# Patient Record
Sex: MALE | Race: WHITE | NOT HISPANIC OR LATINO | Employment: UNEMPLOYED | URBAN - METROPOLITAN AREA
[De-identification: names, ages, dates, MRNs, and addresses within clinical notes are randomized per-mention and may not be internally consistent; named-entity substitution may affect disease eponyms.]

---

## 2022-04-12 ENCOUNTER — APPOINTMENT (OUTPATIENT)
Dept: CT IMAGING | Facility: HOSPITAL | Age: 77
End: 2022-04-12

## 2022-04-12 ENCOUNTER — HOSPITAL ENCOUNTER (INPATIENT)
Facility: HOSPITAL | Age: 77
LOS: 2 days | Discharge: HOME | End: 2022-04-15
Attending: EMERGENCY MEDICINE | Admitting: STUDENT IN AN ORGANIZED HEALTH CARE EDUCATION/TRAINING PROGRAM

## 2022-04-12 DIAGNOSIS — K56.609 SMALL BOWEL OBSTRUCTION: ICD-10-CM

## 2022-04-12 DIAGNOSIS — R10.84 GENERALIZED ABDOMINAL PAIN: Primary | ICD-10-CM

## 2022-04-12 DIAGNOSIS — R93.3 ABNORMAL CT SCAN, ESOPHAGUS: ICD-10-CM

## 2022-04-12 LAB
ALBUMIN SERPL-MCNC: 4.5 G/DL (ref 3.5–5.2)
ALBUMIN/GLOB SERPL: 1.9 G/DL
ALP SERPL-CCNC: 50 U/L (ref 39–117)
ALT SERPL W P-5'-P-CCNC: 11 U/L (ref 1–41)
ANION GAP SERPL CALCULATED.3IONS-SCNC: 14 MMOL/L (ref 5–15)
APTT PPP: 22.3 SECONDS (ref 24–31)
AST SERPL-CCNC: 17 U/L (ref 1–40)
BASOPHILS # BLD AUTO: 0 10*3/MM3 (ref 0–0.2)
BASOPHILS NFR BLD AUTO: 0.7 % (ref 0–1.5)
BILIRUB SERPL-MCNC: 0.5 MG/DL (ref 0–1.2)
BUN SERPL-MCNC: 21 MG/DL (ref 8–23)
BUN/CREAT SERPL: 16.9 (ref 7–25)
CALCIUM SPEC-SCNC: 9.5 MG/DL (ref 8.6–10.5)
CHLORIDE SERPL-SCNC: 102 MMOL/L (ref 98–107)
CO2 SERPL-SCNC: 24 MMOL/L (ref 22–29)
CREAT SERPL-MCNC: 1.24 MG/DL (ref 0.76–1.27)
DEPRECATED RDW RBC AUTO: 47.3 FL (ref 37–54)
EGFRCR SERPLBLD CKD-EPI 2021: 59.9 ML/MIN/1.73
EOSINOPHIL # BLD AUTO: 0.1 10*3/MM3 (ref 0–0.4)
EOSINOPHIL NFR BLD AUTO: 2.5 % (ref 0.3–6.2)
ERYTHROCYTE [DISTWIDTH] IN BLOOD BY AUTOMATED COUNT: 14.1 % (ref 12.3–15.4)
GLOBULIN UR ELPH-MCNC: 2.4 GM/DL
GLUCOSE SERPL-MCNC: 118 MG/DL (ref 65–99)
HCT VFR BLD AUTO: 37 % (ref 37.5–51)
HGB BLD-MCNC: 12.8 G/DL (ref 13–17.7)
HOLD SPECIMEN: NORMAL
HOLD SPECIMEN: NORMAL
INR PPP: 0.96 (ref 0.93–1.1)
LIPASE SERPL-CCNC: 26 U/L (ref 13–60)
LYMPHOCYTES # BLD AUTO: 1 10*3/MM3 (ref 0.7–3.1)
LYMPHOCYTES NFR BLD AUTO: 17.6 % (ref 19.6–45.3)
MCH RBC QN AUTO: 33.6 PG (ref 26.6–33)
MCHC RBC AUTO-ENTMCNC: 34.5 G/DL (ref 31.5–35.7)
MCV RBC AUTO: 97.2 FL (ref 79–97)
MONOCYTES # BLD AUTO: 0.5 10*3/MM3 (ref 0.1–0.9)
MONOCYTES NFR BLD AUTO: 8.8 % (ref 5–12)
NEUTROPHILS NFR BLD AUTO: 4 10*3/MM3 (ref 1.7–7)
NEUTROPHILS NFR BLD AUTO: 70.4 % (ref 42.7–76)
NRBC BLD AUTO-RTO: 0.1 /100 WBC (ref 0–0.2)
PLATELET # BLD AUTO: 162 10*3/MM3 (ref 140–450)
PMV BLD AUTO: 8.7 FL (ref 6–12)
POTASSIUM SERPL-SCNC: 4.4 MMOL/L (ref 3.5–5.2)
PROT SERPL-MCNC: 6.9 G/DL (ref 6–8.5)
PROTHROMBIN TIME: 9.9 SECONDS (ref 9.6–11.7)
RBC # BLD AUTO: 3.81 10*6/MM3 (ref 4.14–5.8)
SARS-COV-2 RNA PNL SPEC NAA+PROBE: NOT DETECTED
SODIUM SERPL-SCNC: 140 MMOL/L (ref 136–145)
TROPONIN T SERPL-MCNC: <0.01 NG/ML (ref 0–0.03)
WBC NRBC COR # BLD: 5.6 10*3/MM3 (ref 3.4–10.8)
WHOLE BLOOD HOLD SPECIMEN: NORMAL

## 2022-04-12 PROCEDURE — 87635 SARS-COV-2 COVID-19 AMP PRB: CPT | Performed by: PHYSICIAN ASSISTANT

## 2022-04-12 PROCEDURE — 99284 EMERGENCY DEPT VISIT MOD MDM: CPT

## 2022-04-12 PROCEDURE — 93005 ELECTROCARDIOGRAM TRACING: CPT

## 2022-04-12 PROCEDURE — 85730 THROMBOPLASTIN TIME PARTIAL: CPT | Performed by: PHYSICIAN ASSISTANT

## 2022-04-12 PROCEDURE — 74177 CT ABD & PELVIS W/CONTRAST: CPT

## 2022-04-12 PROCEDURE — 93005 ELECTROCARDIOGRAM TRACING: CPT | Performed by: EMERGENCY MEDICINE

## 2022-04-12 PROCEDURE — 80053 COMPREHEN METABOLIC PANEL: CPT | Performed by: PHYSICIAN ASSISTANT

## 2022-04-12 PROCEDURE — 85025 COMPLETE CBC W/AUTO DIFF WBC: CPT | Performed by: PHYSICIAN ASSISTANT

## 2022-04-12 PROCEDURE — 83690 ASSAY OF LIPASE: CPT | Performed by: PHYSICIAN ASSISTANT

## 2022-04-12 PROCEDURE — 0 MORPHINE SULFATE 4 MG/ML SOLUTION: Performed by: PHYSICIAN ASSISTANT

## 2022-04-12 PROCEDURE — 85610 PROTHROMBIN TIME: CPT | Performed by: PHYSICIAN ASSISTANT

## 2022-04-12 PROCEDURE — 25010000002 ONDANSETRON PER 1 MG: Performed by: PHYSICIAN ASSISTANT

## 2022-04-12 PROCEDURE — 84484 ASSAY OF TROPONIN QUANT: CPT | Performed by: PHYSICIAN ASSISTANT

## 2022-04-12 PROCEDURE — 0 IOPAMIDOL PER 1 ML: Performed by: EMERGENCY MEDICINE

## 2022-04-12 RX ORDER — SODIUM CHLORIDE 0.9 % (FLUSH) 0.9 %
10 SYRINGE (ML) INJECTION AS NEEDED
Status: DISCONTINUED | OUTPATIENT
Start: 2022-04-12 | End: 2022-04-15 | Stop reason: HOSPADM

## 2022-04-12 RX ORDER — ONDANSETRON 2 MG/ML
4 INJECTION INTRAMUSCULAR; INTRAVENOUS ONCE
Status: COMPLETED | OUTPATIENT
Start: 2022-04-12 | End: 2022-04-12

## 2022-04-12 RX ORDER — PANTOPRAZOLE SODIUM 40 MG/10ML
40 INJECTION, POWDER, LYOPHILIZED, FOR SOLUTION INTRAVENOUS ONCE
Status: COMPLETED | OUTPATIENT
Start: 2022-04-12 | End: 2022-04-12

## 2022-04-12 RX ORDER — MORPHINE SULFATE 4 MG/ML
4 INJECTION, SOLUTION INTRAMUSCULAR; INTRAVENOUS ONCE
Status: COMPLETED | OUTPATIENT
Start: 2022-04-12 | End: 2022-04-12

## 2022-04-12 RX ADMIN — IOPAMIDOL 100 ML: 755 INJECTION, SOLUTION INTRAVENOUS at 23:14

## 2022-04-12 RX ADMIN — SODIUM CHLORIDE 1000 ML: 9 INJECTION, SOLUTION INTRAVENOUS at 21:46

## 2022-04-12 RX ADMIN — Medication 10 ML: at 21:47

## 2022-04-12 RX ADMIN — MORPHINE SULFATE 4 MG: 4 INJECTION INTRAVENOUS at 21:47

## 2022-04-12 RX ADMIN — PANTOPRAZOLE SODIUM 40 MG: 40 INJECTION, POWDER, FOR SOLUTION INTRAVENOUS at 21:47

## 2022-04-12 RX ADMIN — ONDANSETRON 4 MG: 2 INJECTION INTRAMUSCULAR; INTRAVENOUS at 21:47

## 2022-04-13 ENCOUNTER — APPOINTMENT (OUTPATIENT)
Dept: GENERAL RADIOLOGY | Facility: HOSPITAL | Age: 77
End: 2022-04-13

## 2022-04-13 ENCOUNTER — APPOINTMENT (OUTPATIENT)
Dept: CT IMAGING | Facility: HOSPITAL | Age: 77
End: 2022-04-13

## 2022-04-13 PROBLEM — R10.84 GENERALIZED ABDOMINAL PAIN: Status: ACTIVE | Noted: 2022-04-13

## 2022-04-13 LAB
ALBUMIN SERPL-MCNC: 3.6 G/DL (ref 3.5–5.2)
ALP SERPL-CCNC: 40 U/L (ref 39–117)
ALT SERPL W P-5'-P-CCNC: 11 U/L (ref 1–41)
ANION GAP SERPL CALCULATED.3IONS-SCNC: 7 MMOL/L (ref 5–15)
AST SERPL-CCNC: 19 U/L (ref 1–40)
BILIRUB CONJ SERPL-MCNC: <0.2 MG/DL (ref 0–0.3)
BILIRUB INDIRECT SERPL-MCNC: ABNORMAL MG/DL
BILIRUB SERPL-MCNC: 0.5 MG/DL (ref 0–1.2)
BILIRUB UR QL STRIP: NEGATIVE
BUN SERPL-MCNC: 24 MG/DL (ref 8–23)
BUN/CREAT SERPL: 26.1 (ref 7–25)
CALCIUM SPEC-SCNC: 8.5 MG/DL (ref 8.6–10.5)
CHLORIDE SERPL-SCNC: 108 MMOL/L (ref 98–107)
CLARITY UR: CLEAR
CO2 SERPL-SCNC: 25 MMOL/L (ref 22–29)
COLOR UR: YELLOW
CREAT SERPL-MCNC: 0.92 MG/DL (ref 0.76–1.27)
D-LACTATE SERPL-SCNC: 1 MMOL/L (ref 0.5–2)
DEPRECATED RDW RBC AUTO: 46.8 FL (ref 37–54)
EGFRCR SERPLBLD CKD-EPI 2021: 85.7 ML/MIN/1.73
ERYTHROCYTE [DISTWIDTH] IN BLOOD BY AUTOMATED COUNT: 14.1 % (ref 12.3–15.4)
GLUCOSE SERPL-MCNC: 91 MG/DL (ref 65–99)
GLUCOSE UR STRIP-MCNC: NEGATIVE MG/DL
HCT VFR BLD AUTO: 29.7 % (ref 37.5–51)
HGB BLD-MCNC: 10.4 G/DL (ref 13–17.7)
HGB UR QL STRIP.AUTO: NEGATIVE
KETONES UR QL STRIP: ABNORMAL
LEUKOCYTE ESTERASE UR QL STRIP.AUTO: NEGATIVE
MCH RBC QN AUTO: 34 PG (ref 26.6–33)
MCHC RBC AUTO-ENTMCNC: 35.2 G/DL (ref 31.5–35.7)
MCV RBC AUTO: 96.8 FL (ref 79–97)
NITRITE UR QL STRIP: NEGATIVE
PH UR STRIP.AUTO: 5.5 [PH] (ref 5–8)
PLATELET # BLD AUTO: 119 10*3/MM3 (ref 140–450)
PMV BLD AUTO: 8.4 FL (ref 6–12)
POTASSIUM SERPL-SCNC: 4.5 MMOL/L (ref 3.5–5.2)
PROT SERPL-MCNC: 5.7 G/DL (ref 6–8.5)
PROT UR QL STRIP: NEGATIVE
RBC # BLD AUTO: 3.06 10*6/MM3 (ref 4.14–5.8)
SODIUM SERPL-SCNC: 140 MMOL/L (ref 136–145)
SP GR UR STRIP: 1.06 (ref 1–1.03)
UROBILINOGEN UR QL STRIP: ABNORMAL
WBC NRBC COR # BLD: 5.4 10*3/MM3 (ref 3.4–10.8)

## 2022-04-13 PROCEDURE — 85027 COMPLETE CBC AUTOMATED: CPT | Performed by: STUDENT IN AN ORGANIZED HEALTH CARE EDUCATION/TRAINING PROGRAM

## 2022-04-13 PROCEDURE — 25010000002 METHYLNALTREXONE 12 MG/0.6ML SOLUTION: Performed by: NURSE PRACTITIONER

## 2022-04-13 PROCEDURE — 80076 HEPATIC FUNCTION PANEL: CPT | Performed by: STUDENT IN AN ORGANIZED HEALTH CARE EDUCATION/TRAINING PROGRAM

## 2022-04-13 PROCEDURE — 71045 X-RAY EXAM CHEST 1 VIEW: CPT

## 2022-04-13 PROCEDURE — 74018 RADEX ABDOMEN 1 VIEW: CPT

## 2022-04-13 PROCEDURE — 36415 COLL VENOUS BLD VENIPUNCTURE: CPT | Performed by: FAMILY MEDICINE

## 2022-04-13 PROCEDURE — 80048 BASIC METABOLIC PNL TOTAL CA: CPT | Performed by: STUDENT IN AN ORGANIZED HEALTH CARE EDUCATION/TRAINING PROGRAM

## 2022-04-13 PROCEDURE — 81003 URINALYSIS AUTO W/O SCOPE: CPT | Performed by: PHYSICIAN ASSISTANT

## 2022-04-13 PROCEDURE — 94640 AIRWAY INHALATION TREATMENT: CPT

## 2022-04-13 PROCEDURE — 94799 UNLISTED PULMONARY SVC/PX: CPT

## 2022-04-13 PROCEDURE — 99222 1ST HOSP IP/OBS MODERATE 55: CPT | Performed by: FAMILY MEDICINE

## 2022-04-13 PROCEDURE — 99223 1ST HOSP IP/OBS HIGH 75: CPT | Performed by: SURGERY

## 2022-04-13 PROCEDURE — 83605 ASSAY OF LACTIC ACID: CPT | Performed by: FAMILY MEDICINE

## 2022-04-13 PROCEDURE — 25010000002 MORPHINE PER 10 MG: Performed by: FAMILY MEDICINE

## 2022-04-13 RX ORDER — MELOXICAM 15 MG/1
15 TABLET ORAL DAILY
COMMUNITY
End: 2022-04-15 | Stop reason: HOSPADM

## 2022-04-13 RX ORDER — ALBUTEROL SULFATE 90 UG/1
2 AEROSOL, METERED RESPIRATORY (INHALATION) EVERY 6 HOURS PRN
COMMUNITY

## 2022-04-13 RX ORDER — NITROGLYCERIN 0.4 MG/1
0.4 TABLET SUBLINGUAL
Status: DISCONTINUED | OUTPATIENT
Start: 2022-04-13 | End: 2022-04-15 | Stop reason: HOSPADM

## 2022-04-13 RX ORDER — SODIUM CHLORIDE 0.9 % (FLUSH) 0.9 %
10 SYRINGE (ML) INJECTION AS NEEDED
Status: DISCONTINUED | OUTPATIENT
Start: 2022-04-13 | End: 2022-04-15 | Stop reason: HOSPADM

## 2022-04-13 RX ORDER — SODIUM CHLORIDE 9 MG/ML
75 INJECTION, SOLUTION INTRAVENOUS CONTINUOUS
Status: DISCONTINUED | OUTPATIENT
Start: 2022-04-13 | End: 2022-04-14

## 2022-04-13 RX ORDER — CLOPIDOGREL BISULFATE 75 MG/1
75 TABLET ORAL DAILY
COMMUNITY

## 2022-04-13 RX ORDER — MORPHINE SULFATE 2 MG/ML
2 INJECTION, SOLUTION INTRAMUSCULAR; INTRAVENOUS EVERY 4 HOURS PRN
Status: DISCONTINUED | OUTPATIENT
Start: 2022-04-13 | End: 2022-04-15 | Stop reason: HOSPADM

## 2022-04-13 RX ORDER — PRAVASTATIN SODIUM 40 MG
40 TABLET ORAL DAILY
COMMUNITY

## 2022-04-13 RX ORDER — ONDANSETRON 2 MG/ML
4 INJECTION INTRAMUSCULAR; INTRAVENOUS EVERY 6 HOURS PRN
Status: DISCONTINUED | OUTPATIENT
Start: 2022-04-13 | End: 2022-04-15 | Stop reason: HOSPADM

## 2022-04-13 RX ORDER — TAMSULOSIN HYDROCHLORIDE 0.4 MG/1
1 CAPSULE ORAL DAILY
COMMUNITY

## 2022-04-13 RX ORDER — ONDANSETRON 4 MG/1
4 TABLET, FILM COATED ORAL EVERY 6 HOURS PRN
Status: DISCONTINUED | OUTPATIENT
Start: 2022-04-13 | End: 2022-04-15 | Stop reason: HOSPADM

## 2022-04-13 RX ORDER — ACETAMINOPHEN 160 MG/5ML
650 SOLUTION ORAL EVERY 4 HOURS PRN
Status: DISCONTINUED | OUTPATIENT
Start: 2022-04-13 | End: 2022-04-15 | Stop reason: HOSPADM

## 2022-04-13 RX ORDER — IPRATROPIUM BROMIDE AND ALBUTEROL SULFATE 2.5; .5 MG/3ML; MG/3ML
3 SOLUTION RESPIRATORY (INHALATION) EVERY 6 HOURS PRN
Status: DISCONTINUED | OUTPATIENT
Start: 2022-04-13 | End: 2022-04-14

## 2022-04-13 RX ORDER — PANTOPRAZOLE SODIUM 40 MG/10ML
40 INJECTION, POWDER, LYOPHILIZED, FOR SOLUTION INTRAVENOUS
Status: DISCONTINUED | OUTPATIENT
Start: 2022-04-13 | End: 2022-04-13

## 2022-04-13 RX ORDER — AMLODIPINE BESYLATE 10 MG/1
10 TABLET ORAL DAILY
COMMUNITY

## 2022-04-13 RX ORDER — ACETAMINOPHEN 325 MG/1
650 TABLET ORAL EVERY 4 HOURS PRN
Status: DISCONTINUED | OUTPATIENT
Start: 2022-04-13 | End: 2022-04-15 | Stop reason: HOSPADM

## 2022-04-13 RX ORDER — IPRATROPIUM BROMIDE AND ALBUTEROL SULFATE 2.5; .5 MG/3ML; MG/3ML
3 SOLUTION RESPIRATORY (INHALATION) EVERY 4 HOURS PRN
COMMUNITY

## 2022-04-13 RX ORDER — PANTOPRAZOLE SODIUM 40 MG/10ML
40 INJECTION, POWDER, LYOPHILIZED, FOR SOLUTION INTRAVENOUS 2 TIMES DAILY
Status: DISCONTINUED | OUTPATIENT
Start: 2022-04-13 | End: 2022-04-14

## 2022-04-13 RX ORDER — TRAMADOL HYDROCHLORIDE 50 MG/1
50 TABLET ORAL EVERY 6 HOURS PRN
COMMUNITY

## 2022-04-13 RX ORDER — IPRATROPIUM BROMIDE AND ALBUTEROL SULFATE 2.5; .5 MG/3ML; MG/3ML
3 SOLUTION RESPIRATORY (INHALATION) EVERY 4 HOURS PRN
Status: CANCELLED | OUTPATIENT
Start: 2022-04-13

## 2022-04-13 RX ORDER — AMLODIPINE BESYLATE 5 MG/1
10 TABLET ORAL DAILY
Status: CANCELLED | OUTPATIENT
Start: 2022-04-13

## 2022-04-13 RX ORDER — BISACODYL 10 MG
10 SUPPOSITORY, RECTAL RECTAL ONCE
Status: COMPLETED | OUTPATIENT
Start: 2022-04-13 | End: 2022-04-13

## 2022-04-13 RX ORDER — SORBITOL SOLUTION 70 %
45 SOLUTION, ORAL MISCELLANEOUS ONCE
Status: COMPLETED | OUTPATIENT
Start: 2022-04-13 | End: 2022-04-13

## 2022-04-13 RX ORDER — ACETAMINOPHEN 650 MG/1
650 SUPPOSITORY RECTAL EVERY 4 HOURS PRN
Status: DISCONTINUED | OUTPATIENT
Start: 2022-04-13 | End: 2022-04-15 | Stop reason: HOSPADM

## 2022-04-13 RX ORDER — ALBUTEROL SULFATE 2.5 MG/3ML
2.5 SOLUTION RESPIRATORY (INHALATION) EVERY 6 HOURS PRN
Status: CANCELLED | OUTPATIENT
Start: 2022-04-13

## 2022-04-13 RX ORDER — CLOPIDOGREL BISULFATE 75 MG/1
75 TABLET ORAL DAILY
Status: CANCELLED | OUTPATIENT
Start: 2022-04-13

## 2022-04-13 RX ORDER — SODIUM CHLORIDE 0.9 % (FLUSH) 0.9 %
10 SYRINGE (ML) INJECTION EVERY 12 HOURS SCHEDULED
Status: DISCONTINUED | OUTPATIENT
Start: 2022-04-13 | End: 2022-04-15 | Stop reason: HOSPADM

## 2022-04-13 RX ORDER — ALBUTEROL SULFATE 2.5 MG/3ML
2.5 SOLUTION RESPIRATORY (INHALATION) EVERY 6 HOURS PRN
Status: DISCONTINUED | OUTPATIENT
Start: 2022-04-13 | End: 2022-04-14

## 2022-04-13 RX ORDER — CHOLECALCIFEROL (VITAMIN D3) 125 MCG
5 CAPSULE ORAL NIGHTLY PRN
Status: DISCONTINUED | OUTPATIENT
Start: 2022-04-13 | End: 2022-04-15 | Stop reason: HOSPADM

## 2022-04-13 RX ADMIN — METHYLNALTREXONE BROMIDE 8 MG: 12 INJECTION, SOLUTION SUBCUTANEOUS at 14:31

## 2022-04-13 RX ADMIN — MORPHINE SULFATE 2 MG: 2 INJECTION, SOLUTION INTRAMUSCULAR; INTRAVENOUS at 04:59

## 2022-04-13 RX ADMIN — ACETAMINOPHEN 650 MG: 325 TABLET ORAL at 23:45

## 2022-04-13 RX ADMIN — ALBUTEROL SULFATE 2.5 MG: 2.5 SOLUTION RESPIRATORY (INHALATION) at 22:03

## 2022-04-13 RX ADMIN — PANTOPRAZOLE SODIUM 40 MG: 40 INJECTION, POWDER, LYOPHILIZED, FOR SOLUTION INTRAVENOUS at 05:00

## 2022-04-13 RX ADMIN — SODIUM CHLORIDE 100 ML/HR: 9 INJECTION, SOLUTION INTRAVENOUS at 05:02

## 2022-04-13 RX ADMIN — Medication 10 ML: at 05:02

## 2022-04-13 RX ADMIN — PANTOPRAZOLE SODIUM 40 MG: 40 INJECTION, POWDER, FOR SOLUTION INTRAVENOUS at 22:21

## 2022-04-13 RX ADMIN — Medication 5 MG: at 23:45

## 2022-04-13 RX ADMIN — Medication 10 ML: at 22:21

## 2022-04-13 RX ADMIN — SORBITOL SOLUTION (BULK) 45 ML: 70 SOLUTION at 14:31

## 2022-04-13 RX ADMIN — SODIUM CHLORIDE 100 ML/HR: 9 INJECTION, SOLUTION INTRAVENOUS at 14:30

## 2022-04-13 RX ADMIN — BISACODYL 10 MG: 10 SUPPOSITORY RECTAL at 14:31

## 2022-04-13 NOTE — PLAN OF CARE
Goal Outcome Evaluation:  Plan of Care Reviewed With: patient        Progress: no change  Outcome Evaluation: Patient admitted to floor from ER for abdominal pain with possible ileus.  MD contacted for pain medication due to patients pain 10/10, NGT to ILWS with brown coffee ground output.  GI and Gen surgery consults called, will see in AM

## 2022-04-13 NOTE — PROGRESS NOTES
[facility] Medicine Services Daily Progress Note    Patient Name: Kali Garcia  : 1945  MRN: [id number]  Primary Care Physician:  Provider, No Known  Date of admission: 2022      Subjective      Chief Complaint: Abdominal pain      2022  Patient states abdominal pain is still present but improved.  States flatus is slightly present.  Minimal output from NG tube.  GI and surgery to evaluate.      ROS   Constitutional: Negative for chills, diaphoresis and fever.   Eyes: Negative for blurred vision, double vision and pain.   Cardiovascular: Negative for claudication, irregular heartbeat, leg swelling and orthopnea.   Respiratory: Negative for cough, hemoptysis and shortness of breath.    Skin: Negative for color change.   Musculoskeletal: Negative for arthritis and back pain.   Gastrointestinal: Positive for abdominal pain, constipation, nausea and vomiting.   Genitourinary: Negative for bladder incontinence, dysuria and flank pain.   Neurological: Negative for brief paralysis, focal weakness, numbness and seizures.   Psychiatric/Behavioral: Negative for altered mental status and depression. The patient has insomnia.    All other systems reviewed and are negative.    Objective      Vitals:   Temp:  [97.5 °F (36.4 °C)-97.7 °F (36.5 °C)] 97.6 °F (36.4 °C)  Heart Rate:  [61-93] 66  Resp:  [15-26] 16  BP: (103-137)/(46-78) 117/58  Flow (L/min):  [2] 2    Physical Exam  Constitutional:       General: He is not in acute distress.     Appearance: He is not ill-appearing.   HENT:      Head: Normocephalic and atraumatic.      Nose:      Comments: NG tube present     Mouth/Throat:      Pharynx: Oropharynx is clear. No oropharyngeal exudate.   Eyes:      General: No scleral icterus.  Cardiovascular:      Rate and Rhythm: Normal rate and regular rhythm.      Heart sounds: No murmur heard.    No friction rub. No gallop.   Pulmonary:      Effort: No respiratory distress.      Breath  sounds: No wheezing or rales.   Abdominal:      General: There is no distension.      Tenderness: There is abdominal tenderness. There is no guarding.   Musculoskeletal:         General: No swelling or deformity.      Cervical back: Normal range of motion. No rigidity.      Right lower leg: No edema.      Left lower leg: No edema.   Skin:     Coloration: Skin is not jaundiced.      Findings: No bruising or lesion.   Neurological:      General: No focal deficit present.      Mental Status: He is alert and oriented to person, place, and time.      Motor: No weakness.   Psychiatric:         Mood and Affect: Mood normal.         Behavior: Behavior normal.         Thought Content: Thought content normal.         Judgment: Judgment normal.             Result Review    Result Review:  I have personally reviewed the results from the time of this admission to 4/13/2022 10:50 EDT and agree with these findings:  [x]  Laboratory  []  Microbiology  [x]  Radiology  []  EKG/Telemetry   []  Cardiology/Vascular   []  Pathology  []  Old records  []  Other:            Assessment/Plan      Brief Patient Summary:  Kali Garcia is a 76 y.o. male who presented due to abdominal pain      pantoprazole, 40 mg, Intravenous, BID  sodium chloride, 10 mL, Intravenous, Q12H       sodium chloride, 100 mL/hr, Last Rate: 100 mL/hr (04/13/22 0502)         Active Hospital Problems:  Active Hospital Problems    Diagnosis    • Generalized abdominal pain      Plan:     #SBO vs Ileus    - CT a/p shows small bowel dilation suggesting ileus or obstruction    - NG placed    - NPO    - IVF     - symptoms improved     - Due to esophageal thickening will start PPI BID     - GI and surgery consulted    #Esophageal thickening    -CT a/p suggests lower esophageal thickening, recommends CT chest    - CT chest with contrast ordered    #CAD    - hold home plavix due to anemia    #Anemia    - Hgb 12.8 > 10.4, base unknown    - dilution vs 2/2 obstruction    - On  ppi BID    - watch for melena    - GI following    #HTN    - start home norvasc when cleared for PO    #HLD    - resume home statin when cleared for PO    #BP    - start Flomax when cleared for PO    #DVT prophylaxis    - stop lovenox, start SCDs    DVT prophylaxis:  Mechanical DVT prophylaxis orders are present.    CODE STATUS:    Code Status (Patient has no pulse and is not breathing): CPR (Attempt to Resuscitate)  Medical Interventions (Patient has pulse or is breathing): Full Support      Disposition:  I expect patient to be discharged when cleared by surgery.    This patient has been examined wearing appropriate Personal Protective Equipment and discussed with PAtient and nursing. 04/13/22      Electronically signed by Santiago Yanez DO, 04/13/22, 10:50 EDT.  [facility] Hospitalist Team

## 2022-04-13 NOTE — CONSULTS
General Surgery Consult Note      Name: Kali Garcia ADMIT: 2022   : 1945  PCP: Provider, No Known    MRN: [id number] LOS: 0 days   AGE/SEX: 76 y.o. male  ROOM: [address] [place] [address]      Patient Care Team:  Provider, No Known as PCP - General  Chief Complaint   Patient presents with   • Abdominal Pain       Subjective   76-year-old gentleman with about 5-week history of worsening abdominal distention reflux and constipation.  He says that he has had a deep burning in the epigastrium especially when he lays down at night.  He has had flatus and some bowel function but has had decrease in output.  He has never had abdominal surgery before.  On arrival he was found to have a distended abdominal exam.  CT scan showed distended small bowel and colon concerning for ileus or obstruction.  He was also found to have some thickening of the esophagus.  He is into the hospital for fluid resuscitation nasogastric tube decompression and myself and gastroenterology were consulted for work-up and management of his presentation.    Past Medical History:   Diagnosis Date   • COPD (chronic obstructive pulmonary disease) (HCC)    • Emphysema lung (HCC)    • Hyperlipidemia    • Hypertension    • Osteoarthritis      Past Surgical History:   Procedure Laterality Date   • CARDIAC CATHETERIZATION     • CORONARY STENT PLACEMENT     • EYE SURGERY     • FEMORAL ARTERY STENT     • KNEE SURGERY Left    • KNEE SURGERY Left    • LUNG SURGERY       History reviewed. No pertinent family history.  Social History     Tobacco Use   • Smoking status: Current Every Day Smoker     Packs/day: 1.00     Years: 63.00     Pack years: 63.00     Types: Cigarettes   Substance Use Topics   • Alcohol use: Not Currently   • Drug use: Never     Medications Prior to Admission   Medication Sig Dispense Refill Last Dose   • albuterol sulfate  (90 Base) MCG/ACT inhaler Inhale 2 puffs Every 6 (Six) Hours As Needed for Wheezing.      • amLODIPine  (NORVASC) 10 MG tablet Take 10 mg by mouth Daily.      • clopidogrel (PLAVIX) 75 MG tablet Take 75 mg by mouth Daily.      • ipratropium-albuterol (DUO-NEB) 0.5-2.5 mg/3 ml nebulizer Take 3 mL by nebulization Every 4 (Four) Hours As Needed for Wheezing.      • meloxicam (MOBIC) 15 MG tablet Take 15 mg by mouth Daily.      • mupirocin (BACTROBAN) 2 % ointment Apply 1 application topically to the appropriate area as directed 2 (Two) Times a Day.      • pravastatin (PRAVACHOL) 40 MG tablet Take 40 mg by mouth Daily.      • tamsulosin (FLOMAX) 0.4 MG capsule 24 hr capsule Take 1 capsule by mouth Daily.      • traMADol (ULTRAM) 50 MG tablet Take 50 mg by mouth Every 6 (Six) Hours As Needed for Moderate Pain .        pantoprazole, 40 mg, Intravenous, BID  sodium chloride, 10 mL, Intravenous, Q12H      sodium chloride, 100 mL/hr, Last Rate: 100 mL/hr (04/13/22 0502)      •  acetaminophen **OR** acetaminophen **OR** acetaminophen  •  melatonin  •  Morphine  •  nitroglycerin  •  ondansetron **OR** ondansetron  •  sodium chloride  •  sodium chloride  Patient has no known allergies.    Review of Systems   Constitutional: Negative for chills and fever.   HENT: Negative for sore throat and trouble swallowing.    Eyes: Negative for blurred vision and double vision.   Respiratory: Positive for shortness of breath. Negative for cough.    Cardiovascular: Negative for chest pain and leg swelling.   Gastrointestinal: Positive for abdominal distention, constipation, nausea and GERD. Negative for abdominal pain, blood in stool, diarrhea and vomiting.   Genitourinary: Negative for dysuria and hematuria.   Skin: Negative for rash and wound.   Neurological: Positive for dizziness. Negative for confusion.   Psychiatric/Behavioral: Negative for agitation and depressed mood.        Objective     Vital Signs and Labs:  Vital Signs Patient Vitals for the past 24 hrs:   BP Temp Temp src Pulse Resp SpO2 Height Weight   04/13/22 0839 117/58 97.6  "°F (36.4 °C) Oral 66 16 95 % -- --   04/13/22 0355 130/56 97.7 °F (36.5 °C) Oral 86 26 96 % -- 54.6 kg (120 lb 5.9 oz)   04/13/22 0229 130/66 -- -- 93 -- 92 % -- --   04/13/22 0202 -- -- -- -- -- 91 % -- --   04/13/22 0201 116/58 -- -- 88 -- -- -- --   04/13/22 0145 113/59 -- -- 85 -- 97 % -- --   04/13/22 0130 125/61 -- -- 83 -- 93 % -- --   04/13/22 0115 113/60 -- -- 74 -- 96 % -- --   04/13/22 0100 137/57 -- -- 86 -- 97 % -- --   04/13/22 0048 133/62 -- -- 85 -- 93 % -- --   04/13/22 0033 129/63 -- -- 84 -- 93 % -- --   04/13/22 0018 134/58 -- -- 85 -- 98 % -- --   04/13/22 0003 136/57 -- -- 86 -- 95 % -- --   04/12/22 2346 128/54 -- -- 76 -- -- -- --   04/12/22 2331 103/46 -- -- 86 -- -- -- --   04/12/22 2316 115/52 -- -- 86 -- -- -- --   04/12/22 2315 -- -- -- -- -- 92 % -- --   04/12/22 2246 110/49 -- -- 67 -- 92 % -- --   04/12/22 2232 116/53 -- -- 79 -- 92 % -- --   04/12/22 2216 126/48 -- -- 69 -- 93 % -- --   04/12/22 2147 133/61 -- -- 77 -- 93 % -- --   04/12/22 2131 128/68 -- -- 68 -- 97 % -- --   04/12/22 2116 124/63 -- -- 77 -- 100 % -- --   04/12/22 2101 134/78 -- -- 72 -- 90 % -- --   04/12/22 2056 103/68 -- -- 62 -- 98 % -- --   04/12/22 2037 121/52 97.5 °F (36.4 °C) Oral 61 15 97 % 182.9 cm (72\") 128 kg (282 lb 3 oz)       Physical Exam:  Physical Exam  Constitutional:       General: He is not in acute distress.     Appearance: He is well-developed.      Comments: Cachectic   HENT:      Head: Normocephalic and atraumatic.      Nose:      Comments: Nasogastric tube in place with gastric contents within the canister  Eyes:      General: No scleral icterus.     Conjunctiva/sclera: Conjunctivae normal.   Neck:      Trachea: No tracheal deviation.   Cardiovascular:      Rate and Rhythm: Normal rate.      Pulses: Normal pulses.   Pulmonary:      Effort: Pulmonary effort is normal. No respiratory distress.   Abdominal:      Palpations: Abdomen is soft.      Comments: Moderate distention mild general " tenderness to palpation no definitive evidence of hernia no peritoneal signs   Musculoskeletal:         General: No swelling or tenderness.      Cervical back: Neck supple. No tenderness. No muscular tenderness.   Skin:     General: Skin is warm and dry.   Neurological:      General: No focal deficit present.      Mental Status: He is alert. Mental status is at baseline.   Psychiatric:         Mood and Affect: Mood normal.         Behavior: Behavior normal.         CBC    Results from last 7 days   Lab Units 04/13/22  0922 04/12/22  2101   WBC 10*3/mm3 5.40 5.60   HEMOGLOBIN g/dL 10.4* 12.8*   PLATELETS 10*3/mm3 119* 162     BMP   Results from last 7 days   Lab Units 04/13/22  0922 04/12/22  2101   SODIUM mmol/L 140 140   POTASSIUM mmol/L 4.5 4.4   CHLORIDE mmol/L 108* 102   CO2 mmol/L 25.0 24.0   BUN mg/dL 24* 21   CREATININE mg/dL 0.92 1.24   GLUCOSE mg/dL 91 118*     Radiology(recent) CT Abdomen Pelvis With Contrast    Result Date: 4/13/2022  Impression: 1. Small bowel dilatation could reflect ileus or obstruction. No clear transition point is seen. Follow-up as clinically warranted. 2. Fluid distention of the lower esophagus may be related to possible small bowel obstruction or reflux. Questioned masslike thickening in the lower esophagus is only partially imaged. Chest CT with contrast may be helpful. Electronically signed by:  Berto Combs M.D.  4/12/2022 10:05 PM    XR Abdomen KUB    Result Date: 4/13/2022  IMPRESSION :  Nasogastric in good position.  Bowel dilation which may either be due to ileus or distal obstruction  Electronically Signed By-Damon Kumar On:4/13/2022 7:43 AM This report was finalized on [id number] by  Damon Kumar, .      I reviewed the patient's new clinical results.  I reviewed the patient's new imaging results and agree with the interpretation.    Assessment/Plan       Generalized abdominal pain      76 y.o. male 5 weeks of reflux symptoms epigastric abdominal pain and  constipation.    Unsure as the exact etiology of his symptoms.  I believe the gastroenterology is good to be seeing him for consideration for evaluation of the thickening of the lower esophagus they also may be able to help with considerations for constipation in the colon.  He does not have any history of abdominal surgery and there is no transition point on the imaging to suggest a bowel obstruction so I do not believe he has a surgical indication at present.  I will continue to follow as this is being worked up and symptoms are being managed.       This note was created using Dragon Voice Recognition software.    Wayne Alcala MD  04/13/22  12:00 EDT

## 2022-04-13 NOTE — H&P
[facility] Medicine Services      Patient Name: Kali Garcia  : 1945  MRN: [id number]  Primary Care Physician:  Provider, No Known  Date of admission: 2022      Subjective      Chief Complaint: Generalized abdominal pain and constipation    History of Present Illness: Kali Garcia is a 76 y.o. male who presented to [facility] on 2022 complaining of generalized and diffuse abdominal pain and constipation.  Patient states that the generalized abdominal pain started 4 weeks ago, and it radiates up into his esophagus.  Constipation has worsened over the past 4 weeks as well.  his last bowel movement was yesterday.  Patient states that he had to strain and it was a smaller than normal bowel movement.  Patient is also been having worsening nausea and vomiting for the past 4 weeks.  Patient reports that the vomitus is darker in quality and no blood is seen in it.  Denies any diarrhea or blood in the stool.  Patient denies any fevers, rigors, chills.      Review of Systems   Constitutional: Negative for chills, diaphoresis and fever.   Eyes: Negative for blurred vision, double vision and pain.   Cardiovascular: Negative for claudication, irregular heartbeat, leg swelling and orthopnea.   Respiratory: Negative for cough, hemoptysis and shortness of breath.    Skin: Negative for color change.   Musculoskeletal: Negative for arthritis and back pain.   Gastrointestinal: Positive for abdominal pain, constipation, nausea and vomiting.   Genitourinary: Negative for bladder incontinence, dysuria and flank pain.   Neurological: Negative for brief paralysis, focal weakness, numbness and seizures.   Psychiatric/Behavioral: Negative for altered mental status and depression. The patient has insomnia.    All other systems reviewed and are negative.       Personal History     Past Medical History:   Diagnosis Date   • COPD (chronic obstructive pulmonary disease) (HCC)    •  Hypertension        No past surgical history on file.    Family History: family history is not on file. Otherwise pertinent FHx was reviewed and not pertinent to current issue.    Social History:      Home Medications:  Prior to Admission Medications     None            Allergies:  No Known Allergies    Objective      Vitals:   Temp:  [97.5 °F (36.4 °C)] 97.5 °F (36.4 °C)  Heart Rate:  [61-93] 93  Resp:  [15] 15  BP: (103-137)/(46-78) 130/66    Physical Exam  Vitals and nursing note reviewed.   Constitutional:       General: He is in acute distress.      Appearance: He is normal weight. He is ill-appearing. He is not toxic-appearing or diaphoretic.   HENT:      Head: Normocephalic.      Nose: Nose normal.   Eyes:      Pupils: Pupils are equal, round, and reactive to light.   Cardiovascular:      Rate and Rhythm: Normal rate and regular rhythm.      Pulses: Normal pulses.      Heart sounds: Normal heart sounds.   Pulmonary:      Effort: Pulmonary effort is normal. No respiratory distress.      Breath sounds: Normal breath sounds. No stridor. No rhonchi.   Chest:      Chest wall: No tenderness.   Abdominal:      General: There is no distension.      Palpations: There is no mass.      Tenderness: There is no right CVA tenderness, left CVA tenderness, guarding or rebound.      Comments: Patient has exquisite diffuse abdominal tenderness to palpation, also mild rigidity noted   Musculoskeletal:         General: Normal range of motion.      Cervical back: Normal range of motion and neck supple. No rigidity.   Skin:     General: Skin is warm and dry.   Neurological:      General: No focal deficit present.      Mental Status: He is alert and oriented to person, place, and time. Mental status is at baseline.   Psychiatric:         Mood and Affect: Mood normal.         Behavior: Behavior normal.          Result Review    Result Review:  I have personally reviewed the results from the time of this admission to 4/13/2022 02:52  EDT and agree with these findings:  [x]  Laboratory  [x]  Microbiology  [x]  Radiology  [x]  EKG/Telemetry   []  Cardiology/Vascular   []  Pathology  []  Old records  []  Other:  Most notable findings include: CT abdomen pelvis showed small bowel dilatation which could reflect ileus or obstruction      Assessment/Plan        Active Hospital Problems:  Active Hospital Problems    Diagnosis    • Generalized abdominal pain      Plan:     SBO  -Admit patient to floor  -Vital signs per unit policy  -Bowel rest and decompression, NG tube placement ordered  -General surgical evaluation and consultation  -Appreciate all recommendations  -N.p.o., normal saline 100 cc an hour maintenance fluids, strict I's and O's  -Protonix IV  -CT Abdomen Pelvis With Contrast   Final Result    Impression:     1. Small bowel dilatation could reflect ileus or obstruction. No clear transition point is seen. Follow-up as clinically warranted.    2. Fluid distention of the lower esophagus may be related to possible small bowel obstruction or reflux. Questioned masslike thickening in the lower esophagus is only partially imaged. Chest CT with contrast may be helpful  -Will order a lactic acid  -Cont. To trend all routine labwork and infectious markers    Hypertension  -Vital signs per unit policy  -No home meds listed  -Restart home meds once verified by pharmacy    COPD/emphysema  -Patient stable on room air  -No home meds listed  -Restart home meds once verified by pharmacy    DVT prophylaxis:  Medical DVT prophylaxis orders are present.    CODE STATUS:    Code Status (Patient has no pulse and is not breathing): CPR (Attempt to Resuscitate)  Medical Interventions (Patient has pulse or is breathing): Full Support    Admission Status:  I believe this patient meets inpatient status.    I discussed the patient's findings and my recommendations with patient.        Signature: jim moreno MD , 3:00 am

## 2022-04-13 NOTE — ED PROVIDER NOTES
Subjective   Patient is a 76-year-old male who presents to the ED with complaints of abdominal pain over the past few days that has progressed over the past 24 hours.  Patient states a generalized burning type pain that he rates as severe.  Patient states the burning type pain radiates up into his esophagus patient reports associated nausea vomiting and belching.  Patient states his vomit is a little bit darker in color.  Patient reports he had a small bowel movement today but was somewhat smaller than his normal.  He denies any black or bloody stools.  Patient states he is also had some chills no known fever.  No recent travel or known sick contacts.  No urinary symptoms including dysuria or hematuria.  She does report having a colonoscopy a few years ago and he believes was unremarkable at that time.  Patient states he is currently on Plavix.  Patient denies any other alleviating or exacerbating factors of his symptoms.      History provided by:  Patient and parent      Review of Systems   Constitutional: Negative.    HENT: Negative.    Eyes: Negative for photophobia and visual disturbance.   Respiratory: Negative.    Cardiovascular: Negative.    Gastrointestinal: Positive for abdominal pain, constipation, nausea and vomiting. Negative for abdominal distention, blood in stool and diarrhea.   Genitourinary: Negative.    Musculoskeletal: Negative for back pain, neck pain and neck stiffness.   Skin: Negative.    Neurological: Negative.    Hematological: Negative.        No past medical history on file.    No Known Allergies    No past surgical history on file.    No family history on file.    Social History     Socioeconomic History   • Marital status:            Objective   Physical Exam  Vitals and nursing note reviewed.   Constitutional:       General: He is not in acute distress.     Appearance: Normal appearance. He is well-developed. He is not toxic-appearing or diaphoretic.      Comments: Chronically  "ill-appearing   HENT:      Head: Normocephalic and atraumatic.      Mouth/Throat:      Mouth: Mucous membranes are moist.      Pharynx: Oropharynx is clear.   Eyes:      General: No scleral icterus.     Extraocular Movements: Extraocular movements intact.      Pupils: Pupils are equal, round, and reactive to light.   Cardiovascular:      Rate and Rhythm: Normal rate and regular rhythm.      Pulses: Normal pulses.      Heart sounds: No murmur heard.    No friction rub. No gallop.   Pulmonary:      Effort: Pulmonary effort is normal. No tachypnea, accessory muscle usage or respiratory distress.      Breath sounds: No stridor. Examination of the right-lower field reveals wheezing. Examination of the left-lower field reveals wheezing. Wheezing present. No decreased breath sounds, rhonchi or rales.   Chest:      Chest wall: No mass, deformity, tenderness or crepitus.   Abdominal:      General: Bowel sounds are normal. There is no distension.      Palpations: Abdomen is soft. There is no mass.      Tenderness: There is generalized abdominal tenderness. There is no right CVA tenderness, left CVA tenderness, guarding or rebound. Negative signs include Steiner's sign and McBurney's sign.      Hernia: No hernia is present.   Musculoskeletal:      Cervical back: Normal range of motion and neck supple.   Skin:     General: Skin is warm.      Capillary Refill: Capillary refill takes less than 2 seconds.      Findings: No rash.   Neurological:      Mental Status: He is alert and oriented to person, place, and time.   Psychiatric:         Mood and Affect: Mood normal.         Behavior: Behavior normal.         Procedures           ED Course    /62   Pulse 85   Temp 97.5 °F (36.4 °C) (Oral)   Resp 15   Ht 182.9 cm (72\")   Wt 128 kg (282 lb 3 oz)   SpO2 93%   BMI 38.27 kg/m²   Medications   sodium chloride 0.9 % flush 10 mL (10 mL Intravenous Given 4/12/22 2147)   nitroglycerin (NITROSTAT) SL tablet 0.4 mg (has no " administration in time range)   sodium chloride 0.9 % flush 10 mL (has no administration in time range)   sodium chloride 0.9 % flush 10 mL (has no administration in time range)   acetaminophen (TYLENOL) tablet 650 mg (has no administration in time range)     Or   acetaminophen (TYLENOL) 160 MG/5ML solution 650 mg (has no administration in time range)     Or   acetaminophen (TYLENOL) suppository 650 mg (has no administration in time range)   ondansetron (ZOFRAN) tablet 4 mg (has no administration in time range)     Or   ondansetron (ZOFRAN) injection 4 mg (has no administration in time range)   melatonin tablet 5 mg (has no administration in time range)   enoxaparin (LOVENOX) syringe 40 mg (has no administration in time range)   pantoprazole (PROTONIX) injection 40 mg (has no administration in time range)   sodium chloride 0.9 % infusion (has no administration in time range)   sodium chloride 0.9 % bolus 1,000 mL (1,000 mL Intravenous New Bag 4/12/22 2146)   ondansetron (ZOFRAN) injection 4 mg (4 mg Intravenous Given 4/12/22 2147)   Morphine sulfate (PF) injection 4 mg (4 mg Intravenous Given 4/12/22 2147)   pantoprazole (PROTONIX) injection 40 mg (40 mg Intravenous Given 4/12/22 2147)   iopamidol (ISOVUE-370) 76 % injection 100 mL (100 mL Intravenous Given 4/12/22 2317)     Labs Reviewed   COMPREHENSIVE METABOLIC PANEL - Abnormal; Notable for the following components:       Result Value    Glucose 118 (*)     eGFR 59.9 (*)     All other components within normal limits    Narrative:     GFR Normal >60  Chronic Kidney Disease <60  Kidney Failure <15     APTT - Abnormal; Notable for the following components:    PTT 22.3 (*)     All other components within normal limits   CBC WITH AUTO DIFFERENTIAL - Abnormal; Notable for the following components:    RBC 3.81 (*)     Hemoglobin 12.8 (*)     Hematocrit 37.0 (*)     MCV 97.2 (*)     MCH 33.6 (*)     Lymphocyte % 17.6 (*)     All other components within normal limits    COVID-19,CEPHEID/ESTEBAN,COR/FAROOQ/PAD/DOMINIC IN-HOUSE,NP SWAB IN TRANSPORT MEDIA 3-4 HR TAT, RT-PCR - Normal    Narrative:     Fact sheet for providers: https://www.fda.gov/media/160790/download     Fact sheet for patients: https://www.fda.gov/media/813347/download  Fact sheet for providers: https://www.fda.gov/media/594274/download    Fact sheet for patients: https://www.fda.gov/media/134803/download    Test performed by PCR.   LIPASE - Normal   PROTIME-INR - Normal   TROPONIN (IN-HOUSE) - Normal    Narrative:     Troponin T Reference Range:  <= 0.03 ng/mL-   Negative for AMI  >0.03 ng/mL-     Abnormal for myocardial necrosis.  Clinicians would have to utilize clinical acumen, EKG, Troponin and serial changes to determine if it is an Acute Myocardial Infarction or myocardial injury due to an underlying chronic condition.       Results may be falsely decreased if patient taking Biotin.     RAINBOW DRAW    Narrative:     The following orders were created for panel order [place] Draw.  Procedure                               Abnormality         Status                     ---------                               -----------         ------                     Green Top (Gel)[[id number]]                                  Final result               Lavender Top[[id number]]                                     Final result               Gold Top - SST[[id number]]                                   Final result               Light Blue Top[[id number]]                                   Final result                 Please view results for these tests on the individual orders.   URINALYSIS W/ MICROSCOPIC IF INDICATED (NO CULTURE)   GREEN TOP   LAVENDER TOP   GOLD TOP - SST   LIGHT BLUE TOP   CBC AND DIFFERENTIAL    Narrative:     The following orders were created for panel order CBC & Differential.  Procedure                               Abnormality         Status                     ---------                               -----------          ------                     CBC Auto Differential[[id number]]        Abnormal            Final result                 Please view results for these tests on the individual orders.     CT Abdomen Pelvis With Contrast    Result Date: 4/13/2022  Impression: 1. Small bowel dilatation could reflect ileus or obstruction. No clear transition point is seen. Follow-up as clinically warranted. 2. Fluid distention of the lower esophagus may be related to possible small bowel obstruction or reflux. Questioned masslike thickening in the lower esophagus is only partially imaged. Chest CT with contrast may be helpful. Electronically signed by:  Berto Combs M.D.  4/12/2022 10:05 PM                                                 MDM  Number of Diagnoses or Management Options  Generalized abdominal pain  Small bowel obstruction (HCC)  Diagnosis management comments: Chart Review:  Comorbidity: As per past medical history  Differentials: DKA, intra-abdominal infection, dissection, peritonitis, peptic ulcer disease, pancreatitis, hepatitis, ischemic bowel, bowel obstruction, appendicitis     ;this list is not all inclusive and does not constitute the entirety of considered causes  ECG: Interpreted by myself and Dr. Dunbar shows sinus rhythm rate 66 normal EKG no previous to review  Labs: As above  Imaging: Was interpreted by physician and reviewed by myself:  CT Abdomen Pelvis With Contrast   Final Result    Impression:     1. Small bowel dilatation could reflect ileus or obstruction. No clear transition point is seen. Follow-up as clinically warranted.    2. Fluid distention of the lower esophagus may be related to possible small bowel obstruction or reflux. Questioned masslike thickening in the lower esophagus is only partially imaged. Chest CT with contrast may be helpful.        Electronically signed by:  Berto Combs M.D.      4/12/2022 10:05 PM     Disposition/Treatment:  Appropriate PPE was worn during exam and throughout  all encounters with the patient.  While in the ED patient was afebrile and appeared nontoxic was given morphine Protonix fluids and Zofran while in the ED.  Upon reassessment he does report some improvement of his pain.  Lab results showed normal troponin pro time INR unremarkable lipase within normal limits COVID-19 swab negative.  Metabolic panel fairly unremarkable glucose 118 otherwise kidney function liver function normal.  Metabolic panel showed WBC 5.6. patient's hemodynamically stable though hemoglobin slightly low today at 12.8 hematocrit 37.  Chart reviewed from 4 years ago show a hemoglobin 12.6.  CT of abdomen pelvis showed some small bowel dilation could reflect ileus versus obstruction on there is no clear transition point.  Some fluid distention in the lower esophagus which could be related to possible bowel obstruction or reflux did have masslike thickening in the lower esophagus recommended chest CT for further evaluation.    On reassessment patient is resting quietly does report some improvement of his abdominal pain but does report some continued nausea and belching.  Due to continued symptoms and CT finding NG tube will be placed for possible bowel obstruction as likely cause of patient's symptoms.  Surgery and GI will be consulted for in the morning.  Urinalysis pending upon admission.  Findings were discussed with the patient and family at bedside he voiced understanding of admission for further evaluation management of possible bowel obstruction.  They were in agreement with plan.  Spoke to Dr. Maharaj who agreed for admission with hospitalist group.       Amount and/or Complexity of Data Reviewed  Clinical lab tests: reviewed  Tests in the radiology section of CPT®: reviewed  Tests in the medicine section of CPT®: reviewed        Final diagnoses:   Generalized abdominal pain   Small bowel obstruction (HCC)       ED Disposition  ED Disposition     ED Disposition   Decision to Admit     Condition   --    Comment   Level of Care: Med/Surg [1]   Diagnosis: Generalized abdominal pain [027951]   Admitting Physician: SHIREEN BARRON [[id number]]   Attending Physician: HEATHER SHELTON [[id number]]   Certification: I Certify That Inpatient Hospital Services Are Medically Necessary For Greater Than 2 Midnights               No follow-up provider specified.       Medication List      No changes were made to your prescriptions during this visit.          Christ Martin PA  04/13/22 0206

## 2022-04-13 NOTE — PLAN OF CARE
Patient observed with eyes open in sitting position in chair. Rise and fall of chest observed. Dressings clean dry and intact. Hourly rounding completed this shift, no complaints or needs voiced.  Goal Outcome Evaluation:

## 2022-04-13 NOTE — CASE MANAGEMENT/SOCIAL WORK
Discharge Planning Assessment  [facility]     Patient Name: Kali Garcia  MRN: [id number]  Today's Date: 4/13/2022    Admit Date: 4/12/2022     Discharge Needs Assessment     Row Name 04/13/22 1330       Living Environment    People in Home significant other;child(concepción), adult    Current Living Arrangements home    Primary Care Provided by self    Provides Primary Care For no one    Family Caregiver if Needed child(concepción), adult;significant other    Able to Return to Prior Arrangements yes       Resource/Environmental Concerns    Resource/Environmental Concerns none       Transition Planning    Patient/Family Anticipates Transition to home with family    Patient/Family Anticipated Services at Transition none    Transportation Anticipated family or friend will provide       Discharge Needs Assessment    Readmission Within the Last 30 Days no previous admission in last 30 days    Equipment Currently Used at Home cane, straight;oxygen;walker, rolling    Concerns to be Addressed discharge planning    Anticipated Changes Related to Illness inability to care for self    Equipment Needed After Discharge none    Provided Post Acute Provider List? N/A    Provided Post Acute Provider Quality & Resource List? N/A               Discharge Plan     Row Name 04/13/22 2876       Plan    Plan Pending clinical progress    Plan Comments Spoke to patient at bedside. PCP and pharmacy confirmed. Denies dc needs at this time.              Continued Care and Services - Admitted Since 4/12/2022    Coordination has not been started for this encounter.          Demographic Summary     Row Name 04/13/22 1325       General Information    Admission Type inpatient    Arrived From emergency department    Required Notices Provided Important Message from Medicare    Referral Source admission list    Reason for Consult discharge planning    Preferred Language English               Functional Status     Row Name 04/13/22 6814       Functional Status     Usual Activity Tolerance fair    Current Activity Tolerance fair       Functional Status, IADL    Medications independent    Meal Preparation assistive person    Housekeeping assistive person    Laundry assistive person    Shopping assistive person    IADL Comments son and significant other help as needed.       Mental Status    General Appearance WDL WDL       Mental Status Summary    Recent Changes in Mental Status/Cognitive Functioning no changes              Met with patient in room wearing PPE: mask, face shield/goggles, gloves, gown.      Maintained distance greater than six feet and spent less than 15 minutes in the room.               Sakina Quinteros RN

## 2022-04-13 NOTE — CONSULTS
Nutrition Services    Patient Name: Kali Garcia  YOB: 1945  MRN: [id number]  Admission date: 4/12/2022    Comment:    Severe chronic disease related malnutrition r/t suspected increased hypermetabolism associated with COPD AEB severe muscle/fat wasting as noted on physical exam.     Patient is currently NPO for ileus vs SBO. Will monitor clinical course closely and possible need for nutrition support.     PPE Documentation        PPE Worn By Provider mask, gloves and eye protection   PPE Worn By Patient  None      CLINICAL NUTRITION ASSESSMENT      Reason for Assessment 4/13: Low BMI      H&P      Past Medical History:   Diagnosis Date   • COPD (chronic obstructive pulmonary disease) (HCC)    • Emphysema lung (HCC)    • Hyperlipidemia    • Hypertension    • Osteoarthritis        Past Surgical History:   Procedure Laterality Date   • CARDIAC CATHETERIZATION     • CORONARY STENT PLACEMENT     • EYE SURGERY     • FEMORAL ARTERY STENT     • KNEE SURGERY Left    • KNEE SURGERY Left    • LUNG SURGERY          Current Problems   SBO vs Ileus  - CT a/p shows small bowel dilation suggesting ileus or obstruction   - GI and surgery consulted     Esophageal thickening  - GI suspects chronic constipation playing role  - patient reports no BM in 3-4 weeks      Hx of COPD      Encounter Information        Trending Narrative     4/13: Visited patient today for low BMI consult. Patient reports outside of acute medical event, usually only eats two meals/day.Typical 24 hr recall: B-eggs, rausch, toast, jelly L-skips D-variety of meals, cites small pot roast + potatoes as example. Denies SOB while eating or chewing or swallowing issues. NFPE significant for severe malnutrition. Suspect may be related to hypermetabolism associated with COPD. Patient currently with NGT to suction for SBO vs ileus. If patient is to need surgery, would be a good candidate for TPN. Will continue to monitor nutritional course closely.   "    Anthropometrics        Current Height, Weight Height: 182.9 cm (72\")  Weight: 54.6 kg (120 lb 5.9 oz) (04/13/22 0355)       Ideal Body Weight (IBW) 178 lbs   Usual Body Weight (UBW) None        Trending Weight Hx     This admission: 4/13: Admit weigh of 282 lbs obvious error, visually more consistent with weight of 120 lbs.                PTA: None       Wt Readings from Last 30 Encounters:   04/13/22 0355 54.6 kg (120 lb 5.9 oz)   04/12/22 2037 128 kg (282 lb 3 oz)      BMI kg/m2 Body mass index is 16.33 kg/m².       Labs        Pertinent Labs    Results from last 7 days   Lab Units 04/13/22  0922 04/12/22  2101   SODIUM mmol/L 140 140   POTASSIUM mmol/L 4.5 4.4   CHLORIDE mmol/L 108* 102   CO2 mmol/L 25.0 24.0   BUN mg/dL 24* 21   CREATININE mg/dL 0.92 1.24   CALCIUM mg/dL 8.5* 9.5   BILIRUBIN mg/dL 0.5 0.5   ALK PHOS U/L 40 50   ALT (SGPT) U/L 11 11   AST (SGOT) U/L 19 17   GLUCOSE mg/dL 91 118*     Results from last 7 days   Lab Units 04/13/22  0922   HEMOGLOBIN g/dL 10.4*   HEMATOCRIT % 29.7*     COVID19   Date Value Ref Range Status   04/12/2022 Not Detected Not Detected - Ref. Range Final     No results found for: HGBA1C     Medications    Scheduled Medications bisacodyl, 10 mg, Rectal, Once  methylnaltrexone, 8 mg, Subcutaneous, Once  pantoprazole, 40 mg, Intravenous, BID  sodium chloride, 10 mL, Intravenous, Q12H  sorbitol, 45 mL, Nasogastric, Once        Infusions sodium chloride, 100 mL/hr, Last Rate: 100 mL/hr (04/13/22 0502)        PRN Medications •  acetaminophen **OR** acetaminophen **OR** acetaminophen  •  melatonin  •  Morphine  •  nitroglycerin  •  ondansetron **OR** ondansetron  •  sodium chloride  •  sodium chloride     Physical Findings        Trending Physical   Appearance, NFPE 4/13: NFPE completed, consistent with nutrition diagnosis of malnutrition using AND/ASPEN criteria. See MSA below.    --  Edema  None documented      Bowel Function Last BM On 4/12      Tubes NGT   " "  Chewing/Swallowing Patient denies any issues      Skin Intact        Estimated/Assessed Needs    4/13/22    Energy Requirements    Height for Calculation  Height: 182.9 cm (72\")   Weight for Calculation 54.6 kg (120 lbs)   Method for Estimation  35-40 kcals/kg    EST Needs (kcal/day) 5542-4540 kcals        Protein Requirements    Weight for Calculation 54.6 kg (120 lbs)   EST Protein Needs (g/kg) 1.5-2 g/kg    EST Daily Needs (g/day)  g        Fluid Requirements     Estimated Needs (mL/day) 1 mL/kcal-adjust based on hydration status        Fluid Deficit    Current Na Level (mEq/L)    Desired Na Level (mEq/L)    Estimated Fluid Deficit (L)       Current Nutrition Orders & Evaluation of Intake       Oral Nutrition     Food Allergies NKFA    Current PO Diet NPO Diet NPO Type: Sips with Meds   Supplement    PO Evaluation     Trending % PO Intake 4/13: 0% r/t NPO status      Enteral Nutrition    Enteral Route    TF Modular    TF Delivery Method    Current TF Order    Current Water Flush     TF Residual/Tolerance     TF Observation         Parenteral Nutrition     TPN Route    Current TPN Order    Lipids (mL/%/frequency)     MVI Frequency     Trace Element Frequency     Total # Days on TPN    TPN Observation         Nutrition Course Details    PO Diets: 4/12 to present (4/13) NPO      Nutrition Support:      Nutritional Risk Screening        NRS-2002 Score          Nutrition Diagnosis         Nutrition Dx Problem 1 Severe chronic disease related malnutrition r/t suspected increased hypermetabolism associated with COPD AEB severe muscle/fat wasting as noted on physical exam.       Nutrition Dx Problem 2        Intervention Goal         Intervention Goal(s) Diet to advance    If patient is to need surgery would be a good candidate for TPN      Nutrition Intervention        RD Action Will closely monitor nutritional course      Nutrition Prescription          Diet Prescription NPO    Supplement Prescription  "     Enteral Prescription        TPN Prescription      Monitor/Evaluation        Monitor Per protocol, I&O, Pertinent labs, Weight, Skin status, GI status, Symptoms, POC/GOC     Malnutrition Severity Assessment      Patient meets criteria for : Severe Malnutrition  Malnutrition Type (last 8 hours)     Malnutrition Severity Assessment     Row Name 04/13/22 1545       Malnutrition Severity Assessment    Malnutrition Type Chronic Disease - Related Malnutrition    Row Name 04/13/22 1545       Muscle Loss    Loss of Muscle Mass Findings Severe    [place] Region Severe - deep hollowing/scooping, lack of muscle to touch, facial bones well defined    Clavicle Bone Region Severe - protruding prominent bone    Acromion Bone Region Severe - squared shoulders, bones, and acromion process protrusion prominent    Scapular Bone Region --  supine    Dorsal Hand Region Severe - prominent depression    Patellar Region Severe - prominent bone, square looking, very little muscle definition    Anterior Thigh Region Severe - line/depression along thigh, obviously thin    Posterior Calf Region --  SCDS    Row Name 04/13/22 154       Fat Loss    Subcutaneous Fat Loss Findings Severe    Orbital Region  Severe - pronounced hollowness/depression, dark circles, loose saggy skin    Upper Arm Region Severe - mostly skin, very little space between folds, fingers touch    Thoracic & Lumbar Region Moderate - ribs visible with mild depressions, iliac crest somewhat prominent    Row Name 04/13/22 1541       Criteria Met (Must meet criteria for severity in at least 2 of these categories: M Wasting, Fat Loss, Fluid, Secondary Signs, Wt. Status, Intake)    Patient meets criteria for  Severe Malnutrition                     Electronically signed by:  Lani Bagley RD  04/13/22 14:07 EDT
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
## 2022-04-14 ENCOUNTER — INPATIENT HOSPITAL (OUTPATIENT)
Dept: URBAN - METROPOLITAN AREA HOSPITAL 84 | Facility: HOSPITAL | Age: 77
End: 2022-04-14
Payer: MEDICARE

## 2022-04-14 ENCOUNTER — ANESTHESIA EVENT (OUTPATIENT)
Dept: GASTROENTEROLOGY | Facility: HOSPITAL | Age: 77
End: 2022-04-14

## 2022-04-14 ENCOUNTER — ANESTHESIA (OUTPATIENT)
Dept: GASTROENTEROLOGY | Facility: HOSPITAL | Age: 77
End: 2022-04-14

## 2022-04-14 ENCOUNTER — APPOINTMENT (OUTPATIENT)
Dept: CT IMAGING | Facility: HOSPITAL | Age: 77
End: 2022-04-14

## 2022-04-14 DIAGNOSIS — K31.89 OTHER DISEASES OF STOMACH AND DUODENUM: ICD-10-CM

## 2022-04-14 DIAGNOSIS — K20.80 OTHER ESOPHAGITIS WITHOUT BLEEDING: ICD-10-CM

## 2022-04-14 DIAGNOSIS — K44.9 DIAPHRAGMATIC HERNIA WITHOUT OBSTRUCTION OR GANGRENE: ICD-10-CM

## 2022-04-14 DIAGNOSIS — R93.3 ABNORMAL FINDINGS ON DIAGNOSTIC IMAGING OF OTHER PARTS OF DI: ICD-10-CM

## 2022-04-14 PROBLEM — E43 SEVERE MALNUTRITION: Status: ACTIVE | Noted: 2022-04-14

## 2022-04-14 LAB
ANION GAP SERPL CALCULATED.3IONS-SCNC: 10 MMOL/L (ref 5–15)
BUN SERPL-MCNC: 20 MG/DL (ref 8–23)
BUN/CREAT SERPL: 30.3 (ref 7–25)
CALCIUM SPEC-SCNC: 7.8 MG/DL (ref 8.6–10.5)
CHLORIDE SERPL-SCNC: 110 MMOL/L (ref 98–107)
CO2 SERPL-SCNC: 24 MMOL/L (ref 22–29)
CREAT SERPL-MCNC: 0.66 MG/DL (ref 0.76–1.27)
D DIMER PPP FEU-MCNC: 1.84 MG/L (FEU) (ref 0–0.59)
DEPRECATED RDW RBC AUTO: 47.3 FL (ref 37–54)
EGFRCR SERPLBLD CKD-EPI 2021: 96.6 ML/MIN/1.73
ERYTHROCYTE [DISTWIDTH] IN BLOOD BY AUTOMATED COUNT: 14.1 % (ref 12.3–15.4)
GLUCOSE SERPL-MCNC: 97 MG/DL (ref 65–99)
HCT VFR BLD AUTO: 28.2 % (ref 37.5–51)
HGB BLD-MCNC: 10.2 G/DL (ref 13–17.7)
MCH RBC QN AUTO: 35.1 PG (ref 26.6–33)
MCHC RBC AUTO-ENTMCNC: 36.1 G/DL (ref 31.5–35.7)
MCV RBC AUTO: 97.1 FL (ref 79–97)
NT-PROBNP SERPL-MCNC: 618 PG/ML (ref 0–1800)
PLATELET # BLD AUTO: 117 10*3/MM3 (ref 140–450)
PMV BLD AUTO: 8.9 FL (ref 6–12)
POTASSIUM SERPL-SCNC: 4 MMOL/L (ref 3.5–5.2)
RBC # BLD AUTO: 2.9 10*6/MM3 (ref 4.14–5.8)
SODIUM SERPL-SCNC: 144 MMOL/L (ref 136–145)
WBC NRBC COR # BLD: 5.6 10*3/MM3 (ref 3.4–10.8)

## 2022-04-14 PROCEDURE — 25010000002 FUROSEMIDE PER 20 MG: Performed by: STUDENT IN AN ORGANIZED HEALTH CARE EDUCATION/TRAINING PROGRAM

## 2022-04-14 PROCEDURE — 85379 FIBRIN DEGRADATION QUANT: CPT | Performed by: NURSE PRACTITIONER

## 2022-04-14 PROCEDURE — 0DB68ZX EXCISION OF STOMACH, VIA NATURAL OR ARTIFICIAL OPENING ENDOSCOPIC, DIAGNOSTIC: ICD-10-PCS | Performed by: INTERNAL MEDICINE

## 2022-04-14 PROCEDURE — 83880 ASSAY OF NATRIURETIC PEPTIDE: CPT | Performed by: NURSE PRACTITIONER

## 2022-04-14 PROCEDURE — 43239 EGD BIOPSY SINGLE/MULTIPLE: CPT | Performed by: INTERNAL MEDICINE

## 2022-04-14 PROCEDURE — 94799 UNLISTED PULMONARY SVC/PX: CPT

## 2022-04-14 PROCEDURE — 71275 CT ANGIOGRAPHY CHEST: CPT

## 2022-04-14 PROCEDURE — 94664 DEMO&/EVAL PT USE INHALER: CPT

## 2022-04-14 PROCEDURE — 25010000002 PROPOFOL 1000 MG/100ML EMULSION

## 2022-04-14 PROCEDURE — 85027 COMPLETE CBC AUTOMATED: CPT | Performed by: STUDENT IN AN ORGANIZED HEALTH CARE EDUCATION/TRAINING PROGRAM

## 2022-04-14 PROCEDURE — 99232 SBSQ HOSP IP/OBS MODERATE 35: CPT | Performed by: STUDENT IN AN ORGANIZED HEALTH CARE EDUCATION/TRAINING PROGRAM

## 2022-04-14 PROCEDURE — 0 IOPAMIDOL PER 1 ML: Performed by: STUDENT IN AN ORGANIZED HEALTH CARE EDUCATION/TRAINING PROGRAM

## 2022-04-14 PROCEDURE — 88305 TISSUE EXAM BY PATHOLOGIST: CPT | Performed by: INTERNAL MEDICINE

## 2022-04-14 PROCEDURE — 99232 SBSQ HOSP IP/OBS MODERATE 35: CPT | Performed by: SURGERY

## 2022-04-14 PROCEDURE — 80048 BASIC METABOLIC PNL TOTAL CA: CPT | Performed by: STUDENT IN AN ORGANIZED HEALTH CARE EDUCATION/TRAINING PROGRAM

## 2022-04-14 RX ORDER — FUROSEMIDE 10 MG/ML
20 INJECTION INTRAMUSCULAR; INTRAVENOUS ONCE
Status: COMPLETED | OUTPATIENT
Start: 2022-04-14 | End: 2022-04-14

## 2022-04-14 RX ORDER — TAMSULOSIN HYDROCHLORIDE 0.4 MG/1
0.4 CAPSULE ORAL DAILY
Status: DISCONTINUED | OUTPATIENT
Start: 2022-04-14 | End: 2022-04-15 | Stop reason: HOSPADM

## 2022-04-14 RX ORDER — POLYETHYLENE GLYCOL 3350 17 G/17G
17 POWDER, FOR SOLUTION ORAL 2 TIMES DAILY
Status: DISCONTINUED | OUTPATIENT
Start: 2022-04-14 | End: 2022-04-15 | Stop reason: HOSPADM

## 2022-04-14 RX ORDER — LIDOCAINE HYDROCHLORIDE 10 MG/ML
INJECTION, SOLUTION EPIDURAL; INFILTRATION; INTRACAUDAL; PERINEURAL AS NEEDED
Status: DISCONTINUED | OUTPATIENT
Start: 2022-04-14 | End: 2022-04-14 | Stop reason: SURG

## 2022-04-14 RX ORDER — PANTOPRAZOLE SODIUM 40 MG/1
40 TABLET, DELAYED RELEASE ORAL
Status: DISCONTINUED | OUTPATIENT
Start: 2022-04-14 | End: 2022-04-15 | Stop reason: HOSPADM

## 2022-04-14 RX ORDER — IPRATROPIUM BROMIDE AND ALBUTEROL SULFATE 2.5; .5 MG/3ML; MG/3ML
3 SOLUTION RESPIRATORY (INHALATION)
Status: DISCONTINUED | OUTPATIENT
Start: 2022-04-14 | End: 2022-04-15

## 2022-04-14 RX ORDER — SODIUM CHLORIDE 9 MG/ML
INJECTION, SOLUTION INTRAVENOUS CONTINUOUS PRN
Status: DISCONTINUED | OUTPATIENT
Start: 2022-04-14 | End: 2022-04-14 | Stop reason: SURG

## 2022-04-14 RX ORDER — ATORVASTATIN CALCIUM 10 MG/1
10 TABLET, FILM COATED ORAL NIGHTLY
Status: DISCONTINUED | OUTPATIENT
Start: 2022-04-14 | End: 2022-04-15 | Stop reason: HOSPADM

## 2022-04-14 RX ORDER — GLYCOPYRROLATE 0.2 MG/ML
INJECTION INTRAMUSCULAR; INTRAVENOUS AS NEEDED
Status: DISCONTINUED | OUTPATIENT
Start: 2022-04-14 | End: 2022-04-14 | Stop reason: SURG

## 2022-04-14 RX ORDER — PROPOFOL 10 MG/ML
INJECTION, EMULSION INTRAVENOUS AS NEEDED
Status: DISCONTINUED | OUTPATIENT
Start: 2022-04-14 | End: 2022-04-14 | Stop reason: SURG

## 2022-04-14 RX ADMIN — IPRATROPIUM BROMIDE AND ALBUTEROL SULFATE 3 ML: 2.5; .5 SOLUTION RESPIRATORY (INHALATION) at 10:52

## 2022-04-14 RX ADMIN — IOPAMIDOL 100 ML: 755 INJECTION, SOLUTION INTRAVENOUS at 01:17

## 2022-04-14 RX ADMIN — TAMSULOSIN HYDROCHLORIDE 0.4 MG: 0.4 CAPSULE ORAL at 11:42

## 2022-04-14 RX ADMIN — Medication 10 ML: at 22:06

## 2022-04-14 RX ADMIN — Medication 10 ML: at 10:20

## 2022-04-14 RX ADMIN — POLYETHYLENE GLYCOL 3350 17 G: 17 POWDER, FOR SOLUTION ORAL at 22:06

## 2022-04-14 RX ADMIN — PANTOPRAZOLE SODIUM 40 MG: 40 INJECTION, POWDER, FOR SOLUTION INTRAVENOUS at 10:20

## 2022-04-14 RX ADMIN — PROPOFOL 30 MG: 10 INJECTION, EMULSION INTRAVENOUS at 12:51

## 2022-04-14 RX ADMIN — LIDOCAINE HYDROCHLORIDE 50 MG: 10 INJECTION, SOLUTION EPIDURAL; INFILTRATION; INTRACAUDAL; PERINEURAL at 12:48

## 2022-04-14 RX ADMIN — ATORVASTATIN CALCIUM 10 MG: 10 TABLET, FILM COATED ORAL at 22:02

## 2022-04-14 RX ADMIN — PANTOPRAZOLE SODIUM 40 MG: 40 TABLET, DELAYED RELEASE ORAL at 17:24

## 2022-04-14 RX ADMIN — FUROSEMIDE 20 MG: 10 INJECTION, SOLUTION INTRAMUSCULAR; INTRAVENOUS at 10:20

## 2022-04-14 RX ADMIN — SODIUM CHLORIDE: 0.9 INJECTION, SOLUTION INTRAVENOUS at 12:46

## 2022-04-14 RX ADMIN — Medication 5 MG: at 22:02

## 2022-04-14 RX ADMIN — GLYCOPYRROLATE 0.2 MG: 0.2 INJECTION INTRAMUSCULAR; INTRAVENOUS at 12:48

## 2022-04-14 RX ADMIN — IPRATROPIUM BROMIDE AND ALBUTEROL SULFATE 3 ML: .5; 3 SOLUTION RESPIRATORY (INHALATION) at 06:38

## 2022-04-14 RX ADMIN — ACETAMINOPHEN 650 MG: 325 TABLET ORAL at 22:02

## 2022-04-14 RX ADMIN — IPRATROPIUM BROMIDE AND ALBUTEROL SULFATE 3 ML: 2.5; .5 SOLUTION RESPIRATORY (INHALATION) at 19:14

## 2022-04-14 RX ADMIN — PROPOFOL 80 MG: 10 INJECTION, EMULSION INTRAVENOUS at 12:48

## 2022-04-14 NOTE — PROGRESS NOTES
[facility] Medicine Services Daily Progress Note    Patient Name: Kali Garcia  : 1945  MRN: [id number]  Primary Care Physician:  Provider, No Known  Date of admission: 2022      Subjective      Chief Complaint: Abdominal pain      2022  Patient states abdominal pain is still present but improved.  States flatus is slightly present.  Minimal output from NG tube.  GI and surgery to evaluate.      2022  NG tube removed. Pain improved. Patient became dyspneic overnight and put on 3LNC, he wears 2.5 at home.  CT PE without PE but does show severe emphysema with lower esophageal wall thickening.  Plan for EGD today.    ROS   Constitutional: Negative for chills, diaphoresis and fever.   Eyes: Negative for blurred vision, double vision and pain.   Cardiovascular: Negative for claudication, irregular heartbeat, leg swelling and orthopnea.   Respiratory: Negative for cough, hemoptysis and shortness of breath.    Skin: Negative for color change.   Musculoskeletal: Negative for arthritis and back pain.   Gastrointestinal: Positive for abdominal pain, constipation, nausea and vomiting.   Genitourinary: Negative for bladder incontinence, dysuria and flank pain.   Neurological: Negative for brief paralysis, focal weakness, numbness and seizures.   Psychiatric/Behavioral: Negative for altered mental status and depression. The patient has insomnia.    All other systems reviewed and are negative.    Objective      Vitals:   Temp:  [97.5 °F (36.4 °C)-98.5 °F (36.9 °C)] 98.2 °F (36.8 °C)  Heart Rate:  [65-89] 73  Resp:  [15-22] 16  BP: (124-144)/(55-64) 129/55  Flow (L/min):  [3-6] 3    Physical Exam  Constitutional:       General: He is not in acute distress.     Appearance: He is not ill-appearing.   HENT:      Head: Normocephalic and atraumatic.      Nose:      Comments: NG tube removed     Mouth/Throat:      Pharynx: Oropharynx is clear. No oropharyngeal exudate.   Eyes:       General: No scleral icterus.  Cardiovascular:      Rate and Rhythm: Normal rate and regular rhythm.      Heart sounds: No murmur heard.    No friction rub. No gallop.   Pulmonary:      Effort: No respiratory distress.      Breath sounds: No wheezing or rales.      Comments: Diminished breath sounds b/l  Patient on 3L NC  Abdominal:      General: There is no distension.      Tenderness: There is abdominal tenderness. There is no guarding.   Musculoskeletal:         General: No swelling or deformity.      Cervical back: Normal range of motion. No rigidity.      Right lower leg: No edema.      Left lower leg: No edema.   Skin:     Coloration: Skin is not jaundiced.      Findings: No bruising or lesion.   Neurological:      General: No focal deficit present.      Mental Status: He is alert and oriented to person, place, and time.      Motor: No weakness.   Psychiatric:         Mood and Affect: Mood normal.         Behavior: Behavior normal.         Thought Content: Thought content normal.         Judgment: Judgment normal.             Result Review    Result Review:  I have personally reviewed the results from the time of this admission to 4/14/2022 11:39 EDT and agree with these findings:  [x]  Laboratory  []  Microbiology  [x]  Radiology  []  EKG/Telemetry   []  Cardiology/Vascular   []  Pathology  []  Old records  []  Other:            Assessment/Plan      Brief Patient Summary:  Kali Garcia is a 76 y.o. male who presented due to abdominal pain      atorvastatin, 10 mg, Oral, Nightly  ipratropium-albuterol, 3 mL, Nebulization, 4x Daily - RT  pantoprazole, 40 mg, Intravenous, BID  sodium chloride, 10 mL, Intravenous, Q12H  tamsulosin, 0.4 mg, Oral, Daily             Active Hospital Problems:  Active Hospital Problems    Diagnosis    • **Abnormal CT scan, esophagus      Added automatically from request for surgery [id number]     • Severe malnutrition (CMS/HCC)    • Generalized abdominal pain      Plan:     #SBO vs  Ileus    - CT a/p shows small bowel dilation suggesting ileus or obstruction    - NG placed, has been removed    - NPO    - IVF     - symptoms improved     - Due to esophageal thickening will start PPI BID     - GI and surgery consulted    - No plans for surgery per surgery    - GI plans EGD today    #Esophageal thickening    -CT a/p suggests lower esophageal thickening, recommends CT chest    - CT PE shows esophageal wall thickening    - GI plans EGD today    #COPD, chronic    - patient wears 2.5L NC at home    - currently on 3L NC    - Duonebs QID    - given lasix once due to dyspnea and elevated proBNP    #CAD    - hold home plavix due to anemia    #Anemia    - Hgb 12.8 > 10.4 > 10.2, base unknown    - dilution vs 2/2 obstruction    - On ppi BID    - watch for melena    - GI following    #HTN    - hold home norvasc as BP stable    #HLD    - resume home statin when cleared for PO    #BP    - start Flomax when cleared for PO    #DVT prophylaxis    - stop lovenox, start SCDs    DVT prophylaxis:  Mechanical DVT prophylaxis orders are present.    CODE STATUS:    Code Status (Patient has no pulse and is not breathing): CPR (Attempt to Resuscitate)  Medical Interventions (Patient has pulse or is breathing): Full Support      Disposition:  I expect patient to be discharged when cleared by surgery and GI    This patient has been examined wearing appropriate Personal Protective Equipment and discussed with PAtient and nursing. 04/14/22      Electronically signed by Santiago Yanez DO, 04/14/22, 11:39 EDT.  [facility] Hospitalist Team

## 2022-04-14 NOTE — PLAN OF CARE
Goal Outcome Evaluation:  Plan of Care Reviewed With: patient        Progress: no change  Outcome Evaluation: Patient had a increase in SOA and had labored breathing, MD contacted and orders placed for labs and imaging,  IVF were stopped due to SOA, D-Dimer came back elevated, CT PE protocol ordered showing no PE.  MD ordered home nebulizers and patient did improve after receiving treatment.  NGT to ALL

## 2022-04-14 NOTE — PROGRESS NOTES
Nutrition Services    Patient Name: Kali Garcia  YOB: 1945  MRN: [id number]  Admission date: 4/12/2022    PPE Documentation        PPE Worn By Provider Did not enter room for this encounter   PPE Worn By Patient  N/A     PROGRESS NOTE      Encounter Information: Progress note to check on plan for nutrition. Pt had EGD today, revealing esophagitis and gastritis. Pt also with ongoing constipation, though he did have a small bowel movement 4/13. Medications are now in place to support bowel function, as well as PPI to address gastritis. At this point, pt remains NPO; will monitor for readiness to resume PO diet. Since bowels are now moving, pt may be able to resume PO soon.       PO Diet: NPO Diet NPO Type: Strict NPO   PO Supplements: None ordered    PO Intake:  NPO       Nutrition support orders:    Nutrition support review:        Labs (reviewed below): Reviewed, management per attending         GI Function:  Stool Output  Stool Unmeasured Occurrence: 1 (04/13/22 2300)  Stool Amount: small (04/13/22 2300)   Abd distension improving, per surgeon's note today 4/14       Nutrition Intervention: Continue NPO until approved by physician for diet advancement.     RD continues to follow closely to monitor for possible need for nutrition support.       Results from last 7 days   Lab Units 04/14/22  0008 04/13/22  0922 04/12/22  2101   SODIUM mmol/L 144 140 140   POTASSIUM mmol/L 4.0 4.5 4.4   CHLORIDE mmol/L 110* 108* 102   CO2 mmol/L 24.0 25.0 24.0   BUN mg/dL 20 24* 21   CREATININE mg/dL 0.66* 0.92 1.24   CALCIUM mg/dL 7.8* 8.5* 9.5   BILIRUBIN mg/dL  --  0.5 0.5   ALK PHOS U/L  --  40 50   ALT (SGPT) U/L  --  11 11   AST (SGOT) U/L  --  19 17   GLUCOSE mg/dL 97 91 118*     Results from last 7 days   Lab Units 04/14/22  0008   HEMOGLOBIN g/dL 10.2*   HEMATOCRIT % 28.2*     COVID19   Date Value Ref Range Status   04/12/2022 Not Detected Not Detected - Ref. Range Final     No results found for:  HGBA1C    RD to follow up per protocol.    Electronically signed by:  Laurie Jaime RD  04/14/22 14:37 EDT

## 2022-04-14 NOTE — ANESTHESIA POSTPROCEDURE EVALUATION
Patient: Kali Garcia    Procedure Summary     Date: 04/14/22 Room / Location: [facility] ENDOSCOPY 4 / [facility] ENDOSCOPY    Anesthesia Start: 1246 Anesthesia Stop: 1254    Procedure: ESOPHAGOGASTRODUODENOSCOPY WITH BIOPSY X1 AREA (N/A ) Diagnosis:       Abnormal CT scan, esophagus      (Abnormal CT scan, esophagus [R93.3])    Surgeons: Luz Jacques MD Provider: Donovan Brito MD    Anesthesia Type: MAC ASA Status: 3          Anesthesia Type: MAC    Vitals  Vitals Value Taken Time   /50 04/14/22 1337   Temp     Pulse 71 04/14/22 1339   Resp 27 04/14/22 1337   SpO2 95 % 04/14/22 1338   Vitals shown include unvalidated device data.        Post Anesthesia Care and Evaluation    Patient location during evaluation: PACU  Patient participation: complete - patient participated  Level of consciousness: awake  Pain scale: See nurse's notes for pain score.  Pain management: adequate  Airway patency: patent  Anesthetic complications: No anesthetic complications  PONV Status: none  Cardiovascular status: acceptable  Respiratory status: acceptable  Hydration status: acceptable    Comments: Patient seen and examined postoperatively; vital signs stable; SpO2 greater than or equal to 90%; cardiopulmonary status stable; nausea/vomiting adequately controlled; pain adequately controlled; no apparent anesthesia complications; patient discharged from anesthesia care when discharge criteria were met

## 2022-04-14 NOTE — OP NOTE
ESOPHAGOGASTRODUODENOSCOPY Procedure Report    Patient Name:  Kali Garcia  YOB: 1945    Date of Surgery:  4/14/2022     Pre-Op Diagnosis:  Abnormal CT scan, esophagus [R93.3]         Procedure/CPT® Codes:      Procedure(s):  ESOPHAGOGASTRODUODENOSCOPY WITH BIOPSY X1 AREA    Staff:  Surgeon(s):  Luz Jacques MD      Anesthesia: Monitored Anesthesia Care    Description of Procedure:  A description of the procedure as well as risks, benefits and alternative methods were explained to the patient who voiced understanding and signed the corresponding consent form. A physical exam was performed and vital signs were monitored throughout the procedure.    An upper GI endoscope was placed into the mouth and proceeded through the esophagus, stomach and second portion of the duodenum without difficulty. The scope was then retroflexed and the fundus was visualized. The procedure was not difficult and there were no immediate complications.  Esophageal mucosa did show some changes of severe LA grade D erosive esophagitis with a 3 to 4 cm hiatal hernia.  Multiple erosion congestion as well as gastritis was seen in the fundus and the body area biopsy was performed.  Duodenal mucosa looks normal first and the second part of duodenum.      Impression:  1.  Grade D erosive esophagitis.  2.  Hiatal hernia.  3.  Changes suggestive of gastritis.    Recommendations:  Follow up with GI clinic as needed  Follow up with PCP as scheduled  MiraLAX twice a day  Follow up with biopsy report  Continue with the PPI twice a day for at least 8 weeks and subsequently will be changed to once a day.  Patient may need to be on at least low-dose PPI for long-term.  He will be scheduled for outpatient colonoscopy.  Call if needed.      Luz Jacques MD     Date: 4/14/2022    Time: 12:51 EDT

## 2022-04-14 NOTE — PROGRESS NOTES
General Surgery Progress Note    Name: Kali Garcia ADMIT: 2022   : 1945  PCP: Provider, No Known    MRN: [id number] LOS: 1 days   AGE/SEX: 76 y.o. male  ROOM: [address] [place] [address]    Chief Complaint   Patient presents with   • Abdominal Pain     Subjective     76 y.o. male admitted with weakness poor p.o. intake and constipation.      Says he feels better today after his bowels have moved but he still feeling very weak.  No fevers or chills nausea vomiting no worsening abdominal pain.  Abdominal distention is improving.    Objective     Scheduled Medications:   furosemide, 20 mg, Intravenous, Once  ipratropium-albuterol, 3 mL, Nebulization, 4x Daily - RT  pantoprazole, 40 mg, Intravenous, BID  sodium chloride, 10 mL, Intravenous, Q12H        Active Infusions:       As Needed Medications:  •  acetaminophen **OR** acetaminophen **OR** acetaminophen  •  melatonin  •  Morphine  •  nitroglycerin  •  ondansetron **OR** ondansetron  •  sodium chloride  •  sodium chloride    Vital Signs  Vital Signs Patient Vitals for the past 24 hrs:   BP Temp Temp src Pulse Resp SpO2   22 0900 129/55 98.2 °F (36.8 °C) Oral 69 15 94 %   22 0643 -- -- -- -- 16 --   22 0638 -- -- -- 65 18 95 %   22 0552 124/57 98.5 °F (36.9 °C) Oral 66 22 95 %   22 0100 134/57 98.1 °F (36.7 °C) -- 69 18 96 %   22 2207 -- -- -- 89 18 97 %   22 2203 -- -- -- 85 18 97 %   22 2100 144/56 98.4 °F (36.9 °C) -- 80 21 96 %   22 1705 144/64 97.5 °F (36.4 °C) Oral 73 19 100 %   22 1240 130/58 98.1 °F (36.7 °C) Oral 82 18 90 %       Physical Exam:  Physical Exam  Constitutional:       Comments: Thin and frail   HENT:      Head: Normocephalic and atraumatic.   Eyes:      General: No scleral icterus.     Conjunctiva/sclera: Conjunctivae normal.   Cardiovascular:      Rate and Rhythm: Normal rate.   Pulmonary:      Effort: Pulmonary effort is normal. No respiratory distress.   Abdominal:       General: There is no distension.      Palpations: Abdomen is soft.      Tenderness: There is no abdominal tenderness.   Skin:     General: Skin is warm and dry.   Neurological:      General: No focal deficit present.      Mental Status: Mental status is at baseline.   Psychiatric:         Mood and Affect: Mood normal.         Behavior: Behavior normal.         Results Review:     CBC    Results from last 7 days   Lab Units 04/14/22  0008 04/13/22 0922 04/12/22  2101   WBC 10*3/mm3 5.60 5.40 5.60   HEMOGLOBIN g/dL 10.2* 10.4* 12.8*   PLATELETS 10*3/mm3 117* 119* 162     BMP   Results from last 7 days   Lab Units 04/14/22  0008 04/13/22 0922 04/12/22  2101   SODIUM mmol/L 144 140 140   POTASSIUM mmol/L 4.0 4.5 4.4   CHLORIDE mmol/L 110* 108* 102   CO2 mmol/L 24.0 25.0 24.0   BUN mg/dL 20 24* 21   CREATININE mg/dL 0.66* 0.92 1.24   GLUCOSE mg/dL 97 91 118*     Radiology(recent) CT Abdomen Pelvis With Contrast    Result Date: 4/13/2022  Impression: 1. Small bowel dilatation could reflect ileus or obstruction. No clear transition point is seen. Follow-up as clinically warranted. 2. Fluid distention of the lower esophagus may be related to possible small bowel obstruction or reflux. Questioned masslike thickening in the lower esophagus is only partially imaged. Chest CT with contrast may be helpful. Electronically signed by:  Berto Combs M.D.  4/12/2022 10:05 PM    XR Chest 1 View    Result Date: 4/13/2022  Suspected emphysematous changes without definite acute cardiopulmonary abnormality.  Electronically Signed By-Evelyn Angulo MD On:4/13/2022 10:02 PM This report was finalized on [id number] by  Evelyn Angulo MD.    CT Angiogram Chest Pulmonary Embolism    Result Date: 4/14/2022  1. No pulmonary embolism. 2. Centrilobular emphysema. An irregular nodular area in the right upper lobe warrants follow-up or further evaluation with tissue sampling. 3. Lower esophageal wall thickening. Correlate for reflux or  esophagitis. Electronically signed by:  Berto Combs M.D.  4/14/2022 12:02 AM    XR Abdomen KUB    Result Date: 4/13/2022  IMPRESSION :  Nasogastric in good position.  Bowel dilation which may either be due to ileus or distal obstruction  Electronically Signed By-Damon Kumar On:4/13/2022 7:43 AM This report was finalized on [id number] by  Damon Kumar, .      I reviewed the patient's new clinical results.  I reviewed the patient's new imaging results and agree with the interpretation.    Assessment/Plan       Abnormal CT scan, esophagus    Generalized abdominal pain    Severe malnutrition (CMS/HCC)      76 y.o. male with nausea vomiting constipation    I reviewed gastroenterology's note appreciate their work involvement.  We will review endoscopy reports when they are available.  From my standpoint no surgery is indicated at present so will essentially follow peripherally unless new findings would change circumstances.        This note was created using Dragon Voice Recognition software.    Wayne Alcala MD  04/14/22  10:09 EDT

## 2022-04-14 NOTE — ANESTHESIA PREPROCEDURE EVALUATION
Anesthesia Evaluation     Patient summary reviewed and Nursing notes reviewed   NPO Solid Status: > 8 hours  NPO Liquid Status: > 8 hours           Airway   Mallampati: I  TM distance: >3 FB  Neck ROM: full  No difficulty expected  Dental - normal exam     Pulmonary - normal exam   (+) a smoker Current Abstained day of surgery, COPD,   Cardiovascular - normal exam    (+) hypertension,       Neuro/Psych- negative ROS  GI/Hepatic/Renal/Endo - negative ROS     Musculoskeletal (-) negative ROS    Abdominal  - normal exam    Bowel sounds: normal.   Substance History - negative use     OB/GYN negative ob/gyn ROS         Other                        Anesthesia Plan    ASA 3     MAC     intravenous induction     Anesthetic plan, all risks, benefits, and alternatives have been provided, discussed and informed consent has been obtained with: patient.    Plan discussed with CAA.        CODE STATUS:    Code Status (Patient has no pulse and is not breathing): CPR (Attempt to Resuscitate)  Medical Interventions (Patient has pulse or is breathing): Full Support

## 2022-04-15 VITALS
BODY MASS INDEX: 17.41 KG/M2 | HEART RATE: 72 BPM | OXYGEN SATURATION: 97 % | TEMPERATURE: 97.6 F | SYSTOLIC BLOOD PRESSURE: 112 MMHG | DIASTOLIC BLOOD PRESSURE: 45 MMHG | WEIGHT: 128.53 LBS | HEIGHT: 72 IN | RESPIRATION RATE: 22 BRPM

## 2022-04-15 LAB
ANION GAP SERPL CALCULATED.3IONS-SCNC: 11 MMOL/L (ref 5–15)
BUN SERPL-MCNC: 18 MG/DL (ref 8–23)
BUN/CREAT SERPL: 25.7 (ref 7–25)
CALCIUM SPEC-SCNC: 8.3 MG/DL (ref 8.6–10.5)
CHLORIDE SERPL-SCNC: 105 MMOL/L (ref 98–107)
CO2 SERPL-SCNC: 25 MMOL/L (ref 22–29)
CREAT SERPL-MCNC: 0.7 MG/DL (ref 0.76–1.27)
DEPRECATED RDW RBC AUTO: 45.5 FL (ref 37–54)
EGFRCR SERPLBLD CKD-EPI 2021: 94.9 ML/MIN/1.73
ERYTHROCYTE [DISTWIDTH] IN BLOOD BY AUTOMATED COUNT: 13.7 % (ref 12.3–15.4)
GLUCOSE SERPL-MCNC: 80 MG/DL (ref 65–99)
HCT VFR BLD AUTO: 27.3 % (ref 37.5–51)
HGB BLD-MCNC: 9.9 G/DL (ref 13–17.7)
LAB AP CASE REPORT: NORMAL
MCH RBC QN AUTO: 34.7 PG (ref 26.6–33)
MCHC RBC AUTO-ENTMCNC: 36.1 G/DL (ref 31.5–35.7)
MCV RBC AUTO: 95.9 FL (ref 79–97)
PATH REPORT.FINAL DX SPEC: NORMAL
PATH REPORT.GROSS SPEC: NORMAL
PLATELET # BLD AUTO: 117 10*3/MM3 (ref 140–450)
PMV BLD AUTO: 9 FL (ref 6–12)
POTASSIUM SERPL-SCNC: 3.6 MMOL/L (ref 3.5–5.2)
RBC # BLD AUTO: 2.85 10*6/MM3 (ref 4.14–5.8)
SODIUM SERPL-SCNC: 141 MMOL/L (ref 136–145)
WBC NRBC COR # BLD: 4.9 10*3/MM3 (ref 3.4–10.8)

## 2022-04-15 PROCEDURE — 94799 UNLISTED PULMONARY SVC/PX: CPT

## 2022-04-15 PROCEDURE — 85027 COMPLETE CBC AUTOMATED: CPT | Performed by: STUDENT IN AN ORGANIZED HEALTH CARE EDUCATION/TRAINING PROGRAM

## 2022-04-15 PROCEDURE — 99239 HOSP IP/OBS DSCHRG MGMT >30: CPT | Performed by: STUDENT IN AN ORGANIZED HEALTH CARE EDUCATION/TRAINING PROGRAM

## 2022-04-15 PROCEDURE — 80048 BASIC METABOLIC PNL TOTAL CA: CPT | Performed by: STUDENT IN AN ORGANIZED HEALTH CARE EDUCATION/TRAINING PROGRAM

## 2022-04-15 RX ORDER — POLYETHYLENE GLYCOL 3350 17 G/17G
17 POWDER, FOR SOLUTION ORAL 2 TIMES DAILY
Qty: 510 G | Refills: 1 | Status: SHIPPED | OUTPATIENT
Start: 2022-04-15

## 2022-04-15 RX ORDER — PANTOPRAZOLE SODIUM 40 MG/1
40 TABLET, DELAYED RELEASE ORAL
Qty: 112 TABLET | Refills: 0 | Status: SHIPPED | OUTPATIENT
Start: 2022-04-15 | End: 2022-06-10

## 2022-04-15 RX ORDER — IPRATROPIUM BROMIDE AND ALBUTEROL SULFATE 2.5; .5 MG/3ML; MG/3ML
3 SOLUTION RESPIRATORY (INHALATION)
Status: DISCONTINUED | OUTPATIENT
Start: 2022-04-15 | End: 2022-04-15 | Stop reason: HOSPADM

## 2022-04-15 RX ORDER — PANTOPRAZOLE SODIUM 40 MG/1
40 TABLET, DELAYED RELEASE ORAL DAILY
Qty: 30 TABLET | Refills: 2 | Status: SHIPPED | OUTPATIENT
Start: 2022-06-10

## 2022-04-15 RX ADMIN — IPRATROPIUM BROMIDE AND ALBUTEROL SULFATE 3 ML: 2.5; .5 SOLUTION RESPIRATORY (INHALATION) at 08:18

## 2022-04-15 RX ADMIN — PANTOPRAZOLE SODIUM 40 MG: 40 TABLET, DELAYED RELEASE ORAL at 08:26

## 2022-04-15 RX ADMIN — IPRATROPIUM BROMIDE AND ALBUTEROL SULFATE 3 ML: .5; 3 SOLUTION RESPIRATORY (INHALATION) at 11:31

## 2022-04-15 RX ADMIN — TAMSULOSIN HYDROCHLORIDE 0.4 MG: 0.4 CAPSULE ORAL at 08:26

## 2022-04-15 RX ADMIN — POLYETHYLENE GLYCOL 3350 17 G: 17 POWDER, FOR SOLUTION ORAL at 08:26

## 2022-04-15 RX ADMIN — Medication 10 ML: at 08:26

## 2022-04-15 RX ADMIN — ALUMINA, MAGNESIA, AND SIMETHICONE: 2400; 2400; 240 SUSPENSION ORAL at 13:10

## 2022-04-15 NOTE — PLAN OF CARE
Goal Outcome Evaluation:  Plan of Care Reviewed With: patient        Progress: improving  Outcome Evaluation: Patient has had improvements with pain and is tolerating clear liquid diet well, able to titrate oxygen demands down to 1L NC

## 2022-04-15 NOTE — DISCHARGE SUMMARY
[facility] Medicine Services  DISCHARGE SUMMARY    Patient Name: Kali Garcia  : 1945  MRN: [id number]    Date of Admission: 2022  Discharge Diagnosis: erosive esophagitis  Date of Discharge:  04/15/2022  Primary Care Physician: Provider, No Known      Presenting Problem:   Small bowel obstruction (HCC) [K56.609]  Generalized abdominal pain [R10.84]    Active and Resolved Hospital Problems:  Active Hospital Problems    Diagnosis POA   • **Abnormal CT scan, esophagus [R93.3] Unknown   • Severe malnutrition (CMS/HCC) [E43] Yes   • Generalized abdominal pain [R10.84] Yes      Resolved Hospital Problems   No resolved problems to display.         Hospital Course     Hospital Course:  Kali Garcia is a 76 y.o. male who presented to [facility] on 2022 due to abdominal pain.  CT a/p showed ileus vs obstruction with lower esophageal thickening.  NG was placed and patient admitted with GI and surgery evaluation.  Started on IVF.  No surgical intervention warranted per surgery. CT chest ruled out PE but did confirm lower esophageal thickening.  NG removed on 2022.  EGD done by GI and revealed erosive esophagitis.  Started on PPI BID and diet slowly advanced.  Patient was tolerating diet and feeling much improved on 04/15/2022, he was discharged home in agreement with plan below.    See daily progress notes for further detail        DISCHARGE Follow Up Recommendations for labs and diagnostics:       - Follow up with PCP in 5-7 days  - Follow up with Dr. Jacques within one month to assess biopsy report and consider colonoscopy  - Take Protonix 40mg PO BID for 8 weeks then take one daily indefinitely  - Take Miralax 17g packet twice a day  - Stop taking Mobic        Day of Discharge     Vital Signs:  Temp:  [97.6 °F (36.4 °C)-98.4 °F (36.9 °C)] 98.2 °F (36.8 °C)  Heart Rate:  [57-76] 57  Resp:  [15-27] 18  BP: ()/(32-54) 108/40  Flow (L/min):  [1-3] 1    Physical  Exam:  Physical Exam  Constitutional:       General: He is not in acute distress.     Appearance: He is not toxic-appearing.   HENT:      Head: Normocephalic and atraumatic.      Nose: Nose normal. No congestion.      Mouth/Throat:      Pharynx: Oropharynx is clear. No oropharyngeal exudate.   Eyes:      General: No scleral icterus.  Cardiovascular:      Rate and Rhythm: Normal rate and regular rhythm.      Heart sounds: No murmur heard.    No friction rub. No gallop.   Pulmonary:      Effort: No respiratory distress.      Breath sounds: No wheezing or rales.      Comments: On 1L NC  Abdominal:      General: There is no distension.      Tenderness: There is no abdominal tenderness. There is no guarding.   Musculoskeletal:         General: No swelling or deformity.      Cervical back: Normal range of motion. No rigidity.      Right lower leg: No edema.      Left lower leg: No edema.   Skin:     Coloration: Skin is not jaundiced.      Findings: No bruising or lesion.   Neurological:      General: No focal deficit present.      Mental Status: He is alert and oriented to person, place, and time.      Motor: No weakness.   Psychiatric:         Mood and Affect: Mood normal.         Behavior: Behavior normal.         Thought Content: Thought content normal.         Judgment: Judgment normal.           Pertinent  and/or Most Recent Results     LAB RESULTS:      Lab 04/15/22  0338 04/14/22  0008 04/13/22  0922 04/13/22  0343 04/12/22  2101   WBC 4.90 5.60 5.40  --  5.60   HEMOGLOBIN 9.9* 10.2* 10.4*  --  12.8*   HEMATOCRIT 27.3* 28.2* 29.7*  --  37.0*   PLATELETS 117* 117* 119*  --  162   NEUTROS ABS  --   --   --   --  4.00   LYMPHS ABS  --   --   --   --  1.00   MONOS ABS  --   --   --   --  0.50   EOS ABS  --   --   --   --  0.10   MCV 95.9 97.1* 96.8  --  97.2*   LACTATE  --   --   --  1.0  --    PROTIME  --   --   --   --  9.9   APTT  --   --   --   --  22.3*         Lab 04/15/22  0338 04/14/22  0008 04/13/22  0922  04/12/22  2101   SODIUM 141 144 140 140   POTASSIUM 3.6 4.0 4.5 4.4   CHLORIDE 105 110* 108* 102   CO2 25.0 24.0 25.0 24.0   ANION GAP 11.0 10.0 7.0 14.0   BUN 18 20 24* 21   CREATININE 0.70* 0.66* 0.92 1.24   EGFR 94.9 96.6 85.7 59.9*   GLUCOSE 80 97 91 118*   CALCIUM 8.3* 7.8* 8.5* 9.5         Lab 04/13/22  0922 04/12/22  2101   TOTAL PROTEIN 5.7* 6.9   ALBUMIN 3.60 4.50   GLOBULIN  --  2.4   ALT (SGPT) 11 11   AST (SGOT) 19 17   BILIRUBIN 0.5 0.5   BILIRUBIN DIRECT <0.2  --    ALK PHOS 40 50   LIPASE  --  26         Lab 04/14/22  0008 04/12/22 2101   PROBNP 618.0  --    TROPONIN T  --  <0.010   PROTIME  --  9.9   INR  --  0.96                 Brief Urine Lab Results  (Last result in the past 365 days)      Color   Clarity   Blood   Leuk Est   Nitrite   Protein   CREAT   Urine HCG        04/13/22 0500 Yellow   Clear   Negative   Negative   Negative   Negative               Microbiology Results (last 10 days)     Procedure Component Value - Date/Time    COVID-19,CEPHEID/ESTEBAN,COR/FAROOQ/PAD/DOMINIC IN-HOUSE(OR EMERGENT/ADD-ON),NP SWAB IN TRANSPORT MEDIA 3-4 HR TAT, RT-PCR - Swab, Nasopharynx [[id number]]  (Normal) Collected: 04/12/22 2151    Lab Status: Final result Specimen: Swab from Nasopharynx Updated: 04/12/22 2228     COVID19 Not Detected    Narrative:      Fact sheet for providers: https://www.fda.gov/media/861182/download     Fact sheet for patients: https://www.fda.gov/media/419944/download  Fact sheet for providers: https://www.fda.gov/media/758333/download    Fact sheet for patients: https://www.fda.gov/media/548602/download    Test performed by PCR.          CT Abdomen Pelvis With Contrast    Result Date: 4/13/2022  Impression: Impression: 1. Small bowel dilatation could reflect ileus or obstruction. No clear transition point is seen. Follow-up as clinically warranted. 2. Fluid distention of the lower esophagus may be related to possible small bowel obstruction or reflux. Questioned masslike thickening in the  lower esophagus is only partially imaged. Chest CT with contrast may be helpful. Electronically signed by:  Berto Combs M.D.  4/12/2022 10:05 PM    XR Chest 1 View    Result Date: 4/13/2022  Impression: Suspected emphysematous changes without definite acute cardiopulmonary abnormality.  Electronically Signed By-Evelyn Angulo MD On:4/13/2022 10:02 PM This report was finalized on [id number] by  Evelyn Angulo MD.    CT Angiogram Chest Pulmonary Embolism    Result Date: 4/14/2022  Impression: 1. No pulmonary embolism. 2. Centrilobular emphysema. An irregular nodular area in the right upper lobe warrants follow-up or further evaluation with tissue sampling. 3. Lower esophageal wall thickening. Correlate for reflux or esophagitis. Electronically signed by:  Berto Combs M.D.  4/14/2022 12:02 AM    XR Abdomen KUB    Result Date: 4/13/2022  Impression: IMPRESSION :  Nasogastric in good position.  Bowel dilation which may either be due to ileus or distal obstruction  Electronically Signed By-Damon Kumar On:4/13/2022 7:43 AM This report was finalized on [id number] by  Damon Kumar, .                  Labs Pending at Discharge:  Pending Labs     Order Current Status    Tissue Pathology Exam In process          Procedures Performed  Procedure(s):  ESOPHAGOGASTRODUODENOSCOPY WITH BIOPSY X1 AREA  04/14 1243 UPPER GI ENDOSCOPY      Consults:   Consults     Date and Time Order Name Status Description    4/13/2022  1:25 AM Gastroenterology (on-call MD unless specified)      4/13/2022  1:25 AM Surgery (on-call MD unless specified)      4/13/2022  1:25 AM Hospitalist (on-call MD unless specified)              Discharge Details        Discharge Medications      New Medications      Instructions Start Date   pantoprazole 40 MG EC tablet  Commonly known as: PROTONIX   40 mg, Oral, 2 Times Daily Before Meals      pantoprazole 40 MG EC tablet  Commonly known as: Protonix   40 mg, Oral, Daily   Start Date: Angelica 10, 2022      polyethylene glycol 17 g packet  Commonly known as: MIRALAX   17 g, Oral, 2 Times Daily         Continue These Medications      Instructions Start Date   albuterol sulfate  (90 Base) MCG/ACT inhaler  Commonly known as: PROVENTIL HFA;VENTOLIN HFA;PROAIR HFA   2 puffs, Inhalation, Every 6 Hours PRN      amLODIPine 10 MG tablet  Commonly known as: NORVASC   10 mg, Oral, Daily      clopidogrel 75 MG tablet  Commonly known as: PLAVIX   75 mg, Oral, Daily      ipratropium-albuterol 0.5-2.5 mg/3 ml nebulizer  Commonly known as: DUO-NEB   3 mL, Nebulization, Every 4 Hours PRN      mupirocin 2 % ointment  Commonly known as: BACTROBAN   1 application, Topical, 2 Times Daily      pravastatin 40 MG tablet  Commonly known as: PRAVACHOL   40 mg, Oral, Daily      tamsulosin 0.4 MG capsule 24 hr capsule  Commonly known as: FLOMAX   1 capsule, Oral, Daily      traMADol 50 MG tablet  Commonly known as: ULTRAM   50 mg, Oral, Every 6 Hours PRN         Stop These Medications    meloxicam 15 MG tablet  Commonly known as: MOBIC            No Known Allergies      Discharge Disposition: Home  Home or Self Care   Condition on discharge: GOOD    Diet:  Hospital:  Diet Order   Procedures   • Diet Gastrointestinal; Low Residue         Discharge Activity:   Activity Instructions     Activity as Tolerated              CODE STATUS:  Code Status and Medical Interventions:   Ordered at: 04/13/22 0153     Code Status (Patient has no pulse and is not breathing):    CPR (Attempt to Resuscitate)     Medical Interventions (Patient has pulse or is breathing):    Full Support         No future appointments.    Additional Instructions for the Follow-ups that You Need to Schedule     Call MD With Problems / Concerns   As directed      Instructions: Call [phone] or email [email or web address] for problems or concerns.    Order Comments: Instructions: Call [phone] or email [email or web address] for problems or concerns.           Discharge Follow-up with PCP   As directed       Currently Documented PCP:    Provider, No Known    PCP Phone Number:    None     Follow Up Details: Follow up with PCP in 5-7 days         Discharge Follow-up with Specified Provider: Dr. Jacques; 1 Month   As directed      To: Dr. Jacques    Follow Up: 1 Month    Follow Up Details: Follow up with GI within one month to assess biopsy report and consider colonoscopy               Time spent on Discharge including face to face service:  35 minutes    This patient has been examined wearing appropriate Personal Protective Equipment and discussed with Patient. 04/15/22      Signature: Electronically signed by Santiago Yanez DO, 04/15/22, 1:06 PM EDT.

## 2022-04-17 LAB — QT INTERVAL: 382 MS

## 2022-04-18 NOTE — CASE MANAGEMENT/SOCIAL WORK
Case Management Discharge Note      Final Note: home    Provided Post Acute Provider List?: N/A  Provided Post Acute Provider Quality & Resource List?: N/A            Transportation Services  Private: Car    Final Discharge Disposition Code: 01 - home or self-care

## 2022-05-16 ENCOUNTER — OFFICE (OUTPATIENT)
Dept: URBAN - METROPOLITAN AREA CLINIC 64 | Facility: CLINIC | Age: 77
End: 2022-05-16
Payer: MEDICARE

## 2022-05-16 VITALS
SYSTOLIC BLOOD PRESSURE: 117 MMHG | HEART RATE: 68 BPM | HEIGHT: 71 IN | WEIGHT: 121 LBS | DIASTOLIC BLOOD PRESSURE: 58 MMHG

## 2022-05-16 DIAGNOSIS — R10.84 GENERALIZED ABDOMINAL PAIN: ICD-10-CM

## 2022-05-16 DIAGNOSIS — R11.2 NAUSEA WITH VOMITING, UNSPECIFIED: ICD-10-CM

## 2022-05-16 PROCEDURE — 99213 OFFICE O/P EST LOW 20 MIN: CPT | Performed by: NURSE PRACTITIONER

## 2022-05-16 RX ORDER — PANTOPRAZOLE SODIUM 40 MG/1
40 TABLET, DELAYED RELEASE ORAL
Qty: 90 | Refills: 3 | Status: ACTIVE
Start: 2022-05-16

## 2022-08-02 ENCOUNTER — LAB (OUTPATIENT)
Dept: LAB | Facility: HOSPITAL | Age: 77
End: 2022-08-02

## 2022-08-02 LAB — SARS-COV-2 ORF1AB RESP QL NAA+PROBE: NOT DETECTED

## 2022-08-02 PROCEDURE — U0004 COV-19 TEST NON-CDC HGH THRU: HCPCS

## 2022-08-02 PROCEDURE — C9803 HOPD COVID-19 SPEC COLLECT: HCPCS

## 2022-08-04 ENCOUNTER — HOSPITAL ENCOUNTER (OUTPATIENT)
Facility: HOSPITAL | Age: 77
Setting detail: HOSPITAL OUTPATIENT SURGERY
Discharge: HOME | End: 2022-08-04
Attending: INTERNAL MEDICINE | Admitting: INTERNAL MEDICINE

## 2022-08-04 ENCOUNTER — ON CAMPUS - OUTPATIENT (OUTPATIENT)
Dept: URBAN - METROPOLITAN AREA HOSPITAL 85 | Facility: HOSPITAL | Age: 77
End: 2022-08-04
Payer: MEDICARE

## 2022-08-04 ENCOUNTER — ANESTHESIA (OUTPATIENT)
Dept: GASTROENTEROLOGY | Facility: HOSPITAL | Age: 77
End: 2022-08-04

## 2022-08-04 ENCOUNTER — ANESTHESIA EVENT (OUTPATIENT)
Dept: GASTROENTEROLOGY | Facility: HOSPITAL | Age: 77
End: 2022-08-04

## 2022-08-04 VITALS
BODY MASS INDEX: 15.77 KG/M2 | RESPIRATION RATE: 16 BRPM | WEIGHT: 112.66 LBS | HEART RATE: 50 BPM | SYSTOLIC BLOOD PRESSURE: 141 MMHG | DIASTOLIC BLOOD PRESSURE: 61 MMHG | OXYGEN SATURATION: 97 % | TEMPERATURE: 97.4 F | HEIGHT: 71 IN

## 2022-08-04 DIAGNOSIS — K57.30 DIVERTICULOSIS OF LARGE INTESTINE WITHOUT PERFORATION OR ABS: ICD-10-CM

## 2022-08-04 DIAGNOSIS — K59.00 CONSTIPATION, UNSPECIFIED: ICD-10-CM

## 2022-08-04 DIAGNOSIS — K59.00 CONSTIPATION: ICD-10-CM

## 2022-08-04 DIAGNOSIS — K22.10 ULCER OF ESOPHAGUS WITHOUT BLEEDING: ICD-10-CM

## 2022-08-04 DIAGNOSIS — R19.4 CHANGE IN BOWEL HABIT: ICD-10-CM

## 2022-08-04 DIAGNOSIS — K44.9 DIAPHRAGMATIC HERNIA WITHOUT OBSTRUCTION OR GANGRENE: ICD-10-CM

## 2022-08-04 DIAGNOSIS — K55.21 ANGIODYSPLASIA OF COLON WITH HEMORRHAGE: ICD-10-CM

## 2022-08-04 DIAGNOSIS — K20.90 ESOPHAGITIS, UNSPECIFIED WITHOUT BLEEDING: ICD-10-CM

## 2022-08-04 DIAGNOSIS — K25.9 GASTRIC ULCER, UNSPECIFIED AS ACUTE OR CHRONIC, WITHOUT HEMO: ICD-10-CM

## 2022-08-04 DIAGNOSIS — K20.90 ESOPHAGITIS: ICD-10-CM

## 2022-08-04 PROCEDURE — C1889 IMPLANT/INSERT DEVICE, NOC: HCPCS | Performed by: INTERNAL MEDICINE

## 2022-08-04 PROCEDURE — 25010000002 PROPOFOL 10 MG/ML EMULSION

## 2022-08-04 PROCEDURE — 45382 COLONOSCOPY W/CONTROL BLEED: CPT | Performed by: INTERNAL MEDICINE

## 2022-08-04 PROCEDURE — 43239 EGD BIOPSY SINGLE/MULTIPLE: CPT | Performed by: INTERNAL MEDICINE

## 2022-08-04 PROCEDURE — 88305 TISSUE EXAM BY PATHOLOGIST: CPT | Performed by: INTERNAL MEDICINE

## 2022-08-04 DEVICE — DEV CLIP HEMO DURACLIP REPOSTNG COLONOSCOPE 16MM 235CM: Type: IMPLANTABLE DEVICE | Site: CECUM | Status: FUNCTIONAL

## 2022-08-04 RX ORDER — SODIUM CHLORIDE 9 MG/ML
INJECTION, SOLUTION INTRAVENOUS CONTINUOUS PRN
Status: DISCONTINUED | OUTPATIENT
Start: 2022-08-04 | End: 2022-08-04 | Stop reason: SURG

## 2022-08-04 RX ORDER — MEPERIDINE HYDROCHLORIDE 25 MG/ML
12.5 INJECTION INTRAMUSCULAR; INTRAVENOUS; SUBCUTANEOUS
Status: DISCONTINUED | OUTPATIENT
Start: 2022-08-04 | End: 2022-08-04 | Stop reason: HOSPADM

## 2022-08-04 RX ORDER — FENTANYL CITRATE 50 UG/ML
25 INJECTION, SOLUTION INTRAMUSCULAR; INTRAVENOUS
Status: DISCONTINUED | OUTPATIENT
Start: 2022-08-04 | End: 2022-08-04 | Stop reason: HOSPADM

## 2022-08-04 RX ORDER — IPRATROPIUM BROMIDE AND ALBUTEROL SULFATE 2.5; .5 MG/3ML; MG/3ML
3 SOLUTION RESPIRATORY (INHALATION) ONCE AS NEEDED
Status: DISCONTINUED | OUTPATIENT
Start: 2022-08-04 | End: 2022-08-04 | Stop reason: HOSPADM

## 2022-08-04 RX ORDER — LABETALOL HYDROCHLORIDE 5 MG/ML
5 INJECTION, SOLUTION INTRAVENOUS
Status: DISCONTINUED | OUTPATIENT
Start: 2022-08-04 | End: 2022-08-04 | Stop reason: HOSPADM

## 2022-08-04 RX ORDER — FLUMAZENIL 0.1 MG/ML
0.1 INJECTION INTRAVENOUS AS NEEDED
Status: DISCONTINUED | OUTPATIENT
Start: 2022-08-04 | End: 2022-08-04 | Stop reason: HOSPADM

## 2022-08-04 RX ORDER — LIDOCAINE HYDROCHLORIDE 20 MG/ML
INJECTION, SOLUTION EPIDURAL; INFILTRATION; INTRACAUDAL; PERINEURAL AS NEEDED
Status: DISCONTINUED | OUTPATIENT
Start: 2022-08-04 | End: 2022-08-04 | Stop reason: SURG

## 2022-08-04 RX ORDER — PROPOFOL 10 MG/ML
VIAL (ML) INTRAVENOUS AS NEEDED
Status: DISCONTINUED | OUTPATIENT
Start: 2022-08-04 | End: 2022-08-04 | Stop reason: SURG

## 2022-08-04 RX ORDER — MIDAZOLAM HYDROCHLORIDE 1 MG/ML
1 INJECTION INTRAMUSCULAR; INTRAVENOUS
Status: DISCONTINUED | OUTPATIENT
Start: 2022-08-04 | End: 2022-08-04 | Stop reason: HOSPADM

## 2022-08-04 RX ORDER — NALOXONE HCL 0.4 MG/ML
0.4 VIAL (ML) INJECTION AS NEEDED
Status: DISCONTINUED | OUTPATIENT
Start: 2022-08-04 | End: 2022-08-04 | Stop reason: HOSPADM

## 2022-08-04 RX ORDER — ONDANSETRON 2 MG/ML
4 INJECTION INTRAMUSCULAR; INTRAVENOUS ONCE AS NEEDED
Status: DISCONTINUED | OUTPATIENT
Start: 2022-08-04 | End: 2022-08-04 | Stop reason: HOSPADM

## 2022-08-04 RX ORDER — HYDRALAZINE HYDROCHLORIDE 20 MG/ML
5 INJECTION INTRAMUSCULAR; INTRAVENOUS
Status: DISCONTINUED | OUTPATIENT
Start: 2022-08-04 | End: 2022-08-04 | Stop reason: HOSPADM

## 2022-08-04 RX ORDER — OXYCODONE HYDROCHLORIDE 5 MG/1
10 TABLET ORAL EVERY 4 HOURS PRN
Status: DISCONTINUED | OUTPATIENT
Start: 2022-08-04 | End: 2022-08-04 | Stop reason: HOSPADM

## 2022-08-04 RX ORDER — DIPHENHYDRAMINE HYDROCHLORIDE 50 MG/ML
12.5 INJECTION INTRAMUSCULAR; INTRAVENOUS
Status: DISCONTINUED | OUTPATIENT
Start: 2022-08-04 | End: 2022-08-04 | Stop reason: HOSPADM

## 2022-08-04 RX ADMIN — SODIUM CHLORIDE: 0.9 INJECTION, SOLUTION INTRAVENOUS at 11:48

## 2022-08-04 RX ADMIN — LIDOCAINE HYDROCHLORIDE 50 MG: 20 INJECTION, SOLUTION EPIDURAL; INFILTRATION; INTRACAUDAL; PERINEURAL at 11:52

## 2022-08-04 RX ADMIN — PROPOFOL 200 MG: 10 INJECTION, EMULSION INTRAVENOUS at 11:52

## 2022-08-04 NOTE — ANESTHESIA POSTPROCEDURE EVALUATION
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 and endoscopic clipping x 1 (N/A Rectum) Diagnosis:       Constipation      Change in bowel habit      Esophagitis      (Constipation [K59.00])      (Change in bowel habit [R19.4])      (Esophagitis [K20.90])    Surgeons: Luz Jacques MD Provider: Haleigh Sheriff MD    Anesthesia Type: MAC ASA Status: 3          Anesthesia Type: MAC    Vitals  Vitals Value Taken Time   /61 08/04/22 1300   Temp     Pulse 50 08/04/22 1300   Resp 16 08/04/22 1300   SpO2 97 % 08/04/22 1300           Post Anesthesia Care and Evaluation    Patient location during evaluation: PACU  Patient participation: complete - patient participated  Level of consciousness: awake and alert  Pain management: satisfactory to patient    Airway patency: patent  Anesthetic complications: No anesthetic complications  PONV Status: none  Cardiovascular status: acceptable  Respiratory status: acceptable  Hydration status: acceptable

## 2022-08-04 NOTE — OP NOTE
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 A physical exam was performed and vital signs were monitored throughout the procedure.    Initially  scope was advanced regularization from orifice, esophagus stomach retroflexion lamination was performed slow withdrawal of the scope was performed    A rectal exam was performed which was normal. An Olympus colonoscope was placed into the rectum and proceeded under direct visualization through the colon until the cecum and appendiceal orifice were identified. Careful visualization occurred upon slow withdraw of the scope. The scope was then retroflexed and the distal rectum was visualized. The quality of the prep was good. The procedure was not difficult and there were no immediate complications.    Findings:   Esophageal mucosa looks normal upper middle lobe of the esophagus, ulcer was seen at the GE junction area with a nonobstructing ring and 3 to 4 cm hiatal hernia.  Gastric mucosa looks normal fundus body antrum area duodenal mucosa looks normal.  Cecum did show there was a large cecal AVM with some oozing.  Bleeding was controlled by using APC and placement of 1 Endo Clip with good hemostasis.  Extensive diverticulosis was noted with a moderate-sized internal/external hemorrhoid.  Patient [place]  procedure very well immediate complication were noticed.    Impression:  1.  Esophageal ulcer at the GE junction area biopsy was performed.  Previously noted grade D erosive esophagitis has all healed up.  2.  Moderate-sized hiatal hernia.  3.  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  4.  Extensive diverticulosis.  5.  Moderate-sized hemorrhoid.    Recommendations:  Follow up with GI clinic as needed  Follow up with PCP as scheduled  Follow up with biopsy report  No recall  Benefiber 1 scoop 2x/day   Continue with the PPI  Follow the biopsy result      Luz Jacques MD     Date: 8/4/2022    Time: 12:20 EDT

## 2022-08-04 NOTE — H&P
Patient Care Team:  Provider, No Known as PCP - General      Subjective .     History of present illness:    Kali Garcia is a 76 y.o. male who presents today for Procedure(s):  ESOPHAGOGASTRODUODENOSCOPY  COLONOSCOPY for the indications listed below.     The updated Patient Profile was reviewed prior to the procedure, in conjunction with the Physical Exam, including medical conditions, surgical procedures, medications, allergies, family history and social history.     Pre-operatively, I reviewed the indication(s) for the procedure, the risks of the procedure [including but not limited to: unexpected bleeding possibly requiring hospitalization and/or unplanned repeat procedures, perforation possibly requiring surgical treatment, missed lesions and complications of sedation/MAC (also explained by anesthesia staff)].     I have evaluated the patient for risks associated with the planned anesthesia and the procedure to be performed and find the patient an acceptable candidate for IV sedation.    Multiple opportunities were provided for any questions or concerns, and all questions were answered satisfactorily before any anesthesia was administered. We will proceed with the planned procedure.      ASSESSMENT/PLAN:  EGD  COLON          Past Medical History:  Past Medical History:   Diagnosis Date   • COPD (chronic obstructive pulmonary disease) (HCC)    • Coronary artery disease    • Emphysema lung (HCC)    • Hyperlipidemia    • Hypertension    • Osteoarthritis        Past Surgical History:  Past Surgical History:   Procedure Laterality Date   • CARDIAC CATHETERIZATION     • CORONARY STENT PLACEMENT     • ENDOSCOPY N/A 4/14/2022    Procedure: ESOPHAGOGASTRODUODENOSCOPY WITH BIOPSY X1 AREA;  Surgeon: Luz Jacques MD;  Location: [facility] ENDOSCOPY;  Service: Gastroenterology;  Laterality: N/A;  esophagitis, gastritis, HH   • EYE SURGERY     • FEMORAL ARTERY STENT     • KNEE SURGERY Left    • KNEE SURGERY Left    • LUNG  SURGERY         Social History:  Social History     Tobacco Use   • Smoking status: Current Every Day Smoker     Packs/day: 1.00     Years: 63.00     Pack years: 63.00     Types: Cigarettes   Substance Use Topics   • Alcohol use: Not Currently   • Drug use: Never       Family History:  History reviewed. No pertinent family history.    Medications:  No medications prior to admission.       Scheduled Meds:  Continuous Infusions:No current facility-administered medications for this encounter.    PRN Meds:.    ALLERGIES:  Patient has no known allergies.        Objective     Vital Signs:        Physical Exam:      General Appearance:    Awake and alert, in no acute distress   Lungs:     Clear to auscultation bilaterally, respirations regular, even and unlabored    Heart:    Regular rhythm and normal rate, normal S1 and S2, no            murmur, no gallop, no rub   Abdomen:     Normal bowel sounds, soft, non-tender, no rebound or guarding, non-distended, no hepatosplenomegaly        Results Review:   I reviewed the patient's labs and imaging.  Lab Results (last 24 hours)     ** No results found for the last 24 hours. **          Imaging Results (Last 24 Hours)     ** No results found for the last 24 hours. **             I discussed the patients findings and my recommendations with the patient.  Lzu Jacques MD  08/04/22  09:23 EDT

## 2022-08-04 NOTE — ANESTHESIA PREPROCEDURE EVALUATION
Anesthesia Evaluation     Patient summary reviewed and Nursing notes reviewed   NPO Solid Status: > 8 hours  NPO Liquid Status: > 8 hours           Airway   Mallampati: I  TM distance: >3 FB  Neck ROM: full  No difficulty expected  Dental - normal exam     Pulmonary - normal exam   (+) a smoker Current Abstained day of surgery, COPD,   Cardiovascular - normal exam    (+) hypertension, CAD, hyperlipidemia,       Neuro/Psych- negative ROS  GI/Hepatic/Renal/Endo - negative ROS     Musculoskeletal     Abdominal  - normal exam    Bowel sounds: normal.   Substance History - negative use     OB/GYN negative ob/gyn ROS         Other   arthritis,                        Anesthesia Plan    ASA 3     MAC     intravenous induction     Anesthetic plan, risks, benefits, and alternatives have been provided, discussed and informed consent has been obtained with: patient.    Plan discussed with CAA.        CODE STATUS:

## 2022-08-04 NOTE — DISCHARGE INSTRUCTIONS
A responsible adult should stay with you and you should rest quietly for the rest of the day.    Do not drink alcohol, drive, operate any heavy machinery or power tools or make any legal/important decisions for the next 24 hours.     Progress your diet as tolerated.  If you begin to experience severe pain, increased shortness of breath, racing heartbeat or a fever above 101 F, seek immediate medical attention.     Follow up with MD as instructed. Call office for results in 3 to 5 days if needed. [phone]    Findings:   Esophageal mucosa looks normal upper middle lobe of the esophagus, ulcer was seen at the GE junction area with a nonobstructing ring and 3 to 4 cm hiatal hernia.  Gastric mucosa looks normal fundus body antrum area duodenal mucosa looks normal.  Cecum did show there was a large cecal AVM with some oozing.  Bleeding was controlled by using APC and placement of 1 Endo Clip with good hemostasis.  Extensive diverticulosis was noted with a moderate-sized internal/external hemorrhoid.  Patient Toller procedure very well immediate complication were noticed.     Impression:  1.  Esophageal ulcer at the GE junction area biopsy was performed.  Previously noted grade D erosive esophagitis has all healed up.  2.  Moderate-sized hiatal hernia.  3.  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  4.  Extensive diverticulosis.  5.  Moderate-sized hemorrhoid.     Recommendations:  Follow up with GI clinic as needed  Follow up with PCP as scheduled  Follow up with biopsy report  No recall  Benefiber 1 scoop 2x/day   Continue taking pantoprazole but increase to 2 pills daily  Follow the biopsy result        Luz Jacques MD

## 2022-08-05 LAB
LAB AP CASE REPORT: NORMAL
LAB AP DIAGNOSIS COMMENT: NORMAL
PATH REPORT.FINAL DX SPEC: NORMAL
PATH REPORT.GROSS SPEC: NORMAL

## 2024-02-01 ENCOUNTER — APPOINTMENT (OUTPATIENT)
Dept: GENERAL RADIOLOGY | Facility: HOSPITAL | Age: 79
End: 2024-02-01
Payer: MEDICARE

## 2024-02-01 ENCOUNTER — APPOINTMENT (OUTPATIENT)
Dept: CARDIOLOGY | Facility: HOSPITAL | Age: 79
End: 2024-02-01
Payer: MEDICARE

## 2024-02-01 ENCOUNTER — APPOINTMENT (OUTPATIENT)
Dept: CT IMAGING | Facility: HOSPITAL | Age: 79
End: 2024-02-01
Payer: MEDICARE

## 2024-02-01 ENCOUNTER — HOSPITAL ENCOUNTER (INPATIENT)
Facility: HOSPITAL | Age: 79
LOS: 4 days | Discharge: HOME | End: 2024-02-06
Attending: EMERGENCY MEDICINE | Admitting: INTERNAL MEDICINE
Payer: MEDICARE

## 2024-02-01 DIAGNOSIS — J12.82 PNEUMONIA DUE TO COVID-19 VIRUS: ICD-10-CM

## 2024-02-01 DIAGNOSIS — U07.1 PNEUMONIA DUE TO COVID-19 VIRUS: ICD-10-CM

## 2024-02-01 DIAGNOSIS — J18.9 PNEUMONIA DUE TO INFECTIOUS ORGANISM, UNSPECIFIED LATERALITY, UNSPECIFIED PART OF LUNG: ICD-10-CM

## 2024-02-01 DIAGNOSIS — U07.1 COVID-19: ICD-10-CM

## 2024-02-01 DIAGNOSIS — R55 SYNCOPE AND COLLAPSE: Primary | ICD-10-CM

## 2024-02-01 DIAGNOSIS — R53.1 WEAKNESS: ICD-10-CM

## 2024-02-01 LAB
ALBUMIN SERPL-MCNC: 3.4 G/DL (ref 3.5–5.2)
ALBUMIN/GLOB SERPL: 0.9 G/DL
ALP SERPL-CCNC: 67 U/L (ref 39–117)
ALT SERPL W P-5'-P-CCNC: 14 U/L (ref 1–41)
ANION GAP SERPL CALCULATED.3IONS-SCNC: 10 MMOL/L (ref 5–15)
APTT PPP: <20 SECONDS (ref 61–76.5)
AST SERPL-CCNC: 19 U/L (ref 1–40)
BASOPHILS # BLD AUTO: 0 10*3/MM3 (ref 0–0.2)
BASOPHILS NFR BLD AUTO: 0.4 % (ref 0–1.5)
BH CV XLRA MEAS LEFT DIST CCA EDV: 9.3 CM/SEC
BH CV XLRA MEAS LEFT DIST CCA PSV: 60.4 CM/SEC
BH CV XLRA MEAS LEFT DIST ICA EDV: -14.9 CM/SEC
BH CV XLRA MEAS LEFT DIST ICA PSV: -83.9 CM/SEC
BH CV XLRA MEAS LEFT ICA/CCA RATIO: -1.01
BH CV XLRA MEAS LEFT PROX CCA EDV: 9.7 CM/SEC
BH CV XLRA MEAS LEFT PROX CCA PSV: 93.9 CM/SEC
BH CV XLRA MEAS LEFT PROX ECA PSV: -103 CM/SEC
BH CV XLRA MEAS LEFT PROX ICA EDV: -16.5 CM/SEC
BH CV XLRA MEAS LEFT PROX ICA PSV: -94.9 CM/SEC
BH CV XLRA MEAS LEFT PROX SCLA PSV: -130 CM/SEC
BH CV XLRA MEAS LEFT VERTEBRAL A PSV: 64.6 CM/SEC
BH CV XLRA MEAS RIGHT DIST CCA EDV: 10.5 CM/SEC
BH CV XLRA MEAS RIGHT DIST CCA PSV: 91.1 CM/SEC
BH CV XLRA MEAS RIGHT DIST ICA EDV: -16.1 CM/SEC
BH CV XLRA MEAS RIGHT DIST ICA PSV: -78.5 CM/SEC
BH CV XLRA MEAS RIGHT ICA/CCA RATIO: -0.86
BH CV XLRA MEAS RIGHT PROX CCA EDV: 13 CM/SEC
BH CV XLRA MEAS RIGHT PROX CCA PSV: 83.2 CM/SEC
BH CV XLRA MEAS RIGHT PROX ECA PSV: -108 CM/SEC
BH CV XLRA MEAS RIGHT PROX ICA EDV: -17.5 CM/SEC
BH CV XLRA MEAS RIGHT PROX ICA PSV: -71.5 CM/SEC
BH CV XLRA MEAS RIGHT PROX SCLA PSV: 105 CM/SEC
BH CV XLRA MEAS RIGHT VERTEBRAL A PSV: -42.3 CM/SEC
BILIRUB SERPL-MCNC: 0.3 MG/DL (ref 0–1.2)
BUN SERPL-MCNC: 20 MG/DL (ref 8–23)
BUN/CREAT SERPL: 23.8 (ref 7–25)
CALCIUM SPEC-SCNC: 9 MG/DL (ref 8.6–10.5)
CHLORIDE SERPL-SCNC: 101 MMOL/L (ref 98–107)
CO2 SERPL-SCNC: 24 MMOL/L (ref 22–29)
CREAT SERPL-MCNC: 0.84 MG/DL (ref 0.76–1.27)
D DIMER PPP FEU-MCNC: 1.31 MG/L (FEU) (ref 0–0.79)
DEPRECATED RDW RBC AUTO: 51.2 FL (ref 37–54)
EGFRCR SERPLBLD CKD-EPI 2021: 88.7 ML/MIN/1.73
EOSINOPHIL # BLD AUTO: 0 10*3/MM3 (ref 0–0.4)
EOSINOPHIL NFR BLD AUTO: 0.9 % (ref 0.3–6.2)
ERYTHROCYTE [DISTWIDTH] IN BLOOD BY AUTOMATED COUNT: 14.8 % (ref 12.3–15.4)
FLUAV RNA RESP QL NAA+PROBE: NOT DETECTED
FLUBV RNA RESP QL NAA+PROBE: NOT DETECTED
GEN 5 2HR TROPONIN T REFLEX: 38 NG/L
GLOBULIN UR ELPH-MCNC: 3.7 GM/DL
GLUCOSE SERPL-MCNC: 86 MG/DL (ref 65–99)
HCT VFR BLD AUTO: 33 % (ref 37.5–51)
HGB BLD-MCNC: 11 G/DL (ref 13–17.7)
HOLD SPECIMEN: NORMAL
HOLD SPECIMEN: NORMAL
INR PPP: 0.94 (ref 0.93–1.1)
LYMPHOCYTES # BLD AUTO: 0.8 10*3/MM3 (ref 0.7–3.1)
LYMPHOCYTES NFR BLD AUTO: 13.9 % (ref 19.6–45.3)
MAGNESIUM SERPL-MCNC: 1.9 MG/DL (ref 1.6–2.4)
MCH RBC QN AUTO: 31.1 PG (ref 26.6–33)
MCHC RBC AUTO-ENTMCNC: 33.2 G/DL (ref 31.5–35.7)
MCV RBC AUTO: 93.8 FL (ref 79–97)
MONOCYTES # BLD AUTO: 0.5 10*3/MM3 (ref 0.1–0.9)
MONOCYTES NFR BLD AUTO: 9.2 % (ref 5–12)
NEUTROPHILS NFR BLD AUTO: 4.1 10*3/MM3 (ref 1.7–7)
NEUTROPHILS NFR BLD AUTO: 75.6 % (ref 42.7–76)
NRBC BLD AUTO-RTO: 0 /100 WBC (ref 0–0.2)
PLATELET # BLD AUTO: 228 10*3/MM3 (ref 140–450)
PMV BLD AUTO: 7.5 FL (ref 6–12)
POTASSIUM SERPL-SCNC: 4.3 MMOL/L (ref 3.5–5.2)
PROT SERPL-MCNC: 7.1 G/DL (ref 6–8.5)
PROTHROMBIN TIME: 10.3 SECONDS (ref 9.6–11.7)
RBC # BLD AUTO: 3.52 10*6/MM3 (ref 4.14–5.8)
RSV RNA RESP QL NAA+PROBE: NOT DETECTED
SARS-COV-2 RNA RESP QL NAA+PROBE: DETECTED
SODIUM SERPL-SCNC: 135 MMOL/L (ref 136–145)
TROPONIN T DELTA: -7 NG/L
TROPONIN T SERPL HS-MCNC: 45 NG/L
TSH SERPL DL<=0.05 MIU/L-ACNC: 2.28 UIU/ML (ref 0.27–4.2)
WBC NRBC COR # BLD AUTO: 5.5 10*3/MM3 (ref 3.4–10.8)
WHOLE BLOOD HOLD COAG: NORMAL
WHOLE BLOOD HOLD SPECIMEN: NORMAL

## 2024-02-01 PROCEDURE — 84484 ASSAY OF TROPONIN QUANT: CPT | Performed by: EMERGENCY MEDICINE

## 2024-02-01 PROCEDURE — 93005 ELECTROCARDIOGRAM TRACING: CPT

## 2024-02-01 PROCEDURE — 36415 COLL VENOUS BLD VENIPUNCTURE: CPT

## 2024-02-01 PROCEDURE — 83735 ASSAY OF MAGNESIUM: CPT | Performed by: EMERGENCY MEDICINE

## 2024-02-01 PROCEDURE — 85730 THROMBOPLASTIN TIME PARTIAL: CPT | Performed by: EMERGENCY MEDICINE

## 2024-02-01 PROCEDURE — 25010000002 ENOXAPARIN PER 10 MG: Performed by: INTERNAL MEDICINE

## 2024-02-01 PROCEDURE — 25010000002 CEFTRIAXONE PER 250 MG: Performed by: EMERGENCY MEDICINE

## 2024-02-01 PROCEDURE — 71045 X-RAY EXAM CHEST 1 VIEW: CPT

## 2024-02-01 PROCEDURE — 25810000003 SODIUM CHLORIDE 0.9 % SOLUTION: Performed by: EMERGENCY MEDICINE

## 2024-02-01 PROCEDURE — 70450 CT HEAD/BRAIN W/O DYE: CPT

## 2024-02-01 PROCEDURE — G0378 HOSPITAL OBSERVATION PER HR: HCPCS

## 2024-02-01 PROCEDURE — 93005 ELECTROCARDIOGRAM TRACING: CPT | Performed by: EMERGENCY MEDICINE

## 2024-02-01 PROCEDURE — 84443 ASSAY THYROID STIM HORMONE: CPT | Performed by: EMERGENCY MEDICINE

## 2024-02-01 PROCEDURE — 99285 EMERGENCY DEPT VISIT HI MDM: CPT

## 2024-02-01 PROCEDURE — 71275 CT ANGIOGRAPHY CHEST: CPT

## 2024-02-01 PROCEDURE — 93880 EXTRACRANIAL BILAT STUDY: CPT

## 2024-02-01 PROCEDURE — 94761 N-INVAS EAR/PLS OXIMETRY MLT: CPT

## 2024-02-01 PROCEDURE — 25510000001 IOPAMIDOL PER 1 ML: Performed by: EMERGENCY MEDICINE

## 2024-02-01 PROCEDURE — 85379 FIBRIN DEGRADATION QUANT: CPT | Performed by: EMERGENCY MEDICINE

## 2024-02-01 PROCEDURE — 85025 COMPLETE CBC W/AUTO DIFF WBC: CPT | Performed by: EMERGENCY MEDICINE

## 2024-02-01 PROCEDURE — 25810000003 SODIUM CHLORIDE 0.9 % SOLUTION: Performed by: INTERNAL MEDICINE

## 2024-02-01 PROCEDURE — 87637 SARSCOV2&INF A&B&RSV AMP PRB: CPT | Performed by: EMERGENCY MEDICINE

## 2024-02-01 PROCEDURE — 85610 PROTHROMBIN TIME: CPT | Performed by: EMERGENCY MEDICINE

## 2024-02-01 PROCEDURE — 80053 COMPREHEN METABOLIC PANEL: CPT | Performed by: EMERGENCY MEDICINE

## 2024-02-01 PROCEDURE — 87040 BLOOD CULTURE FOR BACTERIA: CPT | Performed by: EMERGENCY MEDICINE

## 2024-02-01 RX ORDER — SODIUM CHLORIDE 9 MG/ML
50 INJECTION, SOLUTION INTRAVENOUS CONTINUOUS
Status: DISCONTINUED | OUTPATIENT
Start: 2024-02-01 | End: 2024-02-06 | Stop reason: HOSPADM

## 2024-02-01 RX ORDER — ENOXAPARIN SODIUM 100 MG/ML
30 INJECTION SUBCUTANEOUS DAILY
Status: DISCONTINUED | OUTPATIENT
Start: 2024-02-01 | End: 2024-02-02

## 2024-02-01 RX ORDER — SODIUM CHLORIDE 0.9 % (FLUSH) 0.9 %
10 SYRINGE (ML) INJECTION AS NEEDED
Status: DISCONTINUED | OUTPATIENT
Start: 2024-02-01 | End: 2024-02-06 | Stop reason: HOSPADM

## 2024-02-01 RX ORDER — SODIUM CHLORIDE 9 MG/ML
40 INJECTION, SOLUTION INTRAVENOUS AS NEEDED
Status: DISCONTINUED | OUTPATIENT
Start: 2024-02-01 | End: 2024-02-06 | Stop reason: HOSPADM

## 2024-02-01 RX ORDER — ACETAMINOPHEN 325 MG/1
650 TABLET ORAL EVERY 4 HOURS PRN
Status: DISCONTINUED | OUTPATIENT
Start: 2024-02-01 | End: 2024-02-06 | Stop reason: HOSPADM

## 2024-02-01 RX ORDER — ACETAMINOPHEN 160 MG/5ML
650 SOLUTION ORAL EVERY 4 HOURS PRN
Status: DISCONTINUED | OUTPATIENT
Start: 2024-02-01 | End: 2024-02-06 | Stop reason: HOSPADM

## 2024-02-01 RX ORDER — ACETAMINOPHEN 650 MG/1
650 SUPPOSITORY RECTAL EVERY 4 HOURS PRN
Status: DISCONTINUED | OUTPATIENT
Start: 2024-02-01 | End: 2024-02-06 | Stop reason: HOSPADM

## 2024-02-01 RX ORDER — CLOPIDOGREL BISULFATE 75 MG/1
75 TABLET ORAL DAILY
Status: DISCONTINUED | OUTPATIENT
Start: 2024-02-01 | End: 2024-02-06 | Stop reason: HOSPADM

## 2024-02-01 RX ORDER — NITROGLYCERIN 0.4 MG/1
0.4 TABLET SUBLINGUAL
Status: DISCONTINUED | OUTPATIENT
Start: 2024-02-01 | End: 2024-02-06 | Stop reason: HOSPADM

## 2024-02-01 RX ORDER — POLYETHYLENE GLYCOL 3350 17 G/17G
17 POWDER, FOR SOLUTION ORAL DAILY PRN
Status: DISCONTINUED | OUTPATIENT
Start: 2024-02-01 | End: 2024-02-06 | Stop reason: HOSPADM

## 2024-02-01 RX ORDER — BISACODYL 5 MG/1
5 TABLET, DELAYED RELEASE ORAL DAILY PRN
Status: DISCONTINUED | OUTPATIENT
Start: 2024-02-01 | End: 2024-02-06 | Stop reason: HOSPADM

## 2024-02-01 RX ORDER — ATORVASTATIN CALCIUM 10 MG/1
10 TABLET, FILM COATED ORAL DAILY
Status: DISCONTINUED | OUTPATIENT
Start: 2024-02-02 | End: 2024-02-06 | Stop reason: HOSPADM

## 2024-02-01 RX ORDER — SODIUM CHLORIDE 0.9 % (FLUSH) 0.9 %
10 SYRINGE (ML) INJECTION EVERY 12 HOURS SCHEDULED
Status: DISCONTINUED | OUTPATIENT
Start: 2024-02-01 | End: 2024-02-06 | Stop reason: HOSPADM

## 2024-02-01 RX ORDER — ONDANSETRON 4 MG/1
4 TABLET, ORALLY DISINTEGRATING ORAL EVERY 6 HOURS PRN
Status: DISCONTINUED | OUTPATIENT
Start: 2024-02-01 | End: 2024-02-06 | Stop reason: HOSPADM

## 2024-02-01 RX ORDER — ONDANSETRON 2 MG/ML
4 INJECTION INTRAMUSCULAR; INTRAVENOUS EVERY 6 HOURS PRN
Status: DISCONTINUED | OUTPATIENT
Start: 2024-02-01 | End: 2024-02-06 | Stop reason: HOSPADM

## 2024-02-01 RX ORDER — BISACODYL 10 MG
10 SUPPOSITORY, RECTAL RECTAL DAILY PRN
Status: DISCONTINUED | OUTPATIENT
Start: 2024-02-01 | End: 2024-02-06 | Stop reason: HOSPADM

## 2024-02-01 RX ORDER — AMOXICILLIN 250 MG
2 CAPSULE ORAL 2 TIMES DAILY
Status: DISCONTINUED | OUTPATIENT
Start: 2024-02-01 | End: 2024-02-06 | Stop reason: HOSPADM

## 2024-02-01 RX ADMIN — SODIUM CHLORIDE 500 ML: 9 INJECTION, SOLUTION INTRAVENOUS at 14:23

## 2024-02-01 RX ADMIN — CEFTRIAXONE 2000 MG: 2 INJECTION, POWDER, FOR SOLUTION INTRAMUSCULAR; INTRAVENOUS at 17:12

## 2024-02-01 RX ADMIN — ENOXAPARIN SODIUM 30 MG: 100 INJECTION SUBCUTANEOUS at 17:12

## 2024-02-01 RX ADMIN — CLOPIDOGREL BISULFATE 75 MG: 75 TABLET ORAL at 17:13

## 2024-02-01 RX ADMIN — SODIUM CHLORIDE 125 ML/HR: 9 INJECTION, SOLUTION INTRAVENOUS at 17:12

## 2024-02-01 RX ADMIN — IOPAMIDOL 100 ML: 755 INJECTION, SOLUTION INTRAVENOUS at 15:38

## 2024-02-01 NOTE — H&P
"    Hospitalist Service  History and Physical      Patient Name: Kali Garcia  : 1945  MRN: [id number]  Primary Care Physician:  Provider, No Known  Date of admission: 2024      Subjective      Chief Complaint: Dizziness    History of Present Illness: Kali Garcia is a 78 y.o. male with past medical history of COPD, PVD, hypertension, hyperlipidemia who presented to [facility] on 2024 complaints of passing out at home.  Patient says he was sitting on his chair this morning and tried to reach out for his inhaler and got up.  He got his inhaler and then sat down and started feeling severe dizziness and \"feeling blackout\", unclear if he passed out.  Admits to associated lightheadedness and some chest palpitations but denies any chest pain.  Has chronic shortness of breath and has been feeling worse over the past few days and has also been feeling generalized weak.  No fever, chills, night sweats, Benjamin pain, nausea, vomiting, diarrhea.      ROS: Pertinent positives as noted in HPI/subjective.  All other systems were reviewed and are negative.      Personal History     Past Medical History:   Diagnosis Date    COPD (chronic obstructive pulmonary disease)     Coronary artery disease     Emphysema lung     Hyperlipidemia     Hypertension     Osteoarthritis        Past Surgical History:   Procedure Laterality Date    CARDIAC CATHETERIZATION      COLONOSCOPY N/A 2022    Procedure: COLONOSCOPY with 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 and endoscopic clipping x 1;  Surgeon: Luz Jacques MD;  Location: [facility] ENDOSCOPY;  Service: Gastroenterology;  Laterality: N/A;  post op: cecal AVM    CORONARY STENT PLACEMENT      ENDOSCOPY N/A 2022    Procedure: ESOPHAGOGASTRODUODENOSCOPY WITH BIOPSY X1 AREA;  Surgeon: Luz Jacques MD;  Location: [facility] ENDOSCOPY;  Service: Gastroenterology;  Laterality: N/A;  esophagitis, gastritis, HH    ENDOSCOPY N/A 2022    " Procedure: ESOPHAGOGASTRODUODENOSCOPY;  Surgeon: Luz Jacques MD;  Location: [facility] ENDOSCOPY;  Service: Gastroenterology;  Laterality: N/A;  post op: hiatal hernia, eosphagitis    EYE SURGERY      FEMORAL ARTERY STENT      KNEE SURGERY Left     KNEE SURGERY Left     LUNG SURGERY         Family History: family history is not on file. Otherwise pertinent FHx was reviewed and not pertinent to current issue.    Social History:  reports that he has been smoking cigarettes. He has a 63.00 pack-year smoking history. He does not have any smokeless tobacco history on file. He reports that he does not currently use alcohol. He reports that he does not use drugs.    Home Medications:  Prior to Admission Medications       Prescriptions Last Dose Informant Patient Reported? Taking?    amLODIPine (NORVASC) 10 MG tablet 1/31/2024  Yes Yes    Take 1 tablet by mouth Daily.    clopidogrel (PLAVIX) 75 MG tablet 1/31/2024  Yes Yes    Take 1 tablet by mouth Daily.    ipratropium-albuterol (DUO-NEB) 0.5-2.5 mg/3 ml nebulizer 1/31/2024  Yes Yes    Take 3 mL by nebulization Every 4 (Four) Hours As Needed for Wheezing.    pravastatin (PRAVACHOL) 40 MG tablet 1/31/2024  Yes Yes    Take 1 tablet by mouth Daily.              I have utilized all available, immediate resources to obtain, update, or review the patient's current medications including all prescriptions, over-the-counter products, herbals, cannabis/cannabidiol products, and vitamin.mineral/dietary (nutritional) supplements.    Allergies:  Allergies   Allergen Reactions    Codeine GI Intolerance       Objective      Vitals:   Temp:  [97.5 °F (36.4 °C)] 97.5 °F (36.4 °C)  Heart Rate:  [69-89] 75  Resp:  [14] 14  BP: ()/(44-69) 134/59    Physical Exam  Constitutional:       General: He is not in acute distress.     Appearance: Normal appearance. He is not ill-appearing.   HENT:      Head: Normocephalic and atraumatic.      Mouth/Throat:      Mouth: Mucous membranes are dry.       Pharynx: Oropharynx is clear.   Eyes:      Extraocular Movements: Extraocular movements intact.      Conjunctiva/sclera: Conjunctivae normal.      Pupils: Pupils are equal, round, and reactive to light.   Cardiovascular:      Rate and Rhythm: Normal rate and regular rhythm.      Pulses: Normal pulses.      Heart sounds: Normal heart sounds. No murmur heard.  Pulmonary:      Effort: Pulmonary effort is normal. No respiratory distress.      Breath sounds: Normal breath sounds. No wheezing, rhonchi or rales.   Abdominal:      General: Abdomen is flat. Bowel sounds are normal. There is no distension.      Palpations: Abdomen is soft.      Tenderness: There is no abdominal tenderness. There is no guarding.   Musculoskeletal:         General: No swelling or deformity. Normal range of motion.      Cervical back: Normal range of motion and neck supple.   Skin:     General: Skin is warm and dry.   Neurological:      General: No focal deficit present.      Mental Status: He is alert and oriented to person, place, and time. Mental status is at baseline.      Cranial Nerves: No cranial nerve deficit.   Psychiatric:         Mood and Affect: Mood normal.         Behavior: Behavior normal.         Thought Content: Thought content normal.         Result Review    Result Review:  I have personally reviewed the results from the time of this admission to 2/1/2024 16:37 EST and agree with these findings:  [x]  Laboratory  [x]  Microbiology  [x]  Radiology  [x]  EKG/Telemetry   [x]  Cardiology/Vascular   []  Pathology  [x]  Old records  []  Other:      Assessment & Plan        Active Hospital Problems:  Active Hospital Problems    Diagnosis     **Syncope        Assessment/Plan:     Syncope  -Appears to be orthostatic, orthostatic vital signs positive on admission  -Continue aggressive IV fluids  -EKG with NSR, no acute ST-T wave changes noted  -Trend troponin, check echo and carotid Doppler  -Consider cardiology consult based on  findings    Left upper lobe pneumonia  COVID-positive  -CTA chest shows no PE but does mention left upper lobe pneumonia, patient remains on room air  -Respiratory viral panel positive for COVID  -Continue IV ceftriaxone    PVD  Hyperlipidemia  -Continue Plavix and statin    Hypertension  -Hold antihypertensives for now due to orthostatic    DVT prophylaxis:  Medical DVT prophylaxis orders are present.        Differential diagnosis including as mentioned above in A/P. Discussed the case in detail with the patient/family who also stated the patient has above. Discussed the case in details with consultants who agreed with the current diagnosis of Syncope and agreed with the treatment plan for this. I reviewed the data independently as mentioned in my MDM. Shared decision making completed with the clinician and patient regarding risk and benefits of treatment plan and patient agreed to proceed with the above mentioned treatment plan.    CODE STATUS:         Admission Status:  I believe this patient meets obs status.    Signature:   Electronically signed by Patti Fraser DO, 02/01/24, 4:37 PM EST.    Part of this note may be an electronic transcription/translation of spoken language to printed text using the Dragon Dictation System.

## 2024-02-01 NOTE — Clinical Note
Level of Care: Med/Surg [1]   Admitting Physician: HEATHER SHELTON [[id number]]   Attending Physician: HEATHER SHELTON [[id number]]

## 2024-02-01 NOTE — ED PROVIDER NOTES
"Subjective   History of Present Illness  Chief complaint: Patient is a 78-year-old who had 3 near syncopal events today and had a full syncopal event 3 days prior.  He states 3 days ago he was sitting on a chair and felt himself \"blacking out\".  He found himself leaning over the chair upside down.  He chronically takes Plavix for coronary stents.  He also takes amlodipine.  He is taken these medicines for years no new changes.  He is not having chest pain.  He has COPD and chronic shortness of breath.  He does feel himself get increasing shortness of breath prior to these events.  He was able to keep himself from blacking out today.  He states that \"it felt like a rush in my head just before\".  He states he feels \"blackening sensation\".  Has no focal neurologic complaints.  He states he chronically has some left leg weakness and intermittent numbness.  Nothing new for him.  He has oxygen at home if he needs it but does not use it often.    Context:    Duration:    Timing:    Severity:    Associated Symptoms:        PCP:        Review of Systems   Constitutional:  Positive for fatigue.   HENT:  Positive for congestion.    Eyes: Negative.    Respiratory:  Positive for shortness of breath.    Cardiovascular: Negative.    Gastrointestinal: Negative.    Genitourinary: Negative.    Musculoskeletal: Negative.    Neurological:  Positive for dizziness, syncope and light-headedness.       Past Medical History:   Diagnosis Date    COPD (chronic obstructive pulmonary disease)     Coronary artery disease     Emphysema lung     Hyperlipidemia     Hypertension     Osteoarthritis        Allergies   Allergen Reactions    Codeine GI Intolerance       Past Surgical History:   Procedure Laterality Date    CARDIAC CATHETERIZATION      COLONOSCOPY N/A 8/4/2022    Procedure: COLONOSCOPY with 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 and endoscopic clipping x 1;  Surgeon: Luz Jacques MD;  Location: [facility] ENDOSCOPY;  " Service: Gastroenterology;  Laterality: N/A;  post op: cecal AVM    CORONARY STENT PLACEMENT      ENDOSCOPY N/A 4/14/2022    Procedure: ESOPHAGOGASTRODUODENOSCOPY WITH BIOPSY X1 AREA;  Surgeon: Luz Jacques MD;  Location: [facility] ENDOSCOPY;  Service: Gastroenterology;  Laterality: N/A;  esophagitis, gastritis, HH    ENDOSCOPY N/A 8/4/2022    Procedure: ESOPHAGOGASTRODUODENOSCOPY;  Surgeon: Luz Jacques MD;  Location: [facility] ENDOSCOPY;  Service: Gastroenterology;  Laterality: N/A;  post op: hiatal hernia, eosphagitis    EYE SURGERY      FEMORAL ARTERY STENT      KNEE SURGERY Left     KNEE SURGERY Left     LUNG SURGERY         No family history on file.    Social History     Socioeconomic History    Marital status:    Tobacco Use    Smoking status: Every Day     Packs/day: 1.00     Years: 63.00     Additional pack years: 0.00     Total pack years: 63.00     Types: Cigarettes   Substance and Sexual Activity    Alcohol use: Not Currently    Drug use: Never           Objective   Physical Exam  Vitals and nursing note reviewed.   Constitutional:       Appearance: Normal appearance.   HENT:      Head: Normocephalic and atraumatic.   Eyes:      Extraocular Movements: Extraocular movements intact.      Pupils: Pupils are equal, round, and reactive to light.   Cardiovascular:      Rate and Rhythm: Normal rate and regular rhythm.      Heart sounds: Normal heart sounds.   Pulmonary:      Effort: Pulmonary effort is normal.      Breath sounds: Normal breath sounds.   Abdominal:      Tenderness: There is no abdominal tenderness.   Musculoskeletal:      Cervical back: Normal range of motion and neck supple.   Neurological:      Mental Status: He is alert and oriented to person, place, and time.      Comments: Mild drift left lower extremity patient states is chronic.  Otherwise no focal deficits.   Psychiatric:         Mood and Affect: Mood normal.         Thought Content: Thought content normal.         Procedures            ED Course                                 Results for orders placed or performed during the hospital encounter of 02/01/24   COVID-19, FLU A/B, RSV PCR 1 HR TAT - Swab, Nasopharynx    Specimen: Nasopharynx; Swab   Result Value Ref Range    COVID19 Detected (C) Not Detected - Ref. Range    Influenza A PCR Not Detected Not Detected    Influenza B PCR Not Detected Not Detected    RSV, PCR Not Detected Not Detected   Comprehensive Metabolic Panel    Specimen: Blood   Result Value Ref Range    Glucose 86 65 - 99 mg/dL    BUN 20 8 - 23 mg/dL    Creatinine 0.84 0.76 - 1.27 mg/dL    Sodium 135 (L) 136 - 145 mmol/L    Potassium 4.3 3.5 - 5.2 mmol/L    Chloride 101 98 - 107 mmol/L    CO2 24.0 22.0 - 29.0 mmol/L    Calcium 9.0 8.6 - 10.5 mg/dL    Total Protein 7.1 6.0 - 8.5 g/dL    Albumin 3.4 (L) 3.5 - 5.2 g/dL    ALT (SGPT) 14 1 - 41 U/L    AST (SGOT) 19 1 - 40 U/L    Alkaline Phosphatase 67 39 - 117 U/L    Total Bilirubin 0.3 0.0 - 1.2 mg/dL    Globulin 3.7 gm/dL    A/G Ratio 0.9 g/dL    BUN/Creatinine Ratio 23.8 7.0 - 25.0    Anion Gap 10.0 5.0 - 15.0 mmol/L    eGFR 88.7 >60.0 mL/min/1.73   Protime-INR    Specimen: Blood   Result Value Ref Range    Protime 10.3 9.6 - 11.7 Seconds    INR 0.94 0.93 - 1.10   aPTT    Specimen: Blood   Result Value Ref Range    PTT <20.0 (L) 61.0 - 76.5 seconds   High Sensitivity Troponin T    Specimen: Blood   Result Value Ref Range    HS Troponin T 45 (H) <22 ng/L   D-dimer, Quantitative    Specimen: Blood   Result Value Ref Range    D-Dimer, Quantitative 1.31 (H) 0.00 - 0.79 mg/L (FEU)   Magnesium    Specimen: Blood   Result Value Ref Range    Magnesium 1.9 1.6 - 2.4 mg/dL   TSH    Specimen: Blood   Result Value Ref Range    TSH 2.280 0.270 - 4.200 uIU/mL   CBC Auto Differential    Specimen: Blood   Result Value Ref Range    WBC 5.50 3.40 - 10.80 10*3/mm3    RBC 3.52 (L) 4.14 - 5.80 10*6/mm3    Hemoglobin 11.0 (L) 13.0 - 17.7 g/dL    Hematocrit 33.0 (L) 37.5 - 51.0 %    MCV 93.8 79.0 -  97.0 fL    MCH 31.1 26.6 - 33.0 pg    MCHC 33.2 31.5 - 35.7 g/dL    RDW 14.8 12.3 - 15.4 %    RDW-SD 51.2 37.0 - 54.0 fl    MPV 7.5 6.0 - 12.0 fL    Platelets 228 140 - 450 10*3/mm3    Neutrophil % 75.6 42.7 - 76.0 %    Lymphocyte % 13.9 (L) 19.6 - 45.3 %    Monocyte % 9.2 5.0 - 12.0 %    Eosinophil % 0.9 0.3 - 6.2 %    Basophil % 0.4 0.0 - 1.5 %    Neutrophils, Absolute 4.10 1.70 - 7.00 10*3/mm3    Lymphocytes, Absolute 0.80 0.70 - 3.10 10*3/mm3    Monocytes, Absolute 0.50 0.10 - 0.90 10*3/mm3    Eosinophils, Absolute 0.00 0.00 - 0.40 10*3/mm3    Basophils, Absolute 0.00 0.00 - 0.20 10*3/mm3    nRBC 0.0 0.0 - 0.2 /100 WBC   ECG 12 Lead Syncope   Result Value Ref Range    QT Interval 358 ms    QTC Interval 427 ms   Green Top (Gel)   Result Value Ref Range    Extra Tube Hold for add-ons.    Lavender Top   Result Value Ref Range    Extra Tube hold for add-on    Gold Top - SST   Result Value Ref Range    Extra Tube Hold for add-ons.    Light Blue Top   Result Value Ref Range    Extra Tube Hold for add-ons.        CT Angiogram Chest Pulmonary Embolism    Result Date: 2/1/2024  Impression: Negative exam for pulmonary embolism. Severe emphysematous changes identified. New infiltrate of the left upper lobe suggests pneumonia. Stellate scar of the right upper lobe is stable. Electronically Signed: Divina Troy MD  2/1/2024 3:46 PM EST  Workstation ID: [id number]    CT Head Without Contrast    Result Date: 2/1/2024  1.No evidence for acute intracranial abnormality. 2.Nonspecific white matter changes are noted with associated diffuse volume loss. These findings are likely related to chronic small vessel ischemic changes and/or age-related changes. Electronically Signed: Jonny Espinal MD  2/1/2024 3:36 PM EST  Workstation ID: [id number]    XR Chest 1 View    Result Date: 2/1/2024  Impression: New bandlike density in the perihilar left midlung favored to represent subsegmental atelectasis. Emphysema with biapical scarring.  Electronically Signed: Martha Dodd MD  2/1/2024 2:51 PM EST  Workstation ID: [id number]             Medical Decision Making  Patient was seen evaluate for the above problem    Differential diagnosis includes was not limited to PE, pneumonia, ACS, arrhythmia    Initial EKG of the patient did show sinus.  There are PACs at rate of 85.  This was interpreted by myself.  Patient did drop of blood pressure from 135 lying to 91/45 standing.  Heart rate did not change significantly.  However he was symptomatic and weak and dizzy.  Potentially the cause of his recurrent syncopal events.  His troponin was 45.  This will be trended.  His D-dimer was elevated.  CT chest shows no PE.  There is a right upper lobe infiltrate.  His COVID-19 was positive as well.  However it is not a multifocal picture typically seen with COVID.  There was lobar right upper lobe.  I did order dose of Rocephin antibiotic.  And patient will be admitted to Dr. Fraser who agrees to take the patient.  Patient verbalized understanding of admission.  He did receive IV fluids here in the emergency department as well for his orthostasis.    Problems Addressed:  COVID-19: complicated acute illness or injury  Pneumonia due to infectious organism, unspecified laterality, unspecified part of lung: complicated acute illness or injury  Syncope and collapse: complicated acute illness or injury    Amount and/or Complexity of Data Reviewed  Labs: ordered. Decision-making details documented in ED Course.     Details: Labs reviewed by myself  Radiology: ordered and independent interpretation performed.     Details: ET scan reveals no PE.  There is a right upper lobe infiltrate.  ECG/medicine tests: ordered and independent interpretation performed.     Details: EKG interpreted by myself as above    Risk  Prescription drug management.  Decision regarding hospitalization.        Final diagnoses:   None   Syncope and collapse  COVID-19  Pneumonia    ED Disposition  ED  Disposition       None            No follow-up provider specified.       Medication List      No changes were made to your prescriptions during this visit.            Marlon Peters,   02/01/24 [id number]

## 2024-02-02 ENCOUNTER — APPOINTMENT (OUTPATIENT)
Dept: GENERAL RADIOLOGY | Facility: HOSPITAL | Age: 79
End: 2024-02-02
Payer: MEDICARE

## 2024-02-02 PROBLEM — U07.1 PNEUMONIA DUE TO COVID-19 VIRUS: Status: ACTIVE | Noted: 2024-02-02

## 2024-02-02 PROBLEM — J12.82 PNEUMONIA DUE TO COVID-19 VIRUS: Status: ACTIVE | Noted: 2024-02-02

## 2024-02-02 LAB
ANION GAP SERPL CALCULATED.3IONS-SCNC: 8 MMOL/L (ref 5–15)
ANION GAP SERPL CALCULATED.3IONS-SCNC: 8 MMOL/L (ref 5–15)
APTT PPP: 25.4 SECONDS (ref 61–76.5)
APTT PPP: 34.6 SECONDS (ref 61–76.5)
BASOPHILS # BLD AUTO: 0 10*3/MM3 (ref 0–0.2)
BASOPHILS NFR BLD AUTO: 0.5 % (ref 0–1.5)
BUN SERPL-MCNC: 16 MG/DL (ref 8–23)
BUN SERPL-MCNC: 21 MG/DL (ref 8–23)
BUN/CREAT SERPL: 24.2 (ref 7–25)
BUN/CREAT SERPL: 25.9 (ref 7–25)
CA-I SERPL ISE-MCNC: 1.22 MMOL/L (ref 1.2–1.3)
CALCIUM SPEC-SCNC: 8.6 MG/DL (ref 8.6–10.5)
CALCIUM SPEC-SCNC: 8.8 MG/DL (ref 8.6–10.5)
CHLORIDE SERPL-SCNC: 102 MMOL/L (ref 98–107)
CHLORIDE SERPL-SCNC: 103 MMOL/L (ref 98–107)
CO2 SERPL-SCNC: 26 MMOL/L (ref 22–29)
CO2 SERPL-SCNC: 27 MMOL/L (ref 22–29)
CREAT SERPL-MCNC: 0.66 MG/DL (ref 0.76–1.27)
CREAT SERPL-MCNC: 0.81 MG/DL (ref 0.76–1.27)
DEPRECATED RDW RBC AUTO: 48.6 FL (ref 37–54)
DEPRECATED RDW RBC AUTO: 50.8 FL (ref 37–54)
EGFRCR SERPLBLD CKD-EPI 2021: 89.7 ML/MIN/1.73
EGFRCR SERPLBLD CKD-EPI 2021: 95.4 ML/MIN/1.73
EOSINOPHIL # BLD AUTO: 0 10*3/MM3 (ref 0–0.4)
EOSINOPHIL NFR BLD AUTO: 0.5 % (ref 0.3–6.2)
ERYTHROCYTE [DISTWIDTH] IN BLOOD BY AUTOMATED COUNT: 14.7 % (ref 12.3–15.4)
ERYTHROCYTE [DISTWIDTH] IN BLOOD BY AUTOMATED COUNT: 14.9 % (ref 12.3–15.4)
GEN 5 2HR TROPONIN T REFLEX: 43 NG/L
GLUCOSE SERPL-MCNC: 110 MG/DL (ref 65–99)
GLUCOSE SERPL-MCNC: 97 MG/DL (ref 65–99)
HCT VFR BLD AUTO: 29.8 % (ref 37.5–51)
HCT VFR BLD AUTO: 30.1 % (ref 37.5–51)
HGB BLD-MCNC: 10 G/DL (ref 13–17.7)
HGB BLD-MCNC: 10.2 G/DL (ref 13–17.7)
INR PPP: 1.01 (ref 0.93–1.1)
LYMPHOCYTES # BLD AUTO: 0.6 10*3/MM3 (ref 0.7–3.1)
LYMPHOCYTES NFR BLD AUTO: 13.1 % (ref 19.6–45.3)
MAGNESIUM SERPL-MCNC: 1.9 MG/DL (ref 1.6–2.4)
MCH RBC QN AUTO: 31.6 PG (ref 26.6–33)
MCH RBC QN AUTO: 32.2 PG (ref 26.6–33)
MCHC RBC AUTO-ENTMCNC: 33.6 G/DL (ref 31.5–35.7)
MCHC RBC AUTO-ENTMCNC: 34 G/DL (ref 31.5–35.7)
MCV RBC AUTO: 92.9 FL (ref 79–97)
MCV RBC AUTO: 95.9 FL (ref 79–97)
MONOCYTES # BLD AUTO: 0.4 10*3/MM3 (ref 0.1–0.9)
MONOCYTES NFR BLD AUTO: 8.3 % (ref 5–12)
NEUTROPHILS NFR BLD AUTO: 3.5 10*3/MM3 (ref 1.7–7)
NEUTROPHILS NFR BLD AUTO: 77.6 % (ref 42.7–76)
NRBC BLD AUTO-RTO: 0 /100 WBC (ref 0–0.2)
PLATELET # BLD AUTO: 205 10*3/MM3 (ref 140–450)
PLATELET # BLD AUTO: 219 10*3/MM3 (ref 140–450)
PMV BLD AUTO: 7.6 FL (ref 6–12)
PMV BLD AUTO: 7.8 FL (ref 6–12)
POTASSIUM SERPL-SCNC: 3.7 MMOL/L (ref 3.5–5.2)
POTASSIUM SERPL-SCNC: 4.2 MMOL/L (ref 3.5–5.2)
PROTHROMBIN TIME: 11 SECONDS (ref 9.6–11.7)
QT INTERVAL: 358 MS
QTC INTERVAL: 427 MS
RBC # BLD AUTO: 3.11 10*6/MM3 (ref 4.14–5.8)
RBC # BLD AUTO: 3.24 10*6/MM3 (ref 4.14–5.8)
SODIUM SERPL-SCNC: 137 MMOL/L (ref 136–145)
SODIUM SERPL-SCNC: 137 MMOL/L (ref 136–145)
TROPONIN T DELTA: 8 NG/L
TROPONIN T SERPL HS-MCNC: 35 NG/L
TROPONIN T SERPL HS-MCNC: 37 NG/L
WBC NRBC COR # BLD AUTO: 4 10*3/MM3 (ref 3.4–10.8)
WBC NRBC COR # BLD AUTO: 4.5 10*3/MM3 (ref 3.4–10.8)

## 2024-02-02 PROCEDURE — 85025 COMPLETE CBC W/AUTO DIFF WBC: CPT | Performed by: INTERNAL MEDICINE

## 2024-02-02 PROCEDURE — 93005 ELECTROCARDIOGRAM TRACING: CPT | Performed by: INTERNAL MEDICINE

## 2024-02-02 PROCEDURE — 36415 COLL VENOUS BLD VENIPUNCTURE: CPT | Performed by: INTERNAL MEDICINE

## 2024-02-02 PROCEDURE — 85027 COMPLETE CBC AUTOMATED: CPT | Performed by: INTERNAL MEDICINE

## 2024-02-02 PROCEDURE — 80048 BASIC METABOLIC PNL TOTAL CA: CPT | Performed by: INTERNAL MEDICINE

## 2024-02-02 PROCEDURE — 94799 UNLISTED PULMONARY SVC/PX: CPT

## 2024-02-02 PROCEDURE — 85730 THROMBOPLASTIN TIME PARTIAL: CPT | Performed by: INTERNAL MEDICINE

## 2024-02-02 PROCEDURE — 94664 DEMO&/EVAL PT USE INHALER: CPT

## 2024-02-02 PROCEDURE — 25010000002 AMIODARONE IN DEXTROSE 5% 150-4.21 MG/100ML-% SOLUTION: Performed by: INTERNAL MEDICINE

## 2024-02-02 PROCEDURE — 25010000002 HEPARIN (PORCINE) 25000-0.45 UT/250ML-% SOLUTION: Performed by: INTERNAL MEDICINE

## 2024-02-02 PROCEDURE — 83735 ASSAY OF MAGNESIUM: CPT | Performed by: INTERNAL MEDICINE

## 2024-02-02 PROCEDURE — 25010000002 CEFTRIAXONE PER 250 MG: Performed by: INTERNAL MEDICINE

## 2024-02-02 PROCEDURE — 71045 X-RAY EXAM CHEST 1 VIEW: CPT

## 2024-02-02 PROCEDURE — 94640 AIRWAY INHALATION TREATMENT: CPT

## 2024-02-02 PROCEDURE — 84484 ASSAY OF TROPONIN QUANT: CPT | Performed by: INTERNAL MEDICINE

## 2024-02-02 PROCEDURE — 82330 ASSAY OF CALCIUM: CPT | Performed by: INTERNAL MEDICINE

## 2024-02-02 PROCEDURE — 85610 PROTHROMBIN TIME: CPT | Performed by: INTERNAL MEDICINE

## 2024-02-02 PROCEDURE — 99222 1ST HOSP IP/OBS MODERATE 55: CPT | Performed by: INTERNAL MEDICINE

## 2024-02-02 PROCEDURE — 25810000003 SODIUM CHLORIDE 0.9 % SOLUTION: Performed by: INTERNAL MEDICINE

## 2024-02-02 RX ORDER — DILTIAZEM HYDROCHLORIDE 5 MG/ML
0.25 INJECTION INTRAVENOUS ONCE
Status: DISCONTINUED | OUTPATIENT
Start: 2024-02-02 | End: 2024-02-02

## 2024-02-02 RX ORDER — DILTIAZEM HCL/D5W 125 MG/125
5-15 PLASTIC BAG, INJECTION (ML) INTRAVENOUS
Status: DISCONTINUED | OUTPATIENT
Start: 2024-02-02 | End: 2024-02-03

## 2024-02-02 RX ORDER — DILTIAZEM HYDROCHLORIDE 5 MG/ML
10 INJECTION INTRAVENOUS ONCE
Status: COMPLETED | OUTPATIENT
Start: 2024-02-02 | End: 2024-02-02

## 2024-02-02 RX ORDER — HEPARIN SODIUM 10000 [USP'U]/100ML
12 INJECTION, SOLUTION INTRAVENOUS
Status: DISCONTINUED | OUTPATIENT
Start: 2024-02-02 | End: 2024-02-03

## 2024-02-02 RX ORDER — AMIODARONE HYDROCHLORIDE 200 MG/1
300 TABLET ORAL EVERY 12 HOURS SCHEDULED
Status: DISCONTINUED | OUTPATIENT
Start: 2024-02-02 | End: 2024-02-06

## 2024-02-02 RX ORDER — IPRATROPIUM BROMIDE AND ALBUTEROL SULFATE 2.5; .5 MG/3ML; MG/3ML
3 SOLUTION RESPIRATORY (INHALATION)
Status: DISCONTINUED | OUTPATIENT
Start: 2024-02-02 | End: 2024-02-02

## 2024-02-02 RX ORDER — DILTIAZEM HCL/D5W 125 MG/125
5-15 PLASTIC BAG, INJECTION (ML) INTRAVENOUS
Status: DISCONTINUED | OUTPATIENT
Start: 2024-02-02 | End: 2024-02-02

## 2024-02-02 RX ORDER — ALBUTEROL SULFATE 90 UG/1
2 AEROSOL, METERED RESPIRATORY (INHALATION)
Status: DISCONTINUED | OUTPATIENT
Start: 2024-02-02 | End: 2024-02-06 | Stop reason: HOSPADM

## 2024-02-02 RX ORDER — LORAZEPAM 0.5 MG/1
0.25 TABLET ORAL ONCE
Status: COMPLETED | OUTPATIENT
Start: 2024-02-02 | End: 2024-02-02

## 2024-02-02 RX ADMIN — ALBUTEROL SULFATE 2 PUFF: 108 AEROSOL, METERED RESPIRATORY (INHALATION) at 10:09

## 2024-02-02 RX ADMIN — AMIODARONE HYDROCHLORIDE 300 MG: 200 TABLET ORAL at 20:32

## 2024-02-02 RX ADMIN — SODIUM CHLORIDE 125 ML/HR: 9 INJECTION, SOLUTION INTRAVENOUS at 15:15

## 2024-02-02 RX ADMIN — CLOPIDOGREL BISULFATE 75 MG: 75 TABLET ORAL at 09:13

## 2024-02-02 RX ADMIN — AMIODARONE HYDROCHLORIDE 150 MG: 1.5 INJECTION, SOLUTION INTRAVENOUS at 16:46

## 2024-02-02 RX ADMIN — CEFTRIAXONE 1000 MG: 1 INJECTION, POWDER, FOR SOLUTION INTRAMUSCULAR; INTRAVENOUS at 18:06

## 2024-02-02 RX ADMIN — ATORVASTATIN CALCIUM 10 MG: 10 TABLET, FILM COATED ORAL at 09:13

## 2024-02-02 RX ADMIN — ACETAMINOPHEN 650 MG: 325 TABLET, FILM COATED ORAL at 02:46

## 2024-02-02 RX ADMIN — ALBUTEROL SULFATE 2 PUFF: 108 AEROSOL, METERED RESPIRATORY (INHALATION) at 18:46

## 2024-02-02 RX ADMIN — Medication 10 ML: at 20:32

## 2024-02-02 RX ADMIN — Medication 5 MG/HR: at 15:33

## 2024-02-02 RX ADMIN — DOXYCYCLINE 100 MG: 100 INJECTION, POWDER, LYOPHILIZED, FOR SOLUTION INTRAVENOUS at 17:02

## 2024-02-02 RX ADMIN — ALBUTEROL SULFATE 2 PUFF: 108 AEROSOL, METERED RESPIRATORY (INHALATION) at 15:43

## 2024-02-02 RX ADMIN — DOCUSATE SODIUM AND SENNOSIDES 2 TABLET: 8.6; 5 TABLET, FILM COATED ORAL at 20:32

## 2024-02-02 RX ADMIN — Medication 10 ML: at 09:13

## 2024-02-02 RX ADMIN — DOCUSATE SODIUM AND SENNOSIDES 2 TABLET: 8.6; 5 TABLET, FILM COATED ORAL at 09:13

## 2024-02-02 RX ADMIN — HEPARIN SODIUM 12 UNITS/KG/HR: 10000 INJECTION, SOLUTION INTRAVENOUS at 16:55

## 2024-02-02 RX ADMIN — DILTIAZEM HYDROCHLORIDE 10 MG: 5 INJECTION, SOLUTION INTRAVENOUS at 15:33

## 2024-02-02 RX ADMIN — LORAZEPAM 0.25 MG: 0.5 TABLET ORAL at 15:18

## 2024-02-02 NOTE — CONSULTS
Referring Provider: Bri Montero MD    Reason for Consultation:      Atrial fibrillation with RVR      Patient Care Team:  She Kraft MD as PCP - General (Internal Medicine)      SUBJECTIVE    Patient seen and examined, agree with narrative as discussed with nurse practitioner after face-to-face encounter with scribed findings below verified by me for accuracy     Chief Complaint: Shortness of breath/near syncope    History of present illness:  Kali Garcia is a 78 y.o. male with a history of COPD who presented to [facility] with complaint of shortness of breath, dizziness, near syncope.    Patient is reported to have a history of coronary artery disease with previous PCI.  Patient presented to the emergency room on February 1, 2024 emergency room evaluation included CT PE protocol that was negative for PE that showed severe emphysematous changes infiltrate in the left upper lobe suggesting pneumonia.    CT of the head was unremarkable.  EKG showed sinus rhythm with PACs.  Patient was found to have severe orthostasis with blood pressure lying 135 with a drop to 91/45 standing.    Patient tested positive for COVID-19.Since encounter he has developed Afib with RVR and was transferred on [facility] [address] to PCU.  I saw the patient who is concerned about his underlying lung disease and viral syndrome reassurance provided, oxygenation is okay, we discussed adding amiodarone given short duration of atrial fibrillation to try to maintain sinus rhythm which he is agreeable to this plan.          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 and endoscopic clipping x 1;  Surgeon: Luz Jacques MD;  Location: [facility] ENDOSCOPY;  Service: Gastroenterology;  Laterality: N/A;  post op: cecal AVM    CORONARY STENT PLACEMENT      ENDOSCOPY N/A 2022    Procedure: ESOPHAGOGASTRODUODENOSCOPY WITH BIOPSY X1 AREA;  Surgeon: Luz Jacques MD;  Location: [facility] ENDOSCOPY;  Service: Gastroenterology;  Laterality: N/A;  esophagitis, gastritis, HH    ENDOSCOPY N/A 2022    Procedure: ESOPHAGOGASTRODUODENOSCOPY;  Surgeon: Luz Jacques MD;  Location: [facility] ENDOSCOPY;  Service: Gastroenterology;  Laterality: N/A;  post op: hiatal hernia, eosphagitis    EYE SURGERY      FEMORAL ARTERY STENT      KNEE SURGERY Left     KNEE SURGERY Left     LUNG SURGERY         History reviewed. No pertinent family history.    Social History     Tobacco Use    Smoking status: Former     Packs/day: 1.00     Years: 63.00     Additional pack years: 0.00     Total pack years: 63.00     Types: Cigarettes     Quit date: 2023     Years since quittin.1   Vaping Use    Vaping Use: Never used   Substance Use Topics    Alcohol use: Not Currently    Drug use: Never        Home meds:  Prior to Admission medications    Medication Sig Start Date End Date Taking? Authorizing Provider   amLODIPine (NORVASC) 10 MG tablet Take 1 tablet by mouth Daily.   Yes Gwendolyn Martínez MD   clopidogrel (PLAVIX) 75 MG tablet Take 1 tablet by mouth Daily.   Yes Gwendolyn Martínez MD   ipratropium-albuterol (DUO-NEB) 0.5-2.5 mg/3 ml nebulizer Take 3 mL by nebulization Every 4 (Four) Hours As Needed for Wheezing.   Yes Gwendolyn Martínez MD   pravastatin (PRAVACHOL) 40 MG tablet Take 1 tablet by mouth Daily.   Yes Gwendolyn Martínez MD       Allergies:     Codeine    Scheduled Meds:albuterol sulfate HFA, 2 puff, Inhalation, 4x Daily - RT  atorvastatin, 10 mg,  "Oral, Daily  cefTRIAXone, 1,000 mg, Intravenous, Q24H  clopidogrel, 75 mg, Oral, Daily  doxycycline, 100 mg, Intravenous, Q12H  heparin, 60 Units/kg, Intravenous, Once  senna-docusate sodium, 2 tablet, Oral, BID  sodium chloride, 10 mL, Intravenous, Q12H      Continuous Infusions:dilTIAZem, 5-15 mg/hr, Last Rate: 7.5 mg/hr (02/02/24 1603)  heparin, 12 Units/kg/hr  sodium chloride, 125 mL/hr, Last Rate: 125 mL/hr (02/02/24 1515)      PRN Meds:  acetaminophen **OR** acetaminophen **OR** acetaminophen    senna-docusate sodium **AND** polyethylene glycol **AND** bisacodyl **AND** bisacodyl    [COMPLETED] dilTIAZem **AND** dilTIAZem **AND** dilTIAZem    heparin    heparin    nitroglycerin    ondansetron ODT **OR** ondansetron    [COMPLETED] Insert Peripheral IV **AND** sodium chloride    sodium chloride    sodium chloride      OBJECTIVE    Vital Signs  Vitals:    02/02/24 1500 02/02/24 1538 02/02/24 1542 02/02/24 1603   BP:  107/55  113/60   BP Location:    Left arm   Patient Position:    Lying   Pulse: (!) 124 119 112 (!) 130   Resp:    20   Temp:       TempSrc:       SpO2: 94% 96% 93% 95%   Weight:       Height:           Flowsheet Rows      Flowsheet Row First Filed Value   Admission Height 180.3 cm (71\") Documented at 02/01/2024 1327   Admission Weight 52.1 kg (114 lb 14.5 oz) Documented at 02/01/2024 1330              Intake/Output Summary (Last 24 hours) at 2/2/2024 1638  Last data filed at 2/2/2024 0900  Gross per 24 hour   Intake --   Output 550 ml   Net -550 ml        Telemetry: Atrial fibrillation with    Physical Exam:  The patient is alert, frail currently in A-fib with RVR and undergoing rapid response  Vital signs as noted above.  Head and neck revealed no carotid bruits or jugular venous distention.  No thyromegaly or lymphadenopathy is present  Lungs clear.  Decreased bilateral bases heart: Tachycardic irregularly irregular rhythm normal first and second heart sounds. No murmur.  No precordial rub is " present.  No gallop is present.  Abdomen: Soft and nontender.  No organomegaly is present.  Extremities with good peripheral pulses without any pedal edema.  Skin: Warm and dry.  Musculoskeletal system is grossly normal.  CNS grossly normal.       Results Review:  I have personally reviewed the results from the time of this admission to 2/2/2024 16:38 EST and agree with these findings:  [x]  Laboratory  []  Microbiology  []  Radiology  [x]  EKG/Telemetry   [x]  Cardiology/Vascular   []  Pathology  []  Old records  []  Other:    Most notable findings include:     Lab Results (last 24 hours)       Procedure Component Value Units Date/Time    High Sensitivity Troponin T [[id number]]  (Abnormal) Collected: 02/02/24 1538    Specimen: Blood Updated: 02/02/24 1638     HS Troponin T 35 ng/L     Narrative:      High Sensitive Troponin T Reference Range:  <14.0 ng/L- Negative Female for AMI  <22.0 ng/L- Negative Male for AMI  >=14 - Abnormal Female indicating possible myocardial injury.  >=22 - Abnormal Male indicating possible myocardial injury.   Clinicians would have to utilize clinical acumen, EKG, Troponin, and serial changes to determine if it is an Acute Myocardial Infarction or myocardial injury due to an underlying chronic condition.         Protime-INR [[id number]]  (Normal) Collected: 02/02/24 1538    Specimen: Blood Updated: 02/02/24 1604     Protime 11.0 Seconds      INR 1.01    aPTT [[id number]]  (Abnormal) Collected: 02/02/24 1538    Specimen: Blood Updated: 02/02/24 1604     PTT 25.4 seconds     CBC & Differential [[id number]] Updated: 02/02/24 1556    Specimen: Blood     Narrative:      The following orders were created for panel order CBC & Differential.  Procedure                               Abnormality         Status                     ---------                               -----------         ------                     CBC Auto Differential[[id number]]                                                          Please view results for these tests on the individual orders.    CBC Auto Differential [[id number]] Updated: 02/02/24 1556    Specimen: Blood     Basic Metabolic Panel [[id number]] Collected: 02/02/24 1538    Specimen: Blood Updated: 02/02/24 1550    High Sensitivity Troponin T [[id number]]  (Abnormal) Collected: 02/02/24 0930    Specimen: Blood Updated: 02/02/24 1016     HS Troponin T 37 ng/L     Narrative:      High Sensitive Troponin T Reference Range:  <14.0 ng/L- Negative Female for AMI  <22.0 ng/L- Negative Male for AMI  >=14 - Abnormal Female indicating possible myocardial injury.  >=22 - Abnormal Male indicating possible myocardial injury.   Clinicians would have to utilize clinical acumen, EKG, Troponin, and serial changes to determine if it is an Acute Myocardial Infarction or myocardial injury due to an underlying chronic condition.         Basic Metabolic Panel [[id number]]  (Abnormal) Collected: 02/02/24 0029    Specimen: Blood Updated: 02/02/24 0130     Glucose 97 mg/dL      BUN 21 mg/dL      Creatinine 0.81 mg/dL      Sodium 137 mmol/L      Potassium 4.2 mmol/L      Chloride 102 mmol/L      CO2 27.0 mmol/L      Calcium 8.8 mg/dL      BUN/Creatinine Ratio 25.9     Anion Gap 8.0 mmol/L      eGFR 89.7 mL/min/1.73     Narrative:      GFR Normal >60  Chronic Kidney Disease <60  Kidney Failure <15    The GFR formula is only valid for adults with stable renal function between ages 18 and 70.    Magnesium [[id number]]  (Normal) Collected: 02/02/24 0029    Specimen: Blood Updated: 02/02/24 0130     Magnesium 1.9 mg/dL     Calcium, Ionized [[id number]]  (Normal) Collected: 02/02/24 0029    Specimen: Blood Updated: 02/02/24 0117     Ionized Calcium 1.22 mmol/L     CBC (No Diff) [[id number]]  (Abnormal) Collected: 02/02/24 0029    Specimen: Blood Updated: 02/02/24 0112     WBC 4.00 10*3/mm3      RBC 3.24 10*6/mm3      Hemoglobin 10.2 g/dL      Hematocrit 30.1 %      MCV 92.9 fL      MCH 31.6 pg      MCHC 34.0 g/dL       RDW 14.9 %      RDW-SD 50.8 fl      MPV 7.6 fL      Platelets 219 10*3/mm3     Blood Culture - Blood, Arm, Right [[id number]] Collected: 02/01/24 1648    Specimen: Blood from Arm, Right Updated: 02/01/24 1651    Blood Culture - Blood, Arm, Left [[id number]] Collected: 02/01/24 1648    Specimen: Blood from Arm, Left Updated: 02/01/24 1651    High Sensitivity Troponin T 2Hr [[id number]]  (Abnormal) Collected: 02/01/24 1614    Specimen: Blood Updated: 02/01/24 1647     HS Troponin T 38 ng/L      Troponin T Delta -7 ng/L     Narrative:      High Sensitive Troponin T Reference Range:  <14.0 ng/L- Negative Female for AMI  <22.0 ng/L- Negative Male for AMI  >=14 - Abnormal Female indicating possible myocardial injury.  >=22 - Abnormal Male indicating possible myocardial injury.   Clinicians would have to utilize clinical acumen, EKG, Troponin, and serial changes to determine if it is an Acute Myocardial Infarction or myocardial injury due to an underlying chronic condition.                 Imaging Results (Last 24 Hours)       Procedure Component Value Units Date/Time    XR Chest 1 View [[id number]] Collected: 02/02/24 1022     Updated: 02/02/24 1026    Narrative:      XR CHEST 1 VW    Date of Exam: 2/2/2024 10:10 AM EST    Indication: short of breath    Comparison: None available.    Findings:  Heart size and pulmonary vessels are within normal limits. There is emphysematous change of the lungs. There is a chronic nodular opacity within the right lung apex. There are linear opacities in the left perihilar region compatible with atelectasis or   scarring. There is hazy perihilar airspace disease which may be secondary to pneumonia. No focal airspace consolidation within the right lung. No definite pleural effusion or pneumothorax.      Impression:      Impression:    1. Hazy left perihilar airspace disease which may be secondary to pneumonia.  2. Stable chronic nodular opacity within the right lung apex.  3.  Emphysema      Electronically Signed: Mike Celaya MD    2/2/2024 10:24 AM EST    Workstation ID: [id number]            LAB RESULTS (LAST 7 DAYS)    CBC  Results from last 7 days   Lab Units 02/02/24  0029 02/01/24  1407   WBC 10*3/mm3 4.00 5.50   RBC 10*6/mm3 3.24* 3.52*   HEMOGLOBIN g/dL 10.2* 11.0*   HEMATOCRIT % 30.1* 33.0*   MCV fL 92.9 93.8   PLATELETS 10*3/mm3 219 228       BMP  Results from last 7 days   Lab Units 02/02/24  0029 02/01/24  1407   SODIUM mmol/L 137 135*   POTASSIUM mmol/L 4.2 4.3   CHLORIDE mmol/L 102 101   CO2 mmol/L 27.0 24.0   BUN mg/dL 21 20   CREATININE mg/dL 0.81 0.84   GLUCOSE mg/dL 97 86   MAGNESIUM mg/dL 1.9 1.9       CMP   Results from last 7 days   Lab Units 02/02/24  0029 02/01/24  1407   SODIUM mmol/L 137 135*   POTASSIUM mmol/L 4.2 4.3   CHLORIDE mmol/L 102 101   CO2 mmol/L 27.0 24.0   BUN mg/dL 21 20   CREATININE mg/dL 0.81 0.84   GLUCOSE mg/dL 97 86   ALBUMIN g/dL  --  3.4*   BILIRUBIN mg/dL  --  0.3   ALK PHOS U/L  --  67   AST (SGOT) U/L  --  19   ALT (SGPT) U/L  --  14       BNP        TROPONIN  Results from last 7 days   Lab Units 02/02/24  1538   HSTROP T ng/L 35*       CoAg  Results from last 7 days   Lab Units 02/02/24  1538 02/01/24  1407   INR  1.01 0.94   APTT seconds 25.4* <20.0*       Creatinine Clearance  Estimated Creatinine Clearance: 56.9 mL/min (by C-G formula based on SCr of 0.81 mg/dL).    ABG          Radiology  XR Chest 1 View    Result Date: 2/2/2024  Impression: 1. Hazy left perihilar airspace disease which may be secondary to pneumonia. 2. Stable chronic nodular opacity within the right lung apex. 3. Emphysema Electronically Signed: Mike Celaya MD  2/2/2024 10:24 AM EST  Workstation ID: [id number]    CT Angiogram Chest Pulmonary Embolism    Result Date: 2/1/2024  Impression: Negative exam for pulmonary embolism. Severe emphysematous changes identified. New infiltrate of the left upper lobe suggests pneumonia. Stellate scar of the right upper lobe is  stable. Electronically Signed: Divina Troy MD  2/1/2024 3:46 PM EST  Workstation ID: [id number]    CT Head Without Contrast    Result Date: 2/1/2024  1.No evidence for acute intracranial abnormality. 2.Nonspecific white matter changes are noted with associated diffuse volume loss. These findings are likely related to chronic small vessel ischemic changes and/or age-related changes. Electronically Signed: Jonny Espinal MD  2/1/2024 3:36 PM EST  Workstation ID: [id number]    XR Chest 1 View    Result Date: 2/1/2024  Impression: New bandlike density in the perihilar left midlung favored to represent subsegmental atelectasis. Emphysema with biapical scarring. Electronically Signed: Martha Dodd MD  2/1/2024 2:51 PM EST  Workstation ID: [id number]       EKG  I personally viewed and interpreted the patient's EKG/Telemetry data:  ECG 12 Lead Chest Pain   Preliminary Result   HEART RATE= 128  bpm   RR Interval= 467  ms   CA Interval=   ms   P Horizontal Axis=   deg   P Front Axis=   deg   QRSD Interval= 101  ms   QT Interval= 331  ms   QTcB= 484  ms   QRS Axis= 57  deg   T Wave Axis= -69  deg   - ABNORMAL ECG -   Atrial fibrillation   ST depr, consider ischemia, anterolateral lds   When compared with ECG of 02-Feb-2024 10:04:49,   Significant change in rhythm: previously sinus   New or worsened ischemia or infarction   Electronically Signed By:    Date and Time of Study: 2024-02-02 15:06:23      ECG 12 Lead Chest Pain   Preliminary Result   HEART RATE= 81  bpm   RR Interval= 740  ms   CA Interval= 139  ms   P Horizontal Axis= -32  deg   P Front Axis= 96  deg   QRSD Interval= 103  ms   QT Interval= 402  ms   QTcB= 467  ms   QRS Axis= 74  deg   T Wave Axis= 38  deg   - NORMAL ECG -   Sinus rhythm   Electronically Signed By:    Date and Time of Study: 2024-02-02 10:04:49      ECG 12 Lead Syncope   Final Result   HEART RATE= 85  bpm   RR Interval= 704  ms   CA Interval= 138  ms   P Horizontal Axis=   deg   P Front Axis= 85   deg   QRSD Interval= 84  ms   QT Interval= 358  ms   QTcB= 427  ms   QRS Axis= 75  deg   T Wave Axis= 66  deg   - ABNORMAL ECG -   Sinus rhythm   Atrial premature complexes   When compared with ECG of 12-Apr-2022 20:47:01,   No significant change   Electronically Signed By: Marlon Peters ([email or web address]) 02-Feb-2024 08:43:42   Date and Time of Study: 2024-02-01 13:44:57      SCANNED - TELEMETRY     Final Result      SCANNED - TELEMETRY     Final Result      SCANNED - TELEMETRY     Final Result                                  Echocardiogram:          Stress Test:        Cardiac Catheterization:  No results found for this or any previous visit.        Other:      ASSESSMENT & PLAN:    Principal Problem:    Syncope  Active Problems:    Pneumonia due to COVID-19 virus    Atrial fibrillation with RVR  Will start IV Cardizem  Transfer to PCU  Watch blood pressure closely  Echocardiogram will be obtained    Near syncope  Significant orthostatic blood pressure changes with systolic blood pressure dropping from 1 35 to  91 when going from sitting to standing  Continue gentle hydration  May need to add midodrine    Covid-19    Community-acquired pneumonia    Severe COPD    Hypertension    Reported history of coronary artery disease with previous PCI  No recent cardiology follow-up  Denies chest pain at present      Patient on IV Cardizem   Amiodarone bolus followed by p.o. b.I.d. regimen  Will consider IV protocol if does not cardiovert or rates are uncontrolled  Continue treatment viral pneumonia and COPD with severe underlying emphysema and COPD     Patient on PCU, continue telemetry  Echocardiogram to be obtained  Starting IV heparin for anticoagulation ,  possibly to Lovenox tomorrow     Further recommendations based on findings and clinical course    Margi Aburto, FADIA  02/02/24  16:38 EST

## 2024-02-02 NOTE — SIGNIFICANT NOTE
02/02/24 1523   Rehab Time/Intention   Session Not Performed   (RN asked to hold due to elevated HR)   Recommendation   OT - Next Appointment 02/03/24

## 2024-02-02 NOTE — PLAN OF CARE
Goal Outcome Evaluation:              Outcome Evaluation: patient resting comfortably in bed, vs stable, patient on 2L nc, c/o cob and productive cough. albuterol inhaler ordered. No other nursing concerns

## 2024-02-02 NOTE — CONSULTS
Nutrition Services    Patient Name: Kali Garcia  YOB: 1945  MRN: [id number]  Admission date: 2/1/2024    Comment:    See MSA below.    Severe chronic illness-related malnutrition related to hypermetabolism in the setting of COPD as evidenced by PO intake meeting < 75% of estimated energy requirement for > 1 month and severe fat/muscle loss per NFPE.    RD to order Boost Plus TID (provides 1080 kcals, 42 g protein if consumed). Boost Glucose Control may be substituted for Boost Plus at this time, due to national shortage of many ONS products. If substituted, each Boost Glucose Control will provide 190 kcal and 16g PRO.    RD to continue to monitor.    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 and endoscopic clipping x 1;  Surgeon: Luz Jacques MD;  Location: [facility] ENDOSCOPY;  Service: Gastroenterology;  Laterality: N/A;  post op: cecal AVM    CORONARY STENT PLACEMENT      ENDOSCOPY N/A 4/14/2022    Procedure: ESOPHAGOGASTRODUODENOSCOPY WITH BIOPSY X1 AREA;  Surgeon: Luz Jacques MD;  Location: [facility] ENDOSCOPY;  Service: Gastroenterology;  Laterality: N/A;  esophagitis, gastritis, HH    ENDOSCOPY N/A 8/4/2022    Procedure: ESOPHAGOGASTRODUODENOSCOPY;  Surgeon: Luz Jacques MD;  Location: [facility] ENDOSCOPY;  Service: Gastroenterology;  Laterality: N/A;  post op: hiatal hernia, eosphagitis    EYE SURGERY      FEMORAL ARTERY STENT      KNEE SURGERY Left     KNEE SURGERY Left     LUNG SURGERY          Current Problems   Fainting    Pneumonia  COVID +    COPD    "    Encounter Information        Trending Narrative     2/2 - Pt admitted 2/1 with multiple syncopal events prior to admission. RD visited pt at bedside. Pt reports a UBW of 120-125 lbs. Pt reports a usual daily intake of sausage/toast for breakfast, a sandwich with deli meat for lunch, and a larger meal for dinner. He tends to snack on chips and cookies. Pt reported he does not drink ONS regularly, but has before. Pt agreeable to offer of Boost. RD to order Boost Plus TID (provides 1080 kcals, 42 g protein if consumed). Boost Glucose Control may be substituted for Boost Plus at this time, due to national shortage of many ONS products. If substituted, each Boost Glucose Control will provide 190 kcal and 16g PRO. NFPE completed, consistent with nutrition diagnosis of malnutrition using AND/ASPEN criteria. See MSA below. RD to continue to monitor.      Anthropometrics        Current Height, Weight Height: 180.3 cm (71\")  Weight: 54.4 kg (120 lb) (02/02/24 0337)       Usual Body Weight (UBW) 120-125 lbs       Trending Weight Hx     This admission: 2/2 - 120 lbs             PTA: 2/2 - no past weight readings to compare, pt reports UBW of 120-125 lbs    Wt Readings from Last 30 Encounters:   02/02/24 0337 54.4 kg (120 lb)   02/01/24 1330 52.1 kg (114 lb 14.5 oz)   08/04/22 1124 51.1 kg (112 lb 10.5 oz)   07/26/22 1511 59 kg (130 lb)   04/15/22 0436 58.3 kg (128 lb 8.5 oz)   04/13/22 0355 54.6 kg (120 lb 5.9 oz)   04/12/22 2037 128 kg (282 lb 3 oz)      BMI kg/m2 Body mass index is 16.74 kg/m².       Labs        Pertinent Labs Reviewed, management per attending.   Results from last 7 days   Lab Units 02/02/24  0029 02/01/24  1407   SODIUM mmol/L 137 135*   POTASSIUM mmol/L 4.2 4.3   CHLORIDE mmol/L 102 101   CO2 mmol/L 27.0 24.0   BUN mg/dL 21 20   CREATININE mg/dL 0.81 0.84   CALCIUM mg/dL 8.8 9.0   BILIRUBIN mg/dL  --  0.3   ALK PHOS U/L  --  67   ALT (SGPT) U/L  --  14   AST (SGOT) U/L  --  19   GLUCOSE mg/dL 97 86 " "    Results from last 7 days   Lab Units 02/02/24  0029 02/01/24  1407   MAGNESIUM mg/dL 1.9 1.9   HEMOGLOBIN g/dL 10.2* 11.0*   HEMATOCRIT % 30.1* 33.0*     No results found for: \"HGBA1C\"     Medications    Scheduled Medications albuterol sulfate HFA, 2 puff, Inhalation, 4x Daily - RT  atorvastatin, 10 mg, Oral, Daily  cefTRIAXone, 1,000 mg, Intravenous, Q24H  clopidogrel, 75 mg, Oral, Daily  enoxaparin, 30 mg, Subcutaneous, Daily  senna-docusate sodium, 2 tablet, Oral, BID  sodium chloride, 10 mL, Intravenous, Q12H        Infusions sodium chloride, 125 mL/hr, Last Rate: 125 mL/hr (02/01/24 2040)        PRN Medications   acetaminophen **OR** acetaminophen **OR** acetaminophen    senna-docusate sodium **AND** polyethylene glycol **AND** bisacodyl **AND** bisacodyl    nitroglycerin    ondansetron ODT **OR** ondansetron    [COMPLETED] Insert Peripheral IV **AND** sodium chloride    sodium chloride    sodium chloride     Physical Findings        Trending Physical   Appearance, NFPE 2/2 - NFPE completed, consistent with nutrition diagnosis of malnutrition using AND/ASPEN criteria. See MSA below.      --  Edema  No documented edema     Bowel Function No documented BM this admission - per RN comment 2/2 pt stated their last BM was 3 weeks ago. Scheduled pericolace in place,pt missed some doses. PRN bowel regimen available.     Tubes No feeding tube in place.     Chewing/Swallowing No documented chewing/swallowing issues.     Skin Intact     --  Current Nutrition Orders & Evaluation of Intake       Oral Nutrition     Food Allergies NKFA   Current PO Diet Diet: Cardiac Diets; Healthy Heart (2-3 Na+); Texture: Regular Texture (IDDSI 7); Fluid Consistency: Thin (IDDSI 0)   Supplement No supplement ordered.   PO Evaluation     Trending % PO Intake 2/2 - No PO intake recorded so far this admission - pt admitted yesterday 2/1   --  Nutritional Risk Screening        NRS-2002 Score          Nutrition Diagnosis         Nutrition Dx " Problem 1 Severe chronic illness-related malnutrition related to hypermetabolism in the setting of COPD as evidenced by PO intake meeting < 75% of estimated energy requirement for > 1 month and severe fat/muscle loss per NFPE.      Nutrition Dx Problem 2        Intervention Goal         Intervention Goal(s) Encourage good PO and ONS intake.     Nutrition Intervention        RD Action NFPE completed, RD to order ONS, continue healthy heart diet as tolerated.     Nutrition Prescription          Diet Prescription Healthy heart   Supplement Prescription Boost Plus TID (provides 1080 kcals, 42 g protein if consumed)     Boost Glucose Control may be substituted for Boost Plus at this time, due to national shortage of many ONS products. If substituted, each Boost Glucose Control will provide 190 kcal and 16g PRO.     --  Monitor/Evaluation        Monitor Per protocol, PO intake, Supplement intake, Weight     Malnutrition Severity Assessment      Patient meets criteria for : (P) Severe Malnutrition  Malnutrition Type (last 8 hours)       Malnutrition Severity Assessment       Row Name 02/02/24 1059       Malnutrition Severity Assessment    Malnutrition Type Chronic Disease - Related Malnutrition (P)       Row Name 02/02/24 1059       Insufficient Energy Intake     Insufficient Energy Intake Findings Severe (P)     Insufficient Energy Intake  <75% of est. energy requirement for > or equal to 1 month (P)       Row Name 02/02/24 1059       Muscle Loss    Loss of Muscle Mass Findings Severe (P)     Mu-ism Region Severe - deep hollowing/scooping, lack of muscle to touch, facial bones well defined (P)     Clavicle Bone Region Severe - protruding prominent bone (P)     Acromion Bone Region Severe - squared shoulders, bones, and acromion process protrusion prominent (P)     Scapular Bone Region Severe - prominent bones, depressions easily visible between ribs, scapula, spine, shoulders (P)     Dorsal Hand Region Severe - prominent  depression (P)     Patellar Region Severe - prominent bone, square looking, very little muscle definition (P)     Anterior Thigh Region Severe - line/depression along thigh, obviously thin (P)     Posterior Calf Region Severe - thin with very little definition/firmness (P)       Row Name 02/02/24 1059       Fat Loss    Subcutaneous Fat Loss Findings Severe (P)     Orbital Region  Severe - pronounced hollowness/depression, dark circles, loose saggy skin (P)     Upper Arm Region Severe - mostly skin, very little space between folds, fingers touch (P)       Row Name 02/02/24 1059       Criteria Met (Must meet criteria for severity in at least 2 of these categories: M Wasting, Fat Loss, Fluid, Secondary Signs, Wt. Status, Intake)    Patient meets criteria for  Severe Malnutrition (P)                            Electronically signed by:  Ingrid Heller  02/02/24 10:45 EST

## 2024-02-02 NOTE — ED NOTES
Nursing report ED to floor  Kali Garcia  78 y.o.  male    HPI:   Chief Complaint   Patient presents with    Dizziness     Pt states he about passed out 3 times today pt feels weak and tired  states he has been wearing out so easy.          Admitting doctor:   Patti Fraser DO    Admitting diagnosis:   The primary encounter diagnosis was Syncope and collapse. Diagnoses of COVID-19 and Pneumonia due to infectious organism, unspecified laterality, unspecified part of lung were also pertinent to this visit.    Code status:   Current Code Status       Date Active Code Status Order ID Comments User Context       Prior            Allergies:   Codeine    Isolation:  No active isolations     Fall Risk:  Fall Risk Assessment was completed, and patient is at high risk for falls.   Predictive Model Details         30 (Low) Factor Value    Calculated 2/1/2024 21:45 Age 78    Risk of Fall Model Musculoskeletal Assessment WDL     Active Peripheral IV Present     Imaging order in this encounter Present     Respiratory Rate 22     Diastolic BP 44     Skin Assessment WDL     Financial Class Other     Number of Distinct Medication Classes administered 5     Magnesium 1.9 mg/dL     Drug Use No     Albumin 3.4 g/dL     Tobacco Use Current     Richard Scale not on file     Peripheral Vascular Assessment WDL     Calcium 9 mg/dL     Clinically Relevant Sex Not Female     Chloride 101 mmol/L     Gastrointestinal Assessment WDL     Cardiac Assessment WDL     Days after Admission 0.342     Number of administrations of Anti-Coagulants 1     Creatinine 0.84 mg/dL     Potassium 4.3 mmol/L     Total Bilirubin 0.3 mg/dL     ALT 14 U/L         Weight:       02/01/24  1330   Weight: 52.1 kg (114 lb 14.5 oz)       Intake and Output  No intake or output data in the 24 hours ending 02/01/24 2145    Diet:   Dietary Orders (From admission, onward)       Start     Ordered    02/01/24 1635  Diet: Cardiac Diets; Healthy Heart (2-3 Na+); Texture:  Regular Texture (IDDSI 7); Fluid Consistency: Thin (IDDSI 0)  Diet Effective Now        References:    Diet Order Cross[facility]   Question Answer Comment   Diets: Cardiac Diets    Cardiac Diet: Healthy Heart (2-3 Na+)    Texture: Regular Texture (IDDSI 7)    Fluid Consistency: Thin (IDDSI 0)        02/01/24 1636                     Most recent vitals:   Vitals:    02/01/24 2002 02/01/24 2017 02/01/24 2031 02/01/24 2048   BP: 97/72 143/64 152/69 124/44   BP Location: Left arm  Left arm    Patient Position: Lying  Lying    Pulse: 78 65 64 76   Resp: 20  15 22   Temp: 97.6 °F (36.4 °C)      TempSrc: Oral      SpO2: 96% 95% 95% 96%   Weight:       Height:           Active LDAs/IV Access:   Lines, Drains & Airways       Active LDAs       Name Placement date Placement time Site Days    Peripheral IV 02/01/24 1405 Anterior;Left Forearm 02/01/24  1405  Forearm  less than 1                    Skin Condition:   Skin Assessments (last day)       None             Labs (abnormal labs have a star):   Labs Reviewed   COVID-19/FLUA&B/RSV, NP SWAB IN TRANSPORT MEDIA 1 HR TAT - Abnormal; Notable for the following components:       Result Value    COVID19 Detected (*)     All other components within normal limits    Narrative:     Fact sheet for providers: https://www.fda.gov/media/817269/download    Fact sheet for patients: https://www.fda.gov/media/034641/download    Test performed by PCR.   COMPREHENSIVE METABOLIC PANEL - Abnormal; Notable for the following components:    Sodium 135 (*)     Albumin 3.4 (*)     All other components within normal limits    Narrative:     GFR Normal >60  Chronic Kidney Disease <60  Kidney Failure <15    The GFR formula is only valid for adults with stable renal function between ages 18 and 70.   APTT - Abnormal; Notable for the following components:    PTT <20.0 (*)     All other components within normal limits   TROPONIN - Abnormal; Notable for the following components:    HS Troponin T 45 (*)     All  "other components within normal limits    Narrative:     High Sensitive Troponin T Reference Range:  <14.0 ng/L- Negative Female for AMI  <22.0 ng/L- Negative Male for AMI  >=14 - Abnormal Female indicating possible myocardial injury.  >=22 - Abnormal Male indicating possible myocardial injury.   Clinicians would have to utilize clinical acumen, EKG, Troponin, and serial changes to determine if it is an Acute Myocardial Infarction or myocardial injury due to an underlying chronic condition.        D-DIMER, QUANTITATIVE - Abnormal; Notable for the following components:    D-Dimer, Quantitative 1.31 (*)     All other components within normal limits    Narrative:     According to the assay 's published package insert, a normal (<0.50 mg/L (FEU)) D-dimer result in conjunction with a non-high clinical probability assessment, excludes deep vein thrombosis (DVT) and pulmonary embolism (PE) with high sensitivity.    D-dimer values increase with age and this can make VTE exclusion of an older population difficult. To address this, the [employer], based on best available evidence and recent guidelines, recommends that clinicians use age-adjusted D-dimer thresholds in patients greater than 50 years of age with: a) a low probability of PE who do not meet all Pulmonary Embolism Rule Out Criteria, or b) in those with intermediate probability of PE.   The formula for an age-adjusted D-dimer cut-off is \"age/100\".  For example, a 60 year old patient would have an age-adjusted cut-off of 0.60 mg/L (FEU) and an 80 year old 0.80 mg/L (FEU).   CBC WITH AUTO DIFFERENTIAL - Abnormal; Notable for the following components:    RBC 3.52 (*)     Hemoglobin 11.0 (*)     Hematocrit 33.0 (*)     Lymphocyte % 13.9 (*)     All other components within normal limits   HIGH SENSITIVITIY TROPONIN T 2HR - Abnormal; Notable for the following components:    HS Troponin T 38 (*)     Troponin T Delta -7 (*)     All other " components within normal limits    Narrative:     High Sensitive Troponin T Reference Range:  <14.0 ng/L- Negative Female for AMI  <22.0 ng/L- Negative Male for AMI  >=14 - Abnormal Female indicating possible myocardial injury.  >=22 - Abnormal Male indicating possible myocardial injury.   Clinicians would have to utilize clinical acumen, EKG, Troponin, and serial changes to determine if it is an Acute Myocardial Infarction or myocardial injury due to an underlying chronic condition.        PROTIME-INR - Normal   MAGNESIUM - Normal   TSH - Normal   BLOOD CULTURE   BLOOD CULTURE   RAINBOW DRAW    Narrative:     The following orders were created for panel order [place] Draw.  Procedure                               Abnormality         Status                     ---------                               -----------         ------                     Green Top (Gel)[[id number]]                                  Final result               Lavender Top[[id number]]                                     Final result               Gold Top - SST[[id number]]                                   Final result               Light Blue Top[[id number]]                                   Final result                 Please view results for these tests on the individual orders.   CBC AND DIFFERENTIAL    Narrative:     The following orders were created for panel order CBC & Differential.  Procedure                               Abnormality         Status                     ---------                               -----------         ------                     CBC Auto Differential[[id number]]        Abnormal            Final result                 Please view results for these tests on the individual orders.   GREEN TOP   LAVENDER TOP   GOLD TOP - SST   LIGHT BLUE TOP       LOC: Person, Place, Time, and Situation    Telemetry:  Telemetry    Cardiac Monitoring Ordered: yes    EKG:   ECG 12 Lead Syncope   Preliminary Result   HEART RATE= 85  bpm    RR Interval= 704  ms   NJ Interval= 138  ms   P Horizontal Axis=   deg   P Front Axis= 85  deg   QRSD Interval= 84  ms   QT Interval= 358  ms   QTcB= 427  ms   QRS Axis= 75  deg   T Wave Axis= 66  deg   - ABNORMAL ECG -   Sinus rhythm   Atrial premature complexes   When compared with ECG of 12-Apr-2022 20:47:01,   No significant change   Electronically Signed By:    Date and Time of Study: 2024-02-01 13:44:57          Medications Given in the ED:   Medications   sodium chloride 0.9 % flush 10 mL (has no administration in time range)   clopidogrel (PLAVIX) tablet 75 mg (75 mg Oral Given 2/1/24 1713)   atorvastatin (LIPITOR) tablet 10 mg (has no administration in time range)   sodium chloride 0.9 % flush 10 mL (has no administration in time range)   sodium chloride 0.9 % flush 10 mL (has no administration in time range)   sodium chloride 0.9 % infusion 40 mL (has no administration in time range)   sennosides-docusate (PERICOLACE) 8.6-50 MG per tablet 2 tablet (has no administration in time range)     And   polyethylene glycol (MIRALAX) packet 17 g (has no administration in time range)     And   bisacodyl (DULCOLAX) EC tablet 5 mg (has no administration in time range)     And   bisacodyl (DULCOLAX) suppository 10 mg (has no administration in time range)   ondansetron ODT (ZOFRAN-ODT) disintegrating tablet 4 mg (has no administration in time range)     Or   ondansetron (ZOFRAN) injection 4 mg (has no administration in time range)   Enoxaparin Sodium (LOVENOX) syringe 30 mg (30 mg Subcutaneous Given 2/1/24 1712)   nitroglycerin (NITROSTAT) SL tablet 0.4 mg (has no administration in time range)   sodium chloride 0.9 % infusion (125 mL/hr Intravenous Currently Infusing 2/1/24 2040)   acetaminophen (TYLENOL) tablet 650 mg (has no administration in time range)     Or   acetaminophen (TYLENOL) 160 MG/5ML oral solution 650 mg (has no administration in time range)     Or   acetaminophen (TYLENOL) suppository 650 mg (has no  administration in time range)   cefTRIAXone (ROCEPHIN) 1,000 mg in sodium chloride 0.9 % 100 mL IVPB (has no administration in time range)   sodium chloride 0.9 % bolus 500 mL (0 mL Intravenous Stopped 2/1/24 1702)   iopamidol (ISOVUE-370) 76 % injection 100 mL (100 mL Intravenous Given 2/1/24 1538)   cefTRIAXone (ROCEPHIN) 2,000 mg in sodium chloride 0.9 % 100 mL IVPB (0 mg Intravenous Stopped 2/1/24 2039)       Imaging results:  CT Angiogram Chest Pulmonary Embolism    Result Date: 2/1/2024  Impression: Negative exam for pulmonary embolism. Severe emphysematous changes identified. New infiltrate of the left upper lobe suggests pneumonia. Stellate scar of the right upper lobe is stable. Electronically Signed: Divina Troy MD  2/1/2024 3:46 PM EST  Workstation ID: [id number]    CT Head Without Contrast    Result Date: 2/1/2024  1.No evidence for acute intracranial abnormality. 2.Nonspecific white matter changes are noted with associated diffuse volume loss. These findings are likely related to chronic small vessel ischemic changes and/or age-related changes. Electronically Signed: Jonny Espinal MD  2/1/2024 3:36 PM EST  Workstation ID: [id number]    XR Chest 1 View    Result Date: 2/1/2024  Impression: New bandlike density in the perihilar left midlung favored to represent subsegmental atelectasis. Emphysema with biapical scarring. Electronically Signed: Martha Dodd MD  2/1/2024 2:51 PM EST  Workstation ID: [id number]     Social issues:   Social History     Socioeconomic History    Marital status:    Tobacco Use    Smoking status: Every Day     Packs/day: 1.00     Years: 63.00     Additional pack years: 0.00     Total pack years: 63.00     Types: Cigarettes   Substance and Sexual Activity    Alcohol use: Not Currently    Drug use: Never       NIH Stroke Scale:  Interval: (not recorded)  1a. Level of Consciousness: (not recorded)  1b. LOC Questions: (not recorded)  1c. LOC Commands: (not recorded)  2. Best  Gaze: (not recorded)  3. Visual: (not recorded)  4. Facial Palsy: (not recorded)  5a. Motor Arm, Left: (not recorded)  5b. Motor Arm, Right: (not recorded)  6a. Motor Leg, Left: (not recorded)  6b. Motor Leg, Right: (not recorded)  7. Limb Ataxia: (not recorded)  8. Sensory: (not recorded)  9. Best Language: (not recorded)  10. Dysarthria: (not recorded)  11. Extinction and Inattention (formerly Neglect): (not recorded)    Total (NIH Stroke Scale): (not recorded)     Additional notable assessment information:  ....       Nursing report ED to floor:    [address]    Cele Villafana LPN   02/01/24 21:45 EST Nursing report ED to floor  Kali Garcia  78 y.o.  male    HPI:   Chief Complaint   Patient presents with    Dizziness     Pt states he about passed out 3 times today pt feels weak and tired  states he has been wearing out so easy.          Admitting doctor:   Patti Fraser DO    Admitting diagnosis:   The primary encounter diagnosis was Syncope and collapse. Diagnoses of COVID-19 and Pneumonia due to infectious organism, unspecified laterality, unspecified part of lung were also pertinent to this visit.    Code status:   Current Code Status       Date Active Code Status Order ID Comments User Context       Prior            Allergies:   Codeine    Isolation:  No active isolations     Fall Risk:  Fall Risk Assessment was completed, and patient is at high risk for falls.   Predictive Model Details         30 (Low) Factor Value    Calculated 2/1/2024 21:45 Age 78    Risk of Fall Model Musculoskeletal Assessment WDL     Active Peripheral IV Present     Imaging order in this encounter Present     Respiratory Rate 22     Diastolic BP 44     Skin Assessment WDL     Financial Class Other     Number of Distinct Medication Classes administered 5     Magnesium 1.9 mg/dL     Drug Use No     Albumin 3.4 g/dL     Tobacco Use Current     Richard Scale not on file     Peripheral Vascular Assessment WDL     Calcium 9 mg/dL      Clinically Relevant Sex Not Female     Chloride 101 mmol/L     Gastrointestinal Assessment WDL     Cardiac Assessment WDL     Days after Admission 0.342     Number of administrations of Anti-Coagulants 1     Creatinine 0.84 mg/dL     Potassium 4.3 mmol/L     Total Bilirubin 0.3 mg/dL     ALT 14 U/L         Weight:       02/01/24  1330   Weight: 52.1 kg (114 lb 14.5 oz)       Intake and Output  No intake or output data in the 24 hours ending 02/01/24 2145    Diet:   Dietary Orders (From admission, onward)       Start     Ordered    02/01/24 1635  Diet: Cardiac Diets; Healthy Heart (2-3 Na+); Texture: Regular Texture (IDDSI 7); Fluid Consistency: Thin (IDDSI 0)  Diet Effective Now        References:    Diet Order Cross[facility]   Question Answer Comment   Diets: Cardiac Diets    Cardiac Diet: Healthy Heart (2-3 Na+)    Texture: Regular Texture (IDDSI 7)    Fluid Consistency: Thin (IDDSI 0)        02/01/24 1636                     Most recent vitals:   Vitals:    02/01/24 2002 02/01/24 2017 02/01/24 2031 02/01/24 2048   BP: 97/72 143/64 152/69 124/44   BP Location: Left arm  Left arm    Patient Position: Lying  Lying    Pulse: 78 65 64 76   Resp: 20  15 22   Temp: 97.6 °F (36.4 °C)      TempSrc: Oral      SpO2: 96% 95% 95% 96%   Weight:       Height:           Active LDAs/IV Access:   Lines, Drains & Airways       Active LDAs       Name Placement date Placement time Site Days    Peripheral IV 02/01/24 1405 Anterior;Left Forearm 02/01/24  1405  Forearm  less than 1                    Skin Condition:   Skin Assessments (last day)       None             Labs (abnormal labs have a star):   Labs Reviewed   COVID-19/FLUA&B/RSV, NP SWAB IN TRANSPORT MEDIA 1 HR TAT - Abnormal; Notable for the following components:       Result Value    COVID19 Detected (*)     All other components within normal limits    Narrative:     Fact sheet for providers: https://www.fda.gov/media/742313/download    Fact sheet for patients:  https://www.fda.gov/media/957726/download    Test performed by PCR.   COMPREHENSIVE METABOLIC PANEL - Abnormal; Notable for the following components:    Sodium 135 (*)     Albumin 3.4 (*)     All other components within normal limits    Narrative:     GFR Normal >60  Chronic Kidney Disease <60  Kidney Failure <15    The GFR formula is only valid for adults with stable renal function between ages 18 and 70.   APTT - Abnormal; Notable for the following components:    PTT <20.0 (*)     All other components within normal limits   TROPONIN - Abnormal; Notable for the following components:    HS Troponin T 45 (*)     All other components within normal limits    Narrative:     High Sensitive Troponin T Reference Range:  <14.0 ng/L- Negative Female for AMI  <22.0 ng/L- Negative Male for AMI  >=14 - Abnormal Female indicating possible myocardial injury.  >=22 - Abnormal Male indicating possible myocardial injury.   Clinicians would have to utilize clinical acumen, EKG, Troponin, and serial changes to determine if it is an Acute Myocardial Infarction or myocardial injury due to an underlying chronic condition.        D-DIMER, QUANTITATIVE - Abnormal; Notable for the following components:    D-Dimer, Quantitative 1.31 (*)     All other components within normal limits    Narrative:     According to the assay 's published package insert, a normal (<0.50 mg/L (FEU)) D-dimer result in conjunction with a non-high clinical probability assessment, excludes deep vein thrombosis (DVT) and pulmonary embolism (PE) with high sensitivity.    D-dimer values increase with age and this can make VTE exclusion of an older population difficult. To address this, the [employer], based on best available evidence and recent guidelines, recommends that clinicians use age-adjusted D-dimer thresholds in patients greater than 50 years of age with: a) a low probability of PE who do not meet all Pulmonary Embolism Rule Out  "Criteria, or b) in those with intermediate probability of PE.   The formula for an age-adjusted D-dimer cut-off is \"age/100\".  For example, a 60 year old patient would have an age-adjusted cut-off of 0.60 mg/L (FEU) and an 80 year old 0.80 mg/L (FEU).   CBC WITH AUTO DIFFERENTIAL - Abnormal; Notable for the following components:    RBC 3.52 (*)     Hemoglobin 11.0 (*)     Hematocrit 33.0 (*)     Lymphocyte % 13.9 (*)     All other components within normal limits   HIGH SENSITIVITIY TROPONIN T 2HR - Abnormal; Notable for the following components:    HS Troponin T 38 (*)     Troponin T Delta -7 (*)     All other components within normal limits    Narrative:     High Sensitive Troponin T Reference Range:  <14.0 ng/L- Negative Female for AMI  <22.0 ng/L- Negative Male for AMI  >=14 - Abnormal Female indicating possible myocardial injury.  >=22 - Abnormal Male indicating possible myocardial injury.   Clinicians would have to utilize clinical acumen, EKG, Troponin, and serial changes to determine if it is an Acute Myocardial Infarction or myocardial injury due to an underlying chronic condition.        PROTIME-INR - Normal   MAGNESIUM - Normal   TSH - Normal   BLOOD CULTURE   BLOOD CULTURE   RAINBOW DRAW    Narrative:     The following orders were created for panel order [place] Draw.  Procedure                               Abnormality         Status                     ---------                               -----------         ------                     Green Top (Gel)[[id number]]                                  Final result               Lavender Top[[id number]]                                     Final result               Gold Top - SST[[id number]]                                   Final result               Light Blue Top[[id number]]                                   Final result                 Please view results for these tests on the individual orders.   CBC AND DIFFERENTIAL    Narrative:     The following orders " were created for panel order CBC & Differential.  Procedure                               Abnormality         Status                     ---------                               -----------         ------                     CBC Auto Differential[[id number]]        Abnormal            Final result                 Please view results for these tests on the individual orders.   GREEN TOP   LAVENDER TOP   GOLD TOP - SST   LIGHT BLUE TOP       LOC: Person, Place, Time, and Situation    Telemetry:  Telemetry    Cardiac Monitoring Ordered: yes    EKG:   ECG 12 Lead Syncope   Preliminary Result   HEART RATE= 85  bpm   RR Interval= 704  ms   UT Interval= 138  ms   P Horizontal Axis=   deg   P Front Axis= 85  deg   QRSD Interval= 84  ms   QT Interval= 358  ms   QTcB= 427  ms   QRS Axis= 75  deg   T Wave Axis= 66  deg   - ABNORMAL ECG -   Sinus rhythm   Atrial premature complexes   When compared with ECG of 12-Apr-2022 20:47:01,   No significant change   Electronically Signed By:    Date and Time of Study: 2024-02-01 13:44:57          Medications Given in the ED:   Medications   sodium chloride 0.9 % flush 10 mL (has no administration in time range)   clopidogrel (PLAVIX) tablet 75 mg (75 mg Oral Given 2/1/24 1713)   atorvastatin (LIPITOR) tablet 10 mg (has no administration in time range)   sodium chloride 0.9 % flush 10 mL (has no administration in time range)   sodium chloride 0.9 % flush 10 mL (has no administration in time range)   sodium chloride 0.9 % infusion 40 mL (has no administration in time range)   sennosides-docusate (PERICOLACE) 8.6-50 MG per tablet 2 tablet (has no administration in time range)     And   polyethylene glycol (MIRALAX) packet 17 g (has no administration in time range)     And   bisacodyl (DULCOLAX) EC tablet 5 mg (has no administration in time range)     And   bisacodyl (DULCOLAX) suppository 10 mg (has no administration in time range)   ondansetron ODT (ZOFRAN-ODT) disintegrating tablet 4 mg  (has no administration in time range)     Or   ondansetron (ZOFRAN) injection 4 mg (has no administration in time range)   Enoxaparin Sodium (LOVENOX) syringe 30 mg (30 mg Subcutaneous Given 2/1/24 1712)   nitroglycerin (NITROSTAT) SL tablet 0.4 mg (has no administration in time range)   sodium chloride 0.9 % infusion (125 mL/hr Intravenous Currently Infusing 2/1/24 2040)   acetaminophen (TYLENOL) tablet 650 mg (has no administration in time range)     Or   acetaminophen (TYLENOL) 160 MG/5ML oral solution 650 mg (has no administration in time range)     Or   acetaminophen (TYLENOL) suppository 650 mg (has no administration in time range)   cefTRIAXone (ROCEPHIN) 1,000 mg in sodium chloride 0.9 % 100 mL IVPB (has no administration in time range)   sodium chloride 0.9 % bolus 500 mL (0 mL Intravenous Stopped 2/1/24 1702)   iopamidol (ISOVUE-370) 76 % injection 100 mL (100 mL Intravenous Given 2/1/24 1538)   cefTRIAXone (ROCEPHIN) 2,000 mg in sodium chloride 0.9 % 100 mL IVPB (0 mg Intravenous Stopped 2/1/24 2039)       Imaging results:  CT Angiogram Chest Pulmonary Embolism    Result Date: 2/1/2024  Impression: Negative exam for pulmonary embolism. Severe emphysematous changes identified. New infiltrate of the left upper lobe suggests pneumonia. Stellate scar of the right upper lobe is stable. Electronically Signed: Divina Troy MD  2/1/2024 3:46 PM EST  Workstation ID: [id number]    CT Head Without Contrast    Result Date: 2/1/2024  1.No evidence for acute intracranial abnormality. 2.Nonspecific white matter changes are noted with associated diffuse volume loss. These findings are likely related to chronic small vessel ischemic changes and/or age-related changes. Electronically Signed: Jonny Espinal MD  2/1/2024 3:36 PM EST  Workstation ID: [id number]    XR Chest 1 View    Result Date: 2/1/2024  Impression: New bandlike density in the perihilar left midlung favored to represent subsegmental atelectasis. Emphysema  with biapical scarring. Electronically Signed: Martha Dodd MD  2/1/2024 2:51 PM EST  Workstation ID: [id number]     Social issues:   Social History     Socioeconomic History    Marital status:    Tobacco Use    Smoking status: Every Day     Packs/day: 1.00     Years: 63.00     Additional pack years: 0.00     Total pack years: 63.00     Types: Cigarettes   Substance and Sexual Activity    Alcohol use: Not Currently    Drug use: Never       NIH Stroke Scale:  Interval: (not recorded)  1a. Level of Consciousness: (not recorded)  1b. LOC Questions: (not recorded)  1c. LOC Commands: (not recorded)  2. Best Gaze: (not recorded)  3. Visual: (not recorded)  4. Facial Palsy: (not recorded)  5a. Motor Arm, Left: (not recorded)  5b. Motor Arm, Right: (not recorded)  6a. Motor Leg, Left: (not recorded)  6b. Motor Leg, Right: (not recorded)  7. Limb Ataxia: (not recorded)  8. Sensory: (not recorded)  9. Best Language: (not recorded)  10. Dysarthria: (not recorded)  11. Extinction and Inattention (formerly Neglect): (not recorded)    Total (NIH Stroke Scale): (not recorded)     Additional notable assessment information:       Nursing report ED to floor:  Nurse to obtain on 3C - Rosales Villafana LPN   02/01/24 21:45 EST

## 2024-02-02 NOTE — NURSING NOTE
Patient started c/o dizziness, cp, feeling clammy and sob. /66. Hr 120's-150's. MD notified. EKG ordered, showed afib, st depression, ischemia. Cardiology consulted. Rapid response called. Cardizem push and drip started. Report given to nurse on 2D. Patient to be transferred when room is clean

## 2024-02-02 NOTE — PLAN OF CARE
Goal Outcome Evaluation:              Outcome Evaluation: Pt admitted to floor.  Covid precautions continues.  Will continue to monitor.

## 2024-02-02 NOTE — CASE MANAGEMENT/SOCIAL WORK
Discharge Planning Assessment   [facility]     Patient Name: Kali Garcia  MRN: [id number]  Today's Date: 2/1/2024    Admit Date: 2/1/2024    Plan: Home, watch for O2   Discharge Needs Assessment       Row Name 02/01/24 1920       Living Environment    People in Home child(concepción), adult    Current Living Arrangements home    Potentially Unsafe Housing Conditions none    In the past 12 months has the electric, gas, oil, or water company threatened to shut off services in your home? No    Primary Care Provided by self    Provides Primary Care For no one    Able to Return to Prior Arrangements yes       Resource/Environmental Concerns    Resource/Environmental Concerns none    Transportation Concerns none       Transportation Needs    In the past 12 months, has lack of transportation kept you from medical appointments or from getting medications? no    In the past 12 months, has lack of transportation kept you from meetings, work, or from getting things needed for daily living? No       Food Insecurity    Within the past 12 months, you worried that your food would run out before you got the money to buy more. Never true    Within the past 12 months, the food you bought just didn't last and you didn't have money to get more. Never true       Transition Planning    Patient/Family Anticipates Transition to home with family    Patient/Family Anticipated Services at Transition none    Transportation Anticipated family or friend will provide       Discharge Needs Assessment    Readmission Within the Last 30 Days no previous admission in last 30 days    Equipment Currently Used at Home none    Concerns to be Addressed denies needs/concerns at this time    Anticipated Changes Related to Illness none    Equipment Needed After Discharge none                   Discharge Plan       Row Name 02/01/24 1921       Plan    Plan Home, watch for O2    Plan Comments Covid +, met with patient using precautions. Lives at home with son who  assist and drives and will transport. Patient reports he has an old oxygen concentrator he uses if he thinks he needs it but does not have estabilishe home oxygen. PCP and Pharmacy verified, no transportation or finanical concerns. DC Barriers: IV ATB, IVF                      Expected Discharge Date and Time       Expected Discharge Date Expected Discharge Time    Feb 2, 2024            Demographic Summary       Row Name 02/01/24 1914       General Information    Admission Type observation    Arrived From emergency department    Required Notices Provided Observation Status Notice    Referral Source admission list    Reason for Consult discharge planning    Preferred Language English                   Functional Status       Row Name 02/01/24 1920       Functional Status    Usual Activity Tolerance fair    Current Activity Tolerance fair      Row Name 02/01/24 1914       Functional Status    Usual Activity Tolerance fair    Current Activity Tolerance fair       Functional Status, IADL    Medications independent    Meal Preparation assistive person    Housekeeping assistive person    Laundry assistive person    Shopping assistive person       Mental Status    General Appearance WDL WDL       Mental Status Summary    Recent Changes in Mental Status/Cognitive Functioning no changes                            Patient Forms       Row Name 02/01/24 1930       Patient Forms    Important Message from Medicare (IMM) --  Rosas 2/1 per yola Jiménez RN

## 2024-02-02 NOTE — PROGRESS NOTES
"    [facility] MEDICINE SERVICE  DAILY PROGRESS NOTE    NAME: Kali Garcia  : 1945  MRN: [id number]      LOS: 0 days     PROVIDER OF SERVICE: Bri Montero MD    Chief Complaint: Syncope     SUBJECTIVE     Patient was noted to be in atrial fibrillation with a rapid ventricular response.  Rapid response called at 1520 on 2024.  Patient assessed at bedside.  Denies chest pain.  Complains of palpitations and shortness of breath.  Cardizem drip started.  Heparin drip started.  Cardiology consulted.  Transfer order to PCU.      OBJECTIVE   /50 (BP Location: Right arm, Patient Position: Lying)   Pulse 91   Temp 97.3 °F (36.3 °C) (Oral)   Resp 26   Ht 180.3 cm (71\")   Wt 54.4 kg (120 lb)   SpO2 96%   BMI 16.74 kg/m²         Scheduled Meds   albuterol sulfate HFA, 2 puff, Inhalation, 4x Daily - RT  atorvastatin, 10 mg, Oral, Daily  cefTRIAXone, 1,000 mg, Intravenous, Q24H  clopidogrel, 75 mg, Oral, Daily  dilTIAZem, 0.25 mg/kg, Intravenous, Once  doxycycline, 100 mg, Intravenous, Q12H  heparin, 60 Units/kg, Intravenous, Once  senna-docusate sodium, 2 tablet, Oral, BID  sodium chloride, 10 mL, Intravenous, Q12H       PRN Meds     acetaminophen **OR** acetaminophen **OR** acetaminophen    senna-docusate sodium **AND** polyethylene glycol **AND** bisacodyl **AND** bisacodyl    dilTIAZem **AND** dilTIAZem **AND** dilTIAZem    heparin    heparin    nitroglycerin    ondansetron ODT **OR** ondansetron    [COMPLETED] Insert Peripheral IV **AND** sodium chloride    sodium chloride    sodium chloride   Infusions  dilTIAZem, 5-15 mg/hr  heparin, 12 Units/kg/hr  sodium chloride, 125 mL/hr, Last Rate: 125 mL/hr (24 5605)          EXAM:    General: Moderate respiratory distress  HEENT: Normocephalic, atraumatic  Neck: Supple, No JVD  CV: Irregularly irregular, S1 and S2 are normal, no murmurs/rubs/or gallops  Lungs: Decreased breath sounds bilaterally.  Rhonchi bilaterally.  Abdomen: " Soft, non-distended, non-tender, bowel sounds present  EXT: No edema of lower extremities  Neuro: Cranial nerves II through XII intact  Skin: Intact, no rashes, no lesions, no erythema    Medications reviewed: yes.  Labs reviewed: yes.    Result Review:  I have personally reviewed the results from the time of this admission to 2/2/2024 15:28 EST and agree with these findings:  [x]  Laboratory  []  Microbiology  [x]  Radiology  []  EKG/Telemetry   []  Cardiology/Vascular   []  Pathology  []  Old records  []  Other:      ASSESSMENT/PLAN     Acute respiratory insufficiency, not in failure, secondary to atrial fibrillation with a rapid ventricular response and COVID-19 viral pneumonia  Continue supplemental oxygen  Cardizem drip start date 2/2  Heparin drip start date 2/2  Echocardiogram  TSH level normal  Troponins flat and stable  Electrocardiogram with atrial fibrillation  Cardiology consulted evaluation pending    COVID-19 viral pneumonia  Rocephin start date 2/1  Doxycycline start date 2/2  CT angiogram with no pulmonary embolism, but with findings of left upper lobe pneumonia    Syncope  Most likely orthostatic  Orthostatic vital signs positive on admission  IV fluids    Peripheral vascular disease  Continue Plavix and statin    Hypertension  Chronic and stable    Venous thromboembolism prophylaxis: Heparin drip  Code: Full

## 2024-02-02 NOTE — SIGNIFICANT NOTE
02/02/24 1528   OTHER   Discipline physical therapist   Rehab Time/Intention   Session Not Performed unable to evaluate, medical status change;other (see comments)  (rapid response called just as PT/OT preparing to enter room)   Recommendation   PT - Next Appointment 02/03/24

## 2024-02-02 NOTE — CASE MANAGEMENT/SOCIAL WORK
Continued Stay Note  [facility]     Patient Name: Kali Garcia  MRN: [id number]  Today's Date: 2/2/2024    Admit Date: 2/1/2024    Plan: D/C Plan: Home, watch for O2 needs.   Discharge Plan       Row Name 02/02/24 1139       Plan    Plan D/C Plan: Home, watch for O2 needs.    Provided Post Acute Provider List? N/A    Provided Post Acute Provider Quality & Resource List? N/A    Plan Comments D/c barriers: NC 2L, IV antibiotics, IVF               Expected Discharge Date and Time       Expected Discharge Date Expected Discharge Time    Feb 4, 2024         La Kraft RN     [address] [address]  Phone: [phone]  Fax: [phone]

## 2024-02-02 NOTE — NURSING NOTE
Patient given Bolus Amio. Currently on Cardizem and heparin gtt. Iv fluids and ABX. On 5L NC. Pt resting at this time.Plan of care to continue.

## 2024-02-03 ENCOUNTER — APPOINTMENT (OUTPATIENT)
Dept: CARDIOLOGY | Facility: HOSPITAL | Age: 79
End: 2024-02-03
Payer: MEDICARE

## 2024-02-03 LAB
APTT PPP: 37.6 SECONDS (ref 61–76.5)
HOLD SPECIMEN: NORMAL

## 2024-02-03 PROCEDURE — 94799 UNLISTED PULMONARY SVC/PX: CPT

## 2024-02-03 PROCEDURE — 25810000003 SODIUM CHLORIDE 0.9 % SOLUTION: Performed by: INTERNAL MEDICINE

## 2024-02-03 PROCEDURE — 94761 N-INVAS EAR/PLS OXIMETRY MLT: CPT

## 2024-02-03 PROCEDURE — 93308 TTE F-UP OR LMTD: CPT

## 2024-02-03 PROCEDURE — 93325 DOPPLER ECHO COLOR FLOW MAPG: CPT

## 2024-02-03 PROCEDURE — 25010000002 CEFTRIAXONE PER 250 MG: Performed by: INTERNAL MEDICINE

## 2024-02-03 PROCEDURE — 93308 TTE F-UP OR LMTD: CPT | Performed by: INTERNAL MEDICINE

## 2024-02-03 PROCEDURE — 93321 DOPPLER ECHO F-UP/LMTD STD: CPT | Performed by: INTERNAL MEDICINE

## 2024-02-03 PROCEDURE — 93325 DOPPLER ECHO COLOR FLOW MAPG: CPT | Performed by: INTERNAL MEDICINE

## 2024-02-03 PROCEDURE — 93306 TTE W/DOPPLER COMPLETE: CPT

## 2024-02-03 PROCEDURE — 94664 DEMO&/EVAL PT USE INHALER: CPT

## 2024-02-03 PROCEDURE — 97162 PT EVAL MOD COMPLEX 30 MIN: CPT

## 2024-02-03 PROCEDURE — 93005 ELECTROCARDIOGRAM TRACING: CPT | Performed by: INTERNAL MEDICINE

## 2024-02-03 PROCEDURE — 85730 THROMBOPLASTIN TIME PARTIAL: CPT | Performed by: INTERNAL MEDICINE

## 2024-02-03 PROCEDURE — 99232 SBSQ HOSP IP/OBS MODERATE 35: CPT | Performed by: INTERNAL MEDICINE

## 2024-02-03 PROCEDURE — 25010000002 ENOXAPARIN PER 10 MG: Performed by: INTERNAL MEDICINE

## 2024-02-03 PROCEDURE — 93321 DOPPLER ECHO F-UP/LMTD STD: CPT

## 2024-02-03 RX ORDER — ASPIRIN 81 MG/1
81 TABLET ORAL DAILY
Status: DISCONTINUED | OUTPATIENT
Start: 2024-02-03 | End: 2024-02-03

## 2024-02-03 RX ORDER — ENOXAPARIN SODIUM 100 MG/ML
1 INJECTION SUBCUTANEOUS EVERY 12 HOURS
Status: DISCONTINUED | OUTPATIENT
Start: 2024-02-03 | End: 2024-02-06 | Stop reason: HOSPADM

## 2024-02-03 RX ORDER — DILTIAZEM HYDROCHLORIDE 60 MG/1
60 TABLET, FILM COATED ORAL EVERY 8 HOURS SCHEDULED
Status: DISCONTINUED | OUTPATIENT
Start: 2024-02-03 | End: 2024-02-03

## 2024-02-03 RX ADMIN — DOXYCYCLINE 100 MG: 100 INJECTION, POWDER, LYOPHILIZED, FOR SOLUTION INTRAVENOUS at 15:53

## 2024-02-03 RX ADMIN — Medication 10 ML: at 20:58

## 2024-02-03 RX ADMIN — ALBUTEROL SULFATE 2 PUFF: 108 AEROSOL, METERED RESPIRATORY (INHALATION) at 06:41

## 2024-02-03 RX ADMIN — CLOPIDOGREL BISULFATE 75 MG: 75 TABLET ORAL at 08:06

## 2024-02-03 RX ADMIN — DILTIAZEM HYDROCHLORIDE 30 MG: 30 TABLET, FILM COATED ORAL at 21:00

## 2024-02-03 RX ADMIN — ENOXAPARIN SODIUM 50 MG: 100 INJECTION SUBCUTANEOUS at 09:58

## 2024-02-03 RX ADMIN — DOCUSATE SODIUM AND SENNOSIDES 2 TABLET: 8.6; 5 TABLET, FILM COATED ORAL at 20:58

## 2024-02-03 RX ADMIN — ATORVASTATIN CALCIUM 10 MG: 10 TABLET, FILM COATED ORAL at 08:07

## 2024-02-03 RX ADMIN — DOXYCYCLINE 100 MG: 100 INJECTION, POWDER, LYOPHILIZED, FOR SOLUTION INTRAVENOUS at 03:34

## 2024-02-03 RX ADMIN — DOCUSATE SODIUM AND SENNOSIDES 2 TABLET: 8.6; 5 TABLET, FILM COATED ORAL at 08:06

## 2024-02-03 RX ADMIN — ALBUTEROL SULFATE 2 PUFF: 108 AEROSOL, METERED RESPIRATORY (INHALATION) at 14:02

## 2024-02-03 RX ADMIN — ACETAMINOPHEN 650 MG: 325 TABLET, FILM COATED ORAL at 16:03

## 2024-02-03 RX ADMIN — AMIODARONE HYDROCHLORIDE 300 MG: 200 TABLET ORAL at 20:58

## 2024-02-03 RX ADMIN — ALBUTEROL SULFATE 2 PUFF: 108 AEROSOL, METERED RESPIRATORY (INHALATION) at 10:38

## 2024-02-03 RX ADMIN — AMIODARONE HYDROCHLORIDE 300 MG: 200 TABLET ORAL at 09:55

## 2024-02-03 RX ADMIN — ENOXAPARIN SODIUM 50 MG: 100 INJECTION SUBCUTANEOUS at 21:00

## 2024-02-03 RX ADMIN — ALBUTEROL SULFATE 2 PUFF: 108 AEROSOL, METERED RESPIRATORY (INHALATION) at 21:41

## 2024-02-03 RX ADMIN — Medication 10 ML: at 08:21

## 2024-02-03 RX ADMIN — SODIUM CHLORIDE 125 ML/HR: 9 INJECTION, SOLUTION INTRAVENOUS at 10:01

## 2024-02-03 RX ADMIN — CEFTRIAXONE 1000 MG: 1 INJECTION, POWDER, FOR SOLUTION INTRAMUSCULAR; INTRAVENOUS at 17:45

## 2024-02-03 NOTE — CONSULTS
Referring Provider: Elsi Cardenas MD    Reason for Consultation:      Atrial fibrillation with RVR      Patient Care Team:  She Kraft MD as PCP - General (Internal Medicine)      SUBJECTIVE    Patient seen and examined, agree with narrative as discussed with nurse practitioner after face-to-face encounter with scribed findings below verified by me for accuracy     Chief Complaint: Shortness of breath/near syncope    History of present illness:  Kali Garcia is a 78 y.o. male with a history of COPD who presented to [facility] with complaint of shortness of breath, dizziness, near syncope.    Patient is reported to have a history of coronary artery disease with previous PCI.  Patient presented to the emergency room on February 1, 2024 emergency room evaluation included CT PE protocol that was negative for PE that showed severe emphysematous changes infiltrate in the left upper lobe suggesting pneumonia.    CT of the head was unremarkable.  EKG showed sinus rhythm with PACs.  Patient was found to have severe orthostasis with blood pressure lying 135 with a drop to 91/45 standing.    Patient tested positive for COVID-19.Since encounter he has developed Afib with RVR and was transferred on Cardize drip to PCU.  I saw the patient who is concerned about his underlying lung disease and viral syndrome reassurance provided, oxygenation is okay, we discussed adding amiodarone given short duration of atrial fibrillation to try to maintain sinus rhythm which he is agreeable to this plan.  ==================================================  Rates better today  On amiodarone as well as Cardizem  Blood pressures are stable  Patient rest comfortably throughout the evening with no chest pain    I discussed with the patient will pursue rhythm control strategy with amiodarone in combination with beta-blockers or calcium channel blockers  Patient will need ischemic evaluation at some point with ischemic  changes with rapid heart rates with ST depression throughout the anterior lateral leads likely rate related changes with demand ischemia.  She did have assessment of an ischemic burden at some point once he improves from respiratory standpoint.  He is chest pain-free      Review of systems negative x 14 point review of systems except was mentioned above  Historical data copied forward from previous encounters in EMR is unchanged    Initial EKG reviewed inter by me demonstrates A-fib RVR with ST depression downsloping in the inferolateral leads rate related      Personal History:      Past Medical History:   Diagnosis Date    COPD (chronic obstructive pulmonary disease)     Coronary artery disease     Emphysema lung     Hyperlipidemia     Hypertension     Osteoarthritis        Past Surgical History:   Procedure Laterality Date    CARDIAC CATHETERIZATION      COLONOSCOPY N/A 8/4/2022    Procedure: COLONOSCOPY with 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 and endoscopic clipping x 1;  Surgeon: Luz Jacques MD;  Location: [facility] ENDOSCOPY;  Service: Gastroenterology;  Laterality: N/A;  post op: cecal AVM    CORONARY STENT PLACEMENT      ENDOSCOPY N/A 4/14/2022    Procedure: ESOPHAGOGASTRODUODENOSCOPY WITH BIOPSY X1 AREA;  Surgeon: Luz Jacques MD;  Location: [facility] ENDOSCOPY;  Service: Gastroenterology;  Laterality: N/A;  esophagitis, gastritis, HH    ENDOSCOPY N/A 8/4/2022    Procedure: ESOPHAGOGASTRODUODENOSCOPY;  Surgeon: Luz Jacques MD;  Location: [facility] ENDOSCOPY;  Service: Gastroenterology;  Laterality: N/A;  post op: hiatal hernia, eosphagitis    EYE SURGERY      FEMORAL ARTERY STENT      KNEE SURGERY Left     KNEE SURGERY Left     LUNG SURGERY         History reviewed. No pertinent family history.    Social History     Tobacco Use    Smoking status: Former     Packs/day: 1.00     Years: 63.00     Additional pack years: 0.00     Total pack years: 63.00     Types: Cigarettes     Quit date:  2023     Years since quittin.1   Vaping Use    Vaping Use: Never used   Substance Use Topics    Alcohol use: Not Currently    Drug use: Never        Home meds:  Prior to Admission medications    Medication Sig Start Date End Date Taking? Authorizing Provider   amLODIPine (NORVASC) 10 MG tablet Take 1 tablet by mouth Daily.   Yes ProviderGwendolyn MD   clopidogrel (PLAVIX) 75 MG tablet Take 1 tablet by mouth Daily.   Yes ProviderGwendolyn MD   ipratropium-albuterol (DUO-NEB) 0.5-2.5 mg/3 ml nebulizer Take 3 mL by nebulization Every 4 (Four) Hours As Needed for Wheezing.   Yes ProviderGwendolyn MD   pravastatin (PRAVACHOL) 40 MG tablet Take 1 tablet by mouth Daily.   Yes ProviderGwendolyn MD       Allergies:     Codeine    Scheduled Meds:albuterol sulfate HFA, 2 puff, Inhalation, 4x Daily - RT  amiodarone, 300 mg, Oral, Q12H  atorvastatin, 10 mg, Oral, Daily  cefTRIAXone, 1,000 mg, Intravenous, Q24H  clopidogrel, 75 mg, Oral, Daily  doxycycline, 100 mg, Intravenous, Q12H  senna-docusate sodium, 2 tablet, Oral, BID  sodium chloride, 10 mL, Intravenous, Q12H      Continuous Infusions:dilTIAZem, 5-15 mg/hr, Last Rate: 5 mg/hr (24)  heparin, 12 Units/kg/hr, Last Rate: 15 Units/kg/hr (24 0046)  sodium chloride, 125 mL/hr, Last Rate: 125 mL/hr (24 1515)      PRN Meds:  acetaminophen **OR** acetaminophen **OR** acetaminophen    senna-docusate sodium **AND** polyethylene glycol **AND** bisacodyl **AND** bisacodyl    [COMPLETED] dilTIAZem **AND** dilTIAZem **AND** dilTIAZem    heparin    heparin    nitroglycerin    ondansetron ODT **OR** ondansetron    [COMPLETED] Insert Peripheral IV **AND** sodium chloride    sodium chloride    sodium chloride      OBJECTIVE    Vital Signs  Vitals:    24 0745 24 0805 24 0810 24 0815   BP: 121/40 101/40 114/61 116/42   BP Location:       Patient Position:       Pulse: 66 62 69 63   Resp:       Temp:       TempSrc:      "  SpO2: 97%      Weight:       Height:           Flowsheet Rows      Flowsheet Row First Filed Value   Admission Height 180.3 cm (71\") Documented at 02/01/2024 1327   Admission Weight 52.1 kg (114 lb 14.5 oz) Documented at 02/01/2024 1330              Intake/Output Summary (Last 24 hours) at 2/3/2024 0823  Last data filed at 2/2/2024 2134  Gross per 24 hour   Intake 100 ml   Output 400 ml   Net -300 ml        Telemetry: Atrial fibrillation with    Physical Exam:  The patient is alert, frail currently on nasal cannula with controlled rates, comfortable without distress   Vital signs as noted above.  Head and neck revealed no carotid bruits or jugular venous distention.  No thyromegaly or lymphadenopathy is present  Lungs clear.  Decreased bilateral bases   heart: Rates are stable, 60s to 70s normal first and second heart sounds. No murmur.  No precordial rub is present.  No gallop is present.  Abdomen: Soft and nontender.  No organomegaly is present.  Extremities with good peripheral pulses without any pedal edema.  Skin: Warm and dry.  Musculoskeletal system is grossly normal.  CNS grossly normal.       Results Review:  I have personally reviewed the results from the time of this admission to 2/3/2024 08:23 EST and agree with these findings:  [x]  Laboratory  []  Microbiology  []  Radiology  [x]  EKG/Telemetry   [x]  Cardiology/Vascular   []  Pathology  []  Old records  []  Other:    Most notable findings include:     Lab Results (last 24 hours)       Procedure Component Value Units Date/Time    aPTT [[id number]]  (Abnormal) Collected: 02/02/24 2251    Specimen: Blood Updated: 02/02/24 2343     PTT 34.6 seconds     High Sensitivity Troponin T 2Hr [[id number]]  (Abnormal) Collected: 02/02/24 1728    Specimen: Blood Updated: 02/02/24 1855     HS Troponin T 43 ng/L      Troponin T Delta 8 ng/L     Narrative:      High Sensitive Troponin T Reference Range:  <14.0 ng/L- Negative Female for AMI  <22.0 ng/L- Negative Male " for AMI  >=14 - Abnormal Female indicating possible myocardial injury.  >=22 - Abnormal Male indicating possible myocardial injury.   Clinicians would have to utilize clinical acumen, EKG, Troponin, and serial changes to determine if it is an Acute Myocardial Infarction or myocardial injury due to an underlying chronic condition.         Basic Metabolic Panel [[id number]]  (Abnormal) Collected: 02/02/24 1728    Specimen: Blood Updated: 02/02/24 1854     Glucose 110 mg/dL      BUN 16 mg/dL      Creatinine 0.66 mg/dL      Sodium 137 mmol/L      Potassium 3.7 mmol/L      Comment: Slight hemolysis detected by analyzer. Result may be falsely elevated.        Chloride 103 mmol/L      CO2 26.0 mmol/L      Calcium 8.6 mg/dL      BUN/Creatinine Ratio 24.2     Anion Gap 8.0 mmol/L      eGFR 95.4 mL/min/1.73     Narrative:      GFR Normal >60  Chronic Kidney Disease <60  Kidney Failure <15    The GFR formula is only valid for adults with stable renal function between ages 18 and 70.    CBC & Differential [[id number]]  (Abnormal) Collected: 02/02/24 1728    Specimen: Blood Updated: 02/02/24 1830    Narrative:      The following orders were created for panel order CBC & Differential.  Procedure                               Abnormality         Status                     ---------                               -----------         ------                     CBC Auto Differential[[id number]]        Abnormal            Final result                 Please view results for these tests on the individual orders.    CBC Auto Differential [[id number]]  (Abnormal) Collected: 02/02/24 1728    Specimen: Blood Updated: 02/02/24 1830     WBC 4.50 10*3/mm3      RBC 3.11 10*6/mm3      Hemoglobin 10.0 g/dL      Hematocrit 29.8 %      MCV 95.9 fL      MCH 32.2 pg      MCHC 33.6 g/dL      RDW 14.7 %      RDW-SD 48.6 fl      MPV 7.8 fL      Platelets 205 10*3/mm3      Neutrophil % 77.6 %      Lymphocyte % 13.1 %      Monocyte % 8.3 %      Eosinophil %  0.5 %      Basophil % 0.5 %      Neutrophils, Absolute 3.50 10*3/mm3      Lymphocytes, Absolute 0.60 10*3/mm3      Monocytes, Absolute 0.40 10*3/mm3      Eosinophils, Absolute 0.00 10*3/mm3      Basophils, Absolute 0.00 10*3/mm3      nRBC 0.0 /100 WBC     Blood Culture - Blood, Arm, Left [[id number]]  (Normal) Collected: 02/01/24 1648    Specimen: Blood from Arm, Left Updated: 02/02/24 1701     Blood Culture No growth at 24 hours    Blood Culture - Blood, Arm, Right [[id number]]  (Normal) Collected: 02/01/24 1648    Specimen: Blood from Arm, Right Updated: 02/02/24 1701     Blood Culture No growth at 24 hours    High Sensitivity Troponin T [[id number]]  (Abnormal) Collected: 02/02/24 1538    Specimen: Blood Updated: 02/02/24 1638     HS Troponin T 35 ng/L     Narrative:      High Sensitive Troponin T Reference Range:  <14.0 ng/L- Negative Female for AMI  <22.0 ng/L- Negative Male for AMI  >=14 - Abnormal Female indicating possible myocardial injury.  >=22 - Abnormal Male indicating possible myocardial injury.   Clinicians would have to utilize clinical acumen, EKG, Troponin, and serial changes to determine if it is an Acute Myocardial Infarction or myocardial injury due to an underlying chronic condition.         Protime-INR [[id number]]  (Normal) Collected: 02/02/24 1538    Specimen: Blood Updated: 02/02/24 1604     Protime 11.0 Seconds      INR 1.01    aPTT [[id number]]  (Abnormal) Collected: 02/02/24 1538    Specimen: Blood Updated: 02/02/24 1604     PTT 25.4 seconds     High Sensitivity Troponin T [[id number]]  (Abnormal) Collected: 02/02/24 0930    Specimen: Blood Updated: 02/02/24 1016     HS Troponin T 37 ng/L     Narrative:      High Sensitive Troponin T Reference Range:  <14.0 ng/L- Negative Female for AMI  <22.0 ng/L- Negative Male for AMI  >=14 - Abnormal Female indicating possible myocardial injury.  >=22 - Abnormal Male indicating possible myocardial injury.   Clinicians would have to utilize clinical acumen,  EKG, Troponin, and serial changes to determine if it is an Acute Myocardial Infarction or myocardial injury due to an underlying chronic condition.                 Imaging Results (Last 24 Hours)       Procedure Component Value Units Date/Time    XR Chest 1 View [[id number]] Collected: 02/02/24 1022     Updated: 02/02/24 1026    Narrative:      XR CHEST 1 VW    Date of Exam: 2/2/2024 10:10 AM EST    Indication: short of breath    Comparison: None available.    Findings:  Heart size and pulmonary vessels are within normal limits. There is emphysematous change of the lungs. There is a chronic nodular opacity within the right lung apex. There are linear opacities in the left perihilar region compatible with atelectasis or   scarring. There is hazy perihilar airspace disease which may be secondary to pneumonia. No focal airspace consolidation within the right lung. No definite pleural effusion or pneumothorax.      Impression:      Impression:    1. Hazy left perihilar airspace disease which may be secondary to pneumonia.  2. Stable chronic nodular opacity within the right lung apex.  3. Emphysema      Electronically Signed: Mike Celaya MD    2/2/2024 10:24 AM EST    Workstation ID: [id number]            LAB RESULTS (LAST 7 DAYS)    CBC  Results from last 7 days   Lab Units 02/02/24 1728 02/02/24  0029 02/01/24  1407   WBC 10*3/mm3 4.50 4.00 5.50   RBC 10*6/mm3 3.11* 3.24* 3.52*   HEMOGLOBIN g/dL 10.0* 10.2* 11.0*   HEMATOCRIT % 29.8* 30.1* 33.0*   MCV fL 95.9 92.9 93.8   PLATELETS 10*3/mm3 205 219 228       BMP  Results from last 7 days   Lab Units 02/02/24  1728 02/02/24  0029 02/01/24  1407   SODIUM mmol/L 137 137 135*   POTASSIUM mmol/L 3.7 4.2 4.3   CHLORIDE mmol/L 103 102 101   CO2 mmol/L 26.0 27.0 24.0   BUN mg/dL 16 21 20   CREATININE mg/dL 0.66* 0.81 0.84   GLUCOSE mg/dL 110* 97 86   MAGNESIUM mg/dL  --  1.9 1.9       CMP   Results from last 7 days   Lab Units 02/02/24  1728 02/02/24  0029 02/01/24  1407    SODIUM mmol/L 137 137 135*   POTASSIUM mmol/L 3.7 4.2 4.3   CHLORIDE mmol/L 103 102 101   CO2 mmol/L 26.0 27.0 24.0   BUN mg/dL 16 21 20   CREATININE mg/dL 0.66* 0.81 0.84   GLUCOSE mg/dL 110* 97 86   ALBUMIN g/dL  --   --  3.4*   BILIRUBIN mg/dL  --   --  0.3   ALK PHOS U/L  --   --  67   AST (SGOT) U/L  --   --  19   ALT (SGPT) U/L  --   --  14       BNP        TROPONIN  Results from last 7 days   Lab Units 02/02/24  1728   HSTROP T ng/L 43*       CoAg  Results from last 7 days   Lab Units 02/02/24  2251 02/02/24  1538 02/01/24  1407   INR   --  1.01 0.94   APTT seconds 34.6* 25.4* <20.0*       Creatinine Clearance  Estimated Creatinine Clearance: 66.1 mL/min (A) (by C-G formula based on SCr of 0.66 mg/dL (L)).    ABG          Radiology  XR Chest 1 View    Result Date: 2/2/2024  Impression: 1. Hazy left perihilar airspace disease which may be secondary to pneumonia. 2. Stable chronic nodular opacity within the right lung apex. 3. Emphysema Electronically Signed: Mike Celaya MD  2/2/2024 10:24 AM EST  Workstation ID: [id number]    CT Angiogram Chest Pulmonary Embolism    Result Date: 2/1/2024  Impression: Negative exam for pulmonary embolism. Severe emphysematous changes identified. New infiltrate of the left upper lobe suggests pneumonia. Stellate scar of the right upper lobe is stable. Electronically Signed: Divina Troy MD  2/1/2024 3:46 PM EST  Workstation ID: [id number]    CT Head Without Contrast    Result Date: 2/1/2024  1.No evidence for acute intracranial abnormality. 2.Nonspecific white matter changes are noted with associated diffuse volume loss. These findings are likely related to chronic small vessel ischemic changes and/or age-related changes. Electronically Signed: Jonny Espinal MD  2/1/2024 3:36 PM EST  Workstation ID: [id number]    XR Chest 1 View    Result Date: 2/1/2024  Impression: New bandlike density in the perihilar left midlung favored to represent subsegmental atelectasis. Emphysema  with biapical scarring. Electronically Signed: Martha Dodd MD  2/1/2024 2:51 PM EST  Workstation ID: [id number]       EKG  I personally viewed and interpreted the patient's EKG/Telemetry data:  ECG 12 Lead Chest Pain   Preliminary Result   HEART RATE= 128  bpm   RR Interval= 467  ms   CT Interval=   ms   P Horizontal Axis=   deg   P Front Axis=   deg   QRSD Interval= 101  ms   QT Interval= 331  ms   QTcB= 484  ms   QRS Axis= 57  deg   T Wave Axis= -69  deg   - ABNORMAL ECG -   Atrial fibrillation   ST depr, consider ischemia, anterolateral lds   When compared with ECG of 02-Feb-2024 10:04:49,   Significant change in rhythm: previously sinus   New or worsened ischemia or infarction   Electronically Signed By:    Date and Time of Study: 2024-02-02 15:06:23      ECG 12 Lead Chest Pain   Preliminary Result   HEART RATE= 81  bpm   RR Interval= 740  ms   CT Interval= 139  ms   P Horizontal Axis= -32  deg   P Front Axis= 96  deg   QRSD Interval= 103  ms   QT Interval= 402  ms   QTcB= 467  ms   QRS Axis= 74  deg   T Wave Axis= 38  deg   - NORMAL ECG -   Sinus rhythm   Electronically Signed By:    Date and Time of Study: 2024-02-02 10:04:49      ECG 12 Lead Syncope   Final Result   HEART RATE= 85  bpm   RR Interval= 704  ms   CT Interval= 138  ms   P Horizontal Axis=   deg   P Front Axis= 85  deg   QRSD Interval= 84  ms   QT Interval= 358  ms   QTcB= 427  ms   QRS Axis= 75  deg   T Wave Axis= 66  deg   - ABNORMAL ECG -   Sinus rhythm   Atrial premature complexes   When compared with ECG of 12-Apr-2022 20:47:01,   No significant change   Electronically Signed By: Marlon Peters ([email or web address]) 02-Feb-2024 08:43:42   Date and Time of Study: 2024-02-01 13:44:57      SCANNED - TELEMETRY     Final Result      SCANNED - TELEMETRY     Final Result      SCANNED - TELEMETRY     Final Result      SCANNED - TELEMETRY     Final Result      SCANNED - TELEMETRY     Final Result                                  Echocardiogram:          Stress  Test:        Cardiac Catheterization:  No results found for this or any previous visit.        Other:      ASSESSMENT & PLAN:    Principal Problem:    Syncope  Active Problems:    Pneumonia due to COVID-19 virus    Atrial fibrillation with RVR  Back in sinus rhythm  Continue amiodarone  Will convert Cardizem to p.o.  Underlying heart rates are in the 60s avoid beta-blockers specially respiratory insufficiency status with advanced COPD and emphysema  2D echo is pending    Abnormal EKG  Significant repolarization abnormality with ST depression with rapid heart rates  Will need ischemic evaluation at a later date.  He has no ongoing ST changes chest pain or concern for ACS at this time  Anticoagulation with heparin will continue  Patient already on Plavix monotherapy, will avoid aspirin    Near syncope  Significant orthostatic blood pressure changes with systolic blood pressure dropping from 1 35 to  91 when going from sitting to standing  Continue gentle hydration will continue  Assess orthostatics  He will be on Cardizem for his A-fib at this time  Midodrine not contraindicated, would not impact rate control aspects of Cardizem as an AV kirby blocker or antiarrhythmic    Covid-19, management per primary with respect to remdesivir steroids antibiotics and other interventions    Community-acquired pneumonia secondary to the above, antibiotics per primary    Severe COPD/advanced emphysema    Hypertension    Reported history of coronary artery disease with previous PCI, he is on Plavix monotherapy, had ischemic changes with rapid heart rates.  Will need an ischemic evaluation at a later date    Patient on PCU, continue telemetry  Echocardiogram to be obtained     Further recommendations based on findings and clinical course    Berto Barroso MD  02/03/24  08:23 EST

## 2024-02-03 NOTE — PLAN OF CARE
Goal Outcome Evaluation:  Plan of Care Reviewed With: patient      Patient has been pleasant and has rested comfortably this shift with no issues or concerns noted at this time. Patient is still on cardizem drip and heparin drip along with IV antibiotics and NS fluid.

## 2024-02-03 NOTE — PROGRESS NOTES
"[facility] MEDICINE SERVICE  DAILY PROGRESS NOTE    NAME: Kali Garcia  : 1945  MRN: [id number]      LOS: 1 day     PROVIDER OF SERVICE: Bri Montero MD    Chief Complaint: Syncope     SUBJECTIVE     Patient was noted to be in atrial fibrillation with a rapid ventricular response.  Rapid response called at 1520 on 2024.  Patient assessed at bedside.  Denies chest pain.  Complains of palpitations and shortness of breath.  Cardizem drip started.  Heparin drip started.  Cardiology consulted.  Transfer order to PCU.    2/3/2024  Patient denies for any new complaint. No nausea or vomiting. Still feels weak.      OBJECTIVE   /47 (BP Location: Right arm, Patient Position: Lying)   Pulse 67   Temp 97.7 °F (36.5 °C) (Oral)   Resp 22   Ht 180.3 cm (71\")   Wt 51.5 kg (113 lb 8.6 oz)   SpO2 98%   BMI 15.84 kg/m²         Scheduled Meds   albuterol sulfate HFA, 2 puff, Inhalation, 4x Daily - RT  amiodarone, 300 mg, Oral, Q12H  atorvastatin, 10 mg, Oral, Daily  cefTRIAXone, 1,000 mg, Intravenous, Q24H  clopidogrel, 75 mg, Oral, Daily  dilTIAZem, 30 mg, Oral, Q8H  doxycycline, 100 mg, Intravenous, Q12H  enoxaparin, 1 mg/kg, Subcutaneous, Q12H  senna-docusate sodium, 2 tablet, Oral, BID  sodium chloride, 10 mL, Intravenous, Q12H       PRN Meds     acetaminophen **OR** acetaminophen **OR** acetaminophen    senna-docusate sodium **AND** polyethylene glycol **AND** bisacodyl **AND** bisacodyl    nitroglycerin    ondansetron ODT **OR** ondansetron    [COMPLETED] Insert Peripheral IV **AND** sodium chloride    sodium chloride    sodium chloride   Infusions  sodium chloride, 50 mL/hr, Last Rate: 125 mL/hr (24 1001)          EXAM:    General: Moderate respiratory distress  HEENT: Normocephalic, atraumatic  Neck: Supple, No JVD  CV: Irregularly irregular, S1 and S2 are normal, no murmurs/rubs/or gallops  Lungs: Decreased breath sounds bilaterally.  Rhonchi bilaterally.  Abdomen: " Soft, non-distended, non-tender, bowel sounds present  EXT: No edema of lower extremities  Neuro: Cranial nerves II through XII intact  Skin: Intact, no rashes, no lesions, no erythema    Medications reviewed: yes.  Labs reviewed: yes.    Result Review:  I have personally reviewed the results from the time of this admission to 2/3/2024 12:01 EST and agree with these findings:  [x]  Laboratory  []  Microbiology  [x]  Radiology  []  EKG/Telemetry   []  Cardiology/Vascular   []  Pathology  []  Old records  []  Other:      ASSESSMENT/PLAN     Acute respiratory insufficiency, not in failure, secondary to atrial fibrillation with a rapid ventricular response and COVID-19 viral pneumonia  Continue supplemental oxygen  Cardizem drip start date 2/2  Heparin drip start date 2/2  Echocardiogram  TSH level normal  Troponins flat and stable  Electrocardiogram with atrial fibrillation  Cardiology consulted evaluation pending    COVID-19 viral pneumonia  Rocephin start date 2/1  Doxycycline start date 2/2  CT angiogram with no pulmonary embolism, but with findings of left upper lobe pneumonia    Syncope  Most likely orthostatic  Orthostatic vital signs positive on admission  Change NS rate to 50 ml per hour.    Peripheral vascular disease  Continue Plavix and statin    Hypertension  Chronic and stable    Venous thromboembolism prophylaxis: Heparin drip  Code: Full

## 2024-02-03 NOTE — PLAN OF CARE
Goal Outcome Evaluation:    Pt is a 77 y/o male who presents to [facility] with syncopal episode. Pt reports history of passing out and falling. Pt was admitted for syncope and was found to have positive orthostatic vitals. Pt also found to have a-fib w/ RVR, L upper lobe PNA and COVID+. At [facility] he lives in a SSH with no steps and is independent with household ambulation without an AD. He lives with his son's family, though pt's family works during the day. Pt had a rapid response called yesterday due to a-fib with RVR. At this time pt requires min A to roll due to fear of exacerbating his symptoms. Pt declines to sit EOB despite education due to fear and requests to attempt tomorrow. Placed bed in chair position at end of session to improve cardiovascular return. Pt reports h/o neck discomfort. Discussed c-spine ROM therex and diaphragmatic breathing as he utilizes accessory mm. At this time he will need SNF due to symptomatic orthostatic hypotension and increased work of breathing with exertion. Pt may progress quickly once BP is regulated. PT will continue to follow.     Anticipated Discharge Disposition (PT): skilled nursing facility

## 2024-02-03 NOTE — THERAPY EVALUATION
Patient Name: Kali Garcia  : 1945    MRN: [id number]                              Today's Date: 2/3/2024       Admit Date: 2024    Visit Dx:     ICD-10-CM ICD-9-CM   1. Syncope and collapse  R55 780.2   2. COVID-19  U07.1 079.89   3. Pneumonia due to infectious organism, unspecified laterality, unspecified part of lung  J18.9 486     Patient Active Problem List   Diagnosis    Generalized abdominal pain    Severe malnutrition    Abnormal CT scan, esophagus    Syncope    Pneumonia due to COVID-19 virus     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 and endoscopic clipping x 1;  Surgeon: Luz Jacques MD;  Location: [facility] ENDOSCOPY;  Service: Gastroenterology;  Laterality: N/A;  post op: cecal AVM    CORONARY STENT PLACEMENT      ENDOSCOPY N/A 2022    Procedure: ESOPHAGOGASTRODUODENOSCOPY WITH BIOPSY X1 AREA;  Surgeon: Luz Jacques MD;  Location: [facility] ENDOSCOPY;  Service: Gastroenterology;  Laterality: N/A;  esophagitis, gastritis, HH    ENDOSCOPY N/A 2022    Procedure: ESOPHAGOGASTRODUODENOSCOPY;  Surgeon: Luz Jacques MD;  Location: [facility] ENDOSCOPY;  Service: Gastroenterology;  Laterality: N/A;  post op: hiatal hernia, eosphagitis    EYE SURGERY      FEMORAL ARTERY STENT      KNEE SURGERY Left     KNEE SURGERY Left     LUNG SURGERY        General Information       Row Name 24 1328          Physical Therapy Time and Intention    Document Type evaluation  -     Mode of Treatment physical therapy  -       Row Name 24 1328          General Information    Prior Level of Function independent:;community mobility;gait;transfer;bed  mobility  family provides transportation. Does not utilize AD. Reports falling due to syncopal events, but no falls d/t balance.  -     Existing Precautions/Restrictions fall  -     Barriers to Rehab medically complex;previous functional deficit  -       Row Name 02/03/24 1328          Living Environment    People in Home child(concepción), adult;other (see comments)  son and his family;  to watch pt's 4mo grandson  -       Row Name 02/03/24 1328          Home Main Entrance    Number of Stairs, Main Entrance none  -       Row Name 02/03/24 1328          Stairs Within Home, Primary    Number of Stairs, Within Home, Primary none  -       Row Name 02/03/24 1328          Cognition    Orientation Status (Cognition) oriented x 4  -       Row Name 02/03/24 1328          Safety Issues, Functional Mobility    Impairments Affecting Function (Mobility) endurance/activity tolerance;strength  -               User Key  (r) = Recorded By, (t) = Taken By, (c) = Cosigned By      Initials Name Provider Type    Mai Beck, PT Physical Therapist                   Mobility       Row Name 02/03/24 1332          Bed Mobility    Bed Mobility rolling left;rolling right  -     Rolling Left [place] (Bed Mobility) minimum assist (75% patient effort)  -     Rolling Right [place] (Bed Mobility) minimum assist (75% patient effort)  -     Assistive Device (Bed Mobility) bed rails  -     Comment, (Bed Mobility) Pt readjusts in bed and reports feeling weak following this. He requests to complete transfers another day as he does not want to exacerbate his symptoms. Placed bed in chair position and discussed importance of this and importance of neck and shld ROM to decrease pain/stiffness as he reports increased neck discomfort. Discussed/demonstrated diaphragmatic breathing to limit accessory m breathing.  -       Row Name 02/03/24 1332          Transfers    Comment, (Transfers) Pt requests to limit mobility  this date as he expresses concerns with symptom exacerbation when rolling in bed.  -               User Key  (r) = Recorded By, (t) = Taken By, (c) = Cosigned By      Initials Name Provider Type    Mai Beck, PT Physical Therapist                   Obj/Interventions       Row Name 02/03/24 1336          Range of Motion Comprehensive    General Range of Motion bilateral lower extremity ROM WFL  -     Comment, General Range of Motion BUE grossly limited by 20%, remaining WFL  -       Row Name 02/03/24 1336          Strength Comprehensive (MMT)    General Manual Muscle Testing (MMT) Assessment upper extremity strength deficits identified;lower extremity strength deficits identified  -     Comment, General Manual Muscle Testing (MMT) Assessment BUE grossly 3/5, LE 3+/5  -               User Key  (r) = Recorded By, (t) = Taken By, (c) = Cosigned By      Initials Name Provider Type    Mai Beck, PT Physical Therapist                   Goals/Plan       Row Name 02/03/24 1339          Bed Mobility Goal 1 (PT)    Activity/Assistive Device (Bed Mobility Goal 1, PT) bed mobility activities, all  -MAVERICK     [place] Level/Cues Needed (Bed Mobility Goal 1, PT) independent  -MAVERICK     Time Frame (Bed Mobility Goal 1, PT) long term goal (LTG);2 weeks  -       Row Name 02/03/24 1339          Transfer Goal 1 (PT)    Activity/Assistive Device (Transfer Goal 1, PT) sit-to-stand/stand-to-sit;bed-to-chair/chair-to-bed  -MAVERICK     [place] Level/Cues Needed (Transfer Goal 1, PT) modified independence  -MAVERICK     Time Frame (Transfer Goal 1, PT) long term goal (LTG);2 weeks  -       Row Name 02/03/24 1339          Therapy Assessment/Plan (PT)    Planned Therapy Interventions (PT) bed mobility training;balance training;gait training;home exercise program;patient/family education;strengthening;neuromuscular re-education;transfer training;wheelchair management/propulsion training  -               User Key  (r) =  Recorded By, (t) = Taken By, (c) = Cosigned By      Initials Name Provider Type    MAVERICK Mai Link, PT Physical Therapist                   Clinical Impression       Row Name 02/03/24 8924          Pain    Pain Intervention(s) Ambulation/increased activity;Repositioned  -     Additional Documentation Pain Scale: FACES Pre/Post-Treatment (Group)  -MAVERICK       Row Name 02/03/24 8121          Pain Scale: FACES Pre/Post-Treatment    Pain: FACES Scale, Pretreatment 2-->hurts little bit  -MAVERICK     Posttreatment Pain Rating 2-->hurts little bit  -     Pain Location - neck  -     Pre/Posttreatment Pain Comment Discussed importance of ROM and demonstrated some exercises to complete while in bed.  -       Row Name 02/03/24 5135          Plan of Care Review    Plan of Care Reviewed With patient  -     Outcome Evaluation Pt is a 77 y/o male who presents to [facility] with syncopal episode. Pt reports history of passing out and falling. Pt was admitted for syncope and was found to have positive orthostatic vitals. Pt also found to have a-fib w/ RVR, L upper lobe PNA and COVID+. At [facility] he lives in a SSH with no steps and is independent with household ambulation without an AD. He lives with his son's family, though pt's family works during the day. Pt had a rapid response called yesterday due to a-fib with RVR. At this time pt requires min A to roll due to fear of exacerbating his symptoms. Pt declines to sit EOB despite education due to fear and requests to attempt tomorrow. Placed bed in chair position at end of session to improve cardiovascular return. Pt reports h/o neck discomfort. Discussed c-spine ROM therex and diaphragmatic breathing as he utilizes accessory mm. At this time he will need SNF due to symptomatic orthostatic hypotension and increased work of breathing with exertion. Pt may progress quickly once BP is regulated. PT will continue to follow.  -       Row Name 02/03/24 7506          Therapy Assessment/Plan  (PT)    Rehab Potential (PT) good, to achieve stated therapy goals  -     Criteria for Skilled Interventions Met (PT) meets criteria  -MAVERICK     Therapy Frequency (PT) 5 times/wk  -MAVERICK     Predicted Duration of Therapy Intervention (PT) Until d/c  -MAVERICK       Row Name 02/03/24 1337          Vital Signs    Intra Systolic BP Rehab 119  MAP 74  -MAVERICK     Intra Treatment Diastolic BP 47  -MAVERICK     Intratreatment Heart Rate (beats/min) 70  -MAVERICK     Pre SpO2 (%) 95  -MAVERICK     O2 Delivery Pre Treatment supplemental O2  -MAVERICK     Intra SpO2 (%) 90  -MAVEIRCK     O2 Delivery Intra Treatment supplemental O2  -MAVERICK     Post SpO2 (%) 94  -MAVERICK     O2 Delivery Post Treatment supplemental O2  -MAVERICK       Row Name 02/03/24 1337          Positioning and Restraints    Pre-Treatment Position in bed  -MAVERICK     Post Treatment Position bed  -MAVERICK     In Bed notified nsg;fowlers;call light within reach;encouraged to call for assist;exit alarm on  bed in chair position  -MAVERICK               User Key  (r) = Recorded By, (t) = Taken By, (c) = Cosigned By      Initials Name Provider Type    Mai Beck, PT Physical Therapist                   Outcome Measures       Row Name 02/03/24 1340 02/03/24 1200       How much help from another person do you currently need...    Turning from your back to your side while in flat bed without using bedrails? 3  -MAVERICK 4  -TS    Moving from lying on back to sitting on the side of a flat bed without bedrails? 3  -MAVERICK 4  -TS    Moving to and from a bed to a chair (including a wheelchair)? 1  -MAVERICK 4  -TS    Standing up from a chair using your arms (e.g., wheelchair, bedside chair)? 1  -MAVERICK 4  -TS    Climbing 3-5 steps with a railing? 1  -MAVERICK 2  -TS    To walk in hospital room? 1  -MAVERICK 3  -TS    AM-PAC 6 Clicks Score (PT) 10  -MAVERICK 21  -TS    Highest Level of Mobility Goal 4 --> Transfer to chair/commode  -MAVERICK 6 --> Walk 10 steps or more  -TS      Row Name 02/03/24 0815          How much help from another person do you currently need...     Turning from your back to your side while in flat bed without using bedrails? 4  -TS     Moving from lying on back to sitting on the side of a flat bed without bedrails? 4  -TS     Moving to and from a bed to a chair (including a wheelchair)? 4  -TS     Standing up from a chair using your arms (e.g., wheelchair, bedside chair)? 4  -TS     Climbing 3-5 steps with a railing? 2  -TS     To walk in hospital room? 3  -TS     AM-PAC 6 Clicks Score (PT) 21  -TS     Highest Level of Mobility Goal 6 --> Walk 10 steps or more  -TS       Row Name 02/03/24 1340          Functional Assessment    Outcome Measure Options AM-PAC 6 Clicks Basic Mobility (PT)  -               User Key  (r) = Recorded By, (t) = Taken By, (c) = Cosigned By      Initials Name Provider Type    Mai Beck, PT Physical Therapist    Molly Chavez LPN Licensed Nurse                                 Physical Therapy Education       Title: PT OT SLP Therapies (In Progress)       Topic: Physical Therapy (In Progress)       Point: Mobility training (Done)       Learning Progress Summary             Patient Acceptance, E,TB, VU by  at 2/3/2024 1340                         Point: Home exercise program (Not Started)       Learner Progress:  Not documented in this visit.              Point: Body mechanics (Not Started)       Learner Progress:  Not documented in this visit.              Point: Precautions (Done)       Learning Progress Summary             Patient Acceptance, E,TB, VU by  at 2/3/2024 1340                                         User Key       Initials Effective Dates Name Provider Type [facility] 08/23/21 -  Mai Link, PT Physical Therapist PT                  PT Recommendation and Plan  Planned Therapy Interventions (PT): bed mobility training, balance training, gait training, home exercise program, patient/family education, strengthening, neuromuscular re-education, transfer training, wheelchair management/propulsion  training  Plan of Care Reviewed With: patient  Outcome Evaluation: Pt is a 77 y/o male who presents to [facility] with syncopal episode. Pt reports history of passing out and falling. Pt was admitted for syncope and was found to have positive orthostatic vitals. Pt also found to have a-fib w/ RVR, L upper lobe PNA and COVID+. At [facility] he lives in a SSH with no steps and is independent with household ambulation without an AD. He lives with his son's family, though pt's family works during the day. Pt had a rapid response called yesterday due to a-fib with RVR. At this time pt requires min A to roll due to fear of exacerbating his symptoms. Pt declines to sit EOB despite education due to fear and requests to attempt tomorrow. Placed bed in chair position at end of session to improve cardiovascular return. Pt reports h/o neck discomfort. Discussed c-spine ROM therex and diaphragmatic breathing as he utilizes accessory mm. At this time he will need SNF due to symptomatic orthostatic hypotension and increased work of breathing with exertion. Pt may progress quickly once BP is regulated. PT will continue to follow.     Time Calculation:         PT Charges       Row Name 02/03/24 1340             Time Calculation    Start Time 1154  -MAVERICK      Stop Time 1220  -MAVERICK      Time Calculation (min) 26 min  -MAVERICK      PT Received On 02/03/24  -MAVERICK      PT - Next Appointment 02/05/24  -MAVERICK      PT Goal Re-Cert Due Date 02/17/24  -MAVERICK                User Key  (r) = Recorded By, (t) = Taken By, (c) = Cosigned By      Initials Name Provider Type     Mai Link, PT Physical Therapist                  Therapy Charges for Today       Code Description Service Date Service Provider Modifiers Qty    [id number]  PT EVAL MOD COMPLEXITY 2 2/3/2024 Mai Link, PT GP 1            PT G-Codes  Outcome Measure Options: AM-PAC 6 Clicks Basic Mobility (PT)  AM-PAC 6 Clicks Score (PT): 10  PT Discharge Summary  Anticipated Discharge Disposition (PT):  skilled nursing facility    Mai Link, PT  2/3/2024

## 2024-02-04 LAB
ANION GAP SERPL CALCULATED.3IONS-SCNC: 8 MMOL/L (ref 5–15)
APTT PPP: 30.2 SECONDS (ref 61–76.5)
APTT PPP: 30.3 SECONDS (ref 61–76.5)
BASOPHILS # BLD AUTO: 0 10*3/MM3 (ref 0–0.2)
BASOPHILS NFR BLD AUTO: 0.4 % (ref 0–1.5)
BH CV ECHO MEAS - ACS: 2.3 CM
BH CV ECHO MEAS - AI P1/2T: 1765 MSEC
BH CV ECHO MEAS - AO MAX PG: 20.6 MMHG
BH CV ECHO MEAS - AO MEAN PG: 12 MMHG
BH CV ECHO MEAS - AO ROOT DIAM: 3.1 CM
BH CV ECHO MEAS - AO V2 MAX: 227 CM/SEC
BH CV ECHO MEAS - AO V2 VTI: 44.7 CM
BH CV ECHO MEAS - AVA(I,D): 1.72 CM2
BH CV ECHO MEAS - EDV(CUBED): 103.8 ML
BH CV ECHO MEAS - EDV(MOD-SP4): 51.2 ML
BH CV ECHO MEAS - EF(MOD-BP): 69 %
BH CV ECHO MEAS - EF(MOD-SP4): 69.1 %
BH CV ECHO MEAS - ESV(MOD-SP4): 15.8 ML
BH CV ECHO MEAS - FS: 38.3 %
BH CV ECHO MEAS - IVS/LVPW: 0.89 CM
BH CV ECHO MEAS - IVSD: 0.8 CM
BH CV ECHO MEAS - LA DIMENSION: 3.4 CM
BH CV ECHO MEAS - LAT PEAK E' VEL: 8.3 CM/SEC
BH CV ECHO MEAS - LV DIASTOLIC VOL/BSA (35-75): 30.9 CM2
BH CV ECHO MEAS - LV MASS(C)D: 132.3 GRAMS
BH CV ECHO MEAS - LV MAX PG: 4.8 MMHG
BH CV ECHO MEAS - LV MEAN PG: 3 MMHG
BH CV ECHO MEAS - LV SYSTOLIC VOL/BSA (12-30): 9.5 CM2
BH CV ECHO MEAS - LV V1 MAX: 110 CM/SEC
BH CV ECHO MEAS - LV V1 VTI: 24.5 CM
BH CV ECHO MEAS - LVIDD: 4.7 CM
BH CV ECHO MEAS - LVIDS: 2.9 CM
BH CV ECHO MEAS - LVOT AREA: 3.1 CM2
BH CV ECHO MEAS - LVOT DIAM: 2 CM
BH CV ECHO MEAS - LVPWD: 0.9 CM
BH CV ECHO MEAS - MED PEAK E' VEL: 7.7 CM/SEC
BH CV ECHO MEAS - MR MAX PG: 112.8 MMHG
BH CV ECHO MEAS - MR MAX VEL: 531 CM/SEC
BH CV ECHO MEAS - MV A DUR: 0.12 SEC
BH CV ECHO MEAS - MV A MAX VEL: 72.3 CM/SEC
BH CV ECHO MEAS - MV DEC SLOPE: 954 CM/SEC2
BH CV ECHO MEAS - MV DEC TIME: 0.15 SEC
BH CV ECHO MEAS - MV E MAX VEL: 110 CM/SEC
BH CV ECHO MEAS - MV E/A: 1.52
BH CV ECHO MEAS - MV MAX PG: 7.1 MMHG
BH CV ECHO MEAS - MV MEAN PG: 3 MMHG
BH CV ECHO MEAS - MV P1/2T: 40.8 MSEC
BH CV ECHO MEAS - MV V2 VTI: 40.4 CM
BH CV ECHO MEAS - MVA(P1/2T): 5.4 CM2
BH CV ECHO MEAS - MVA(VTI): 1.91 CM2
BH CV ECHO MEAS - PA V2 MAX: 105 CM/SEC
BH CV ECHO MEAS - PULM A REVS DUR: 0.12 SEC
BH CV ECHO MEAS - PULM A REVS VEL: 27.3 CM/SEC
BH CV ECHO MEAS - PULM DIAS VEL: 41.4 CM/SEC
BH CV ECHO MEAS - PULM S/D: 1.23
BH CV ECHO MEAS - PULM SYS VEL: 50.8 CM/SEC
BH CV ECHO MEAS - RAP SYSTOLE: 8 MMHG
BH CV ECHO MEAS - RVSP: 48.4 MMHG
BH CV ECHO MEAS - SI(MOD-SP4): 21.4 ML/M2
BH CV ECHO MEAS - SV(LVOT): 77 ML
BH CV ECHO MEAS - SV(MOD-SP4): 35.4 ML
BH CV ECHO MEAS - TAPSE (>1.6): 2.21 CM
BH CV ECHO MEAS - TR MAX PG: 40.4 MMHG
BH CV ECHO MEAS - TR MAX VEL: 318 CM/SEC
BH CV ECHO MEASUREMENTS AVERAGE E/E' RATIO: 13.75
BH CV XLRA - TDI S': 12.3 CM/SEC
BUN SERPL-MCNC: 15 MG/DL (ref 8–23)
BUN/CREAT SERPL: 20.5 (ref 7–25)
CALCIUM SPEC-SCNC: 8.6 MG/DL (ref 8.6–10.5)
CHLORIDE SERPL-SCNC: 104 MMOL/L (ref 98–107)
CO2 SERPL-SCNC: 27 MMOL/L (ref 22–29)
CREAT SERPL-MCNC: 0.73 MG/DL (ref 0.76–1.27)
DEPRECATED RDW RBC AUTO: 49.4 FL (ref 37–54)
EGFRCR SERPLBLD CKD-EPI 2021: 92.5 ML/MIN/1.73
EOSINOPHIL # BLD AUTO: 0.1 10*3/MM3 (ref 0–0.4)
EOSINOPHIL NFR BLD AUTO: 2.2 % (ref 0.3–6.2)
ERYTHROCYTE [DISTWIDTH] IN BLOOD BY AUTOMATED COUNT: 15 % (ref 12.3–15.4)
GLUCOSE SERPL-MCNC: 115 MG/DL (ref 65–99)
HCT VFR BLD AUTO: 29.4 % (ref 37.5–51)
HGB BLD-MCNC: 10 G/DL (ref 13–17.7)
LEFT ATRIUM VOLUME INDEX: 37.5 ML/M2
LYMPHOCYTES # BLD AUTO: 0.6 10*3/MM3 (ref 0.7–3.1)
LYMPHOCYTES NFR BLD AUTO: 18.5 % (ref 19.6–45.3)
MCH RBC QN AUTO: 32.3 PG (ref 26.6–33)
MCHC RBC AUTO-ENTMCNC: 33.9 G/DL (ref 31.5–35.7)
MCV RBC AUTO: 95.2 FL (ref 79–97)
MONOCYTES # BLD AUTO: 0.2 10*3/MM3 (ref 0.1–0.9)
MONOCYTES NFR BLD AUTO: 7.6 % (ref 5–12)
NEUTROPHILS NFR BLD AUTO: 2.2 10*3/MM3 (ref 1.7–7)
NEUTROPHILS NFR BLD AUTO: 71.3 % (ref 42.7–76)
NRBC BLD AUTO-RTO: 0 /100 WBC (ref 0–0.2)
PLATELET # BLD AUTO: 186 10*3/MM3 (ref 140–450)
PMV BLD AUTO: 7.9 FL (ref 6–12)
POTASSIUM SERPL-SCNC: 3.6 MMOL/L (ref 3.5–5.2)
RBC # BLD AUTO: 3.09 10*6/MM3 (ref 4.14–5.8)
SODIUM SERPL-SCNC: 139 MMOL/L (ref 136–145)
WBC NRBC COR # BLD AUTO: 3.1 10*3/MM3 (ref 3.4–10.8)

## 2024-02-04 PROCEDURE — 85730 THROMBOPLASTIN TIME PARTIAL: CPT | Performed by: INTERNAL MEDICINE

## 2024-02-04 PROCEDURE — 25010000002 CEFTRIAXONE PER 250 MG: Performed by: INTERNAL MEDICINE

## 2024-02-04 PROCEDURE — 94799 UNLISTED PULMONARY SVC/PX: CPT

## 2024-02-04 PROCEDURE — 97166 OT EVAL MOD COMPLEX 45 MIN: CPT | Performed by: OCCUPATIONAL THERAPIST

## 2024-02-04 PROCEDURE — 25010000002 ENOXAPARIN PER 10 MG: Performed by: INTERNAL MEDICINE

## 2024-02-04 PROCEDURE — 99232 SBSQ HOSP IP/OBS MODERATE 35: CPT | Performed by: INTERNAL MEDICINE

## 2024-02-04 PROCEDURE — 85025 COMPLETE CBC W/AUTO DIFF WBC: CPT | Performed by: HOSPITALIST

## 2024-02-04 PROCEDURE — 94761 N-INVAS EAR/PLS OXIMETRY MLT: CPT

## 2024-02-04 PROCEDURE — 94664 DEMO&/EVAL PT USE INHALER: CPT

## 2024-02-04 PROCEDURE — 80048 BASIC METABOLIC PNL TOTAL CA: CPT | Performed by: HOSPITALIST

## 2024-02-04 RX ORDER — DILTIAZEM HYDROCHLORIDE 120 MG/1
120 CAPSULE, COATED, EXTENDED RELEASE ORAL
Status: DISCONTINUED | OUTPATIENT
Start: 2024-02-05 | End: 2024-02-04

## 2024-02-04 RX ADMIN — DILTIAZEM HYDROCHLORIDE 30 MG: 30 TABLET, FILM COATED ORAL at 06:12

## 2024-02-04 RX ADMIN — AMIODARONE HYDROCHLORIDE 300 MG: 200 TABLET ORAL at 19:47

## 2024-02-04 RX ADMIN — CLOPIDOGREL BISULFATE 75 MG: 75 TABLET ORAL at 08:19

## 2024-02-04 RX ADMIN — DOXYCYCLINE 100 MG: 100 INJECTION, POWDER, LYOPHILIZED, FOR SOLUTION INTRAVENOUS at 04:06

## 2024-02-04 RX ADMIN — CEFTRIAXONE 1000 MG: 1 INJECTION, POWDER, FOR SOLUTION INTRAMUSCULAR; INTRAVENOUS at 17:13

## 2024-02-04 RX ADMIN — ENOXAPARIN SODIUM 50 MG: 100 INJECTION SUBCUTANEOUS at 08:21

## 2024-02-04 RX ADMIN — DILTIAZEM HYDROCHLORIDE 30 MG: 30 TABLET, FILM COATED ORAL at 14:57

## 2024-02-04 RX ADMIN — ACETAMINOPHEN 650 MG: 650 SOLUTION ORAL at 17:32

## 2024-02-04 RX ADMIN — DOXYCYCLINE 100 MG: 100 INJECTION, POWDER, LYOPHILIZED, FOR SOLUTION INTRAVENOUS at 15:49

## 2024-02-04 RX ADMIN — DOCUSATE SODIUM AND SENNOSIDES 2 TABLET: 8.6; 5 TABLET, FILM COATED ORAL at 08:15

## 2024-02-04 RX ADMIN — ALBUTEROL SULFATE 2 PUFF: 108 AEROSOL, METERED RESPIRATORY (INHALATION) at 10:34

## 2024-02-04 RX ADMIN — ALBUTEROL SULFATE 2 PUFF: 108 AEROSOL, METERED RESPIRATORY (INHALATION) at 19:10

## 2024-02-04 RX ADMIN — ATORVASTATIN CALCIUM 10 MG: 10 TABLET, FILM COATED ORAL at 08:19

## 2024-02-04 RX ADMIN — Medication 10 ML: at 08:20

## 2024-02-04 RX ADMIN — ALBUTEROL SULFATE 2 PUFF: 108 AEROSOL, METERED RESPIRATORY (INHALATION) at 14:52

## 2024-02-04 RX ADMIN — Medication 10 ML: at 19:48

## 2024-02-04 RX ADMIN — ALBUTEROL SULFATE 2 PUFF: 108 AEROSOL, METERED RESPIRATORY (INHALATION) at 06:30

## 2024-02-04 RX ADMIN — DOCUSATE SODIUM AND SENNOSIDES 2 TABLET: 8.6; 5 TABLET, FILM COATED ORAL at 19:46

## 2024-02-04 RX ADMIN — ENOXAPARIN SODIUM 50 MG: 100 INJECTION SUBCUTANEOUS at 19:46

## 2024-02-04 NOTE — PLAN OF CARE
Goal Outcome Evaluation:  Plan of Care Reviewed With: patient           Outcome Evaluation: Pt is a 78 y.o. male with past medical history of COPD, PVD, hypertension, hyperlipidemia who presented to [facility] on 2/1/2024 complaints of syncopal episode resulting in fall. Pt found to have positive orthostatic vitals. Pt also positive for PNA & covid-19. On 02/02/24 a rapid response was called due to a-fib with RVR. At baseline, pt lives with son, dtr-in-law. They have a Nanny that comes during the day to watch their baby while they work so pt is not alone at home. Pt reports he is ind with all BADLs & mobility. He does not drive & leads a sedentary life. Upon eval, pt is A&O X4. He is currently on 2L O2 (he is not on O2 at baseline). Pt completed bed mobility with mod ind, bed>chair transfer with CGA. He had a drop in BP from 128/55 to 106/79 from sit>stand. Pt demo generalized deconditioning with dec activity endurance & strength required for safe mobility & completion of ADLs especially in standing. OT will continue to follow for tx & recommends SNF upon discharge at this time. Pt prefers to return home with home health, so will continue to re-assess discharge needs as he progresses.      Anticipated Discharge Disposition (OT): skilled nursing facility

## 2024-02-04 NOTE — PROGRESS NOTES
"[facility] MEDICINE SERVICE  DAILY PROGRESS NOTE    NAME: Kali Garcia  : 1945  MRN: [id number]      LOS: 2 days     PROVIDER OF SERVICE: Bri Montero MD    Chief Complaint: Syncope     SUBJECTIVE     Patient was noted to be in atrial fibrillation with a rapid ventricular response.  Rapid response called at 1520 on 2024.  Patient assessed at bedside.  Denies chest pain.  Complains of palpitations and shortness of breath.  Cardizem drip started.  Heparin drip started.  Cardiology consulted.  Transfer order to PCU.    2/3/2024  Patient denies for any new complaint. No nausea or vomiting. Still feels weak.    2024  Pt says he feeling better today. Noted requiring around 4 litre of oxygen.       OBJECTIVE   /50 (BP Location: Right arm, Patient Position: Lying)   Pulse 50   Temp 97.2 °F (36.2 °C) (Oral)   Resp 18   Ht 180.3 cm (71\")   Wt 51.5 kg (113 lb 8.6 oz)   SpO2 92%   BMI 15.84 kg/m²         Scheduled Meds   albuterol sulfate HFA, 2 puff, Inhalation, 4x Daily - RT  amiodarone, 300 mg, Oral, Q12H  atorvastatin, 10 mg, Oral, Daily  cefTRIAXone, 1,000 mg, Intravenous, Q24H  clopidogrel, 75 mg, Oral, Daily  dilTIAZem, 30 mg, Oral, Q8H  doxycycline, 100 mg, Intravenous, Q12H  enoxaparin, 1 mg/kg, Subcutaneous, Q12H  senna-docusate sodium, 2 tablet, Oral, BID  sodium chloride, 10 mL, Intravenous, Q12H       PRN Meds     acetaminophen **OR** acetaminophen **OR** acetaminophen    senna-docusate sodium **AND** polyethylene glycol **AND** bisacodyl **AND** bisacodyl    nitroglycerin    ondansetron ODT **OR** ondansetron    [COMPLETED] Insert Peripheral IV **AND** sodium chloride    sodium chloride    sodium chloride   Infusions  sodium chloride, 50 mL/hr, Last Rate: 50 mL/hr (24 1205)          EXAM:    General: Moderate respiratory distress  HEENT: Normocephalic, atraumatic  Neck: Supple, No JVD  CV: Irregularly irregular, S1 and S2 are normal, no " murmurs/rubs/or gallops  Lungs: Decreased breath sounds bilaterally.  Rhonchi bilaterally.  Abdomen: Soft, non-distended, non-tender, bowel sounds present  EXT: No edema of lower extremities  Neuro: Cranial nerves II through XII intact  Skin: Intact, no rashes, no lesions, no erythema    Medications reviewed: yes.  Labs reviewed: yes.    Result Review:  I have personally reviewed the results from the time of this admission to 2/4/2024 10:35 EST and agree with these findings:  [x]  Laboratory  []  Microbiology  [x]  Radiology  []  EKG/Telemetry   []  Cardiology/Vascular   []  Pathology  []  Old records  []  Other:      ASSESSMENT/PLAN     Acute respiratory failure requiring 4-5 litre oxygen, taper off as tolerated, secondary to atrial fibrillation with a rapid ventricular response and COVID-19 viral pneumonia  Continue supplemental oxygen  Cardizem drip start date 2/2, now off  Heparin drip start date 2/2, now off,on Lovenox therapeutic dose.  Echocardiogram  TSH level normal  Troponins flat and stable  Electrocardiogram with atrial fibrillation  Cardiology consulted.     COVID-19 viral pneumonia  Rocephin start date 2/1  Doxycycline start date 2/2  CT angiogram with no pulmonary embolism, but with findings of left upper lobe pneumonia    Syncope  Most likely orthostatic  Orthostatic vital signs positive on admission  Change NS rate to 50 ml per hour.    Peripheral vascular disease  Continue Plavix and statin    Hypertension  Chronic and stable    Venous thromboembolism prophylaxis: Heparin drip  Code: Full

## 2024-02-04 NOTE — PLAN OF CARE
Goal Outcome Evaluation:   Patient is progressing. O2 is down to 2LNC. Was able to get up in the chair today.

## 2024-02-04 NOTE — THERAPY EVALUATION
Addended by: OSCAR CORMIER on: 3/10/2021 02:19 PM     Modules accepted: Orders     Patient Name: Kali Garcia  : 1945    MRN: [id number]                              Today's Date: 2024       Admit Date: 2024    Visit Dx:     ICD-10-CM ICD-9-CM   1. Syncope and collapse  R55 780.2   2. COVID-19  U07.1 079.89   3. Pneumonia due to infectious organism, unspecified laterality, unspecified part of lung  J18.9 486     Patient Active Problem List   Diagnosis    Generalized abdominal pain    Severe malnutrition    Abnormal CT scan, esophagus    Syncope    Pneumonia due to COVID-19 virus     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 and endoscopic clipping x 1;  Surgeon: Luz Jacques MD;  Location: [facility] ENDOSCOPY;  Service: Gastroenterology;  Laterality: N/A;  post op: cecal AVM    CORONARY STENT PLACEMENT      ENDOSCOPY N/A 2022    Procedure: ESOPHAGOGASTRODUODENOSCOPY WITH BIOPSY X1 AREA;  Surgeon: Luz Jacques MD;  Location: [facility] ENDOSCOPY;  Service: Gastroenterology;  Laterality: N/A;  esophagitis, gastritis, HH    ENDOSCOPY N/A 2022    Procedure: ESOPHAGOGASTRODUODENOSCOPY;  Surgeon: Luz Jacques MD;  Location: [facility] ENDOSCOPY;  Service: Gastroenterology;  Laterality: N/A;  post op: hiatal hernia, eosphagitis    EYE SURGERY      FEMORAL ARTERY STENT      KNEE SURGERY Left     KNEE SURGERY Left     LUNG SURGERY        General Information       Row Name 24 1536          OT Time and Intention    Document Type evaluation  -DT     Mode of Treatment occupational therapy  -DT       Row Name 24 1536          General Information    Patient Profile Reviewed yes  -DT     Prior Level of Function all household  mobility;ADL's  -DT     Existing Precautions/Restrictions fall  -DT     Barriers to Rehab medically complex  -DT       Row Name 02/04/24 1536          Living Environment    People in Home --  Lives with son & his family (wife & baby).  with baby & pt during the day while son & dtr in law work.  -DT       Row Name 02/04/24 1536          Home Main Entrance    Number of Stairs, Main Entrance none  -DT       Row Name 02/04/24 1536          Stairs Within Home, Primary    Stair Railings, Within Home, Primary none  -DT       Row Name 02/04/24 1536          Cognition    Orientation Status (Cognition) oriented x 4  -DT       Row Name 02/04/24 1536          Safety Issues, Functional Mobility    Impairments Affecting Function (Mobility) endurance/activity tolerance;strength  -DT               User Key  (r) = Recorded By, (t) = Taken By, (c) = Cosigned By      Initials Name Provider Type    DT Xiomara Leach, OT Occupational Therapist                     Mobility/ADL's       Row Name 02/04/24 1537          Bed Mobility    Bed Mobility bed mobility (all) activities  -DT     All Activities, [place] (Bed Mobility) modified independence  -DT     Assistive Device (Bed Mobility) bed rails  -DT       Row Name 02/04/24 1537          Transfers    Transfers bed-chair transfer;stand-sit transfer;sit-stand transfer  -DT       Row Name 02/04/24 1537          Bed-Chair Transfer    Bed-Chair [place] (Transfers) contact guard  -DT     Comment, (Bed-Chair Transfer) gait belt  -DT       Row Name 02/04/24 1537          Sit-Stand Transfer    Sit-Stand [place] (Transfers) contact guard  -DT     Comment, (Sit-Stand Transfer) gait belt  -DT       Row Name 02/04/24 1537          Stand-Sit Transfer    Stand-Sit [place] (Transfers) contact guard  -DT     Comment, (Stand-Sit Transfer) gait belt  -DT       Row Name 02/04/24 1537          Activities of Daily Living    BADL Assessment/Intervention lower body dressing  -DT        Row Name 02/04/24 1537          Lower Body Dressing Assessment/Training    [place] Level (Lower Body Dressing) lower body dressing skills;contact guard assist  -DT     Position (Lower Body Dressing) edge of bed sitting  -DT               User Key  (r) = Recorded By, (t) = Taken By, (c) = Cosigned By      Initials Name Provider Type    DT Xiomara Leach OT Occupational Therapist                   Obj/Interventions       Row Name 02/04/24 1539          Range of Motion Comprehensive    General Range of Motion bilateral upper extremity ROM WNL  -DT       Row Name 02/04/24 1539          Strength Comprehensive (MMT)    General Manual Muscle Testing (MMT) Assessment upper extremity strength deficits identified  -DT     Comment, General Manual Muscle Testing (MMT) Assessment BUE=4-/5  -DT       Row Name 02/04/24 1539          Balance    Balance Assessment sitting static balance;sitting dynamic balance;standing static balance;standing dynamic balance  -DT     Static Sitting Balance supervision  -DT     Dynamic Sitting Balance standby assist  -DT     Position, Sitting Balance sitting edge of bed  -DT     Static Standing Balance contact guard  -DT     Dynamic Standing Balance contact guard  -DT     Position/Device Used, Standing Balance unsupported  -DT               User Key  (r) = Recorded By, (t) = Taken By, (c) = Cosigned By      Initials Name Provider Type    DT Xiomara Leach, OT Occupational Therapist                   Goals/Plan       Row Name 02/04/24 1551          Transfer Goal 1 (OT)    Activity/Assistive Device (Transfer Goal 1, OT) transfers, all  -DT     [place] Level/Cues Needed (Transfer Goal 1, OT) independent  -DT     Time Frame (Transfer Goal 1, OT) 2 weeks  -DT       Row Name 02/04/24 1551          Bathing Goal 1 (OT)    Activity/Device (Bathing Goal 1, OT) bathing skills, all  -DT     [place] Level/Cues Needed (Bathing Goal 1, OT) modified independence  -DT     Time  Frame (Bathing Goal 1, OT) 2 weeks  -DT       Row Name 02/04/24 1551          Dressing Goal 1 (OT)    Activity/Device (Dressing Goal 1, OT) dressing skills, all  -DT     [place]/Cues Needed (Dressing Goal 1, OT) independent  -DT     Time Frame (Dressing Goal 1, OT) 2 weeks  -DT       Row Name 02/04/24 1551          Toileting Goal 1 (OT)    Activity/Device (Toileting Goal 1, OT) toileting skills, all  -DT     [place] Level/Cues Needed (Toileting Goal 1, OT) independent  -DT     Time Frame (Toileting Goal 1, OT) 2 weeks  -DT       Row Name 02/04/24 1551          Therapy Assessment/Plan (OT)    Planned Therapy Interventions (OT) activity tolerance training;BADL retraining;functional balance retraining;neuromuscular control/coordination retraining;patient/caregiver education/training;ROM/therapeutic exercise;strengthening exercise;transfer/mobility retraining  -DT               User Key  (r) = Recorded By, (t) = Taken By, (c) = Cosigned By      Initials Name Provider Type    DT Xiomara Leach, OT Occupational Therapist                   Clinical Impression       Row Name 02/04/24 1530          Pain Assessment    Pretreatment Pain Rating 0/10 - no pain  -DT     Posttreatment Pain Rating 0/10 - no pain  -DT       Row Name 02/04/24 1539          Pain Scale: FACES Pre/Post-Treatment    Pain: FACES Scale, Pretreatment 0-->no hurt  -DT     Posttreatment Pain Rating 0-->no hurt  -DT       Row Name 02/04/24 153          Plan of Care Review    Plan of Care Reviewed With patient  -DT     Outcome Evaluation Pt is a 78 y.o. male with past medical history of COPD, PVD, hypertension, hyperlipidemia who presented to [facility] on 2/1/2024 complaints of syncopal episode resulting in fall. Pt found to have positive orthostatic vitals. Pt also positive for PNA & covid-19. On 02/02/24 a rapid response was called due to a-fib with RVR. At baseline, pt lives with son, dtr-in-law. They have a Nanny that comes  during the day to watch their baby while they work so pt is not alone at home. Pt reports he is ind with all BADLs & mobility. He does not drive & leads a sedentary life. Upon eval, pt is A&O X4. He is currently on 2L O2 (he is not on O2 at baseline). Pt completed bed mobility with mod ind, bed>chair transfer with CGA. He had a drop in BP from 128/55 to 106/79 from sit>stand. Pt demo generalized deconditioning with dec activity endurance & strength required for safe mobility & completion of ADLs especially in standing. OT will continue to follow for tx & recommends SNF upon discharge at this time. Pt prefers to return home with home health, so will continue to re-assess discharge needs as he progresses.  -DT       Row Name 02/04/24 1537          Therapy Assessment/Plan (OT)    Rehab Potential (OT) good, to achieve stated therapy goals  -DT     Criteria for Skilled Therapeutic Interventions Met (OT) yes;skilled treatment is necessary;meets criteria  -DT     Therapy Frequency (OT) 3 times/wk  -DT     Predicted Duration of Therapy Intervention (OT) until d/c  -DT       Row Name 02/04/24 1531          Therapy Plan Review/Discharge Plan (OT)    Anticipated Discharge Disposition (OT) skilled nursing facility  -DT       Row Name 02/04/24 1535          Vital Signs    Pre Systolic BP Rehab 131  -DT     Pre Treatment Diastolic BP 70  -DT     Intra Systolic BP Rehab 128  -DT     Intra Treatment Diastolic BP 55  -DT     Post Systolic BP Rehab 106  -DT     Post Treatment Diastolic BP 79  -DT     Pre SpO2 (%) 98  -DT     O2 Delivery Pre Treatment supplemental O2  2L  -DT     Intra SpO2 (%) 90  -DT     O2 Delivery Intra Treatment supplemental O2  2L  -DT     Post SpO2 (%) 100  -DT     O2 Delivery Post Treatment supplemental O2  2l  -DT       Row Name 02/04/24 1532          Positioning and Restraints    Pre-Treatment Position in bed  -DT     Post Treatment Position chair  -DT     In Chair notified nsg;sitting;call light within  reach;encouraged to call for assist;exit alarm on  -DT               User Key  (r) = Recorded By, (t) = Taken By, (c) = Cosigned By      Initials Name Provider Type    DT Xiomara Leach OT Occupational Therapist                   Outcome Measures       Row Name 02/04/24 1200 02/04/24 0815       How much help from another person do you currently need...    Turning from your back to your side while in flat bed without using bedrails? 3  -TS 3  -TS    Moving from lying on back to sitting on the side of a flat bed without bedrails? 3  -TS 3  -TS    Moving to and from a bed to a chair (including a wheelchair)? 1  -TS 1  -TS    Standing up from a chair using your arms (e.g., wheelchair, bedside chair)? 1  -TS 1  -TS    Climbing 3-5 steps with a railing? 1  -TS 1  -TS    To walk in hospital room? 1  -TS 1  -TS    AM-PAC 6 Clicks Score (PT) 10  -TS 10  -TS    Highest Level of Mobility Goal 4 --> Transfer to chair/commode  -TS 4 --> Transfer to chair/commode  -TS              User Key  (r) = Recorded By, (t) = Taken By, (c) = Cosigned By      Initials Name Provider Type    TS Molly Escoto LPN Licensed Nurse                    Occupational Therapy Education       Title: PT OT SLP Therapies (In Progress)       Topic: Occupational Therapy (In Progress)       Point: ADL training (Done)       Description:   Instruct learner(s) on proper safety adaptation and remediation techniques during self care or transfers.   Instruct in proper use of assistive devices.                  Learning Progress Summary             Patient Acceptance, E,TB, VU by DT at 2/4/2024 4290                         Point: Home exercise program (Not Started)       Description:   Instruct learner(s) on appropriate technique for monitoring, assisting and/or progressing therapeutic exercises/activities.                  Learner Progress:  Not documented in this visit.              Point: Precautions (Done)       Description:   Instruct learner(s) on  prescribed precautions during self-care and functional transfers.                  Learning Progress Summary             Patient Acceptance, E,TB, VU by DT at 2/4/2024 1552                         Point: Body mechanics (Done)       Description:   Instruct learner(s) on proper positioning and spine alignment during self-care, functional mobility activities and/or exercises.                  Learning Progress Summary             Patient Acceptance, E,TB, VU by DT at 2/4/2024 1552                                         User Key       Initials Effective Dates Name Provider Type Discipline    DT 07/11/23 -  Xiomara Leach, OT Occupational Therapist OT                  OT Recommendation and Plan  Planned Therapy Interventions (OT): activity tolerance training, BADL retraining, functional balance retraining, neuromuscular control/coordination retraining, patient/caregiver education/training, ROM/therapeutic exercise, strengthening exercise, transfer/mobility retraining  Therapy Frequency (OT): 3 times/wk  Plan of Care Review  Plan of Care Reviewed With: patient  Outcome Evaluation: Pt is a 78 y.o. male with past medical history of COPD, PVD, hypertension, hyperlipidemia who presented to [facility] on 2/1/2024 complaints of syncopal episode resulting in fall. Pt found to have positive orthostatic vitals. Pt also positive for PNA & covid-19. On 02/02/24 a rapid response was called due to a-fib with RVR. At baseline, pt lives with son, dtr-in-law. They have a Nanny that comes during the day to watch their baby while they work so pt is not alone at home. Pt reports he is ind with all BADLs & mobility. He does not drive & leads a sedentary life. Upon eval, pt is A&O X4. He is currently on 2L O2 (he is not on O2 at baseline). Pt completed bed mobility with mod ind, bed>chair transfer with CGA. He had a drop in BP from 128/55 to 106/79 from sit>stand. Pt demo generalized deconditioning with dec activity  endurance & strength required for safe mobility & completion of ADLs especially in standing. OT will continue to follow for tx & recommends SNF upon discharge at this time. Pt prefers to return home with home health, so will continue to re-assess discharge needs as he progresses.     Time Calculation:         Time Calculation- OT       Row Name 02/04/24 1552             Time Calculation- OT    OT Start Time 1517  -DT      OT Stop Time 1533  -DT      OT Time Calculation (min) 16 min  -DT      OT Received On 02/04/24  -DT      OT - Next Appointment 02/06/24  -DT      OT Goal Re-Cert Due Date 02/18/24  -DT                User Key  (r) = Recorded By, (t) = Taken By, (c) = Cosigned By      Initials Name Provider Type    Xiomara Jiang, OT Occupational Therapist                           Xiomara Leach, CALEB  2/4/2024

## 2024-02-04 NOTE — PLAN OF CARE
Goal Outcome Evaluation:  Plan of Care Reviewed With: patient        Progress: improving     Patient feeling a little better this shift he advised. On IV antibiotics, and fluids, no issues or concerns noted at this time.

## 2024-02-05 LAB
QT INTERVAL: 331 MS
QT INTERVAL: 402 MS
QT INTERVAL: 459 MS
QTC INTERVAL: 467 MS
QTC INTERVAL: 470 MS
QTC INTERVAL: 484 MS

## 2024-02-05 PROCEDURE — 99232 SBSQ HOSP IP/OBS MODERATE 35: CPT | Performed by: INTERNAL MEDICINE

## 2024-02-05 PROCEDURE — 25010000002 CEFTRIAXONE PER 250 MG: Performed by: INTERNAL MEDICINE

## 2024-02-05 PROCEDURE — 85007 BL SMEAR W/DIFF WBC COUNT: CPT | Performed by: INTERNAL MEDICINE

## 2024-02-05 PROCEDURE — 25010000002 ENOXAPARIN PER 10 MG: Performed by: INTERNAL MEDICINE

## 2024-02-05 PROCEDURE — 85025 COMPLETE CBC W/AUTO DIFF WBC: CPT | Performed by: INTERNAL MEDICINE

## 2024-02-05 PROCEDURE — 94799 UNLISTED PULMONARY SVC/PX: CPT

## 2024-02-05 PROCEDURE — 94664 DEMO&/EVAL PT USE INHALER: CPT

## 2024-02-05 PROCEDURE — 97110 THERAPEUTIC EXERCISES: CPT

## 2024-02-05 PROCEDURE — 94761 N-INVAS EAR/PLS OXIMETRY MLT: CPT

## 2024-02-05 PROCEDURE — 97116 GAIT TRAINING THERAPY: CPT

## 2024-02-05 PROCEDURE — 97112 NEUROMUSCULAR REEDUCATION: CPT

## 2024-02-05 PROCEDURE — 25810000003 SODIUM CHLORIDE 0.9 % SOLUTION: Performed by: HOSPITALIST

## 2024-02-05 RX ORDER — DOXYCYCLINE 100 MG/1
100 CAPSULE ORAL EVERY 12 HOURS SCHEDULED
Qty: 2 CAPSULE | Refills: 0 | Status: DISCONTINUED | OUTPATIENT
Start: 2024-02-05 | End: 2024-02-06 | Stop reason: HOSPADM

## 2024-02-05 RX ORDER — LACTULOSE 10 G/15ML
30 SOLUTION ORAL 3 TIMES DAILY
Status: DISPENSED | OUTPATIENT
Start: 2024-02-05 | End: 2024-02-06

## 2024-02-05 RX ADMIN — ENOXAPARIN SODIUM 50 MG: 100 INJECTION SUBCUTANEOUS at 21:24

## 2024-02-05 RX ADMIN — AMIODARONE HYDROCHLORIDE 300 MG: 200 TABLET ORAL at 21:24

## 2024-02-05 RX ADMIN — LACTULOSE 30 G: 20 SOLUTION ORAL at 16:23

## 2024-02-05 RX ADMIN — DOCUSATE SODIUM AND SENNOSIDES 2 TABLET: 8.6; 5 TABLET, FILM COATED ORAL at 08:25

## 2024-02-05 RX ADMIN — CEFTRIAXONE 1000 MG: 1 INJECTION, POWDER, FOR SOLUTION INTRAMUSCULAR; INTRAVENOUS at 16:22

## 2024-02-05 RX ADMIN — ACETAMINOPHEN 650 MG: 325 TABLET, FILM COATED ORAL at 16:23

## 2024-02-05 RX ADMIN — Medication 10 ML: at 21:25

## 2024-02-05 RX ADMIN — AMIODARONE HYDROCHLORIDE 300 MG: 200 TABLET ORAL at 08:24

## 2024-02-05 RX ADMIN — SODIUM CHLORIDE 50 ML/HR: 9 INJECTION, SOLUTION INTRAVENOUS at 06:30

## 2024-02-05 RX ADMIN — DOXYCYCLINE 100 MG: 100 CAPSULE ORAL at 21:25

## 2024-02-05 RX ADMIN — ALBUTEROL SULFATE 2 PUFF: 108 AEROSOL, METERED RESPIRATORY (INHALATION) at 06:52

## 2024-02-05 RX ADMIN — Medication 10 ML: at 08:26

## 2024-02-05 RX ADMIN — ENOXAPARIN SODIUM 50 MG: 100 INJECTION SUBCUTANEOUS at 09:15

## 2024-02-05 RX ADMIN — ATORVASTATIN CALCIUM 10 MG: 10 TABLET, FILM COATED ORAL at 08:25

## 2024-02-05 RX ADMIN — DOXYCYCLINE 100 MG: 100 INJECTION, POWDER, LYOPHILIZED, FOR SOLUTION INTRAVENOUS at 04:04

## 2024-02-05 RX ADMIN — ALBUTEROL SULFATE 2 PUFF: 108 AEROSOL, METERED RESPIRATORY (INHALATION) at 19:54

## 2024-02-05 RX ADMIN — ALBUTEROL SULFATE 2 PUFF: 108 AEROSOL, METERED RESPIRATORY (INHALATION) at 09:57

## 2024-02-05 RX ADMIN — ACETAMINOPHEN 650 MG: 325 TABLET, FILM COATED ORAL at 21:24

## 2024-02-05 RX ADMIN — CLOPIDOGREL BISULFATE 75 MG: 75 TABLET ORAL at 08:26

## 2024-02-05 NOTE — DISCHARGE PLACEMENT REQUEST
"Kali Gould (78 y.o. Male)       Date of Birth   1945    Social Security Number       Address   [address] [place] [address] [place] [facility]    Home Phone   [phone]    MRN   [id number]       Worship   None    Marital Status                               Admission Date   2/1/24    Admission Type   Emergency    Admitting Provider   Patti Fraser DO    Attending Provider   Alicia Jackson MD    Department, Room/Bed   [facility] 2D, 254/1       Discharge Date       Discharge Disposition       Discharge Destination                                 Attending Provider: Alicia Jackson MD    Allergies: Codeine    Isolation: Enh Drop/Con   Infection: COVID (confirmed) (02/01/24)   Code Status: CPR    Ht: 180.3 cm (71\")   Wt: 51.5 kg (113 lb 8.6 oz)    Admission Cmt: None   Principal Problem: Syncope [R55]                   Active Insurance as of 2/1/2024       Primary Coverage       Payor Plan Insurance Group Employer/Plan Group    ANTHEM MEDICARE REPLACEMENT ANTHEM MEDICARE ADVANTAGE [id number]       Payor Plan Address Payor Plan Phone Number Payor Plan Fax Number Effective Dates    [address] [phone]  1/1/2022 - None Entered    [facility] [address]         Subscriber Name Subscriber Birth Date Member ID       KALI GOULD 1945 [id number]                     Emergency Contacts        (Rel.) Home Phone Work Phone Mobile Phone    LUIS FERNANDOSRINIVAS (Son) -- -- [phone]    JUAN GOULD (Other) -- -- [phone]                "

## 2024-02-05 NOTE — PLAN OF CARE
Goal Outcome Evaluation:  Plan of Care Reviewed With: patient        Progress: no change  Outcome Evaluation: A&O x4. Participated in therapy. C/O weakness & fatigue-did participated in PT/OT today. Remains on 02@2L. Remains risk for falls. Appetite fair. Remains risk for skin injury. Did sit up in chair at bedside for short period.

## 2024-02-05 NOTE — THERAPY TREATMENT NOTE
"Subjective: Pt agreeable to therapeutic plan of care.    Objective:     Bed mobility - Min-A  Transfers - CGA, Assist x 2, and with rolling walker  Ambulation - 6 feet Min-A and with rolling walker    Therapeutic Exercise - 10 Reps B LE AROM supported sitting / chair    Vitals: Hypotensive  BP supine 119/56  BP sitting EOB 99/49  BP standing 103/41    Pain: 0 VAS   Location: N/A  Intervention for pain: N/A    Education: Provided education on the importance of mobility in the acute care setting, Verbal/Tactile Cues, Transfer Training, Gait Training, and Energy conservation strategies    Assessment: Kali Garcia presents with functional mobility impairments which indicate the need for skilled intervention. Pt has generalized weakness and decreased functional activity tolerance. PT cued pt for safe transfer technique. Pt fatigues easily with minimal exertion. Tolerating session today without incident. Will continue to follow and progress as tolerated.     Plan/Recommendations:   If medically appropriate, Moderate Intensity Therapy recommended post-acute care. This is recommended as therapy feels the patient would require 3-4 days per week and wouldn't tolerate \"3 hour daily\" rehab intensity. SNF would be the preferred choice. If the patient does not agree to SNF, arrange HH or OP depending on home bound status. If patient is medically complex, consider LTACH. Pt requires no DME at discharge.     Pt desires Skilled Rehab placement at discharge. Pt cooperative; agreeable to therapeutic recommendations and plan of care.         Basic Mobility 6-click:  Rollin = Total, A lot = 2, A little = 3; 4 = None  Supine>Sit:   1 = Total, A lot = 2, A little = 3; 4 = None   Sit>Stand with arms:  1 = Total, A lot = 2, A little = 3; 4 = None  Bed>Chair:   1 = Total, A lot = 2, A little = 3; 4 = None  Ambulate in room:  1 = Total, A lot = 2, A little = 3; 4 = None  3-5 Steps with railin = Total, A lot = 2, A little " = 3; 4 = None  Score: 15    Modified [place]: N/A = No pre-op stroke/TIA    Post-Tx Position: Up in Chair, Alarms activated, and Call light and personal items within reach  PPE: gloves, gown, eye protection, and N95

## 2024-02-05 NOTE — CONSULTS
Referring Provider: Elsi Cardenas MD    Reason for Consultation:      Atrial fibrillation with RVR      Patient Care Team:  She Kraft MD as PCP - General (Internal Medicine)      SUBJECTIVE    Patient seen and examined, agree with narrative as discussed with nurse practitioner after face-to-face encounter with scribed findings below verified by me for accuracy     Chief Complaint: Shortness of breath/near syncope    History of present illness:  Kali Garcia is a 78 y.o. male with a history of COPD who presented to [facility] with complaint of shortness of breath, dizziness, near syncope.    Patient is reported to have a history of coronary artery disease with previous PCI.  Patient presented to the emergency room on February 1, 2024 emergency room evaluation included CT PE protocol that was negative for PE that showed severe emphysematous changes infiltrate in the left upper lobe suggesting pneumonia.    CT of the head was unremarkable.  EKG showed sinus rhythm with PACs.  Patient was found to have severe orthostasis with blood pressure lying 135 with a drop to 91/45 standing.    Patient tested positive for COVID-19.Since encounter he has developed Afib with RVR and was transferred on [facility] dr to PCU.  I saw the patient who is concerned about his underlying lung disease and viral syndrome reassurance provided, oxygenation is okay, we discussed adding amiodarone given short duration of atrial fibrillation to try to maintain sinus rhythm which he is agreeable to this plan.  ==================================================  Hemodynamically electrically stable, heart rate 70s, blood pressure 120 systolic  No acute events overnight  No CV complaints  Patient rest comfortably throughout the evening with no chest pain    I discussed with the patient will pursue rhythm control strategy with amiodarone in combination with beta-blockers or calcium channel blockers  Patient will need  ischemic evaluation at some point with ischemic changes with rapid heart rates with ST depression throughout the anterior lateral leads likely rate related changes with demand ischemia.  She did have assessment of an ischemic burden at some point once he improves from respiratory standpoint.  He is chest pain-free at this time    Oxygen requirement better 2 to 3 L      Review of systems negative x 14 point review of systems except was mentioned above  Historical data copied forward from previous encounters in EMR is unchanged    Initial EKG reviewed inter by me demonstrates A-fib RVR with ST depression downsloping in the inferolateral leads rate related      Personal History:      Past Medical History:   Diagnosis Date    COPD (chronic obstructive pulmonary disease)     Coronary artery disease     Emphysema lung     Hyperlipidemia     Hypertension     Osteoarthritis        Past Surgical History:   Procedure Laterality Date    CARDIAC CATHETERIZATION      COLONOSCOPY N/A 8/4/2022    Procedure: COLONOSCOPY with 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 and endoscopic clipping x 1;  Surgeon: Luz Jacques MD;  Location: [facility] ENDOSCOPY;  Service: Gastroenterology;  Laterality: N/A;  post op: cecal AVM    CORONARY STENT PLACEMENT      ENDOSCOPY N/A 4/14/2022    Procedure: ESOPHAGOGASTRODUODENOSCOPY WITH BIOPSY X1 AREA;  Surgeon: Luz Jacques MD;  Location: [facility] ENDOSCOPY;  Service: Gastroenterology;  Laterality: N/A;  esophagitis, gastritis, HH    ENDOSCOPY N/A 8/4/2022    Procedure: ESOPHAGOGASTRODUODENOSCOPY;  Surgeon: Luz Jacques MD;  Location: [facility] ENDOSCOPY;  Service: Gastroenterology;  Laterality: N/A;  post op: hiatal hernia, eosphagitis    EYE SURGERY      FEMORAL ARTERY STENT      KNEE SURGERY Left     KNEE SURGERY Left     LUNG SURGERY         History reviewed. No pertinent family history.    Social History     Tobacco Use    Smoking status: Former     Packs/day: 1.00     Years: 63.00      Additional pack years: 0.00     Total pack years: 63.00     Types: Cigarettes     Quit date: 2023     Years since quittin.1   Vaping Use    Vaping Use: Never used   Substance Use Topics    Alcohol use: Not Currently    Drug use: Never        Home meds:  Prior to Admission medications    Medication Sig Start Date End Date Taking? Authorizing Provider   amLODIPine (NORVASC) 10 MG tablet Take 1 tablet by mouth Daily.   Yes ProviderGwendolyn MD   clopidogrel (PLAVIX) 75 MG tablet Take 1 tablet by mouth Daily.   Yes ProviderGwendolyn MD   ipratropium-albuterol (DUO-NEB) 0.5-2.5 mg/3 ml nebulizer Take 3 mL by nebulization Every 4 (Four) Hours As Needed for Wheezing.   Yes Gwendolyn Martínez MD   pravastatin (PRAVACHOL) 40 MG tablet Take 1 tablet by mouth Daily.   Yes ProviderGwendolyn MD       Allergies:     Codeine    Scheduled Meds:albuterol sulfate HFA, 2 puff, Inhalation, 4x Daily - RT  amiodarone, 300 mg, Oral, Q12H  atorvastatin, 10 mg, Oral, Daily  cefTRIAXone, 1,000 mg, Intravenous, Q24H  clopidogrel, 75 mg, Oral, Daily  dilTIAZem, 30 mg, Oral, Q8H  doxycycline, 100 mg, Intravenous, Q12H  enoxaparin, 1 mg/kg, Subcutaneous, Q12H  senna-docusate sodium, 2 tablet, Oral, BID  sodium chloride, 10 mL, Intravenous, Q12H      Continuous Infusions:sodium chloride, 50 mL/hr, Last Rate: 50 mL/hr (24 1205)      PRN Meds:  acetaminophen **OR** acetaminophen **OR** acetaminophen    senna-docusate sodium **AND** polyethylene glycol **AND** bisacodyl **AND** bisacodyl    nitroglycerin    ondansetron ODT **OR** ondansetron    [COMPLETED] Insert Peripheral IV **AND** sodium chloride    sodium chloride    sodium chloride      OBJECTIVE    Vital Signs  Vitals:    24   BP:    126/41   BP Location:    Right arm   Patient Position:    Lying   Pulse:    73   Resp: 18 18 18 20   Temp:    97.8 °F (36.6 °C)   TempSrc:    Oral   SpO2: 100%   99%   Weight:   "     Height:           Flowsheet Rows      Flowsheet Row First Filed Value   Admission Height 180.3 cm (71\") Documented at 02/01/2024 1327   Admission Weight 52.1 kg (114 lb 14.5 oz) Documented at 02/01/2024 1330              Intake/Output Summary (Last 24 hours) at 2/4/2024 2143  Last data filed at 2/4/2024 2000  Gross per 24 hour   Intake 811 ml   Output 600 ml   Net 211 ml        Telemetry: Atrial fibrillation with    Physical Exam:  The patient is alert, frail currently on nasal cannula with controlled rates, comfortable without distress   Vital signs as noted above.  Head and neck revealed no carotid bruits or jugular venous distention.  No thyromegaly or lymphadenopathy is present  Lungs clear.  Decreased bilateral bases   heart: Rates are stable, 60s to 70s normal first and second heart sounds. No murmur.  No precordial rub is present.  No gallop is present.  Abdomen: Soft and nontender.  No organomegaly is present.  Extremities with good peripheral pulses without any pedal edema.  Skin: Warm and dry.  Musculoskeletal system is grossly normal.  CNS grossly normal.       Results Review:  I have personally reviewed the results from the time of this admission to 2/4/2024 21:43 EST and agree with these findings:  [x]  Laboratory  []  Microbiology  []  Radiology  [x]  EKG/Telemetry   [x]  Cardiology/Vascular   []  Pathology  []  Old records  []  Other:    Most notable findings include:     Lab Results (last 24 hours)       Procedure Component Value Units Date/Time    Blood Culture - Blood, Arm, Left [[id number]]  (Normal) Collected: 02/01/24 1648    Specimen: Blood from Arm, Left Updated: 02/04/24 1700     Blood Culture No growth at 3 days    Blood Culture - Blood, Arm, Right [[id number]]  (Normal) Collected: 02/01/24 1648    Specimen: Blood from Arm, Right Updated: 02/04/24 1700     Blood Culture No growth at 3 days    Basic Metabolic Panel [[id number]]  (Abnormal) Collected: 02/04/24 1142    Specimen: Blood " Updated: 02/04/24 1231     Glucose 115 mg/dL      BUN 15 mg/dL      Creatinine 0.73 mg/dL      Sodium 139 mmol/L      Potassium 3.6 mmol/L      Chloride 104 mmol/L      CO2 27.0 mmol/L      Calcium 8.6 mg/dL      BUN/Creatinine Ratio 20.5     Anion Gap 8.0 mmol/L      eGFR 92.5 mL/min/1.73     Narrative:      GFR Normal >60  Chronic Kidney Disease <60  Kidney Failure <15    The GFR formula is only valid for adults with stable renal function between ages 18 and 70.    aPTT [[id number]]  (Abnormal) Collected: 02/04/24 1142    Specimen: Blood Updated: 02/04/24 1223     PTT 30.3 seconds     CBC & Differential [[id number]]  (Abnormal) Collected: 02/04/24 1142    Specimen: Blood Updated: 02/04/24 1211    Narrative:      The following orders were created for panel order CBC & Differential.  Procedure                               Abnormality         Status                     ---------                               -----------         ------                     CBC Auto Differential[[id number]]        Abnormal            Final result                 Please view results for these tests on the individual orders.    CBC Auto Differential [[id number]]  (Abnormal) Collected: 02/04/24 1142    Specimen: Blood Updated: 02/04/24 1211     WBC 3.10 10*3/mm3      RBC 3.09 10*6/mm3      Hemoglobin 10.0 g/dL      Hematocrit 29.4 %      MCV 95.2 fL      MCH 32.3 pg      MCHC 33.9 g/dL      RDW 15.0 %      RDW-SD 49.4 fl      MPV 7.9 fL      Platelets 186 10*3/mm3      Neutrophil % 71.3 %      Lymphocyte % 18.5 %      Monocyte % 7.6 %      Eosinophil % 2.2 %      Basophil % 0.4 %      Neutrophils, Absolute 2.20 10*3/mm3      Lymphocytes, Absolute 0.60 10*3/mm3      Monocytes, Absolute 0.20 10*3/mm3      Eosinophils, Absolute 0.10 10*3/mm3      Basophils, Absolute 0.00 10*3/mm3      nRBC 0.0 /100 WBC             Imaging Results (Last 24 Hours)       ** No results found for the last 24 hours. **            LAB RESULTS (LAST 7  DAYS)    CBC  Results from last 7 days   Lab Units 02/04/24 1142 02/02/24 1728 02/02/24 0029 02/01/24  1407   WBC 10*3/mm3 3.10* 4.50 4.00 5.50   RBC 10*6/mm3 3.09* 3.11* 3.24* 3.52*   HEMOGLOBIN g/dL 10.0* 10.0* 10.2* 11.0*   HEMATOCRIT % 29.4* 29.8* 30.1* 33.0*   MCV fL 95.2 95.9 92.9 93.8   PLATELETS 10*3/mm3 186 205 219 228       BMP  Results from last 7 days   Lab Units 02/04/24 1142 02/02/24 1728 02/02/24 0029 02/01/24  1407   SODIUM mmol/L 139 137 137 135*   POTASSIUM mmol/L 3.6 3.7 4.2 4.3   CHLORIDE mmol/L 104 103 102 101   CO2 mmol/L 27.0 26.0 27.0 24.0   BUN mg/dL 15 16 21 20   CREATININE mg/dL 0.73* 0.66* 0.81 0.84   GLUCOSE mg/dL 115* 110* 97 86   MAGNESIUM mg/dL  --   --  1.9 1.9       CMP   Results from last 7 days   Lab Units 02/04/24 1142 02/02/24 1728 02/02/24 0029 02/01/24  1407   SODIUM mmol/L 139 137 137 135*   POTASSIUM mmol/L 3.6 3.7 4.2 4.3   CHLORIDE mmol/L 104 103 102 101   CO2 mmol/L 27.0 26.0 27.0 24.0   BUN mg/dL 15 16 21 20   CREATININE mg/dL 0.73* 0.66* 0.81 0.84   GLUCOSE mg/dL 115* 110* 97 86   ALBUMIN g/dL  --   --   --  3.4*   BILIRUBIN mg/dL  --   --   --  0.3   ALK PHOS U/L  --   --   --  67   AST (SGOT) U/L  --   --   --  19   ALT (SGPT) U/L  --   --   --  14       BNP        TROPONIN  Results from last 7 days   Lab Units 02/02/24 1728   HSTROP T ng/L 43*       CoAg  Results from last 7 days   Lab Units 02/04/24 1142 02/03/24  1052 02/02/24  2251 02/02/24  1538 02/01/24  1407   INR   --   --   --  1.01 0.94   APTT seconds 30.3* 37.6* 34.6* 25.4* <20.0*       Creatinine Clearance  Estimated Creatinine Clearance: 59.8 mL/min (A) (by C-G formula based on SCr of 0.73 mg/dL (L)).    ABG          Radiology  No radiology results for the last day      EKG  I personally viewed and interpreted the patient's EKG/Telemetry data:  ECG 12 Lead Rhythm Change   Preliminary Result   HEART RATE= 63  bpm   RR Interval= 952  ms   VA Interval= 149  ms   P Horizontal Axis= 11  deg   P  Front Axis= 76  deg   QRSD Interval= 93  ms   QT Interval= 459  ms   QTcB= 470  ms   QRS Axis= 62  deg   T Wave Axis= 31  deg   - NORMAL ECG -   Sinus rhythm   When compared with ECG of 02-Feb-2024 15:06:23,   Significant change in rhythm: previously atrial fibrillation   Electronically Signed By:    Date and Time of Study: 2024-02-03 09:53:17      ECG 12 Lead Chest Pain   Preliminary Result   HEART RATE= 128  bpm   RR Interval= 467  ms   NH Interval=   ms   P Horizontal Axis=   deg   P Front Axis=   deg   QRSD Interval= 101  ms   QT Interval= 331  ms   QTcB= 484  ms   QRS Axis= 57  deg   T Wave Axis= -69  deg   - ABNORMAL ECG -   Atrial fibrillation   ST depr, consider ischemia, anterolateral lds   When compared with ECG of 02-Feb-2024 10:04:49,   Significant change in rhythm: previously sinus   New or worsened ischemia or infarction   Electronically Signed By:    Date and Time of Study: 2024-02-02 15:06:23      ECG 12 Lead Chest Pain   Preliminary Result   HEART RATE= 81  bpm   RR Interval= 740  ms   NH Interval= 139  ms   P Horizontal Axis= -32  deg   P Front Axis= 96  deg   QRSD Interval= 103  ms   QT Interval= 402  ms   QTcB= 467  ms   QRS Axis= 74  deg   T Wave Axis= 38  deg   - NORMAL ECG -   Sinus rhythm   Electronically Signed By:    Date and Time of Study: 2024-02-02 10:04:49      ECG 12 Lead Syncope   Final Result   HEART RATE= 85  bpm   RR Interval= 704  ms   NH Interval= 138  ms   P Horizontal Axis=   deg   P Front Axis= 85  deg   QRSD Interval= 84  ms   QT Interval= 358  ms   QTcB= 427  ms   QRS Axis= 75  deg   T Wave Axis= 66  deg   - ABNORMAL ECG -   Sinus rhythm   Atrial premature complexes   When compared with ECG of 12-Apr-2022 20:47:01,   No significant change   Electronically Signed By: Marlon Peters ([email or web address]) 02-Feb-2024 08:43:42   Date and Time of Study: 2024-02-01 13:44:57      SCANNED - TELEMETRY     Final Result      SCANNED - TELEMETRY     Final Result      SCANNED - TELEMETRY     Final  Result      SCANNED - TELEMETRY     Final Result      SCANNED - TELEMETRY     Final Result      SCANNED - TELEMETRY     Final Result      SCANNED - TELEMETRY     Final Result      SCANNED - TELEMETRY     Final Result      SCANNED - TELEMETRY     Final Result                                  Echocardiogram:    Results for orders placed during the hospital encounter of 02/01/24    Adult Transthoracic Echo Limited W/ Cont if Necessary Per Protocol    Interpretation Summary    Left ventricular systolic function is normal. Left ventricular ejection fraction appears to be 61 - 65%.    Left ventricular diastolic function was normal.    The left atrial cavity is mildly dilated.    Left atrial volume is mildly increased.    There is moderate calcification of the aortic valve mainly affecting the left coronary and right coronary cusp(s).    Estimated right ventricular systolic pressure from tricuspid regurgitation is mildly elevated (35-45 mmHg).    Mild pulmonary hypertension is present.        Stress Test:        Cardiac Catheterization:  No results found for this or any previous visit.        Other:      ASSESSMENT & PLAN:    Principal Problem:    Syncope  Active Problems:    Pneumonia due to COVID-19 virus    Atrial fibrillation with RVR  Back in sinus rhythm  Continue amiodarone p.o. twice daily, de-escalate to once daily after 10 days  Tolerating oral Cardizem, changed to sustained-release 120 daily  Underlying heart rates are in the 60s avoid beta-blockers specially respiratory insufficiency status with advanced COPD and emphysema    2D echo demonstrated preserved EF 60 to 65% with normal diastolic function, left atrium mildly enlarged, moderate calcification aortic valve without stenosis, borderline elevated pulmonary pressures likely underlying lung disease without significant systolic or diastolic dysfunction on the left side    Abnormal EKG  Significant repolarization abnormality with ST depression with rapid heart  rates  Will need ischemic evaluation at a later date.  He has no ongoing ST changes chest pain or concern for ACS at this time  Anticoagulation with heparin will continue  Patient already on Plavix monotherapy, will avoid aspirin    Near syncope  Significant orthostatic blood pressure changes with systolic blood pressure dropping from 1 35 to  91 when going from sitting to standing  Continue gentle hydration will continue  Assess orthostatics  He will be on Cardizem for his A-fib at this time  Midodrine not contraindicated, would not impact rate control aspects of Cardizem as an AV kirby blocker or antiarrhythmic    Covid-19, management per primary with respect to remdesivir steroids antibiotics and other interventions    Community-acquired pneumonia secondary to the above, antibiotics per primary    Severe COPD/advanced emphysema    Hypertension    Reported history of coronary artery disease with previous PCI, he is on Plavix monotherapy, had ischemic changes with rapid heart rates.  Will need an ischemic evaluation at a later date    Patient on PCU, continue telemetry  Echocardiogram to be obtained      Today  Convert to long-acting Cardizem  Continue amiodarone  No urgent need for ischemic evaluation at this time  Continue COVID-19 treatment  Appears euvolemic    Outpatient evaluation for stress testing would be recommended, preserved EF by 2D echo    Berto Barroso MD  02/04/24  21:43 EST

## 2024-02-05 NOTE — PLAN OF CARE
"Goal Outcome Evaluation:     Assessment: Kali Garcia presents with functional mobility impairments which indicate the need for skilled intervention. Pt has generalized weakness and decreased functional activity tolerance. PT cued pt for safe transfer technique. Pt fatigues easily with minimal exertion. Tolerating session today without incident. Will continue to follow and progress as tolerated.     Plan/Recommendations:   If medically appropriate, Moderate Intensity Therapy recommended post-acute care. This is recommended as therapy feels the patient would require 3-4 days per week and wouldn't tolerate \"3 hour daily\" rehab intensity. SNF would be the preferred choice. If the patient does not agree to SNF, arrange HH or OP depending on home bound status. If patient is medically complex, consider LTACH. Pt requires no DME at discharge.     Pt desires Skilled Rehab placement at discharge. Pt cooperative; agreeable to therapeutic recommendations and plan of care.                                              "

## 2024-02-05 NOTE — PROGRESS NOTES
CARDIOLOGY FOLLOW-UP PROGRESS NOTE      Reason for follow-up:    Afib RVR     Attending: Alicia Jackson MD      Subjective .   No acute events overnight  No nursing complaints  Still somewhat SOA  Denies chest pain     ROS  Pertinent items are noted in HPI, all other systems reviewed and negative  Allergies: Codeine    Scheduled Meds:albuterol sulfate HFA, 2 puff, Inhalation, 4x Daily - RT  amiodarone, 300 mg, Oral, Q12H  atorvastatin, 10 mg, Oral, Daily  cefTRIAXone, 1,000 mg, Intravenous, Q24H  clopidogrel, 75 mg, Oral, Daily  doxycycline, 100 mg, Intravenous, Q12H  enoxaparin, 1 mg/kg, Subcutaneous, Q12H  senna-docusate sodium, 2 tablet, Oral, BID  sodium chloride, 10 mL, Intravenous, Q12H        Continuous Infusions:sodium chloride, 50 mL/hr, Last Rate: 50 mL/hr (02/05/24 0630)        PRN Meds:.  acetaminophen **OR** acetaminophen **OR** acetaminophen    senna-docusate sodium **AND** polyethylene glycol **AND** bisacodyl **AND** bisacodyl    nitroglycerin    ondansetron ODT **OR** ondansetron    [COMPLETED] Insert Peripheral IV **AND** sodium chloride    sodium chloride    sodium chloride    Objective     VITAL SIGNS  Patient Vitals for the past 24 hrs:   BP Temp Temp src Pulse Resp SpO2   02/05/24 0826 129/68 97.8 °F (36.6 °C) Oral 62 18 93 %   02/05/24 0654 -- -- -- 65 18 98 %   02/05/24 0652 -- -- -- 65 18 98 %   02/05/24 0330 119/40 97.8 °F (36.6 °C) Oral 65 15 97 %   02/04/24 2335 131/43 98.1 °F (36.7 °C) Oral 67 20 99 %   02/1945 126/41 97.8 °F (36.6 °C) Oral 73 20 99 %   02/04/24 1917 -- -- -- -- 18 --   02/04/24 1914 -- -- -- -- 18 --   02/04/24 1913 -- -- -- -- 18 100 %   02/04/24 1910 -- -- -- 70 18 97 %   02/04/24 1545 123/59 97.3 °F (36.3 °C) Oral 64 21 100 %   02/04/24 1457 131/70 -- -- 64 -- --   02/04/24 1455 -- -- -- 66 18 100 %   02/04/24 1452 -- -- -- 66 18 100 %   02/04/24 1122 93/41 -- -- 63 15 (!) 89 %   02/04/24 1120 -- 97.7 °F (36.5 °C) -- -- 15 96 %   02/04/24 1115 126/41 -- -- 64  "15 96 %   02/04/24 1036 -- -- -- 57 18 99 %   02/04/24 1034 -- -- -- 57 18 100 %        Flowsheet Rows      Flowsheet Row First Filed Value   Admission Height 180.3 cm (71\") Documented at 02/01/2024 1327   Admission Weight 52.1 kg (114 lb 14.5 oz) Documented at 02/01/2024 1330            Body mass index is 15.84 kg/m².      Intake/Output Summary (Last 24 hours) at 2/5/2024 0913  Last data filed at 2/5/2024 0800  Gross per 24 hour   Intake 811 ml   Output 480 ml   Net 331 ml        TELEMETRY:     SR    Physical Exam:  Vitals reviewed.   Constitutional:       General: Not in acute distress.     Appearance: Normal appearance. Well-developed.   Eyes:      Pupils: Pupils are equal, round, and reactive to light.   HENT:      Head: Normocephalic and atraumatic.   Neck:      Vascular: No JVD.   Pulmonary:      Effort: Pulmonary effort is normal.      Breath sounds: Decreased breath sounds present.   Cardiovascular:      Normal rate. Regular rhythm.   Pulses:     Intact distal pulses.   Edema:     Peripheral edema absent.   Abdominal:      General: There is no distension.      Palpations: Abdomen is soft.      Tenderness: There is no abdominal tenderness.   Musculoskeletal: Normal range of motion.      Cervical back: Normal range of motion and neck supple. Skin:     General: Skin is warm and dry.   Neurological:      Mental Status: Alert and oriented to person, place, and time.            Results Review:   I reviewed the patient's new clinical results.    CBC    Results from last 7 days   Lab Units 02/04/24  1142 02/02/24  1728 02/02/24  0029 02/01/24  1407   WBC 10*3/mm3 3.10* 4.50 4.00 5.50   HEMOGLOBIN g/dL 10.0* 10.0* 10.2* 11.0*   PLATELETS 10*3/mm3 186 205 219 228     BMP   Results from last 7 days   Lab Units 02/04/24  1142 02/02/24  1728 02/02/24  0029 02/01/24  1407   SODIUM mmol/L 139 137 137 135*   POTASSIUM mmol/L 3.6 3.7 4.2 4.3   CHLORIDE mmol/L 104 103 102 101   CO2 mmol/L 27.0 26.0 27.0 24.0   BUN mg/dL 15 16 " "21 20   CREATININE mg/dL 0.73* 0.66* 0.81 0.84   GLUCOSE mg/dL 115* 110* 97 86   MAGNESIUM mg/dL  --   --  1.9 1.9     Cr Clearance Estimated Creatinine Clearance: 59.8 mL/min (A) (by C-G formula based on SCr of 0.73 mg/dL (L)).  Coag   Results from last 7 days   Lab Units 02/04/24  2250 02/04/24  1142 02/03/24  1052 02/02/24  2251 02/02/24  1538 02/01/24  1407   INR   --   --   --   --  1.01 0.94   APTT seconds 30.2* 30.3* 37.6* 34.6* 25.4* <20.0*     HbA1C No results found for: \"HGBA1C\"  Blood Glucose No results found for: \"POCGLU\"  Infection   Results from last 7 days   Lab Units 02/01/24  1648   BLOODCX  No growth at 3 days  No growth at 3 days     CMP   Results from last 7 days   Lab Units 02/04/24  1142 02/02/24  1728 02/02/24  0029 02/01/24  1407   SODIUM mmol/L 139 137 137 135*   POTASSIUM mmol/L 3.6 3.7 4.2 4.3   CHLORIDE mmol/L 104 103 102 101   CO2 mmol/L 27.0 26.0 27.0 24.0   BUN mg/dL 15 16 21 20   CREATININE mg/dL 0.73* 0.66* 0.81 0.84   GLUCOSE mg/dL 115* 110* 97 86   ALBUMIN g/dL  --   --   --  3.4*   BILIRUBIN mg/dL  --   --   --  0.3   ALK PHOS U/L  --   --   --  67   AST (SGOT) U/L  --   --   --  19   ALT (SGPT) U/L  --   --   --  14     ABG      UA      ARUN  No results found for: \"POCMETH\", \"POCAMPHET\", \"POCBARBITUR\", \"POCBENZO\", \"POCCOCAINE\", \"POCOPIATES\", \"POCOXYCODO\", \"POCPHENCYC\", \"POCPROPOXY\", \"POCTHC\", \"POCTRICYC\"  Lysis Labs   Results from last 7 days   Lab Units 02/04/24  2250 02/04/24  1142 02/03/24  1052 02/02/24  2251 02/02/24  1728 02/02/24  1538 02/02/24  0029 02/01/24  1407   INR   --   --   --   --   --  1.01  --  0.94   APTT seconds 30.2* 30.3* 37.6* 34.6*  --  25.4*  --  <20.0*   HEMOGLOBIN g/dL  --  10.0*  --   --  10.0*  --  10.2* 11.0*   PLATELETS 10*3/mm3  --  186  --   --  205  --  219 228   CREATININE mg/dL  --  0.73*  --   --  0.66*  --  0.81 0.84     Radiology(recent) No radiology results for the last day    Imaging Results (Last 24 Hours)       ** No results found for " the last 24 hours. **            Results from last 7 days   Lab Units 02/02/24  1728   HSTROP T ng/L 43*       EKG                        I personally viewed and interpreted the patient's EKG/Telemetry data:        ECHOCARDIOGRAM:     Results for orders placed during the hospital encounter of 02/01/24    Adult Transthoracic Echo Limited W/ Cont if Necessary Per Protocol    Interpretation Summary    Left ventricular systolic function is normal. Left ventricular ejection fraction appears to be 61 - 65%.    Left ventricular diastolic function was normal.    The left atrial cavity is mildly dilated.    Left atrial volume is mildly increased.    There is moderate calcification of the aortic valve mainly affecting the left coronary and right coronary cusp(s).    Estimated right ventricular systolic pressure from tricuspid regurgitation is mildly elevated (35-45 mmHg).    Mild pulmonary hypertension is present.        STRESS MYOVIEW:      CARDIAC CATHETERIZATION:      OTHER:         Assessment & Plan            Syncope    Pneumonia due to COVID-19 virus        ASSESSMENT:    Atrial fibrillation with RVR  Converted back to SR  Watch blood pressure closely  Echocardiogram preserved LV function     Near syncope  Significant orthostatic blood pressure changes with systolic blood pressure dropping from 1 35 to  91 when going from sitting to standing  Continue gentle hydration  May need to add midodrine     Covid-19     Community-acquired pneumonia     Severe COPD     Hypertension     Reported history of coronary artery disease with previous PCI  No recent cardiology follow-up  Denies chest pain at present      PLAN:    Pt anxious about overall state of health  Provided reassurance to patient  He is maintaining sinus rhythm, breathing is better on better supplemental oxygen  We discussed adequate nutrition as he has increased frailty, global muscle wasting with advanced lung disease COPD and emphysema  , continue amiodarone for  now taper off as outpatient later, depending on heart rates can switch to sotalol for more long-term therapy again as outpatient we will evaluate this  Consider ischemic evaluation in future when pulmonary status stabilizes with Lexiscan stress  Appears euvolemic at this time      Continue to follow  Optimize therapy as indicated by hemodynamics, rhythm control and clinical course      Berto Barroso MD, PhD          Margi Aburto, FADIA  02/05/24  09:13 EST

## 2024-02-05 NOTE — PROGRESS NOTES
[facility]     Progress Note    Patient Name: Kali Garcia  : 1945  MRN: [id number]  Primary Care Physician:  She Kraft MD  Date of admission: 2024  Service date and time: 24 10:02 EST  Subjective   Subjective     Chief Complaint: Dizziness    HPI:  Seen and examined this morning.  Patient laying on the bed comfortably.  Patient complain of weakness.  Otherwise does not have other complaint.    Denies chest pain, shortness of breath, nausea and vomiting.    Disposition: Awaiting placement      Objective   Objective     Vitals:   Temp:  [97.3 °F (36.3 °C)-98.1 °F (36.7 °C)] 97.8 °F (36.6 °C)  Heart Rate:  [57-73] 62  Resp:  [15-21] 18  BP: ()/(40-70) 129/68  Flow (L/min):  [2-3] 2  Physical Exam    Constitutional: Awake, alert   Respiratory: Clear to auscultation bilaterally, nonlabored respirations    Cardiovascular: RRR, no murmurs, rubs, or gallops, palpable pedal pulses bilaterally   Gastrointestinal: Positive bowel sounds, soft, nontender, nondistended   Musculoskeletal: No bilateral ankle edema, no clubbing or cyanosis to extremities   Psychiatric: Appropriate affect, cooperative   Neurologic: Oriented x 3, strength symmetric in all extremities, Cranial Nerves grossly intact to confrontation, speech clear   Skin: No rashes     Result Review    Result Review:  I have personally reviewed the results from the time of this admission to 2024 10:02 EST and agree with these findings:  [x]  Laboratory list / accordion  []  Microbiology  [x]  Radiology  []  EKG/Telemetry   []  Cardiology/Vascular   []  Pathology  []  Old records  []  Other:        Assessment & Plan   Assessment / Plan       Active Hospital Problems:  Active Hospital Problems    Diagnosis     **Syncope     Pneumonia due to COVID-19 virus      Plan:     Acute respiratory failure : Improving  requiring 4-5 litre oxygen, taper off as tolerated, secondary to atrial fibrillation with a rapid ventricular response and  COVID-19 viral pneumonia  Continue supplemental oxygen    A-fib with RVR : Resolved  -Patient converted to normal sinus rhythm  -Patient weaned off the Cardizem drip.  -Currently on amiodarone at 300 mg every 12 hours  -Cardiology following appreciate their help  -Cardiology recommended ischemic workup when patient stable.  -On Plavix 75 mg and Lovenox full dose.       COVID-19 viral pneumonia  Rocephin start date 2/1 X 5 days   Doxycycline start date 2/2 X 5 days   CT angiogram with no pulmonary embolism, but with findings of left upper lobe pneumonia     Syncope  Most likely orthostatic  Orthostatic vital signs positive on admission  Change NS rate to 50 ml per hour.     Peripheral vascular disease  Continue Plavix and statin     Hypertension  Chronic and stable            DVT prophylaxis:  Medical DVT prophylaxis orders are present.        CODE STATUS:   Level Of Support Discussed With: Patient  Code Status (Patient has no pulse and is not breathing): CPR (Attempt to Resuscitate)  Medical Interventions (Patient has pulse or is breathing): Full Support    Disposition:  I expect patient to be discharged, awaiting rehab placement.    Alicia Jackson MD

## 2024-02-05 NOTE — CONSULTS
Nutrition Services    Patient Name: Kali Garcia  YOB: 1945  MRN: [id number]  Admission date: 2/1/2024    PROGRESS NOTE      Encounter Information: Progress note to check on PO intakes. Intake variable, but overall averaging about 60% at meals. Still without a bowel movement - ongoing constipation may be hindering appetite. Secure message sent to attending to update about ongoing constipation. Will liberalize diet and continue ONS to help meet needs.       PO Diet: Diet: Cardiac Diets; Healthy Heart (2-3 Na+); Texture: Regular Texture (IDDSI 7); Fluid Consistency: Thin (IDDSI 0)   PO Supplements: Boost Plus TID (provides 1080 kcals, 42 g protein if consumed)   Boost Glucose Control may be substituted for Boost Plus at this time, due to national shortage of many ONS products. If substituted, each Boost Glucose Control will provide 190 kcal and 16g PRO.   PO Intake:  60% on average       Current nutrition support:    Nutrition support review:        Labs (reviewed below): Glucose elevated mildly - not enough to restrict diet further at this point; will monitor        GI Function:  Still no BM (none x 5 days of admission); pt reports no BM x ~3 weeks PTA       Nutrition Intervention Updates: Liberalize diet to Regular - intake too poor to warrant restrictions presently     Continue ONS    Consider more aggressive scheduled bowel regimen, if not contraindicated.       Results from last 7 days   Lab Units 02/04/24  1142 02/02/24  1728 02/02/24  0029 02/01/24  1407   SODIUM mmol/L 139 137 137 135*   POTASSIUM mmol/L 3.6 3.7 4.2 4.3   CHLORIDE mmol/L 104 103 102 101   CO2 mmol/L 27.0 26.0 27.0 24.0   BUN mg/dL 15 16 21 20   CREATININE mg/dL 0.73* 0.66* 0.81 0.84   CALCIUM mg/dL 8.6 8.6 8.8 9.0   BILIRUBIN mg/dL  --   --   --  0.3   ALK PHOS U/L  --   --   --  67   ALT (SGPT) U/L  --   --   --  14   AST (SGOT) U/L  --   --   --  19   GLUCOSE mg/dL 115* 110* 97 86     Results from last 7 days   Lab Units  "02/04/24  1142 02/02/24  1728 02/02/24  0029 02/01/24  1407   MAGNESIUM mg/dL  --   --  1.9 1.9   HEMOGLOBIN g/dL 10.0*   < > 10.2* 11.0*   HEMATOCRIT % 29.4*   < > 30.1* 33.0*    < > = values in this interval not displayed.     COVID19   Date Value Ref Range Status   02/01/2024 Detected (C) Not Detected - Ref. Range Final     No results found for: \"HGBA1C\"    RD to follow up per protocol.    Electronically signed by:  Laurie Jaime RD  02/05/24 14:50 EST    "

## 2024-02-05 NOTE — PLAN OF CARE
Goal Outcome Evaluation:  Plan of Care Reviewed With: patient        Progress: improving               Pt resting comfortably with no complaints. Currently on 2L O2. IVFs infusing. PO cardizem discontinued per cardiology. Will continue to monitor...

## 2024-02-06 VITALS
TEMPERATURE: 97.2 F | DIASTOLIC BLOOD PRESSURE: 51 MMHG | HEART RATE: 57 BPM | RESPIRATION RATE: 16 BRPM | HEIGHT: 71 IN | OXYGEN SATURATION: 99 % | WEIGHT: 113.54 LBS | BODY MASS INDEX: 15.9 KG/M2 | SYSTOLIC BLOOD PRESSURE: 132 MMHG

## 2024-02-06 PROBLEM — R55 SYNCOPE: Status: RESOLVED | Noted: 2024-02-01 | Resolved: 2024-02-06

## 2024-02-06 PROBLEM — R10.84 GENERALIZED ABDOMINAL PAIN: Status: RESOLVED | Noted: 2022-04-13 | Resolved: 2024-02-06

## 2024-02-06 LAB
ANION GAP SERPL CALCULATED.3IONS-SCNC: 8 MMOL/L (ref 5–15)
BACTERIA SPEC AEROBE CULT: NORMAL
BACTERIA SPEC AEROBE CULT: NORMAL
BASOPHILS # BLD AUTO: 0 10*3/MM3 (ref 0–0.2)
BASOPHILS # BLD MANUAL: 0.03 10*3/MM3 (ref 0–0.2)
BASOPHILS NFR BLD AUTO: 0.7 % (ref 0–1.5)
BASOPHILS NFR BLD MANUAL: 1 % (ref 0–1.5)
BUN SERPL-MCNC: 18 MG/DL (ref 8–23)
BUN/CREAT SERPL: 21.4 (ref 7–25)
CALCIUM SPEC-SCNC: 9.1 MG/DL (ref 8.6–10.5)
CHLORIDE SERPL-SCNC: 103 MMOL/L (ref 98–107)
CO2 SERPL-SCNC: 30 MMOL/L (ref 22–29)
CREAT SERPL-MCNC: 0.84 MG/DL (ref 0.76–1.27)
DEPRECATED RDW RBC AUTO: 49.9 FL (ref 37–54)
DEPRECATED RDW RBC AUTO: 50.3 FL (ref 37–54)
EGFRCR SERPLBLD CKD-EPI 2021: 88.7 ML/MIN/1.73
EOSINOPHIL # BLD AUTO: 0.1 10*3/MM3 (ref 0–0.4)
EOSINOPHIL # BLD MANUAL: 0.09 10*3/MM3 (ref 0–0.4)
EOSINOPHIL NFR BLD AUTO: 1.9 % (ref 0.3–6.2)
EOSINOPHIL NFR BLD MANUAL: 3 % (ref 0.3–6.2)
ERYTHROCYTE [DISTWIDTH] IN BLOOD BY AUTOMATED COUNT: 14.7 % (ref 12.3–15.4)
ERYTHROCYTE [DISTWIDTH] IN BLOOD BY AUTOMATED COUNT: 15.1 % (ref 12.3–15.4)
GLUCOSE SERPL-MCNC: 93 MG/DL (ref 65–99)
HCT VFR BLD AUTO: 26.3 % (ref 37.5–51)
HCT VFR BLD AUTO: 29.7 % (ref 37.5–51)
HGB BLD-MCNC: 10.1 G/DL (ref 13–17.7)
HGB BLD-MCNC: 8.7 G/DL (ref 13–17.7)
HOLD SPECIMEN: NORMAL
LYMPHOCYTES # BLD AUTO: 0.9 10*3/MM3 (ref 0.7–3.1)
LYMPHOCYTES # BLD MANUAL: 0.93 10*3/MM3 (ref 0.7–3.1)
LYMPHOCYTES NFR BLD AUTO: 18.9 % (ref 19.6–45.3)
LYMPHOCYTES NFR BLD MANUAL: 6 % (ref 5–12)
MCH RBC QN AUTO: 30.8 PG (ref 26.6–33)
MCH RBC QN AUTO: 32.8 PG (ref 26.6–33)
MCHC RBC AUTO-ENTMCNC: 33 G/DL (ref 31.5–35.7)
MCHC RBC AUTO-ENTMCNC: 34.1 G/DL (ref 31.5–35.7)
MCV RBC AUTO: 93.4 FL (ref 79–97)
MCV RBC AUTO: 96.4 FL (ref 79–97)
METAMYELOCYTES NFR BLD MANUAL: 1 % (ref 0–0)
MONOCYTES # BLD AUTO: 0.4 10*3/MM3 (ref 0.1–0.9)
MONOCYTES # BLD: 0.17 10*3/MM3 (ref 0.1–0.9)
MONOCYTES NFR BLD AUTO: 9.9 % (ref 5–12)
NEUTROPHILS # BLD AUTO: 1.65 10*3/MM3 (ref 1.7–7)
NEUTROPHILS NFR BLD AUTO: 3.1 10*3/MM3 (ref 1.7–7)
NEUTROPHILS NFR BLD AUTO: 68.6 % (ref 42.7–76)
NEUTROPHILS NFR BLD MANUAL: 56 % (ref 42.7–76)
NEUTS BAND NFR BLD MANUAL: 1 % (ref 0–5)
NRBC BLD AUTO-RTO: 0 /100 WBC (ref 0–0.2)
PLATELET # BLD AUTO: 176 10*3/MM3 (ref 140–450)
PLATELET # BLD AUTO: 196 10*3/MM3 (ref 140–450)
PMV BLD AUTO: 8.4 FL (ref 6–12)
PMV BLD AUTO: 8.5 FL (ref 6–12)
POTASSIUM SERPL-SCNC: 3.9 MMOL/L (ref 3.5–5.2)
RBC # BLD AUTO: 2.82 10*6/MM3 (ref 4.14–5.8)
RBC # BLD AUTO: 3.09 10*6/MM3 (ref 4.14–5.8)
RBC MORPH BLD: NORMAL
SCAN SLIDE: NORMAL
SMALL PLATELETS BLD QL SMEAR: ADEQUATE
SODIUM SERPL-SCNC: 141 MMOL/L (ref 136–145)
VARIANT LYMPHS NFR BLD MANUAL: 1 % (ref 0–5)
VARIANT LYMPHS NFR BLD MANUAL: 31 % (ref 19.6–45.3)
WBC MORPH BLD: NORMAL
WBC NRBC COR # BLD AUTO: 2.9 10*3/MM3 (ref 3.4–10.8)
WBC NRBC COR # BLD AUTO: 4.6 10*3/MM3 (ref 3.4–10.8)

## 2024-02-06 PROCEDURE — 97530 THERAPEUTIC ACTIVITIES: CPT

## 2024-02-06 PROCEDURE — 80048 BASIC METABOLIC PNL TOTAL CA: CPT | Performed by: STUDENT IN AN ORGANIZED HEALTH CARE EDUCATION/TRAINING PROGRAM

## 2024-02-06 PROCEDURE — 94664 DEMO&/EVAL PT USE INHALER: CPT

## 2024-02-06 PROCEDURE — 97535 SELF CARE MNGMENT TRAINING: CPT

## 2024-02-06 PROCEDURE — 97110 THERAPEUTIC EXERCISES: CPT

## 2024-02-06 PROCEDURE — 25010000002 ENOXAPARIN PER 10 MG: Performed by: INTERNAL MEDICINE

## 2024-02-06 PROCEDURE — 25810000003 SODIUM CHLORIDE 0.9 % SOLUTION: Performed by: HOSPITALIST

## 2024-02-06 PROCEDURE — 94799 UNLISTED PULMONARY SVC/PX: CPT

## 2024-02-06 PROCEDURE — 85025 COMPLETE CBC W/AUTO DIFF WBC: CPT | Performed by: STUDENT IN AN ORGANIZED HEALTH CARE EDUCATION/TRAINING PROGRAM

## 2024-02-06 PROCEDURE — 99232 SBSQ HOSP IP/OBS MODERATE 35: CPT | Performed by: INTERNAL MEDICINE

## 2024-02-06 PROCEDURE — 94761 N-INVAS EAR/PLS OXIMETRY MLT: CPT

## 2024-02-06 RX ORDER — ALBUTEROL SULFATE 90 UG/1
2 AEROSOL, METERED RESPIRATORY (INHALATION)
Qty: 0.02 G | Refills: 0 | Status: SHIPPED | OUTPATIENT
Start: 2024-02-06 | End: 2024-03-07

## 2024-02-06 RX ORDER — DOXYCYCLINE 100 MG/1
100 CAPSULE ORAL EVERY 12 HOURS SCHEDULED
Qty: 2 CAPSULE | Refills: 0 | Status: SHIPPED | OUTPATIENT
Start: 2024-02-06 | End: 2024-02-07

## 2024-02-06 RX ORDER — AMIODARONE HYDROCHLORIDE 200 MG/1
TABLET ORAL
Qty: 44 TABLET | Refills: 0 | Status: SHIPPED | OUTPATIENT
Start: 2024-02-06 | End: 2024-03-14

## 2024-02-06 RX ORDER — AMIODARONE HYDROCHLORIDE 200 MG/1
200 TABLET ORAL EVERY 12 HOURS SCHEDULED
Qty: 60 TABLET | Refills: 0 | Status: SHIPPED | OUTPATIENT
Start: 2024-02-06 | End: 2024-02-06 | Stop reason: SDUPTHER

## 2024-02-06 RX ORDER — AMIODARONE HYDROCHLORIDE 100 MG/1
300 TABLET ORAL EVERY 12 HOURS SCHEDULED
Qty: 180 TABLET | Refills: 0 | Status: SHIPPED | OUTPATIENT
Start: 2024-02-06 | End: 2024-02-06 | Stop reason: HOSPADM

## 2024-02-06 RX ORDER — AMIODARONE HYDROCHLORIDE 200 MG/1
200 TABLET ORAL EVERY 12 HOURS SCHEDULED
Status: DISCONTINUED | OUTPATIENT
Start: 2024-02-06 | End: 2024-02-06 | Stop reason: HOSPADM

## 2024-02-06 RX ORDER — AMOXICILLIN AND CLAVULANATE POTASSIUM 875; 125 MG/1; MG/1
1 TABLET, FILM COATED ORAL 2 TIMES DAILY
Qty: 4 TABLET | Refills: 0 | Status: SHIPPED | OUTPATIENT
Start: 2024-02-06 | End: 2024-02-08

## 2024-02-06 RX ORDER — PREDNISONE 10 MG/1
TABLET ORAL
Qty: 19 TABLET | Refills: 0 | Status: SHIPPED | OUTPATIENT
Start: 2024-02-06 | End: 2024-02-15

## 2024-02-06 RX ADMIN — ACETAMINOPHEN 650 MG: 325 TABLET, FILM COATED ORAL at 10:30

## 2024-02-06 RX ADMIN — ENOXAPARIN SODIUM 50 MG: 100 INJECTION SUBCUTANEOUS at 10:31

## 2024-02-06 RX ADMIN — SODIUM CHLORIDE 50 ML/HR: 9 INJECTION, SOLUTION INTRAVENOUS at 03:18

## 2024-02-06 RX ADMIN — ALBUTEROL SULFATE 2 PUFF: 108 AEROSOL, METERED RESPIRATORY (INHALATION) at 06:44

## 2024-02-06 RX ADMIN — ALBUTEROL SULFATE 2 PUFF: 108 AEROSOL, METERED RESPIRATORY (INHALATION) at 15:44

## 2024-02-06 RX ADMIN — DOXYCYCLINE 100 MG: 100 CAPSULE ORAL at 07:21

## 2024-02-06 RX ADMIN — Medication 10 ML: at 07:19

## 2024-02-06 RX ADMIN — AMIODARONE HYDROCHLORIDE 300 MG: 200 TABLET ORAL at 07:20

## 2024-02-06 RX ADMIN — ALBUTEROL SULFATE 2 PUFF: 108 AEROSOL, METERED RESPIRATORY (INHALATION) at 10:42

## 2024-02-06 RX ADMIN — CLOPIDOGREL BISULFATE 75 MG: 75 TABLET ORAL at 07:20

## 2024-02-06 RX ADMIN — DOCUSATE SODIUM AND SENNOSIDES 2 TABLET: 8.6; 5 TABLET, FILM COATED ORAL at 07:20

## 2024-02-06 RX ADMIN — ATORVASTATIN CALCIUM 10 MG: 10 TABLET, FILM COATED ORAL at 07:21

## 2024-02-06 NOTE — PROGRESS NOTES
CARDIOLOGY FOLLOW-UP PROGRESS NOTE      Reason for follow-up:    Afib RVR     Attending: Alicia Jackson MD      Subjective .   No acute events overnight  No nursing complaints  Chest pain-free    No recurrent atrial fibrillation heart rates in the 60s, not on beta-blockers     ROS  Pertinent items are noted in HPI, all other systems reviewed and negative  Allergies: Codeine    Scheduled Meds:albuterol sulfate HFA, 2 puff, Inhalation, 4x Daily - RT  amiodarone, 300 mg, Oral, Q12H  atorvastatin, 10 mg, Oral, Daily  cefTRIAXone, 1,000 mg, Intravenous, Q24H  clopidogrel, 75 mg, Oral, Daily  doxycycline, 100 mg, Oral, Q12H  enoxaparin, 1 mg/kg, Subcutaneous, Q12H  senna-docusate sodium, 2 tablet, Oral, BID  sodium chloride, 10 mL, Intravenous, Q12H        Continuous Infusions:sodium chloride, 50 mL/hr, Last Rate: 50 mL/hr (02/06/24 0318)        PRN Meds:.  acetaminophen **OR** acetaminophen **OR** acetaminophen    senna-docusate sodium **AND** polyethylene glycol **AND** bisacodyl **AND** bisacodyl    nitroglycerin    ondansetron ODT **OR** ondansetron    [COMPLETED] Insert Peripheral IV **AND** sodium chloride    sodium chloride    sodium chloride    Objective     VITAL SIGNS  Patient Vitals for the past 24 hrs:   BP Temp Temp src Pulse Resp SpO2   02/06/24 1546 -- -- -- 57 16 99 %   02/06/24 1544 -- -- -- 56 16 99 %   02/06/24 1516 132/51 97.2 °F (36.2 °C) Oral 55 18 99 %   02/06/24 1103 109/53 97.2 °F (36.2 °C) Oral 56 13 100 %   02/06/24 1044 -- -- -- 51 19 100 %   02/06/24 1042 -- -- -- 54 16 99 %   02/06/24 0716 146/56 97.7 °F (36.5 °C) Oral 62 12 100 %   02/06/24 0647 -- -- -- 56 17 97 %   02/06/24 0644 -- -- -- 56 17 99 %   02/06/24 0311 128/65 97.9 °F (36.6 °C) Oral 58 17 93 %   02/05/24 2341 122/50 97.8 °F (36.6 °C) Oral 60 19 91 %   02/05/24 2033 149/55 97.8 °F (36.6 °C) Oral 66 18 95 %   02/05/24 2000 -- -- -- 63 18 96 %   02/05/24 1954 -- -- -- 59 18 99 %        Flowsheet Rows      Flowsheet Row First  "Filed Value   Admission Height 180.3 cm (71\") Documented at 02/01/2024 1327   Admission Weight 52.1 kg (114 lb 14.5 oz) Documented at 02/01/2024 1330            Body mass index is 15.84 kg/m².      Intake/Output Summary (Last 24 hours) at 2/6/2024 1705  Last data filed at 2/6/2024 1600  Gross per 24 hour   Intake 1693 ml   Output 700 ml   Net 993 ml        TELEMETRY:     SR    Physical Exam:  Vitals reviewed.   Constitutional:       General: Not in acute distress.     Appearance: Normal appearance. Well-developed.   Eyes:      Pupils: Pupils are equal, round, and reactive to light.   HENT:      Head: Normocephalic and atraumatic.   Neck:      Vascular: No JVD.   Pulmonary:      Effort: Pulmonary effort is normal.      Breath sounds: Decreased breath sounds present.   Cardiovascular:      Normal rate. Regular rhythm.   Pulses:     Intact distal pulses.   Edema:     Peripheral edema absent.   Abdominal:      General: There is no distension.      Palpations: Abdomen is soft.      Tenderness: There is no abdominal tenderness.   Musculoskeletal: Normal range of motion.      Cervical back: Normal range of motion and neck supple. Skin:     General: Skin is warm and dry.   Neurological:      Mental Status: Alert and oriented to person, place, and time.            Results Review:   I reviewed the patient's new clinical results.    CBC    Results from last 7 days   Lab Units 02/06/24  1526 02/05/24  2336 02/04/24  1142 02/02/24  1728 02/02/24  0029 02/01/24  1407   WBC 10*3/mm3 4.60 2.90* 3.10* 4.50 4.00 5.50   HEMOGLOBIN g/dL 10.1* 8.7* 10.0* 10.0* 10.2* 11.0*   PLATELETS 10*3/mm3 196 176 186 205 219 228     BMP   Results from last 7 days   Lab Units 02/06/24  1526 02/04/24  1142 02/02/24  1728 02/02/24  0029 02/01/24  1407   SODIUM mmol/L 141 139 137 137 135*   POTASSIUM mmol/L 3.9 3.6 3.7 4.2 4.3   CHLORIDE mmol/L 103 104 103 102 101   CO2 mmol/L 30.0* 27.0 26.0 27.0 24.0   BUN mg/dL 18 15 16 21 20   CREATININE mg/dL 0.84 " "0.73* 0.66* 0.81 0.84   GLUCOSE mg/dL 93 115* 110* 97 86   MAGNESIUM mg/dL  --   --   --  1.9 1.9     Cr Clearance Estimated Creatinine Clearance: 51.9 mL/min (by C-G formula based on SCr of 0.84 mg/dL).  Coag   Results from last 7 days   Lab Units 02/04/24  2250 02/04/24  1142 02/03/24  1052 02/02/24  2251 02/02/24  1538 02/01/24  1407   INR   --   --   --   --  1.01 0.94   APTT seconds 30.2* 30.3* 37.6* 34.6* 25.4* <20.0*     HbA1C No results found for: \"HGBA1C\"  Blood Glucose No results found for: \"POCGLU\"  Infection   Results from last 7 days   Lab Units 02/01/24  1648   BLOODCX  No growth at 5 days  No growth at 5 days     CMP   Results from last 7 days   Lab Units 02/06/24  1526 02/04/24  1142 02/02/24  1728 02/02/24  0029 02/01/24  1407   SODIUM mmol/L 141 139 137 137 135*   POTASSIUM mmol/L 3.9 3.6 3.7 4.2 4.3   CHLORIDE mmol/L 103 104 103 102 101   CO2 mmol/L 30.0* 27.0 26.0 27.0 24.0   BUN mg/dL 18 15 16 21 20   CREATININE mg/dL 0.84 0.73* 0.66* 0.81 0.84   GLUCOSE mg/dL 93 115* 110* 97 86   ALBUMIN g/dL  --   --   --   --  3.4*   BILIRUBIN mg/dL  --   --   --   --  0.3   ALK PHOS U/L  --   --   --   --  67   AST (SGOT) U/L  --   --   --   --  19   ALT (SGPT) U/L  --   --   --   --  14     ABG      UA      ARUN  No results found for: \"POCMETH\", \"POCAMPHET\", \"POCBARBITUR\", \"POCBENZO\", \"POCCOCAINE\", \"POCOPIATES\", \"POCOXYCODO\", \"POCPHENCYC\", \"POCPROPOXY\", \"POCTHC\", \"POCTRICYC\"  Lysis Labs   Results from last 7 days   Lab Units 02/06/24  1526 02/05/24  2336 02/04/24  2250 02/04/24  1142 02/03/24  1052 02/02/24  2251 02/02/24  1728 02/02/24  1538 02/02/24  0029 02/01/24  1407   INR   --   --   --   --   --   --   --  1.01  --  0.94   APTT seconds  --   --  30.2* 30.3* 37.6* 34.6*  --  25.4*  --  <20.0*   HEMOGLOBIN g/dL 10.1* 8.7*  --  10.0*  --   --  10.0*  --  10.2* 11.0*   PLATELETS 10*3/mm3 196 176  --  186  --   --  205  --  219 228   CREATININE mg/dL 0.84  --   --  0.73*  --   --  0.66*  --  0.81 0.84 "     Radiology(recent) No radiology results for the last day    Imaging Results (Last 24 Hours)       ** No results found for the last 24 hours. **            Results from last 7 days   Lab Units 02/02/24  1728   HSTROP T ng/L 43*       EKG                        I personally viewed and interpreted the patient's EKG/Telemetry data:        ECHOCARDIOGRAM:     Results for orders placed during the hospital encounter of 02/01/24    Adult Transthoracic Echo Limited W/ Cont if Necessary Per Protocol    Interpretation Summary    Left ventricular systolic function is normal. Left ventricular ejection fraction appears to be 61 - 65%.    Left ventricular diastolic function was normal.    The left atrial cavity is mildly dilated.    Left atrial volume is mildly increased.    There is moderate calcification of the aortic valve mainly affecting the left coronary and right coronary cusp(s).    Estimated right ventricular systolic pressure from tricuspid regurgitation is mildly elevated (35-45 mmHg).    Mild pulmonary hypertension is present.        STRESS MYOVIEW:      CARDIAC CATHETERIZATION:      OTHER:         Assessment & Plan            Pneumonia due to COVID-19 virus        ASSESSMENT:    Atrial fibrillation with RVR  Converted back to SR  Watch blood pressure closely  Echocardiogram preserved LV function     Near syncope  Significant orthostatic blood pressure changes with systolic blood pressure dropping from 1 35 to  91 when going from sitting to standing  Continue gentle hydration  May need to add midodrine     Covid-19     Community-acquired pneumonia     Severe COPD     Hypertension     Reported history of coronary artery disease with previous PCI  No recent cardiology follow-up  Denies chest pain at present      PLAN:      He is maintaining sinus rhythm, breathing is better on better supplemental oxygen  We discussed adequate nutrition as he has increased frailty, global muscle wasting with advanced lung disease COPD  and emphysema  , continue amiodarone 200 twice a day for another week and then decrease to 200 daily  Off beta-blockers with heart rates in the low 60s  Off Cardizem as well secondary to blood pressures marginal at times  Consider ischemic evaluation in future when pulmonary status stabilizes with Lexiscan stress  Appears euvolemic at this time      Continue to follow  Optimize therapy as indicated by hemodynamics, rhythm control and clinical course      Berto Barroso MD, PhD          Berto Barroso MD  02/06/24  17:05 EST

## 2024-02-06 NOTE — DISCHARGE PLACEMENT REQUEST
"Kali Gould (78 y.o. Male)       Date of Birth   1945    Social Security Number       Address   [address] [place] [address] [place] [facility]    Home Phone   [phone]    MRN   [id number]       Buddhist   None    Marital Status                               Admission Date   2/1/24    Admission Type   Emergency    Admitting Provider   Patti Fraser DO    Attending Provider   Alicia Jackson MD    Department, Room/Bed   [facility] 2D, 254/1       Discharge Date       Discharge Disposition       Discharge Destination                                 Attending Provider: Alicia Jackson MD    Allergies: Codeine    Isolation: Enh Drop/Con   Infection: COVID (confirmed) (02/01/24)   Code Status: CPR    Ht: 180.3 cm (71\")   Wt: 51.5 kg (113 lb 8.6 oz)    Admission Cmt: None   Principal Problem: Syncope [R55]                   Active Insurance as of 2/1/2024       Primary Coverage       Payor Plan Insurance Group Employer/Plan Group    ANTHEM MEDICARE REPLACEMENT ANTHEM MEDICARE ADVANTAGE [id number]       Payor Plan Address Payor Plan Phone Number Payor Plan Fax Number Effective Dates    [address] [phone]  1/1/2022 - None Entered    [facility] [id number]         Subscriber Name Subscriber Birth Date Member ID       KALI GOULD 1945 [id number]                     Emergency Contacts        (Rel.) Home Phone Work Phone Mobile Phone    LUIS FERNANDOSRINIVAS (Son) -- -- [phone]    JUAN GOULD (Other) -- -- [phone]                "

## 2024-02-06 NOTE — DISCHARGE SUMMARY
[facility]         DISCHARGE SUMMARY    Patient Name: Kali Garcia  : 1945  MRN: [id number]    Date of Admission: 2024  Date of Discharge:  2024  Primary Care Physician: She Kraft MD    Consults       Date and Time Order Name Status Description    2024  2:56 PM Inpatient Cardiology Consult Completed     2024  4:01 PM Hospitalist (on-call MD unless specified)              Presenting Problem:   Syncope and collapse [R55]  Syncope [R55]  Pneumonia due to infectious organism, unspecified laterality, unspecified part of lung [J18.9]  COVID-19 [U07.1]  Pneumonia due to COVID-19 virus [U07.1, J12.82]    Active and Resolved Hospital Problems:  Active Hospital Problems    Diagnosis POA    Pneumonia due to COVID-19 virus [U07.1, J12.82] Yes      Resolved Hospital Problems    Diagnosis POA    **Syncope [R55] Yes         Hospital Course     Hospital Course:  Kali Garcia is a 78 y.o. male Kali Garcia is a 78 y.o. male with past medical history of COPD, PVD, hypertension, hyperlipidemia who admitted to the hospital for acute respiratory failure with hypoxia, secondary to COVID-pneumonia, syncope, A-fib with RVR.  CT angiogram with no pulmonary embolism, but with findings of left upper lobe pneumonia   Patient was started empirically on Rocephin, doxycycline.  Cardiology was consulted during this admission, patient was started on Cardizem drip.   Cardiology switch Cardizem drip to amiodarone .   During this admission patient condition continued to improve, A-fib with RVR was resolved, patient required 2 L oxygen to keep oxygen saturation greater 92%.    At this point patient received maximum benefit from this hospitalization was safe to discharge him home to follow-up with his PCP and other specialist        DISCHARGE Follow Up Recommendations for labs and diagnostics:   -Follow-up with your PCP in 1 week  -Follow-up with cardiology in 1 to 2 weeks        Day of  Discharge     Vital Signs:  Temp:  [97.2 °F (36.2 °C)-97.9 °F (36.6 °C)] 97.2 °F (36.2 °C)  Heart Rate:  [51-66] 57  Resp:  [12-19] 16  BP: (109-149)/(50-65) 132/51  Flow (L/min):  [2] 2  Physical Exam:  Constitutional: Awake, alert   Eyes: PERRLA, sclerae anicteric, no conjunctival injection   HENT: NCAT, mucous membranes moist   Neck: Supple, no thyromegaly, no lymphadenopathy, trachea midline   Respiratory: Clear to auscultation bilaterally, nonlabored respirations    Cardiovascular: RRR, no murmurs, rubs, or gallops, palpable pedal pulses bilaterally   Gastrointestinal: Positive bowel sounds, soft, nontender, nondistended   Musculoskeletal: No bilateral ankle edema, no clubbing or cyanosis to extremities   Psychiatric: Appropriate affect, cooperative   Neurologic: Oriented x 3, strength symmetric in all extremities, Cranial Nerves grossly intact to confrontation, speech clear   Skin: No rashes     Pertinent  and/or Most Recent Results     LAB RESULTS:      Lab 02/06/24  1526 02/05/24  2336 02/04/24  2250 02/04/24  1142 02/03/24  1052 02/02/24  2251 02/02/24  1728 02/02/24  1538 02/02/24  0029 02/01/24  1407   WBC 4.60 2.90*  --  3.10*  --   --  4.50  --  4.00 5.50   HEMOGLOBIN 10.1* 8.7*  --  10.0*  --   --  10.0*  --  10.2* 11.0*   HEMATOCRIT 29.7* 26.3*  --  29.4*  --   --  29.8*  --  30.1* 33.0*   PLATELETS 196 176  --  186  --   --  205  --  219 228   NEUTROS ABS 3.10 1.65*  --  2.20  --   --  3.50  --   --  4.10   LYMPHS ABS 0.90  --   --  0.60*  --   --  0.60*  --   --  0.80   MONOS ABS 0.40  --   --  0.20  --   --  0.40  --   --  0.50   EOS ABS 0.10 0.09  --  0.10  --   --  0.00  --   --  0.00   MCV 96.4 93.4  --  95.2  --   --  95.9  --  92.9 93.8   PROTIME  --   --   --   --   --   --   --  11.0  --  10.3   APTT  --   --  30.2* 30.3* 37.6* 34.6*  --  25.4*  --  <20.0*         Lab 02/06/24  1526 02/04/24  1142 02/02/24  1728 02/02/24  0029 02/01/24  1407   SODIUM 141 139 137 137 135*   POTASSIUM 3.9 3.6  3.7 4.2 4.3   CHLORIDE 103 104 103 102 101   CO2 30.0* 27.0 26.0 27.0 24.0   ANION GAP 8.0 8.0 8.0 8.0 10.0   BUN 18 15 16 21 20   CREATININE 0.84 0.73* 0.66* 0.81 0.84   EGFR 88.7 92.5 95.4 89.7 88.7   GLUCOSE 93 115* 110* 97 86   CALCIUM 9.1 8.6 8.6 8.8 9.0   IONIZED CALCIUM  --   --   --  1.22  --    MAGNESIUM  --   --   --  1.9 1.9   TSH  --   --   --   --  2.280         Lab 02/01/24  1407   TOTAL PROTEIN 7.1   ALBUMIN 3.4*   GLOBULIN 3.7   ALT (SGPT) 14   AST (SGOT) 19   BILIRUBIN 0.3   ALK PHOS 67         Lab 02/02/24  1728 02/02/24  1538 02/02/24  0930 02/01/24  1614 02/01/24  1407   HSTROP T 43* 35* 37* 38* 45*   PROTIME  --  11.0  --   --  10.3   INR  --  1.01  --   --  0.94                 Brief Urine Lab Results       None          Microbiology Results (last 10 days)       Procedure Component Value - Date/Time    Blood Culture - Blood, Arm, Left [[id number]]  (Normal) Collected: 02/01/24 1648    Lab Status: Final result Specimen: Blood from Arm, Left Updated: 02/06/24 1700     Blood Culture No growth at 5 days    Blood Culture - Blood, Arm, Right [[id number]]  (Normal) Collected: 02/01/24 1648    Lab Status: Final result Specimen: Blood from Arm, Right Updated: 02/06/24 1700     Blood Culture No growth at 5 days    COVID-19, FLU A/B, RSV PCR 1 HR TAT - Swab, Nasopharynx [[id number]]  (Abnormal) Collected: 02/01/24 1408    Lab Status: Final result Specimen: Swab from Nasopharynx Updated: 02/01/24 1454     COVID19 Detected     Influenza A PCR Not Detected     Influenza B PCR Not Detected     RSV, PCR Not Detected    Narrative:      Fact sheet for providers: https://www.fda.gov/media/679134/download    Fact sheet for patients: https://www.fda.gov/media/347439/download    Test performed by PCR.            XR Chest 1 View    Result Date: 2/2/2024  Impression: Impression: 1. Hazy left perihilar airspace disease which may be secondary to pneumonia. 2. Stable chronic nodular opacity within the right lung apex. 3.  Emphysema Electronically Signed: Mike Celaya MD  2/2/2024 10:24 AM EST  Workstation ID: [id number]    CT Angiogram Chest Pulmonary Embolism    Result Date: 2/1/2024  Impression: Impression: Negative exam for pulmonary embolism. Severe emphysematous changes identified. New infiltrate of the left upper lobe suggests pneumonia. Stellate scar of the right upper lobe is stable. Electronically Signed: Divina Troy MD  2/1/2024 3:46 PM EST  Workstation ID: [id number]    CT Head Without Contrast    Result Date: 2/1/2024  Impression: 1.No evidence for acute intracranial abnormality. 2.Nonspecific white matter changes are noted with associated diffuse volume loss. These findings are likely related to chronic small vessel ischemic changes and/or age-related changes. Electronically Signed: Jonny Espinal MD  2/1/2024 3:36 PM EST  Workstation ID: [id number]    XR Chest 1 View    Result Date: 2/1/2024  Impression: Impression: New bandlike density in the perihilar left midlung favored to represent subsegmental atelectasis. Emphysema with biapical scarring. Electronically Signed: Martha Dodd MD  2/1/2024 2:51 PM EST  Workstation ID: [id number]     Results for orders placed during the hospital encounter of 02/01/24    Bilateral Carotid Duplex    Interpretation Summary    Minimal atherosclerotic plaque proximal right internal carotid artery with less than 50% ICA stenosis.    Mild atherosclerotic plaque proximal left internal carotid artery with less than 50% ICA stenosis.      Results for orders placed during the hospital encounter of 02/01/24    Bilateral Carotid Duplex    Interpretation Summary    Minimal atherosclerotic plaque proximal right internal carotid artery with less than 50% ICA stenosis.    Mild atherosclerotic plaque proximal left internal carotid artery with less than 50% ICA stenosis.      Results for orders placed during the hospital encounter of 02/01/24    Adult Transthoracic Echo Limited W/ Cont if Necessary  Per Protocol    Interpretation Summary    Left ventricular systolic function is normal. Left ventricular ejection fraction appears to be 61 - 65%.    Left ventricular diastolic function was normal.    The left atrial cavity is mildly dilated.    Left atrial volume is mildly increased.    There is moderate calcification of the aortic valve mainly affecting the left coronary and right coronary cusp(s).    Estimated right ventricular systolic pressure from tricuspid regurgitation is mildly elevated (35-45 mmHg).    Mild pulmonary hypertension is present.      Labs Pending at Discharge:  Pending Labs       Order Current Status    Extra Tubes In process              Discharge Details        Discharge Medications        New Medications        Instructions Start Date   albuterol sulfate  (90 Base) MCG/ACT inhaler  Commonly known as: PROVENTIL HFA;VENTOLIN HFA;PROAIR HFA   2 puffs, Inhalation, 4 Times Daily - RT      amiodarone 200 MG tablet  Commonly known as: PACERONE   Take 1 tablet by mouth Every 12 (Twelve) Hours for 7 days, THEN 1 tablet Daily for 30 days.   Start Date: February 6, 2024     amoxicillin-clavulanate 875-125 MG per tablet  Commonly known as: AUGMENTIN   1 tablet, Oral, 2 Times Daily      apixaban 5 MG tablet tablet  Commonly known as: ELIQUIS   5 mg, Oral, 2 Times Daily      doxycycline 100 MG capsule  Commonly known as: MONODOX   100 mg, Oral, Every 12 Hours Scheduled      predniSONE 10 MG tablet  Commonly known as: DELTASONE   Take 4 tablets by mouth Daily for 3 days, THEN 2 tablets Daily for 2 days, THEN 1 tablet Daily for 2 days, THEN 0.5 tablets Daily for 2 days.   Start Date: February 6, 2024            Continue These Medications        Instructions Start Date   clopidogrel 75 MG tablet  Commonly known as: PLAVIX   75 mg, Oral, Daily      ipratropium-albuterol 0.5-2.5 mg/3 ml nebulizer  Commonly known as: DUO-NEB   3 mL, Nebulization, Every 4 Hours PRN      pravastatin 40 MG tablet  Commonly  known as: PRAVACHOL   40 mg, Oral, Daily             Stop These Medications      amLODIPine 10 MG tablet  Commonly known as: NORVASC              Allergies   Allergen Reactions    Codeine GI Intolerance         Discharge Disposition:   Home or Self Care    Discharge Condition: .cond    Diet:  Hospital:  Diet Order   Procedures    Diet: Regular/House Diet; Texture: Regular Texture (IDDSI 7); Fluid Consistency: Thin (IDDSI 0)         Discharge Activity:   Activity Instructions       Activity as Tolerated                CODE STATUS:  Code Status and Medical Interventions:   Ordered at: 02/03/24 1201     Level Of Support Discussed With:    Patient     Code Status (Patient has no pulse and is not breathing):    CPR (Attempt to Resuscitate)     Medical Interventions (Patient has pulse or is breathing):    Full Support         No future appointments.    Additional Instructions for the Follow-ups that You Need to Schedule       Ambulatory Referral to Home Health (Hospital)   As directed      Face to Face Visit Date: 2/6/2024   Follow-up provider for Plan of Care?: I treated the patient in an acute care facility and will not continue treatment after discharge.   Follow-up provider: SHE KARFT [[id number]]   Reason/Clinical Findings: Weakness   Describe mobility limitations that make leaving home difficult: Weakness   Nursing/Therapeutic Services Requested: Physical Therapy Occupational Therapy   Frequency: 1 Week 1        Discharge Follow-up with PCP   As directed       Currently Documented PCP:    She Kraft MD    PCP Phone Number:    [phone]     Follow Up Details: 1 week        Discharge Follow-up with Specified Provider: Cardiology; 1 Week   As directed      To: Cardiology   Follow Up: 1 Week                Time spent on Discharge including face to face service:  33  minutes    Alicia Jackson MD

## 2024-02-06 NOTE — PROCEDURES
Exercise Oximetry    Patient Name:Kali Garcia   MRN: [id number]   Date: 02/06/24             ROOM AIR BASELINE   SpO2% 96   Heart Rate 59   Blood Pressure      EXERCISE ON ROOM AIR SpO2% EXERCISE ON O2 @  LPM SpO2%   1 MINUTE 96 1 MINUTE    2 MINUTES 94 2 MINUTES    3 MINUTES 91 3 MINUTES    4 MINUTES 87 4 MINUTES    5 MINUTES 91 5 MINUTES    6 MINUTES 94 6 MINUTES               Distance Walked   Distance Walked   Dyspnea (Francisco Scale)   Dyspnea (Francisco Scale)   Fatigue (Francisco Scale)   Fatigue (Francisco Scale)   SpO2% Post Exercise  98 SpO2% Post Exercise   HR Post Exercise  57 HR Post Exercise   Time to Recovery   Time to Recovery     Comments: walked pt and pt required 2lnc to keep sats above 87%.

## 2024-02-06 NOTE — PLAN OF CARE
Goal Outcome Evaluation:  Plan of Care Reviewed With: patient        Progress: no change  Outcome Evaluation: No changes overnight. Remains risk for skin injury & falls. Did participate with therapy again & sat up in chair for short period.  Does present with weakness & decreased endurance. Wants to return home with family

## 2024-02-06 NOTE — THERAPY TREATMENT NOTE
Subjective: Pt agreeable to therapeutic plan of care.  Cognition: oriented to Person, Place, Time, and Situation, memory: Normal, arousal/alertness: Attentive and Listless, safety/judgement: good, and awareness of deficits: good awareness of safety precautions and fair awareness of deficits    Objective: has PRN O2 at home 2* COPD. SOA with minimal activity but O2 levels not dropping appreciatively with short walk in the room on 2-3L via NC.    Bed Mobility: SBA and with adaptive equipment   Functional Transfers: SBA and with adaptive equipment     Balance: sitting EOB, unsupported, static, and dynamic Modified-Independent  Functional Ambulation: SBA and with adaptive equipment    Toileting and Grooming: Mod-A  ADL Position: sitting up in bed  ADL Comments: set up to use urinal in the chair & pt states he can manage. Fatigue & SOA limited his endurance to hold up arms & brush his long hair.    Therapeutic Exercise - 5 Reps on incentive spirometer  Vitals: WNL on 2-3L. Desat from 100% to 94% then back up after 15' walk.    Pain: 3 VAS  Location: neck & shld. Provided short massage and cues to drop shld as he is noted to be shrugging hard in guarded, anxious posture. Pt completed some neck & shld rolls w/ cue to relax & RN was notified.  Interventions for pain: Repositioned, RN notified, Increased Activity, and Therapeutic Presence  Education: Provided education on the importance of mobility in the acute care setting, Verbal/Tactile Cues, ADL training, Transfer Training, and Energy conservation strategies      Assessment: Kali Garcia presents with ADL impairments affecting function including balance, endurance / activity tolerance, pain, shortness of breath, and strength. Demonstrated functioning below baseline abilities indicate the need for continued skilled intervention while inpatient. Pt is anxious regarding his health and SOA with minimal activity though his O2 sats are WNL on 2-3 L O2 when standing &  "sitting. Pt endurance is so low he is requiring assist for ADL and SBA to mobilize short distances. Pt reports he feels he would benefit from short term rehab stay at this point. He verbalizes and demonstrates motivation to do what he needs to do to improve, sitting up in the chair daily, using his spirometer, etc. If he is not able to access insurance coverage for SNF and needs to go home with family he would benefit from a RW to manage mild balance impairment. Tolerating session today without incident. Will continue to follow and progress as tolerated.     Plan/Recommendations:   Moderate Intensity Therapy recommended post-acute care. This is recommended as therapy feels the patient would require 3-4 days per week and wouldn't tolerate \"3 hour daily\" rehab intensity. SNF would be the preferred choice. If the patient does not agree to SNF, arrange HH or OP depending on home bound status. If patient is medically complex, consider LTACH.. Pt requires rolling walker at discharge.     Pt desires Skilled Rehab placement at discharge. Pt cooperative; agreeable to therapeutic recommendations and plan of care.     Modified Casey: 4 = Moderately severe disability (Unable to attend to own bodily needs without assistance, and unable to walk unassisted)     Post-Tx Position: Up in Chair, Alarms activated, and Call light and personal items within reach  PPE: gloves, gown, eye protection, and N95    "

## 2024-02-06 NOTE — PLAN OF CARE
"Goal Outcome Evaluation:        Assessment: Kali Garcia presents with ADL impairments affecting function including balance, endurance / activity tolerance, pain, shortness of breath, and strength. Demonstrated functioning below baseline abilities indicate the need for continued skilled intervention while inpatient. Pt is anxious regarding his health and SOA with minimal activity though his O2 sats are WNL on 2-3 L O2 when standing & sitting. Pt endurance is so low he is requiring assist for ADL and SBA to mobilize short distances. Pt reports he feels he would benefit from short term rehab stay at this point. He verbalizes and demonstrates motivation to do what he needs to do to improve, sitting up in the chair daily, using his spirometer, etc. If he is not able to access insurance coverage for SNF and needs to go home with family he would benefit from a RW to manage mild balance impairment. Tolerating session today without incident. Will continue to follow and progress as tolerated.      Plan/Recommendations:   Moderate Intensity Therapy recommended post-acute care. This is recommended as therapy feels the patient would require 3-4 days per week and wouldn't tolerate \"3 hour daily\" rehab intensity. SNF would be the preferred choice. If the patient does not agree to SNF, arrange HH or OP depending on home bound status. If patient is medically complex, consider LTACH.. Pt requires rolling walker at discharge.      Pt desires Skilled Rehab placement at discharge. Pt cooperative; agreeable to therapeutic recommendations and plan of care.      Modified Casey: 4 = Moderately severe disability (Unable to attend to own bodily needs without assistance, and unable to walk unassisted)     "

## 2024-02-06 NOTE — PLAN OF CARE
Problem: Adult Inpatient Plan of Care  Goal: Absence of Hospital-Acquired Illness or Injury  Intervention: Identify and Manage Fall Risk  Description: Perform standard risk assessment on admission using a validated tool or comprehensive approach appropriate to the patient; reassess fall risk frequently, with change in status or transfer to another level of care.  Communicate fall injury risk to interprofessional healthcare team.  Determine need for increased observation, equipment and environmental modification, such as low bed, signage and supportive, nonskid footwear.  Adjust safety measures to individual developmental age, stage and identified risk factors.  Reinforce the importance of safety and physical activity with patient and family.  Perform regular intentional rounding to assess need for position change, pain assessment and personal needs, including assistance with toileting.  Recent Flowsheet Documentation  Taken 2/6/2024 0200 by Ernesto Flores LPN  Safety Promotion/Fall Prevention:   safety round/check completed   room organization consistent   nonskid shoes/slippers when out of bed   muscle strengthening facilitated   mobility aid in reach   lighting adjusted   fall prevention program maintained   clutter free environment maintained   activity supervised   assistive device/personal items within reach  Taken 2/6/2024 0005 by Ernesto Floers LPN  Safety Promotion/Fall Prevention:   safety round/check completed   room organization consistent   nonskid shoes/slippers when out of bed   lighting adjusted   muscle strengthening facilitated   mobility aid in reach   fall prevention program maintained   clutter free environment maintained   assistive device/personal items within reach   activity supervised  Taken 2/5/2024 2200 by Ernesto Flores LPN  Safety Promotion/Fall Prevention:   room organization consistent   safety round/check completed   nonskid shoes/slippers when out of bed   muscle  strengthening facilitated   lighting adjusted   mobility aid in reach   fall prevention program maintained   clutter free environment maintained   assistive device/personal items within reach   activity supervised  Taken 2/5/2024 2030 by Ernesto Flores LPN  Safety Promotion/Fall Prevention:   safety round/check completed   room organization consistent   nonskid shoes/slippers when out of bed   muscle strengthening facilitated   mobility aid in reach   lighting adjusted   fall prevention program maintained   clutter free environment maintained   assistive device/personal items within reach   activity supervised  Intervention: Prevent Skin Injury  Description: Perform a screening for skin injury risk, such as pressure or moisture associated skin damage on admission and at regular intervals throughout hospital stay.  Keep all areas of skin (especially folds) clean and dry.  Maintain adequate skin hydration.  Relieve and redistribute pressure and protect bony prominences; implement measures based on patient-specific risk factors.  Match turning and repositioning schedule to clinical condition.  Encourage weight shift frequently; assist with reposition if unable to complete independently.  Float heels off bed; avoid pressure on the Achilles tendon.  Keep skin free from extended contact with medical devices.  Encourage functional activity and mobility, as early as tolerated.  Use aids (e.g., slide boards, mechanical lift) during transfer.  Recent Flowsheet Documentation  Taken 2/6/2024 0200 by Ernesto Flores LPN  Body Position: position changed independently  Taken 2/6/2024 0005 by Ernesto Flores LPN  Body Position: position changed independently  Taken 2/5/2024 2200 by Ernesto Flores LPN  Body Position: position changed independently  Taken 2/5/2024 2030 by Ernesto Flores LPN  Body Position: position changed independently  Skin Protection:   adhesive use limited   incontinence pads  utilized  Intervention: Prevent and Manage VTE (Venous Thromboembolism) Risk  Description: Assess for VTE (venous thromboembolism) risk.  Encourage and assist with early ambulation.  Initiate and maintain compression or other therapy, as indicated, based on identified risk in accordance with organizational protocol and provider order.  Encourage both active and passive leg exercises while in bed, if unable to ambulate.  Recent Flowsheet Documentation  Taken 2/6/2024 0200 by Ernesto Flores LPN  Activity Management: activity encouraged  Taken 2/6/2024 0005 by Ernesto Flores LPN  Activity Management: activity encouraged  Taken 2/5/2024 2200 by Ernesto Flores LPN  Activity Management: activity encouraged  Taken 2/5/2024 2030 by Ernesto Flores LPN  Activity Management: activity encouraged  VTE Prevention/Management:   sequential compression devices off   patient refused intervention  Intervention: Prevent Infection  Description: Maintain skin and mucous membrane integrity; promote hand, oral and pulmonary hygiene.  Optimize fluid balance, nutrition, sleep and glycemic control to maximize infection resistance.  Identify potential sources of infection early to prevent or mitigate progression of infection (e.g., wound, lines, devices).  Evaluate ongoing need for invasive devices; remove promptly when no longer indicated.  Recent Flowsheet Documentation  Taken 2/6/2024 0200 by Ernesto Flores LPN  Infection Prevention:   visitors restricted/screened   single patient room provided   rest/sleep promoted   personal protective equipment utilized   hand hygiene promoted  Taken 2/6/2024 0005 by Ernesto Flores LPN  Infection Prevention:   visitors restricted/screened   single patient room provided   rest/sleep promoted   personal protective equipment utilized   hand hygiene promoted  Goal: Optimal Comfort and Wellbeing  Intervention: Monitor Pain and Promote Comfort  Description: Assess pain  level, treatment efficacy and patient response at regular intervals using a consistent pain scale.  Consider the presence and impact of preexisting chronic pain.  Encourage patient and caregiver involvement in pain assessment, interventions and safety measures.  Recent Flowsheet Documentation  Taken 2/5/2024 2124 by Ernesto Flores LPN  Pain Management Interventions:   see MAR   quiet environment facilitated   position adjusted  Taken 2/5/2024 2030 by Ernesto Flores LPN  Pain Management Interventions:   see MAR   quiet environment facilitated   pillow support provided   pain management plan reviewed with patient/caregiver  Intervention: Provide Person-Centered Care  Description: Use a family-focused approach to care.  Develop trust and rapport by proactively providing information, encouraging questions, addressing concerns and offering reassurance.  Acknowledge emotional response to hospitalization.  Recognize and utilize personal coping strategies.  Honor spiritual and cultural preferences.  Recent Flowsheet Documentation  Taken 2/5/2024 2030 by Ernesto Flores LPN  Trust Relationship/Rapport:   care explained   questions encouraged     Problem: Syncope  Goal: Absence of Syncopal Symptoms  Intervention: Manage Effect of Syncopal Symptoms  Description: Assist with determining underlying cause (e.g., orthostatic blood pressure measurements, physiologic monitoring of trends correlated with activity and medication).  Provide close monitoring with ambulation to prevent fall or injury.  Monitor impact of medication that lowers BP, particularly if taking more than one agent (e.g., diuretic, beta-blocker, vasodilator); advocate for medication adjustment.  Prevent lower extremity venous pooling (e.g., use of compression stocking garments, including thigh and abdomen).  Encourage adequate fluid intake; assess sodium intake for adequacy and over-restriction.  Encourage slow and cautious position changes to  minimize potential for syncopal episode (e.g., lying to sitting, sitting to standing).  Consider pharmacotherapy, such as alpha-agonist or mineralocorticoid agent.  Encourage patient to practice counter maneuvers to prevent syncopal episode.  Acknowledge, normalize and validate patient and family response to syncopal episodes (e.g., fear and concern, guilt, increased worry).  Assist with life transitions (e.g., loss of independence and role, responsibility changes).  Monitor for quality of life concerns; engage support system to assist with coping.  Assess and monitor for psychologic impairment, which may contribute to syncopal episodes (e.g., anxiety, depression, panic disorder).  Recent Flowsheet Documentation  Taken 2/5/2024 2030 by Ernesto Flores LPN  Supportive Measures:   active listening utilized   relaxation techniques promoted     Problem: Fall Injury Risk  Goal: Absence of Fall and Fall-Related Injury  Intervention: Identify and Manage Contributors  Description: Develop a fall prevention plan with the patient and caregiver/family.  Provide reorientation, appropriate sensory stimulation and routines with changes in mental status to decrease risk of fall.  Promote use of personal vision and auditory aids.  Assess assistance level required for safe and effective self-care; provide support as needed, such as toileting, mobilization. For age 65 and older, implement timed toileting with assistance.  Encourage physical activity, such as performance of mobility and self-care at highest level of patient ability, multicomponent exercise program and provision of appropriate assistive devices.  If fall occurs, assess the severity of injury; implement fall injury protocol. Determine the cause and revise fall injury prevention plan.  Regularly review medication contribution to fall risk; adjust medication administration times to minimize risk of falling.  Consider risk related to polypharmacy and age.  Balance  adequate pain management with potential for oversedation.  Recent Flowsheet Documentation  Taken 2/6/2024 0200 by Ernesto Flores LPN  Medication Review/Management: medications reviewed  Taken 2/6/2024 0005 by Ernesto Flores LPN  Medication Review/Management: medications reviewed  Taken 2/5/2024 2200 by Ernesto Flores LPN  Medication Review/Management: medications reviewed  Taken 2/5/2024 2030 by Ernesto Flores LPN  Medication Review/Management: medications reviewed  Intervention: Promote Injury-Free Environment  Description: Provide a safe, barrier-free environment that encourages independent activity.  Keep care area uncluttered and well-lighted.  Determine need for increased observation or monitoring.  Avoid use of devices that minimize mobility, such as restraints or indwelling urinary catheter.  Recent Flowsheet Documentation  Taken 2/6/2024 0200 by Ernesto Flores LPN  Safety Promotion/Fall Prevention:   safety round/check completed   room organization consistent   nonskid shoes/slippers when out of bed   muscle strengthening facilitated   mobility aid in reach   lighting adjusted   fall prevention program maintained   clutter free environment maintained   activity supervised   assistive device/personal items within reach  Taken 2/6/2024 0005 by Ernesto Flores LPN  Safety Promotion/Fall Prevention:   safety round/check completed   room organization consistent   nonskid shoes/slippers when out of bed   lighting adjusted   muscle strengthening facilitated   mobility aid in reach   fall prevention program maintained   clutter free environment maintained   assistive device/personal items within reach   activity supervised  Taken 2/5/2024 2200 by Ernesto Flores LPN  Safety Promotion/Fall Prevention:   room organization consistent   safety round/check completed   nonskid shoes/slippers when out of bed   muscle strengthening facilitated   lighting adjusted   mobility aid in  reach   fall prevention program maintained   clutter free environment maintained   assistive device/personal items within reach   activity supervised  Taken 2/5/2024 2030 by Ernesto Flores LPN  Safety Promotion/Fall Prevention:   safety round/check completed   room organization consistent   nonskid shoes/slippers when out of bed   muscle strengthening facilitated   mobility aid in reach   lighting adjusted   fall prevention program maintained   clutter free environment maintained   assistive device/personal items within reach   activity supervised     Problem: Hypertension Comorbidity  Goal: Blood Pressure in Desired Range  Intervention: Maintain Blood Pressure Management  Description: Evaluate adherence to home antihypertensive regimen (e.g., exercise and activity, diet modification, medication).  Provide scheduled antihypertensive medication; consider administration time and effects (e.g., avoid giving diuretic prior to bedtime).  Monitor response to antihypertensive medication therapy (e.g., blood pressure, electrolyte levels, medication effects).  Minimize risk of orthostatic hypotension; encourage caution with position changes, particularly if elderly.  Recent Flowsheet Documentation  Taken 2/6/2024 0200 by Ernesto Flores LPN  Medication Review/Management: medications reviewed  Taken 2/6/2024 0005 by Ernesto Flores LPN  Medication Review/Management: medications reviewed  Taken 2/5/2024 2200 by Ernesto Flores LPN  Medication Review/Management: medications reviewed  Taken 2/5/2024 2030 by Ernesto Flores LPN  Medication Review/Management: medications reviewed     Problem: Malnutrition  Goal: Improved Nutritional Intake  Intervention: Promote and Optimize Nutrition Support  Description: Perform a nutritional assessment; include a nutrition-focused physical exam.  Determine calorie, protein, vitamin, mineral and fluid requirements.  Initiate early nutrition support; enteral nutrition is  preferred versus parenteral.  Optimize protein intake, unless contraindicated (e.g., hepatic encephalopathy, chronic renal failure).  Consider postpyloric versus gastric tube feeding for patient at increased risk of aspiration.  Advocate for and adjust infusion rate, formulation or volume based on feeding tolerance and clinical status (e.g., hemodynamic stability); minimize unnecessary interruptions.  Anticipate the need for a promotility agent if reduced gastric emptying or delayed bowel motility is suspected.  Monitor nutrition delivery to ensure safe practices (e.g., confirmation of tube placement, tube patency, medication delivery, head of bed elevation, oral care).  Initiate parenteral nutrition if enteral nutrition support is contraindicated.  Recent Flowsheet Documentation  Taken 2/6/2024 0005 by Ernesto Flores LPN  Nutrition Support Management: weight trending reviewed     Problem: Skin Injury Risk Increased  Goal: Skin Health and Integrity  Intervention: Optimize Skin Protection  Description: Perform a full pressure injury risk assessment, as indicated by screening, upon admission to care unit.  Reassess skin (injury risk, full inspection) frequently (e.g., scheduled interval, with change in condition) to provide optimal early detection and prevention.  Maintain adequate tissue perfusion (e.g., encourage fluid balance; avoid crossing legs, constrictive clothing or devices) to promote tissue oxygenation.  Maintain head of bed at lowest degree of elevation tolerated, considering medical condition and other restrictions.  Avoid positioning onto an area that remains reddened.  Minimize incontinence and moisture (e.g., toileting schedule; moisture-wicking pad, diaper or incontinence collection device; skin moisture barrier).  Cleanse skin promptly and gently when soiled utilizing a pH-balanced cleanser.  Relieve and redistribute pressure (e.g., scheduled position changes, weight shifts, use of support  surface, medical device repositioning, protective dressing application, use of positioning device, microclimate control, use of pressure-injury-monitor  Encourage increased activity, such as sitting in a chair at the bedside or early mobilization, when able to tolerate.  Recent Flowsheet Documentation  Taken 2/6/2024 0200 by Ernesto Flores LPN  Head of Bed (HOB) Positioning: HOB at 20-30 degrees  Taken 2/6/2024 0005 by Ernesto Flores LPN  Head of Bed (HOB) Positioning: HOB at 20-30 degrees  Taken 2/5/2024 2200 by Ernesto Flores LPN  Head of Bed (HOB) Positioning: HOB at 20-30 degrees  Taken 2/5/2024 2030 by Ernesto Flores LPN  Pressure Reduction Techniques: frequent weight shift encouraged  Head of Bed (HOB) Positioning: HOB at 30-45 degrees  Pressure Reduction Devices: pressure-redistributing mattress utilized  Skin Protection:   adhesive use limited   incontinence pads utilized   Goal Outcome Evaluation:plan of care on going , cont to require enhance isloation BS monitor no s/s insulin required this shift , will cont to monitor pt progress toward goal

## 2024-02-07 ENCOUNTER — TRANSCRIBE ORDERS (OUTPATIENT)
Dept: HOME HEALTH SERVICES | Facility: HOME HEALTHCARE | Age: 79
End: 2024-02-07
Payer: MEDICARE

## 2024-02-07 ENCOUNTER — DOCUMENTATION (OUTPATIENT)
Dept: HOME HEALTH SERVICES | Facility: HOME HEALTHCARE | Age: 79
End: 2024-02-07
Payer: MEDICARE

## 2024-02-07 ENCOUNTER — READMISSION MANAGEMENT (OUTPATIENT)
Dept: CALL CENTER | Facility: HOSPITAL | Age: 79
End: 2024-02-07
Payer: MEDICARE

## 2024-02-07 ENCOUNTER — HOME HEALTH ADMISSION (OUTPATIENT)
Dept: HOME HEALTH SERVICES | Facility: HOME HEALTHCARE | Age: 79
End: 2024-02-07
Payer: COMMERCIAL

## 2024-02-07 DIAGNOSIS — J12.82 PNEUMONIA DUE TO COVID-19 VIRUS: Primary | ICD-10-CM

## 2024-02-07 DIAGNOSIS — U07.1 PNEUMONIA DUE TO COVID-19 VIRUS: Primary | ICD-10-CM

## 2024-02-07 NOTE — CASE MANAGEMENT/SOCIAL WORK
Case Management Discharge Note      Final Note: Home with [facility] and 02 per Lidya    Provided Post Acute Provider List?: N/A  Provided Post Acute Provider Quality & Resource List?: N/A    Selected Continued Care - Discharged on 2/6/2024 Admission date: 2/1/2024 - Discharge disposition: Home or Self Care             Transportation Services  Private: Car    Final Discharge Disposition Code: 06 - home with home health care

## 2024-02-07 NOTE — OUTREACH NOTE
Prep Survey      Flowsheet Row Responses   [facility] discharged from? [facility]   Is LACE score < 7 ? No   Eligibility Readm Mgmt   Discharge diagnosis Syncope   Does the patient have one of the following disease processes/diagnoses(primary or secondary)? Other   Does the patient have Home health ordered? Yes   What is the Home health agency?  Pending sale to [facility]   Is there a DME ordered? No  [[facility] if oxygen needed]   Comments regarding appointments Follow-up with your PCP in 1 week  -Follow-up with cardiology in 1 to 2 weeks   Medication alerts for this patient see avs--po antibx, new meds   Prep survey completed? Yes            Vaishnavi BRYANT - Registered Nurse

## 2024-02-07 NOTE — PROGRESS NOTES
Demographics verified Pt is agreeable to services and has no other agency    Nursing and PT    Tami Ward NP is agreeable to sign the plan of care and follow for home health orders. Pt used to see Dr. Rupal Kraft but he is now only seeing patients in [place] so the care of this patient was moved over to Tami Ward NP.     Pt has a hospital follow up appt on 2/9 at [facility]    Pharmacy: [facility] in [place]

## 2024-02-08 ENCOUNTER — HOME CARE VISIT (OUTPATIENT)
Dept: HOME HEALTH SERVICES | Facility: HOME HEALTHCARE | Age: 79
End: 2024-02-08
Payer: COMMERCIAL

## 2024-02-08 ENCOUNTER — HOME CARE VISIT (OUTPATIENT)
Dept: HOME HEALTH SERVICES | Facility: HOME HEALTHCARE | Age: 79
End: 2024-02-08
Payer: MEDICARE

## 2024-02-08 VITALS
DIASTOLIC BLOOD PRESSURE: 58 MMHG | OXYGEN SATURATION: 98 % | TEMPERATURE: 98.1 F | HEART RATE: 72 BPM | SYSTOLIC BLOOD PRESSURE: 122 MMHG | RESPIRATION RATE: 17 BRPM

## 2024-02-08 PROCEDURE — G0151 HHCP-SERV OF PT,EA 15 MIN: HCPCS

## 2024-02-08 PROCEDURE — G0299 HHS/HOSPICE OF RN EA 15 MIN: HCPCS

## 2024-02-08 NOTE — HOME HEALTH
"SOC Note:  78 y.o. male with past medical history of COPD, PVD, hypertension, hyperlipidemia who admitted to the hospital for acute respiratory failure with hypoxia, secondary to COVID-pneumonia, syncope, A-fib with RVR.   Home Health ordered for: disciplines SN 1wk3, PT 1wk1 eval    Reason for Hosp/Primary Dx/Co-morbidities:  history of COPD, PVD, hypertension, hyperlipidemia     Focus of Care: COVID    Patient's goal(s): \"I want to feel better and have more energy, I wear out really quickly\"    Current Functional status/mobility/DME: nebulizer, oxygen     HB status/Living Arrangements: Patient is living in private home with his son ans daughter in law    Skin Integrity/wound status: No wound    Code Status: Full code    Fall Risk/Safety concerns: High risk of falls due to recent A-fib with dizziness in the hospital    Educated on Emergency Plan, steps to take prior to going to the ER and when to Call Home Health First:  Patient reports he will call home health if he has questions and 911 if he has sever chest pain     Medication issues/Concerns: Major drug to drug potential for interactions sent to PCP for review    Additional Problems/Concerns: decreased strength and stamina    SDOH Barriers (i.e. caregiver concerns, social isolation, transportation, food insecurity, environment, income etc.)/Need for MSW: No need    Plan for next visit: Cardiopulmonary assesment, medication reveiw / teach, coaching on incentive spirometry, assess for adequate hydration / fluid status, assess energy level"

## 2024-02-09 VITALS
DIASTOLIC BLOOD PRESSURE: 58 MMHG | OXYGEN SATURATION: 99 % | HEART RATE: 62 BPM | RESPIRATION RATE: 18 BRPM | TEMPERATURE: 98.6 F | SYSTOLIC BLOOD PRESSURE: 130 MMHG

## 2024-02-09 NOTE — HOME HEALTH
"Eval Note    Patient's goal(s): \"To get stronger\"    Services required to achieve goals: PT    Potential Issues for goal attainment: Prior history of COPD    Problems identified: Patient presents with generalized weakness and decline in functional mobility due to covid-19    Describe the Functional status and safety: Patient demonstrates 4/5 gross strength in ble, needs cga for sit to stand and ambulates 1 mins 41 secs non stop needing sba/cga and wall/furniture support, taking slow, labored and discontinous steps    Describe any environmental issues: Patient lives with son and daughter in law    Any equipment needs: N/A    POC confirmed with patient on 2/8/24"

## 2024-02-12 ENCOUNTER — HOME CARE VISIT (OUTPATIENT)
Dept: HOME HEALTH SERVICES | Facility: HOME HEALTHCARE | Age: 79
End: 2024-02-12
Payer: COMMERCIAL

## 2024-02-12 VITALS
SYSTOLIC BLOOD PRESSURE: 140 MMHG | HEART RATE: 68 BPM | OXYGEN SATURATION: 97 % | DIASTOLIC BLOOD PRESSURE: 60 MMHG | TEMPERATURE: 97.9 F

## 2024-02-12 PROCEDURE — G0299 HHS/HOSPICE OF RN EA 15 MIN: HCPCS

## 2024-02-13 ENCOUNTER — READMISSION MANAGEMENT (OUTPATIENT)
Dept: CALL CENTER | Facility: HOSPITAL | Age: 79
End: 2024-02-13
Payer: MEDICARE

## 2024-02-13 ENCOUNTER — HOME CARE VISIT (OUTPATIENT)
Dept: HOME HEALTH SERVICES | Facility: HOME HEALTHCARE | Age: 79
End: 2024-02-13
Payer: COMMERCIAL

## 2024-02-13 ENCOUNTER — HOME CARE VISIT (OUTPATIENT)
Dept: HOME HEALTH SERVICES | Facility: HOME HEALTHCARE | Age: 79
End: 2024-02-13
Payer: MEDICARE

## 2024-02-13 PROCEDURE — G0157 HHC PT ASSISTANT EA 15: HCPCS

## 2024-02-14 VITALS
HEART RATE: 75 BPM | TEMPERATURE: 96.9 F | SYSTOLIC BLOOD PRESSURE: 128 MMHG | OXYGEN SATURATION: 96 % | DIASTOLIC BLOOD PRESSURE: 78 MMHG

## 2024-02-15 ENCOUNTER — HOME CARE VISIT (OUTPATIENT)
Dept: HOME HEALTH SERVICES | Facility: HOME HEALTHCARE | Age: 79
End: 2024-02-15
Payer: MEDICARE

## 2024-02-16 ENCOUNTER — READMISSION MANAGEMENT (OUTPATIENT)
Dept: CALL CENTER | Facility: HOSPITAL | Age: 79
End: 2024-02-16
Payer: MEDICARE

## 2024-02-19 ENCOUNTER — HOME CARE VISIT (OUTPATIENT)
Dept: HOME HEALTH SERVICES | Facility: HOME HEALTHCARE | Age: 79
End: 2024-02-19
Payer: MEDICARE

## 2024-02-19 VITALS
OXYGEN SATURATION: 94 % | RESPIRATION RATE: 20 BRPM | DIASTOLIC BLOOD PRESSURE: 54 MMHG | SYSTOLIC BLOOD PRESSURE: 128 MMHG | HEART RATE: 70 BPM | TEMPERATURE: 97.3 F

## 2024-02-19 PROCEDURE — G0299 HHS/HOSPICE OF RN EA 15 MIN: HCPCS

## 2024-02-20 ENCOUNTER — HOME CARE VISIT (OUTPATIENT)
Dept: HOME HEALTH SERVICES | Facility: HOME HEALTHCARE | Age: 79
End: 2024-02-20
Payer: MEDICARE

## 2024-02-20 PROCEDURE — G0157 HHC PT ASSISTANT EA 15: HCPCS

## 2024-02-20 NOTE — HOME HEALTH
Routine Visit Note: Patient reports last BM was 3 days ago and he feels like his appetite is worse. Patient reports he is drinking about 2 16 ounce water bottles a day and he had been drinking three. Patient reports his last BM was 3 days ago and was hard. Skin turgor is poor and patient reports he just has no energy. Patient walked from bedroom to living room and was very unsteady on his feet and had to quickly sit down on couch as he was getting shaky. Encouraged patient to continue Miralax and stool softeners and instructed to drink 4 to 5 bottles of water a day to keep his colon moving. Patient drank 1 and 1/2 bottles of water while SN was in the home.    Patient further reports that he has no appetite. His family brings him food that looks really good and he states he takes a bite or two before he is full and can not eat any more. Patient was educated to roll constipation can play in decreasing appetite and causing nausea. Patient reports he will drink at least 4 bottles of water a day and take his Miralax. He also reports he has an appointment with his primary care physician on 3/8 and has started no new medications    Skill/education provided: Cardiopulmonary assessment, falls assessment, pain assessment, medication review and teaching    Patient/caregiver response: Patient tolerated assessment well with no complaints or issues    Plan for next visit: Attempting to get further SN visits authorized to better manage hydration and constipation, Cardiopulmonary assessment, falls assessment, pain assessment, medication review and teaching    Other pertinent info: None

## 2024-02-21 VITALS
HEART RATE: 54 BPM | SYSTOLIC BLOOD PRESSURE: 128 MMHG | DIASTOLIC BLOOD PRESSURE: 76 MMHG | OXYGEN SATURATION: 99 % | TEMPERATURE: 96.9 F

## 2024-02-26 ENCOUNTER — HOME CARE VISIT (OUTPATIENT)
Dept: HOME HEALTH SERVICES | Facility: HOME HEALTHCARE | Age: 79
End: 2024-02-26
Payer: COMMERCIAL

## 2024-02-26 VITALS
SYSTOLIC BLOOD PRESSURE: 112 MMHG | HEART RATE: 68 BPM | DIASTOLIC BLOOD PRESSURE: 50 MMHG | OXYGEN SATURATION: 93 % | TEMPERATURE: 97.9 F

## 2024-02-26 PROCEDURE — G0299 HHS/HOSPICE OF RN EA 15 MIN: HCPCS

## 2024-02-26 NOTE — HOME HEALTH
"Routine Visit Note: Patient reports no new medications, problems or issues since nurse was here last. Patient reports that he is feeling a little discouraged since he can not get out of his house to do much. He reports that his family always wants him to go out to eat at least once a week, but he never goes with them. Patient reports his portable oxygen tank is empty and he is afraid he will be short of breath. [place] [facility] [phone] to order refill on patients protable oxygen tanks. Provider \" Jody\" states that there is an outstanding bill and they will not refill his portable oxygen tanks until his ooutstanding balance is resolved. Insurance information was provided to oxygen provider and encouraged them to obtain authorization as patient will need this long term for his ongoing COPD. Jody states she has sent insurance information to their billing department and someone will follow up with him    Skill/education provided: Cardiopulmonary assessment, falls assessment, pain assessment, medication review and teaching, reviewed deep breathing excercises and incentive spirometry    Patient/caregiver response: Patient tolerated assessment well with no complaints or issues    Plan for next visit: Cardiopulmonary assessment, falls assessment, pain assessment, medication review and teaching    Other pertinent info: None"

## 2024-02-27 ENCOUNTER — HOME CARE VISIT (OUTPATIENT)
Dept: HOME HEALTH SERVICES | Facility: HOME HEALTHCARE | Age: 79
End: 2024-02-27
Payer: COMMERCIAL

## 2024-02-27 PROCEDURE — G0157 HHC PT ASSISTANT EA 15: HCPCS

## 2024-02-28 VITALS
HEART RATE: 68 BPM | DIASTOLIC BLOOD PRESSURE: 76 MMHG | SYSTOLIC BLOOD PRESSURE: 132 MMHG | TEMPERATURE: 97.3 F | OXYGEN SATURATION: 98 %

## 2024-03-04 ENCOUNTER — HOME CARE VISIT (OUTPATIENT)
Dept: HOME HEALTH SERVICES | Facility: HOME HEALTHCARE | Age: 79
End: 2024-03-04
Payer: COMMERCIAL

## 2024-03-04 VITALS
DIASTOLIC BLOOD PRESSURE: 60 MMHG | SYSTOLIC BLOOD PRESSURE: 130 MMHG | RESPIRATION RATE: 17 BRPM | TEMPERATURE: 97.8 F | HEART RATE: 68 BPM | OXYGEN SATURATION: 97 %

## 2024-03-04 PROCEDURE — G0151 HHCP-SERV OF PT,EA 15 MIN: HCPCS

## 2024-03-04 PROCEDURE — G0299 HHS/HOSPICE OF RN EA 15 MIN: HCPCS

## 2024-03-04 NOTE — HOME HEALTH
"Routine Visit Note: Patient reports no new medications, problems or issues since nurse was here last although patient states he does not understand why he feels good on some days and aweful on other days. Once again discussed optimal fluid status and patient reported he is drinking about 3 bottles of water a day. When asked how much water his body needed to stay well hydrated patient states 4-5 bottles a day. Encouraged patient to check his skin turgor and he states \"Oh, it is sticking together, that means I need to drink more water\" Patient was once again encouraged to drink at least 4 bottle of water a day and patients states, \"OK, I can do that\" Patient was encouraged to keep a log of how much water he is drinking so he can see if the days he is not feeling well might be after days he did not drink much water. Patient voiced agreement.    Patient still has not gotten a refill on his portable oxygen tank or had his concentrator serviced. Call placed to [facility] and requested refill on portable tank and service on his concentrator    Skill/education provided: Cardiopulmonary assessment, falls assessment, pain assessment, medication review and teaching    Patient/caregiver response: Patient tolerated assessment well with no complaints or issues    Plan for next visit: Cardiopulmonary assessment, falls assessment, pain assessment, medication review and teaching    Other pertinent info: None"

## 2024-03-05 VITALS
OXYGEN SATURATION: 99 % | SYSTOLIC BLOOD PRESSURE: 124 MMHG | RESPIRATION RATE: 18 BRPM | DIASTOLIC BLOOD PRESSURE: 58 MMHG | HEART RATE: 64 BPM

## 2024-03-05 NOTE — HOME HEALTH
Discharge Summary/Summary of Care Provided: ***  Patient received home health for diagnosis: ***  Current level of functional ability: ***  Living arrangements: ***  Progress towards goals and/or Were all goals met? ***  If not all goals met, barriers that prevented patient from meeting goals: ***  SDOH concerns (i.e. Caregiver availability, social isolation, environment, income, transportation access, food insecurity etc.)***  Follow-u p appointment plans and community resources provided: ***  Other imporant information. ***

## 2024-03-06 ENCOUNTER — HOME CARE VISIT (OUTPATIENT)
Dept: HOME HEALTH SERVICES | Facility: HOME HEALTHCARE | Age: 79
End: 2024-03-06
Payer: COMMERCIAL

## 2024-03-11 ENCOUNTER — HOME CARE VISIT (OUTPATIENT)
Dept: HOME HEALTH SERVICES | Facility: HOME HEALTHCARE | Age: 79
End: 2024-03-11
Payer: MEDICARE

## 2024-03-11 VITALS
TEMPERATURE: 97.3 F | RESPIRATION RATE: 17 BRPM | HEART RATE: 78 BPM | SYSTOLIC BLOOD PRESSURE: 138 MMHG | OXYGEN SATURATION: 95 % | DIASTOLIC BLOOD PRESSURE: 60 MMHG

## 2024-03-11 PROCEDURE — G0299 HHS/HOSPICE OF RN EA 15 MIN: HCPCS

## 2024-03-12 NOTE — HOME HEALTH
Patient verbalized agreement that he is ready for HH discharge as we planned.   RPM set up removed from home and returned to the office

## 2024-10-01 ENCOUNTER — TRANSCRIBE ORDERS (OUTPATIENT)
Dept: ADMINISTRATIVE | Facility: HOSPITAL | Age: 79
End: 2024-10-01
Payer: MEDICARE

## 2024-10-01 DIAGNOSIS — R91.1 LUNG NODULE: Primary | ICD-10-CM

## 2024-10-10 ENCOUNTER — HOSPITAL ENCOUNTER (OUTPATIENT)
Dept: PET IMAGING | Facility: HOSPITAL | Age: 79
Discharge: HOME | End: 2024-10-10
Payer: MEDICARE

## 2024-10-10 DIAGNOSIS — R91.1 LUNG NODULE: ICD-10-CM

## 2024-10-10 LAB — GLUCOSE BLDC GLUCOMTR-MCNC: 88 MG/DL (ref 70–105)

## 2024-10-10 PROCEDURE — 78815 PET IMAGE W/CT SKULL-THIGH: CPT

## 2024-10-10 PROCEDURE — A9552 F18 FDG: HCPCS | Performed by: NURSE PRACTITIONER

## 2024-10-10 PROCEDURE — 0 FLUDEOXYGLUCOSE F18 SOLUTION: Performed by: NURSE PRACTITIONER

## 2024-10-10 PROCEDURE — 82948 REAGENT STRIP/BLOOD GLUCOSE: CPT

## 2024-10-10 RX ADMIN — FLUDEOXYGLUCOSE F 18 1 DOSE: 200 INJECTION, SOLUTION INTRAVENOUS at 10:59

## 2024-12-22 ENCOUNTER — APPOINTMENT (OUTPATIENT)
Dept: GENERAL RADIOLOGY | Facility: HOSPITAL | Age: 79
DRG: 981 | End: 2024-12-22
Payer: MEDICARE

## 2024-12-22 ENCOUNTER — HOSPITAL ENCOUNTER (INPATIENT)
Facility: HOSPITAL | Age: 79
LOS: 11 days | Discharge: NURSING, REHAB OR LONG-TERM CARE | DRG: 981 | End: 2025-01-03
Attending: EMERGENCY MEDICINE
Payer: MEDICARE

## 2024-12-22 DIAGNOSIS — E86.0 DEHYDRATION: ICD-10-CM

## 2024-12-22 DIAGNOSIS — J18.9 PNEUMONIA OF LEFT LOWER LOBE DUE TO INFECTIOUS ORGANISM: Primary | ICD-10-CM

## 2024-12-22 DIAGNOSIS — D64.9 ANEMIA, UNSPECIFIED TYPE: ICD-10-CM

## 2024-12-22 DIAGNOSIS — I49.5 SICK SINUS SYNDROME: ICD-10-CM

## 2024-12-22 DIAGNOSIS — B34.8 RHINOVIRUS: ICD-10-CM

## 2024-12-22 DIAGNOSIS — J96.21 ACUTE ON CHRONIC RESPIRATORY FAILURE WITH HYPOXIA: ICD-10-CM

## 2024-12-22 LAB
ALBUMIN SERPL-MCNC: 3.3 G/DL (ref 3.5–5.2)
ALBUMIN/GLOB SERPL: 0.8 G/DL
ALP SERPL-CCNC: 78 U/L (ref 39–117)
ALT SERPL W P-5'-P-CCNC: 13 U/L (ref 1–41)
ANION GAP SERPL CALCULATED.3IONS-SCNC: 8.6 MMOL/L (ref 5–15)
AST SERPL-CCNC: 24 U/L (ref 1–40)
B PARAPERT DNA SPEC QL NAA+PROBE: NOT DETECTED
B PERT DNA SPEC QL NAA+PROBE: NOT DETECTED
BILIRUB SERPL-MCNC: 0.7 MG/DL (ref 0–1.2)
BUN SERPL-MCNC: 32 MG/DL (ref 8–23)
BUN/CREAT SERPL: 31.1 (ref 7–25)
BURR CELLS BLD QL SMEAR: ABNORMAL
C PNEUM DNA NPH QL NAA+NON-PROBE: NOT DETECTED
C3 FRG RBC-MCNC: ABNORMAL
CALCIUM SPEC-SCNC: 9 MG/DL (ref 8.6–10.5)
CHLORIDE SERPL-SCNC: 102 MMOL/L (ref 98–107)
CO2 SERPL-SCNC: 29.4 MMOL/L (ref 22–29)
CREAT SERPL-MCNC: 1.03 MG/DL (ref 0.76–1.27)
D-LACTATE SERPL-SCNC: 1.4 MMOL/L (ref 0.3–2)
DEPRECATED RDW RBC AUTO: 55.7 FL (ref 37–54)
EGFRCR SERPLBLD CKD-EPI 2021: 73.4 ML/MIN/1.73
ERYTHROCYTE [DISTWIDTH] IN BLOOD BY AUTOMATED COUNT: 16 % (ref 12.3–15.4)
FLUAV SUBTYP SPEC NAA+PROBE: NOT DETECTED
FLUBV RNA ISLT QL NAA+PROBE: NOT DETECTED
GLOBULIN UR ELPH-MCNC: 4.3 GM/DL
GLUCOSE SERPL-MCNC: 131 MG/DL (ref 65–99)
HADV DNA SPEC NAA+PROBE: NOT DETECTED
HCOV 229E RNA SPEC QL NAA+PROBE: NOT DETECTED
HCOV HKU1 RNA SPEC QL NAA+PROBE: NOT DETECTED
HCOV NL63 RNA SPEC QL NAA+PROBE: NOT DETECTED
HCOV OC43 RNA SPEC QL NAA+PROBE: NOT DETECTED
HCT VFR BLD AUTO: 24.5 % (ref 37.5–51)
HGB BLD-MCNC: 7.8 G/DL (ref 13–17.7)
HMPV RNA NPH QL NAA+NON-PROBE: NOT DETECTED
HOLD SPECIMEN: NORMAL
HOLD SPECIMEN: NORMAL
HPIV1 RNA ISLT QL NAA+PROBE: NOT DETECTED
HPIV2 RNA SPEC QL NAA+PROBE: NOT DETECTED
HPIV3 RNA NPH QL NAA+PROBE: NOT DETECTED
HPIV4 P GENE NPH QL NAA+PROBE: NOT DETECTED
LYMPHOCYTES # BLD MANUAL: 0.17 10*3/MM3 (ref 0.7–3.1)
LYMPHOCYTES NFR BLD MANUAL: 4 % (ref 5–12)
M PNEUMO IGG SER IA-ACNC: NOT DETECTED
MCH RBC QN AUTO: 30.1 PG (ref 26.6–33)
MCHC RBC AUTO-ENTMCNC: 31.8 G/DL (ref 31.5–35.7)
MCV RBC AUTO: 94.6 FL (ref 79–97)
MONOCYTES # BLD: 0.33 10*3/MM3 (ref 0.1–0.9)
NEUTROPHILS # BLD AUTO: 7.77 10*3/MM3 (ref 1.7–7)
NEUTROPHILS NFR BLD MANUAL: 79 % (ref 42.7–76)
NEUTS BAND NFR BLD MANUAL: 15 % (ref 0–5)
PLATELET # BLD AUTO: 142 10*3/MM3 (ref 140–450)
PMV BLD AUTO: 9.8 FL (ref 6–12)
POIKILOCYTOSIS BLD QL SMEAR: ABNORMAL
POTASSIUM SERPL-SCNC: 4.3 MMOL/L (ref 3.5–5.2)
PROCALCITONIN SERPL-MCNC: 0.6 NG/ML (ref 0–0.25)
PROT SERPL-MCNC: 7.6 G/DL (ref 6–8.5)
RBC # BLD AUTO: 2.59 10*6/MM3 (ref 4.14–5.8)
RHINOVIRUS RNA SPEC NAA+PROBE: DETECTED
RSV RNA NPH QL NAA+NON-PROBE: NOT DETECTED
SARS-COV-2 RNA RESP QL NAA+PROBE: NOT DETECTED
SCAN SLIDE: NORMAL
SMALL PLATELETS BLD QL SMEAR: ADEQUATE
SODIUM SERPL-SCNC: 140 MMOL/L (ref 136–145)
VARIANT LYMPHS NFR BLD MANUAL: 2 % (ref 19.6–45.3)
WBC MORPH BLD: NORMAL
WBC NRBC COR # BLD AUTO: 8.27 10*3/MM3 (ref 3.4–10.8)
WHOLE BLOOD HOLD COAG: NORMAL
WHOLE BLOOD HOLD SPECIMEN: NORMAL

## 2024-12-22 PROCEDURE — 36415 COLL VENOUS BLD VENIPUNCTURE: CPT

## 2024-12-22 PROCEDURE — 80053 COMPREHEN METABOLIC PANEL: CPT | Performed by: EMERGENCY MEDICINE

## 2024-12-22 PROCEDURE — 99285 EMERGENCY DEPT VISIT HI MDM: CPT

## 2024-12-22 PROCEDURE — 85025 COMPLETE CBC W/AUTO DIFF WBC: CPT | Performed by: EMERGENCY MEDICINE

## 2024-12-22 PROCEDURE — 83605 ASSAY OF LACTIC ACID: CPT | Performed by: EMERGENCY MEDICINE

## 2024-12-22 PROCEDURE — 84145 PROCALCITONIN (PCT): CPT | Performed by: EMERGENCY MEDICINE

## 2024-12-22 PROCEDURE — 87040 BLOOD CULTURE FOR BACTERIA: CPT | Performed by: EMERGENCY MEDICINE

## 2024-12-22 PROCEDURE — 83880 ASSAY OF NATRIURETIC PEPTIDE: CPT | Performed by: NURSE PRACTITIONER

## 2024-12-22 PROCEDURE — 87154 CUL TYP ID BLD PTHGN 6+ TRGT: CPT | Performed by: EMERGENCY MEDICINE

## 2024-12-22 PROCEDURE — 0202U NFCT DS 22 TRGT SARS-COV-2: CPT | Performed by: EMERGENCY MEDICINE

## 2024-12-22 PROCEDURE — 25010000002 AMPICILLIN-SULBACTAM PER 1.5 G: Performed by: EMERGENCY MEDICINE

## 2024-12-22 PROCEDURE — 93005 ELECTROCARDIOGRAM TRACING: CPT

## 2024-12-22 PROCEDURE — 87147 CULTURE TYPE IMMUNOLOGIC: CPT | Performed by: EMERGENCY MEDICINE

## 2024-12-22 PROCEDURE — 25810000003 SODIUM CHLORIDE 0.9 % SOLUTION 250 ML FLEX CONT: Performed by: EMERGENCY MEDICINE

## 2024-12-22 PROCEDURE — 93005 ELECTROCARDIOGRAM TRACING: CPT | Performed by: EMERGENCY MEDICINE

## 2024-12-22 PROCEDURE — 71045 X-RAY EXAM CHEST 1 VIEW: CPT

## 2024-12-22 PROCEDURE — 25010000002 AZITHROMYCIN PER 500 MG: Performed by: EMERGENCY MEDICINE

## 2024-12-22 PROCEDURE — 85007 BL SMEAR W/DIFF WBC COUNT: CPT | Performed by: EMERGENCY MEDICINE

## 2024-12-22 RX ORDER — SODIUM CHLORIDE, SODIUM LACTATE, POTASSIUM CHLORIDE, CALCIUM CHLORIDE 600; 310; 30; 20 MG/100ML; MG/100ML; MG/100ML; MG/100ML
75 INJECTION, SOLUTION INTRAVENOUS CONTINUOUS
Status: DISCONTINUED | OUTPATIENT
Start: 2024-12-22 | End: 2024-12-23

## 2024-12-22 RX ORDER — SODIUM CHLORIDE 0.9 % (FLUSH) 0.9 %
10 SYRINGE (ML) INJECTION AS NEEDED
Status: DISCONTINUED | OUTPATIENT
Start: 2024-12-22 | End: 2025-01-03 | Stop reason: HOSPADM

## 2024-12-22 RX ADMIN — AMPICILLIN SODIUM AND SULBACTAM SODIUM 3 G: 2; 1 INJECTION, POWDER, FOR SOLUTION INTRAMUSCULAR; INTRAVENOUS at 23:11

## 2024-12-22 RX ADMIN — SODIUM CHLORIDE 500 MG: 9 INJECTION, SOLUTION INTRAVENOUS at 23:11

## 2024-12-22 NOTE — Clinical Note
Physician documentation on smoking history and CT Lung Screening reviewed. All required documentation complete. Patient is a former smoker (quit 2016- 3 years) with a 64.5 pack year history per physician documentation. Ivonne Conteh spoke to patient to review details of CT Lung Screening exam.  All questions answered at this time. Prepped: right groin. Prepped with: ChloraPrep. The site was clipped. The patient was draped in a sterile fashion.

## 2024-12-22 NOTE — Clinical Note
Hemostasis started on the right femoral vein. Perclose was used in achieving hemostasis. Closure device deployed in the vessel. Closure device additional comment: Perclose x 2

## 2024-12-23 PROBLEM — D64.9 ANEMIA: Status: ACTIVE | Noted: 2024-12-23

## 2024-12-23 PROBLEM — J18.9 PNEUMONIA: Status: ACTIVE | Noted: 2024-12-23

## 2024-12-23 PROBLEM — B34.8 RHINOVIRUS: Status: ACTIVE | Noted: 2024-12-23

## 2024-12-23 PROBLEM — J96.21 ACUTE ON CHRONIC RESPIRATORY FAILURE WITH HYPOXIA: Status: ACTIVE | Noted: 2024-12-23

## 2024-12-23 LAB
ABO GROUP BLD: NORMAL
ANION GAP SERPL CALCULATED.3IONS-SCNC: 9.3 MMOL/L (ref 5–15)
ANISOCYTOSIS BLD QL: ABNORMAL
ARTERIAL PATENCY WRIST A: POSITIVE
ARTERIAL PATENCY WRIST A: POSITIVE
ATMOSPHERIC PRESS: ABNORMAL MM[HG]
ATMOSPHERIC PRESS: ABNORMAL MM[HG]
BASE EXCESS BLDA CALC-SCNC: 7.2 MMOL/L (ref 0–3)
BASE EXCESS BLDA CALC-SCNC: 8.3 MMOL/L (ref 0–3)
BDY SITE: ABNORMAL
BDY SITE: ABNORMAL
BILIRUB CONJ SERPL-MCNC: 0.3 MG/DL (ref 0–0.3)
BILIRUB INDIRECT SERPL-MCNC: 0.2 MG/DL
BILIRUB SERPL-MCNC: 0.5 MG/DL (ref 0–1.2)
BLD GP AB SCN SERPL QL: NEGATIVE
BUN SERPL-MCNC: 32 MG/DL (ref 8–23)
BUN/CREAT SERPL: 33.7 (ref 7–25)
CALCIUM SPEC-SCNC: 8.3 MG/DL (ref 8.6–10.5)
CHLORIDE SERPL-SCNC: 104 MMOL/L (ref 98–107)
CO2 BLDA-SCNC: 36.5 MMOL/L (ref 22–29)
CO2 BLDA-SCNC: 36.7 MMOL/L (ref 22–29)
CO2 SERPL-SCNC: 28.7 MMOL/L (ref 22–29)
CREAT SERPL-MCNC: 0.95 MG/DL (ref 0.76–1.27)
DEPRECATED RDW RBC AUTO: 55.6 FL (ref 37–54)
EGFRCR SERPLBLD CKD-EPI 2021: 80.9 ML/MIN/1.73
ERYTHROCYTE [DISTWIDTH] IN BLOOD BY AUTOMATED COUNT: 16.3 % (ref 12.3–15.4)
FERRITIN SERPL-MCNC: 421 NG/ML (ref 30–400)
FOLATE SERPL-MCNC: 8.15 NG/ML (ref 4.78–24.2)
GLUCOSE SERPL-MCNC: 104 MG/DL (ref 65–99)
HAPTOGLOB SERPL-MCNC: 315 MG/DL (ref 30–200)
HCO3 BLDA-SCNC: 34.4 MMOL/L (ref 21–28)
HCO3 BLDA-SCNC: 34.8 MMOL/L (ref 21–28)
HCT VFR BLD AUTO: 21.9 % (ref 37.5–51)
HEMODILUTION: NO
HEMODILUTION: NO
HGB BLD-MCNC: 7.1 G/DL (ref 13–17.7)
INHALED O2 CONCENTRATION: 44 %
INHALED O2 CONCENTRATION: 60 %
IRON 24H UR-MRATE: 12 MCG/DL (ref 59–158)
IRON SATN MFR SERPL: 5 % (ref 20–50)
LARGE PLATELETS: ABNORMAL
LDH SERPL-CCNC: 194 U/L (ref 135–225)
LYMPHOCYTES # BLD MANUAL: 0.14 10*3/MM3 (ref 0.7–3.1)
MCH RBC QN AUTO: 30.3 PG (ref 26.6–33)
MCHC RBC AUTO-ENTMCNC: 32.4 G/DL (ref 31.5–35.7)
MCV RBC AUTO: 93.6 FL (ref 79–97)
METAMYELOCYTES NFR BLD MANUAL: 1 % (ref 0–0)
MODALITY: ABNORMAL
MODALITY: ABNORMAL
NEUTROPHILS # BLD AUTO: 6.55 10*3/MM3 (ref 1.7–7)
NEUTROPHILS NFR BLD MANUAL: 77 % (ref 42.7–76)
NEUTS BAND NFR BLD MANUAL: 20 % (ref 0–5)
NT-PROBNP SERPL-MCNC: 1788 PG/ML (ref 0–1800)
PAW @ PEAK INSP FLOW SETTING VENT: 10 CMH2O
PCO2 BLDA: 62.2 MM HG (ref 35–48)
PCO2 BLDA: 67.9 MM HG (ref 35–48)
PEEP RESPIRATORY: 5 CM[H2O]
PH BLDA: 7.31 PH UNITS (ref 7.35–7.45)
PH BLDA: 7.36 PH UNITS (ref 7.35–7.45)
PLATELET # BLD AUTO: 144 10*3/MM3 (ref 140–450)
PMV BLD AUTO: 10.5 FL (ref 6–12)
PO2 BLD: 144 MM[HG] (ref 0–500)
PO2 BLD: 289 MM[HG] (ref 0–500)
PO2 BLDA: 173.6 MM HG (ref 83–108)
PO2 BLDA: 63.5 MM HG (ref 83–108)
POLYCHROMASIA BLD QL SMEAR: ABNORMAL
POTASSIUM SERPL-SCNC: 3.8 MMOL/L (ref 3.5–5.2)
QT INTERVAL: 466 MS
QTC INTERVAL: 501 MS
RBC # BLD AUTO: 2.34 10*6/MM3 (ref 4.14–5.8)
RESPIRATORY RATE: 20
RETICS # AUTO: 0.02 10*6/MM3 (ref 0.02–0.13)
RETICS/RBC NFR AUTO: 1.03 % (ref 0.7–1.9)
RH BLD: POSITIVE
SAO2 % BLDCOA: 88.8 % (ref 94–98)
SAO2 % BLDCOA: 99.4 % (ref 94–98)
SCAN SLIDE: NORMAL
SMALL PLATELETS BLD QL SMEAR: ADEQUATE
SODIUM SERPL-SCNC: 142 MMOL/L (ref 136–145)
TIBC SERPL-MCNC: 224 MCG/DL (ref 298–536)
TRANSFERRIN SERPL-MCNC: 150 MG/DL (ref 200–360)
VARIANT LYMPHS NFR BLD MANUAL: 2 % (ref 19.6–45.3)
VIT B12 BLD-MCNC: 321 PG/ML (ref 211–946)
WBC MORPH BLD: NORMAL
WBC NRBC COR # BLD AUTO: 6.75 10*3/MM3 (ref 3.4–10.8)

## 2024-12-23 PROCEDURE — 86900 BLOOD TYPING SEROLOGIC ABO: CPT

## 2024-12-23 PROCEDURE — 82248 BILIRUBIN DIRECT: CPT | Performed by: NURSE PRACTITIONER

## 2024-12-23 PROCEDURE — 86850 RBC ANTIBODY SCREEN: CPT | Performed by: NURSE PRACTITIONER

## 2024-12-23 PROCEDURE — 36600 WITHDRAWAL OF ARTERIAL BLOOD: CPT | Performed by: NURSE PRACTITIONER

## 2024-12-23 PROCEDURE — 83540 ASSAY OF IRON: CPT | Performed by: NURSE PRACTITIONER

## 2024-12-23 PROCEDURE — 94761 N-INVAS EAR/PLS OXIMETRY MLT: CPT

## 2024-12-23 PROCEDURE — 25010000002 METHYLPREDNISOLONE PER 40 MG: Performed by: NURSE PRACTITIONER

## 2024-12-23 PROCEDURE — 82607 VITAMIN B-12: CPT | Performed by: NURSE PRACTITIONER

## 2024-12-23 PROCEDURE — 85025 COMPLETE CBC W/AUTO DIFF WBC: CPT | Performed by: NURSE PRACTITIONER

## 2024-12-23 PROCEDURE — 83010 ASSAY OF HAPTOGLOBIN QUANT: CPT | Performed by: NURSE PRACTITIONER

## 2024-12-23 PROCEDURE — 94799 UNLISTED PULMONARY SVC/PX: CPT

## 2024-12-23 PROCEDURE — 86923 COMPATIBILITY TEST ELECTRIC: CPT

## 2024-12-23 PROCEDURE — 36600 WITHDRAWAL OF ARTERIAL BLOOD: CPT

## 2024-12-23 PROCEDURE — 86901 BLOOD TYPING SEROLOGIC RH(D): CPT | Performed by: NURSE PRACTITIONER

## 2024-12-23 PROCEDURE — 94660 CPAP INITIATION&MGMT: CPT

## 2024-12-23 PROCEDURE — 82728 ASSAY OF FERRITIN: CPT | Performed by: NURSE PRACTITIONER

## 2024-12-23 PROCEDURE — 82746 ASSAY OF FOLIC ACID SERUM: CPT | Performed by: NURSE PRACTITIONER

## 2024-12-23 PROCEDURE — 86900 BLOOD TYPING SEROLOGIC ABO: CPT | Performed by: NURSE PRACTITIONER

## 2024-12-23 PROCEDURE — 82803 BLOOD GASES ANY COMBINATION: CPT | Performed by: NURSE PRACTITIONER

## 2024-12-23 PROCEDURE — 97162 PT EVAL MOD COMPLEX 30 MIN: CPT

## 2024-12-23 PROCEDURE — 84466 ASSAY OF TRANSFERRIN: CPT | Performed by: NURSE PRACTITIONER

## 2024-12-23 PROCEDURE — 83615 LACTATE (LD) (LDH) ENZYME: CPT | Performed by: NURSE PRACTITIONER

## 2024-12-23 PROCEDURE — 25010000002 METHYLPREDNISOLONE PER 40 MG: Performed by: INTERNAL MEDICINE

## 2024-12-23 PROCEDURE — 25010000002 AMPICILLIN-SULBACTAM PER 1.5 G: Performed by: NURSE PRACTITIONER

## 2024-12-23 PROCEDURE — 82247 BILIRUBIN TOTAL: CPT | Performed by: NURSE PRACTITIONER

## 2024-12-23 PROCEDURE — 85045 AUTOMATED RETICULOCYTE COUNT: CPT | Performed by: NURSE PRACTITIONER

## 2024-12-23 PROCEDURE — 82803 BLOOD GASES ANY COMBINATION: CPT

## 2024-12-23 PROCEDURE — 86901 BLOOD TYPING SEROLOGIC RH(D): CPT

## 2024-12-23 PROCEDURE — 80048 BASIC METABOLIC PNL TOTAL CA: CPT | Performed by: NURSE PRACTITIONER

## 2024-12-23 PROCEDURE — 94664 DEMO&/EVAL PT USE INHALER: CPT

## 2024-12-23 PROCEDURE — 94640 AIRWAY INHALATION TREATMENT: CPT

## 2024-12-23 PROCEDURE — P9016 RBC LEUKOCYTES REDUCED: HCPCS

## 2024-12-23 PROCEDURE — 36430 TRANSFUSION BLD/BLD COMPNT: CPT

## 2024-12-23 RX ORDER — GUAIFENESIN 600 MG/1
1200 TABLET, EXTENDED RELEASE ORAL EVERY 12 HOURS SCHEDULED
Status: DISCONTINUED | OUTPATIENT
Start: 2024-12-23 | End: 2025-01-03 | Stop reason: HOSPADM

## 2024-12-23 RX ORDER — BISACODYL 10 MG
10 SUPPOSITORY, RECTAL RECTAL DAILY PRN
Status: DISCONTINUED | OUTPATIENT
Start: 2024-12-23 | End: 2025-01-03 | Stop reason: HOSPADM

## 2024-12-23 RX ORDER — SODIUM CHLORIDE 9 MG/ML
40 INJECTION, SOLUTION INTRAVENOUS AS NEEDED
Status: DISCONTINUED | OUTPATIENT
Start: 2024-12-23 | End: 2025-01-03 | Stop reason: HOSPADM

## 2024-12-23 RX ORDER — SODIUM CHLORIDE 0.9 % (FLUSH) 0.9 %
10 SYRINGE (ML) INJECTION EVERY 12 HOURS SCHEDULED
Status: DISCONTINUED | OUTPATIENT
Start: 2024-12-23 | End: 2025-01-03 | Stop reason: HOSPADM

## 2024-12-23 RX ORDER — BUDESONIDE 0.5 MG/2ML
0.5 INHALANT ORAL
Status: DISCONTINUED | OUTPATIENT
Start: 2024-12-23 | End: 2025-01-03 | Stop reason: HOSPADM

## 2024-12-23 RX ORDER — METHYLPREDNISOLONE SODIUM SUCCINATE 40 MG/ML
40 INJECTION, POWDER, LYOPHILIZED, FOR SOLUTION INTRAMUSCULAR; INTRAVENOUS EVERY 8 HOURS
Status: DISCONTINUED | OUTPATIENT
Start: 2024-12-23 | End: 2024-12-24

## 2024-12-23 RX ORDER — AMOXICILLIN 250 MG
2 CAPSULE ORAL 2 TIMES DAILY PRN
Status: DISCONTINUED | OUTPATIENT
Start: 2024-12-23 | End: 2025-01-03 | Stop reason: HOSPADM

## 2024-12-23 RX ORDER — AMIODARONE HYDROCHLORIDE 200 MG/1
200 TABLET ORAL DAILY
COMMUNITY
End: 2025-01-03 | Stop reason: HOSPADM

## 2024-12-23 RX ORDER — IPRATROPIUM BROMIDE AND ALBUTEROL SULFATE 2.5; .5 MG/3ML; MG/3ML
3 SOLUTION RESPIRATORY (INHALATION)
Status: DISCONTINUED | OUTPATIENT
Start: 2024-12-23 | End: 2025-01-03 | Stop reason: HOSPADM

## 2024-12-23 RX ORDER — ACETAMINOPHEN 325 MG/1
650 TABLET ORAL EVERY 6 HOURS PRN
Status: DISCONTINUED | OUTPATIENT
Start: 2024-12-23 | End: 2025-01-03 | Stop reason: HOSPADM

## 2024-12-23 RX ORDER — AZITHROMYCIN 250 MG/1
500 TABLET, FILM COATED ORAL NIGHTLY
Status: COMPLETED | OUTPATIENT
Start: 2024-12-23 | End: 2024-12-24

## 2024-12-23 RX ORDER — SODIUM CHLORIDE 0.9 % (FLUSH) 0.9 %
10 SYRINGE (ML) INJECTION AS NEEDED
Status: DISCONTINUED | OUTPATIENT
Start: 2024-12-23 | End: 2025-01-03 | Stop reason: HOSPADM

## 2024-12-23 RX ORDER — AMLODIPINE BESYLATE 10 MG/1
10 TABLET ORAL DAILY
COMMUNITY
End: 2025-01-03 | Stop reason: HOSPADM

## 2024-12-23 RX ORDER — CLOPIDOGREL BISULFATE 75 MG/1
75 TABLET ORAL DAILY
Status: DISCONTINUED | OUTPATIENT
Start: 2024-12-23 | End: 2025-01-03 | Stop reason: HOSPADM

## 2024-12-23 RX ORDER — GUAIFENESIN 600 MG/1
600 TABLET, EXTENDED RELEASE ORAL EVERY 12 HOURS SCHEDULED
Status: DISCONTINUED | OUTPATIENT
Start: 2024-12-23 | End: 2024-12-23

## 2024-12-23 RX ORDER — IPRATROPIUM BROMIDE AND ALBUTEROL SULFATE 2.5; .5 MG/3ML; MG/3ML
3 SOLUTION RESPIRATORY (INHALATION) EVERY 4 HOURS PRN
Status: DISCONTINUED | OUTPATIENT
Start: 2024-12-23 | End: 2025-01-03 | Stop reason: HOSPADM

## 2024-12-23 RX ORDER — ONDANSETRON 4 MG/1
4 TABLET, ORALLY DISINTEGRATING ORAL EVERY 6 HOURS PRN
Status: DISCONTINUED | OUTPATIENT
Start: 2024-12-23 | End: 2025-01-03 | Stop reason: HOSPADM

## 2024-12-23 RX ORDER — POLYETHYLENE GLYCOL 3350 17 G/17G
17 POWDER, FOR SOLUTION ORAL DAILY
Status: DISCONTINUED | OUTPATIENT
Start: 2024-12-23 | End: 2025-01-03 | Stop reason: HOSPADM

## 2024-12-23 RX ORDER — POLYETHYLENE GLYCOL 3350 17 G/17G
17 POWDER, FOR SOLUTION ORAL DAILY PRN
Status: DISCONTINUED | OUTPATIENT
Start: 2024-12-23 | End: 2025-01-03 | Stop reason: HOSPADM

## 2024-12-23 RX ORDER — TRAZODONE HYDROCHLORIDE 100 MG/1
100 TABLET ORAL DAILY PRN
Status: ON HOLD | COMMUNITY

## 2024-12-23 RX ORDER — METHYLPREDNISOLONE SODIUM SUCCINATE 40 MG/ML
40 INJECTION, POWDER, LYOPHILIZED, FOR SOLUTION INTRAMUSCULAR; INTRAVENOUS EVERY 12 HOURS
Status: DISCONTINUED | OUTPATIENT
Start: 2024-12-23 | End: 2024-12-23

## 2024-12-23 RX ORDER — ACETYLCYSTEINE 200 MG/ML
3 SOLUTION ORAL; RESPIRATORY (INHALATION)
Status: DISPENSED | OUTPATIENT
Start: 2024-12-23 | End: 2024-12-28

## 2024-12-23 RX ORDER — BISACODYL 5 MG/1
5 TABLET, DELAYED RELEASE ORAL DAILY PRN
Status: DISCONTINUED | OUTPATIENT
Start: 2024-12-23 | End: 2025-01-03 | Stop reason: HOSPADM

## 2024-12-23 RX ORDER — FLUTICASONE FUROATE, UMECLIDINIUM BROMIDE AND VILANTEROL TRIFENATATE 200; 62.5; 25 UG/1; UG/1; UG/1
1 POWDER RESPIRATORY (INHALATION)
COMMUNITY
End: 2025-01-03 | Stop reason: HOSPADM

## 2024-12-23 RX ORDER — ATORVASTATIN CALCIUM 10 MG/1
10 TABLET, FILM COATED ORAL DAILY
Status: DISCONTINUED | OUTPATIENT
Start: 2024-12-23 | End: 2025-01-03 | Stop reason: HOSPADM

## 2024-12-23 RX ORDER — ALUMINA, MAGNESIA, AND SIMETHICONE 2400; 2400; 240 MG/30ML; MG/30ML; MG/30ML
15 SUSPENSION ORAL EVERY 6 HOURS PRN
Status: DISCONTINUED | OUTPATIENT
Start: 2024-12-23 | End: 2025-01-03 | Stop reason: HOSPADM

## 2024-12-23 RX ORDER — ONDANSETRON 2 MG/ML
4 INJECTION INTRAMUSCULAR; INTRAVENOUS EVERY 6 HOURS PRN
Status: DISCONTINUED | OUTPATIENT
Start: 2024-12-23 | End: 2025-01-03 | Stop reason: HOSPADM

## 2024-12-23 RX ADMIN — IPRATROPIUM BROMIDE AND ALBUTEROL SULFATE 3 ML: .5; 3 SOLUTION RESPIRATORY (INHALATION) at 06:35

## 2024-12-23 RX ADMIN — METHYLPREDNISOLONE SODIUM SUCCINATE 40 MG: 40 INJECTION, POWDER, FOR SOLUTION INTRAMUSCULAR; INTRAVENOUS at 20:41

## 2024-12-23 RX ADMIN — ACETYLCYSTEINE 3 ML: 200 SOLUTION ORAL; RESPIRATORY (INHALATION) at 19:10

## 2024-12-23 RX ADMIN — AZITHROMYCIN 500 MG: 250 TABLET, FILM COATED ORAL at 20:41

## 2024-12-23 RX ADMIN — GUAIFENESIN 1200 MG: 600 TABLET, EXTENDED RELEASE ORAL at 20:41

## 2024-12-23 RX ADMIN — IPRATROPIUM BROMIDE AND ALBUTEROL SULFATE 3 ML: .5; 3 SOLUTION RESPIRATORY (INHALATION) at 02:38

## 2024-12-23 RX ADMIN — BUDESONIDE INHALATION 0.5 MG: 0.5 SUSPENSION RESPIRATORY (INHALATION) at 06:40

## 2024-12-23 RX ADMIN — IPRATROPIUM BROMIDE AND ALBUTEROL SULFATE 3 ML: .5; 3 SOLUTION RESPIRATORY (INHALATION) at 10:43

## 2024-12-23 RX ADMIN — ATORVASTATIN CALCIUM 10 MG: 10 TABLET ORAL at 10:04

## 2024-12-23 RX ADMIN — CLOPIDOGREL BISULFATE 75 MG: 75 TABLET ORAL at 10:04

## 2024-12-23 RX ADMIN — AMPICILLIN SODIUM AND SULBACTAM SODIUM 3 G: 2; 1 INJECTION, POWDER, FOR SOLUTION INTRAMUSCULAR; INTRAVENOUS at 15:23

## 2024-12-23 RX ADMIN — IPRATROPIUM BROMIDE AND ALBUTEROL SULFATE 3 ML: .5; 3 SOLUTION RESPIRATORY (INHALATION) at 02:50

## 2024-12-23 RX ADMIN — Medication 10 ML: at 02:18

## 2024-12-23 RX ADMIN — AMPICILLIN SODIUM AND SULBACTAM SODIUM 3 G: 2; 1 INJECTION, POWDER, FOR SOLUTION INTRAMUSCULAR; INTRAVENOUS at 04:30

## 2024-12-23 RX ADMIN — Medication 10 ML: at 10:05

## 2024-12-23 RX ADMIN — BUDESONIDE INHALATION 0.5 MG: 0.5 SUSPENSION RESPIRATORY (INHALATION) at 19:14

## 2024-12-23 RX ADMIN — ACETYLCYSTEINE 3 ML: 200 SOLUTION ORAL; RESPIRATORY (INHALATION) at 14:44

## 2024-12-23 RX ADMIN — IPRATROPIUM BROMIDE AND ALBUTEROL SULFATE 3 ML: .5; 3 SOLUTION RESPIRATORY (INHALATION) at 14:40

## 2024-12-23 RX ADMIN — METHYLPREDNISOLONE SODIUM SUCCINATE 40 MG: 40 INJECTION, POWDER, FOR SOLUTION INTRAMUSCULAR; INTRAVENOUS at 04:31

## 2024-12-23 RX ADMIN — IPRATROPIUM BROMIDE AND ALBUTEROL SULFATE 3 ML: .5; 3 SOLUTION RESPIRATORY (INHALATION) at 19:10

## 2024-12-23 RX ADMIN — Medication 10 ML: at 20:41

## 2024-12-23 RX ADMIN — METHYLPREDNISOLONE SODIUM SUCCINATE 40 MG: 40 INJECTION, POWDER, FOR SOLUTION INTRAMUSCULAR; INTRAVENOUS at 15:23

## 2024-12-23 RX ADMIN — AMPICILLIN SODIUM AND SULBACTAM SODIUM 3 G: 2; 1 INJECTION, POWDER, FOR SOLUTION INTRAMUSCULAR; INTRAVENOUS at 20:41

## 2024-12-23 NOTE — CONSULTS
[facility]: [facility]         CONSULT NOTE    Patient Identification:  Kali Garcia  79 y.o.  male  1945  [id number]            Requesting physician: Attending physician    Reason for Consultation: Respiratory failure, mucous plugs      History of Present Illness:  79-year-old male with history of COPD, chronic hypoxemia on 6 L of oxygen at home, A-fib, CAD, HTN and HLD who presented 12/22/2024 with complaints of 2 weeks of progressive shortness of breath, congestion and cough with intermittent right-sided chest pain and weakness.    Results for orders placed during the hospital encounter of 02/01/24    Adult Transthoracic Echo Limited W/ Cont if Necessary Per Protocol    Interpretation Summary    Left ventricular systolic function is normal. Left ventricular ejection fraction appears to be 61 - 65%.    Left ventricular diastolic function was normal.    The left atrial cavity is mildly dilated.    Left atrial volume is mildly increased.    There is moderate calcification of the aortic valve mainly affecting the left coronary and right coronary cusp(s).    Estimated right ventricular systolic pressure from tricuspid regurgitation is mildly elevated (35-45 mmHg).    Mild pulmonary hypertension is present.        Assessment:  Pneumonia due to unspecified pathogen  COPD: At home on Trelegy inhaler  Rhinovirus infection  Hypoxemia: At home 6 L of oxygen  CAD  PVD  HTN  HLD  A-fib  Anemia  Former smoker quit 2022 after 60 pack years  Echo 2/1/2024 EF 61-65% mild pulmonary hypertension 35-45 mmHg    CT scan of the chest in June 2024 showing a noncalcified nodule in the right upper lobe around 2 x 1.1 x 1 cm. Patient was also noted to have patchy airspace disease and clustered micronodules in the lingula. He then underwent PET scan showing a 1.7 x 1.1 cm partially calcified irregular shaped nodule in the right upper lobe to be metabolically active with SUV of 4.84 and additional 2 metabolically active  irregular nodular densities in the right upper lobe. Inferior apical segment with intervening areas of metabolically active interstitial thickening.    PFTs: Very severe airflow obstruction with significant air trapping and reduced diffusion capacity. FEV1/FVC postbronchodilator is 0.39 with FEV1 of 0.71 L or 28% predicted and FVC of 1.82 L or 47% predicted. No significant bronchodilator response noted. Total lung capacity of 7.77 L or 131% predicted and residual volume of 5.82 L or 226% predicted and DLCO of 61% predicted noted.       Recommendations:  Patient is very cachectic, I discussed with him CODE STATUS and he is going to be discussing it with his children to decide because if he goes on mechanical ventilation there is a good likelihood it will be for prolonged time     antibiotics: Unasyn  Nebulized Mucomyst  Encourage use of incentive spirometry and flutter valve  IV Solu-Medrol  Nebulized Pulmicort  Oxygen supplement and titration to maintain saturation 90 to 95%: Currently on 12 L per high flow nasal cannula  Bronchodilators  Mucinex    Atorvastatin  Plavix    I personally reviewed the radiological studies      Review of Sytems:  Constitutional: Negative for chills, and fever and positive for malaise/fatigue.   HENT: Negative.    Eyes: Negative.    Cardiovascular: Intermittent right-sided chest pain  Respiratory: Positive for cough and shortness of breath.    Skin: Negative.    Musculoskeletal: Negative.    Gastrointestinal: Negative.    Genitourinary: Negative.    Neurological: Generalized weakness    Past Medical History:  Past Medical History:   Diagnosis Date    COPD (chronic obstructive pulmonary disease)     Coronary artery disease     Emphysema lung     Hyperlipidemia     Hypertension     Osteoarthritis        Past Surgical History:  Past Surgical History:   Procedure Laterality Date    CARDIAC CATHETERIZATION      COLONOSCOPY N/A 8/4/2022    Procedure: COLONOSCOPY with argon plasma coagulation  of arterial venous malformation and endoscopic clipping x 1;  Surgeon: Luz Jacques MD;  Location: [facility] ENDOSCOPY;  Service: Gastroenterology;  Laterality: N/A;  post op: cecal AVM    CORONARY STENT PLACEMENT      ENDOSCOPY N/A 2022    Procedure: ESOPHAGOGASTRODUODENOSCOPY WITH BIOPSY X1 AREA;  Surgeon: Luz Jacques MD;  Location: [facility] ENDOSCOPY;  Service: Gastroenterology;  Laterality: N/A;  esophagitis, gastritis, HH    ENDOSCOPY N/A 2022    Procedure: ESOPHAGOGASTRODUODENOSCOPY;  Surgeon: Luz Jacques MD;  Location: [facility] ENDOSCOPY;  Service: Gastroenterology;  Laterality: N/A;  post op: hiatal hernia, eosphagitis    EYE SURGERY      FEMORAL ARTERY STENT      KNEE SURGERY Left     KNEE SURGERY Left     LUNG SURGERY          Home Meds:  Medications Prior to Admission   Medication Sig Dispense Refill Last Dose/Taking    clopidogrel (PLAVIX) 75 MG tablet Take 1 tablet by mouth Daily. Indications: Percutaneous Coronary Intervention   2024    ipratropium-albuterol (DUO-NEB) 0.5-2.5 mg/3 ml nebulizer Take 3 mL by nebulization Every 4 (Four) Hours As Needed for Wheezing. Indications: Chronic Obstructive Lung Disease   2024    O2 (OXYGEN) Inhale 2 L/min Daily. Indications: Shortness of breath   2024 Morning    polyethylene glycol (MIRALAX) 17 g packet Take 17 g by mouth Daily. Indications: Constipation   Past Week    pravastatin (PRAVACHOL) 40 MG tablet Take 1 tablet by mouth Daily. Indications: Disease involving Lipid Deposits in the Arteries   2024       Allergies:  Allergies   Allergen Reactions    Codeine GI Intolerance       Social History:   Social History     Socioeconomic History    Marital status:    Tobacco Use    Smoking status: Former     Current packs/day: 0.00     Average packs/day: 1 pack/day for 63.0 years (63.0 ttl pk-yrs)     Types: Cigarettes     Start date: 1960     Quit date: 2023     Years since quittin.0   Vaping Use    Vaping status:  "Never Used   Substance and Sexual Activity    Alcohol use: Not Currently    Drug use: Never    Sexual activity: Defer       Family History:  History reviewed. No pertinent family history.    Physical Exam:  /48   Pulse 62   Temp 98.6 °F (37 °C) (Axillary)   Resp 20   Ht 180.3 cm (71\")   Wt 50.1 kg (110 lb 7.2 oz)   SpO2 94%   BMI 15.40 kg/m²  Body mass index is 15.4 kg/m². 94% 50.1 kg (110 lb 7.2 oz)  General Appearance:  Alert, patient looks chronically ill but not in acute distress  HEENT:  Normocephalic, without obvious abnormality, Conjunctiva/corneas clear,.   Nares normal, no drainage     Neck:  Supple, symmetrical, trachea midline. No JVD.  Lungs /Chest wall: Wheezing and mild left basal rhonchi, respirations unlabored, symmetrical wall movement.     Heart:  Regular rate and rhythm, S1 S2 normal  Abdomen: Soft, non-tender, no masses, no organomegaly.    Extremities: No edema, no clubbing or cyanosis    LABS:  Lab Results   Component Value Date    CALCIUM 8.3 (L) 12/23/2024     Results from last 7 days   Lab Units 12/23/24  0614 12/22/24  2203   SODIUM mmol/L 142 140   POTASSIUM mmol/L 3.8 4.3   CHLORIDE mmol/L 104 102   CO2 mmol/L 28.7 29.4*   BUN mg/dL 32* 32*   CREATININE mg/dL 0.95 1.03   GLUCOSE mg/dL 104* 131*   CALCIUM mg/dL 8.3* 9.0   WBC 10*3/mm3 6.75 8.27   HEMOGLOBIN g/dL 7.1* 7.8*   PLATELETS 10*3/mm3 144 142   ALT (SGPT) U/L  --  13   AST (SGOT) U/L  --  24   PROBNP pg/mL  --  1,788.0   PROCALCITONIN ng/mL  --  0.60*     Lab Results   Component Value Date    TROPONINT 43 (H) 02/02/2024             Results from last 7 days   Lab Units 12/22/24  2204 12/22/24  2203   PROCALCITONIN ng/mL  --  0.60*   LACTATE mmol/L 1.4  --      Results from last 7 days   Lab Units 12/23/24  0454 12/23/24  0301   PH, ARTERIAL pH units 7.355 7.313*   PCO2, ARTERIAL mm Hg 62.2* 67.9*   PO2 ART mm Hg 173.6* 63.5*   O2 SATURATION ART % 99.4* 88.8*   MODALITY  BiPap Cannula     Results from last 7 days   Lab " Units 12/22/24 2204   ADENOVIRUS DETECTION BY PCR  Not Detected   CORONAVIRUS 229E  Not Detected   CORONAVIRUS HKU1  Not Detected   CORONAVIRUS NL63  Not Detected   CORONAVIRUS OC43  Not Detected   HUMAN METAPNEUMOVIRUS  Not Detected   HUMAN RHINOVIRUS/ENTEROVIRUS  Detected*   INFLUENZA B PCR  Not Detected   PARAINFLUENZA 1  Not Detected   PARAINFLUENZA VIRUS 2  Not Detected   PARAINFLUENZA VIRUS 3  Not Detected   PARAINFLUENZA VIRUS 4  Not Detected   BORDETELLA PERTUSSIS PCR  Not Detected   CHLAMYDOPHILA PNEUMONIAE PCR  Not Detected   MYCOPLAMA PNEUMO PCR  Not Detected   INFLUENZA A PCR  Not Detected   RSV, PCR  Not Detected             Lab Results   Component Value Date    TSH 2.280 02/01/2024     Estimated Creatinine Clearance: 43.9 mL/min (by C-G formula based on SCr of 0.95 mg/dL).         Imaging:  Imaging Results (Last 24 Hours)       Procedure Component Value Units Date/Time    XR Chest 1 View [[id number]] Collected: 12/22/24 2256     Updated: 12/22/24 2259    Narrative:      XR CHEST 1 VW    Date of Exam: 12/22/2024 10:18 PM EST    Indication: Simple Sepsis Protocol    Comparison: 2/2/2024 and PET/CT 10/10/2024.    Findings:  Stable nodular thickening in the right lung apex. Stable diffuse coarse interstitial disease in both lungs and severe emphysema. There is increased opacity in the left lung base that could reflect a superimposed pneumonia. No pneumothorax or pleural   effusion. Heart size is normal. Pulmonary vasculature is largely obscured. No acute osseous abnormalities.      Impression:      Impression:  1.Increased opacity in the left lung base could reflect pneumonia.  2.Stable nodular thickening in the right lung apex.  3.Severe emphysema and chronic interstitial disease.          Electronically Signed: Sandro Saravia MD    12/22/2024 10:57 PM EST    Workstation ID: [id number]              Current Meds:   SCHEDULE  ampicillin-sulbactam, 3 g, Intravenous, Q8H  atorvastatin, 10 mg, Oral,  Daily  azithromycin, 500 mg, Oral, Nightly  budesonide, 0.5 mg, Nebulization, BID - RT  clopidogrel, 75 mg, Oral, Daily  guaiFENesin, 600 mg, Oral, Q12H  ipratropium-albuterol, 3 mL, Nebulization, Q4H - RT  methylPREDNISolone sodium succinate, 40 mg, Intravenous, Q12H  polyethylene glycol, 17 g, Oral, Daily  sodium chloride, 10 mL, Intravenous, Q12H      Infusions     PRNs    acetaminophen    aluminum-magnesium hydroxide-simethicone    senna-docusate sodium **AND** polyethylene glycol **AND** bisacodyl **AND** bisacodyl    Calcium Replacement - Follow Nurse / BPA Driven Protocol    ipratropium-albuterol    Magnesium Standard Dose Replacement - Follow Nurse / BPA Driven Protocol    melatonin    ondansetron ODT **OR** ondansetron    Phosphorus Replacement - Follow Nurse / BPA Driven Protocol    Potassium Replacement - Follow Nurse / BPA Driven Protocol    sodium chloride    sodium chloride    sodium chloride        Ellie Castellanos MD  12/23/2024  09:53 EST      Much of this encounter note is an electronic transcription/translation of spoken language to printed text using Dragon Software.

## 2024-12-23 NOTE — ED NOTES
Nursing report ED to floor  Kali Garcia  79 y.o.  male    HPI:   Chief Complaint   Patient presents with    Shortness of Breath       Admitting doctor:   Carlos Llanes Alvarez, MD    Admitting diagnosis:   The primary encounter diagnosis was Pneumonia of left lower lobe due to infectious organism. Diagnoses of Rhinovirus, Acute on chronic respiratory failure with hypoxia, Dehydration, and Anemia, unspecified type were also pertinent to this visit.    Code status:   Current Code Status       Date Active Code Status Order ID Comments User Context       12/23/2024 0036 CPR (Attempt to Resuscitate) [id number]  Nurys Thorpe APRN ED        Question Answer    Code Status (Patient has no pulse and is not breathing) CPR (Attempt to Resuscitate)    Medical Interventions (Patient has pulse or is breathing) Full Support    Level Of Support Discussed With Patient                    Allergies:   Codeine    Isolation:  Droplet     Fall Risk:  Fall Risk Assessment was completed, and patient is at moderate risk for falls.   Predictive Model Details         20 (Low) Factor Value    Calculated 12/23/2024 00:52 Age 79    Risk of Fall Model Active Peripheral IV Present     Imaging order in this encounter Present     Respiratory Rate 20     Diastolic BP 56     Magnesium not on file     Number of Distinct Medication Classes administered 3     Albumin 3.3 g/dL     Richard Scale not on file     Calcium 9 mg/dL     Clinically Relevant Sex Not Female     Total Bilirubin 0.7 mg/dL     Chloride 102 mmol/L     Days after Admission 0.134     Potassium 4.3 mmol/L     Tobacco Use Quit     ALT 13 U/L     Creatinine 1.03 mg/dL         Weight:       12/22/24 2139   Weight: 49.9 kg (110 lb 0.2 oz)       Intake and Output    Intake/Output Summary (Last 24 hours) at 12/23/2024 0057  Last data filed at 12/22/2024 2351  Gross per 24 hour   Intake 100 ml   Output --   Net 100 ml       Diet:   Dietary Orders (From admission, onward)       Start      Ordered    12/23/24 0037  Diet: Regular/House; Fluid Consistency: Thin (IDDSI 0)  Diet Effective Now        References:    Diet Order Crosswalk   Question Answer Comment   Diets: Regular/House    Fluid Consistency: Thin (IDDSI 0)        12/23/24 0036                     Most recent vitals:   Vitals:    12/22/24 2346 12/23/24 0004 12/23/24 0032 12/23/24 0047   BP: 118/58 124/56 120/56 122/58   Pulse: 63 63 63 64   Resp:       Temp:       TempSrc:       SpO2: 94% 93% 90% 92%   Weight:       Height:           Active LDAs/IV Access:   Lines, Drains & Airways       Active LDAs       Name Placement date Placement time Site Days    Peripheral IV 12/22/24 2156 Anterior;Right Forearm 12/22/24 2156  Forearm  less than 1    Peripheral IV 12/22/24 2202 Left Antecubital 12/22/24 2202  Antecubital  less than 1                    Skin Condition:   Skin Assessments (last day)       Date/Time Skin WDL    12/22/24 22:07:08 WDL             Labs (abnormal labs have a star):   Labs Reviewed   RESPIRATORY PANEL PCR W/ COVID-19 (SARS-COV-2), NP SWAB IN UTM/VTP, 2 HR TAT - Abnormal; Notable for the following components:       Result Value    Human Rhinovirus/Enterovirus Detected (*)     All other components within normal limits    Narrative:     In the setting of a positive respiratory panel with a viral infection PLUS a negative procalcitonin without other underlying concern for bacterial infection, consider observing off antibiotics or discontinuation of antibiotics and continue supportive care. If the respiratory panel is positive for atypical bacterial infection (Bordetella pertussis, Chlamydophila pneumoniae, or Mycoplasma pneumoniae), consider antibiotic de-escalation to target atypical bacterial infection.   COMPREHENSIVE METABOLIC PANEL - Abnormal; Notable for the following components:    Glucose 131 (*)     BUN 32 (*)     CO2 29.4 (*)     Albumin 3.3 (*)     BUN/Creatinine Ratio 31.1 (*)     All other components within normal  "limits    Narrative:     GFR Categories in Chronic Kidney Disease (CKD)      GFR Category          GFR (mL/min/1.73)    Interpretation  G1                     90 or greater         Normal or high (1)  G2                      60-89                Mild decrease (1)  G3a                   45-59                Mild to moderate decrease  G3b                   30-44                Moderate to severe decrease  G4                    15-29                Severe decrease  G5                    14 or less           Kidney failure          (1)In the absence of evidence of kidney disease, neither GFR category G1 or G2 fulfill the criteria for CKD.    eGFR calculation 2021 CKD-EPI creatinine equation, which does not include race as a factor   PROCALCITONIN - Abnormal; Notable for the following components:    Procalcitonin 0.60 (*)     All other components within normal limits    Narrative:     As a Marker for Sepsis (Non-Neonates):    1. <0.5 ng/mL represents a low risk of severe sepsis and/or septic shock.  2. >2 ng/mL represents a high risk of severe sepsis and/or septic shock.    As a Marker for Lower Respiratory Tract Infections that require antibiotic therapy:    PCT on Admission    Antibiotic Therapy       6-12 Hrs later    >0.5                Strongly Recommended  >0.25 - <0.5        Recommended   0.1 - 0.25          Discouraged              Remeasure/reassess PCT  <0.1                Strongly Discouraged     Remeasure/reassess PCT    As 28 day mortality risk marker: \"Change in Procalcitonin Result\" (>80% or <=80%) if Day 0 (or Day 1) and Day 4 values are available. Refer to http://www.[employer]-pct-calculator.com    Change in PCT <=80%  A decrease of PCT levels below or equal to 80% defines a positive change in PCT test result representing a higher risk for 28-day all-cause mortality of patients diagnosed with severe sepsis for septic shock.    Change in PCT >80%  A decrease of PCT levels of more than 80% defines a negative " change in PCT result representing a lower risk for 28-day all-cause mortality of patients diagnosed with severe sepsis or septic shock.      CBC WITH AUTO DIFFERENTIAL - Abnormal; Notable for the following components:    RBC 2.59 (*)     Hemoglobin 7.8 (*)     Hematocrit 24.5 (*)     RDW 16.0 (*)     RDW-SD 55.7 (*)     All other components within normal limits    Narrative:     The previously reported component NRBC is no longer being reported. Previous result was 0.0 /100 WBC (Reference Range: 0.0-0.2 /100 WBC) on 12/22/2024 at 2215 EST.   MANUAL DIFFERENTIAL - Abnormal; Notable for the following components:    Neutrophil % 79.0 (*)     Lymphocyte % 2.0 (*)     Monocyte % 4.0 (*)     Bands %  15.0 (*)     Neutrophils Absolute 7.77 (*)     Lymphocytes Absolute 0.17 (*)     All other components within normal limits   POC LACTATE - Normal   BLOOD CULTURE   BLOOD CULTURE   RAINBOW DRAW    Narrative:     The following orders were created for panel order [place] Draw.  Procedure                               Abnormality         Status                     ---------                               -----------         ------                     Green Top (Gel)[[id number]]                                  Final result               Lavender Top[[id number]]                                     Final result               Gold Top - SST[[id number]]                                   Final result               Light Blue Top[[id number]]                                   Final result                 Please view results for these tests on the individual orders.   SCAN SLIDE   BASIC METABOLIC PANEL   CBC WITH AUTO DIFFERENTIAL   CBC AND DIFFERENTIAL    Narrative:     The following orders were created for panel order CBC & Differential.  Procedure                               Abnormality         Status                     ---------                               -----------         ------                     CBC Auto  Differential[[id number]]        Abnormal            Final result               Scan Slide[[id number]]                                       Final result                 Please view results for these tests on the individual orders.   GREEN TOP   LAVENDER TOP   GOLD TOP - SST   LIGHT BLUE TOP       LOC: Person, Place, Time, and Situation    Telemetry:  Telemetry    Cardiac Monitoring Ordered: yes    EKG:   ECG 12 Lead Dyspnea   Preliminary Result   HEART RATE=70  bpm   RR Fznbmpqg=781  ms   SC Interval=  ms   P Horizontal Axis=  deg   P Front Axis=  deg   QRSD Lfzwlxtj=707  ms   QT Skgrsdjz=233  ms   IKuH=952  ms   QRS Axis=44  deg   T Wave Axis=  deg   - ABNORMAL ECG -   Atrial flutter with varied AV block,   IVCD, consider RBBB   Abnormal T, consider ischemia, inferior leads   Date and Time of Study:2024-12-22 21:53:44          Medications Given in the ED:   Medications   sodium chloride 0.9 % flush 10 mL (has no administration in time range)   lactated ringers infusion (has no administration in time range)   ipratropium-albuterol (DUO-NEB) nebulizer solution 3 mL (has no administration in time range)   sodium chloride 0.9 % flush 10 mL (has no administration in time range)   sodium chloride 0.9 % flush 10 mL (has no administration in time range)   sodium chloride 0.9 % infusion 40 mL (has no administration in time range)   aluminum-magnesium hydroxide-simethicone (MAALOX MAX) 400-400-40 MG/5ML suspension 15 mL (has no administration in time range)   ondansetron ODT (ZOFRAN-ODT) disintegrating tablet 4 mg (has no administration in time range)     Or   ondansetron (ZOFRAN) injection 4 mg (has no administration in time range)   Potassium Replacement - Follow Nurse / BPA Driven Protocol (has no administration in time range)   Magnesium Standard Dose Replacement - Follow Nurse / BPA Driven Protocol (has no administration in time range)   Phosphorus Replacement - Follow Nurse / BPA Driven Protocol (has no administration in  time range)   Calcium Replacement - Follow Nurse / BPA Driven Protocol (has no administration in time range)   sennosides-docusate (PERICOLACE) 8.6-50 MG per tablet 2 tablet (has no administration in time range)     And   polyethylene glycol (MIRALAX) packet 17 g (has no administration in time range)     And   bisacodyl (DULCOLAX) EC tablet 5 mg (has no administration in time range)     And   bisacodyl (DULCOLAX) suppository 10 mg (has no administration in time range)   melatonin tablet 5 mg (has no administration in time range)   acetaminophen (TYLENOL) tablet 650 mg (has no administration in time range)   ampicillin-sulbactam (UNASYN) 3 g in sodium chloride 0.9 % 100 mL MBP (0 g Intravenous Stopped 24 8161)   azithromycin (ZITHROMAX) 500 mg in sodium chloride 0.9 % 250 mL IVPB-VTB (0 mg Intravenous Stopped 24 0042)       Imaging results:  XR Chest 1 View    Result Date: 2024  Impression: 1.Increased opacity in the left lung base could reflect pneumonia. 2.Stable nodular thickening in the right lung apex. 3.Severe emphysema and chronic interstitial disease. Electronically Signed: Sandro Saravia MD  2024 10:57 PM EST  Workstation ID: [id number]     Social issues:   Social History     Socioeconomic History    Marital status:    Tobacco Use    Smoking status: Former     Current packs/day: 0.00     Average packs/day: 1 pack/day for 63.0 years (63.0 ttl pk-yrs)     Types: Cigarettes     Start date: 1960     Quit date: 2023     Years since quittin.0   Vaping Use    Vaping status: Never Used   Substance and Sexual Activity    Alcohol use: Not Currently    Drug use: Never    Sexual activity: Defer       NIH Stroke Scale:  Interval: (not recorded)  1a. Level of Consciousness: (not recorded)  1b. LOC Questions: (not recorded)  1c. LOC Commands: (not recorded)  2. Best Gaze: (not recorded)  3. Visual: (not recorded)  4. Facial Palsy: (not recorded)  5a. Motor Arm, Left: (not  recorded)  5b. Motor Arm, Right: (not recorded)  6a. Motor Leg, Left: (not recorded)  6b. Motor Leg, Right: (not recorded)  7. Limb Ataxia: (not recorded)  8. Sensory: (not recorded)  9. Best Language: (not recorded)  10. Dysarthria: (not recorded)  11. Extinction and Inattention (formerly Neglect): (not recorded)    Total (NIH Stroke Scale): (not recorded)     Additional notable assessment information:NA     Nursing report ED to floor:  SAM Griffin RN   12/23/24 00:57 EST

## 2024-12-23 NOTE — NURSING NOTE
Pt needing moved due to BiPap needs, called report to [place] [facility],pt to be transferred [phone] when RT arrives to help move

## 2024-12-23 NOTE — CASE MANAGEMENT/SOCIAL WORK
Discharge Planning Assessment   Seth     Patient Name: Kali Garcia  MRN: [id number]  Today's Date: 12/23/2024    Admit Date: 12/22/2024    Plan: From home with son/PHILIPP, pending clinical course and PT/OT [facility] (SNF choice pending). Current [facility] 6L NC.   Discharge Needs Assessment       Row Name 12/23/24 1432       Living Environment    People in Home child(concepción), adult    Name(s) of People in Home Ivis adler and Elisabeth SEGAL    Current Living Arrangements home    Potentially Unsafe Housing Conditions none    In the past 12 months has the electric, gas, oil, or water company threatened to shut off services in your home? No    Primary Care Provided by self    Provides Primary Care For no one    Family Caregiver if Needed child(concepción), adult    Family Caregiver Names Ivis adler and Elisabeth SEGAL    Quality of Family Relationships helpful;involved;supportive    Able to Return to Prior Arrangements yes       Resource/Environmental Concerns    Resource/Environmental Concerns none    Transportation Concerns none       Transportation Needs    In the past 12 months, has lack of transportation kept you from medical appointments or from getting medications? no    In the past 12 months, has lack of transportation kept you from meetings, work, or from getting things needed for daily living? No       Food Insecurity    Within the past 12 months, you worried that your food would run out before you got the money to buy more. Never true    Within the past 12 months, the food you bought just didn't last and you didn't have money to get more. Never true       Transition Planning    Patient/Family Anticipates Transition to home    Patient/Family Anticipated Services at Transition none    Transportation Anticipated car, drives self;family or friend will provide       Discharge Needs Assessment    Readmission Within the Last 30 Days no previous admission in last 30 days    Equipment Currently Used at Home bath bench;walker,  "rolling;oxygen;pulse ox;cpap;rollator    Concerns to be Addressed discharge planning    Anticipated Changes Related to Illness none    Equipment Needed After Discharge none                   Discharge Plan       Row Name 24 1434       Plan    Plan From home with son/PHILIPP, pending clinical course and PT/OT [facility] (SNF choice pending). Current [facility] 6L NC.    Patient/Family in Agreement with Plan yes    Provided Post Acute Provider List? Yes    Post Acute Provider List Nursing Home    Provided Post Acute Provider Quality & Resource List? Yes    Post Acute Provider Quality and Resource List Nursing Home    Delivered To Support Person    Support Person Ivis - son    Method of Delivery Telephone  left at bedside    Plan Comments CM met with patient at bedside. Patient A&Ox4. Patient lives at home with son/PHILIPP who assist with ADLS, has a rollator, RW, CPAP and is current with [facility] home O2 6L NC. Family will transport at discharge. PCP and pharmacy confirmed. Agreeable to M2B.  Denies financial assistance needs for medication and/or food. Denies any current HH, Caregiver, or rehab services. Spoke with patient and son Ivis regarding possible SNF placement, Ivis would like a list (left at bedside) to discuss SNF options with father vs home with Hospice. DC Barriers:  6L NC/BIPAP, IV Abxs/steroids, nebs, Hgb 7.1, Pulm following.               Expected Discharge Date and Time       Expected Discharge Date Expected Discharge Time    Dec 24, 2024            Demographic Summary       Row Name 24 1434       General Information    Reason for Consult decision-making    General Information Comments GAURAV reviewed chart and noted pt is listed as being , but spouse is not a listed contact. Per nurse, pt with intermittent confusion. GAURAV called pt's son Ivis, who reports pt's wife  2021. He reports pt has another son named Luisito, but, \"he's a drug addict,\" and does not know how to contact him. Ivis reports he " has had no contact with his brother in 3 years and has no idea of his whereabouts. According to HB 1119, pt's son is legal NOK unless pt's other son comes forward, in which case both would need to agree on decisions. Marital status updated.      Row Name 12/23/24 1431       General Information    Admission Type inpatient    Arrived From emergency department    Required Notices Provided Important Message from Medicare    Referral Source admission list    Reason for Consult discharge planning    Preferred Language English                   Functional Status       Row Name 12/23/24 1431       Functional Status    Usual Activity Tolerance fair    Current Activity Tolerance fair       Functional Status, IADL    Medications assistive person    Meal Preparation assistive person    Housekeeping completely dependent    Laundry assistive person    Shopping assistive equipment and person       Mental Status    General Appearance WDL WDL       Mental Status Summary    Recent Changes in Mental Status/Cognitive Functioning no changes             O. Mer Torres RN  ICU/CVU   O: [phone]  C: [phone]  [email or web address]

## 2024-12-23 NOTE — CONSULTS
Nutrition Services    Patient Name: Kali Garcia  YOB: 1945  MRN: [id number]  Admission date: 12/22/2024    Comment:  -- Add chocolate Boost Original BID (Provides 480 kcals, 20 g protein if consumed)     -- Severe chronic disease related malnutrition related to chronic medical conditions including COPD as evidenced by severe fat/muscle loss per physical exam and PO intakes less than 75% for greater than 1 month.  See MSA below.    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 and endoscopic clipping x 1;  Surgeon: Luz Jacques MD;  Location: [facility] ENDOSCOPY;  Service: Gastroenterology;  Laterality: N/A;  post op: cecal AVM    CORONARY STENT PLACEMENT      ENDOSCOPY N/A 4/14/2022    Procedure: ESOPHAGOGASTRODUODENOSCOPY WITH BIOPSY X1 AREA;  Surgeon: Luz Jacques MD;  Location: [facility] ENDOSCOPY;  Service: Gastroenterology;  Laterality: N/A;  esophagitis, gastritis, HH    ENDOSCOPY N/A 8/4/2022    Procedure: ESOPHAGOGASTRODUODENOSCOPY;  Surgeon: Luz Jacques MD;  Location: [facility] ENDOSCOPY;  Service: Gastroenterology;  Laterality: N/A;  post op: hiatal hernia, eosphagitis    EYE SURGERY      FEMORAL ARTERY STENT      KNEE SURGERY Left     KNEE SURGERY Left     LUNG SURGERY          Current Problems   Acute on chronic respiratory failure with hypoxia  COPD exacerbation   Left lower lobe pneumonia  Rhinovirus  - pulmonology  "following    Anemia     CAD  Atrial fibrillation  Hypertension  Hyperlipidemia  PVD     Severe protein malnutrition  - See MSA 12/23/24       Encounter Information        Trending Narrative     12/23: Admitted for SOB, cough, congestion and diagnosed with PNA.  RD visited patient at bedside.  Patient reports poor appetite.  Willing to get Boost (prefers chocolate).  Reports [id number]# range.  NFPE completed, consistent with nutrition diagnosis of malnutrition using AND/ASPEN criteria. See MSA below.        Anthropometrics        Current Height, Weight Height: 180.3 cm (71\")  Weight: 50.1 kg (110 lb 7.2 oz) (12/23/24 0531)       Usual Body Weight (UBW) 130-140# range       Trending Weight Hx     This admission: 12/23: 110#             PTA: No recent significant weight loss to note     Wt Readings from Last 30 Encounters:   12/23/24 0531 50.1 kg (110 lb 7.2 oz)   12/22/24 2139 49.9 kg (110 lb 0.2 oz)   03/04/24 1452 53 kg (116 lb 12.1 oz)   02/19/24 1303 50.5 kg (111 lb 5 oz)   02/16/24 2101 50 kg (110 lb 2.3 oz)   02/16/24 2054 51.8 kg (114 lb 2.8 oz)   02/15/24 1416 52.9 kg (116 lb 10.3 oz)   02/04/24 0315 51.5 kg (113 lb 8.6 oz)   02/03/24 1415 51.3 kg (113 lb)   02/03/24 0345 51.5 kg (113 lb 8.6 oz)   02/02/24 1524 51.8 kg (114 lb 3.2 oz)   02/02/24 0337 54.4 kg (120 lb)   02/01/24 1330 52.1 kg (114 lb 14.5 oz)   08/04/22 1124 51.1 kg (112 lb 10.5 oz)   07/26/22 1511 59 kg (130 lb)   04/15/22 0436 58.3 kg (128 lb 8.5 oz)   04/13/22 0355 54.6 kg (120 lb 5.9 oz)   04/12/22 2037 128 kg (282 lb 3 oz)      BMI kg/m2 Body mass index is 15.4 kg/m².       Labs        Pertinent Labs    Results from last 7 days   Lab Units 12/23/24  0614 12/22/24  2203   SODIUM mmol/L 142 140   POTASSIUM mmol/L 3.8 4.3   CHLORIDE mmol/L 104 102   CO2 mmol/L 28.7 29.4*   BUN mg/dL 32* 32*   CREATININE mg/dL 0.95 1.03   CALCIUM mg/dL 8.3* 9.0   BILIRUBIN mg/dL 0.5 0.7   ALK PHOS U/L  --  78   ALT (SGPT) U/L  --  13   AST (SGOT) U/L  --  24 " "  GLUCOSE mg/dL 104* 131*     Results from last 7 days   Lab Units 12/23/24  0614   HEMOGLOBIN g/dL 7.1*   HEMATOCRIT % 21.9*     No results found for: \"HGBA1C\"     Medications    Scheduled Medications acetylcysteine, 3 mL, Nebulization, BID - RT  ampicillin-sulbactam, 3 g, Intravenous, Q8H  atorvastatin, 10 mg, Oral, Daily  azithromycin, 500 mg, Oral, Nightly  budesonide, 0.5 mg, Nebulization, BID - RT  clopidogrel, 75 mg, Oral, Daily  guaiFENesin, 1,200 mg, Oral, Q12H  ipratropium-albuterol, 3 mL, Nebulization, Q4H - RT  methylPREDNISolone sodium succinate, 40 mg, Intravenous, Q12H  polyethylene glycol, 17 g, Oral, Daily  sodium chloride, 10 mL, Intravenous, Q12H        Infusions      PRN Medications   acetaminophen    aluminum-magnesium hydroxide-simethicone    senna-docusate sodium **AND** polyethylene glycol **AND** bisacodyl **AND** bisacodyl    Calcium Replacement - Follow Nurse / BPA Driven Protocol    ipratropium-albuterol    Magnesium Standard Dose Replacement - Follow Nurse / BPA Driven Protocol    melatonin    ondansetron ODT **OR** ondansetron    Phosphorus Replacement - Follow Nurse / BPA Driven Protocol    Potassium Replacement - Follow Nurse / BPA Driven Protocol    sodium chloride    sodium chloride    sodium chloride     Physical Findings        Trending Physical   Appearance, NFPE 12/23: NFPE completed, consistent with nutrition diagnosis of malnutrition using AND/ASPEN criteria. See MSA below.     --  Edema  No edema documented      Bowel Function Last documented BM 12/23 (today)     Tubes No feeding tube      Chewing/Swallowing No known issues      Skin Left hip area     --  Current Nutrition Orders & Evaluation of Intake       Oral Nutrition     Food Allergies NKFA   Current PO Diet Diet: Regular/House; Fluid Consistency: Thin (IDDSI 0)   Supplement None ordered    PO Evaluation     Trending % PO Intake 12/23: None documented    --  Nutritional Risk Screening        NRS-2002 Score      "     Nutrition Diagnosis         Nutrition Dx Problem 1 Severe chronic disease related malnutrition related to chronic medical conditions including COPD as evidenced by severe fat/muscle loss per physical exam and PO intakes less than 75% for greater than 1 month.        Nutrition Dx Problem 2        Intervention Goal         Intervention Goal(s) Accept ONS  No weight loss  PO intakes at least 60%     Nutrition Intervention        RD Action Add ONS  Completed NFPE     Nutrition Prescription          Diet Prescription Regular    Supplement Prescription Boost Original BID   --   Monitor/Evaluation        Monitor Per protocol, I&O, PO intake, Supplement intake, Pertinent labs, Weight, Hemodynamic stability     Malnutrition Severity Assessment      Patient meets criteria for : Severe Malnutrition  Malnutrition Type (Last 8 Hours)       Malnutrition Severity Assessment       Row Name 12/23/24 1300       Malnutrition Severity Assessment    Malnutrition Type Chronic Disease - Related Malnutrition      Row Name 12/23/24 1300       Insufficient Energy Intake     Insufficient Energy Intake Findings Severe    Insufficient Energy Intake  <75% of est. energy requirement for > or equal to 1 month      Row Name 12/23/24 1300       Muscle Loss    Loss of Muscle Mass Findings Severe    Dilliner Region Severe - deep hollowing/scooping, lack of muscle to touch, facial bones well defined    Clavicle Bone Region Severe - protruding prominent bone    Acromion Bone Region Severe - squared shoulders, bones, and acromion process protrusion prominent    Scapular Bone Region Severe - prominent bones, depressions easily visible between ribs, scapula, spine, shoulders    Dorsal Hand Region Severe - prominent depression    Patellar Region Severe - prominent bone, square looking, very little muscle definition    Anterior Thigh Region Severe - line/depression along thigh, obviously thin    Posterior Calf Region Severe - thin with very little  definition/firmness      Row Name 12/23/24 1300       Fat Loss    Subcutaneous Fat Loss Findings Severe    Orbital Region  Severe - pronounced hollowness/depression, dark circles, loose saggy skin    Upper Arm Region Severe - mostly skin, very little space between folds, fingers touch    Thoracic & Lumbar Region Severe - ribs visible with prominent depressions, iliac crest very prominent      Row Name 12/23/24 1300       Criteria Met (Must meet criteria for severity in at least 2 of these categories: M Wasting, Fat Loss, Fluid, Secondary Signs, Wt. Status, Intake)    Patient meets criteria for  Severe Malnutrition                     Electronically signed by:  Kathrine Maharaj RD  12/23/24 12:50 EST

## 2024-12-23 NOTE — ED PROVIDER NOTES
Subjective   History of Present Illness  79-year-old male presents with weakness shortness of breath.  He has COPD is on oxygen at 6 L at home.  He has had cough.  He has had decreased p.o. intake.  No vomiting no diarrhea.  He does not complain of pain at this time.  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 and endoscopic clipping x 1;  Surgeon: Luz Jacques MD;  Location: [facility] ENDOSCOPY;  Service: Gastroenterology;  Laterality: N/A;  post op: cecal AVM    CORONARY STENT PLACEMENT      ENDOSCOPY N/A 2022    Procedure: ESOPHAGOGASTRODUODENOSCOPY WITH BIOPSY X1 AREA;  Surgeon: Luz Jacques MD;  Location: [facility] ENDOSCOPY;  Service: Gastroenterology;  Laterality: N/A;  esophagitis, gastritis, HH    ENDOSCOPY N/A 2022    Procedure: ESOPHAGOGASTRODUODENOSCOPY;  Surgeon: Luz Jacques MD;  Location: [facility] ENDOSCOPY;  Service: Gastroenterology;  Laterality: N/A;  post op: hiatal hernia, eosphagitis    EYE SURGERY      FEMORAL ARTERY STENT      KNEE SURGERY Left     KNEE SURGERY Left     LUNG SURGERY         No family history on file.    Social History     Socioeconomic History    Marital status:    Tobacco Use    Smoking status: Former     Current packs/day: 0.00     Average packs/day: 1 pack/day for 63.0 years (63.0 ttl pk-yrs)     Types: Cigarettes     Start date: 1960     Quit date: 2023     Years since quittin.0   Vaping Use    Vaping status: Never Used   Substance and Sexual Activity    Alcohol use: Not Currently    Drug use: Never    Sexual activity:  Defer     Prior to Admission medications    Medication Sig Start Date End Date Taking? Authorizing Provider   clopidogrel (PLAVIX) 75 MG tablet Take 1 tablet by mouth Daily. Indications: Percutaneous Coronary Intervention    Gwendolyn Martínez MD   ipratropium-albuterol (DUO-NEB) 0.5-2.5 mg/3 ml nebulizer Take 3 mL by nebulization Every 4 (Four) Hours As Needed for Wheezing. Indications: Chronic Obstructive Lung Disease    Gwendolyn Martínez MD   O2 (OXYGEN) Inhale 2 L/min Daily. Indications: Shortness of breath 1/8/23   Gwendolyn Martínez MD   polyethylene glycol (MIRALAX) 17 g packet Take 17 g by mouth Daily. Indications: Constipation 2/1/24   Gwendolyn Martínez MD   pravastatin (PRAVACHOL) 40 MG tablet Take 1 tablet by mouth Daily. Indications: Disease involving Lipid Deposits in the Arteries    ProviderGwendolyn MD             Objective   Physical Exam  79-year-old male awake alert.  He is chronically ill cachectic in appearance.  Oropharynx dry neck supple chest reveals bilateral rhonchi.  Cardiovascular rate and rhythm abdomen soft nontender extremities without tenderness edema  Procedures           ED Course      Results for orders placed or performed during the hospital encounter of 12/22/24   ECG 12 Lead Dyspnea    Collection Time: 12/22/24  9:53 PM   Result Value Ref Range    QT Interval 466 ms    QTC Interval 501 ms   Comprehensive Metabolic Panel    Collection Time: 12/22/24 10:03 PM    Specimen: Blood   Result Value Ref Range    Glucose 131 (H) 65 - 99 mg/dL    BUN 32 (H) 8 - 23 mg/dL    Creatinine 1.03 0.76 - 1.27 mg/dL    Sodium 140 136 - 145 mmol/L    Potassium 4.3 3.5 - 5.2 mmol/L    Chloride 102 98 - 107 mmol/L    CO2 29.4 (H) 22.0 - 29.0 mmol/L    Calcium 9.0 8.6 - 10.5 mg/dL    Total Protein 7.6 6.0 - 8.5 g/dL    Albumin 3.3 (L) 3.5 - 5.2 g/dL    ALT (SGPT) 13 1 - 41 U/L    AST (SGOT) 24 1 - 40 U/L    Alkaline Phosphatase 78 39 - 117 U/L    Total Bilirubin 0.7 0.0 - 1.2 mg/dL     Globulin 4.3 gm/dL    A/G Ratio 0.8 g/dL    BUN/Creatinine Ratio 31.1 (H) 7.0 - 25.0    Anion Gap 8.6 5.0 - 15.0 mmol/L    eGFR 73.4 >60.0 mL/min/1.73   Procalcitonin    Collection Time: 12/22/24 10:03 PM    Specimen: Blood   Result Value Ref Range    Procalcitonin 0.60 (H) 0.00 - 0.25 ng/mL   CBC Auto Differential    Collection Time: 12/22/24 10:03 PM    Specimen: Blood   Result Value Ref Range    WBC 8.27 3.40 - 10.80 10*3/mm3    RBC 2.59 (L) 4.14 - 5.80 10*6/mm3    Hemoglobin 7.8 (L) 13.0 - 17.7 g/dL    Hematocrit 24.5 (L) 37.5 - 51.0 %    MCV 94.6 79.0 - 97.0 fL    MCH 30.1 26.6 - 33.0 pg    MCHC 31.8 31.5 - 35.7 g/dL    RDW 16.0 (H) 12.3 - 15.4 %    RDW-SD 55.7 (H) 37.0 - 54.0 fl    MPV 9.8 6.0 - 12.0 fL    Platelets 142 140 - 450 10*3/mm3   Scan Slide    Collection Time: 12/22/24 10:03 PM    Specimen: Blood   Result Value Ref Range    Scan Slide     Manual Differential    Collection Time: 12/22/24 10:03 PM    Specimen: Blood   Result Value Ref Range    Neutrophil % 79.0 (H) 42.7 - 76.0 %    Lymphocyte % 2.0 (L) 19.6 - 45.3 %    Monocyte % 4.0 (L) 5.0 - 12.0 %    Bands %  15.0 (H) 0.0 - 5.0 %    Neutrophils Absolute 7.77 (H) 1.70 - 7.00 10*3/mm3    Lymphocytes Absolute 0.17 (L) 0.70 - 3.10 10*3/mm3    Monocytes Absolute 0.33 0.10 - 0.90 10*3/mm3    Crenated RBC's Slight/1+ None Seen    Poikilocytes Slight/1+ None Seen    RBC Fragments Slight/1+ None Seen    WBC Morphology Normal Normal    Platelet Estimate Adequate Normal   Green Top (Gel)    Collection Time: 12/22/24 10:03 PM   Result Value Ref Range    Extra Tube Hold for add-ons.    Lavender Top    Collection Time: 12/22/24 10:03 PM   Result Value Ref Range    Extra Tube hold for add-on    Gold Top - SST    Collection Time: 12/22/24 10:03 PM   Result Value Ref Range    Extra Tube Hold for add-ons.    Light Blue Top    Collection Time: 12/22/24 10:03 PM   Result Value Ref Range    Extra Tube Hold for add-ons.    Respiratory Panel PCR w/COVID-19(SARS-CoV-2)  "JOSEFINA/MERE/FAROOQ/PAD/COR/VANDA In-House, NP Swab in UTM/VTM, 2 HR TAT - Swab, Nasopharynx    Collection Time: 12/22/24 10:04 PM    Specimen: Nasopharynx; Swab   Result Value Ref Range    ADENOVIRUS, PCR Not Detected Not Detected    Coronavirus 229E Not Detected Not Detected    Coronavirus HKU1 Not Detected Not Detected    Coronavirus NL63 Not Detected Not Detected    Coronavirus OC43 Not Detected Not Detected    COVID19 Not Detected Not Detected - Ref. Range    Human Metapneumovirus Not Detected Not Detected    Human Rhinovirus/Enterovirus Detected (A) Not Detected    Influenza A PCR Not Detected Not Detected    Influenza B PCR Not Detected Not Detected    Parainfluenza Virus 1 Not Detected Not Detected    Parainfluenza Virus 2 Not Detected Not Detected    Parainfluenza Virus 3 Not Detected Not Detected    Parainfluenza Virus 4 Not Detected Not Detected    RSV, PCR Not Detected Not Detected    Bordetella pertussis pcr Not Detected Not Detected    Bordetella parapertussis PCR Not Detected Not Detected    Chlamydophila pneumoniae PCR Not Detected Not Detected    Mycoplasma pneumo by PCR Not Detected Not Detected   POC Lactate    Collection Time: 12/22/24 10:04 PM    Specimen: Blood   Result Value Ref Range    Lactate 1.4 0.3 - 2.0 mmol/L     XR Chest 1 View   Final Result   Impression:   1.Increased opacity in the left lung base could reflect pneumonia.   2.Stable nodular thickening in the right lung apex.   3.Severe emphysema and chronic interstitial disease.               Electronically Signed: Sandro Saravia MD     12/22/2024 10:57 PM EST     Workstation ID: [id number]        /56   Pulse 64   Temp 97.6 °F (36.4 °C) (Axillary)   Resp 20   Ht 180.3 cm (71\")   Wt 49.9 kg (110 lb 0.2 oz)   SpO2 94%   BMI 15.34 kg/m²                                                      Medical Decision Making    Chart review: Patient had PET scan in October of this year that showed a 1.7 x 1.1 partially calcified irregular shaped " nodule anterior aspect right pulmonary apex with increased metabolic activity suspicious for malignancy.  There were 2 additional metabolic active irregular nodular densities right upper lobe.  Comorbidity: As per past history   Differential: Pneumonia, COPD exacerbation,  My EKG interpretation: Probable sinus rhythm rate of 70.  Baseline artifact makes accurate determination of rhythm difficult  Lab: Comprehensive metabolic panel glucose 131 with BUN of 32 procalcitonin elevated 0.6 respiratory panel positive for human rhinovirus CBC normal white count hemoglobin 7.8 platelet count of 142 with 79 segs 15 bands lactic acid normal 1.4.  My Radiology review and interpretation: Chest x-ray reveals increased opacity left lung base with stable nodular thickening in the right lung apex with severe emphysema and chronic interstitial disease  Discussion/treatment: Patient had IV placed.  Clinically he appears dehydrated.  He will be started on IV fluids.  He was given Unasyn and azithromycin.  Findings were discussed with family.  Patient was discussed with the nurse practitioner the hospitalist.  Will be admitted to their service for continued care.  Patient was evaluated using appropriate PPE    Final diagnoses:   Pneumonia of left lower lobe due to infectious organism   Rhinovirus   Acute on chronic respiratory failure with hypoxia   Dehydration   Anemia, unspecified type       ED Disposition  ED Disposition       ED Disposition   Decision to Admit    Condition   --    Comment   Level of Care: Telemetry [5]   Admitting Physician: LLANES ALVAREZ, CARLOS [[id number]]                 No follow-up provider specified.       Medication List      No changes were made to your prescriptions during this visit.            Huseyin Sutherland MD  12/22/24 [id number]

## 2024-12-23 NOTE — PROGRESS NOTES
This patient is an 79-year-old gentleman admitted this morning for acute on chronic hypoxic respiratory failure secondary to COPD exacerbation and left lower lobe pneumonia plus rhinovirus infection.  Continue current management with steroids, bronchodilators and antibiotics and follow-up on cultures.  Supportive care for rhinovirus infection. Will follow.  Iron workup shows iron deficiency with an iron saturation of 5%.  Start Ferrlecit.  Hemoglobin of 7.1.  Transfuse 1 unit of packed red blood cells for symptomatic anemia.

## 2024-12-23 NOTE — NURSING NOTE
Taken over care from Haritha for this pt.  Per Haritha, looks like the NP ordered Bipap for this pt.  And pt will need moved off this unit.

## 2024-12-23 NOTE — THERAPY EVALUATION
Patient Name: Kali Garcia  : 1945    MRN: [id number]                              Today's Date: 2024       Admit Date: 2024    Visit Dx:     ICD-10-CM ICD-9-CM   1. Pneumonia of left lower lobe due to infectious organism  J18.9 486   2. Rhinovirus  B34.8 079.3   3. Acute on chronic respiratory failure with hypoxia  J96.21 518.84     799.02   4. Dehydration  E86.0 276.51   5. Anemia, unspecified type  D64.9 285.9     Patient Active Problem List   Diagnosis    Severe malnutrition    Abnormal CT scan, esophagus    Pneumonia due to COVID-19 virus    Pneumonia    Rhinovirus    Anemia    Acute on chronic respiratory failure with hypoxia    Coronary artery disease    Hypertension    COPD (chronic obstructive pulmonary disease)     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 and endoscopic clipping x 1;  Surgeon: Luz Jacques MD;  Location: [facility] ENDOSCOPY;  Service: Gastroenterology;  Laterality: N/A;  post op: cecal AVM    CORONARY STENT PLACEMENT      ENDOSCOPY N/A 2022    Procedure: ESOPHAGOGASTRODUODENOSCOPY WITH BIOPSY X1 AREA;  Surgeon: Luz Jacques MD;  Location: [facility] ENDOSCOPY;  Service: Gastroenterology;  Laterality: N/A;  esophagitis, gastritis, HH    ENDOSCOPY N/A 2022    Procedure: ESOPHAGOGASTRODUODENOSCOPY;  Surgeon: Luz Jacques MD;  Location: [facility] ENDOSCOPY;  Service: Gastroenterology;  Laterality: N/A;  post op: hiatal hernia, eosphagitis    EYE SURGERY      FEMORAL ARTERY STENT      KNEE SURGERY Left     KNEE SURGERY Left     LUNG SURGERY        General Information       Row Name 24 2818          Physical Therapy  Time and Intention    Document Type evaluation  -OD     Mode of Treatment physical therapy  -OD       Row Name 12/23/24 1258          General Information    Patient Profile Reviewed yes  -OD     Prior Level of Function independent:  -OD     Existing Precautions/Restrictions fall;oxygen therapy device and L/min;other (see comments)  12L high flow  -OD     Barriers to Rehab medically complex;cognitive status  -OD       Row Name 12/23/24 1258          Living Environment    People in Home child(concepción), adult;other (see comments)  son and DIL  -OD       Row Name 12/23/24 1258          Home Main Entrance    Number of Stairs, Main Entrance none  -OD       Row Name 12/23/24 1258          Stairs Within Home, Primary    Number of Stairs, Within Home, Primary none  -OD       Row Name 12/23/24 1258          Cognition    Orientation Status (Cognition) oriented x 4;verbal cues/prompts needed for orientation  -OD       Row Name 12/23/24 1258          Safety Issues/Impairments Affecting Functional Mobility    Safety Issues Affecting Function (Mobility) friction/shear risk;judgment;problem-solving;sequencing abilities  -OD     Impairments Affecting Function (Mobility) balance;cognition;endurance/activity tolerance;strength  -OD     Cognitive Impairments, Mobility Safety/Performance attention;judgment;problem-solving/reasoning;sequencing abilities  -OD               User Key  (r) = Recorded By, (t) = Taken By, (c) = Cosigned By      Initials Name Provider Type    OD Anne Marie De León PT Physical Therapist                   Mobility       Row Name 12/23/24 1300          Bed Mobility    Bed Mobility bed mobility (all) activities  -OD     All Activities, [place] (Bed Mobility) minimum assist (75% patient effort)  -OD       Row Name 12/23/24 1300          Bed-Chair Transfer    Bed-Chair [place] (Transfers) moderate assist (50% patient effort)  -OD       Row Name 12/23/24 1300          Sit-Stand Transfer    Sit-Stand [place]  (Transfers) minimum assist (75% patient effort)  -OD     Assistive Device (Sit-Stand Transfers) walker, front-wheeled  -OD     Comment, (Sit-Stand Transfer) with arm-in-arm assist x1 and BUE support on arm rests and RW  -OD       Row Name 12/23/24 1300          Gait/Stairs (Locomotion)    [place] Level (Gait) unable to assess  -OD     Patient was able to Ambulate no, other medical factors prevent ambulation  -OD     Reason Patient was unable to Ambulate Excessive Weakness  a few shuffled steps during stand pivot  -OD               User Key  (r) = Recorded By, (t) = Taken By, (c) = Cosigned By      Initials Name Provider Type    OD Anne Marie De León, JEROME Physical Therapist                   Obj/Interventions       Row Name 12/23/24 1302          Range of Motion Comprehensive    General Range of Motion lower extremity range of motion deficits identified  -OD     Comment, General Range of Motion lacks full knee extension  -OD       Row Name 12/23/24 1302          Strength Comprehensive (MMT)    General Manual Muscle Testing (MMT) Assessment lower extremity strength deficits identified  -OD     Comment, General Manual Muscle Testing (MMT) Assessment BLE grossly weak 3/5  -OD       Row Name 12/23/24 1302          Motor Skills    Motor Skills functional endurance  -OD     Functional Endurance poor  -OD       Row Name 12/23/24 1302          Balance    Balance Assessment standing dynamic balance  -OD     Dynamic Standing Balance contact guard;minimal assist  -OD     Position/Device Used, Standing Balance walker, rolling  -OD     Balance Interventions sitting;minimal challenge;standing;supported;moderate challenge  -OD       Row Name 12/23/24 1302          Sensory Assessment (Somatosensory)    Sensory Assessment (Somatosensory) sensation intact  -OD               User Key  (r) = Recorded By, (t) = Taken By, (c) = Cosigned By      Initials Name Provider Type    Anne Marie Ragland PT Physical Therapist                    Goals/Plan       Row Name 12/23/24 1307          Bed Mobility Goal 1 (PT)    Activity/Assistive Device (Bed Mobility Goal 1, PT) bed mobility activities, all  -OD     [place] Level/Cues Needed (Bed Mobility Goal 1, PT) independent  -OD     Time Frame (Bed Mobility Goal 1, PT) long term goal (LTG);2 weeks  -OD       Row Name 12/23/24 1307          Transfer Goal 1 (PT)    Activity/Assistive Device (Transfer Goal 1, PT) transfers, all;walker, rolling  -OD     [place] Level/Cues Needed (Transfer Goal 1, PT) modified independence  -OD     Time Frame (Transfer Goal 1, PT) long term goal (LTG);2 weeks  -OD       Row Name 12/23/24 1307          Gait Training Goal 1 (PT)    Activity/Assistive Device (Gait Training Goal 1, PT) gait (walking locomotion);assistive device use;walker, rolling  -OD     [place] Level (Gait Training Goal 1, PT) modified independence  -OD     Distance (Gait Training Goal 1, PT) 40'  -OD     Time Frame (Gait Training Goal 1, PT) long term goal (LTG);2 weeks  -OD       Row Name 12/23/24 1307          Balance Goal 1 (PT)    Activity/Assistive Device (Balance Goal) standing static balance;standing dynamic balance;walker, rolling  -OD     [place] Level/Cues Needed (Balance Goal 1, PT) modified independence  -OD     Time Frame (Balance Goal 1, PT) long-term goal (LTG);2 weeks  -OD       Row Name 12/23/24 1307          Therapy Assessment/Plan (PT)    Planned Therapy Interventions (PT) balance training;bed mobility training;gait training;home exercise program;neuromuscular re-education;ROM (range of motion);stair training;strengthening;stretching;patient/family education;postural re-education;transfer training  -OD               User Key  (r) = Recorded By, (t) = Taken By, (c) = Cosigned By      Initials Name Provider Type    OD Anne Marie De León, PT Physical Therapist                   Clinical Impression       Row Name 12/23/24 1303          Pain    Pretreatment Pain Rating 0/10 - no pain   -OD     Posttreatment Pain Rating 0/10 - no pain  -OD       Row Name 12/23/24 1303          Plan of Care Review    Plan of Care Reviewed With patient  -OD     Progress no change  -OD     Outcome Evaluation Kali Garcia is an 78 y/o M who was admitted to [facility] on 12/22/24 for SOA and congestion. D/w ARF with hypoxia, COPD exacerbation, L lower lobe PNA, and (+) rhinovirus. At baseline, pt lives with his son and DIL in [facility] with 0STE. Pt is typically IND with ADLs and mobility without AD. This date, pt is AAOx4 and on 12L high-flow. Pt demonstrates diminished attention, global weakness, poor balance, and difficulty sequencing. Pt requires Jerod for bed mobility and modA for stand-pivot to the recliner. Dependent for helene-care and only able to tolerate standing ~20 seconds due to poor endurance. Pt desaturated initially upon sitting EOB ~ 88%, but able to return to 90 with cues for efficient breath work. Pt appears below baseline function and would benefit from SNF upon d/c.  -OD       Row Name 12/23/24 1303          Therapy Assessment/Plan (PT)    Rehab Potential (PT) good  -OD     Criteria for Skilled Interventions Met (PT) yes;meets criteria  -OD     Therapy Frequency (PT) 5 times/wk  -OD     Predicted Duration of Therapy Intervention (PT) until d/c  -OD       Row Name 12/23/24 1303          Vital Signs    O2 Delivery Pre Treatment hi-flow  -OD     Intra SpO2 (%) 88  -OD     O2 Delivery Intra Treatment hi-flow  -OD     Post SpO2 (%) 93  -OD     O2 Delivery Post Treatment hi-flow  -OD     Pre Patient Position Supine  -OD     Intra Patient Position Standing  -OD     Post Patient Position Sitting  -OD       Row Name 12/23/24 1303          Positioning and Restraints    Pre-Treatment Position in bed  -OD     Post Treatment Position chair  -OD     In Chair notified nsg;reclined;call light within reach;encouraged to call for assist;exit alarm on  -OD               User Key  (r) = Recorded By, (t) = Taken By, (c) = Cosigned  By      Initials Name Provider Type    Anne Marie Ragland, JEROME Physical Therapist                   Outcome Measures       Row Name 12/23/24 1307 12/23/24 0900       How much help from another person do you currently need...    Turning from your back to your side while in flat bed without using bedrails? 3  -OD 4  -BR    Moving from lying on back to sitting on the side of a flat bed without bedrails? 3  -OD 2  -BR    Moving to and from a bed to a chair (including a wheelchair)? 2  -OD 2  -BR    Standing up from a chair using your arms (e.g., wheelchair, bedside chair)? 3  -OD 2  -BR    Climbing 3-5 steps with a railing? 2  -OD 2  -BR    To walk in hospital room? 2  -OD 2  -BR    AM-PAC 6 Clicks Score (PT) 15  -OD 14  -BR    Highest Level of Mobility Goal 4 --> Transfer to chair/commode  -OD 4 --> Transfer to chair/commode  -BR      Row Name 12/23/24 0135          How much help from another person do you currently need...    Turning from your back to your side while in flat bed without using bedrails? 2  -GH     Moving from lying on back to sitting on the side of a flat bed without bedrails? 2  -GH     Moving to and from a bed to a chair (including a wheelchair)? 2  -GH     Standing up from a chair using your arms (e.g., wheelchair, bedside chair)? 1  -GH     Climbing 3-5 steps with a railing? 1  -GH     To walk in hospital room? 1  -GH     AM-PAC 6 Clicks Score (PT) 9  -GH     Highest Level of Mobility Goal 3 --> Sit at edge of bed  -GH       Row Name 12/23/24 1307          Functional Assessment    Outcome Measure Options AM-PAC 6 Clicks Basic Mobility (PT)  -OD               User Key  (r) = Recorded By, (t) = Taken By, (c) = Cosigned By      Initials Name Provider Type    Ghada Alvarez, RN Registered Nurse    Racheal Green, RN Registered Nurse    Anne Marie Ragland, PT Physical Therapist                                 Physical Therapy Education       Title: PT OT SLP Therapies (Done)       Topic: Physical  Therapy (Done)       Point: Mobility training (Done)       Learning Progress Summary            Patient Acceptance, E, VU by OD at 12/23/2024 1308                      Point: Home exercise program (Done)       Learning Progress Summary            Patient Acceptance, E, VU by OD at 12/23/2024 1308                      Point: Body mechanics (Done)       Learning Progress Summary            Patient Acceptance, E, VU by OD at 12/23/2024 1308                      Point: Precautions (Done)       Learning Progress Summary            Patient Acceptance, E, VU by OD at 12/23/2024 1308                                      User Key       Initials Effective Dates Name Provider Type Discipline    OD 05/11/23 -  Anne Marie De León, PT Physical Therapist PT                  PT Recommendation and Plan  Planned Therapy Interventions (PT): balance training, bed mobility training, gait training, home exercise program, neuromuscular re-education, ROM (range of motion), stair training, strengthening, stretching, patient/family education, postural re-education, transfer training  Progress: no change  Outcome Evaluation: Kali Garcia is an 78 y/o M who was admitted to [facility] on 12/22/24 for SOA and congestion. D/w ARF with hypoxia, COPD exacerbation, L lower lobe PNA, and (+) rhinovirus. At baseline, pt lives with his son and DIL in [facility] with 0STE. Pt is typically IND with ADLs and mobility without AD. This date, pt is AAOx4 and on 12L high-flow. Pt demonstrates diminished attention, global weakness, poor balance, and difficulty sequencing. Pt requires Jerod for bed mobility and modA for stand-pivot to the recliner. Dependent for helene-care and only able to tolerate standing ~20 seconds due to poor endurance. Pt desaturated initially upon sitting EOB ~ 88%, but able to return to 90 with cues for efficient breath work. Pt appears below baseline function and would benefit from SNF upon d/c.     Time Calculation:         PT Charges       Row Name  12/23/24 1308             Time Calculation    Start Time 0949  -OD      Stop Time 1013  -OD      Time Calculation (min) 24 min  -OD      PT Received On 12/23/24  -OD      PT - Next Appointment 12/24/24  -OD      PT Goal Re-Cert Due Date 01/06/25  -OD         Time Calculation- PT    Total Timed Code Minutes- PT 0 minute(s)  -OD                User Key  (r) = Recorded By, (t) = Taken By, (c) = Cosigned By      Initials Name Provider Type    OD Anne Marie De León, PT Physical Therapist                  Therapy Charges for Today       Code Description Service Date Service Provider Modifiers Qty    40881432940 HC PT EVAL MOD COMPLEXITY 4 12/23/2024 Anne Marie De León, PT GP 1            PT G-Codes  Outcome Measure Options: AM-PAC 6 Clicks Basic Mobility (PT)  AM-PAC 6 Clicks Score (PT): 15  PT Discharge Summary  Anticipated Discharge Disposition (PT): skilled nursing facility    Anne Marie De León, JEROME  12/23/2024

## 2024-12-23 NOTE — PLAN OF CARE
Goal Outcome Evaluation:   Pt intermittently on bipap. Per Pulmonology patient is to go on Bipap at night and with SOA. Pt on 7L NC when not on bipap. A&O x 4. Pt worked w/ PT/OT today. VSS.

## 2024-12-23 NOTE — PLAN OF CARE
Goal Outcome Evaluation:  Plan of Care Reviewed With: patient        Progress: no change  Outcome Evaluation: Kali Garcia is an 78 y/o M who was admitted to [facility] on 12/22/24 for SOA and congestion. D/w ARF with hypoxia, COPD exacerbation, L lower lobe PNA, and (+) rhinovirus. At baseline, pt lives with his son and DIL in [facility] with 0STE. Pt is typically IND with ADLs and mobility without AD. This date, pt is AAOx4 and on 12L high-flow. Pt demonstrates diminished attention, global weakness, poor balance, and difficulty sequencing. Pt requires Jerod for bed mobility and modA for stand-pivot to the recliner. Dependent for helene-care and only able to tolerate standing ~20 seconds due to poor endurance. Pt desaturated initially upon sitting EOB ~ 88%, but able to return to 90 with cues for efficient breath work. Pt appears below baseline function and would benefit from SNF upon d/c.    Anticipated Discharge Disposition (PT): skilled nursing facility

## 2024-12-23 NOTE — ACP (ADVANCE CARE PLANNING)
"Social Work Assessment  [facility]     Patient Name: Kali Garcia  MRN: [id number]  Today's Date: 2024    Admit Date: 2024     Demographic Summary       Row Name 24 8475       General Information    Reason for Consult decision-making    General Information Comments SW reviewed chart and noted pt is listed as being , but spouse is not a listed contact. Per nurse, pt with intermittent confusion. SW called pt's son Ivsi, who reports pt's wife  2021. He reports pt has another son named Luisito, but, \"he's a drug addict,\" and does not know how to contact him. Ivis reports he has had no contact with his brother in 3 years and has no idea of his whereabouts. According to HB 1119, pt's son is legal NOK unless pt's other son comes forward, in which case both would need to agree on decisions. Marital status updated.      BIJU Albright, \[facility] [place] [facility]\""  Medical Social Worker  Ph [phone]  Fax [phone]  [email or web address]    "

## 2024-12-23 NOTE — NURSING NOTE
Per CN, this pt will need to go ICU.   Looks like RT has already placed this pt on a Bipap.  Informed the CN, who states this pt needs to be moved prior to dayshift.

## 2024-12-23 NOTE — H&P
[facility] Medicine Services    Hospitalist History and Physical     Kail Garcia : 1945 MRN:[id number] LOS:0 ROOM:      Reason for admission: Pneumonia     Assessment / Plan     Acute on chronic respiratory failure with hypoxia  COPD exacerbation   Left lower lobe pneumonia  Rhinovirus  - pt reportedly chronically on 6L O2 via [place]. He was on 15L initially in the ER now back down to his home 6L rate  - CXR: increased opacity in the left lung base could reflect pneumonia.  Stable nodular thickening in the right lung apex.  Severe emphysema chronic interstitial disease.   - RVP detected rhinovirus  - WBC normal, lactate normal, procalcitonin 0.60  - IV unasyn, zithomax  - duonebs, pulmicort, solumedrol, IS/flutter valve  - check BNP, ABG   - pulmonary consultation for further recommendations, may benefit from bronchoscopy   - continuous pulse oximetry     Anemia  - hgb 7.8 (compared to 10.2024)  - check anemia studies     CAD  Atrial fibrillation  Hypertension  Hyperlipidemia  PVD  - on plavix, statin     Severe protein malnutrition  - BMI 15.34  - nutrition consult      Nutrition:   Diet: Regular/House; Fluid Consistency: Thin (IDDSI 0)     VTE Prophylaxis:  Mechanical VTE prophylaxis orders are present.         History of Present illness     Kali Garcia is a 79 y.o. male with PMH of COPD/chronic respiratory failure on 6L O2, atrial fibrillation, CAD, PVD, hypertension, hyperlipidemia, OA presented to [facility] ED 2024 with complaints of shortness of breath, cough, congestion for two weeks. He has had some intermittent right sided chest soreness. He has felt weak.     In the ED the patient had normal WBC, lactate normal 1.4, procalcitonin 0.60, hgb 7.8 (compared to 10.2024). CXR showed increased opacity in the left lung base could reflect pneumonia.  Stable nodular thickening in the right lung apex.  Severe emphysema chronic interstitial disease.  Respiratory viral panel  detected rhinovirus. He was started on IV unasyn and zithromax. He was admitted for further treatment of acute on chronic respiratory failure with hypoxia, pneumonia, rhinovirus, and anemia.    Subjective / Review of systems     Review of Systems   Constitutional:  Positive for fatigue.   HENT: Negative.     Eyes: Negative.    Respiratory:  Positive for cough, shortness of breath and wheezing.    Cardiovascular:  Positive for chest pain.   Gastrointestinal: Negative.    Genitourinary: Negative.    Musculoskeletal: Negative.    Skin: Negative.    Neurological:  Positive for weakness.   Psychiatric/Behavioral: Negative.          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 and endoscopic clipping x 1;  Surgeon: Luz Jacques MD;  Location: [facility] ENDOSCOPY;  Service: Gastroenterology;  Laterality: N/A;  post op: cecal AVM    CORONARY STENT PLACEMENT      ENDOSCOPY N/A 4/14/2022    Procedure: ESOPHAGOGASTRODUODENOSCOPY WITH BIOPSY X1 AREA;  Surgeon: Luz Jacques MD;  Location: [facility] ENDOSCOPY;  Service: Gastroenterology;  Laterality: N/A;  esophagitis, gastritis, HH    ENDOSCOPY N/A 8/4/2022    Procedure: ESOPHAGOGASTRODUODENOSCOPY;  Surgeon: Luz Jacques MD;  Location: [facility] ENDOSCOPY;  Service: Gastroenterology;  Laterality: N/A;  post op: hiatal hernia, eosphagitis    EYE SURGERY      FEMORAL ARTERY STENT      KNEE SURGERY Left     KNEE SURGERY Left     LUNG SURGERY        Social History     Socioeconomic History    Marital status:    Tobacco Use    Smoking status: Former     Current packs/day: 0.00      Average packs/day: 1 pack/day for 63.0 years (63.0 ttl pk-yrs)     Types: Cigarettes     Start date: 1960     Quit date: 2023     Years since quittin.0   Vaping Use    Vaping status: Never Used   Substance and Sexual Activity    Alcohol use: Not Currently    Drug use: Never    Sexual activity: Defer      History reviewed. No pertinent family history.   Allergies   Allergen Reactions    Codeine GI Intolerance      Social Drivers of Health     Tobacco Use: Medium Risk (2024)    Patient History     Smoking Tobacco Use: Former     Smokeless Tobacco Use: Unknown     Passive Exposure: Not on file   Alcohol Use: Not At Risk (2024)    AUDIT-C     Frequency of Alcohol Consumption: Never     Average Number of Drinks: Patient does not drink     Frequency of Binge Drinking: Never   Financial Resource Strain: Not on file   Food Insecurity: No Food Insecurity (2024)    Hunger Vital Sign     Worried About Running Out of Food in the Last Year: Never true     Ran Out of Food in the Last Year: Never true   Transportation Needs: No Transportation Needs (3/11/2024)    OASIS : Transportation     Lack of Transportation (Medical): No     Lack of Transportation (Non-Medical): No     Patient Unable or Declines to Respond: No   Physical Activity: Not on file   Stress: Not on file   Social Connections: Feeling Socially Integrated (3/11/2024)    OASIS : Social Isolation     Frequency of experiencing loneliness or isolation: Never   Interpersonal Safety: Not At Risk (2024)    Abuse Screen     Unsafe at Home or Work/School: no     Feels Threatened by Someone?: no     Does Anyone Keep You from Contacting Others or Doint Things Outside the Home?: no     Physical Sign of Abuse Present: no   Depression: Not on file   Housing Stability: Not At Risk (2024)    Housing Stability     Current Living Arrangements: home     Potentially Unsafe Housing Conditions: none   Utilities: Not At Risk (2024)     [facility] Utilities     Threatened with loss of utilities: No   Health Literacy: Adequate Health Literacy (3/11/2024)    OASIS : Health Literacy     Frequency of needing help to read materials from doctor or pharmacy: Rarely   Recent Concern: Health Literacy - Inadequate Health Literacy (2/8/2024)    OASIS : Health Literacy     Frequency of needing help to read materials from doctor or pharmacy: Often   Employment: Not on file   Disabilities: At Risk (12/23/2024)    Disabilities     Concentrating, Remembering, or Making Decisions Difficulty: no     Doing Errands Independently Difficulty: yes        Home Medications     Prior to Admission medications    Medication Sig Start Date End Date Taking? Authorizing Provider   clopidogrel (PLAVIX) 75 MG tablet Take 1 tablet by mouth Daily. Indications: Percutaneous Coronary Intervention    ProviderGwendolyn MD   ipratropium-albuterol (DUO-NEB) 0.5-2.5 mg/3 ml nebulizer Take 3 mL by nebulization Every 4 (Four) Hours As Needed for Wheezing. Indications: Chronic Obstructive Lung Disease    ProviderGwendolyn MD   O2 (OXYGEN) Inhale 2 L/min Daily. Indications: Shortness of breath 1/8/23   Gwendolyn Martínez MD   polyethylene glycol (MIRALAX) 17 g packet Take 17 g by mouth Daily. Indications: Constipation 2/1/24   Gwendolyn Martínez MD   pravastatin (PRAVACHOL) 40 MG tablet Take 1 tablet by mouth Daily. Indications: Disease involving Lipid Deposits in the Arteries    ProviderGwendolyn MD        Objective / Physical Exam     Vital signs:  Temp: 98.2 °F (36.8 °C)  BP: 120/58  Heart Rate: 68  Resp: 18  SpO2: 91 %  Weight: 49.9 kg (110 lb 0.2 oz)    Admission Weight: Weight: 49.9 kg (110 lb 0.2 oz)    Physical Exam  Vitals and nursing note reviewed.   Constitutional:       Appearance: He is cachectic.   HENT:      Head: Normocephalic and atraumatic.   Eyes:      Extraocular Movements: Extraocular movements intact.      Pupils: Pupils are equal, round, and reactive  to light.   Cardiovascular:      Rate and Rhythm: Normal rate and regular rhythm.      Pulses: Normal pulses.      Heart sounds: Normal heart sounds.   Pulmonary:      Effort: Tachypnea present. No respiratory distress.      Breath sounds: Examination of the right-upper field reveals decreased breath sounds, rhonchi and rales. Examination of the left-upper field reveals decreased breath sounds, rhonchi and rales. Examination of the right-lower field reveals decreased breath sounds and rhonchi. Examination of the left-lower field reveals decreased breath sounds and rhonchi. Decreased breath sounds, rhonchi and rales present.   Abdominal:      General: Bowel sounds are normal.      Palpations: Abdomen is soft.      Tenderness: There is no abdominal tenderness.   Musculoskeletal:         General: Normal range of motion.   Skin:     General: Skin is warm and dry.   Neurological:      Mental Status: He is alert and oriented to person, place, and time.   Psychiatric:         Mood and Affect: Mood normal.         Behavior: Behavior normal.          Labs     Results from last 7 days   Lab Units 12/22/24  2203   WBC 10*3/mm3 8.27   HEMOGLOBIN g/dL 7.8*   HEMATOCRIT % 24.5*   PLATELETS 10*3/mm3 142      Results from last 7 days   Lab Units 12/22/24  2203   ALK PHOS U/L 78   AST (SGOT) U/L 24   ALT (SGPT) U/L 13           Results from last 7 days   Lab Units 12/22/24  2203   SODIUM mmol/L 140   POTASSIUM mmol/L 4.3   CHLORIDE mmol/L 102   CO2 mmol/L 29.4*   BUN mg/dL 32*   CREATININE mg/dL 1.03   GLUCOSE mg/dL 131*        Imaging     XR Chest 1 View    Result Date: 12/22/2024  XR CHEST 1 VW Date of Exam: 12/22/2024 10:18 PM EST Indication: Simple Sepsis Protocol Comparison: 2/2/2024 and PET/CT 10/10/2024. Findings: Stable nodular thickening in the right lung apex. Stable diffuse coarse interstitial disease in both lungs and severe emphysema. There is increased opacity in the left lung base that could reflect a superimposed  pneumonia. No pneumothorax or pleural effusion. Heart size is normal. Pulmonary vasculature is largely obscured. No acute osseous abnormalities.     Impression: 1.Increased opacity in the left lung base could reflect pneumonia. 2.Stable nodular thickening in the right lung apex. 3.Severe emphysema and chronic interstitial disease. Electronically Signed: Sandro Saravia MD  12/22/2024 10:57 PM EST  Workstation ID: [id number]          Current Medications     Scheduled Meds:  ampicillin-sulbactam, 3 g, Intravenous, Q8H  azithromycin, 500 mg, Oral, Nightly  budesonide, 0.5 mg, Nebulization, BID - RT  guaiFENesin, 600 mg, Oral, Q12H  ipratropium-albuterol, 3 mL, Nebulization, Q4H - RT  sodium chloride, 10 mL, Intravenous, Q12H       Nurys ThorpeProvidence [facility]  12/23/24   02:42 EST

## 2024-12-24 LAB
ACANTHOCYTES BLD QL SMEAR: ABNORMAL
ANION GAP SERPL CALCULATED.3IONS-SCNC: 10.8 MMOL/L (ref 5–15)
BACTERIA BLD CULT: ABNORMAL
BH BB BLOOD EXPIRATION DATE: NORMAL
BH BB BLOOD TYPE BARCODE: 6200
BH BB DISPENSE STATUS: NORMAL
BH BB PRODUCT CODE: NORMAL
BH BB UNIT NUMBER: NORMAL
BOTTLE TYPE: ABNORMAL
BUN SERPL-MCNC: 36 MG/DL (ref 8–23)
BUN/CREAT SERPL: 40.9 (ref 7–25)
C3 FRG RBC-MCNC: ABNORMAL
CALCIUM SPEC-SCNC: 8.4 MG/DL (ref 8.6–10.5)
CHLORIDE SERPL-SCNC: 106 MMOL/L (ref 98–107)
CO2 SERPL-SCNC: 30.2 MMOL/L (ref 22–29)
CREAT SERPL-MCNC: 0.88 MG/DL (ref 0.76–1.27)
CROSSMATCH INTERPRETATION: NORMAL
DEPRECATED RDW RBC AUTO: 53.9 FL (ref 37–54)
EGFRCR SERPLBLD CKD-EPI 2021: 86.9 ML/MIN/1.73
ERYTHROCYTE [DISTWIDTH] IN BLOOD BY AUTOMATED COUNT: 15.9 % (ref 12.3–15.4)
GLUCOSE BLDC GLUCOMTR-MCNC: 125 MG/DL (ref 70–105)
GLUCOSE SERPL-MCNC: 127 MG/DL (ref 65–99)
HCT VFR BLD AUTO: 24.5 % (ref 37.5–51)
HGB BLD-MCNC: 7.6 G/DL (ref 13–17.7)
LAB AP CASE REPORT: NORMAL
LYMPHOCYTES # BLD MANUAL: 0.21 10*3/MM3 (ref 0.7–3.1)
LYMPHOCYTES NFR BLD MANUAL: 2 % (ref 5–12)
MCH RBC QN AUTO: 29.1 PG (ref 26.6–33)
MCHC RBC AUTO-ENTMCNC: 31 G/DL (ref 31.5–35.7)
MCV RBC AUTO: 93.9 FL (ref 79–97)
METAMYELOCYTES NFR BLD MANUAL: 6 % (ref 0–0)
MONOCYTES # BLD: 0.14 10*3/MM3 (ref 0.1–0.9)
NEUTROPHILS # BLD AUTO: 6.17 10*3/MM3 (ref 1.7–7)
NEUTROPHILS NFR BLD MANUAL: 70 % (ref 42.7–76)
NEUTS BAND NFR BLD MANUAL: 19 % (ref 0–5)
PATH REPORT.FINAL DX SPEC: NORMAL
PATHOLOGY REVIEW: YES
PLAT MORPH BLD: NORMAL
PLATELET # BLD AUTO: 148 10*3/MM3 (ref 140–450)
PMV BLD AUTO: 10.4 FL (ref 6–12)
POTASSIUM SERPL-SCNC: 4 MMOL/L (ref 3.5–5.2)
RBC # BLD AUTO: 2.61 10*6/MM3 (ref 4.14–5.8)
ROULEAUX BLD QL SMEAR: ABNORMAL
SCAN SLIDE: NORMAL
SODIUM SERPL-SCNC: 147 MMOL/L (ref 136–145)
UNIT  ABO: NORMAL
UNIT  RH: NORMAL
VARIANT LYMPHS NFR BLD MANUAL: 1 % (ref 19.6–45.3)
VARIANT LYMPHS NFR BLD MANUAL: 2 % (ref 0–5)
WBC MORPH BLD: NORMAL
WBC NRBC COR # BLD AUTO: 6.93 10*3/MM3 (ref 3.4–10.8)

## 2024-12-24 PROCEDURE — 85007 BL SMEAR W/DIFF WBC COUNT: CPT | Performed by: NURSE PRACTITIONER

## 2024-12-24 PROCEDURE — 80048 BASIC METABOLIC PNL TOTAL CA: CPT | Performed by: NURSE PRACTITIONER

## 2024-12-24 PROCEDURE — 97112 NEUROMUSCULAR REEDUCATION: CPT

## 2024-12-24 PROCEDURE — 25810000003 SODIUM CHLORIDE 0.9 % SOLUTION: Performed by: INTERNAL MEDICINE

## 2024-12-24 PROCEDURE — 25010000002 NA FERRIC GLUC CPLX PER 12.5 MG: Performed by: INTERNAL MEDICINE

## 2024-12-24 PROCEDURE — 85025 COMPLETE CBC W/AUTO DIFF WBC: CPT | Performed by: NURSE PRACTITIONER

## 2024-12-24 PROCEDURE — 25010000002 AMPICILLIN-SULBACTAM PER 1.5 G: Performed by: NURSE PRACTITIONER

## 2024-12-24 PROCEDURE — 82948 REAGENT STRIP/BLOOD GLUCOSE: CPT

## 2024-12-24 PROCEDURE — 94799 UNLISTED PULMONARY SVC/PX: CPT

## 2024-12-24 PROCEDURE — 94761 N-INVAS EAR/PLS OXIMETRY MLT: CPT

## 2024-12-24 PROCEDURE — 97530 THERAPEUTIC ACTIVITIES: CPT

## 2024-12-24 PROCEDURE — 25010000002 METHYLPREDNISOLONE PER 40 MG: Performed by: INTERNAL MEDICINE

## 2024-12-24 PROCEDURE — 94664 DEMO&/EVAL PT USE INHALER: CPT

## 2024-12-24 PROCEDURE — 94660 CPAP INITIATION&MGMT: CPT

## 2024-12-24 RX ORDER — TAMSULOSIN HYDROCHLORIDE 0.4 MG/1
0.4 CAPSULE ORAL DAILY
Status: DISCONTINUED | OUTPATIENT
Start: 2024-12-24 | End: 2025-01-03 | Stop reason: HOSPADM

## 2024-12-24 RX ORDER — METHYLPREDNISOLONE SODIUM SUCCINATE 40 MG/ML
40 INJECTION, POWDER, LYOPHILIZED, FOR SOLUTION INTRAMUSCULAR; INTRAVENOUS DAILY
Status: DISCONTINUED | OUTPATIENT
Start: 2024-12-25 | End: 2024-12-27

## 2024-12-24 RX ORDER — DIPHENOXYLATE HYDROCHLORIDE AND ATROPINE SULFATE 2.5; .025 MG/1; MG/1
1 TABLET ORAL DAILY
Status: DISCONTINUED | OUTPATIENT
Start: 2024-12-24 | End: 2025-01-03 | Stop reason: HOSPADM

## 2024-12-24 RX ORDER — PANTOPRAZOLE SODIUM 40 MG/1
40 TABLET, DELAYED RELEASE ORAL
Status: DISCONTINUED | OUTPATIENT
Start: 2024-12-25 | End: 2025-01-03 | Stop reason: HOSPADM

## 2024-12-24 RX ADMIN — IPRATROPIUM BROMIDE AND ALBUTEROL SULFATE 3 ML: .5; 3 SOLUTION RESPIRATORY (INHALATION) at 16:01

## 2024-12-24 RX ADMIN — BUDESONIDE INHALATION 0.5 MG: 0.5 SUSPENSION RESPIRATORY (INHALATION) at 07:53

## 2024-12-24 RX ADMIN — METHYLPREDNISOLONE SODIUM SUCCINATE 40 MG: 40 INJECTION, POWDER, FOR SOLUTION INTRAMUSCULAR; INTRAVENOUS at 14:40

## 2024-12-24 RX ADMIN — AMPICILLIN SODIUM AND SULBACTAM SODIUM 3 G: 2; 1 INJECTION, POWDER, FOR SOLUTION INTRAMUSCULAR; INTRAVENOUS at 20:55

## 2024-12-24 RX ADMIN — CLOPIDOGREL BISULFATE 75 MG: 75 TABLET ORAL at 08:39

## 2024-12-24 RX ADMIN — ACETYLCYSTEINE 3 ML: 200 SOLUTION ORAL; RESPIRATORY (INHALATION) at 07:49

## 2024-12-24 RX ADMIN — ACETAMINOPHEN 650 MG: 325 TABLET, FILM COATED ORAL at 14:41

## 2024-12-24 RX ADMIN — TAMSULOSIN HYDROCHLORIDE 0.4 MG: 0.4 CAPSULE ORAL at 17:30

## 2024-12-24 RX ADMIN — IPRATROPIUM BROMIDE AND ALBUTEROL SULFATE 3 ML: .5; 3 SOLUTION RESPIRATORY (INHALATION) at 23:00

## 2024-12-24 RX ADMIN — POLYETHYLENE GLYCOL 3350 17 G: 17 POWDER, FOR SOLUTION ORAL at 08:39

## 2024-12-24 RX ADMIN — SODIUM CHLORIDE 250 MG: 9 INJECTION, SOLUTION INTRAVENOUS at 08:38

## 2024-12-24 RX ADMIN — Medication 5 MG: at 23:39

## 2024-12-24 RX ADMIN — GUAIFENESIN 1200 MG: 600 TABLET, EXTENDED RELEASE ORAL at 08:39

## 2024-12-24 RX ADMIN — GUAIFENESIN 1200 MG: 600 TABLET, EXTENDED RELEASE ORAL at 20:56

## 2024-12-24 RX ADMIN — SENNOSIDES AND DOCUSATE SODIUM 2 TABLET: 50; 8.6 TABLET ORAL at 23:39

## 2024-12-24 RX ADMIN — IPRATROPIUM BROMIDE AND ALBUTEROL SULFATE 3 ML: .5; 3 SOLUTION RESPIRATORY (INHALATION) at 07:49

## 2024-12-24 RX ADMIN — MUPIROCIN 1 APPLICATION: 20 OINTMENT TOPICAL at 08:39

## 2024-12-24 RX ADMIN — IPRATROPIUM BROMIDE AND ALBUTEROL SULFATE 3 ML: .5; 3 SOLUTION RESPIRATORY (INHALATION) at 19:52

## 2024-12-24 RX ADMIN — ATORVASTATIN CALCIUM 10 MG: 10 TABLET ORAL at 08:39

## 2024-12-24 RX ADMIN — METHYLPREDNISOLONE SODIUM SUCCINATE 40 MG: 40 INJECTION, POWDER, FOR SOLUTION INTRAMUSCULAR; INTRAVENOUS at 04:29

## 2024-12-24 RX ADMIN — IPRATROPIUM BROMIDE AND ALBUTEROL SULFATE 3 ML: .5; 3 SOLUTION RESPIRATORY (INHALATION) at 11:32

## 2024-12-24 RX ADMIN — Medication 10 ML: at 20:56

## 2024-12-24 RX ADMIN — MUPIROCIN 1 APPLICATION: 20 OINTMENT TOPICAL at 20:56

## 2024-12-24 RX ADMIN — AMPICILLIN SODIUM AND SULBACTAM SODIUM 3 G: 2; 1 INJECTION, POWDER, FOR SOLUTION INTRAMUSCULAR; INTRAVENOUS at 04:28

## 2024-12-24 RX ADMIN — Medication 10 ML: at 08:40

## 2024-12-24 RX ADMIN — AMPICILLIN SODIUM AND SULBACTAM SODIUM 3 G: 2; 1 INJECTION, POWDER, FOR SOLUTION INTRAMUSCULAR; INTRAVENOUS at 14:40

## 2024-12-24 RX ADMIN — AZITHROMYCIN 500 MG: 250 TABLET, FILM COATED ORAL at 20:56

## 2024-12-24 RX ADMIN — BUDESONIDE INHALATION 0.5 MG: 0.5 SUSPENSION RESPIRATORY (INHALATION) at 19:52

## 2024-12-24 RX ADMIN — THERA TABS 1 TABLET: TAB at 14:41

## 2024-12-24 NOTE — PLAN OF CARE
Problem: Adult Inpatient Plan of Care  Goal: Plan of Care Review  Outcome: Not Progressing  Flowsheets (Taken 12/24/2024 0205)  Progress: no change  Outcome Evaluation: pt remains on 6L HF NC overnight. attempted bipap and bedtime, and pt was unable to tolerate it, and requested to change back to his NC. educated on need for bipap, but patient did not want to wear it at that time. maintaining sats in the upper 90s on 6L HF NC. no complaints of pain or discomfort. VSS. will continue to monitor.  Plan of Care Reviewed With: patient  Goal: Patient-Specific Goal (Individualized)  Outcome: Not Progressing  Goal: Absence of Hospital-Acquired Illness or Injury  Outcome: Not Progressing  Intervention: Identify and Manage Fall Risk  Description: Perform standard risk assessment on admission using a validated tool or comprehensive approach appropriate to the patient; reassess fall risk frequently, with change in status or transfer to another level of care.  Communicate risk to interprofessional healthcare team; ensure fall risk visible cue.  Determine need for increased observation, equipment and environmental modification, as well as use of supportive, nonskid footwear.  Adjust safety measures to individual needs and identified risk factors.  Reinforce the importance of active participation with fall risk prevention, safety, and physical activity with the patient and family.  Perform regular intentional rounding to assess need for position change, pain assessment and personal needs, including assistance with toileting.  Recent Flowsheet Documentation  Taken 12/24/2024 0200 by Maris Dee, RN  Safety Promotion/Fall Prevention: safety round/check completed  Taken 12/24/2024 0100 by Maris Dee, RN  Safety Promotion/Fall Prevention: safety round/check completed  Taken 12/24/2024 0000 by Maris Dee, RN  Safety Promotion/Fall Prevention:   safety round/check completed   clutter free environment maintained   assistive  device/personal items within [facility]   fall prevention program maintained   lighting adjusted   muscle strengthening facilitated   nonskid shoes/slippers when out of bed   room organization consistent  Taken 12/23/2024 2300 by Maris Dee RN  Safety Promotion/Fall Prevention: safety round/check completed  Taken 12/23/2024 2200 by Maris Dee RN  Safety Promotion/Fall Prevention: safety round/check completed  Taken 12/23/2024 2100 by Maris Dee RN  Safety Promotion/Fall Prevention: safety round/check completed  Taken 12/23/2024 1950 by Maris Dee RN  Safety Promotion/Fall Prevention:   safety round/check completed   clutter free environment maintained   assistive device/personal items within [facility]   fall prevention program maintained   lighting adjusted   muscle strengthening facilitated   nonskid shoes/slippers when out of bed   room organization consistent  Taken 12/23/2024 1900 by Maris Dee RN  Safety Promotion/Fall Prevention: safety round/check completed  Intervention: Prevent Skin Injury  Description: Perform a screening for skin injury risk, such as pressure or moisture-associated skin damage on admission and at regular intervals throughout hospital stay.  Keep all areas of skin (especially folds) clean and dry.  Maintain adequate skin hydration.  Relieve and redistribute pressure and protect bony prominences and skin at risk for injury; implement measures based on patient-specific risk factors.  Match turning and repositioning schedule to clinical condition.  Encourage weight shift frequently; assist with reposition if unable to complete independently.  Float heels off bed; avoid pressure on the Achilles tendon.  Keep skin free from extended contact with medical devices.  Optimize nutrition and hydration.  Encourage functional activity and mobility, as early as tolerated.  Use aids (e.g., slide boards, mechanical lift) during transfer.  Recent Flowsheet Documentation  Taken 12/24/2024 0100 by Maris Dee  A, RN  Body Position: position changed independently  Taken 12/24/2024 0000 by Maris Dee RN  Body Position: position changed independently  Skin Protection:   incontinence pads utilized   pulse oximeter probe site changed   silicone foam dressing in place  Taken 12/23/2024 2300 by Maris Dee RN  Body Position: position changed independently  Taken 12/23/2024 2100 by Maris Dee RN  Body Position:   position changed independently   turned   left  Taken 12/23/2024 1950 by Maris Dee RN  Body Position: position changed independently  Skin Protection:   incontinence pads utilized   pulse oximeter probe site changed   silicone foam dressing in place  Taken 12/23/2024 1900 by Maris Dee RN  Body Position: position changed independently  Intervention: Prevent and Manage VTE (Venous Thromboembolism) Risk  Description: Assess for VTE (venous thromboembolism) risk.  Promote early mobilization; encourage both active and passive leg exercises, if unable to ambulate.  Initiate and maintain compression or other therapy, as indicated, based on identified risk in accordance with organizational protocol and provider order.  Recognize the patient's individual risk for bleeding before initiating pharmacologic thromboprophylaxis.  Recent Flowsheet Documentation  Taken 12/24/2024 0000 by Maris Dee RN  VTE Prevention/Management:   bilateral   SCDs (sequential compression devices) on  Taken 12/23/2024 1950 by Maris Dee RN  VTE Prevention/Management:   bilateral   SCDs (sequential compression devices) on  Intervention: Prevent Infection  Description: Maintain skin and mucous membrane integrity; promote hand, oral and pulmonary hygiene.  Optimize fluid balance, nutrition, sleep and glycemic control to maximize infection resistance.  Identify potential sources of infection early to prevent or mitigate progression of infection (e.g., wound, lines, devices).  Evaluate ongoing need for invasive devices; remove promptly when no  longer indicated.  Review vaccination status.  Recent Flowsheet Documentation  Taken 12/24/2024 0000 by Maris Dee RN  Infection Prevention:   equipment surfaces disinfected   hand hygiene promoted   personal protective equipment utilized   rest/sleep promoted   single patient room provided  Taken 12/23/2024 1950 by Maris Dee RN  Infection Prevention:   equipment surfaces disinfected   hand hygiene promoted   personal protective equipment utilized   rest/sleep promoted   single patient room provided  Goal: Optimal Comfort and Wellbeing  Outcome: Not Progressing  Intervention: Monitor Pain and Promote Comfort  Description: Assess pain level, treatment efficacy and patient response at regular intervals using a consistent pain scale.  Consider the presence and impact of preexisting chronic pain.  Encourage patient and caregiver involvement in pain assessment, interventions and safety measures.  Promote activity; balance with sleep and rest to enhance healing.  Recent Flowsheet Documentation  Taken 12/24/2024 0000 by Maris Dee RN  Pain Management Interventions:   care clustered   pillow support provided   position adjusted   no interventions per patient request   quiet environment facilitated   relaxation techniques promoted   therapeutic touch utilized  Taken 12/23/2024 1950 by Maris Dee RN  Pain Management Interventions:   care clustered   pillow support provided   position adjusted   no interventions per patient request   quiet environment facilitated   relaxation techniques promoted   therapeutic touch utilized  Intervention: Provide Person-Centered Care  Description: Use a family-focused approach to care; encourage support system presence and participation.  Develop trust and rapport by proactively providing information, encouraging questions, addressing concerns and offering reassurance.  Acknowledge emotional response to hospitalization.  Recognize and utilize personal coping strategies and strengths;  develop goals via shared decision-making.  Honor spiritual and cultural preferences.  Recent Flowsheet Documentation  Taken 12/24/2024 0000 by Maris Dee RN  Trust Relationship/Rapport:   care explained   choices provided   emotional support provided   empathic listening provided   questions answered   questions encouraged   reassurance provided   thoughts/feelings acknowledged  Taken 12/23/2024 1950 by Maris Dee RN  Trust Relationship/Rapport:   care explained   choices provided   emotional support provided   empathic listening provided   questions answered   questions encouraged   reassurance provided   thoughts/feelings acknowledged  Goal: Readiness for Transition of Care  Outcome: Not Progressing     Problem: Fall Injury Risk  Goal: Absence of Fall and Fall-Related Injury  Outcome: Not Progressing  Intervention: Identify and Manage Contributors  Description: Develop a fall prevention plan, considering patient-centered interventions and family/caregiver involvement; identify and address patient's facilitators and barriers.  Provide reorientation, appropriate sensory stimulation and routines with changes in mental status to decrease risk of fall.  Promote use of personal vision and auditory aids.  Assess assistance level required for safe and effective self-care; provide support as needed, such as toileting and mobilization. For age 65 and older, implement timed toileting with assistance.  Encourage physical activity, such as performance of mobility and self-care at highest level of patient ability, multicomponent exercise program and provision of appropriate assistive devices.  If fall occurs, assess the severity of injury; implement fall injury protocol. Determine the cause and revise fall injury prevention plan.  Regularly review and advocate for medication adjustment to decrease fall risk; consider administration times, polypharmacy and age.  Balance adequate pain management with potential for  oversedation.  Recent Flowsheet Documentation  Taken 12/24/2024 0000 by Maris Dee RN  Medication Review/Management:   medications reviewed   high-risk medications identified  Taken 12/23/2024 1950 by Maris Dee RN  Medication Review/Management:   medications reviewed   high-risk medications identified  Intervention: Promote Injury-Free Environment  Description: Provide a safe, barrier-free environment that encourages independent activity.  Keep care area uncluttered and well-lighted.  Determine need for increased observation or monitoring.  Avoid use of devices that minimize mobility, such as restraints or indwelling urinary catheter.  Recent Flowsheet Documentation  Taken 12/24/2024 0200 by Maris Dee RN  Safety Promotion/Fall Prevention: safety round/check completed  Taken 12/24/2024 0100 by Maris Dee RN  Safety Promotion/Fall Prevention: safety round/check completed  Taken 12/24/2024 0000 by Maris Dee RN  Safety Promotion/Fall Prevention:   safety round/check completed   clutter free environment maintained   assistive device/personal items within reach   fall prevention program maintained   lighting adjusted   muscle strengthening facilitated   nonskid shoes/slippers when out of bed   room organization consistent  Taken 12/23/2024 2300 by Maris Dee RN  Safety Promotion/Fall Prevention: safety round/check completed  Taken 12/23/2024 2200 by Maris Dee RN  Safety Promotion/Fall Prevention: safety round/check completed  Taken 12/23/2024 2100 by Maris Dee RN  Safety Promotion/Fall Prevention: safety round/check completed  Taken 12/23/2024 1950 by Maris Dee RN  Safety Promotion/Fall Prevention:   safety round/check completed   clutter free environment maintained   assistive device/personal items within reach   fall prevention program maintained   lighting adjusted   muscle strengthening facilitated   nonskid shoes/slippers when out of bed   room organization consistent  Taken 12/23/2024 1900 by  Dee, Maris A, RN  Safety Promotion/Fall Prevention: safety round/check completed     Problem: Skin Injury Risk Increased  Goal: Skin Health and Integrity  Outcome: Not Progressing  Intervention: Optimize Skin Protection  Description: Perform a full pressure injury risk assessment, as indicated by screening, upon admission to care unit.  Reassess skin (full inspection and injury risk, including skin temperature, consistency and color) frequently (e.g., scheduled interval, with change in condition) to provide optimal early detection and prevention.  Maintain adequate tissue perfusion (e.g., encourage fluid balance; avoid crossing legs, constrictive clothing or devices) to promote tissue oxygenation.  Maintain head of bed at lowest degree of elevation tolerated, considering medical condition and other restrictions. Use positioning supports to prevent sliding and friction. Consider low friction textiles.  Avoid positioning onto an area that remains reddened or on bony prominences.  Minimize incontinence and moisture (e.g., toileting schedule; moisture-wicking pad, diaper or incontinence collection device; skin moisture barrier).  Cleanse skin promptly and gently, when soiled, utilizing a pH-balanced cleanser.  Relieve and redistribute pressure (e.g., scheduled position changes, weight shifts, use of support surface, medical device repositioning, protective dressing application, use of positioning device, microclimate control, use of pressure-injury-monitor  Encourage increased activity, such as sitting in a chair at the bedside or early mobilization, when able to tolerate. Avoid prolonged sitting.  Recent Flowsheet Documentation  Taken 12/24/2024 0000 by Maris Dee, RN  Activity Management: activity minimized  Pressure Reduction Techniques:   frequent weight shift encouraged   pressure points protected   weight shift assistance provided  Head of Bed (HOB) Positioning: HOB at 20-30 degrees  Pressure Reduction Devices:    positioning supports utilized   pressure-redistributing mattress utilized  Skin Protection:   incontinence pads utilized   pulse oximeter probe site changed   silicone foam dressing in place  Taken 12/23/2024 2300 by Maris Dee RN  Head of Bed ([facility]) Positioning: HOB at 20-30 degrees  Taken 12/23/2024 2100 by Maris Dee RN  Head of Bed ([facility]) Positioning: HOB at 20-30 degrees  Taken 12/23/2024 1950 by Maris Dee RN  Activity Management: activity minimized  Pressure Reduction Techniques:   frequent weight shift encouraged   pressure points protected   positioned off wounds   weight shift assistance provided  Head of Bed ([facility]) Positioning: HOB at 30 degrees  Pressure Reduction Devices:   positioning supports utilized   pressure-redistributing mattress utilized  Skin Protection:   incontinence pads utilized   pulse oximeter probe site changed   silicone foam dressing in place  Taken 12/23/2024 1900 by Maris Dee RN  Head of Bed ([facility]) Positioning: HOB at 30 degrees     Problem: Comorbidity Management  Goal: Blood Pressure in Desired Range  Outcome: Not Progressing  Intervention: Maintain Blood Pressure Management  Description: Evaluate adherence to home antihypertensive regimen (e.g., exercise and activity, diet modification, medication).  Provide scheduled antihypertensive medication; consider administration time and effects (e.g., avoid giving diuretic prior to bedtime).  Monitor response to antihypertensive medication therapy (e.g., blood pressure, electrolyte levels, medication effects).  Minimize risk of orthostatic hypotension; encourage caution with position changes, particularly if elderly.  Recent Flowsheet Documentation  Taken 12/24/2024 0000 by Maris Dee RN  Medication Review/Management:   medications reviewed   high-risk medications identified  Taken 12/23/2024 1950 by Maris Dee RN  Medication Review/Management:   medications reviewed   high-risk medications identified     Problem: Noninvasive  Ventilation Acute  Goal: Effective Unassisted Ventilation and Oxygenation  Outcome: Not Progressing     Problem: Malnutrition  Goal: Improved Nutritional Intake  Outcome: Not Progressing   Goal Outcome Evaluation:  Plan of Care Reviewed With: patient        Progress: no change  Outcome Evaluation: pt remains on 6L HF NC overnight. attempted bipap and bedtime, and pt was unable to tolerate it, and requested to change back to his NC. educated on need for bipap, but patient did not want to wear it at that time. maintaining sats in the upper 90s on 6L HF NC. no complaints of pain or discomfort. VSS. will continue to monitor.

## 2024-12-24 NOTE — DISCHARGE PLACEMENT REQUEST
"Kali Gould (79 y.o. Male)       Date of Birth   1945    Social Security Number       Address   [address] [place] [address] [place] [facility]    Home Phone   [phone]    MRN   [id number]       Cheondoism   None    Marital Status                               Admission Date   12/22/24    Admission Type   Emergency    Admitting Provider   Llanes Alvarez, Carlos, MD    Attending Provider   Brammell, Timothy Duane, MD    Department, Room/Bed   [facility] INTENSIVE CARE UNIT, 2313/1       Discharge Date       Discharge Disposition       Discharge Destination                                 Attending Provider: Brammell, Timothy Duane, MD    Allergies: Codeine    Isolation: Droplet   Infection: Rhinovirus  (12/22/24)   Code Status: CPR    Ht: 180.3 cm (71\")   Wt: 50.1 kg (110 lb 7.2 oz)    Admission Cmt: None   Principal Problem: Pneumonia [J18.9]                   Active Insurance as of 12/22/2024       Primary Coverage       Payor Plan Insurance Group Employer/Plan Group    ANTHEM MEDICARE REPLACEMENT ANTHEM MED ADV PPO [id number]       Payor Plan Address Payor Plan Phone Number Payor Plan Fax Number Effective Dates    [address] [phone]  1/1/2024 - None Entered    [facility] [address]         Subscriber Name Subscriber Birth Date Member ID       KALI GOULD 1945 [id number]                     Emergency Contacts        (Rel.) Home Phone Work Phone Mobile Phone    SRINIVAS GOULD (Son) [phone] -- [phone]    JUAN GOULD (Other) [phone] -- [phone]    Luisito Gould (Son) -- -- --                "

## 2024-12-24 NOTE — PROGRESS NOTES
[facility] MEDICINE SERVICE  DAILY PROGRESS NOTE    NAME: Kali Garcia  : 1945  MRN: [id number]      LOS: 1 day     PROVIDER OF SERVICE: Timothy Duane Brammell, MD    Chief Complaint: Pneumonia    Subjective:     Interval History:  History taken from: patient    Patient sitting up in chair.  He states he feels better than he did.  No excessive sputum production.  Has chronic nasal cannula oxygen.  Has left hip pain that nurses state that he has a decubitus on where he lays dependent on his left side.  Eating without issue.  Nurses unaware of any other additional acute concerns.        Review of Systems:   Review of Systems   All other systems reviewed and are negative.      Objective:     Vital Signs  Temp:  [97.1 °F (36.2 °C)-97.9 °F (36.6 °C)] 97.3 °F (36.3 °C)  Heart Rate:  [50-68] 62  Resp:  [16-24] 20  BP: ()/(42-76) 121/51  Flow (L/min) (Oxygen Therapy):  [5-6] 6   Body mass index is 15.4 kg/m².    Physical Exam  Physical Exam  Vitals reviewed.   Constitutional:       General: He is not in acute distress.  HENT:      Head: Normocephalic.   Cardiovascular:      Rate and Rhythm: Normal rate and regular rhythm.   Pulmonary:      Effort: Pulmonary effort is normal.      Breath sounds: Normal breath sounds.      Comments: Poor air movement  Abdominal:      General: Bowel sounds are normal.      Palpations: Abdomen is soft.      Tenderness: There is no abdominal tenderness.   Musculoskeletal:         General: No swelling.   Neurological:      Mental Status: He is alert. Mental status is at baseline.            Diagnostic Data    Results from last 7 days   Lab Units 24  0427 24  0614 24  2203   WBC 10*3/mm3 6.93 6.75 8.27   HEMOGLOBIN g/dL 7.6* 7.1* 7.8*   HEMATOCRIT % 24.5* 21.9* 24.5*   PLATELETS 10*3/mm3 148 144 142   GLUCOSE mg/dL 127* 104* 131*   CREATININE mg/dL 0.88 0.95 1.03   BUN mg/dL 36* 32* 32*   SODIUM mmol/L 147* 142 140   POTASSIUM mmol/L 4.0 3.8 4.3   AST  (SGOT) U/L  --   --  24   ALT (SGPT) U/L  --   --  13   ALK PHOS U/L  --   --  78   BILIRUBIN mg/dL  --  0.5 0.7   ANION GAP mmol/L 10.8 9.3 8.6       XR Chest 1 View    Result Date: 12/22/2024  Impression: 1.Increased opacity in the left lung base could reflect pneumonia. 2.Stable nodular thickening in the right lung apex. 3.Severe emphysema and chronic interstitial disease. Electronically Signed: Sandro Saravia MD  12/22/2024 10:57 PM EST  Workstation ID: [id number]           Assessment:    Acute on chronic hypoxemic hypercapnic respiratory failure  Pneumonia lower lobe  Probable sepsis  COPD with acute exacerbation  Severe protein malnutrition  Positive blood culture with pending identification  Rhinovirus infection  Atrial fibs  History of coronary artery disease  Anemia/iron deficiency  Peripheral arterial disease  Urinary retention work with need for In-N-Out catheterization.     Plan: Ongoing antibiotic coverage.  Nebulization therapy.  Pulmonary following.  Check stool Hemoccult.  Follow-up labs.  Multiple vitamin.  PPI.  Oral Flomax.  Discussed CODE STATUS with patient by his request and he request not to be intubated and not to receive any resuscitation in the event of a cardiorespiratory arrest.  Family considering discharge with palliative care hospice.            Active and Resolved Problems      Active Hospital Problems    Diagnosis  POA    **Pneumonia [J18.9]  Yes    Rhinovirus [B34.8]  Yes    Anemia [D64.9]  Yes    Acute on chronic respiratory failure with hypoxia [J96.21]  Yes    Coronary artery disease [I25.10]  Yes    Hypertension [I10]  Yes    COPD (chronic obstructive pulmonary disease) [J44.9]  Yes    Severe malnutrition [E43]  Yes      Resolved Hospital Problems   No resolved problems to display.           VTE Prophylaxis:  Mechanical VTE prophylaxis orders are present.             Disposition Planning:     Barriers to Discharge:respiratory improvement  Anticipated Date of Discharge:  12/27  Place of Discharge: home vs rehab      Time: 30 minutes     Code Status and Medical Interventions: CPR (Attempt to Resuscitate); Full Support   Ordered at: 12/23/24 0036     Level Of Support Discussed With:    Patient     Code Status (Patient has no pulse and is not breathing):    CPR (Attempt to Resuscitate)     Medical Interventions (Patient has pulse or is breathing):    Full Support       Signature: Electronically signed by Timothy Duane Brammell, MD, 12/24/24, 14:00 EST.  [facility] Hospitalist Team

## 2024-12-24 NOTE — PLAN OF CARE
Goal Outcome Evaluation:            Pt A&xOX4, pleasant and cooperative.     Remains under treatment for chronic resp failure, LLL pneumonia, and rhino virus.     Continues IV unasyn, solu medrol, mucomyst, mucinex, and bipap as tolerated. Patient has worn bipap approximately 6 hours today per tolerance and to allow eating.     Pt code status updated to DNI/DNR    Pt has decided he wanbts to go home on hospice services. See case management note for further details.     Pt retaining urine this shift. Bladder scan at 1730 revealed 550ml of urine in bladder. Drained 525 from bladder thereafter via straight cath. Pt tolerated well. States this is a ongoing issue for him as he has difficulty starting his stream and completely emptying during voiding. Dr. Nicolas notified and flomax ordered. Pt educated on medication and states understanding.        Ferric gluconate administered this shift. HGB 7.6

## 2024-12-24 NOTE — PROGRESS NOTES
Daily Progress Note          Assessment    Pneumonia due to unspecified pathogen  COPD: At home on Trelegy inhaler  Rhinovirus infection  Hypoxemia: At home 6 L of oxygen  CAD  PVD  HTN  HLD  A-fib  Anemia  Former smoker quit 2022 after 60 pack years  Echo 2/1/2024 EF 61-65% mild pulmonary hypertension 35-45 mmHg     CT scan of the chest in June 2024 showing a noncalcified nodule in the right upper lobe around 2 x 1.1 x 1 cm. Patient was also noted to have patchy airspace disease and clustered micronodules in the lingula. He then underwent PET scan showing a 1.7 x 1.1 cm partially calcified irregular shaped nodule in the right upper lobe to be metabolically active with SUV of 4.84 and additional 2 metabolically active irregular nodular densities in the right upper lobe. Inferior apical segment with intervening areas of metabolically active interstitial thickening.     PFTs: Very severe airflow obstruction with significant air trapping and reduced diffusion capacity. FEV1/FVC postbronchodilator is 0.39 with FEV1 of 0.71 L or 28% predicted and FVC of 1.82 L or 47% predicted. No significant bronchodilator response noted. Total lung capacity of 7.77 L or 131% predicted and residual volume of 5.82 L or 226% predicted and DLCO of 61% predicted noted.         Recommendations:  He is still wheezing, continue IV steroids     patient is very cachectic, I discussed with him CODE STATUS and he is going to be discussing it with his children to decide because if he goes on mechanical ventilation there is a good likelihood it will be for prolonged time      antibiotics: Unasyn  Nebulized Mucomyst  Encourage use of incentive spirometry and flutter valve    Nebulized Pulmicort  Oxygen supplement and titration to maintain saturation 90 to 95%: Currently on 6 L per high flow nasal cannula  Bronchodilators  Mucinex     Atorvastatin  Plavix     I personally reviewed the radiological studies             LOS: 1 day     Subjective      Cough and shortness of breath    Objective     Vital signs for last 24 hours:  Vitals:    12/24/24 1000 12/24/24 1100 12/24/24 1132 12/24/24 1135   BP: 130/55 121/51     BP Location:  Right arm     Patient Position:  Lying     Pulse: 60 61 61 62   Resp:  16 22 20   Temp:  97.3 °F (36.3 °C)     TempSrc:  Oral     SpO2: 97% 97% 95% 96%   Weight:       Height:           Intake/Output last 3 shifts:  I/O last 3 completed shifts:  In: 478 [Blood:378; IV Piggyback:100]  Out: 1400 [Urine:1400]  Intake/Output this shift:  No intake/output data recorded.      Radiology  Imaging Results (Last 24 Hours)       ** No results found for the last 24 hours. **            Labs:  Results from last 7 days   Lab Units 12/24/24  0427   WBC 10*3/mm3 6.93   HEMOGLOBIN g/dL 7.6*   HEMATOCRIT % 24.5*   PLATELETS 10*3/mm3 148     Results from last 7 days   Lab Units 12/24/24  0427 12/23/24  0614 12/22/24  2203   SODIUM mmol/L 147* 142 140   POTASSIUM mmol/L 4.0 3.8 4.3   CHLORIDE mmol/L 106 104 102   CO2 mmol/L 30.2* 28.7 29.4*   BUN mg/dL 36* 32* 32*   CREATININE mg/dL 0.88 0.95 1.03   CALCIUM mg/dL 8.4* 8.3* 9.0   BILIRUBIN mg/dL  --  0.5 0.7   ALK PHOS U/L  --   --  78   ALT (SGPT) U/L  --   --  13   AST (SGOT) U/L  --   --  24   GLUCOSE mg/dL 127* 104* 131*     Results from last 7 days   Lab Units 12/23/24  0454   PH, ARTERIAL pH units 7.355   PO2 ART mm Hg 173.6*   PCO2, ARTERIAL mm Hg 62.2*   HCO3 ART mmol/L 34.8*     Results from last 7 days   Lab Units 12/22/24  2203   ALBUMIN g/dL 3.3*                               Meds:   SCHEDULE  acetylcysteine, 3 mL, Nebulization, BID - RT  ampicillin-sulbactam, 3 g, Intravenous, Q8H  atorvastatin, 10 mg, Oral, Daily  azithromycin, 500 mg, Oral, Nightly  budesonide, 0.5 mg, Nebulization, BID - RT  clopidogrel, 75 mg, Oral, Daily  ferric gluconate, 250 mg, Intravenous, Daily  guaiFENesin, 1,200 mg, Oral, Q12H  ipratropium-albuterol, 3 mL, Nebulization, Q4H - RT  methylPREDNISolone sodium  succinate, 40 mg, Intravenous, Q8H  mupirocin, 1 Application, Each Nare, BID  polyethylene glycol, 17 g, Oral, Daily  sodium chloride, 10 mL, Intravenous, Q12H      Infusions     PRNs    acetaminophen    aluminum-magnesium hydroxide-simethicone    senna-docusate sodium **AND** polyethylene glycol **AND** bisacodyl **AND** bisacodyl    Calcium Replacement - Follow Nurse / BPA Driven Protocol    ipratropium-albuterol    Magnesium Standard Dose Replacement - Follow Nurse / BPA Driven Protocol    melatonin    ondansetron ODT **OR** ondansetron    Phosphorus Replacement - Follow Nurse / BPA Driven Protocol    Potassium Replacement - Follow Nurse / BPA Driven Protocol    sodium chloride    sodium chloride    sodium chloride    Physical Exam:  General Appearance:  Alert   HEENT:  Normocephalic, without obvious abnormality, Conjunctiva/corneas clear,.   Nares normal, no drainage     Neck:  Supple, symmetrical, trachea midline.   Lungs /Chest wall: Wheezing and bilateral basal rhonchi, respirations unlabored, symmetrical wall movement.     Heart:  Regular rate and rhythm, S1 S2 normal  Abdomen: Soft, non-tender, no masses, no organomegaly.    Extremities: No edema, no clubbing or cyanosis     ROS  Constitutional: Negative for chills, fever and malaise/fatigue.   HENT: Negative.    Eyes: Negative.    Cardiovascular: Negative.    Respiratory: Positive for cough and shortness of breath.    Skin: Negative.    Musculoskeletal: Negative.    Gastrointestinal: Negative.    Genitourinary: Negative.    Neurological: Generalized weakness      I reviewed the recent clinical results  I personally reviewed the latest radiological studies    Part of this note may be an electronic transcription/translation of spoken language to printed text using the Dragon Dictation System.

## 2024-12-24 NOTE — PLAN OF CARE
Assessment: Kali Garcia presents with functional mobility impairments which indicate the need for skilled intervention. Pt continues to present with overall deconditioning and weakness. Requires Jerod for bed mobilty and transfers. Weight-shifting improved during transfer to recliner. Tolerating session today without incident. Will continue to follow and progress as tolerated.     Vitals: WNL on BiPap upon arrival at 100%. RN removed to HFNC. Remained > 93% on 6L high flow thorughout.     Anticipated Discharge Disposition (PT): skilled nursing facility

## 2024-12-24 NOTE — CASE MANAGEMENT/SOCIAL WORK
Continued Stay Note   Seth     Patient Name: Kali Garcia  MRN: [id number]  Today's Date: 12/24/2024    Admit Date: 12/22/2024    Plan: Plan to dc home with [facility]. Current DASCO 6L NC.   Discharge Plan       Row Name 12/24/24 1529       Plan    Plan Plan to dc home with [facility]. Current DASCO 6L NC.    Plan Comments Cm met with son/DIL and patient at bedside, request MAX screening (email sent to [email or web address]), [facility] referral requested per LSW, [facility], referral in basket, liaison Starla notified, accepted. Planning to dc home with [facility] Thursday or Friday once patient is DC ready, Floor Rn and MD updated. [facility] D[facility] equipment (Hospital bed, etc...) once dc orders in. Will need ambulance transport.                 Expected Discharge Date and Time       Expected Discharge Date Expected Discharge Time    Dec 27, 2024             CELIA Torres RN  ICU/CVU   O: [phone]  C: [phone]  [email or web address]

## 2024-12-24 NOTE — CASE MANAGEMENT/SOCIAL WORK
Continued Stay Note   Seth     Patient Name: Kali Garcia  MRN: [id number]  Today's Date: 12/24/2024    Admit Date: 12/22/2024    Plan: From home with son/DIL, pending clinical course and PT/OT eval (SNF choice pending). Current DASCO 6L NC.   Discharge Plan       Row Name 12/24/24 1239       Plan    Plan From home with son/PHILIPP, pending clinical course and PT/OT eval (SNF choice pending). Current DASCO 6L NC.    Plan Comments Cm spoke with son Ivis, on his way to hospital to speak with patient about his choices, will ask floor RN to alert CM when needed. DC Barriers: 6L NC/BIPAP, IV Abxs/steroids, nebs, Hgb 7.1, Pulm following.               Expected Discharge Date and Time       Expected Discharge Date Expected Discharge Time    Dec 27, 2024               CELIA Torres RN  ICU/CVU   O: [phone]  C: [phone]  [email or web address]

## 2024-12-24 NOTE — NURSING NOTE
WOCN note:    79 yr old male admitted 12/22/24 with pneumonia. WOCN consult received for possible hospital acquired pressure injury. Patient presents with a partial thickness stage 2 pressure injury to his left greater trochanter measuring approximately 1x1 cm. There is blanchable helene-wound erythema. Patient reports the wound has been present for several months as he lays on that side primarily.    There is a silicone foam dressing in place currently. Recommend to change every 3 days and implement pressure injury prevention measures per protocol. Will follow as needed.

## 2024-12-24 NOTE — THERAPY TREATMENT NOTE
"Subjective: Pt agreeable to therapeutic plan of care.    Objective:     Precautions - BiPap vs HFNC    Bed mobility - Min-A    Transfers - Min-A    Ambulation - 2 feet Min-A    Therapeutic Exercise - ankle pumps, LAQ    Vitals: WNL on BiPap upon arrival at 100%. RN removed to HFNC. Remained > 93% on 6L high flow thorughout.    Pain: 5 VAS   Location: generalized  Intervention for pain: Repositioned, Increased Activity, and Therapeutic Presence    Education: Provided education on the importance of mobility in the acute care setting, Verbal/Tactile Cues, Transfer Training, and Energy conservation strategies    Assessment: Kali Garcia presents with functional mobility impairments which indicate the need for skilled intervention. Pt continues to present with overall deconditioning and weakness. Requires Jerod for bed mobilty and transfers. Weight-shifting improved during transfer to recliner. Tolerating session today without incident. Will continue to follow and progress as tolerated.     Plan/Recommendations:   If medically appropriate, Moderate Intensity Therapy recommended post-acute care. This is recommended as therapy feels the patient would require 3-4 days per week and wouldn't tolerate \"3 hour daily\" rehab intensity. SNF would be the preferred choice. If the patient does not agree to SNF, arrange HH or OP depending on home bound status. If patient is medically complex, consider LTACH. Pt requires no DME at discharge.     Pt desires Skilled Rehab placement at discharge. Pt cooperative; agreeable to therapeutic recommendations and plan of care.         Basic Mobility 6-click:  Rollin = Total, A lot = 2, A little = 3; 4 = None  Supine>Sit:   1 = Total, A lot = 2, A little = 3; 4 = None   Sit>Stand with arms:  1 = Total, A lot = 2, A little = 3; 4 = None  Bed>Chair:   1 = Total, A lot = 2, A little = 3; 4 = None  Ambulate in room:  1 = Total, A lot = 2, A little = 3; 4 = None  3-5 Steps with railing: "  1 = Total, A lot = 2, A little = 3; 4 = None  Score: 18    Modified [place]: N/A = No pre-op stroke/TIA    Post-Tx Position: Up in Chair, Alarms activated, and Call light and personal items within reach  PPE: gloves and surgical mask    Therapy Charges for Today       Code Description Service Date Service Provider Modifiers Qty    48222804954 HC PT EVAL MOD COMPLEXITY 4 12/23/2024 Anne Marie De León, PT GP 1    87401462514 HC PT THERAPEUTIC ACT EA 15 MIN 12/24/2024 Anne Marie De León, PT GP 1    84598785191 HC PT NEUROMUSC RE EDUCATION EA 15 MIN 12/24/2024 Anne Marie De León, PT GP 1           PT Charges       Row Name 12/24/24 1210             Time Calculation    Start Time 1037  -OD      Stop Time 1057  -OD      Time Calculation (min) 20 min  -OD      PT Received On 12/24/24  -OD      PT - Next Appointment 12/26/24  -OD         Time Calculation- PT    Total Timed Code Minutes- PT 20 minute(s)  -OD                User Key  (r) = Recorded By, (t) = Taken By, (c) = Cosigned By      Initials Name Provider Type    OD Anne Marie De León, PT Physical Therapist

## 2024-12-25 LAB
ANION GAP SERPL CALCULATED.3IONS-SCNC: 5.2 MMOL/L (ref 5–15)
BACTERIA SPEC AEROBE CULT: ABNORMAL
BASOPHILS # BLD AUTO: 0 10*3/MM3 (ref 0–0.2)
BASOPHILS NFR BLD AUTO: 0 % (ref 0–1.5)
BUN SERPL-MCNC: 39 MG/DL (ref 8–23)
BUN/CREAT SERPL: 42.9 (ref 7–25)
CALCIUM SPEC-SCNC: 8.6 MG/DL (ref 8.6–10.5)
CHLORIDE SERPL-SCNC: 108 MMOL/L (ref 98–107)
CO2 SERPL-SCNC: 31.8 MMOL/L (ref 22–29)
CREAT SERPL-MCNC: 0.91 MG/DL (ref 0.76–1.27)
DEPRECATED RDW RBC AUTO: 56.4 FL (ref 37–54)
EGFRCR SERPLBLD CKD-EPI 2021: 85.2 ML/MIN/1.73
EOSINOPHIL # BLD AUTO: 0 10*3/MM3 (ref 0–0.4)
EOSINOPHIL NFR BLD AUTO: 0 % (ref 0.3–6.2)
ERYTHROCYTE [DISTWIDTH] IN BLOOD BY AUTOMATED COUNT: 15.9 % (ref 12.3–15.4)
GLUCOSE SERPL-MCNC: 136 MG/DL (ref 65–99)
GRAM STN SPEC: ABNORMAL
HCT VFR BLD AUTO: 27.2 % (ref 37.5–51)
HGB BLD-MCNC: 8.3 G/DL (ref 13–17.7)
IMM GRANULOCYTES # BLD AUTO: 0.1 10*3/MM3 (ref 0–0.05)
IMM GRANULOCYTES NFR BLD AUTO: 1.6 % (ref 0–0.5)
ISOLATED FROM: ABNORMAL
LYMPHOCYTES # BLD AUTO: 0.17 10*3/MM3 (ref 0.7–3.1)
LYMPHOCYTES NFR BLD AUTO: 2.6 % (ref 19.6–45.3)
MCH RBC QN AUTO: 29.2 PG (ref 26.6–33)
MCHC RBC AUTO-ENTMCNC: 30.5 G/DL (ref 31.5–35.7)
MCV RBC AUTO: 95.8 FL (ref 79–97)
MONOCYTES # BLD AUTO: 0.24 10*3/MM3 (ref 0.1–0.9)
MONOCYTES NFR BLD AUTO: 3.7 % (ref 5–12)
NEUTROPHILS NFR BLD AUTO: 5.94 10*3/MM3 (ref 1.7–7)
NEUTROPHILS NFR BLD AUTO: 92.1 % (ref 42.7–76)
NRBC BLD AUTO-RTO: 0 /100 WBC (ref 0–0.2)
PLATELET # BLD AUTO: 155 10*3/MM3 (ref 140–450)
PMV BLD AUTO: 10.1 FL (ref 6–12)
POTASSIUM SERPL-SCNC: 4.3 MMOL/L (ref 3.5–5.2)
RBC # BLD AUTO: 2.84 10*6/MM3 (ref 4.14–5.8)
RETICS # AUTO: 0.03 10*6/MM3 (ref 0.02–0.13)
RETICS/RBC NFR AUTO: 0.99 % (ref 0.7–1.9)
SODIUM SERPL-SCNC: 145 MMOL/L (ref 136–145)
WBC NRBC COR # BLD AUTO: 6.45 10*3/MM3 (ref 3.4–10.8)

## 2024-12-25 PROCEDURE — 94660 CPAP INITIATION&MGMT: CPT

## 2024-12-25 PROCEDURE — 94799 UNLISTED PULMONARY SVC/PX: CPT

## 2024-12-25 PROCEDURE — 94761 N-INVAS EAR/PLS OXIMETRY MLT: CPT

## 2024-12-25 PROCEDURE — 94664 DEMO&/EVAL PT USE INHALER: CPT

## 2024-12-25 PROCEDURE — 25010000002 AMPICILLIN-SULBACTAM PER 1.5 G: Performed by: NURSE PRACTITIONER

## 2024-12-25 PROCEDURE — 85025 COMPLETE CBC W/AUTO DIFF WBC: CPT | Performed by: NURSE PRACTITIONER

## 2024-12-25 PROCEDURE — 25010000002 METHYLPREDNISOLONE PER 40 MG: Performed by: HOSPITALIST

## 2024-12-25 PROCEDURE — 25010000002 NA FERRIC GLUC CPLX PER 12.5 MG: Performed by: INTERNAL MEDICINE

## 2024-12-25 PROCEDURE — 25810000003 SODIUM CHLORIDE 0.9 % SOLUTION: Performed by: INTERNAL MEDICINE

## 2024-12-25 PROCEDURE — 80048 BASIC METABOLIC PNL TOTAL CA: CPT | Performed by: NURSE PRACTITIONER

## 2024-12-25 PROCEDURE — 85045 AUTOMATED RETICULOCYTE COUNT: CPT | Performed by: HOSPITALIST

## 2024-12-25 RX ADMIN — TAMSULOSIN HYDROCHLORIDE 0.4 MG: 0.4 CAPSULE ORAL at 08:10

## 2024-12-25 RX ADMIN — ACETYLCYSTEINE 3 ML: 200 SOLUTION ORAL; RESPIRATORY (INHALATION) at 07:30

## 2024-12-25 RX ADMIN — AMPICILLIN SODIUM AND SULBACTAM SODIUM 3 G: 2; 1 INJECTION, POWDER, FOR SOLUTION INTRAMUSCULAR; INTRAVENOUS at 13:37

## 2024-12-25 RX ADMIN — IPRATROPIUM BROMIDE AND ALBUTEROL SULFATE 3 ML: .5; 3 SOLUTION RESPIRATORY (INHALATION) at 15:13

## 2024-12-25 RX ADMIN — IPRATROPIUM BROMIDE AND ALBUTEROL SULFATE 3 ML: .5; 3 SOLUTION RESPIRATORY (INHALATION) at 19:29

## 2024-12-25 RX ADMIN — PANTOPRAZOLE SODIUM 40 MG: 40 TABLET, DELAYED RELEASE ORAL at 05:23

## 2024-12-25 RX ADMIN — BUDESONIDE INHALATION 0.5 MG: 0.5 SUSPENSION RESPIRATORY (INHALATION) at 19:31

## 2024-12-25 RX ADMIN — THERA TABS 1 TABLET: TAB at 08:10

## 2024-12-25 RX ADMIN — POLYETHYLENE GLYCOL 3350 17 G: 17 POWDER, FOR SOLUTION ORAL at 08:11

## 2024-12-25 RX ADMIN — IPRATROPIUM BROMIDE AND ALBUTEROL SULFATE 3 ML: .5; 3 SOLUTION RESPIRATORY (INHALATION) at 23:50

## 2024-12-25 RX ADMIN — GUAIFENESIN 1200 MG: 600 TABLET, EXTENDED RELEASE ORAL at 20:51

## 2024-12-25 RX ADMIN — Medication 10 ML: at 20:53

## 2024-12-25 RX ADMIN — GUAIFENESIN 1200 MG: 600 TABLET, EXTENDED RELEASE ORAL at 08:10

## 2024-12-25 RX ADMIN — METHYLPREDNISOLONE SODIUM SUCCINATE 40 MG: 40 INJECTION, POWDER, FOR SOLUTION INTRAMUSCULAR; INTRAVENOUS at 08:11

## 2024-12-25 RX ADMIN — AMPICILLIN SODIUM AND SULBACTAM SODIUM 3 G: 2; 1 INJECTION, POWDER, FOR SOLUTION INTRAMUSCULAR; INTRAVENOUS at 05:23

## 2024-12-25 RX ADMIN — AMPICILLIN SODIUM AND SULBACTAM SODIUM 3 G: 2; 1 INJECTION, POWDER, FOR SOLUTION INTRAMUSCULAR; INTRAVENOUS at 20:51

## 2024-12-25 RX ADMIN — IPRATROPIUM BROMIDE AND ALBUTEROL SULFATE 3 ML: .5; 3 SOLUTION RESPIRATORY (INHALATION) at 07:30

## 2024-12-25 RX ADMIN — IPRATROPIUM BROMIDE AND ALBUTEROL SULFATE 3 ML: .5; 3 SOLUTION RESPIRATORY (INHALATION) at 12:50

## 2024-12-25 RX ADMIN — SODIUM CHLORIDE 250 MG: 9 INJECTION, SOLUTION INTRAVENOUS at 08:11

## 2024-12-25 RX ADMIN — BUDESONIDE INHALATION 0.5 MG: 0.5 SUSPENSION RESPIRATORY (INHALATION) at 07:35

## 2024-12-25 RX ADMIN — ATORVASTATIN CALCIUM 10 MG: 10 TABLET ORAL at 08:10

## 2024-12-25 RX ADMIN — ACETYLCYSTEINE 3 ML: 200 SOLUTION ORAL; RESPIRATORY (INHALATION) at 19:30

## 2024-12-25 RX ADMIN — IPRATROPIUM BROMIDE AND ALBUTEROL SULFATE 3 ML: .5; 3 SOLUTION RESPIRATORY (INHALATION) at 02:48

## 2024-12-25 RX ADMIN — Medication 10 ML: at 08:11

## 2024-12-25 RX ADMIN — CLOPIDOGREL BISULFATE 75 MG: 75 TABLET ORAL at 08:10

## 2024-12-25 RX ADMIN — MUPIROCIN 1 APPLICATION: 20 OINTMENT TOPICAL at 20:51

## 2024-12-25 RX ADMIN — MUPIROCIN 1 APPLICATION: 20 OINTMENT TOPICAL at 08:11

## 2024-12-25 NOTE — PROGRESS NOTES
Daily Progress Note          Assessment    Pneumonia due to unspecified pathogen  COPD: At home on Trelegy inhaler  Rhinovirus infection  Hypoxemia: At home 6 L of oxygen  CAD  PVD  HTN  HLD  A-fib  Anemia  Former smoker quit 2022 after 60 pack years  Echo 2/1/2024 EF 61-65% mild pulmonary hypertension 35-45 mmHg     CT scan of the chest in June 2024 showing a noncalcified nodule in the right upper lobe around 2 x 1.1 x 1 cm. Patient was also noted to have patchy airspace disease and clustered micronodules in the lingula. He then underwent PET scan showing a 1.7 x 1.1 cm partially calcified irregular shaped nodule in the right upper lobe to be metabolically active with SUV of 4.84 and additional 2 metabolically active irregular nodular densities in the right upper lobe. Inferior apical segment with intervening areas of metabolically active interstitial thickening.     PFTs: Very severe airflow obstruction with significant air trapping and reduced diffusion capacity. FEV1/FVC postbronchodilator is 0.39 with FEV1 of 0.71 L or 28% predicted and FVC of 1.82 L or 47% predicted. No significant bronchodilator response noted. Total lung capacity of 7.77 L or 131% predicted and residual volume of 5.82 L or 226% predicted and DLCO of 61% predicted noted.         Recommendations:  He is still wheezing with increased distress requiring noninvasive ventilation, continue IV steroids     patient is very cachectic with poor prognosis, patient decided for DNR and home hospice     antibiotics: Unasyn  Nebulized Mucomyst  Encourage use of incentive spirometry and flutter valve    Nebulized Pulmicort  Oxygen supplement and titration to maintain saturation 90 to 95%: Currently on 6 L per high flow nasal cannula  Bronchodilators  Mucinex     Atorvastatin  Plavix     I personally reviewed the radiological studies             LOS: 2 days     Subjective     Increased cough and shortness of breath    Objective     Vital signs for last 24  hours:  Vitals:    12/25/24 1141 12/25/24 1250 12/25/24 1256 12/25/24 1300   BP:    119/53   Pulse:  59 58 59   Resp:  15 16    Temp: 97.5 °F (36.4 °C)      TempSrc: Axillary      SpO2:  100% 99% 98%   Weight:       Height:           Intake/Output last 3 shifts:  I/O last 3 completed shifts:  In: 2004 [P.O.:720; I.V.:906; Blood:378]  Out: 1425 [Urine:1425]  Intake/Output this shift:  I/O this shift:  In: 360 [P.O.:360]  Out: 700 [Urine:700]      Radiology  Imaging Results (Last 24 Hours)       ** No results found for the last 24 hours. **            Labs:  Results from last 7 days   Lab Units 12/25/24  0534   WBC 10*3/mm3 6.45   HEMOGLOBIN g/dL 8.3*   HEMATOCRIT % 27.2*   PLATELETS 10*3/mm3 155     Results from last 7 days   Lab Units 12/25/24  0534 12/24/24  0427 12/23/24  0614 12/22/24  2203   SODIUM mmol/L 145   < > 142 140   POTASSIUM mmol/L 4.3   < > 3.8 4.3   CHLORIDE mmol/L 108*   < > 104 102   CO2 mmol/L 31.8*   < > 28.7 29.4*   BUN mg/dL 39*   < > 32* 32*   CREATININE mg/dL 0.91   < > 0.95 1.03   CALCIUM mg/dL 8.6   < > 8.3* 9.0   BILIRUBIN mg/dL  --   --  0.5 0.7   ALK PHOS U/L  --   --   --  78   ALT (SGPT) U/L  --   --   --  13   AST (SGOT) U/L  --   --   --  24   GLUCOSE mg/dL 136*   < > 104* 131*    < > = values in this interval not displayed.     Results from last 7 days   Lab Units 12/23/24  0454   PH, ARTERIAL pH units 7.355   PO2 ART mm Hg 173.6*   PCO2, ARTERIAL mm Hg 62.2*   HCO3 ART mmol/L 34.8*     Results from last 7 days   Lab Units 12/22/24  2203   ALBUMIN g/dL 3.3*                               Meds:   SCHEDULE  acetylcysteine, 3 mL, Nebulization, BID - RT  ampicillin-sulbactam, 3 g, Intravenous, Q8H  atorvastatin, 10 mg, Oral, Daily  budesonide, 0.5 mg, Nebulization, BID - RT  clopidogrel, 75 mg, Oral, Daily  ferric gluconate, 250 mg, Intravenous, Daily  guaiFENesin, 1,200 mg, Oral, Q12H  ipratropium-albuterol, 3 mL, Nebulization, Q4H - RT  methylPREDNISolone sodium succinate, 40 mg,  Intravenous, Daily  multivitamin, 1 tablet, Oral, Daily  mupirocin, 1 Application, Each Nare, BID  pantoprazole, 40 mg, Oral, Q AM  polyethylene glycol, 17 g, Oral, Daily  sodium chloride, 10 mL, Intravenous, Q12H  tamsulosin, 0.4 mg, Oral, Daily      Infusions     PRNs    acetaminophen    aluminum-magnesium hydroxide-simethicone    senna-docusate sodium **AND** polyethylene glycol **AND** bisacodyl **AND** bisacodyl    Calcium Replacement - Follow Nurse / BPA Driven Protocol    ipratropium-albuterol    Magnesium Standard Dose Replacement - Follow Nurse / BPA Driven Protocol    melatonin    ondansetron ODT **OR** ondansetron    Phosphorus Replacement - Follow Nurse / BPA Driven Protocol    Potassium Replacement - Follow Nurse / BPA Driven Protocol    sodium chloride    sodium chloride    sodium chloride    Physical Exam:  General Appearance:  Alert, looks chronically ill and cachectic  HEENT:  Normocephalic, without obvious abnormality, Conjunctiva/corneas clear,.   Nares normal, no drainage     Neck:  Supple, symmetrical, trachea midline.   Lungs /Chest wall: Wheezing and bilateral basal rhonchi, respirations unlabored, symmetrical wall movement.     Heart:  Regular rate and rhythm, S1 S2 normal  Abdomen: Soft, non-tender, no masses, no organomegaly.    Extremities: No edema, no clubbing or cyanosis     ROS  Constitutional: Negative for chills, fever and malaise/fatigue.   HENT: Negative.    Eyes: Negative.    Cardiovascular: Negative.    Respiratory: Positive for cough and shortness of breath.    Skin: Negative.    Musculoskeletal: Negative.    Gastrointestinal: Negative.    Genitourinary: Negative.    Neurological: Generalized weakness      I reviewed the recent clinical results  I personally reviewed the latest radiological studies    Part of this note may be an electronic transcription/translation of spoken language to printed text using the Dragon Dictation System.

## 2024-12-25 NOTE — PROGRESS NOTES
[facility] MEDICINE SERVICE  DAILY PROGRESS NOTE    NAME: Kali Garcia  : 1945  MRN: [id number]      LOS: 2 days     PROVIDER OF SERVICE: Timothy Duane Brammell, MD    Chief Complaint: Pneumonia    Subjective:     Interval History:  History taken from: patient/nurse.    Patient remains awake and alert.  He is sleeping with his BiPAP on at present.  He had some bleeding with his In-N-Out catheterization and nurses are going to anchor a Yap.  He predominantly lays on his left side with his left hip breakdown.  Eating without issues.  Poor p.o. intake overall.  Nurses unaware of any other additional acute concerns.        Review of Systems:   Review of Systems    Objective:     Vital Signs  Temp:  [97.3 °F (36.3 °C)-97.9 °F (36.6 °C)] 97.4 °F (36.3 °C)  Heart Rate:  [56-64] 56  Resp:  [14-27] 14  BP: (120-140)/(50-88) 131/63  Flow (L/min) (Oxygen Therapy):  [6-15] 15   Body mass index is 15.25 kg/m².    Physical Exam  Physical Exam       Diagnostic Data    Results from last 7 days   Lab Units 24  0534 24  0427 24  0614 24  2203   WBC 10*3/mm3 6.45   < > 6.75 8.27   HEMOGLOBIN g/dL 8.3*   < > 7.1* 7.8*   HEMATOCRIT % 27.2*   < > 21.9* 24.5*   PLATELETS 10*3/mm3 155   < > 144 142   GLUCOSE mg/dL 136*   < > 104* 131*   CREATININE mg/dL 0.91   < > 0.95 1.03   BUN mg/dL 39*   < > 32* 32*   SODIUM mmol/L 145   < > 142 140   POTASSIUM mmol/L 4.3   < > 3.8 4.3   AST (SGOT) U/L  --   --   --  24   ALT (SGPT) U/L  --   --   --  13   ALK PHOS U/L  --   --   --  78   BILIRUBIN mg/dL  --   --  0.5 0.7   ANION GAP mmol/L 5.2   < > 9.3 8.6    < > = values in this interval not displayed.       No radiology results for the last day          Assessment/Plan:   Acute on chronic hypoxemic hypercapnic respiratory failure  Pneumonia lower lobe  Probable sepsis  COPD with acute exacerbation  Severe protein malnutrition  Positive blood culture with pending identification  Rhinovirus  infection  Atrial fibs  History of coronary artery disease  Anemia/iron deficiency  Peripheral arterial disease  Urinary retention work with need for In-N-Out catheterization now with hematuria with Yap placed.  anemia       Plan.  Ongoing aggressive support at present.  He limited his CODE STATUS prior.  Family was some consideration as to limiting ongoing care in the future.      Active and Resolved Problems  Active Hospital Problems    Diagnosis  POA    **Pneumonia [J18.9]  Yes    Rhinovirus [B34.8]  Yes    Anemia [D64.9]  Yes    Acute on chronic respiratory failure with hypoxia [J96.21]  Yes    Coronary artery disease [I25.10]  Yes    Hypertension [I10]  Yes    COPD (chronic obstructive pulmonary disease) [J44.9]  Yes    Severe malnutrition [E43]  Yes      Resolved Hospital Problems   No resolved problems to display.           VTE Prophylaxis:  Mechanical VTE prophylaxis orders are present.             Disposition Planning:     Barriers to Discharge: Respiratory improvement.  Anticipated Date of Discharge: 12/29  Place of Discharge: ??      Time: 30 minutes     Code Status and Medical Interventions: No CPR (Do Not Attempt to Resuscitate); Limited Support; No intubation (DNI), No cardioversion   Ordered at: 12/24/24 1547     Medical Intervention Limits:    No intubation (DNI)    No cardioversion     Level Of Support Discussed With:    Next of Kin (If No Surrogate)     Code Status (Patient has no pulse and is not breathing):    No CPR (Do Not Attempt to Resuscitate)     Medical Interventions (Patient has pulse or is breathing):    Limited Support       Signature: Electronically signed by Timothy Duane Brammell, MD, 12/25/24, 11:24 EST.  [facility]ist Team

## 2024-12-25 NOTE — PLAN OF CARE
Problem: Adult Inpatient Plan of Care  Goal: Plan of Care Review  Outcome: Progressing  Goal: Patient-Specific Goal (Individualized)  Outcome: Progressing  Goal: Absence of Hospital-Acquired Illness or Injury  Outcome: Progressing  Intervention: Identify and Manage Fall Risk  Recent Flowsheet Documentation  Taken 12/25/2024 1615 by Karely Perkins LPN  Safety Promotion/Fall Prevention:   assistive device/personal items within reach   activity supervised   clutter free environment maintained   fall prevention program maintained   gait belt   nonskid shoes/slippers when out of bed   safety round/check completed  Intervention: Prevent Skin Injury  Recent Flowsheet Documentation  Taken 12/25/2024 1615 by Karely Perkins LPN  Skin Protection: incontinence pads utilized  Intervention: Prevent Infection  Recent Flowsheet Documentation  Taken 12/25/2024 1615 by Karely Perkins LPN  Infection Prevention:   hand hygiene promoted   single patient room provided  Goal: Optimal Comfort and Wellbeing  Outcome: Progressing  Intervention: Provide Person-Centered Care  Recent Flowsheet Documentation  Taken 12/25/2024 1615 by Karely Perkins LPN  Trust Relationship/Rapport:   care explained   thoughts/feelings acknowledged   reassurance provided   questions encouraged   questions answered  Goal: Readiness for Transition of Care  Outcome: Progressing     Problem: Fall Injury Risk  Goal: Absence of Fall and Fall-Related Injury  Outcome: Progressing  Intervention: Identify and Manage Contributors  Recent Flowsheet Documentation  Taken 12/25/2024 1615 by Karely Perkins LPN  Medication Review/Management: medications reviewed  Intervention: Promote Injury-Free Environment  Recent Flowsheet Documentation  Taken 12/25/2024 1615 by Karely Perkins LPN  Safety Promotion/Fall Prevention:   assistive device/personal items within reach   activity supervised   clutter free environment maintained   fall prevention program maintained   gait belt    nonskid shoes/slippers when out of bed   safety round/check completed     Problem: Skin Injury Risk Increased  Goal: Skin Health and Integrity  Outcome: Progressing  Intervention: Optimize Skin Protection  Recent Flowsheet Documentation  Taken 12/25/2024 1615 by Karely Perkins LPN  Pressure Reduction Techniques:   frequent weight shift encouraged   weight shift assistance provided   sit time limited to 2 hours  Pressure Reduction Devices:   positioning supports utilized   pressure-redistributing mattress utilized  Skin Protection: incontinence pads utilized     Problem: Comorbidity Management  Goal: Blood Pressure in Desired Range  Outcome: Progressing  Intervention: Maintain Blood Pressure Management  Recent Flowsheet Documentation  Taken 12/25/2024 1615 by Karely Perkins LPN  Medication Review/Management: medications reviewed     Problem: Noninvasive Ventilation Acute  Goal: Effective Unassisted Ventilation and Oxygenation  Outcome: Progressing     Problem: Malnutrition  Goal: Improved Nutritional Intake  Outcome: Progressing   Goal Outcome Evaluation:

## 2024-12-26 LAB
ANION GAP SERPL CALCULATED.3IONS-SCNC: 6.2 MMOL/L (ref 5–15)
BUN SERPL-MCNC: 36 MG/DL (ref 8–23)
BUN/CREAT SERPL: 36.7 (ref 7–25)
BURR CELLS BLD QL SMEAR: ABNORMAL
CALCIUM SPEC-SCNC: 8.4 MG/DL (ref 8.6–10.5)
CHLORIDE SERPL-SCNC: 110 MMOL/L (ref 98–107)
CO2 SERPL-SCNC: 30.8 MMOL/L (ref 22–29)
CREAT SERPL-MCNC: 0.98 MG/DL (ref 0.76–1.27)
DACRYOCYTES BLD QL SMEAR: ABNORMAL
DEPRECATED RDW RBC AUTO: 56 FL (ref 37–54)
EGFRCR SERPLBLD CKD-EPI 2021: 78 ML/MIN/1.73
ERYTHROCYTE [DISTWIDTH] IN BLOOD BY AUTOMATED COUNT: 16 % (ref 12.3–15.4)
GLUCOSE SERPL-MCNC: 117 MG/DL (ref 65–99)
HCT VFR BLD AUTO: 23.5 % (ref 37.5–51)
HGB BLD-MCNC: 7.5 G/DL (ref 13–17.7)
LYMPHOCYTES # BLD MANUAL: 0.42 10*3/MM3 (ref 0.7–3.1)
MCH RBC QN AUTO: 30.5 PG (ref 26.6–33)
MCHC RBC AUTO-ENTMCNC: 31.9 G/DL (ref 31.5–35.7)
MCV RBC AUTO: 95.5 FL (ref 79–97)
METAMYELOCYTES NFR BLD MANUAL: 2 % (ref 0–0)
NEUTROPHILS # BLD AUTO: 5.33 10*3/MM3 (ref 1.7–7)
NEUTROPHILS NFR BLD MANUAL: 81.8 % (ref 42.7–76)
NEUTS BAND NFR BLD MANUAL: 9.1 % (ref 0–5)
PLATELET # BLD AUTO: 138 10*3/MM3 (ref 140–450)
PMV BLD AUTO: 10.2 FL (ref 6–12)
POIKILOCYTOSIS BLD QL SMEAR: ABNORMAL
POTASSIUM SERPL-SCNC: 4.4 MMOL/L (ref 3.5–5.2)
RBC # BLD AUTO: 2.46 10*6/MM3 (ref 4.14–5.8)
SCAN SLIDE: NORMAL
SMALL PLATELETS BLD QL SMEAR: ADEQUATE
SODIUM SERPL-SCNC: 147 MMOL/L (ref 136–145)
VARIANT LYMPHS NFR BLD MANUAL: 2 % (ref 0–5)
VARIANT LYMPHS NFR BLD MANUAL: 5.1 % (ref 19.6–45.3)
WBC MORPH BLD: NORMAL
WBC NRBC COR # BLD AUTO: 5.86 10*3/MM3 (ref 3.4–10.8)

## 2024-12-26 PROCEDURE — 97110 THERAPEUTIC EXERCISES: CPT

## 2024-12-26 PROCEDURE — 94799 UNLISTED PULMONARY SVC/PX: CPT

## 2024-12-26 PROCEDURE — 94761 N-INVAS EAR/PLS OXIMETRY MLT: CPT

## 2024-12-26 PROCEDURE — 97530 THERAPEUTIC ACTIVITIES: CPT

## 2024-12-26 PROCEDURE — 97116 GAIT TRAINING THERAPY: CPT

## 2024-12-26 PROCEDURE — 85025 COMPLETE CBC W/AUTO DIFF WBC: CPT | Performed by: NURSE PRACTITIONER

## 2024-12-26 PROCEDURE — 25010000002 AMPICILLIN-SULBACTAM PER 1.5 G: Performed by: NURSE PRACTITIONER

## 2024-12-26 PROCEDURE — 94664 DEMO&/EVAL PT USE INHALER: CPT

## 2024-12-26 PROCEDURE — 94660 CPAP INITIATION&MGMT: CPT

## 2024-12-26 PROCEDURE — 25010000002 METHYLPREDNISOLONE PER 40 MG: Performed by: HOSPITALIST

## 2024-12-26 PROCEDURE — 85007 BL SMEAR W/DIFF WBC COUNT: CPT | Performed by: NURSE PRACTITIONER

## 2024-12-26 PROCEDURE — 80048 BASIC METABOLIC PNL TOTAL CA: CPT | Performed by: NURSE PRACTITIONER

## 2024-12-26 RX ORDER — THEOPHYLLINE 300 MG/1
300 TABLET, EXTENDED RELEASE ORAL DAILY
Status: DISCONTINUED | OUTPATIENT
Start: 2024-12-26 | End: 2025-01-03 | Stop reason: HOSPADM

## 2024-12-26 RX ADMIN — Medication 10 ML: at 09:02

## 2024-12-26 RX ADMIN — GUAIFENESIN 1200 MG: 600 TABLET, EXTENDED RELEASE ORAL at 08:59

## 2024-12-26 RX ADMIN — PANTOPRAZOLE SODIUM 40 MG: 40 TABLET, DELAYED RELEASE ORAL at 05:12

## 2024-12-26 RX ADMIN — MUPIROCIN 1 APPLICATION: 20 OINTMENT TOPICAL at 09:01

## 2024-12-26 RX ADMIN — GUAIFENESIN 1200 MG: 600 TABLET, EXTENDED RELEASE ORAL at 20:36

## 2024-12-26 RX ADMIN — AMPICILLIN SODIUM AND SULBACTAM SODIUM 3 G: 2; 1 INJECTION, POWDER, FOR SOLUTION INTRAMUSCULAR; INTRAVENOUS at 05:12

## 2024-12-26 RX ADMIN — AMPICILLIN SODIUM AND SULBACTAM SODIUM 3 G: 2; 1 INJECTION, POWDER, FOR SOLUTION INTRAMUSCULAR; INTRAVENOUS at 20:36

## 2024-12-26 RX ADMIN — IPRATROPIUM BROMIDE AND ALBUTEROL SULFATE 3 ML: .5; 3 SOLUTION RESPIRATORY (INHALATION) at 15:20

## 2024-12-26 RX ADMIN — THEOPHYLLINE 300 MG: 300 TABLET, EXTENDED RELEASE ORAL at 18:02

## 2024-12-26 RX ADMIN — METHYLPREDNISOLONE SODIUM SUCCINATE 40 MG: 40 INJECTION, POWDER, FOR SOLUTION INTRAMUSCULAR; INTRAVENOUS at 08:59

## 2024-12-26 RX ADMIN — Medication 10 ML: at 20:36

## 2024-12-26 RX ADMIN — ACETYLCYSTEINE 3 ML: 200 SOLUTION ORAL; RESPIRATORY (INHALATION) at 19:57

## 2024-12-26 RX ADMIN — BUDESONIDE INHALATION 0.5 MG: 0.5 SUSPENSION RESPIRATORY (INHALATION) at 19:57

## 2024-12-26 RX ADMIN — ATORVASTATIN CALCIUM 10 MG: 10 TABLET ORAL at 09:00

## 2024-12-26 RX ADMIN — IPRATROPIUM BROMIDE AND ALBUTEROL SULFATE 3 ML: .5; 3 SOLUTION RESPIRATORY (INHALATION) at 07:17

## 2024-12-26 RX ADMIN — TAMSULOSIN HYDROCHLORIDE 0.4 MG: 0.4 CAPSULE ORAL at 08:59

## 2024-12-26 RX ADMIN — THERA TABS 1 TABLET: TAB at 09:00

## 2024-12-26 RX ADMIN — CLOPIDOGREL BISULFATE 75 MG: 75 TABLET ORAL at 08:59

## 2024-12-26 RX ADMIN — POLYETHYLENE GLYCOL 3350 17 G: 17 POWDER, FOR SOLUTION ORAL at 08:59

## 2024-12-26 RX ADMIN — IPRATROPIUM BROMIDE AND ALBUTEROL SULFATE 3 ML: .5; 3 SOLUTION RESPIRATORY (INHALATION) at 19:57

## 2024-12-26 RX ADMIN — BUDESONIDE INHALATION 0.5 MG: 0.5 SUSPENSION RESPIRATORY (INHALATION) at 07:23

## 2024-12-26 RX ADMIN — IPRATROPIUM BROMIDE AND ALBUTEROL SULFATE 3 ML: .5; 3 SOLUTION RESPIRATORY (INHALATION) at 10:55

## 2024-12-26 RX ADMIN — MUPIROCIN 1 APPLICATION: 20 OINTMENT TOPICAL at 20:36

## 2024-12-26 RX ADMIN — ACETYLCYSTEINE 3 ML: 200 SOLUTION ORAL; RESPIRATORY (INHALATION) at 07:17

## 2024-12-26 RX ADMIN — AMPICILLIN SODIUM AND SULBACTAM SODIUM 3 G: 2; 1 INJECTION, POWDER, FOR SOLUTION INTRAMUSCULAR; INTRAVENOUS at 12:48

## 2024-12-26 NOTE — NURSING NOTE
Patient walked around bed with physical therapy and sat up in the chair. O2 sats dropped to 70%. O2 titrated to 15L to help patient recover to the 90s.     Pt now on 10L high flow n/c with O2 sats at 90%.

## 2024-12-26 NOTE — NURSING NOTE
After patient working with physical therapy. Pt and family are agreeable to short term rehab before transitioning home with hospice care.     CM sent referrals to rehabs.

## 2024-12-26 NOTE — THERAPY TREATMENT NOTE
"Subjective: Pt agreeable to therapeutic plan of care.    Objective:     Precautions -   Fall risk, 8L HF nc    Bed mobility - Min-A  supine to sit  Transfers - Min-A and with rolling walker  Ambulation - 12 feet Min-A, Mod-A, and with rolling walker pt needed increased assist with approach to chair as pt was in a hurry to sit down.     Therapeutic Exercise - 10 Reps B LE AROM unsupported sitting / EOB    Vitals: Desaturates  sats dropped to 71% during gait training.  Nursing came into room and turned 02 up to 15L for pt to recover.  Took pt about 5 minutes to recover back to 88%.  Pulmonary MD came into room and stated pt is safe at 88%.  Turned 02 down to 10L and made nursing aware.     Pain: 0 VAS       Education: Provided education on the importance of mobility in the acute care setting, Verbal/Tactile Cues, Transfer Training, and Gait Training    Assessment: Kali Garcia presents with functional mobility impairments which indicate the need for skilled intervention. Tolerating session today without incident. Pt still with significant weakness and poor endurance.  PT recommend snf rehab at d/c but pt's family want to take pt home.  Will continue to follow and progress as tolerated.     If medically appropriate, Moderate Intensity Therapy recommended post-acute care. This is recommended as therapy feels the patient would require 3-4 days per week and wouldn't tolerate \"3 hour daily\" rehab intensity. SNF would be the preferred choice. If the patient does not agree to SNF, arrange HH or OP depending on home bound status. If patient is medically complex, consider LTACH. Pt requires no DME at discharge.     Pt desires Home with family assist and Home Health at discharge.  Nursing reported that pt's family wants to take pt home at d/c but will be left alone for about 8 hours a day.  Pt's family reported they were going to hire caregivers.       Basic Mobility 6-click:  Rollin = Total, A lot = 2, A little " = 3; 4 = None  Supine>Sit:   1 = Total, A lot = 2, A little = 3; 4 = None   Sit>Stand with arms:  1 = Total, A lot = 2, A little = 3; 4 = None  Bed>Chair:   1 = Total, A lot = 2, A little = 3; 4 = None  Ambulate in room:  1 = Total, A lot = 2, A little = 3; 4 = None  3-5 Steps with railin = Total, A lot = 2, A little = 3; 4 = None  Score: 15    Modified Pitkin: 4 = Moderately severe disability (Unable to attend to own bodily needs without assistance, and unable to walk unassisted)     Post-Tx Position: Up in Chair, Alarms activated, and Call light and personal items within reach  PPE: gloves    Therapy Charges for Today       Code Description Service Date Service Provider Modifiers Qty    38471304724 HC GAIT TRAINING EA 15 MIN 2024 Ingrid Stanley, PTA GP 1    18068237369 HC PT THERAPEUTIC ACT EA 15 MIN 2024 Ingrid Stanley, PTA GP 1    70723275388 HC PT THER PROC EA 15 MIN 2024 Ingrid Stanley, PTA GP 1           PT Charges       Row Name 24 1438             Time Calculation    Start Time 1356  -SC      Stop Time 1430  -SC      Time Calculation (min) 34 min  -SC      PT Received On 24  -SC      PT - Next Appointment 24  -SC         Time Calculation- PT    Total Timed Code Minutes- PT 34 minute(s)  -SC                User Key  (r) = Recorded By, (t) = Taken By, (c) = Cosigned By      Initials Name Provider Type    Ingrid Donaldson PTA Physical Therapist Assistant

## 2024-12-26 NOTE — PLAN OF CARE
"Goal Outcome Evaluation:      Kali Garcia presents with functional mobility impairments which indicate the need for skilled intervention. Tolerating session today without incident. Pt still with significant weakness and poor endurance.  PT recommend snf rehab at d/c but pt's family want to take pt home.  Will continue to follow and progress as tolerated.       If medically appropriate, Moderate Intensity Therapy recommended post-acute care. This is recommended as therapy feels the patient would require 3-4 days per week and wouldn't tolerate \"3 hour daily\" rehab intensity. SNF would be the preferred choice. If the patient does not agree to SNF, arrange HH or OP depending on home bound status. If patient is medically complex, consider LTACH. Pt requires no DME at discharge.     Pt desires Home with family assist and Home Health at discharge.  Nursing reported that pt's family wants to take pt home at d/c but will be left alone for about 8 hours a day.  Pt's family reported they were going to hire caregivers.                                    "

## 2024-12-26 NOTE — PROGRESS NOTES
Nutrition Services  Patient Name: Kali Garcia  YOB: 1945  MRN: [id number]  Admission date: 12/22/2024    PROGRESS NOTE      Nutrition Intervention Updates: Continue current po diet and ONS.   Encourage good intake.        Encounter Information: Checking in to monitor po diet tolerance and intake. Plan for hospice care with possible discharge home today. Tolerating po diet without noted issues at this time.        PO Diet: Diet: Regular/House; Fluid Consistency: Thin (IDDSI 0)   PO Supplements: Boost Original BID (Provides 480 kcals, 20 g protein if consumed)      PO Intake:  56% average po intake x last 9 documented meals        Current nutrition support: -   Nutrition support review: -       Labs (reviewed below): Hypernatremia - management per attending        GI Function:  Last documented BM 12/23       Brief weight review   Wt Readings from Last 10 Encounters:   12/26/24 0610 49.9 kg (110 lb 0.2 oz)   12/25/24 0600 49.6 kg (109 lb 5.6 oz)   12/23/24 0531 50.1 kg (110 lb 7.2 oz)   12/22/24 2139 49.9 kg (110 lb 0.2 oz)   03/04/24 1452 53 kg (116 lb 12.1 oz)   02/19/24 1303 50.5 kg (111 lb 5 oz)   02/16/24 2101 50 kg (110 lb 2.3 oz)   02/16/24 2054 51.8 kg (114 lb 2.8 oz)   02/15/24 1416 52.9 kg (116 lb 10.3 oz)   02/04/24 0315 51.5 kg (113 lb 8.6 oz)   02/03/24 1415 51.3 kg (113 lb)   02/03/24 0345 51.5 kg (113 lb 8.6 oz)   02/02/24 1524 51.8 kg (114 lb 3.2 oz)   02/02/24 0337 54.4 kg (120 lb)   02/01/24 1330 52.1 kg (114 lb 14.5 oz)   08/04/22 1124 51.1 kg (112 lb 10.5 oz)   07/26/22 1511 59 kg (130 lb)   04/15/22 0436 58.3 kg (128 lb 8.5 oz)   04/13/22 0355 54.6 kg (120 lb 5.9 oz)   04/12/22 2037 128 kg (282 lb 3 oz)        Results from last 7 days   Lab Units 12/26/24  0332 12/25/24  0534 12/24/24  0427 12/23/24  0614 12/22/24  2203   SODIUM mmol/L 147* 145 147* 142 140   POTASSIUM mmol/L 4.4 4.3 4.0 3.8 4.3   CHLORIDE mmol/L 110* 108* 106 104 102   CO2 mmol/L 30.8* 31.8* 30.2* 28.7 29.4*    BUN mg/dL 36* 39* 36* 32* 32*   CREATININE mg/dL 0.98 0.91 0.88 0.95 1.03   CALCIUM mg/dL 8.4* 8.6 8.4* 8.3* 9.0   BILIRUBIN mg/dL  --   --   --  0.5 0.7   ALK PHOS U/L  --   --   --   --  78   ALT (SGPT) U/L  --   --   --   --  13   AST (SGOT) U/L  --   --   --   --  24   GLUCOSE mg/dL 117* 136* 127* 104* 131*     Results from last 7 days   Lab Units 12/26/24  0332   HEMOGLOBIN g/dL 7.5*   HEMATOCRIT % 23.5*         RD to follow up per protocol.    Electronically signed by:  Patito Ochoa, GIOVANNI  12/26/24 12:18 EST

## 2024-12-26 NOTE — PLAN OF CARE
Goal Outcome Evaluation:         Pt planning on going home with hospice, possible DC today. Pt has been resting between care.

## 2024-12-26 NOTE — CASE MANAGEMENT/SOCIAL WORK
Continued Stay Note  [facility]     Patient Name: Kali Garcia  MRN: [id number]  Today's Date: 12/26/2024    Admit Date: 12/22/2024    Plan: Pending SNF placement. Will need precert. PASRR approved. From home with [facility]. Current home O2 6L through [employer].   Discharge Plan       Row Name 12/26/24 1614       Plan    Plan Pending SNF placement. Will need precert. PASRR approved. From home with [facility]. Current home O2 6L through [employer].    Patient/Family in Agreement with Plan yes    Plan Comments CM notified by [facility] liaison that family and patient are agreeable to SNF prior to returning home with hospice. Family requests [facility], referrals to [facility] and [facility], pending acceptance.                      Expected Discharge Date and Time       Expected Discharge Date Expected Discharge Time    Dec 28, 2024           Phone communication or documentation only - no physical contact with patient or family.     BILL GregoryN, RN    [address] [address]    Office: [phone]  Fax: [phone]

## 2024-12-26 NOTE — CASE MANAGEMENT/SOCIAL WORK
Social Work Assessment   [facility]     Patient Name: Kali Garcia  MRN: [id number]  Today's Date: 12/26/2024    Admit Date: 12/22/2024     Demographic Summary       Row Name 12/26/24 0859       General Information    Reason for Consult community resources    General Information Comments [facility] referral placed via [employer] at RNCM's request per conversation with pt and family.           BIJU Albright, W  Medical Social Worker  Ph [phone]  Fax [phone]  [email or web address]

## 2024-12-26 NOTE — PROGRESS NOTES
Daily Progress Note          Assessment    Pneumonia due to unspecified pathogen  COPD: At home on Trelegy inhaler  Rhinovirus infection  Hypoxemia: At home 6 L of oxygen  CAD  PVD  HTN  HLD  A-fib  Anemia  Former smoker quit 2022 after 60 pack years  Echo 2/1/2024 EF 61-65% mild pulmonary hypertension 35-45 mmHg     CT scan of the chest in June 2024 showing a noncalcified nodule in the right upper lobe around 2 x 1.1 x 1 cm. Patient was also noted to have patchy airspace disease and clustered micronodules in the lingula. He then underwent PET scan showing a 1.7 x 1.1 cm partially calcified irregular shaped nodule in the right upper lobe to be metabolically active with SUV of 4.84 and additional 2 metabolically active irregular nodular densities in the right upper lobe. Inferior apical segment with intervening areas of metabolically active interstitial thickening.     PFTs: Very severe airflow obstruction with significant air trapping and reduced diffusion capacity. FEV1/FVC postbronchodilator is 0.39 with FEV1 of 0.71 L or 28% predicted and FVC of 1.82 L or 47% predicted. No significant bronchodilator response noted. Total lung capacity of 7.77 L or 131% predicted and residual volume of 5.82 L or 226% predicted and DLCO of 61% predicted noted.         Recommendations:  The wheezing is partially improved, continue IV steroids     patient is very cachectic with poor prognosis, patient decided for DNR and home hospice     antibiotics: Unasyn  Nebulized Mucomyst  Encourage use of incentive spirometry and flutter valve  Theophylline    Nebulized Pulmicort  Oxygen supplement and titration to maintain saturation 90 to 95%: Currently on 9 L per high flow nasal cannula  Bronchodilators  Mucinex     Atorvastatin  Plavix     I personally reviewed the radiological studies             LOS: 3 days     Subjective     cough and shortness of breath    Objective     Vital signs for last 24 hours:  Vitals:    12/26/24 1413 12/26/24  1414 12/26/24 1520 12/26/24 1524   BP:       BP Location:       Patient Position:       Pulse: 70 69 63 60   Resp:   21 18   Temp:       TempSrc:       SpO2: (!) 83% (!) 86% (!) 89% 98%   Weight:       Height:           Intake/Output last 3 shifts:  I/O last 3 completed shifts:  In: 1935 [P.O.:1080; I.V.:855]  Out: 1400 [Urine:1400]  Intake/Output this shift:  I/O this shift:  In: 240 [P.O.:240]  Out: -       Radiology  Imaging Results (Last 24 Hours)       ** No results found for the last 24 hours. **            Labs:  Results from last 7 days   Lab Units 12/26/24  0332   WBC 10*3/mm3 5.86   HEMOGLOBIN g/dL 7.5*   HEMATOCRIT % 23.5*   PLATELETS 10*3/mm3 138*     Results from last 7 days   Lab Units 12/26/24  0332 12/24/24  0427 12/23/24  0614 12/22/24  2203   SODIUM mmol/L 147*   < > 142 140   POTASSIUM mmol/L 4.4   < > 3.8 4.3   CHLORIDE mmol/L 110*   < > 104 102   CO2 mmol/L 30.8*   < > 28.7 29.4*   BUN mg/dL 36*   < > 32* 32*   CREATININE mg/dL 0.98   < > 0.95 1.03   CALCIUM mg/dL 8.4*   < > 8.3* 9.0   BILIRUBIN mg/dL  --   --  0.5 0.7   ALK PHOS U/L  --   --   --  78   ALT (SGPT) U/L  --   --   --  13   AST (SGOT) U/L  --   --   --  24   GLUCOSE mg/dL 117*   < > 104* 131*    < > = values in this interval not displayed.     Results from last 7 days   Lab Units 12/23/24  0454   PH, ARTERIAL pH units 7.355   PO2 ART mm Hg 173.6*   PCO2, ARTERIAL mm Hg 62.2*   HCO3 ART mmol/L 34.8*     Results from last 7 days   Lab Units 12/22/24  2203   ALBUMIN g/dL 3.3*                               Meds:   SCHEDULE  acetylcysteine, 3 mL, Nebulization, BID - RT  ampicillin-sulbactam, 3 g, Intravenous, Q8H  atorvastatin, 10 mg, Oral, Daily  budesonide, 0.5 mg, Nebulization, BID - RT  clopidogrel, 75 mg, Oral, Daily  guaiFENesin, 1,200 mg, Oral, Q12H  ipratropium-albuterol, 3 mL, Nebulization, Q4H - RT  methylPREDNISolone sodium succinate, 40 mg, Intravenous, Daily  multivitamin, 1 tablet, Oral, Daily  mupirocin, 1 Application,  Each Nare, BID  pantoprazole, 40 mg, Oral, Q AM  polyethylene glycol, 17 g, Oral, Daily  sodium chloride, 10 mL, Intravenous, Q12H  tamsulosin, 0.4 mg, Oral, Daily      Infusions     PRNs    acetaminophen    aluminum-magnesium hydroxide-simethicone    senna-docusate sodium **AND** polyethylene glycol **AND** bisacodyl **AND** bisacodyl    Calcium Replacement - Follow Nurse / BPA Driven Protocol    ipratropium-albuterol    Magnesium Standard Dose Replacement - Follow Nurse / BPA Driven Protocol    melatonin    ondansetron ODT **OR** ondansetron    Phosphorus Replacement - Follow Nurse / BPA Driven Protocol    Potassium Replacement - Follow Nurse / BPA Driven Protocol    sodium chloride    sodium chloride    sodium chloride    Physical Exam:  General Appearance:  Alert, looks chronically ill and cachectic  HEENT:  Normocephalic, without obvious abnormality, Conjunctiva/corneas clear,.   Nares normal, no drainage     Neck:  Supple, symmetrical, trachea midline.   Lungs /Chest wall: Wheezing and bilateral basal rhonchi, respirations unlabored, symmetrical wall movement.     Heart:  Regular rate and rhythm, S1 S2 normal  Abdomen: Soft, non-tender, no masses, no organomegaly.    Extremities: No edema, no clubbing or cyanosis     ROS  Constitutional: Negative for chills, fever and malaise/fatigue.   HENT: Negative.    Eyes: Negative.    Cardiovascular: Negative.    Respiratory: Positive for cough and shortness of breath.    Skin: Negative.    Musculoskeletal: Negative.    Gastrointestinal: Negative.    Genitourinary: Negative.    Neurological: Generalized weakness      I reviewed the recent clinical results  I personally reviewed the latest radiological studies    Part of this note may be an electronic transcription/translation of spoken language to printed text using the Dragon Dictation System.

## 2024-12-26 NOTE — PROGRESS NOTES
[facility] MEDICINE SERVICE  DAILY PROGRESS NOTE    NAME: Kali Garcia  : 1945  MRN: [id number]      LOS: 3 days     PROVIDER OF SERVICE: FADIA Downey    Chief Complaint: Pneumonia    Subjective:     Interval History:  History taken from: patient RN    Patient states that he needs assistance with ADLs including feeding.  Discussed care with Starla from hospice and RN.  Hospice to discuss barriers to discharge with family and hopefully provide more frequent care if he is to go home.        Review of Systems:   Review of Systems   Constitutional:  Negative for chills, fatigue and fever.   Respiratory:  Positive for cough, shortness of breath and wheezing. Negative for chest tightness.    Cardiovascular:  Negative for chest pain, palpitations and leg swelling.   Neurological:  Negative for dizziness and headaches.       Objective:     Vital Signs  Temp:  [97.4 °F (36.3 °C)-98.2 °F (36.8 °C)] 97.9 °F (36.6 °C)  Heart Rate:  [55-63] 59  Resp:  [11-28] 20  BP: (119-135)/(53-75) 135/55  Flow (L/min) (Oxygen Therapy):  [7-15] 8   Body mass index is 15.34 kg/m².    Physical Exam  Physical Exam  Constitutional:       Appearance: Normal appearance.   HENT:      Head: Normocephalic and atraumatic.      Nose: Nose normal.      Mouth/Throat:      Mouth: Mucous membranes are moist.   Eyes:      Extraocular Movements: Extraocular movements intact.      Conjunctiva/sclera: Conjunctivae normal.      Pupils: Pupils are equal, round, and reactive to light.   Cardiovascular:      Rate and Rhythm: Regular rhythm. Bradycardia present.      Pulses: Normal pulses.      Heart sounds: Normal heart sounds.   Pulmonary:      Effort: Pulmonary effort is normal.      Breath sounds: Wheezing present.   Abdominal:      General: Abdomen is flat. Bowel sounds are normal.      Palpations: Abdomen is soft.   Musculoskeletal:         General: Normal range of motion.      Cervical back: Normal range of motion.   Skin:      General: Skin is warm and dry.   Neurological:      General: No focal deficit present.      Mental Status: He is alert and oriented to person, place, and time. Mental status is at baseline.   Psychiatric:         Mood and Affect: Mood normal.         Behavior: Behavior normal.         Thought Content: Thought content normal.         Judgment: Judgment normal.            Diagnostic Data    Results from last 7 days   Lab Units 12/26/24  0332 12/24/24  0427 12/23/24  0614 12/22/24  2203   WBC 10*3/mm3 5.86   < > 6.75 8.27   HEMOGLOBIN g/dL 7.5*   < > 7.1* 7.8*   HEMATOCRIT % 23.5*   < > 21.9* 24.5*   PLATELETS 10*3/mm3 138*   < > 144 142   GLUCOSE mg/dL 117*   < > 104* 131*   CREATININE mg/dL 0.98   < > 0.95 1.03   BUN mg/dL 36*   < > 32* 32*   SODIUM mmol/L 147*   < > 142 140   POTASSIUM mmol/L 4.4   < > 3.8 4.3   AST (SGOT) U/L  --   --   --  24   ALT (SGPT) U/L  --   --   --  13   ALK PHOS U/L  --   --   --  78   BILIRUBIN mg/dL  --   --  0.5 0.7   ANION GAP mmol/L 6.2   < > 9.3 8.6    < > = values in this interval not displayed.       No radiology results for the last day      I reviewed the patient's new clinical results.    Assessment/Plan:     Active and Resolved Problems  Active Hospital Problems    Diagnosis  POA    **Pneumonia [J18.9]  Yes    Rhinovirus [B34.8]  Yes    Anemia [D64.9]  Yes    Acute on chronic respiratory failure with hypoxia [J96.21]  Yes    Coronary artery disease [I25.10]  Yes    Hypertension [I10]  Yes    COPD (chronic obstructive pulmonary disease) [J44.9]  Yes    Severe malnutrition [E43]  Yes      Resolved Hospital Problems   No resolved problems to display.       Acute on chronic hypoxemic hypercapnia respiratory  failure   Lower lobe pneumonia   COPD with acute exacerbation   Rhinovirus infection   -Currently on 8L 02, on 6L at home   -Continue droplet precautions for rhinovirus   -Duonebs prn   -Continue mucomyst and Unasyn for now     CAD   Atrial fibrillation   PAD   -Continue home  plavix and aspirin   -Sinus louann today     Urinary retention   Hematuria   -No blood noted in catheter today, hgb 7.5  -Continue springer catheter  -Patient wishes to discharge with springer and hospice following, no voiding trial required       Patient may discharge to hospice once family has arranged for in home care. Patient is concerned that no one is available to feed him if he discharges today. Discussed with RN and [facility] to discuss discharge with family. OT consulted in the event family chooses to pursue SNF.     VTE Prophylaxis:  Mechanical VTE prophylaxis orders are present.             Disposition Planning:     Barriers to Discharge:Family at home   Anticipated Date of Discharge: 12/27/26   Place of Discharge: Home with [facility]       Time: 30 minutes     Code Status and Medical Interventions: No CPR (Do Not Attempt to Resuscitate); Limited Support; No intubation (DNI), No cardioversion   Ordered at: 12/24/24 1547     Medical Intervention Limits:    No intubation (DNI)    No cardioversion     Level Of Support Discussed With:    Next of Kin (If No Surrogate)     Code Status (Patient has no pulse and is not breathing):    No CPR (Do Not Attempt to Resuscitate)     Medical Interventions (Patient has pulse or is breathing):    Limited Support       Signature: Electronically signed by FADIA Downey, 12/26/24, 10:50 EST.  [facility]ist Team

## 2024-12-26 NOTE — CASE MANAGEMENT/SOCIAL WORK
Discharge Planning Assessment  [facility]     Patient Name: Kali Garcia  MRN: [id number]  Today's Date: 12/26/2024    Admit Date: 12/22/2024    Plan: Anticipate routine home with [facility]. Current home O2 6L through [employer].       Discharge Plan       Row Name 12/26/24 1220       Plan    Plan Anticipate routine home with [facility]. Current home O2 6L through [employer].    Patient/Family in Agreement with Plan yes    Plan Comments CM met with patient at bedside. IMM letter reviewed with patient, verbal consent and copy left at bedside. Per [facility] liaison, family considering options for care at home as family cannot be there 24/7, PT/OT to eval today. Barrier to D/C: 10L O2, IV abx.                      Expected Discharge Date and Time       Expected Discharge Date Expected Discharge Time    Dec 27, 2024                Patient Forms       Row Name 12/26/24 1221       Patient Forms    Important Message from Medicare (IMM) Delivered  12/26/24    Delivered to Patient    Method of delivery In person                  Met with patient in room wearing mask.   Maintained distance greater than six feet and spent less than 15 minutes in the room.       BILL GregoryN, [place] [address]    Office: [phone]  Fax: [phone]

## 2024-12-26 NOTE — PLAN OF CARE
Problem: Adult Inpatient Plan of Care  Goal: Plan of Care Review  Outcome: Not Progressing  Goal: Patient-Specific Goal (Individualized)  Outcome: Not Progressing  Goal: Absence of Hospital-Acquired Illness or Injury  Outcome: Not Progressing  Intervention: Identify and Manage Fall Risk  Recent Flowsheet Documentation  Taken 12/26/2024 1600 by Haylee Schmitt RN  Safety Promotion/Fall Prevention:   activity supervised   clutter free environment maintained   nonskid shoes/slippers when out of bed   safety round/check completed  Taken 12/26/2024 1400 by Haylee Schmitt RN  Safety Promotion/Fall Prevention:   activity supervised   assistive device/personal items within reach   clutter free environment maintained   fall prevention program maintained   mobility aid in reach   lighting adjusted   nonskid shoes/slippers when out of bed   safety round/check completed   room organization consistent  Taken 12/26/2024 1200 by Haylee Schmitt RN  Safety Promotion/Fall Prevention:   activity supervised   clutter free environment maintained   nonskid shoes/slippers when out of bed   safety round/check completed  Taken 12/26/2024 1000 by Haylee Schmitt RN  Safety Promotion/Fall Prevention:   activity supervised   clutter free environment maintained   nonskid shoes/slippers when out of bed   safety round/check completed  Taken 12/26/2024 0800 by Haylee Schmitt RN  Safety Promotion/Fall Prevention:   activity supervised   clutter free environment maintained   nonskid shoes/slippers when out of bed   safety round/check completed  Intervention: Prevent Skin Injury  Recent Flowsheet Documentation  Taken 12/26/2024 1600 by Haylee Schmitt RN  Skin Protection:   incontinence pads utilized   skin sealant/moisture barrier applied  Taken 12/26/2024 1200 by Haylee Schmitt RN  Skin Protection:   incontinence pads utilized   skin sealant/moisture barrier applied  Taken 12/26/2024 0800 by Haylee Schmitt  RN  Skin Protection: incontinence pads utilized  Intervention: Prevent Infection  Recent Flowsheet Documentation  Taken 12/26/2024 1600 by Haylee Schmitt RN  Infection Prevention:   hand hygiene promoted   personal protective equipment utilized  Taken 12/26/2024 1400 by Haylee Schmitt RN  Infection Prevention:   cohorting utilized   hand hygiene promoted   personal protective equipment utilized   rest/sleep promoted   single patient room provided   visitors restricted/screened  Taken 12/26/2024 1200 by Haylee Schmitt RN  Infection Prevention:   hand hygiene promoted   personal protective equipment utilized  Taken 12/26/2024 1000 by Haylee Schmitt RN  Infection Prevention:   hand hygiene promoted   personal protective equipment utilized  Taken 12/26/2024 0800 by Haylee Schmitt RN  Infection Prevention:   hand hygiene promoted   personal protective equipment utilized  Goal: Optimal Comfort and Wellbeing  Outcome: Not Progressing  Goal: Readiness for Transition of Care  Outcome: Not Progressing

## 2024-12-27 LAB
ANION GAP SERPL CALCULATED.3IONS-SCNC: 5.1 MMOL/L (ref 5–15)
ANION GAP SERPL CALCULATED.3IONS-SCNC: 7 MMOL/L (ref 5–15)
BACTERIA SPEC AEROBE CULT: NORMAL
BASOPHILS # BLD AUTO: 0.01 10*3/MM3 (ref 0–0.2)
BASOPHILS NFR BLD AUTO: 0.2 % (ref 0–1.5)
BUN SERPL-MCNC: 30 MG/DL (ref 8–23)
BUN SERPL-MCNC: 35 MG/DL (ref 8–23)
BUN/CREAT SERPL: 33.3 (ref 7–25)
BUN/CREAT SERPL: 41.7 (ref 7–25)
CALCIUM SPEC-SCNC: 8.5 MG/DL (ref 8.6–10.5)
CALCIUM SPEC-SCNC: 8.9 MG/DL (ref 8.6–10.5)
CHLORIDE SERPL-SCNC: 105 MMOL/L (ref 98–107)
CHLORIDE SERPL-SCNC: 108 MMOL/L (ref 98–107)
CO2 SERPL-SCNC: 32 MMOL/L (ref 22–29)
CO2 SERPL-SCNC: 32.9 MMOL/L (ref 22–29)
CREAT SERPL-MCNC: 0.84 MG/DL (ref 0.76–1.27)
CREAT SERPL-MCNC: 0.9 MG/DL (ref 0.76–1.27)
DEPRECATED RDW RBC AUTO: 55.6 FL (ref 37–54)
EGFRCR SERPLBLD CKD-EPI 2021: 86.3 ML/MIN/1.73
EGFRCR SERPLBLD CKD-EPI 2021: 88.2 ML/MIN/1.73
EOSINOPHIL # BLD AUTO: 0 10*3/MM3 (ref 0–0.4)
EOSINOPHIL NFR BLD AUTO: 0 % (ref 0.3–6.2)
ERYTHROCYTE [DISTWIDTH] IN BLOOD BY AUTOMATED COUNT: 15.9 % (ref 12.3–15.4)
GLUCOSE SERPL-MCNC: 167 MG/DL (ref 65–99)
GLUCOSE SERPL-MCNC: 99 MG/DL (ref 65–99)
HCT VFR BLD AUTO: 25.8 % (ref 37.5–51)
HGB BLD-MCNC: 7.7 G/DL (ref 13–17.7)
IMM GRANULOCYTES # BLD AUTO: 0.41 10*3/MM3 (ref 0–0.05)
IMM GRANULOCYTES NFR BLD AUTO: 8.9 % (ref 0–0.5)
LYMPHOCYTES # BLD AUTO: 0.43 10*3/MM3 (ref 0.7–3.1)
LYMPHOCYTES NFR BLD AUTO: 9.3 % (ref 19.6–45.3)
MCH RBC QN AUTO: 28.8 PG (ref 26.6–33)
MCHC RBC AUTO-ENTMCNC: 29.8 G/DL (ref 31.5–35.7)
MCV RBC AUTO: 96.6 FL (ref 79–97)
MONOCYTES # BLD AUTO: 0.31 10*3/MM3 (ref 0.1–0.9)
MONOCYTES NFR BLD AUTO: 6.7 % (ref 5–12)
NEUTROPHILS NFR BLD AUTO: 3.44 10*3/MM3 (ref 1.7–7)
NEUTROPHILS NFR BLD AUTO: 74.9 % (ref 42.7–76)
NRBC BLD AUTO-RTO: 0 /100 WBC (ref 0–0.2)
PLATELET # BLD AUTO: 142 10*3/MM3 (ref 140–450)
PMV BLD AUTO: 10.4 FL (ref 6–12)
POTASSIUM SERPL-SCNC: 4.6 MMOL/L (ref 3.5–5.2)
POTASSIUM SERPL-SCNC: 4.6 MMOL/L (ref 3.5–5.2)
RBC # BLD AUTO: 2.67 10*6/MM3 (ref 4.14–5.8)
SODIUM SERPL-SCNC: 144 MMOL/L (ref 136–145)
SODIUM SERPL-SCNC: 146 MMOL/L (ref 136–145)
WBC NRBC COR # BLD AUTO: 4.6 10*3/MM3 (ref 3.4–10.8)

## 2024-12-27 PROCEDURE — 80048 BASIC METABOLIC PNL TOTAL CA: CPT | Performed by: NURSE PRACTITIONER

## 2024-12-27 PROCEDURE — 94664 DEMO&/EVAL PT USE INHALER: CPT

## 2024-12-27 PROCEDURE — 94799 UNLISTED PULMONARY SVC/PX: CPT

## 2024-12-27 PROCEDURE — 25010000003 DEXTROSE 5 % SOLUTION

## 2024-12-27 PROCEDURE — 97116 GAIT TRAINING THERAPY: CPT

## 2024-12-27 PROCEDURE — 25010000002 METHYLPREDNISOLONE PER 40 MG: Performed by: HOSPITALIST

## 2024-12-27 PROCEDURE — 80048 BASIC METABOLIC PNL TOTAL CA: CPT

## 2024-12-27 PROCEDURE — 85025 COMPLETE CBC W/AUTO DIFF WBC: CPT | Performed by: NURSE PRACTITIONER

## 2024-12-27 PROCEDURE — 97530 THERAPEUTIC ACTIVITIES: CPT

## 2024-12-27 PROCEDURE — 97110 THERAPEUTIC EXERCISES: CPT

## 2024-12-27 PROCEDURE — 25010000002 AMPICILLIN-SULBACTAM PER 1.5 G: Performed by: NURSE PRACTITIONER

## 2024-12-27 RX ORDER — ARFORMOTEROL TARTRATE 15 UG/2ML
15 SOLUTION RESPIRATORY (INHALATION)
Status: DISCONTINUED | OUTPATIENT
Start: 2024-12-27 | End: 2025-01-03 | Stop reason: HOSPADM

## 2024-12-27 RX ORDER — PREDNISONE 20 MG/1
40 TABLET ORAL
Status: DISCONTINUED | OUTPATIENT
Start: 2024-12-28 | End: 2024-12-29

## 2024-12-27 RX ORDER — DEXTROSE MONOHYDRATE 50 MG/ML
75 INJECTION, SOLUTION INTRAVENOUS CONTINUOUS
Status: DISCONTINUED | OUTPATIENT
Start: 2024-12-27 | End: 2024-12-28

## 2024-12-27 RX ADMIN — BUDESONIDE INHALATION 0.5 MG: 0.5 SUSPENSION RESPIRATORY (INHALATION) at 18:41

## 2024-12-27 RX ADMIN — AMPICILLIN SODIUM AND SULBACTAM SODIUM 3 G: 2; 1 INJECTION, POWDER, FOR SOLUTION INTRAMUSCULAR; INTRAVENOUS at 13:00

## 2024-12-27 RX ADMIN — AMPICILLIN SODIUM AND SULBACTAM SODIUM 3 G: 2; 1 INJECTION, POWDER, FOR SOLUTION INTRAMUSCULAR; INTRAVENOUS at 21:23

## 2024-12-27 RX ADMIN — THEOPHYLLINE 300 MG: 300 TABLET, EXTENDED RELEASE ORAL at 09:42

## 2024-12-27 RX ADMIN — BUDESONIDE INHALATION 0.5 MG: 0.5 SUSPENSION RESPIRATORY (INHALATION) at 08:03

## 2024-12-27 RX ADMIN — THERA TABS 1 TABLET: TAB at 09:42

## 2024-12-27 RX ADMIN — PANTOPRAZOLE SODIUM 40 MG: 40 TABLET, DELAYED RELEASE ORAL at 05:24

## 2024-12-27 RX ADMIN — ACETYLCYSTEINE 3 ML: 200 SOLUTION ORAL; RESPIRATORY (INHALATION) at 08:08

## 2024-12-27 RX ADMIN — ACETAMINOPHEN 650 MG: 325 TABLET, FILM COATED ORAL at 21:17

## 2024-12-27 RX ADMIN — IPRATROPIUM BROMIDE AND ALBUTEROL SULFATE 3 ML: .5; 3 SOLUTION RESPIRATORY (INHALATION) at 14:43

## 2024-12-27 RX ADMIN — IPRATROPIUM BROMIDE AND ALBUTEROL SULFATE 3 ML: .5; 3 SOLUTION RESPIRATORY (INHALATION) at 22:21

## 2024-12-27 RX ADMIN — IPRATROPIUM BROMIDE AND ALBUTEROL SULFATE 3 ML: .5; 3 SOLUTION RESPIRATORY (INHALATION) at 08:00

## 2024-12-27 RX ADMIN — AMPICILLIN SODIUM AND SULBACTAM SODIUM 3 G: 2; 1 INJECTION, POWDER, FOR SOLUTION INTRAMUSCULAR; INTRAVENOUS at 05:24

## 2024-12-27 RX ADMIN — ATORVASTATIN CALCIUM 10 MG: 10 TABLET ORAL at 09:42

## 2024-12-27 RX ADMIN — METHYLPREDNISOLONE SODIUM SUCCINATE 40 MG: 40 INJECTION, POWDER, FOR SOLUTION INTRAMUSCULAR; INTRAVENOUS at 09:42

## 2024-12-27 RX ADMIN — IPRATROPIUM BROMIDE AND ALBUTEROL SULFATE 3 ML: .5; 3 SOLUTION RESPIRATORY (INHALATION) at 03:48

## 2024-12-27 RX ADMIN — IPRATROPIUM BROMIDE AND ALBUTEROL SULFATE 3 ML: .5; 3 SOLUTION RESPIRATORY (INHALATION) at 00:05

## 2024-12-27 RX ADMIN — Medication 10 ML: at 09:42

## 2024-12-27 RX ADMIN — GUAIFENESIN 1200 MG: 600 TABLET, EXTENDED RELEASE ORAL at 21:17

## 2024-12-27 RX ADMIN — GUAIFENESIN 1200 MG: 600 TABLET, EXTENDED RELEASE ORAL at 09:42

## 2024-12-27 RX ADMIN — Medication 10 ML: at 21:18

## 2024-12-27 RX ADMIN — ACETYLCYSTEINE 3 ML: 200 SOLUTION ORAL; RESPIRATORY (INHALATION) at 18:41

## 2024-12-27 RX ADMIN — MUPIROCIN 1 APPLICATION: 20 OINTMENT TOPICAL at 09:42

## 2024-12-27 RX ADMIN — CLOPIDOGREL BISULFATE 75 MG: 75 TABLET ORAL at 09:42

## 2024-12-27 RX ADMIN — ARFORMOTEROL TARTRATE 15 MCG: 15 SOLUTION RESPIRATORY (INHALATION) at 18:41

## 2024-12-27 RX ADMIN — IPRATROPIUM BROMIDE AND ALBUTEROL SULFATE 3 ML: .5; 3 SOLUTION RESPIRATORY (INHALATION) at 11:25

## 2024-12-27 RX ADMIN — POLYETHYLENE GLYCOL 3350 17 G: 17 POWDER, FOR SOLUTION ORAL at 09:42

## 2024-12-27 RX ADMIN — DEXTROSE MONOHYDRATE 75 ML/HR: 50 INJECTION, SOLUTION INTRAVENOUS at 13:00

## 2024-12-27 RX ADMIN — TAMSULOSIN HYDROCHLORIDE 0.4 MG: 0.4 CAPSULE ORAL at 09:42

## 2024-12-27 RX ADMIN — MUPIROCIN 1 APPLICATION: 20 OINTMENT TOPICAL at 21:24

## 2024-12-27 NOTE — PLAN OF CARE
Goal Outcome Evaluation:    ST following for dysphagia evaluation. SLP attempted evaluation today however upon entrance PTA was in room and said 02 stats were declining. SLP re-attempted evaluation an hour later and pt was then on Bi-pap. SLP spoke with nurse and stated he should remain NPO while on Bi-pap and ST department can assess pt a couple hours after he comes off Bi-pap. Nurse called SLP about an hour later and said family brought in food for pt, SLP advised against eating at this time and recommended NPO until pt is able to come off Bi-pap and be evaluated. ST continuing to follow.

## 2024-12-27 NOTE — PLAN OF CARE
Problem: Adult Inpatient Plan of Care  Goal: Absence of Hospital-Acquired Illness or Injury  Intervention: Identify and Manage Fall Risk  Recent Flowsheet Documentation  Taken 12/27/2024 0400 by Sayra Dillon RN  Safety Promotion/Fall Prevention:   assistive device/personal items within reach   clutter free environment maintained   fall prevention program maintained   nonskid shoes/slippers when out of bed   room organization consistent   safety round/check completed  Taken 12/27/2024 0200 by Sayra Dillon RN  Safety Promotion/Fall Prevention:   assistive device/personal items within reach   clutter free environment maintained   fall prevention program maintained   nonskid shoes/slippers when out of bed   room organization consistent   safety round/check completed  Taken 12/27/2024 0005 by Sayra Dillon RN  Safety Promotion/Fall Prevention:   assistive device/personal items within reach   clutter free environment maintained   fall prevention program maintained   nonskid shoes/slippers when out of bed   room organization consistent   safety round/check completed  Taken 12/26/2024 2200 by Sayra Dillon RN  Safety Promotion/Fall Prevention:   assistive device/personal items within reach   clutter free environment maintained   fall prevention program maintained   nonskid shoes/slippers when out of bed   room organization consistent   safety round/check completed  Taken 12/26/2024 2000 by Sayra Dillon RN  Safety Promotion/Fall Prevention:   assistive device/personal items within reach   clutter free environment maintained   fall prevention program maintained   nonskid shoes/slippers when out of bed   room organization consistent   safety round/check completed  Intervention: Prevent Skin Injury  Recent Flowsheet Documentation  Taken 12/27/2024 0400 by Sayra Dillon, RN  Skin Protection: incontinence pads utilized  Taken 12/27/2024 0005 by Sayra Dillon, SAM  Skin  Protection: incontinence pads utilized  Taken 12/26/2024 2000 by Sayra Dillon RN  Skin Protection: incontinence pads utilized  Intervention: Prevent Infection  Recent Flowsheet Documentation  Taken 12/27/2024 0400 by Sayra Dillon RN  Infection Prevention:   environmental surveillance performed   hand hygiene promoted   personal protective equipment utilized   rest/sleep promoted   single patient room provided  Taken 12/27/2024 0200 by Sayra Dillon RN  Infection Prevention:   environmental surveillance performed   hand hygiene promoted   personal protective equipment utilized   rest/sleep promoted   single patient room provided  Taken 12/27/2024 0005 by Sayra Dillon RN  Infection Prevention:   environmental surveillance performed   hand hygiene promoted   personal protective equipment utilized   rest/sleep promoted   single patient room provided  Taken 12/26/2024 2200 by Sayra Dillon RN  Infection Prevention:   environmental surveillance performed   hand hygiene promoted   personal protective equipment utilized   rest/sleep promoted   single patient room provided  Taken 12/26/2024 2000 by Sayra Dillon RN  Infection Prevention:   environmental surveillance performed   hand hygiene promoted   personal protective equipment utilized   rest/sleep promoted   single patient room provided  Goal: Optimal Comfort and Wellbeing  Intervention: Provide Person-Centered Care  Recent Flowsheet Documentation  Taken 12/27/2024 0400 by Sayra Dillon RN  Trust Relationship/Rapport: care explained  Taken 12/27/2024 0005 by Sayra Dillon RN  Trust Relationship/Rapport: care explained  Taken 12/26/2024 2000 by Sayra Dillon RN  Trust Relationship/Rapport:   care explained   choices provided     Problem: Fall Injury Risk  Goal: Absence of Fall and Fall-Related Injury  Intervention: Identify and Manage Contributors  Recent Flowsheet Documentation  Taken 12/27/2024 0400 by  Sayra Dillon RN  Medication Review/Management: medications reviewed  Taken 12/27/2024 0200 by Sayra Dillon RN  Medication Review/Management: medications reviewed  Taken 12/27/2024 0005 by Sayra Dillon RN  Medication Review/Management: medications reviewed  Taken 12/26/2024 2200 by Sayra Dillon RN  Medication Review/Management: medications reviewed  Taken 12/26/2024 2000 by Sayra Dillon RN  Medication Review/Management: medications reviewed  Intervention: Promote Injury-Free Environment  Recent Flowsheet Documentation  Taken 12/27/2024 0400 by Sayra Dillon RN  Safety Promotion/Fall Prevention:   assistive device/personal items within reach   clutter free environment maintained   fall prevention program maintained   nonskid shoes/slippers when out of bed   room organization consistent   safety round/check completed  Taken 12/27/2024 0200 by Sayra Dillon RN  Safety Promotion/Fall Prevention:   assistive device/personal items within reach   clutter free environment maintained   fall prevention program maintained   nonskid shoes/slippers when out of bed   room organization consistent   safety round/check completed  Taken 12/27/2024 0005 by Sayra Dillon RN  Safety Promotion/Fall Prevention:   assistive device/personal items within reach   clutter free environment maintained   fall prevention program maintained   nonskid shoes/slippers when out of bed   room organization consistent   safety round/check completed  Taken 12/26/2024 2200 by Sayra Dillon RN  Safety Promotion/Fall Prevention:   assistive device/personal items within reach   clutter free environment maintained   fall prevention program maintained   nonskid shoes/slippers when out of bed   room organization consistent   safety round/check completed  Taken 12/26/2024 2000 by Sayra Dillon RN  Safety Promotion/Fall Prevention:   assistive device/personal items within reach   clutter free  environment maintained   fall prevention program maintained   nonskid shoes/slippers when out of bed   room organization consistent   safety round/check completed     Problem: Skin Injury Risk Increased  Goal: Skin Health and Integrity  Intervention: Optimize Skin Protection  Recent Flowsheet Documentation  Taken 12/27/2024 0400 by Sayra Dillon RN  Pressure Reduction Techniques:   frequent weight shift encouraged   weight shift assistance provided  Pressure Reduction Devices: pressure-redistributing mattress utilized  Skin Protection: incontinence pads utilized  Taken 12/27/2024 0005 by Sayra Dillon RN  Pressure Reduction Techniques:   frequent weight shift encouraged   weight shift assistance provided  Pressure Reduction Devices: pressure-redistributing mattress utilized  Skin Protection: incontinence pads utilized  Taken 12/26/2024 2000 by Sayra Dillon RN  Pressure Reduction Techniques:   frequent weight shift encouraged   weight shift assistance provided  Pressure Reduction Devices: pressure-redistributing mattress utilized  Skin Protection: incontinence pads utilized     Problem: Comorbidity Management  Goal: Blood Pressure in Desired Range  Intervention: Maintain Blood Pressure Management  Recent Flowsheet Documentation  Taken 12/27/2024 0400 by Sayra Dillon RN  Medication Review/Management: medications reviewed  Taken 12/27/2024 0200 by Sayra Dillon RN  Medication Review/Management: medications reviewed  Taken 12/27/2024 0005 by Sayra Dillon RN  Medication Review/Management: medications reviewed  Taken 12/26/2024 2200 by Sayra Dillon RN  Medication Review/Management: medications reviewed  Taken 12/26/2024 2000 by Sayra Dillon RN  Medication Review/Management: medications reviewed   Goal Outcome Evaluation:

## 2024-12-27 NOTE — CASE MANAGEMENT/SOCIAL WORK
Continued Stay Note  [facility]     Patient Name: Kali Garcia  MRN: [id number]  Today's Date: 12/27/2024    Admit Date: 12/22/2024    Plan: [facility] accepted pending O2 requirements (can accept up to 15L). Will need precert. PASRR approved. From home with [facility] following. Current home O2 6L through [employer].   Discharge Plan       Row Name 12/27/24 1427       Plan    Plan [facility] accepted pending O2 requirements (can accept up to 15L). Will need precert. PASRR approved. From home with [facility] following. Current home O2 6L through [employer].    Plan Comments CM notified by [facility] liaison they cannot accept due to mediation costs. [facility] liaison can accept, can take up to 15L O2. Patient's family plans to tour facility at 4pm today. [facility]. Barrier to D/C: continuous BiPAP, IV abx, sodium monitoring (Na 146).                      Expected Discharge Date and Time       Expected Discharge Date Expected Discharge Time    Dec 30, 2024           Phone communication or documentation only - no physical contact with patient or family.     BILL GregoryN, [place] [address]    Office: [phone]  Fax: [phone]

## 2024-12-27 NOTE — PLAN OF CARE
"Goal Outcome Evaluation:     Kali Garcia presents with functional mobility impairments which indicate the need for skilled intervention. Tolerating session today without incident.  Pt tolerated tx session as well as he did yesterday.  Sats cont to drop with activity and pt remains weak.  Pt will need rehab at d/c.  Will continue to follow and progress as tolerated.     If medically appropriate, Moderate Intensity Therapy recommended post-acute care. This is recommended as therapy feels the patient would require 3-4 days per week and wouldn't tolerate \"3 hour daily\" rehab intensity. SNF would be the preferred choice. If the patient does not agree to SNF, arrange HH or OP depending on home bound status. If patient is medically complex, consider LTACH. Pt requires no DME at discharge.     Pt desires Skilled Rehab placement at discharge. Pt cooperative; agreeable to therapeutic recommendations and plan of care.                                        "

## 2024-12-27 NOTE — PLAN OF CARE
Goal Outcome Evaluation:           Progress: no change  Outcome Evaluation: Patient having to wear bipap alot today due to low sats. Patient cannot maintain oxygen on nasal cannula. Patient has very poor appetite because of resp status. Will continue to monitor.

## 2024-12-27 NOTE — PROGRESS NOTES
[facility] MEDICINE SERVICE  DAILY PROGRESS NOTE    NAME: Kali Garcia  : 1945  MRN: [id number]      LOS: 4 days     PROVIDER OF SERVICE: Sho Capps MD    Chief Complaint: Pneumonia    Subjective:     Interval History:  History taken from: patient    Seen and examined at bedside this morning.  Currently on 8 L nasal cannula.  Still feels a little bit congested.        Review of Systems: Negative except described above  Review of Systems    Objective:     Vital Signs  Temp:  [97.5 °F (36.4 °C)-97.8 °F (36.6 °C)] 97.7 °F (36.5 °C)  Heart Rate:  [59-70] 66  Resp:  [14-25] 17  BP: (127-147)/(58-67) 143/59  Flow (L/min) (Oxygen Therapy):  [6-10] 8   Body mass index is 15.5 kg/m².    Physical Exam  Physical Exam  Constitutional:       Comments: NAD    Cardiovascular:      Comments:  RRR, S1 & S2   Pulmonary:      Comments:  Lungs CTA   Abdominal:      Comments:  ABD soft, NT            Diagnostic Data    Results from last 7 days   Lab Units 24  0331 24  0427 24  0614 24  2203   WBC 10*3/mm3 4.60   < > 6.75 8.27   HEMOGLOBIN g/dL 7.7*   < > 7.1* 7.8*   HEMATOCRIT % 25.8*   < > 21.9* 24.5*   PLATELETS 10*3/mm3 142   < > 144 142   GLUCOSE mg/dL 99   < > 104* 131*   CREATININE mg/dL 0.84   < > 0.95 1.03   BUN mg/dL 35*   < > 32* 32*   SODIUM mmol/L 146*   < > 142 140   POTASSIUM mmol/L 4.6   < > 3.8 4.3   AST (SGOT) U/L  --   --   --  24   ALT (SGPT) U/L  --   --   --  13   ALK PHOS U/L  --   --   --  78   BILIRUBIN mg/dL  --   --  0.5 0.7   ANION GAP mmol/L 5.1   < > 9.3 8.6    < > = values in this interval not displayed.       No radiology results for the last day      I reviewed the patient's new clinical results.    Assessment/Plan:     Active and Resolved Problems  Active Hospital Problems    Diagnosis  POA    **Pneumonia [J18.9]  Yes    Rhinovirus [B34.8]  Yes    Anemia [D64.9]  Yes    Acute on chronic respiratory failure with hypoxia [J96.21]  Yes    Coronary  artery disease [I25.10]  Yes    Hypertension [I10]  Yes    COPD (chronic obstructive pulmonary disease) [J44.9]  Yes    Severe malnutrition [E43]  Yes      Resolved Hospital Problems   No resolved problems to display.       Acute on chronic hypoxemic hypercapnia respiratory  failure   Lower lobe pneumonia   COPD with acute exacerbation   Rhinovirus infection   -Currently on 8L 02, on 6L at home   -Continue droplet precautions for rhinovirus   -Duonebs prn   -Continue mucomyst and Unasyn for now      CAD   Atrial fibrillation   PAD   -Continue home plavix and aspirin      Urinary retention   Hematuria   -No blood noted in catheter   -Continue springer catheter  -Patient wishes to discharge with springer and hospice following, no voiding trial required     Hypernatremia  Started patient on D5 infusion, will check BMP every 6 hours       VTE Prophylaxis:  Mechanical VTE prophylaxis orders are present.             Disposition Planning:        Anticipated Date of Discharge: Pending pre-CERT for SNF         Time: 35 minutes     Code Status and Medical Interventions: No CPR (Do Not Attempt to Resuscitate); Limited Support; No intubation (DNI), No cardioversion   Ordered at: 12/24/24 1547     Medical Intervention Limits:    No intubation (DNI)    No cardioversion     Level Of Support Discussed With:    Next of Kin (If No Surrogate)     Code Status (Patient has no pulse and is not breathing):    No CPR (Do Not Attempt to Resuscitate)     Medical Interventions (Patient has pulse or is breathing):    Limited Support       Signature: Electronically signed by Sho Capps MD, 12/27/24, 10:22 EST.  [facility] Hospitalist Team

## 2024-12-27 NOTE — SIGNIFICANT NOTE
12/27/24 0230   OTHER   Discipline occupational therapist   Rehab Time/Intention   Session Not Performed other (see comments)  (pt on continuous bipap, desats to 70's with transfers holding this date, will follow up when appropriate)   Recommendation   OT - Next Appointment 12/28/24

## 2024-12-27 NOTE — PROGRESS NOTES
Daily Progress Note          Assessment    Pneumonia due to unspecified pathogen  COPD: At home on Trelegy inhaler  Rhinovirus infection  Hypoxemia: At home 6 L of oxygen  CAD  PVD  HTN  HLD  A-fib  Anemia  Former smoker quit 2022 after 60 pack years  Echo 2/1/2024 EF 61-65% mild pulmonary hypertension 35-45 mmHg     CT scan of the chest in June 2024 showing a noncalcified nodule in the right upper lobe around 2 x 1.1 x 1 cm. Patient was also noted to have patchy airspace disease and clustered micronodules in the lingula. He then underwent PET scan showing a 1.7 x 1.1 cm partially calcified irregular shaped nodule in the right upper lobe to be metabolically active with SUV of 4.84 and additional 2 metabolically active irregular nodular densities in the right upper lobe. Inferior apical segment with intervening areas of metabolically active interstitial thickening.     PFTs: Very severe airflow obstruction with significant air trapping and reduced diffusion capacity. FEV1/FVC postbronchodilator is 0.39 with FEV1 of 0.71 L or 28% predicted and FVC of 1.82 L or 47% predicted. No significant bronchodilator response noted. Total lung capacity of 7.77 L or 131% predicted and residual volume of 5.82 L or 226% predicted and DLCO of 61% predicted noted.         Recommendations:  The wheezing is partially improved, prednisone    patient is very cachectic with poor prognosis, patient decided for DNR and home hospice     antibiotics: Unasyn completed 5 days  Nebulized Mucomyst to complete today  Encourage use of incentive spirometry and flutter valve  Theophylline    Nebulized Pulmicort  Oxygen supplement and titration to maintain saturation 90 to 95%: Currently on 8-12 L per high flow nasal cannula  Bronchodilators  Mucinex     Atorvastatin  Plavix     I personally reviewed the radiological studies             LOS: 4 days     Subjective     cough and shortness of breath    Objective     Vital signs for last 24 hours:  Vitals:     12/27/24 0821 12/27/24 0859 12/27/24 1125 12/27/24 1132   BP:  143/59     BP Location:       Patient Position:       Pulse: 63 66 67 66   Resp: 16 17 24 24   Temp:  97.7 °F (36.5 °C)     TempSrc:  Oral     SpO2:  94% (!) 88%    Weight:       Height:           Intake/Output last 3 shifts:  I/O last 3 completed shifts:  In: 1200 [P.O.:1200]  Out: 1950 [Urine:1950]  Intake/Output this shift:  I/O this shift:  In: 240 [P.O.:240]  Out: -       Radiology  Imaging Results (Last 24 Hours)       ** No results found for the last 24 hours. **            Labs:  Results from last 7 days   Lab Units 12/27/24  0331   WBC 10*3/mm3 4.60   HEMOGLOBIN g/dL 7.7*   HEMATOCRIT % 25.8*   PLATELETS 10*3/mm3 142     Results from last 7 days   Lab Units 12/27/24  0331 12/24/24  0427 12/23/24  0614 12/22/24  2203   SODIUM mmol/L 146*   < > 142 140   POTASSIUM mmol/L 4.6   < > 3.8 4.3   CHLORIDE mmol/L 108*   < > 104 102   CO2 mmol/L 32.9*   < > 28.7 29.4*   BUN mg/dL 35*   < > 32* 32*   CREATININE mg/dL 0.84   < > 0.95 1.03   CALCIUM mg/dL 8.5*   < > 8.3* 9.0   BILIRUBIN mg/dL  --   --  0.5 0.7   ALK PHOS U/L  --   --   --  78   ALT (SGPT) U/L  --   --   --  13   AST (SGOT) U/L  --   --   --  24   GLUCOSE mg/dL 99   < > 104* 131*    < > = values in this interval not displayed.     Results from last 7 days   Lab Units 12/23/24  0454   PH, ARTERIAL pH units 7.355   PO2 ART mm Hg 173.6*   PCO2, ARTERIAL mm Hg 62.2*   HCO3 ART mmol/L 34.8*     Results from last 7 days   Lab Units 12/22/24  2203   ALBUMIN g/dL 3.3*                               Meds:   SCHEDULE  acetylcysteine, 3 mL, Nebulization, BID - RT  ampicillin-sulbactam, 3 g, Intravenous, Q8H  atorvastatin, 10 mg, Oral, Daily  budesonide, 0.5 mg, Nebulization, BID - RT  clopidogrel, 75 mg, Oral, Daily  guaiFENesin, 1,200 mg, Oral, Q12H  ipratropium-albuterol, 3 mL, Nebulization, Q4H - RT  methylPREDNISolone sodium succinate, 40 mg, Intravenous, Daily  multivitamin, 1 tablet, Oral,  Daily  mupirocin, 1 Application, Each Nare, BID  pantoprazole, 40 mg, Oral, Q AM  polyethylene glycol, 17 g, Oral, Daily  sodium chloride, 10 mL, Intravenous, Q12H  tamsulosin, 0.4 mg, Oral, Daily  theophylline, 300 mg, Oral, Daily      Infusions  dextrose, 75 mL/hr      PRNs    acetaminophen    aluminum-magnesium hydroxide-simethicone    senna-docusate sodium **AND** polyethylene glycol **AND** bisacodyl **AND** bisacodyl    Calcium Replacement - Follow Nurse / BPA Driven Protocol    ipratropium-albuterol    Magnesium Standard Dose Replacement - Follow Nurse / BPA Driven Protocol    melatonin    ondansetron ODT **OR** ondansetron    Phosphorus Replacement - Follow Nurse / BPA Driven Protocol    Potassium Replacement - Follow Nurse / BPA Driven Protocol    sodium chloride    sodium chloride    sodium chloride    Physical Exam:  General Appearance:  Alert, looks chronically ill and cachectic  HEENT:  Normocephalic, without obvious abnormality, Conjunctiva/corneas clear,.   Nares normal, no drainage     Neck:  Supple, symmetrical, trachea midline.   Lungs /Chest wall: Mild wheezing and mild bilateral basal rhonchi, respirations unlabored, symmetrical wall movement.     Heart:  Regular rate and rhythm, S1 S2 normal  Abdomen: Soft, non-tender, no masses, no organomegaly.    Extremities: No edema, no clubbing or cyanosis     ROS  Constitutional: Negative for chills, fever and malaise/fatigue.   HENT: Negative.    Eyes: Negative.    Cardiovascular: Negative.    Respiratory: Positive for cough and shortness of breath.    Skin: Negative.    Musculoskeletal: Negative.    Gastrointestinal: Negative.    Genitourinary: Negative.    Neurological: Generalized weakness      I reviewed the recent clinical results  I personally reviewed the latest radiological studies    Part of this note may be an electronic transcription/translation of spoken language to printed text using the Dragon Dictation System.

## 2024-12-27 NOTE — THERAPY TREATMENT NOTE
"Subjective: Pt agreeable to therapeutic plan of care.    Objective:     Precautions -  high fall risk, 10L HF nc,  pt desaturates to 75% after ambulating 12 feet.    Bed mobility - Min-A  Transfers - Min-A and with rolling walker  improved with approach to chair.  Pt did back all the way to the chair.   Ambulation - 12 feet Min-A and with rolling walker      Therapeutic Exercise - 10 Reps B LE AROM unsupported sitting / EOB    Vitals: Desaturates sats dropped to 75% pt very short of air. Nursing titrated 02 up to 15L to let pt recover.  Sats returned to 85% after a few minutes.  Nursing titrated 02 to 12L nc.      Pain: 0 VAS       Education: Provided education on the importance of mobility in the acute care setting, Verbal/Tactile Cues, Transfer Training, and Gait Training    Assessment: Kali Garcia presents with functional mobility impairments which indicate the need for skilled intervention. Tolerating session today without incident.  Pt tolerated tx session as well as he did yesterday.  Sats cont to drop with activity and pt remains weak.  Pt will need rehab at d/c.  Will continue to follow and progress as tolerated.     Plan/Recommendations:   If medically appropriate, Moderate Intensity Therapy recommended post-acute care. This is recommended as therapy feels the patient would require 3-4 days per week and wouldn't tolerate \"3 hour daily\" rehab intensity. SNF would be the preferred choice. If the patient does not agree to SNF, arrange HH or OP depending on home bound status. If patient is medically complex, consider LTACH. Pt requires no DME at discharge.     Pt desires Skilled Rehab placement at discharge. Pt cooperative; agreeable to therapeutic recommendations and plan of care.     Basic Mobility 6-click:  Rollin = Total, A lot = 2, A little = 3; 4 = None  Supine>Sit:   1 = Total, A lot = 2, A little = 3; 4 = None   Sit>Stand with arms:  1 = Total, A lot = 2, A little = 3; 4 = " None  Bed>Chair:   1 = Total, A lot = 2, A little = 3; 4 = None  Ambulate in room:  1 = Total, A lot = 2, A little = 3; 4 = None  3-5 Steps with railin = Total, A lot = 2, A little = 3; 4 = None  Score: 15    Modified [place]: 4 = Moderately severe disability (Unable to attend to own bodily needs without assistance, and unable to walk unassisted)     Post-Tx Position: Up in Chair, Alarms activated, and Call light and personal items within reach  PPE: gloves and surgical mask    Therapy Charges for Today       Code Description Service Date Service Provider Modifiers Qty    73895099146 HC GAIT TRAINING EA 15 MIN 2024 Ingrid Stanley, PTA GP 1    81688703267 HC PT THERAPEUTIC ACT EA 15 MIN 2024 Ingrid Stanley, PTA GP 1    91788361292 HC PT THER PROC EA 15 MIN 2024 Ingrid Stanley, PTA GP 1    33397178887 HC GAIT TRAINING EA 15 MIN 2024 Ingrid Stanley, PTA GP 1    14849035399 HC PT THERAPEUTIC ACT EA 15 MIN 2024 Ingrid Stanley, PTA GP 1    43892567609 HC PT THER PROC EA 15 MIN 2024 Ingrid Stanley, PTA GP 1           PT Charges       Row Name 24 1212             Time Calculation    Start Time 1100  -SC      Stop Time 1129  -SC      Time Calculation (min) 29 min  -SC      PT Received On 24  -SC      PT - Next Appointment 24  -SC         Time Calculation- PT    Total Timed Code Minutes- PT 29 minute(s)  -SC                User Key  (r) = Recorded By, (t) = Taken By, (c) = Cosigned By      Initials Name Provider Type    SC Ingrid Stanley PTA Physical Therapist Assistant

## 2024-12-28 LAB
ANION GAP SERPL CALCULATED.3IONS-SCNC: 5.1 MMOL/L (ref 5–15)
ANION GAP SERPL CALCULATED.3IONS-SCNC: 5.7 MMOL/L (ref 5–15)
BASOPHILS # BLD AUTO: 0 10*3/MM3 (ref 0–0.2)
BASOPHILS NFR BLD AUTO: 0 % (ref 0–1.5)
BUN SERPL-MCNC: 26 MG/DL (ref 8–23)
BUN SERPL-MCNC: 28 MG/DL (ref 8–23)
BUN/CREAT SERPL: 33.7 (ref 7–25)
BUN/CREAT SERPL: 33.8 (ref 7–25)
CALCIUM SPEC-SCNC: 8.5 MG/DL (ref 8.6–10.5)
CALCIUM SPEC-SCNC: 8.8 MG/DL (ref 8.6–10.5)
CHLORIDE SERPL-SCNC: 103 MMOL/L (ref 98–107)
CHLORIDE SERPL-SCNC: 104 MMOL/L (ref 98–107)
CO2 SERPL-SCNC: 31.9 MMOL/L (ref 22–29)
CO2 SERPL-SCNC: 32.3 MMOL/L (ref 22–29)
CREAT SERPL-MCNC: 0.77 MG/DL (ref 0.76–1.27)
CREAT SERPL-MCNC: 0.83 MG/DL (ref 0.76–1.27)
DEPRECATED RDW RBC AUTO: 54.7 FL (ref 37–54)
EGFRCR SERPLBLD CKD-EPI 2021: 88.5 ML/MIN/1.73
EGFRCR SERPLBLD CKD-EPI 2021: 90.5 ML/MIN/1.73
EOSINOPHIL # BLD AUTO: 0 10*3/MM3 (ref 0–0.4)
EOSINOPHIL NFR BLD AUTO: 0 % (ref 0.3–6.2)
ERYTHROCYTE [DISTWIDTH] IN BLOOD BY AUTOMATED COUNT: 15.6 % (ref 12.3–15.4)
GLUCOSE SERPL-MCNC: 122 MG/DL (ref 65–99)
GLUCOSE SERPL-MCNC: 92 MG/DL (ref 65–99)
HCT VFR BLD AUTO: 26.6 % (ref 37.5–51)
HGB BLD-MCNC: 8.3 G/DL (ref 13–17.7)
IMM GRANULOCYTES # BLD AUTO: 0.4 10*3/MM3 (ref 0–0.05)
IMM GRANULOCYTES NFR BLD AUTO: 9.3 % (ref 0–0.5)
LYMPHOCYTES # BLD AUTO: 0.26 10*3/MM3 (ref 0.7–3.1)
LYMPHOCYTES NFR BLD AUTO: 6 % (ref 19.6–45.3)
MCH RBC QN AUTO: 30.1 PG (ref 26.6–33)
MCHC RBC AUTO-ENTMCNC: 31.2 G/DL (ref 31.5–35.7)
MCV RBC AUTO: 96.4 FL (ref 79–97)
MONOCYTES # BLD AUTO: 0.21 10*3/MM3 (ref 0.1–0.9)
MONOCYTES NFR BLD AUTO: 4.9 % (ref 5–12)
NEUTROPHILS NFR BLD AUTO: 3.45 10*3/MM3 (ref 1.7–7)
NEUTROPHILS NFR BLD AUTO: 79.8 % (ref 42.7–76)
NRBC BLD AUTO-RTO: 0 /100 WBC (ref 0–0.2)
PLATELET # BLD AUTO: 137 10*3/MM3 (ref 140–450)
PMV BLD AUTO: 10 FL (ref 6–12)
POTASSIUM SERPL-SCNC: 4.4 MMOL/L (ref 3.5–5.2)
POTASSIUM SERPL-SCNC: 4.7 MMOL/L (ref 3.5–5.2)
RBC # BLD AUTO: 2.76 10*6/MM3 (ref 4.14–5.8)
SODIUM SERPL-SCNC: 140 MMOL/L (ref 136–145)
SODIUM SERPL-SCNC: 142 MMOL/L (ref 136–145)
WBC NRBC COR # BLD AUTO: 4.32 10*3/MM3 (ref 3.4–10.8)

## 2024-12-28 PROCEDURE — 25010000003 DEXTROSE 5 % SOLUTION

## 2024-12-28 PROCEDURE — 94664 DEMO&/EVAL PT USE INHALER: CPT

## 2024-12-28 PROCEDURE — 80048 BASIC METABOLIC PNL TOTAL CA: CPT

## 2024-12-28 PROCEDURE — 85025 COMPLETE CBC W/AUTO DIFF WBC: CPT | Performed by: NURSE PRACTITIONER

## 2024-12-28 PROCEDURE — 94799 UNLISTED PULMONARY SVC/PX: CPT

## 2024-12-28 PROCEDURE — 63710000001 PREDNISONE PER 1 MG: Performed by: INTERNAL MEDICINE

## 2024-12-28 PROCEDURE — 94660 CPAP INITIATION&MGMT: CPT

## 2024-12-28 PROCEDURE — 25010000002 HEPARIN (PORCINE) PER 1000 UNITS

## 2024-12-28 PROCEDURE — 94761 N-INVAS EAR/PLS OXIMETRY MLT: CPT

## 2024-12-28 RX ORDER — HEPARIN SODIUM 5000 [USP'U]/ML
5000 INJECTION, SOLUTION INTRAVENOUS; SUBCUTANEOUS EVERY 8 HOURS SCHEDULED
Status: DISCONTINUED | OUTPATIENT
Start: 2024-12-28 | End: 2025-01-03

## 2024-12-28 RX ORDER — HYDRALAZINE HYDROCHLORIDE 20 MG/ML
10 INJECTION INTRAMUSCULAR; INTRAVENOUS EVERY 4 HOURS PRN
Status: DISCONTINUED | OUTPATIENT
Start: 2024-12-28 | End: 2025-01-03 | Stop reason: HOSPADM

## 2024-12-28 RX ORDER — HYDROXYZINE HYDROCHLORIDE 25 MG/1
25 TABLET, FILM COATED ORAL ONCE
Status: COMPLETED | OUTPATIENT
Start: 2024-12-28 | End: 2024-12-28

## 2024-12-28 RX ADMIN — IPRATROPIUM BROMIDE AND ALBUTEROL SULFATE 3 ML: .5; 3 SOLUTION RESPIRATORY (INHALATION) at 11:33

## 2024-12-28 RX ADMIN — Medication 10 ML: at 09:34

## 2024-12-28 RX ADMIN — IPRATROPIUM BROMIDE AND ALBUTEROL SULFATE 3 ML: .5; 3 SOLUTION RESPIRATORY (INHALATION) at 22:45

## 2024-12-28 RX ADMIN — IPRATROPIUM BROMIDE AND ALBUTEROL SULFATE 3 ML: .5; 3 SOLUTION RESPIRATORY (INHALATION) at 02:26

## 2024-12-28 RX ADMIN — ARFORMOTEROL TARTRATE 15 MCG: 15 SOLUTION RESPIRATORY (INHALATION) at 18:35

## 2024-12-28 RX ADMIN — Medication 10 ML: at 21:19

## 2024-12-28 RX ADMIN — ATORVASTATIN CALCIUM 10 MG: 10 TABLET ORAL at 09:33

## 2024-12-28 RX ADMIN — IPRATROPIUM BROMIDE AND ALBUTEROL SULFATE 3 ML: .5; 3 SOLUTION RESPIRATORY (INHALATION) at 15:15

## 2024-12-28 RX ADMIN — POLYETHYLENE GLYCOL 3350 17 G: 17 POWDER, FOR SOLUTION ORAL at 09:33

## 2024-12-28 RX ADMIN — MUPIROCIN 1 APPLICATION: 20 OINTMENT TOPICAL at 09:34

## 2024-12-28 RX ADMIN — PANTOPRAZOLE SODIUM 40 MG: 40 TABLET, DELAYED RELEASE ORAL at 05:41

## 2024-12-28 RX ADMIN — GUAIFENESIN 1200 MG: 600 TABLET, EXTENDED RELEASE ORAL at 21:19

## 2024-12-28 RX ADMIN — HYDROXYZINE HYDROCHLORIDE 25 MG: 25 TABLET, FILM COATED ORAL at 15:11

## 2024-12-28 RX ADMIN — DEXTROSE MONOHYDRATE 75 ML/HR: 50 INJECTION, SOLUTION INTRAVENOUS at 04:04

## 2024-12-28 RX ADMIN — GUAIFENESIN 1200 MG: 600 TABLET, EXTENDED RELEASE ORAL at 09:33

## 2024-12-28 RX ADMIN — HEPARIN SODIUM 5000 UNITS: 5000 INJECTION INTRAVENOUS; SUBCUTANEOUS at 15:11

## 2024-12-28 RX ADMIN — PREDNISONE 40 MG: 20 TABLET ORAL at 09:34

## 2024-12-28 RX ADMIN — CLOPIDOGREL BISULFATE 75 MG: 75 TABLET ORAL at 09:33

## 2024-12-28 RX ADMIN — THERA TABS 1 TABLET: TAB at 09:33

## 2024-12-28 RX ADMIN — BUDESONIDE INHALATION 0.5 MG: 0.5 SUSPENSION RESPIRATORY (INHALATION) at 07:31

## 2024-12-28 RX ADMIN — MUPIROCIN 1 APPLICATION: 20 OINTMENT TOPICAL at 21:19

## 2024-12-28 RX ADMIN — HEPARIN SODIUM 5000 UNITS: 5000 INJECTION INTRAVENOUS; SUBCUTANEOUS at 21:19

## 2024-12-28 RX ADMIN — BUDESONIDE INHALATION 0.5 MG: 0.5 SUSPENSION RESPIRATORY (INHALATION) at 18:35

## 2024-12-28 RX ADMIN — ARFORMOTEROL TARTRATE 15 MCG: 15 SOLUTION RESPIRATORY (INHALATION) at 07:31

## 2024-12-28 RX ADMIN — THEOPHYLLINE 300 MG: 300 TABLET, EXTENDED RELEASE ORAL at 09:33

## 2024-12-28 RX ADMIN — TAMSULOSIN HYDROCHLORIDE 0.4 MG: 0.4 CAPSULE ORAL at 09:33

## 2024-12-28 NOTE — PROGRESS NOTES
Daily Progress Note          Assessment    Pneumonia due to unspecified pathogen  COPD: At home on Trelegy inhaler  Rhinovirus infection  Hypoxemia: At home 6 L of oxygen  CAD  PVD  HTN  HLD  A-fib  Anemia  Former smoker quit 2022 after 60 pack years  Echo 2/1/2024 EF 61-65% mild pulmonary hypertension 35-45 mmHg     CT scan of the chest in June 2024 showing a noncalcified nodule in the right upper lobe around 2 x 1.1 x 1 cm. Patient was also noted to have patchy airspace disease and clustered micronodules in the lingula. He then underwent PET scan showing a 1.7 x 1.1 cm partially calcified irregular shaped nodule in the right upper lobe to be metabolically active with SUV of 4.84 and additional 2 metabolically active irregular nodular densities in the right upper lobe. Inferior apical segment with intervening areas of metabolically active interstitial thickening.     PFTs: Very severe airflow obstruction with significant air trapping and reduced diffusion capacity. FEV1/FVC postbronchodilator is 0.39 with FEV1 of 0.71 L or 28% predicted and FVC of 1.82 L or 47% predicted. No significant bronchodilator response noted. Total lung capacity of 7.77 L or 131% predicted and residual volume of 5.82 L or 226% predicted and DLCO of 61% predicted noted.         Recommendations:  The wheezing is partially improved, prednisone    patient is very cachectic with poor prognosis, patient decided for DNR and home hospice     antibiotics: Unasyn completed 5 days  Nebulized Mucomyst to complete today  Encourage use of incentive spirometry and flutter valve  Theophylline    Nebulized Pulmicort  Oxygen supplement and titration to maintain saturation 90 to 95%: Currently on Airvo alternating with NIV   Bronchodilators  Mucinex     Atorvastatin  Plavix     I personally reviewed the radiological studies             LOS: 5 days     Subjective     cough and shortness of breath    Objective     Vital signs for last 24 hours:  Vitals:     12/28/24 1137 12/28/24 1349 12/28/24 1515 12/28/24 1519   BP:  129/68     BP Location:  Right arm     Patient Position:  Lying     Pulse: 60 62 55 55   Resp: 19 20 15 16   Temp:  97.4 °F (36.3 °C)     TempSrc:  Axillary     SpO2: 99% (!) 86% 92% 95%   Weight:       Height:           Intake/Output last 3 shifts:  I/O last 3 completed shifts:  In: 720 [P.O.:720]  Out: 1700 [Urine:1700]  Intake/Output this shift:  I/O this shift:  In: -   Out: 950 [Urine:950]      Radiology  Imaging Results (Last 24 Hours)       ** No results found for the last 24 hours. **            Labs:  Results from last 7 days   Lab Units 12/28/24  0015   WBC 10*3/mm3 4.32   HEMOGLOBIN g/dL 8.3*   HEMATOCRIT % 26.6*   PLATELETS 10*3/mm3 137*     Results from last 7 days   Lab Units 12/28/24  0604 12/24/24  0427 12/23/24  0614 12/22/24  2203   SODIUM mmol/L 140   < > 142 140   POTASSIUM mmol/L 4.4   < > 3.8 4.3   CHLORIDE mmol/L 103   < > 104 102   CO2 mmol/L 31.9*   < > 28.7 29.4*   BUN mg/dL 26*   < > 32* 32*   CREATININE mg/dL 0.77   < > 0.95 1.03   CALCIUM mg/dL 8.5*   < > 8.3* 9.0   BILIRUBIN mg/dL  --   --  0.5 0.7   ALK PHOS U/L  --   --   --  78   ALT (SGPT) U/L  --   --   --  13   AST (SGOT) U/L  --   --   --  24   GLUCOSE mg/dL 92   < > 104* 131*    < > = values in this interval not displayed.     Results from last 7 days   Lab Units 12/23/24  0454   PH, ARTERIAL pH units 7.355   PO2 ART mm Hg 173.6*   PCO2, ARTERIAL mm Hg 62.2*   HCO3 ART mmol/L 34.8*     Results from last 7 days   Lab Units 12/22/24  2203   ALBUMIN g/dL 3.3*                               Meds:   SCHEDULE  arformoterol, 15 mcg, Nebulization, BID - RT  atorvastatin, 10 mg, Oral, Daily  budesonide, 0.5 mg, Nebulization, BID - RT  clopidogrel, 75 mg, Oral, Daily  guaiFENesin, 1,200 mg, Oral, Q12H  heparin (porcine), 5,000 Units, Subcutaneous, Q8H  ipratropium-albuterol, 3 mL, Nebulization, Q4H - RT  multivitamin, 1 tablet, Oral, Daily  mupirocin, 1 Application, Each Nare,  BID  pantoprazole, 40 mg, Oral, Q AM  polyethylene glycol, 17 g, Oral, Daily  predniSONE, 40 mg, Oral, Daily With Breakfast  sodium chloride, 10 mL, Intravenous, Q12H  tamsulosin, 0.4 mg, Oral, Daily  theophylline, 300 mg, Oral, Daily      Infusions       PRNs    acetaminophen    aluminum-magnesium hydroxide-simethicone    senna-docusate sodium **AND** polyethylene glycol **AND** bisacodyl **AND** bisacodyl    Calcium Replacement - Follow Nurse / BPA Driven Protocol    hydrALAZINE    ipratropium-albuterol    Magnesium Standard Dose Replacement - Follow Nurse / BPA Driven Protocol    melatonin    ondansetron ODT **OR** ondansetron    Phosphorus Replacement - Follow Nurse / BPA Driven Protocol    Potassium Replacement - Follow Nurse / BPA Driven Protocol    sodium chloride    sodium chloride    sodium chloride    Physical Exam:  General Appearance:  Alert, looks chronically ill and cachectic  HEENT:  Normocephalic, without obvious abnormality, Conjunctiva/corneas clear,.   Nares normal, no drainage     Neck:  Supple, symmetrical, trachea midline.   Lungs /Chest wall: Mild wheezing and mild bilateral basal rhonchi, respirations unlabored, symmetrical wall movement.     Heart:  Regular rate and rhythm, S1 S2 normal  Abdomen: Soft, non-tender, no masses, no organomegaly.    Extremities: No edema, no clubbing or cyanosis     ROS  Constitutional: Negative for chills, fever and malaise/fatigue.   HENT: Negative.    Eyes: Negative.    Cardiovascular: Negative.    Respiratory: Positive for cough and shortness of breath.    Skin: Negative.    Musculoskeletal: Negative.    Gastrointestinal: Negative.    Genitourinary: Negative.    Neurological: Generalized weakness      I reviewed the recent clinical results  I personally reviewed the latest radiological studies    Part of this note may be an electronic transcription/translation of spoken language to printed text using the Dragon Dictation System.

## 2024-12-28 NOTE — DISCHARGE PLACEMENT REQUEST
"Kali Garcia (79 y.o. Male)       Date of Birth   1945    Social Security Number       Address   [address] [place] [address] [place] [facility]    Home Phone   [phone]    MRN   [id number]       Jew   None    Marital Status                               Admission Date   24    Admission Type   Emergency    Admitting Provider   Llanes Alvarez, Carlos, MD    Attending Provider   Sho Capps MD    Department, Room/Bed   [facility],        Discharge Date       Discharge Disposition       Discharge Destination                                 Attending Provider: Sho Capps MD    Allergies: Codeine    Isolation: Droplet   Infection: Rhinovirus  (24)   Code Status: No CPR    Ht: 180.3 cm (71\")   Wt: 51.4 kg (113 lb 5.1 oz)    Admission Cmt: None   Principal Problem: Pneumonia [J18.9]                   Active Insurance as of 2024       Primary Coverage       Payor Plan Insurance Group Employer/Plan Group    ANTHEM MEDICARE REPLACEMENT ANTHEM MED ADV PPO [id number]       Payor Plan Address Payor Plan Phone Number Payor Plan Fax Number Effective Dates    [address] [phone]  2024 - None Entered    [facility] [address]         Subscriber Name Subscriber Birth Date Member ID       AKLI GARCIA 1945 [id number]                     Emergency Contacts        (Rel.) Home Phone Work Phone Mobile Phone    SRINIVAS GARCIA (Son) [phone] -- [phone]    JUAN GARCIA (Other) [phone] -- [phone]    Luisito Garcia (Son) -- -- --                 History & Physical        Nurys Thorpe APRN at 24 0035       Attestation signed by Llanes Alvarez, Carlos, MD at 24 6567    I have reviewed this documentation and agree.                      [facility] Medicine Services    Hospitalist History and Physical     Kali Garcia : 1945 MRN:[id number] LOS:0 " ROOM: 205/1     Reason for admission: Pneumonia     Assessment / Plan     Acute on chronic respiratory failure with hypoxia  COPD exacerbation   Left lower lobe pneumonia  Rhinovirus  - pt reportedly chronically on 6L O2 via NC. He was on 15L initially in the ER now back down to his home 6L rate  - CXR: increased opacity in the left lung base could reflect pneumonia.  Stable nodular thickening in the right lung apex.  Severe emphysema chronic interstitial disease.   - RVP detected rhinovirus  - WBC normal, lactate normal, procalcitonin 0.60  - IV unasyn, zithomax  - duonebs, pulmicort, solumedrol, IS/flutter valve  - check BNP, ABG   - pulmonary consultation for further recommendations, may benefit from bronchoscopy   - continuous pulse oximetry     Anemia  - hgb 7.8 (compared to 10.1 Feb 2024)  - check anemia studies     CAD  Atrial fibrillation  Hypertension  Hyperlipidemia  PVD  - on plavix, statin     Severe protein malnutrition  - BMI 15.34  - nutrition consult      Nutrition:   Diet: Regular/House; Fluid Consistency: Thin (IDDSI 0)     VTE Prophylaxis:  Mechanical VTE prophylaxis orders are present.         History of Present illness     Kali Garcia is a 79 y.o. male with PMH of COPD/chronic respiratory failure on 6L O2, atrial fibrillation, CAD, PVD, hypertension, hyperlipidemia, OA presented to [facility] ED 12/22/2024 with complaints of shortness of breath, cough, congestion for two weeks. He has had some intermittent right sided chest soreness. He has felt weak.     In the ED the patient had normal WBC, lactate normal 1.4, procalcitonin 0.60, hgb 7.8 (compared to 10.1 Feb 2024). CXR showed increased opacity in the left lung base could reflect pneumonia.  Stable nodular thickening in the right lung apex.  Severe emphysema chronic interstitial disease.  Respiratory viral panel detected rhinovirus. He was started on IV unasyn and zithromax. He was admitted for further treatment of acute on chronic respiratory  failure with hypoxia, pneumonia, rhinovirus, and anemia.    Subjective / Review of systems     Review of Systems   Constitutional:  Positive for fatigue.   HENT: Negative.     Eyes: Negative.    Respiratory:  Positive for cough, shortness of breath and wheezing.    Cardiovascular:  Positive for chest pain.   Gastrointestinal: Negative.    Genitourinary: Negative.    Musculoskeletal: Negative.    Skin: Negative.    Neurological:  Positive for weakness.   Psychiatric/Behavioral: Negative.          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 and endoscopic clipping x 1;  Surgeon: Luz Jacques MD;  Location: [facility] ENDOSCOPY;  Service: Gastroenterology;  Laterality: N/A;  post op: cecal AVM    CORONARY STENT PLACEMENT      ENDOSCOPY N/A 4/14/2022    Procedure: ESOPHAGOGASTRODUODENOSCOPY WITH BIOPSY X1 AREA;  Surgeon: Luz Jacques MD;  Location: [facility] ENDOSCOPY;  Service: Gastroenterology;  Laterality: N/A;  esophagitis, gastritis, HH    ENDOSCOPY N/A 8/4/2022    Procedure: ESOPHAGOGASTRODUODENOSCOPY;  Surgeon: Luz Jacques MD;  Location: [facility] ENDOSCOPY;  Service: Gastroenterology;  Laterality: N/A;  post op: hiatal hernia, eosphagitis    EYE SURGERY      FEMORAL ARTERY STENT      KNEE SURGERY Left     KNEE SURGERY Left     LUNG SURGERY        Social History     Socioeconomic History    Marital status:    Tobacco Use    Smoking status: Former     Current packs/day: 0.00     Average packs/day: 1 pack/day for 63.0 years (63.0 ttl pk-yrs)     Types: Cigarettes     Start date: 12/1/1960     Quit date:  2023     Years since quittin.0   Vaping Use    Vaping status: Never Used   Substance and Sexual Activity    Alcohol use: Not Currently    Drug use: Never    Sexual activity: Defer      History reviewed. No pertinent family history.   Allergies   Allergen Reactions    Codeine GI Intolerance      Social Drivers of Health     Tobacco Use: Medium Risk (2024)    Patient History     Smoking Tobacco Use: Former     Smokeless Tobacco Use: Unknown     Passive Exposure: Not on file   Alcohol Use: Not At Risk (2024)    AUDIT-C     Frequency of Alcohol Consumption: Never     Average Number of Drinks: Patient does not drink     Frequency of Binge Drinking: Never   Financial Resource Strain: Not on file   Food Insecurity: No Food Insecurity (2024)    Hunger Vital Sign     Worried About Running Out of Food in the Last Year: Never true     Ran Out of Food in the Last Year: Never true   Transportation Needs: No Transportation Needs (3/11/2024)    OASIS : Transportation     Lack of Transportation (Medical): No     Lack of Transportation (Non-Medical): No     Patient Unable or Declines to Respond: No   Physical Activity: Not on file   Stress: Not on file   Social Connections: Feeling Socially Integrated (3/11/2024)    OASIS : Social Isolation     Frequency of experiencing loneliness or isolation: Never   Interpersonal Safety: Not At Risk (2024)    Abuse Screen     Unsafe at Home or Work/School: no     Feels Threatened by Someone?: no     Does Anyone Keep You from Contacting Others or Doint Things Outside the Home?: no     Physical Sign of Abuse Present: no   Depression: Not on file   Housing Stability: Not At Risk (2024)    Housing Stability     Current Living Arrangements: home     Potentially Unsafe Housing Conditions: none   Utilities: Not At Risk (2024)    [facility] Utilities     Threatened with loss of utilities: No   Health Literacy: Adequate Health Literacy (3/11/2024)    OASIS  : Health Literacy     Frequency of needing help to read materials from doctor or pharmacy: Rarely   Recent Concern: Health Literacy - Inadequate Health Literacy (2/8/2024)    OASIS : Health Literacy     Frequency of needing help to read materials from doctor or pharmacy: Often   Employment: Not on file   Disabilities: At Risk (12/23/2024)    Disabilities     Concentrating, Remembering, or Making Decisions Difficulty: no     Doing Errands Independently Difficulty: yes        Home Medications     Prior to Admission medications    Medication Sig Start Date End Date Taking? Authorizing Provider   clopidogrel (PLAVIX) 75 MG tablet Take 1 tablet by mouth Daily. Indications: Percutaneous Coronary Intervention    ProviderGwendolyn MD   ipratropium-albuterol (DUO-NEB) 0.5-2.5 mg/3 ml nebulizer Take 3 mL by nebulization Every 4 (Four) Hours As Needed for Wheezing. Indications: Chronic Obstructive Lung Disease    ProviderGwendolyn MD   O2 (OXYGEN) Inhale 2 L/min Daily. Indications: Shortness of breath 1/8/23   Gwendolyn Martínez MD   polyethylene glycol (MIRALAX) 17 g packet Take 17 g by mouth Daily. Indications: Constipation 2/1/24   Gwendolyn Martínez MD   pravastatin (PRAVACHOL) 40 MG tablet Take 1 tablet by mouth Daily. Indications: Disease involving Lipid Deposits in the Arteries    ProviderGwendolyn MD        Objective / Physical Exam     Vital signs:  Temp: 98.2 °F (36.8 °C)  BP: 120/58  Heart Rate: 68  Resp: 18  SpO2: 91 %  Weight: 49.9 kg (110 lb 0.2 oz)    Admission Weight: Weight: 49.9 kg (110 lb 0.2 oz)    Physical Exam  Vitals and nursing note reviewed.   Constitutional:       Appearance: He is cachectic.   HENT:      Head: Normocephalic and atraumatic.   Eyes:      Extraocular Movements: Extraocular movements intact.      Pupils: Pupils are equal, round, and reactive to light.   Cardiovascular:      Rate and Rhythm: Normal rate and regular rhythm.      Pulses: Normal pulses.      Heart  sounds: Normal heart sounds.   Pulmonary:      Effort: Tachypnea present. No respiratory distress.      Breath sounds: Examination of the right-upper field reveals decreased breath sounds, rhonchi and rales. Examination of the left-upper field reveals decreased breath sounds, rhonchi and rales. Examination of the right-lower field reveals decreased breath sounds and rhonchi. Examination of the left-lower field reveals decreased breath sounds and rhonchi. Decreased breath sounds, rhonchi and rales present.   Abdominal:      General: Bowel sounds are normal.      Palpations: Abdomen is soft.      Tenderness: There is no abdominal tenderness.   Musculoskeletal:         General: Normal range of motion.   Skin:     General: Skin is warm and dry.   Neurological:      Mental Status: He is alert and oriented to person, place, and time.   Psychiatric:         Mood and Affect: Mood normal.         Behavior: Behavior normal.          Labs     Results from last 7 days   Lab Units 12/22/24  2203   WBC 10*3/mm3 8.27   HEMOGLOBIN g/dL 7.8*   HEMATOCRIT % 24.5*   PLATELETS 10*3/mm3 142      Results from last 7 days   Lab Units 12/22/24  2203   ALK PHOS U/L 78   AST (SGOT) U/L 24   ALT (SGPT) U/L 13           Results from last 7 days   Lab Units 12/22/24  2203   SODIUM mmol/L 140   POTASSIUM mmol/L 4.3   CHLORIDE mmol/L 102   CO2 mmol/L 29.4*   BUN mg/dL 32*   CREATININE mg/dL 1.03   GLUCOSE mg/dL 131*        Imaging     XR Chest 1 View    Result Date: 12/22/2024  XR CHEST 1 VW Date of Exam: 12/22/2024 10:18 PM EST Indication: Simple Sepsis Protocol Comparison: 2/2/2024 and PET/CT 10/10/2024. Findings: Stable nodular thickening in the right lung apex. Stable diffuse coarse interstitial disease in both lungs and severe emphysema. There is increased opacity in the left lung base that could reflect a superimposed pneumonia. No pneumothorax or pleural effusion. Heart size is normal. Pulmonary vasculature is largely obscured. No acute  osseous abnormalities.     Impression: 1.Increased opacity in the left lung base could reflect pneumonia. 2.Stable nodular thickening in the right lung apex. 3.Severe emphysema and chronic interstitial disease. Electronically Signed: Sandro Saravia MD  2024 10:57 PM EST  Workstation ID: [id number]          Current Medications     Scheduled Meds:  ampicillin-sulbactam, 3 g, Intravenous, Q8H  azithromycin, 500 mg, Oral, Nightly  budesonide, 0.5 mg, Nebulization, BID - RT  guaiFENesin, 600 mg, Oral, Q12H  ipratropium-albuterol, 3 mL, Nebulization, Q4H - RT  sodium chloride, 10 mL, Intravenous, Q12H       [facility]  24   02:42 EST     Electronically signed by Llanes Alvarez, Carlos, MD at 24 6107       Operative/Procedure Notes (all)    No notes of this type exist for this encounter.          Physician Progress Notes (last 48 hours)        Sho Capps MD at 24 1134              [facility] MEDICINE SERVICE  DAILY PROGRESS NOTE    NAME: Kali Garcia  : 1945  MRN: [id number]      LOS: 5 days     PROVIDER OF SERVICE: Sho Capps MD    Chief Complaint: Pneumonia    Subjective:     Interval History:  History taken from: patient    Patient seen and examined at bedside this morning.  No acute complaints overnight.  Feels about the same        Review of Systems: Negative except described above  Review of Systems    Objective:     Vital Signs  Temp:  [97.2 °F (36.2 °C)-98.3 °F (36.8 °C)] 97.4 °F (36.3 °C)  Heart Rate:  [59-66] 61  Resp:  [15-22] 22  BP: (138-161)/(52-71) 138/52  Flow (L/min) (Oxygen Therapy):  [35] 35   Body mass index is 15.8 kg/m².    Physical Exam  Physical Exam  Constitutional:       Comments: NAD    Cardiovascular:      Comments:  RRR, S1 & S2   Pulmonary:      Comments:  Lungs CTA   Abdominal:      Comments:  ABD soft, NT            Diagnostic Data    Results from last 7 days   Lab Units 24  0604  12/28/24  0015 12/24/24  0427 12/23/24  0614 12/22/24  2203   WBC 10*3/mm3  --  4.32   < > 6.75 8.27   HEMOGLOBIN g/dL  --  8.3*   < > 7.1* 7.8*   HEMATOCRIT %  --  26.6*   < > 21.9* 24.5*   PLATELETS 10*3/mm3  --  137*   < > 144 142   GLUCOSE mg/dL 92 122*   < > 104* 131*   CREATININE mg/dL 0.77 0.83   < > 0.95 1.03   BUN mg/dL 26* 28*   < > 32* 32*   SODIUM mmol/L 140 142   < > 142 140   POTASSIUM mmol/L 4.4 4.7   < > 3.8 4.3   AST (SGOT) U/L  --   --   --   --  24   ALT (SGPT) U/L  --   --   --   --  13   ALK PHOS U/L  --   --   --   --  78   BILIRUBIN mg/dL  --   --   --  0.5 0.7   ANION GAP mmol/L 5.1 5.7   < > 9.3 8.6    < > = values in this interval not displayed.       No radiology results for the last day      I reviewed the patient's new clinical results.    Assessment/Plan:     Active and Resolved Problems  Active Hospital Problems    Diagnosis  POA    **Pneumonia [J18.9]  Yes    Rhinovirus [B34.8]  Yes    Anemia [D64.9]  Yes    Acute on chronic respiratory failure with hypoxia [J96.21]  Yes    Coronary artery disease [I25.10]  Yes    Hypertension [I10]  Yes    COPD (chronic obstructive pulmonary disease) [J44.9]  Yes    Severe malnutrition [E43]  Yes      Resolved Hospital Problems   No resolved problems to display.       Acute on chronic hypoxemic hypercapnia respiratory  failure   Lower lobe pneumonia   COPD with acute exacerbation   Rhinovirus infection   -Currently on heated high flow 35 on 6L at home   -Continue droplet precautions for rhinovirus   -Duonebs prn   -Continue mucomyst and Unasyn for now      CAD   Atrial fibrillation   PAD   -Continue home plavix and aspirin      Urinary retention   Hematuria   -No blood noted in catheter   -Continue springer catheter  -Patient wishes to discharge with springer and hospice following, no voiding trial required      Hypernatremia, resolved  Will discontinue D5             VTE Prophylaxis:  Mechanical VTE prophylaxis orders are present.             Disposition  "Planning:        Anticipated Date of Discharge:  Anticipated Date of Discharge: Pending pre-CERT for SNF          Time: 35 minutes     Code Status and Medical Interventions: No CPR (Do Not Attempt to Resuscitate); Limited Support; No intubation (DNI), No cardioversion   Ordered at: 24 1547     Medical Intervention Limits:    No intubation (DNI)    No cardioversion     Level Of Support Discussed With:    Next of Kin (If No Surrogate)     Code Status (Patient has no pulse and is not breathing):    No CPR (Do Not Attempt to Resuscitate)     Medical Interventions (Patient has pulse or is breathing):    Limited Support       Signature: Electronically signed by Sho Capps MD, 24, 11:34 EST.  [facility] Hospitalist Team      Electronically signed by Sho Capps MD at 24 1136       Sho Capps MD at 24 1133            Enter Query Response Below      Query Response: Viral RSV pneumonia             If applicable, please update the problem list.     Patient: Kali Gimenez        : 1945  Account: [id number]           Admit Date:         How to Respond to this query:       a. Click New Note     b. Answer query within the yellow box.                c. Update the Problem List, if applicable.      If you have any questions about this query contact me at: [email or web address]     Dr. Capps,    Patient with history of COPD and chronic hypoxic and hypercapnic respiratory failure presented  for weakness and shortness of breath. Notes include left lower lobe pneumonia, rhinovirus infection, severe malnutrition, COPD exacerbation, and acute on chronic respiratory failure. Hospitalist note dated  includes \"left lower lobe pneumonia plus rhinovirus  infection,\" and pulmonary notes include \"Pneumonia due to unspecified pathogen.\" Patient treated with monitoring, supplemental oxygen, Solu-Medrol, Duo-Nebs, IV Unasyn  -  (with scheduled end " "), IV azithromycin , and PO azithromycin  - .     Please clarify the type of pneumonia the patient was initially treated/monitored for:     - Gram-negative pneumonia (excluding Haemophilus influenzae)  - Bacterial pneumonia unspecified  - Other ___________________  - Unable to clinically determine    By submitting this query, we are merely seeking further clarification of documentation to accurately reflect all conditions that you are monitoring, evaluating, treating or that extend the hospitalization or utilize additional resources of care. Please utilize your independent clinical judgment when addressing the question(s) above.     This query and your response, once completed, will be entered into the legal medical record.    Sincerely,  Krunal Sanchez RN, CDS  Clinical Documentation Integrity Program       Electronically signed by Sho Capps MD at 24 1134       Sho Capps MD at 24 1133            Enter Query Response Below      Query Response:  - Unable to clinically determine              If applicable, please update the problem list.     Patient: Kali Gimenez        : 1945  Account: [id number]           Admit Date:         How to Respond to this query:       a. Click New Note     b. Answer query within the yellow box.                c. Update the Problem List, if applicable.      If you have any questions about this query contact me at: [email or web address]     Dr. Capps,     Patient presented  for weakness and shortness of breath. Notes include left lower lobe pneumonia, rhinovirus infection, severe malnutrition, COPD exacerbation, and acute on chronic respiratory failure. Wound care nurse evaluated patient on , noting \"consult received for possible hospital acquired pressure injury. Patient presents with a partial thickness stage 2 pressure injury to his left greater trochanter measuring approximately 1x1 cm. There is " "blanchable helene-wound erythema. Patient reports the wound has been present for several months as he lays on that side primarily. There is a silicone foam dressing in place currently. Recommend to change every 3 days and implement pressure injury prevention measures per protocol.\"    Please clarify if the patient was treated/monitored for:    - Stage II pressure injury to left greater trochanter, present on admission  - Other ___________________  - Unable to clinically determine    By submitting this query, we are merely seeking further clarification of documentation to accurately reflect all conditions that you are monitoring, evaluating, treating or that extend the hospitalization or utilize additional resources of care. Please utilize your independent clinical judgment when addressing the question(s) above.     This query and your response, once completed, will be entered into the legal medical record.    Sincerely,  Krunal Sanchez RN, CDS  Clinical Documentation Integrity Program       Electronically signed by Sho Capps MD at 12/28/24 1133       Ellie Castellanos MD at 12/27/24 1200          Daily Progress Note          Assessment    Pneumonia due to unspecified pathogen  COPD: At home on Trelegy inhaler  Rhinovirus infection  Hypoxemia: At home 6 L of oxygen  CAD  PVD  HTN  HLD  A-fib  Anemia  Former smoker quit 2022 after 60 pack years  Echo 2/1/2024 EF 61-65% mild pulmonary hypertension 35-45 mmHg     CT scan of the chest in June 2024 showing a noncalcified nodule in the right upper lobe around 2 x 1.1 x 1 cm. Patient was also noted to have patchy airspace disease and clustered micronodules in the lingula. He then underwent PET scan showing a 1.7 x 1.1 cm partially calcified irregular shaped nodule in the right upper lobe to be metabolically active with SUV of 4.84 and additional 2 metabolically active irregular nodular densities in the right upper lobe. Inferior apical segment with intervening areas " of metabolically active interstitial thickening.     PFTs: Very severe airflow obstruction with significant air trapping and reduced diffusion capacity. FEV1/FVC postbronchodilator is 0.39 with FEV1 of 0.71 L or 28% predicted and FVC of 1.82 L or 47% predicted. No significant bronchodilator response noted. Total lung capacity of 7.77 L or 131% predicted and residual volume of 5.82 L or 226% predicted and DLCO of 61% predicted noted.         Recommendations:  The wheezing is partially improved, prednisone    patient is very cachectic with poor prognosis, patient decided for DNR and home hospice     antibiotics: Unasyn completed 5 days  Nebulized Mucomyst to complete today  Encourage use of incentive spirometry and flutter valve  Theophylline    Nebulized Pulmicort  Oxygen supplement and titration to maintain saturation 90 to 95%: Currently on 8-12 L per high flow nasal cannula  Bronchodilators  Mucinex     Atorvastatin  Plavix     I personally reviewed the radiological studies             LOS: 4 days     Subjective     cough and shortness of breath    Objective     Vital signs for last 24 hours:  Vitals:    12/27/24 0821 12/27/24 0859 12/27/24 1125 12/27/24 1132   BP:  143/59     BP Location:       Patient Position:       Pulse: 63 66 67 66   Resp: 16 17 24 24   Temp:  97.7 °F (36.5 °C)     TempSrc:  Oral     SpO2:  94% (!) 88%    Weight:       Height:           Intake/Output last 3 shifts:  I/O last 3 completed shifts:  In: 1200 [P.O.:1200]  Out: 1950 [Urine:1950]  Intake/Output this shift:  I/O this shift:  In: 240 [P.O.:240]  Out: -       Radiology  Imaging Results (Last 24 Hours)       ** No results found for the last 24 hours. **            Labs:  Results from last 7 days   Lab Units 12/27/24  0331   WBC 10*3/mm3 4.60   HEMOGLOBIN g/dL 7.7*   HEMATOCRIT % 25.8*   PLATELETS 10*3/mm3 142     Results from last 7 days   Lab Units 12/27/24  0331 12/24/24  0427 12/23/24  0614 12/22/24  2203   SODIUM mmol/L 146*   < >  142 140   POTASSIUM mmol/L 4.6   < > 3.8 4.3   CHLORIDE mmol/L 108*   < > 104 102   CO2 mmol/L 32.9*   < > 28.7 29.4*   BUN mg/dL 35*   < > 32* 32*   CREATININE mg/dL 0.84   < > 0.95 1.03   CALCIUM mg/dL 8.5*   < > 8.3* 9.0   BILIRUBIN mg/dL  --   --  0.5 0.7   ALK PHOS U/L  --   --   --  78   ALT (SGPT) U/L  --   --   --  13   AST (SGOT) U/L  --   --   --  24   GLUCOSE mg/dL 99   < > 104* 131*    < > = values in this interval not displayed.     Results from last 7 days   Lab Units 12/23/24  0454   PH, ARTERIAL pH units 7.355   PO2 ART mm Hg 173.6*   PCO2, ARTERIAL mm Hg 62.2*   HCO3 ART mmol/L 34.8*     Results from last 7 days   Lab Units 12/22/24  2203   ALBUMIN g/dL 3.3*                               Meds:   SCHEDULE  acetylcysteine, 3 mL, Nebulization, BID - RT  ampicillin-sulbactam, 3 g, Intravenous, Q8H  atorvastatin, 10 mg, Oral, Daily  budesonide, 0.5 mg, Nebulization, BID - RT  clopidogrel, 75 mg, Oral, Daily  guaiFENesin, 1,200 mg, Oral, Q12H  ipratropium-albuterol, 3 mL, Nebulization, Q4H - RT  methylPREDNISolone sodium succinate, 40 mg, Intravenous, Daily  multivitamin, 1 tablet, Oral, Daily  mupirocin, 1 Application, Each Nare, BID  pantoprazole, 40 mg, Oral, Q AM  polyethylene glycol, 17 g, Oral, Daily  sodium chloride, 10 mL, Intravenous, Q12H  tamsulosin, 0.4 mg, Oral, Daily  theophylline, 300 mg, Oral, Daily      Infusions  dextrose, 75 mL/hr      PRNs    acetaminophen    aluminum-magnesium hydroxide-simethicone    senna-docusate sodium **AND** polyethylene glycol **AND** bisacodyl **AND** bisacodyl    Calcium Replacement - Follow Nurse / BPA Driven Protocol    ipratropium-albuterol    Magnesium Standard Dose Replacement - Follow Nurse / BPA Driven Protocol    melatonin    ondansetron ODT **OR** ondansetron    Phosphorus Replacement - Follow Nurse / BPA Driven Protocol    Potassium Replacement - Follow Nurse / BPA Driven Protocol    sodium chloride    sodium chloride    sodium chloride    Physical  Exam:  General Appearance:  Alert, looks chronically ill and cachectic  HEENT:  Normocephalic, without obvious abnormality, Conjunctiva/corneas clear,.   Nares normal, no drainage     Neck:  Supple, symmetrical, trachea midline.   Lungs /Chest wall: Mild wheezing and mild bilateral basal rhonchi, respirations unlabored, symmetrical wall movement.     Heart:  Regular rate and rhythm, S1 S2 normal  Abdomen: Soft, non-tender, no masses, no organomegaly.    Extremities: No edema, no clubbing or cyanosis     ROS  Constitutional: Negative for chills, fever and malaise/fatigue.   HENT: Negative.    Eyes: Negative.    Cardiovascular: Negative.    Respiratory: Positive for cough and shortness of breath.    Skin: Negative.    Musculoskeletal: Negative.    Gastrointestinal: Negative.    Genitourinary: Negative.    Neurological: Generalized weakness      I reviewed the recent clinical results  I personally reviewed the latest radiological studies    Part of this note may be an electronic transcription/translation of spoken language to printed text using the Dragon Dictation System.      Electronically signed by Ellie Castellanos MD at 24 1202       Sho Capps MD at 24 1022              [facility] MEDICINE SERVICE  DAILY PROGRESS NOTE    NAME: Kali Garcia  : 1945  MRN: [id number]      LOS: 4 days     PROVIDER OF SERVICE: Sho Capps MD    Chief Complaint: Pneumonia    Subjective:     Interval History:  History taken from: patient    Seen and examined at bedside this morning.  Currently on 8 L nasal cannula.  Still feels a little bit congested.        Review of Systems: Negative except described above  Review of Systems    Objective:     Vital Signs  Temp:  [97.5 °F (36.4 °C)-97.8 °F (36.6 °C)] 97.7 °F (36.5 °C)  Heart Rate:  [59-70] 66  Resp:  [14-25] 17  BP: (127-147)/(58-67) 143/59  Flow (L/min) (Oxygen Therapy):  [6-10] 8   Body mass index is 15.5 kg/m².    Physical  Exam  Physical Exam  Constitutional:       Comments: NAD    Cardiovascular:      Comments:  RRR, S1 & S2   Pulmonary:      Comments:  Lungs CTA   Abdominal:      Comments:  ABD soft, NT            Diagnostic Data    Results from last 7 days   Lab Units 12/27/24  0331 12/24/24  0427 12/23/24  0614 12/22/24  2203   WBC 10*3/mm3 4.60   < > 6.75 8.27   HEMOGLOBIN g/dL 7.7*   < > 7.1* 7.8*   HEMATOCRIT % 25.8*   < > 21.9* 24.5*   PLATELETS 10*3/mm3 142   < > 144 142   GLUCOSE mg/dL 99   < > 104* 131*   CREATININE mg/dL 0.84   < > 0.95 1.03   BUN mg/dL 35*   < > 32* 32*   SODIUM mmol/L 146*   < > 142 140   POTASSIUM mmol/L 4.6   < > 3.8 4.3   AST (SGOT) U/L  --   --   --  24   ALT (SGPT) U/L  --   --   --  13   ALK PHOS U/L  --   --   --  78   BILIRUBIN mg/dL  --   --  0.5 0.7   ANION GAP mmol/L 5.1   < > 9.3 8.6    < > = values in this interval not displayed.       No radiology results for the last day      I reviewed the patient's new clinical results.    Assessment/Plan:     Active and Resolved Problems  Active Hospital Problems    Diagnosis  POA    **Pneumonia [J18.9]  Yes    Rhinovirus [B34.8]  Yes    Anemia [D64.9]  Yes    Acute on chronic respiratory failure with hypoxia [J96.21]  Yes    Coronary artery disease [I25.10]  Yes    Hypertension [I10]  Yes    COPD (chronic obstructive pulmonary disease) [J44.9]  Yes    Severe malnutrition [E43]  Yes      Resolved Hospital Problems   No resolved problems to display.       Acute on chronic hypoxemic hypercapnia respiratory  failure   Lower lobe pneumonia   COPD with acute exacerbation   Rhinovirus infection   -Currently on 8L 02, on 6L at home   -Continue droplet precautions for rhinovirus   -Duonebs prn   -Continue mucomyst and Unasyn for now      CAD   Atrial fibrillation   PAD   -Continue home plavix and aspirin      Urinary retention   Hematuria   -No blood noted in catheter   -Continue springer catheter  -Patient wishes to discharge with springer and hospice following, no  voiding trial required     Hypernatremia  Started patient on D5 infusion, will check BMP every 6 hours       VTE Prophylaxis:  Mechanical VTE prophylaxis orders are present.             Disposition Planning:        Anticipated Date of Discharge: Pending pre-CERT for SNF         Time: 35 minutes     Code Status and Medical Interventions: No CPR (Do Not Attempt to Resuscitate); Limited Support; No intubation (DNI), No cardioversion   Ordered at: 12/24/24 1547     Medical Intervention Limits:    No intubation (DNI)    No cardioversion     Level Of Support Discussed With:    Next of Kin (If No Surrogate)     Code Status (Patient has no pulse and is not breathing):    No CPR (Do Not Attempt to Resuscitate)     Medical Interventions (Patient has pulse or is breathing):    Limited Support       Signature: Electronically signed by Sho Capps MD, 12/27/24, 10:22 [facility].  [facility] Hospitalist Team      Electronically signed by Sho Capps MD at 12/27/24 1024       Ellie Castellanos MD at 12/26/24 1530          Daily Progress Note          Assessment    Pneumonia due to unspecified pathogen  COPD: At home on Trelegy inhaler  Rhinovirus infection  Hypoxemia: At home 6 L of oxygen  CAD  PVD  HTN  HLD  A-fib  Anemia  Former smoker quit 2022 after 60 pack years  Echo 2/1/2024 EF 61-65% mild pulmonary hypertension 35-45 mmHg     CT scan of the chest in June 2024 showing a noncalcified nodule in the right upper lobe around 2 x 1.1 x 1 cm. Patient was also noted to have patchy airspace disease and clustered micronodules in the lingula. He then underwent PET scan showing a 1.7 x 1.1 cm partially calcified irregular shaped nodule in the right upper lobe to be metabolically active with SUV of 4.84 and additional 2 metabolically active irregular nodular densities in the right upper lobe. Inferior apical segment with intervening areas of metabolically active interstitial thickening.     PFTs: Very severe airflow  obstruction with significant air trapping and reduced diffusion capacity. FEV1/FVC postbronchodilator is 0.39 with FEV1 of 0.71 L or 28% predicted and FVC of 1.82 L or 47% predicted. No significant bronchodilator response noted. Total lung capacity of 7.77 L or 131% predicted and residual volume of 5.82 L or 226% predicted and DLCO of 61% predicted noted.         Recommendations:  The wheezing is partially improved, continue IV steroids     patient is very cachectic with poor prognosis, patient decided for DNR and home hospice     antibiotics: Unasyn  Nebulized Mucomyst  Encourage use of incentive spirometry and flutter valve  Theophylline    Nebulized Pulmicort  Oxygen supplement and titration to maintain saturation 90 to 95%: Currently on 9 L per high flow nasal cannula  Bronchodilators  Mucinex     Atorvastatin  Plavix     I personally reviewed the radiological studies             LOS: 3 days     Subjective     cough and shortness of breath    Objective     Vital signs for last 24 hours:  Vitals:    12/26/24 1413 12/26/24 1414 12/26/24 1520 12/26/24 1524   BP:       BP Location:       Patient Position:       Pulse: 70 69 63 60   Resp:   21 18   Temp:       TempSrc:       SpO2: (!) 83% (!) 86% (!) 89% 98%   Weight:       Height:           Intake/Output last 3 shifts:  I/O last 3 completed shifts:  In: 1935 [P.O.:1080; I.V.:855]  Out: 1400 [Urine:1400]  Intake/Output this shift:  I/O this shift:  In: 240 [P.O.:240]  Out: -       Radiology  Imaging Results (Last 24 Hours)       ** No results found for the last 24 hours. **            Labs:  Results from last 7 days   Lab Units 12/26/24  0332   WBC 10*3/mm3 5.86   HEMOGLOBIN g/dL 7.5*   HEMATOCRIT % 23.5*   PLATELETS 10*3/mm3 138*     Results from last 7 days   Lab Units 12/26/24  0332 12/24/24  0427 12/23/24  0614 12/22/24  2203   SODIUM mmol/L 147*   < > 142 140   POTASSIUM mmol/L 4.4   < > 3.8 4.3   CHLORIDE mmol/L 110*   < > 104 102   CO2 mmol/L 30.8*   < > 28.7  29.4*   BUN mg/dL 36*   < > 32* 32*   CREATININE mg/dL 0.98   < > 0.95 1.03   CALCIUM mg/dL 8.4*   < > 8.3* 9.0   BILIRUBIN mg/dL  --   --  0.5 0.7   ALK PHOS U/L  --   --   --  78   ALT (SGPT) U/L  --   --   --  13   AST (SGOT) U/L  --   --   --  24   GLUCOSE mg/dL 117*   < > 104* 131*    < > = values in this interval not displayed.     Results from last 7 days   Lab Units 12/23/24  0454   PH, ARTERIAL pH units 7.355   PO2 ART mm Hg 173.6*   PCO2, ARTERIAL mm Hg 62.2*   HCO3 ART mmol/L 34.8*     Results from last 7 days   Lab Units 12/22/24  2203   ALBUMIN g/dL 3.3*                               Meds:   SCHEDULE  acetylcysteine, 3 mL, Nebulization, BID - RT  ampicillin-sulbactam, 3 g, Intravenous, Q8H  atorvastatin, 10 mg, Oral, Daily  budesonide, 0.5 mg, Nebulization, BID - RT  clopidogrel, 75 mg, Oral, Daily  guaiFENesin, 1,200 mg, Oral, Q12H  ipratropium-albuterol, 3 mL, Nebulization, Q4H - RT  methylPREDNISolone sodium succinate, 40 mg, Intravenous, Daily  multivitamin, 1 tablet, Oral, Daily  mupirocin, 1 Application, Each Nare, BID  pantoprazole, 40 mg, Oral, Q AM  polyethylene glycol, 17 g, Oral, Daily  sodium chloride, 10 mL, Intravenous, Q12H  tamsulosin, 0.4 mg, Oral, Daily      Infusions     PRNs    acetaminophen    aluminum-magnesium hydroxide-simethicone    senna-docusate sodium **AND** polyethylene glycol **AND** bisacodyl **AND** bisacodyl    Calcium Replacement - Follow Nurse / BPA Driven Protocol    ipratropium-albuterol    Magnesium Standard Dose Replacement - Follow Nurse / BPA Driven Protocol    melatonin    ondansetron ODT **OR** ondansetron    Phosphorus Replacement - Follow Nurse / BPA Driven Protocol    Potassium Replacement - Follow Nurse / BPA Driven Protocol    sodium chloride    sodium chloride    sodium chloride    Physical Exam:  General Appearance:  Alert, looks chronically ill and cachectic  HEENT:  Normocephalic, without obvious abnormality, Conjunctiva/corneas clear,.   Nares  normal, no drainage     Neck:  Supple, symmetrical, trachea midline.   Lungs /Chest wall: Wheezing and bilateral basal rhonchi, respirations unlabored, symmetrical wall movement.     Heart:  Regular rate and rhythm, S1 S2 normal  Abdomen: Soft, non-tender, no masses, no organomegaly.    Extremities: No edema, no clubbing or cyanosis     ROS  Constitutional: Negative for chills, fever and malaise/fatigue.   HENT: Negative.    Eyes: Negative.    Cardiovascular: Negative.    Respiratory: Positive for cough and shortness of breath.    Skin: Negative.    Musculoskeletal: Negative.    Gastrointestinal: Negative.    Genitourinary: Negative.    Neurological: Generalized weakness      I reviewed the recent clinical results  I personally reviewed the latest radiological studies    Part of this note may be an electronic transcription/translation of spoken language to printed text using the Dragon Dictation System.      Electronically signed by Ellie Castellanos MD at 12/26/24 1531       Consult Notes (last 48 hours)  Notes from 12/26/24 1155 through 12/28/24 1155   No notes of this type exist for this encounter.          Nutrition Notes (most recent note)        Patito Ochoa RD at 12/26/24 1217          Nutrition Services  Patient Name: Kali Garcia  YOB: 1945  MRN: [id number]  Admission date: 12/22/2024    PROGRESS NOTE      Nutrition Intervention Updates: Continue current po diet and ONS.   Encourage good intake.        Encounter Information: Checking in to monitor po diet tolerance and intake. Plan for hospice care with possible discharge home today. Tolerating po diet without noted issues at this time.        PO Diet: Diet: Regular/House; Fluid Consistency: Thin (IDDSI 0)   PO Supplements: Boost Original BID (Provides 480 kcals, 20 g protein if consumed)      PO Intake:  56% average po intake x last 9 documented meals        Current nutrition support: -   Nutrition support review: -        Labs (reviewed below): Hypernatremia - management per attending        GI Function:  Last documented BM 12/23       Brief weight review   Wt Readings from Last 10 Encounters:   12/26/24 0610 49.9 kg (110 lb 0.2 oz)   12/25/24 0600 49.6 kg (109 lb 5.6 oz)   12/23/24 0531 50.1 kg (110 lb 7.2 oz)   12/22/24 2139 49.9 kg (110 lb 0.2 oz)   03/04/24 1452 53 kg (116 lb 12.1 oz)   02/19/24 1303 50.5 kg (111 lb 5 oz)   02/16/24 2101 50 kg (110 lb 2.3 oz)   02/16/24 2054 51.8 kg (114 lb 2.8 oz)   02/15/24 1416 52.9 kg (116 lb 10.3 oz)   02/04/24 0315 51.5 kg (113 lb 8.6 oz)   02/03/24 1415 51.3 kg (113 lb)   02/03/24 0345 51.5 kg (113 lb 8.6 oz)   02/02/24 1524 51.8 kg (114 lb 3.2 oz)   02/02/24 0337 54.4 kg (120 lb)   02/01/24 1330 52.1 kg (114 lb 14.5 oz)   08/04/22 1124 51.1 kg (112 lb 10.5 oz)   07/26/22 1511 59 kg (130 lb)   04/15/22 0436 58.3 kg (128 lb 8.5 oz)   04/13/22 0355 54.6 kg (120 lb 5.9 oz)   04/12/22 2037 128 kg (282 lb 3 oz)        Results from last 7 days   Lab Units 12/26/24  0332 12/25/24  0534 12/24/24  0427 12/23/24  0614 12/22/24  2203   SODIUM mmol/L 147* 145 147* 142 140   POTASSIUM mmol/L 4.4 4.3 4.0 3.8 4.3   CHLORIDE mmol/L 110* 108* 106 104 102   CO2 mmol/L 30.8* 31.8* 30.2* 28.7 29.4*   BUN mg/dL 36* 39* 36* 32* 32*   CREATININE mg/dL 0.98 0.91 0.88 0.95 1.03   CALCIUM mg/dL 8.4* 8.6 8.4* 8.3* 9.0   BILIRUBIN mg/dL  --   --   --  0.5 0.7   ALK PHOS U/L  --   --   --   --  78   ALT (SGPT) U/L  --   --   --   --  13   AST (SGOT) U/L  --   --   --   --  24   GLUCOSE mg/dL 117* 136* 127* 104* 131*     Results from last 7 days   Lab Units 12/26/24  0332   HEMOGLOBIN g/dL 7.5*   HEMATOCRIT % 23.5*         RD to follow up per protocol.    Electronically signed by:  Patito Ochoa RD  12/26/24 12:18 EST      Electronically signed by Patito Ochoa RD at 12/26/24 1227          Speech Language Pathology Notes (most recent note)        Mike Chance, ADRIANNE at 12/27/24  1520          Goal Outcome Evaluation:    ST following for dysphagia evaluation. SLP attempted evaluation today however upon entrance PTA was in room and said 02 stats were declining. SLP re-attempted evaluation an hour later and pt was then on Bi-pap. SLP spoke with nurse and stated he should remain NPO while on Bi-pap and ST department can assess pt a couple hours after he comes off Bi-pap. Nurse called SLP about an hour later and said family brought in food for pt, SLP advised against eating at this time and recommended NPO until pt is able to come off Bi-pap and be evaluated. ST continuing to follow.       Electronically signed by Mike Chance, SLP at 12/27/24 1523     Ingrid Stanley, PEDRO   Physical Therapist Assistant  Specialty:  Physical Therapy  Therapy Treatment Note      Signed  Date of Service:  12/27/24 1040  Creation Time:  12/27/24 1212     Signed        Subjective: Pt agreeable to therapeutic plan of care.     Objective:      Precautions -  high fall risk, 10L HF nc,  pt desaturates to 75% after ambulating 12 feet.     Bed mobility - Min-A  Transfers - Min-A and with rolling walker  improved with approach to chair.  Pt did back all the way to the chair.   Ambulation - 12 feet Min-A and with rolling walker       Therapeutic Exercise - 10 Reps B LE AROM unsupported sitting / EOB     Vitals: Desaturates sats dropped to 75% pt very short of air. Nursing titrated 02 up to 15L to let pt recover.  Sats returned to 85% after a few minutes.  Nursing titrated 02 to 12L nc.       Pain: 0 VAS        Education: Provided education on the importance of mobility in the acute care setting, Verbal/Tactile Cues, Transfer Training, and Gait Training     Assessment: Kali Garcia presents with functional mobility impairments which indicate the need for skilled intervention. Tolerating session today without incident.  Pt tolerated tx session as well as he did yesterday.  Sats cont to drop with activity and pt  "remains weak.  Pt will need rehab at d/c.  Will continue to follow and progress as tolerated.      Plan/Recommendations:   If medically appropriate, Moderate Intensity Therapy recommended post-acute care. This is recommended as therapy feels the patient would require 3-4 days per week and wouldn't tolerate \"3 hour daily\" rehab intensity. SNF would be the preferred choice. If the patient does not agree to SNF, arrange HH or OP depending on home bound status. If patient is medically complex, consider LTACH. Pt requires no DME at discharge.      Pt desires Skilled Rehab placement at discharge. Pt cooperative; agreeable to therapeutic recommendations and plan of care.      Basic Mobility 6-click:  Rollin = Total, A lot = 2, A little = 3; 4 = None  Supine>Sit:                      1 = Total, A lot = 2, A little = 3; 4 = None   Sit>Stand with arms:       1 = Total, A lot = 2, A little = 3; 4 = None  Bed>Chair:                      1 = Total, A lot = 2, A little = 3; 4 = None  Ambulate in room:           1 = Total, A lot = 2, A little = 3; 4 = None  3-5 Steps with railin = Total, A lot = 2, A little = 3; 4 = None  Score: 15     Modified [place]: 4 = Moderately severe disability (Unable to attend to own bodily needs without assistance, and unable to walk unassisted)      Post-Tx Position: Up in Chair, Alarms activated, and Call light and personal items within reach  PPE: gloves and surgical mask     Therapy Charges for Today         Code Description Service Date Service Provider Modifiers Qty     85071106262 HC GAIT TRAINING EA 15 MIN 2024 Ingrid Stanley, PTA GP 1     14092799219 HC PT THERAPEUTIC ACT EA 15 MIN 2024 Ingrid Stanley, PTA GP 1     56152585476 HC PT THER PROC EA 15 MIN 2024 Ingrid Stanley, PTA GP 1     14620849874 HC GAIT TRAINING EA 15 MIN 2024 Ingrid Stanley, PTA GP 1     04835418779 HC PT THERAPEUTIC ACT EA 15 MIN 2024 Lizeth, " Ingrid AWAD PTA GP 1     [id number]  PT THER PROC EA 15 MIN 12/27/2024 Ingrid Stanley PTA GP 1               PT Charges         Row Name 12/27/24 1212                       Time Calculation     Start Time 1100  -SC         Stop Time 1129  -SC         Time Calculation (min) 29 min  -SC         PT Received On 12/27/24  -SC         PT - Next Appointment 12/29/24  -SC                 Time Calculation- PT     Total Timed Code Minutes- PT 29 minute(s)  -SC                      User Key  (r) = Recorded By, (t) = Taken By, (c) = Cosigned By        Initials Name Provider Type     SC Ingrid Stanley PTA Physical Therapist Assistant                              Ingrid Stanley PTA   Physical Therapist Assistant  Specialty:  Physical Therapy  Therapy Treatment Note      Signed  Date of Service:  12/26/24 1356  Creation Time:  12/26/24 1439     Signed        Subjective: Pt agreeable to therapeutic plan of care.     Objective:      Precautions -   Fall risk, 8L HF nc     Bed mobility - Min-A  supine to sit  Transfers - Min-A and with rolling walker  Ambulation - 12 feet Min-A, Mod-A, and with rolling walker pt needed increased assist with approach to chair as pt was in a hurry to sit down.      Therapeutic Exercise - 10 Reps B LE AROM unsupported sitting / EOB     Vitals: Desaturates  sats dropped to 71% during gait training.  Nursing came into room and turned 02 up to 15L for pt to recover.  Took pt about 5 minutes to recover back to 88%.  Pulmonary MD came into room and stated pt is safe at 88%.  Turned 02 down to 10L and made nursing aware.      Pain: 0 VAS        Education: Provided education on the importance of mobility in the acute care setting, Verbal/Tactile Cues, Transfer Training, and Gait Training     Assessment: Kali Garcia presents with functional mobility impairments which indicate the need for skilled intervention. Tolerating session today without incident. Pt still with significant weakness and  "poor endurance.  PT recommend snf rehab at d/c but pt's family want to take pt home.  Will continue to follow and progress as tolerated.      If medically appropriate, Moderate Intensity Therapy recommended post-acute care. This is recommended as therapy feels the patient would require 3-4 days per week and wouldn't tolerate \"3 hour daily\" rehab intensity. SNF would be the preferred choice. If the patient does not agree to SNF, arrange HH or OP depending on home bound status. If patient is medically complex, consider LTACH. Pt requires no DME at discharge.      Pt desires Home with family assist and Home Health at discharge.  Nursing reported that pt's family wants to take pt home at d/c but will be left alone for about 8 hours a day.  Pt's family reported they were going to hire caregivers.        Basic Mobility 6-click:  Rollin = Total, A lot = 2, A little = 3; 4 = None  Supine>Sit:                      1 = Total, A lot = 2, A little = 3; 4 = None   Sit>Stand with arms:       1 = Total, A lot = 2, A little = 3; 4 = None  Bed>Chair:                      1 = Total, A lot = 2, A little = 3; 4 = None  Ambulate in room:           1 = Total, A lot = 2, A little = 3; 4 = None  3-5 Steps with railin = Total, A lot = 2, A little = 3; 4 = None  Score: 15     Modified [place]: 4 = Moderately severe disability (Unable to attend to own bodily needs without assistance, and unable to walk unassisted)      Post-Tx Position: Up in Chair, Alarms activated, and Call light and personal items within reach  PPE: gloves     Therapy Charges for Today         Code Description Service Date Service Provider Modifiers Qty     88116003781 HC GAIT TRAINING EA 15 MIN 2024 Ingrid Stanley, PTA GP 1     39835875341 HC PT THERAPEUTIC ACT EA 15 MIN 2024 Ingrid Stanley, PTA GP 1     31426828246 HC PT THER PROC EA 15 MIN 2024 Ingrid Stanley, PTA GP 1               PT Charges         Row " Name 12/26/24 1438                       Time Calculation     Start Time 1356  -SC         Stop Time 1430  -SC         Time Calculation (min) 34 min  -SC         PT Received On 12/26/24  -SC         PT - Next Appointment 12/27/24  -SC                 Time Calculation- PT     Total Timed Code Minutes- PT 34 minute(s)  -SC                      User Key  (r) = Recorded By, (t) = Taken By, (c) = Cosigned By        Initials Name Provider Type     Ingrid Donaldson, PTA Physical Therapist Assistant                                                               Anne Marie De León, PT   Physical Therapist  Specialty:  Physical Therapy  Therapy Treatment Note      Signed  Date of Service:  12/24/24 1211  Creation Time:  12/24/24 1211     Signed        Subjective: Pt agreeable to therapeutic plan of care.     Objective:      Precautions - BiPap vs HFNC     Bed mobility - Min-A     Transfers - Min-A     Ambulation - 2 feet Min-A     Therapeutic Exercise - ankle pumps, LAQ     Vitals: WNL on BiPap upon arrival at 100%. RN removed to HFNC. Remained > 93% on 6L high flow thorughout.     Pain: 5 VAS   Location: generalized  Intervention for pain: Repositioned, Increased Activity, and Therapeutic Presence     Education: Provided education on the importance of mobility in the acute care setting, Verbal/Tactile Cues, Transfer Training, and Energy conservation strategies     Assessment: Kali Garcia presents with functional mobility impairments which indicate the need for skilled intervention. Pt continues to present with overall deconditioning and weakness. Requires Jerod for bed mobilty and transfers. Weight-shifting improved during transfer to recliner. Tolerating session today without incident. Will continue to follow and progress as tolerated.      Plan/Recommendations:   If medically appropriate, Moderate Intensity Therapy recommended post-acute care. This is recommended as therapy feels the patient would require 3-4 days per  "week and wouldn't tolerate \"3 hour daily\" rehab intensity. SNF would be the preferred choice. If the patient does not agree to SNF, arrange HH or OP depending on home bound status. If patient is medically complex, consider LTACH. Pt requires no DME at discharge.      Pt desires Skilled Rehab placement at discharge. Pt cooperative; agreeable to therapeutic recommendations and plan of care.            Basic Mobility 6-click:  Rollin = Total, A lot = 2, A little = 3; 4 = None  Supine>Sit:                      1 = Total, A lot = 2, A little = 3; 4 = None   Sit>Stand with arms:       1 = Total, A lot = 2, A little = 3; 4 = None  Bed>Chair:                      1 = Total, A lot = 2, A little = 3; 4 = None  Ambulate in room:           1 = Total, A lot = 2, A little = 3; 4 = None  3-5 Steps with railin = Total, A lot = 2, A little = 3; 4 = None  Score: 18     Modified [place]: N/A = No pre-op stroke/TIA     Post-Tx Position: Up in Chair, Alarms activated, and Call light and personal items within reach  PPE: gloves and surgical mask     Therapy Charges for Today         Code Description Service Date Service Provider Modifiers Qty     16686721166 HC PT EVAL MOD COMPLEXITY 4 2024 Anne Marie De León, PT GP 1     53723351108 HC PT THERAPEUTIC ACT EA 15 MIN 2024 Anne Marie De León, PT GP 1     84057247384 HC PT NEUROMUSC RE EDUCATION EA 15 MIN 2024 Anne Marie De León, PT GP 1               PT Charges         Row Name 24 1210                       Time Calculation     Start Time 1037  -OD         Stop Time 1057  -OD         Time Calculation (min) 20 min  -OD         PT Received On 24  -OD         PT - Next Appointment 24  -OD                 Time Calculation- PT     Total Timed Code Minutes- PT 20 minute(s)  -OD                      User Key  (r) = Recorded By, (t) = Taken By, (c) = Cosigned By        Initials Name Provider Type     OD Anne Marie De León, PT Physical " Therapist                                              Anne Marie De León, PT   Physical Therapist  Specialty:  Physical Therapy  Therapy Evaluation      Signed  Date of Service:  24 130  Creation Time:  24     Signed        Expand All Collapse All  Patient Name: Kali Garcia             : 1945                      MRN: [id number]                              Today's Date: 2024                                 Admit Date: 2024                      Visit Dx:   Visit Diagnosis       ICD-10-CM ICD-9-CM   1. Pneumonia of left lower lobe due to infectious organism  J18.9 486   2. Rhinovirus  B34.8 079.3   3. Acute on chronic respiratory failure with hypoxia  J96.21 518.84       799.02   4. Dehydration  E86.0 276.51   5. Anemia, unspecified type  D64.9 285.9         Problem List       Patient Active Problem List   Diagnosis    Severe malnutrition    Abnormal CT scan, esophagus    Pneumonia due to COVID-19 virus    Pneumonia    Rhinovirus    Anemia    Acute on chronic respiratory failure with hypoxia    Coronary artery disease    Hypertension    COPD (chronic obstructive pulmonary disease)         Medical History        Past Medical History:   Diagnosis Date    COPD (chronic obstructive pulmonary disease)      Coronary artery disease      Emphysema lung      Hyperlipidemia      Hypertension      Osteoarthritis           Surgical History         Past Surgical History:   Procedure Laterality Date    CARDIAC CATHETERIZATION        COLONOSCOPY N/A 2022     Procedure: COLONOSCOPY with 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 and endoscopic clipping x 1;  Surgeon: Luz Jacques MD;  Location: [facility] ENDOSCOPY;  Service: Gastroenterology;  Laterality: N/A;  post op: cecal AVM    CORONARY STENT PLACEMENT        ENDOSCOPY N/A 2022     Procedure: ESOPHAGOGASTRODUODENOSCOPY WITH BIOPSY X1 AREA;  Surgeon: Luz Jacques MD;  Location: [facility] ENDOSCOPY;  Service:  Gastroenterology;  Laterality: N/A;  esophagitis, gastritis, HH    ENDOSCOPY N/A 8/4/2022     Procedure: ESOPHAGOGASTRODUODENOSCOPY;  Surgeon: Luz Jacques MD;  Location: [facility] ENDOSCOPY;  Service: Gastroenterology;  Laterality: N/A;  post op: hiatal hernia, eosphagitis    EYE SURGERY        FEMORAL ARTERY STENT        KNEE SURGERY Left      KNEE SURGERY Left      LUNG SURGERY               General Information         Row Name 12/23/24 1258                 Physical Therapy Time and Intention     Document Type evaluation  -OD       Mode of Treatment physical therapy  -OD          Row Name 12/23/24 1258                 General Information     Patient Profile Reviewed yes  -OD       Prior Level of Function independent:  -OD       Existing Precautions/Restrictions fall;oxygen therapy device and L/min;other (see comments)  12L high flow  -OD       Barriers to Rehab medically complex;cognitive status  -OD          Row Name 12/23/24 1258                 Living Environment     People in Home child(concepción), adult;other (see comments)  son and DIL  -OD          Row Name 12/23/24 1258                 Home Main Entrance     Number of Stairs, Main Entrance none  -OD          Row Name 12/23/24 1258                 Stairs Within Home, Primary     Number of Stairs, Within Home, Primary none  -OD          Row Name 12/23/24 1258                 Cognition     Orientation Status (Cognition) oriented x 4;verbal cues/prompts needed for orientation  -OD          Row Name 12/23/24 1258                 Safety Issues/Impairments Affecting Functional Mobility     Safety Issues Affecting Function (Mobility) friction/shear risk;judgment;problem-solving;sequencing abilities  -OD       Impairments Affecting Function (Mobility) balance;cognition;endurance/activity tolerance;strength  -OD       Cognitive Impairments, Mobility Safety/Performance attention;judgment;problem-solving/reasoning;sequencing abilities  -OD                       User Key   (r) = Recorded By, (t) = Taken By, (c) = Cosigned By        Initials Name Provider Type     OD Anne Marie De León, JEROME Physical Therapist                            Mobility         Row Name 12/23/24 1300                 Bed Mobility     Bed Mobility bed mobility (all) activities  -OD       All Activities, [place] (Bed Mobility) minimum assist (75% patient effort)  -OD          Row Name 12/23/24 1300                 Bed-Chair Transfer     Bed-Chair [place] (Transfers) moderate assist (50% patient effort)  -OD          Row Name 12/23/24 1300                 Sit-Stand Transfer     Sit-Stand [place] (Transfers) minimum assist (75% patient effort)  -OD       Assistive Device (Sit-Stand Transfers) walker, front-wheeled  -OD       Comment, (Sit-Stand Transfer) with arm-in-arm assist x1 and BUE support on arm rests and RW  -OD          Row Name 12/23/24 1300                 Gait/Stairs (Locomotion)     [place] Level (Gait) unable to assess  -OD       Patient was able to Ambulate no, other medical factors prevent ambulation  -OD       Reason Patient was unable to Ambulate Excessive Weakness  a few shuffled steps during stand pivot  -OD                       User Key  (r) = Recorded By, (t) = Taken By, (c) = Cosigned By        Initials Name Provider Type     OD Anne Marie De León, JEROME Physical Therapist                            Obj/Interventions         Row Name 12/23/24 1302                 Range of Motion Comprehensive     General Range of Motion lower extremity range of motion deficits identified  -OD       Comment, General Range of Motion lacks full knee extension  -OD          Row Name 12/23/24 1302                 Strength Comprehensive (MMT)     General Manual Muscle Testing (MMT) Assessment lower extremity strength deficits identified  -OD       Comment, General Manual Muscle Testing (MMT) Assessment BLE grossly weak 3/5  -OD          Row Name 12/23/24 1302                 Motor Skills     Motor Skills  functional endurance  -OD       Functional Endurance poor  -OD          Row Name 12/23/24 1302                 Balance     Balance Assessment standing dynamic balance  -OD       Dynamic Standing Balance contact guard;minimal assist  -OD       Position/Device Used, Standing Balance walker, rolling  -OD       Balance Interventions sitting;minimal challenge;standing;supported;moderate challenge  -OD          Row Name 12/23/24 1302                 Sensory Assessment (Somatosensory)     Sensory Assessment (Somatosensory) sensation intact  -OD                       User Key  (r) = Recorded By, (t) = Taken By, (c) = Cosigned By        Initials Name Provider Type     OD Anne Marie De León, PT Physical Therapist                            Goals/Plan         Row Name 12/23/24 1307                 Bed Mobility Goal 1 (PT)     Activity/Assistive Device (Bed Mobility Goal 1, PT) bed mobility activities, all  -OD       [place] Level/Cues Needed (Bed Mobility Goal 1, PT) independent  -OD       Time Frame (Bed Mobility Goal 1, PT) long term goal (LTG);2 weeks  -OD          Row Name 12/23/24 1307                 Transfer Goal 1 (PT)     Activity/Assistive Device (Transfer Goal 1, PT) transfers, all;walker, rolling  -OD       [place] Level/Cues Needed (Transfer Goal 1, PT) modified independence  -OD       Time Frame (Transfer Goal 1, PT) long term goal (LTG);2 weeks  -OD          Row Name 12/23/24 1307                 Gait Training Goal 1 (PT)     Activity/Assistive Device (Gait Training Goal 1, PT) gait (walking locomotion);assistive device use;walker, rolling  -OD       [place] Level (Gait Training Goal 1, PT) modified independence  -OD       Distance (Gait Training Goal 1, PT) 40'  -OD       Time Frame (Gait Training Goal 1, PT) long term goal (LTG);2 weeks  -OD          Row Name 12/23/24 1307                 Balance Goal 1 (PT)     Activity/Assistive Device (Balance Goal) standing static balance;standing dynamic  balance;walker, rolling  -OD       [place] Level/Cues Needed (Balance Goal 1, PT) modified independence  -OD       Time Frame (Balance Goal 1, PT) long-term goal (LTG);2 weeks  -OD          Row Name 12/23/24 1307                 Therapy Assessment/Plan (PT)     Planned Therapy Interventions (PT) balance training;bed mobility training;gait training;home exercise program;neuromuscular re-education;ROM (range of motion);stair training;strengthening;stretching;patient/family education;postural re-education;transfer training  -OD                    User Key  (r) = Recorded By, (t) = Taken By, (c) = Cosigned By        Initials Name Provider Type     OD Anne Marie De León, PT Physical Therapist                         Clinical Impression         Row Name 12/23/24 1303                 Pain     Pretreatment Pain Rating 0/10 - no pain  -OD       Posttreatment Pain Rating 0/10 - no pain  -OD          Row Name 12/23/24 1303                 Plan of Care Review     Plan of Care Reviewed With patient  -OD       Progress no change  -OD       Outcome Evaluation Kali Garcia is an 80 y/o M who was admitted to [facility] on 12/22/24 for SOA and congestion. D/w ARF with hypoxia, COPD exacerbation, L lower lobe PNA, and (+) rhinovirus. At baseline, pt lives with his son and DIL in [facility] with 0STE. Pt is typically IND with ADLs and mobility without AD. This date, pt is AAOx4 and on 12L high-flow. Pt demonstrates diminished attention, global weakness, poor balance, and difficulty sequencing. Pt requires Jerod for bed mobility and modA for stand-pivot to the recliner. Dependent for helene-care and only able to tolerate standing ~20 seconds due to poor endurance. Pt desaturated initially upon sitting EOB ~ 88%, but able to return to 90 with cues for efficient breath work. Pt appears below baseline function and would benefit from SNF upon d/c.  -OD          Row Name 12/23/24 1304                 Therapy Assessment/Plan (PT)     Rehab Potential (PT)  good  -OD       Criteria for Skilled Interventions Met (PT) yes;meets criteria  -OD       Therapy Frequency (PT) 5 times/wk  -OD       Predicted Duration of Therapy Intervention (PT) until d/c  -OD          Row Name 12/23/24 1303                 Vital Signs     O2 Delivery Pre Treatment hi-flow  -OD       Intra SpO2 (%) 88  -OD       O2 Delivery Intra Treatment hi-flow  -OD       Post SpO2 (%) 93  -OD       O2 Delivery Post Treatment hi-flow  -OD       Pre Patient Position Supine  -OD       Intra Patient Position Standing  -OD       Post Patient Position Sitting  -OD          Row Name 12/23/24 1303                 Positioning and Restraints     Pre-Treatment Position in bed  -OD       Post Treatment Position chair  -OD       In Chair notified nsg;reclined;call light within reach;encouraged to call for assist;exit alarm on  -OD                       User Key  (r) = Recorded By, (t) = Taken By, (c) = Cosigned By        Initials Name Provider Type     OD Anne Marie De León, PT Physical Therapist                            Outcome Measures         Row Name 12/23/24 1307 12/23/24 0900            How much help from another person do you currently need...     Turning from your back to your side while in flat bed without using bedrails? 3  -OD 4  -BR     Moving from lying on back to sitting on the side of a flat bed without bedrails? 3  -OD 2  -BR     Moving to and from a bed to a chair (including a wheelchair)? 2  -OD 2  -BR     Standing up from a chair using your arms (e.g., wheelchair, bedside chair)? 3  -OD 2  -BR     Climbing 3-5 steps with a railing? 2  -OD 2  -BR     To walk in hospital room? 2  -OD 2  -BR     AM-PAC 6 Clicks Score (PT) 15  -OD 14  -BR     Highest Level of Mobility Goal 4 --> Transfer to chair/commode  -OD 4 --> Transfer to chair/commode  -BR        Row Name 12/23/24 0135                 How much help from another person do you currently need...     Turning from your back to your side while in flat bed  without using bedrails? 2  -GH       Moving from lying on back to sitting on the side of a flat bed without bedrails? 2  -GH       Moving to and from a bed to a chair (including a wheelchair)? 2  -GH       Standing up from a chair using your arms (e.g., wheelchair, bedside chair)? 1  -GH       Climbing 3-5 steps with a railing? 1  -GH       To walk in hospital room? 1  -GH       AM-PAC 6 Clicks Score (PT) 9  -GH       Highest Level of Mobility Goal 3 --> Sit at edge of bed  -GH          Row Name 12/23/24 1307                 Functional Assessment     Outcome Measure Options AM-PAC 6 Clicks Basic Mobility (PT)  -OD                       User Key  (r) = Recorded By, (t) = Taken By, (c) = Cosigned By        Initials Name Provider Type     Ghada Alvarez, RN Registered Nurse     Racheal Green, RN Registered Nurse     Anne Marie Ragland, PT Physical Therapist                          Physical Therapy Education            Title: PT OT SLP Therapies (Done)         Topic: Physical Therapy (Done)         Point: Mobility training (Done)         Learning Progress Summary             Patient Acceptance, E, VU by OD at 12/23/2024 1308                            Point: Home exercise program (Done)         Learning Progress Summary             Patient Acceptance, E, VU by OD at 12/23/2024 1308                            Point: Body mechanics (Done)         Learning Progress Summary             Patient Acceptance, E, VU by OD at 12/23/2024 1308                            Point: Precautions (Done)         Learning Progress Summary             Patient Acceptance, E, VU by OD at 12/23/2024 1308                                                User Key         Initials Effective Dates Name Provider Type Discipline     OD 05/11/23 -  Anne Marie De León, PT Physical Therapist PT                          PT Recommendation and Plan  Planned Therapy Interventions (PT): balance training, bed mobility training, gait training, home  exercise program, neuromuscular re-education, ROM (range of motion), stair training, strengthening, stretching, patient/family education, postural re-education, transfer training  Progress: no change  Outcome Evaluation: Kali Garcia is an 78 y/o M who was admitted to [facility] on 12/22/24 for SOA and congestion. D/w ARF with hypoxia, COPD exacerbation, L lower lobe PNA, and (+) rhinovirus. At baseline, pt lives with his son and DIL in [facility] with 0STE. Pt is typically IND with ADLs and mobility without AD. This date, pt is AAOx4 and on 12L high-flow. Pt demonstrates diminished attention, global weakness, poor balance, and difficulty sequencing. Pt requires Jerod for bed mobility and modA for stand-pivot to the recliner. Dependent for helene-care and only able to tolerate standing ~20 seconds due to poor endurance. Pt desaturated initially upon sitting EOB ~ 88%, but able to return to 90 with cues for efficient breath work. Pt appears below baseline function and would benefit from SNF upon d/c.      Time Calculation:        PT Charges         Row Name 12/23/24 1308                       Time Calculation     Start Time 0949  -OD         Stop Time 1013  -OD         Time Calculation (min) 24 min  -OD         PT Received On 12/23/24  -OD         PT - Next Appointment 12/24/24  -OD         PT Goal Re-Cert Due Date 01/06/25  -OD                 Time Calculation- PT     Total Timed Code Minutes- PT 0 minute(s)  -OD                      User Key  (r) = Recorded By, (t) = Taken By, (c) = Cosigned By        Initials Name Provider Type     OD Anne Marie De León, JEROME Physical Therapist                       Therapy Charges for Today         Code Description Service Date Service Provider Modifiers Qty     [id number]  PT EVAL MOD COMPLEXITY 4 12/23/2024 Anne Marie De León, PT GP 1                PT G-Codes  Outcome Measure Options: AM-PAC 6 Clicks Basic Mobility (PT)  AM-PAC 6 Clicks Score (PT): 15  PT Discharge Summary  Anticipated Discharge  Disposition (PT): skilled nursing facility     Anne Marie Enriquez, PT                   12/23/2024

## 2024-12-28 NOTE — PLAN OF CARE
Problem: Adult Inpatient Plan of Care  Goal: Plan of Care Review  Outcome: Progressing  Goal: Patient-Specific Goal (Individualized)  Outcome: Progressing  Goal: Absence of Hospital-Acquired Illness or Injury  Outcome: Progressing  Intervention: Identify and Manage Fall Risk  Recent Flowsheet Documentation  Taken 12/28/2024 0045 by Tiny Zacarias RN  Safety Promotion/Fall Prevention:   safety round/check completed   room organization consistent   nonskid shoes/slippers when out of bed   fall prevention program maintained   clutter free environment maintained   assistive device/personal items within reach  Taken 12/27/2024 2045 by Tiny Zacarias RN  Safety Promotion/Fall Prevention:   safety round/check completed   room organization consistent   nonskid shoes/slippers when out of bed   fall prevention program maintained   clutter free environment maintained   assistive device/personal items within reach  Intervention: Prevent Skin Injury  Recent Flowsheet Documentation  Taken 12/28/2024 0045 by Tiny Zacarias RN  Body Position: supine  Skin Protection:   incontinence pads utilized   transparent dressing maintained  Taken 12/27/2024 2045 by Tiny Zacarias RN  Body Position: left  Skin Protection:   transparent dressing maintained   incontinence pads utilized  Intervention: Prevent and Manage VTE (Venous Thromboembolism) Risk  Recent Flowsheet Documentation  Taken 12/28/2024 0045 by Tiny Zacarias RN  VTE Prevention/Management: SCDs (sequential compression devices) off  Taken 12/27/2024 2045 by Tiny Zacarias RN  VTE Prevention/Management: SCDs (sequential compression devices) off  Intervention: Prevent Infection  Recent Flowsheet Documentation  Taken 12/28/2024 0045 by Tiny Zacarias RN  Infection Prevention:   single patient room provided   rest/sleep promoted   personal protective equipment utilized   hand hygiene promoted   equipment surfaces disinfected  Taken 12/27/2024 2045 by Tiny Zacarias  RN  Infection Prevention:   single patient room provided   rest/sleep promoted   personal protective equipment utilized   hand hygiene promoted   equipment surfaces disinfected  Goal: Optimal Comfort and Wellbeing  Outcome: Progressing  Intervention: Provide Person-Centered Care  Recent Flowsheet Documentation  Taken 12/28/2024 0045 by Tiny Zacarias RN  Trust Relationship/Rapport:   care explained   choices provided   questions answered  Taken 12/27/2024 2045 by Tiny Zacarias RN  Trust Relationship/Rapport:   care explained   choices provided   questions answered  Goal: Readiness for Transition of Care  Outcome: Progressing     Problem: Fall Injury Risk  Goal: Absence of Fall and Fall-Related Injury  Outcome: Progressing  Intervention: Identify and Manage Contributors  Recent Flowsheet Documentation  Taken 12/28/2024 0045 by Tiny Zacarias RN  Medication Review/Management: medications reviewed  Self-Care Promotion: BADL personal objects within reach  Taken 12/27/2024 2045 by Tiny Zacarias RN  Medication Review/Management: medications reviewed  Self-Care Promotion: BADL personal objects within reach  Intervention: Promote Injury-Free Environment  Recent Flowsheet Documentation  Taken 12/28/2024 0045 by Tiny Zacarias RN  Safety Promotion/Fall Prevention:   safety round/check completed   room organization consistent   nonskid shoes/slippers when out of bed   fall prevention program maintained   clutter free environment maintained   assistive device/personal items within reach  Taken 12/27/2024 2045 by Tiny Zacarias RN  Safety Promotion/Fall Prevention:   safety round/check completed   room organization consistent   nonskid shoes/slippers when out of bed   fall prevention program maintained   clutter free environment maintained   assistive device/personal items within reach     Problem: Skin Injury Risk Increased  Goal: Skin Health and Integrity  Outcome: Progressing  Intervention: Optimize Skin  Protection  Recent Flowsheet Documentation  Taken 12/28/2024 0045 by Tiny Zacarias RN  Pressure Reduction Techniques:   frequent weight shift encouraged   positioned off wounds   weight shift assistance provided   pressure points protected  Head of Bed (HOB) Positioning: [facility] elevated  Pressure Reduction Devices:   pressure-redistributing mattress utilized   positioning supports utilized   heel offloading device utilized  Skin Protection:   incontinence pads utilized   transparent dressing maintained  Taken 12/27/2024 2045 by Tiny Zacarias RN  Activity Management: activity encouraged  Pressure Reduction Techniques:   frequent weight shift encouraged   weight shift assistance provided   positioned off wounds  Head of Bed (HOB) Positioning: HOB elevated  Pressure Reduction Devices:   pressure-redistributing mattress utilized   positioning supports utilized  Skin Protection:   transparent dressing maintained   incontinence pads utilized     Problem: Comorbidity Management  Goal: Blood Pressure in Desired Range  Outcome: Progressing  Intervention: Maintain Blood Pressure Management  Recent Flowsheet Documentation  Taken 12/28/2024 0045 by Tiny Zacarias RN  Medication Review/Management: medications reviewed  Taken 12/27/2024 2045 by Tiny Zacarias RN  Medication Review/Management: medications reviewed     Problem: Noninvasive Ventilation Acute  Goal: Effective Unassisted Ventilation and Oxygenation  Outcome: Progressing  Intervention: Monitor and Manage Noninvasive Ventilation  Recent Flowsheet Documentation  Taken 12/28/2024 0045 by Tiny Zacarias RN  Airway/Ventilation Management: pulmonary hygiene promoted  NPPV/CPAP Maintenance:   full face mask   proper fit/secure  Taken 12/27/2024 2045 by Tiny Zacarias RN  Airway/Ventilation Management: pulmonary hygiene promoted  NPPV/CPAP Maintenance:   full face mask   proper fit/secure     Problem: Malnutrition  Goal: Improved Nutritional Intake  Outcome: Progressing    Goal Outcome Evaluation:   Pt wore BIPAP all night. Complaints of a headache through night, PRN tylenol given per order. Pt had a couple instances of confusion and tried to get out of bed. Reoriented and reassured him with success. Call light within reach.

## 2024-12-28 NOTE — CASE MANAGEMENT/SOCIAL WORK
Continued Stay Note  [facility]     Patient Name: Kali Garcia  MRN: [id number]  Today's Date: 12/28/2024    Admit Date: 12/22/2024    Plan: [facility] accepted. Precert started 12/28/24. PASRR approved. From home with [facility]; following. Current home 02 6L through [employer].   Discharge Plan       Row Name 12/28/24 1435       Plan    Plan [facility] accepted. Precert started 12/28/24. PASRR approved. From home with [facility]; following. Current home 02 6L through [employer].    Plan Comments Pt weaned down to 15L. [facility] accepted. [facility] started precert today. Precert pending. [facility] following.     Corrine Damon RN BSN  Weekend   [facility]  Phone: [phone]  Fax: [phone]

## 2024-12-28 NOTE — PLAN OF CARE
"Goal Outcome Evaluation:  ST following for swallowing. Chart reviewed and pt currently requiring 35L O2. Pt is not medically appropriate for ST services this date d/t O2 needs. ST recommends pt be NPO w/ Chery Water Protocol until O2 needs decrease/respiratory status improves. Will continue to follow.      \"The nutrition management of patients with acute respiratory failure on non-invasive ventilation or high flow nasal cannula requires a truly multidisciplinary approach.\"     Research finds that a high-flow nasal cannula flow rate of greater than 35-40L/min was associated with decreased swallowing function in healthy subjects.      Research also found that 50% of patients demonstrated silent aspiration on video swallow studies with 02 flow rate from 35-50 liters, with 60% of this sample being cognitive intact/appropriate.      Swallow Function during High-Flow Nasal Cannula Therapy, 2015,  Initiation of Oral Intake in Patients Using High-Flow Nasal Cannula: A Retrospective Analysis, June 2019     Per research, (Teri, 2019), \"5/10 pt presented with silent penetration/aspiration on VFSS with O2 flow rate ranges from 35-50 liters. Of note, 6/10 pt cognitively appropriate.\" (Rustam, 2015), determines \"A high flow nasal cannula flow rate of greater than 40L/min was associated with decreased swallow function in HEALTHY subjects.\"       WATER PROTOCOL:  The rationale to recommend water when a PO diet cannot appropriately/functionally sustain nutrition is because water is low risk for aspiration pna when compared to aspiration of food or other liquids.    Benefits of a water protocol include but are not limited to:      Oral gratification   Engagement of oropharyngeal swallow musculature   Decrease likelihood of dehydration       Guidelines for proper implementation include:  Thorough oral care prior to consuming water  Upright at 90 degree hip flexion  Small sips at slow rate  Monitor for any changes in respiratory " status and discontinue if distress noted     The Chery Free Water Protocol is a research based protocol established in 1984.

## 2024-12-28 NOTE — PROGRESS NOTES
"Enter Query Response Below      Query Response: Viral RSV pneumonia             If applicable, please update the problem list.     Patient: Kali Gimenez        : 1945  Account: [id number]           Admit Date:         How to Respond to this query:       a. Click New Note     b. Answer query within the yellow box.                c. Update the Problem List, if applicable.      If you have any questions about this query contact me at: [email or web address]     Dr. Capps,    Patient with history of COPD and chronic hypoxic and hypercapnic respiratory failure presented  for weakness and shortness of breath. Notes include left lower lobe pneumonia, rhinovirus infection, severe malnutrition, COPD exacerbation, and acute on chronic respiratory failure. Hospitalist note dated  includes \"left lower lobe pneumonia plus rhinovirus  infection,\" and pulmonary notes include \"Pneumonia due to unspecified pathogen.\" Patient treated with monitoring, supplemental oxygen, Solu-Medrol, Duo-Nebs, IV Unasyn  -  (with scheduled end ), IV azithromycin , and PO azithromycin  - .     Please clarify the type of pneumonia the patient was initially treated/monitored for:     - Gram-negative pneumonia (excluding Haemophilus influenzae)  - Bacterial pneumonia unspecified  - Other ___________________  - Unable to clinically determine    By submitting this query, we are merely seeking further clarification of documentation to accurately reflect all conditions that you are monitoring, evaluating, treating or that extend the hospitalization or utilize additional resources of care. Please utilize your independent clinical judgment when addressing the question(s) above.     This query and your response, once completed, will be entered into the legal medical record.    Sincerely,  Krunal Sanchez RN, CDS  Clinical Documentation Integrity Program     "

## 2024-12-28 NOTE — SIGNIFICANT NOTE
12/28/24 1203   Post Acute Pre-Cert Documentation   Request Submitted by Facility - Type: Hospital   Post-Acute Authorization Type Submitted: SNF   Date Post Acute Pre-Cert Inititated per Facility 12/28/24   Accepting Facility [facility]   Hospital Discharge Date Requested 12/28/24   Had Accepting Facility at Time of Submission Yes   Response Communicated to:    Authorization Number: [id number]   Post Acute Pre-Cert Initiated Comment UR RN submitted SNF precert for [facility] via [employer] portal - pending- cm made aware

## 2024-12-28 NOTE — PROGRESS NOTES
[facility] MEDICINE SERVICE  DAILY PROGRESS NOTE    NAME: Kali Garcia  : 1945  MRN: [id number]      LOS: 5 days     PROVIDER OF SERVICE: Sho Capps MD    Chief Complaint: Pneumonia    Subjective:     Interval History:  History taken from: patient    Patient seen and examined at bedside this morning.  No acute complaints overnight.  Feels about the same        Review of Systems: Negative except described above  Review of Systems    Objective:     Vital Signs  Temp:  [97.2 °F (36.2 °C)-98.3 °F (36.8 °C)] 97.4 °F (36.3 °C)  Heart Rate:  [59-66] 61  Resp:  [15-22] 22  BP: (138-161)/(52-71) 138/52  Flow (L/min) (Oxygen Therapy):  [35] 35   Body mass index is 15.8 kg/m².    Physical Exam  Physical Exam  Constitutional:       Comments: NAD    Cardiovascular:      Comments:  RRR, S1 & S2   Pulmonary:      Comments:  Lungs CTA   Abdominal:      Comments:  ABD soft, NT            Diagnostic Data    Results from last 7 days   Lab Units 24  0604 24  0015 24  0427 24  0614 24  2203   WBC 10*3/mm3  --  4.32   < > 6.75 8.27   HEMOGLOBIN g/dL  --  8.3*   < > 7.1* 7.8*   HEMATOCRIT %  --  26.6*   < > 21.9* 24.5*   PLATELETS 10*3/mm3  --  137*   < > 144 142   GLUCOSE mg/dL 92 122*   < > 104* 131*   CREATININE mg/dL 0.77 0.83   < > 0.95 1.03   BUN mg/dL 26* 28*   < > 32* 32*   SODIUM mmol/L 140 142   < > 142 140   POTASSIUM mmol/L 4.4 4.7   < > 3.8 4.3   AST (SGOT) U/L  --   --   --   --  24   ALT (SGPT) U/L  --   --   --   --  13   ALK PHOS U/L  --   --   --   --  78   BILIRUBIN mg/dL  --   --   --  0.5 0.7   ANION GAP mmol/L 5.1 5.7   < > 9.3 8.6    < > = values in this interval not displayed.       No radiology results for the last day      I reviewed the patient's new clinical results.    Assessment/Plan:     Active and Resolved Problems  Active Hospital Problems    Diagnosis  POA    **Pneumonia [J18.9]  Yes    Rhinovirus [B34.8]  Yes    Anemia [D64.9]  Yes    Acute  on chronic respiratory failure with hypoxia [J96.21]  Yes    Coronary artery disease [I25.10]  Yes    Hypertension [I10]  Yes    COPD (chronic obstructive pulmonary disease) [J44.9]  Yes    Severe malnutrition [E43]  Yes      Resolved Hospital Problems   No resolved problems to display.       Acute on chronic hypoxemic hypercapnia respiratory  failure   Lower lobe pneumonia   COPD with acute exacerbation   Rhinovirus infection   -Currently on heated high flow 35 on 6L at home   -Continue droplet precautions for rhinovirus   -Duonebs prn   -Continue mucomyst and Unasyn for now      CAD   Atrial fibrillation   PAD   -Continue home plavix and aspirin      Urinary retention   Hematuria   -No blood noted in catheter   -Continue springer catheter  -Patient wishes to discharge with springer and hospice following, no voiding trial required      Hypernatremia, resolved  Will discontinue D5             VTE Prophylaxis:  Mechanical VTE prophylaxis orders are present.             Disposition Planning:        Anticipated Date of Discharge:  Anticipated Date of Discharge: Pending pre-CERT for SNF          Time: 35 minutes     Code Status and Medical Interventions: No CPR (Do Not Attempt to Resuscitate); Limited Support; No intubation (DNI), No cardioversion   Ordered at: 12/24/24 1547     Medical Intervention Limits:    No intubation (DNI)    No cardioversion     Level Of Support Discussed With:    Next of Kin (If No Surrogate)     Code Status (Patient has no pulse and is not breathing):    No CPR (Do Not Attempt to Resuscitate)     Medical Interventions (Patient has pulse or is breathing):    Limited Support       Signature: Electronically signed by Sho Capps MD, 12/28/24, 11:34 EST.  [facility]ist Team

## 2024-12-28 NOTE — PROGRESS NOTES
"Enter Query Response Below      Query Response:  - Unable to clinically determine              If applicable, please update the problem list.     Patient: Kali Gimenez        : 1945  Account: [id number]           Admit Date:         How to Respond to this query:       a. Click New Note     b. Answer query within the yellow box.                c. Update the Problem List, if applicable.      If you have any questions about this query contact me at: [email or web address]     Dr. Capps,     Patient presented  for weakness and shortness of breath. Notes include left lower lobe pneumonia, rhinovirus infection, severe malnutrition, COPD exacerbation, and acute on chronic respiratory failure. Wound care nurse evaluated patient on , noting \"consult received for possible hospital acquired pressure injury. Patient presents with a partial thickness stage 2 pressure injury to his left greater trochanter measuring approximately 1x1 cm. There is blanchable helene-wound erythema. Patient reports the wound has been present for several months as he lays on that side primarily. There is a silicone foam dressing in place currently. Recommend to change every 3 days and implement pressure injury prevention measures per protocol.\"    Please clarify if the patient was treated/monitored for:    - Stage II pressure injury to left greater trochanter, present on admission  - Other ___________________  - Unable to clinically determine    By submitting this query, we are merely seeking further clarification of documentation to accurately reflect all conditions that you are monitoring, evaluating, treating or that extend the hospitalization or utilize additional resources of care. Please utilize your independent clinical judgment when addressing the question(s) above.     This query and your response, once completed, will be entered into the legal medical record.    Sincerely,  Krunal Sanchez RN, CDS  Clinical Documentation " Integrity Program

## 2024-12-29 LAB
ANION GAP SERPL CALCULATED.3IONS-SCNC: 3.7 MMOL/L (ref 5–15)
BASOPHILS # BLD AUTO: 0 10*3/MM3 (ref 0–0.2)
BASOPHILS NFR BLD AUTO: 0 % (ref 0–1.5)
BUN SERPL-MCNC: 22 MG/DL (ref 8–23)
BUN/CREAT SERPL: 22.2 (ref 7–25)
CALCIUM SPEC-SCNC: 8.6 MG/DL (ref 8.6–10.5)
CHLORIDE SERPL-SCNC: 101 MMOL/L (ref 98–107)
CO2 SERPL-SCNC: 32.3 MMOL/L (ref 22–29)
CREAT SERPL-MCNC: 0.99 MG/DL (ref 0.76–1.27)
DEPRECATED RDW RBC AUTO: 52 FL (ref 37–54)
EGFRCR SERPLBLD CKD-EPI 2021: 77 ML/MIN/1.73
EOSINOPHIL # BLD AUTO: 0 10*3/MM3 (ref 0–0.4)
EOSINOPHIL NFR BLD AUTO: 0 % (ref 0.3–6.2)
ERYTHROCYTE [DISTWIDTH] IN BLOOD BY AUTOMATED COUNT: 15.1 % (ref 12.3–15.4)
GLUCOSE SERPL-MCNC: 103 MG/DL (ref 65–99)
HCT VFR BLD AUTO: 26.8 % (ref 37.5–51)
HGB BLD-MCNC: 8.4 G/DL (ref 13–17.7)
IMM GRANULOCYTES # BLD AUTO: 0.2 10*3/MM3 (ref 0–0.05)
IMM GRANULOCYTES NFR BLD AUTO: 5.6 % (ref 0–0.5)
LYMPHOCYTES # BLD AUTO: 0.31 10*3/MM3 (ref 0.7–3.1)
LYMPHOCYTES NFR BLD AUTO: 8.7 % (ref 19.6–45.3)
MCH RBC QN AUTO: 29.4 PG (ref 26.6–33)
MCHC RBC AUTO-ENTMCNC: 31.3 G/DL (ref 31.5–35.7)
MCV RBC AUTO: 93.7 FL (ref 79–97)
MONOCYTES # BLD AUTO: 0.15 10*3/MM3 (ref 0.1–0.9)
MONOCYTES NFR BLD AUTO: 4.2 % (ref 5–12)
NEUTROPHILS NFR BLD AUTO: 2.91 10*3/MM3 (ref 1.7–7)
NEUTROPHILS NFR BLD AUTO: 81.5 % (ref 42.7–76)
NRBC BLD AUTO-RTO: 0 /100 WBC (ref 0–0.2)
PLATELET # BLD AUTO: 145 10*3/MM3 (ref 140–450)
PMV BLD AUTO: 10.2 FL (ref 6–12)
POTASSIUM SERPL-SCNC: 4.8 MMOL/L (ref 3.5–5.2)
RBC # BLD AUTO: 2.86 10*6/MM3 (ref 4.14–5.8)
SODIUM SERPL-SCNC: 137 MMOL/L (ref 136–145)
TSH SERPL DL<=0.05 MIU/L-ACNC: 3.47 UIU/ML (ref 0.27–4.2)
WBC NRBC COR # BLD AUTO: 3.57 10*3/MM3 (ref 3.4–10.8)

## 2024-12-29 PROCEDURE — 94660 CPAP INITIATION&MGMT: CPT

## 2024-12-29 PROCEDURE — 94799 UNLISTED PULMONARY SVC/PX: CPT

## 2024-12-29 PROCEDURE — 85025 COMPLETE CBC W/AUTO DIFF WBC: CPT | Performed by: NURSE PRACTITIONER

## 2024-12-29 PROCEDURE — 84443 ASSAY THYROID STIM HORMONE: CPT

## 2024-12-29 PROCEDURE — 94664 DEMO&/EVAL PT USE INHALER: CPT

## 2024-12-29 PROCEDURE — 92610 EVALUATE SWALLOWING FUNCTION: CPT

## 2024-12-29 PROCEDURE — 63710000001 PREDNISONE PER 1 MG: Performed by: INTERNAL MEDICINE

## 2024-12-29 PROCEDURE — 80048 BASIC METABOLIC PNL TOTAL CA: CPT

## 2024-12-29 PROCEDURE — 25010000002 HEPARIN (PORCINE) PER 1000 UNITS

## 2024-12-29 PROCEDURE — 97166 OT EVAL MOD COMPLEX 45 MIN: CPT

## 2024-12-29 PROCEDURE — 97530 THERAPEUTIC ACTIVITIES: CPT

## 2024-12-29 PROCEDURE — 94761 N-INVAS EAR/PLS OXIMETRY MLT: CPT

## 2024-12-29 PROCEDURE — 97112 NEUROMUSCULAR REEDUCATION: CPT

## 2024-12-29 PROCEDURE — 97535 SELF CARE MNGMENT TRAINING: CPT

## 2024-12-29 RX ORDER — AMIODARONE HYDROCHLORIDE 200 MG/1
200 TABLET ORAL
Status: DISCONTINUED | OUTPATIENT
Start: 2024-12-29 | End: 2024-12-30

## 2024-12-29 RX ADMIN — ACETAMINOPHEN 650 MG: 325 TABLET, FILM COATED ORAL at 22:15

## 2024-12-29 RX ADMIN — PANTOPRAZOLE SODIUM 40 MG: 40 TABLET, DELAYED RELEASE ORAL at 05:47

## 2024-12-29 RX ADMIN — IPRATROPIUM BROMIDE AND ALBUTEROL SULFATE 3 ML: .5; 3 SOLUTION RESPIRATORY (INHALATION) at 20:22

## 2024-12-29 RX ADMIN — GUAIFENESIN 1200 MG: 600 TABLET, EXTENDED RELEASE ORAL at 08:18

## 2024-12-29 RX ADMIN — AMIODARONE HYDROCHLORIDE 200 MG: 200 TABLET ORAL at 13:26

## 2024-12-29 RX ADMIN — Medication 10 ML: at 22:16

## 2024-12-29 RX ADMIN — IPRATROPIUM BROMIDE AND ALBUTEROL SULFATE 3 ML: .5; 3 SOLUTION RESPIRATORY (INHALATION) at 02:10

## 2024-12-29 RX ADMIN — Medication 10 ML: at 08:19

## 2024-12-29 RX ADMIN — BUDESONIDE INHALATION 0.5 MG: 0.5 SUSPENSION RESPIRATORY (INHALATION) at 07:33

## 2024-12-29 RX ADMIN — IPRATROPIUM BROMIDE AND ALBUTEROL SULFATE 3 ML: .5; 3 SOLUTION RESPIRATORY (INHALATION) at 15:35

## 2024-12-29 RX ADMIN — TAMSULOSIN HYDROCHLORIDE 0.4 MG: 0.4 CAPSULE ORAL at 08:18

## 2024-12-29 RX ADMIN — HEPARIN SODIUM 5000 UNITS: 5000 INJECTION INTRAVENOUS; SUBCUTANEOUS at 22:15

## 2024-12-29 RX ADMIN — HEPARIN SODIUM 5000 UNITS: 5000 INJECTION INTRAVENOUS; SUBCUTANEOUS at 13:27

## 2024-12-29 RX ADMIN — ATORVASTATIN CALCIUM 10 MG: 10 TABLET ORAL at 08:19

## 2024-12-29 RX ADMIN — HEPARIN SODIUM 5000 UNITS: 5000 INJECTION INTRAVENOUS; SUBCUTANEOUS at 05:48

## 2024-12-29 RX ADMIN — PREDNISONE 40 MG: 20 TABLET ORAL at 08:19

## 2024-12-29 RX ADMIN — IPRATROPIUM BROMIDE AND ALBUTEROL SULFATE 3 ML: .5; 3 SOLUTION RESPIRATORY (INHALATION) at 11:14

## 2024-12-29 RX ADMIN — THERA TABS 1 TABLET: TAB at 08:19

## 2024-12-29 RX ADMIN — CLOPIDOGREL BISULFATE 75 MG: 75 TABLET ORAL at 08:19

## 2024-12-29 RX ADMIN — ARFORMOTEROL TARTRATE 15 MCG: 15 SOLUTION RESPIRATORY (INHALATION) at 22:57

## 2024-12-29 RX ADMIN — ARFORMOTEROL TARTRATE 15 MCG: 15 SOLUTION RESPIRATORY (INHALATION) at 07:33

## 2024-12-29 RX ADMIN — BUDESONIDE INHALATION 0.5 MG: 0.5 SUSPENSION RESPIRATORY (INHALATION) at 20:27

## 2024-12-29 RX ADMIN — Medication 5 MG: at 22:16

## 2024-12-29 RX ADMIN — GUAIFENESIN 1200 MG: 600 TABLET, EXTENDED RELEASE ORAL at 22:15

## 2024-12-29 RX ADMIN — POLYETHYLENE GLYCOL 3350 17 G: 17 POWDER, FOR SOLUTION ORAL at 08:19

## 2024-12-29 RX ADMIN — THEOPHYLLINE 300 MG: 300 TABLET, EXTENDED RELEASE ORAL at 08:18

## 2024-12-29 NOTE — THERAPY EVALUATION
Patient Name: Kali Garcia  : 1945    MRN: [id number]                              Today's Date: 2024       Admit Date: 2024    Visit Dx:     ICD-10-CM ICD-9-CM   1. Pneumonia of left lower lobe due to infectious organism  J18.9 486   2. Rhinovirus  B34.8 079.3   3. Acute on chronic respiratory failure with hypoxia  J96.21 518.84     799.02   4. Dehydration  E86.0 276.51   5. Anemia, unspecified type  D64.9 285.9     Patient Active Problem List   Diagnosis    Severe malnutrition    Abnormal CT scan, esophagus    Pneumonia due to COVID-19 virus    Pneumonia    Rhinovirus    Anemia    Acute on chronic respiratory failure with hypoxia    Coronary artery disease    Hypertension    COPD (chronic obstructive pulmonary disease)     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 and endoscopic clipping x 1;  Surgeon: Luz Jacques MD;  Location: [facility] ENDOSCOPY;  Service: Gastroenterology;  Laterality: N/A;  post op: cecal AVM    CORONARY STENT PLACEMENT      ENDOSCOPY N/A 2022    Procedure: ESOPHAGOGASTRODUODENOSCOPY WITH BIOPSY X1 AREA;  Surgeon: Luz Jacques MD;  Location: [facility] ENDOSCOPY;  Service: Gastroenterology;  Laterality: N/A;  esophagitis, gastritis, HH    ENDOSCOPY N/A 2022    Procedure: ESOPHAGOGASTRODUODENOSCOPY;  Surgeon: Luz Jacques MD;  Location: [facility] ENDOSCOPY;  Service: Gastroenterology;  Laterality: N/A;  post op: hiatal hernia, eosphagitis    EYE SURGERY      FEMORAL ARTERY STENT      KNEE SURGERY Left     KNEE SURGERY Left     LUNG SURGERY        General Information       Row Name 24 [id number]          OT Time and  Intention    Document Type evaluation  -MS     Mode of Treatment occupational therapy  -MS     Patient Effort good  -MS     Symptoms Noted During/After Treatment fatigue  -MS       Row Name 12/29/24 1508          General Information    Patient Profile Reviewed yes  -MS     Prior Level of Function independent:;ADL's;all household mobility  -MS     Existing Precautions/Restrictions fall;oxygen therapy device and L/min  12L HF  -MS     Barriers to Rehab medically complex;cognitive status  -MS       Row Name 12/29/24 1508          Occupational Profile    Reason for Services/Referral (Occupational Profile) Pt is an 80 y/o M admitted to [facility] 12/22/24 with dyspnea, hospital dx PNA. CXR reflects PNA. PMHx significant for COPD, PVD, HTN, and HLD. At baseline pt resides with son, DIL and grandchild, SSH with 0 LILIA. Pt typically (I) with ADLs and does not drive. Pt wears home O2, however unable to verify amount.  -MS       Row Name 12/29/24 1508          Living Environment    People in Home child(concepción), adult  -MS       Row Name 12/29/24 1508          Home Main Entrance    Number of Stairs, Main Entrance none  -MS       Row Name 12/29/24 1508          Cognition    Orientation Status (Cognition) oriented to;person;place;disoriented to;situation;time  -MS       Row Name 12/29/24 1508          Safety Issues/Impairments Affecting Functional Mobility    Safety Issues Affecting Function (Mobility) insight into deficits/self-awareness;positioning of assistive device;problem-solving  -MS     Impairments Affecting Function (Mobility) balance;cognition;endurance/activity tolerance;motor planning;pain;postural/trunk control;shortness of breath;strength  -MS     Cognitive Impairments, Mobility Safety/Performance awareness, need for assistance;insight into deficits/self-awareness;judgment;problem-solving/reasoning;safety precaution awareness  -MS               User Key  (r) = Recorded By, (t) = Taken By, (c) = Cosigned By      Initials Name  Provider Type    Kay Pyle OT Occupational Therapist                     Mobility/ADL's       Row Name 12/29/24 1509          Bed Mobility    Bed Mobility bed mobility (all) activities  -MS     All Activities, [place] (Bed Mobility) unable to assess  -MS     Comment, (Bed Mobility) sitting up in chair upon arrival  -MS       Row Name 12/29/24 1509          Transfers    Transfers sit-stand transfer  -MS       Row Name 12/29/24 1509          Sit-Stand Transfer    Sit-Stand [place] (Transfers) minimum assist (75% patient effort)  -MS     Assistive Device (Sit-Stand Transfers) walker, front-wheeled  -MS     Comment, (Sit-Stand Transfer) verbal cues for hand placement  -MS       Row Name 12/29/24 1509          Functional Mobility    Patient was able to Ambulate no, other medical factors prevent ambulation  -MS     Reason Patient was unable to Ambulate Excessive Weakness  -MS               User Key  (r) = Recorded By, (t) = Taken By, (c) = Cosigned By      Initials Name Provider Type    MS Booker CALEB Villanueva Occupational Therapist                   Obj/Interventions       Row Name 12/29/24 1510          Sensory Assessment (Somatosensory)    Sensory Assessment (Somatosensory) UE sensation intact  -MS       Row Name 12/29/24 1510          Vision Assessment/Intervention    Visual Impairment/Limitations WFL  -MS       Row Name 12/29/24 1510          Range of Motion Comprehensive    Comment, General Range of Motion BUE WFL  -MS       Row Name 12/29/24 1510          Strength Comprehensive (MMT)    Comment, General Manual Muscle Testing (MMT) Assessment BUE grossly 3+/5  -MS       Row Name 12/29/24 1510          Balance    Balance Assessment sitting static balance;sitting dynamic balance;standing static balance;standing dynamic balance  -MS     Static Sitting Balance minimal assist  -MS     Dynamic Sitting Balance minimal assist;moderate assist  -MS     Position, Sitting Balance unsupported;sitting in chair   -MS     Static Standing Balance minimal assist  -MS     Dynamic Standing Balance minimal assist  -MS     Position/Device Used, Standing Balance supported;walker, front-wheeled  -MS               User Key  (r) = Recorded By, (t) = Taken By, (c) = Cosigned By      Initials Name Provider Type    Kay Pyle, OT Occupational Therapist                   Goals/Plan       Row Name 12/29/24 1515          Bed Mobility Goal 1 (OT)    Activity/Assistive Device (Bed Mobility Goal 1, OT) bed mobility activities, all  -MS     [place] Level/Cues Needed (Bed Mobility Goal 1, OT) modified independence  -MS     Time Frame (Bed Mobility Goal 1, OT) long term goal (LTG);2 weeks  -MS     Progress/Outcomes (Bed Mobility Goal 1, OT) new goal  -MS       Row Name 12/29/24 1515          Transfer Goal 1 (OT)    Activity/Assistive Device (Transfer Goal 1, OT) transfers, all  -MS     [place] Level/Cues Needed (Transfer Goal 1, OT) modified independence  -MS     Time Frame (Transfer Goal 1, OT) long term goal (LTG);2 weeks  -MS     Progress/Outcome (Transfer Goal 1, OT) new goal  -MS       Row Name 12/29/24 1515          Dressing Goal 1 (OT)    Activity/Device (Dressing Goal 1, OT) lower body dressing  -MS     [place]/Cues Needed (Dressing Goal 1, OT) minimum assist (75% or more patient effort)  -MS     Time Frame (Dressing Goal 1, OT) long term goal (LTG);2 weeks  -MS     Progress/Outcome (Dressing Goal 1, OT) new goal  -MS       Row Name 12/29/24 1514          Toileting Goal 1 (OT)    Activity/Device (Toileting Goal 1, OT) toileting skills, all  -MS     [place] Level/Cues Needed (Toileting Goal 1, OT) minimum assist (75% or more patient effort)  -MS     Time Frame (Toileting Goal 1, OT) long term goal (LTG);2 weeks  -MS     Progress/Outcome (Toileting Goal 1, OT) new goal  -MS       Row Name 12/29/24 1511          Problem Specific Goal 1 (OT)    Problem Specific Goal 1 (OT) increase activity tolerance needed for ADL  routine >5 minutes without rest break  -MS     Time Frame (Problem Specific Goal 1, OT) long term goal (LTG);2 weeks  -MS     Progress/Outcome (Problem Specific Goal 1, OT) new goal  -MS       Row Name 12/29/24 1515          Therapy Assessment/Plan (OT)    Planned Therapy Interventions (OT) activity tolerance training;adaptive equipment training;BADL retraining;functional balance retraining;IADL retraining;neuromuscular control/coordination retraining;cognitive/visual perception retraining;occupation/activity based interventions;passive ROM/stretching;patient/caregiver education/training;ROM/therapeutic exercise;transfer/mobility retraining;strengthening exercise  -MS               User Key  (r) = Recorded By, (t) = Taken By, (c) = Cosigned By      Initials Name Provider Type    Kay Pyle, CALEB Occupational Therapist                   Clinical Impression       Row Name 12/29/24 1516          Pain Assessment    Pretreatment Pain Rating 0/10 - no pain  -MS     Posttreatment Pain Rating 0/10 - no pain  -MS     Pre/Posttreatment Pain Comment reports no pain at rest  -MS       Row Name 12/29/24 1514          Plan of Care Review    Plan of Care Reviewed With patient  -MS     Progress no change  -MS     Outcome Evaluation Pt is an 78 y/o M admitted to [facility] 12/22/24 with dyspnea, hospital dx PNA. At baseline pt resides with son, DIL and grandchild, SSH with 0 LILIA. Pt typically (I) with ADLs and does not drive. Pt wears home O2, however unable to verify amount. Pt cleared for OT by nursing, oriented x2 disoriented to time and situation. Pt on 12L HF and sitting up in chair upon arrival. Pt requires min A to sit up in chair unsupported with lean to L side and requires min A to maintain unsupported seated balance. Pt requires min A to come to standing, requiring verbal cues for hand placement. Pt noted to have posterior lean in standing, bearing weight through heels of feel, demo difficulty weight shifting to gain balance  himself. Pt tremulous in standing and demo difficulty static standing and global weakness. Pt will require significant assist with ADLroutine and mobility, recommending SNF when medically appropriate for dc. Pt O2 noted to desaturate with seated mobility and with standing, however questionable pleth, unable to determine accuracy. OT will follow and progress as appropriate.  -MS       Row Name 12/29/24 1510          Therapy Assessment/Plan (OT)    Rehab Potential (OT) fair  -MS     Criteria for Skilled Therapeutic Interventions Met (OT) yes;meets criteria;skilled treatment is necessary  -MS     Therapy Frequency (OT) 5 times/wk  -MS     Predicted Duration of Therapy Intervention (OT) until d/c  -MS       Row Name 12/29/24 1510          Therapy Plan Review/Discharge Plan (OT)    Anticipated Discharge Disposition (OT) skilled nursing facility  -MS       Row Name 12/29/24 1510          Vital Signs    Pre Systolic BP Rehab 146  -MS     Pre Treatment Diastolic BP 69  -MS     Post Systolic BP Rehab 141  -MS     Post Treatment Diastolic BP 60  -MS     Pretreatment Heart Rate (beats/min) 64  -MS     Intratreatment Heart Rate (beats/min) 63  -MS     Pretreatment Resp Rate (breaths/min) 20  -MS     Intratreatment Resp Rate (breaths/min) 15  -MS     Pre SpO2 (%) 90  -MS     O2 Delivery Pre Treatment hi-flow  -MS     Intra SpO2 (%) 85  -MS     O2 Delivery Intra Treatment hi-flow  -MS     Post SpO2 (%) 93  -MS     O2 Delivery Post Treatment hi-flow  -MS     Pre Patient Position Sitting  -MS     Intra Patient Position Standing  -MS     Post Patient Position Sitting  -MS       Row Name 12/29/24 1510          Positioning and Restraints    Pre-Treatment Position sitting in chair/recliner  -MS     Post Treatment Position chair  -MS     In Chair notified nsg;reclined;call light within reach;encouraged to call for assist;exit alarm on;waffle cushion;legs elevated  -MS               User Key  (r) = Recorded By, (t) = Taken By, (c) =  Cosigned By      Initials Name Provider Type    Kay Pyle, OT Occupational Therapist                   Outcome Measures       Row Name 12/29/24 1516          How much help from another is currently needed...    Putting on and taking off regular lower body clothing? 2  -MS     Bathing (including washing, rinsing, and drying) 2  -MS     Toileting (which includes using toilet bed pan or urinal) 2  -MS     Putting on and taking off regular upper body clothing 2  -MS     Taking care of personal grooming (such as brushing teeth) 3  -MS     Eating meals 3  -MS     AM-PAC 6 Clicks Score (OT) 14  -MS       Row Name 12/29/24 1200 12/29/24 0800       How much help from another person do you currently need...    Turning from your back to your side while in flat bed without using bedrails? 3  -HS 3  -HS    Moving from lying on back to sitting on the side of a flat bed without bedrails? 3  -HS 3  -HS    Moving to and from a bed to a chair (including a wheelchair)? 2  -HS 2  -HS    Standing up from a chair using your arms (e.g., wheelchair, bedside chair)? 2  -HS 2  -HS    Climbing 3-5 steps with a railing? 2  -HS 2  -HS    To walk in hospital room? 2  -HS 2  -HS    AM-PAC 6 Clicks Score (PT) 14  -HS 14  -HS    Highest Level of Mobility Goal 4 --> Transfer to chair/commode  -HS 4 --> Transfer to chair/commode  -HS      Row Name 12/29/24 1516          Functional Assessment    Outcome Measure Options AM-PAC 6 Clicks Daily Activity (OT)  -MS               User Key  (r) = Recorded By, (t) = Taken By, (c) = Cosigned By      Initials Name Provider Type    MS Booker Kay, OT Occupational Therapist    HS Coral Quinn LPN Licensed Nurse                    Occupational Therapy Education       Title: PT OT SLP Therapies (In Progress)       Topic: Occupational Therapy (Done)       Point: ADL training (Done)       Description:   Instruct learner(s) on proper safety adaptation and remediation techniques during self care or  transfers.   Instruct in proper use of assistive devices.                  Learning Progress Summary            Patient Acceptance, E,TB, VU by MS at 12/29/2024 1517                      Point: Precautions (Done)       Description:   Instruct learner(s) on prescribed precautions during self-care and functional transfers.                  Learning Progress Summary            Patient Acceptance, E,TB, VU by MS at 12/29/2024 1517                      Point: Body mechanics (Done)       Description:   Instruct learner(s) on proper positioning and spine alignment during self-care, functional mobility activities and/or exercises.                  Learning Progress Summary            Patient Acceptance, E,TB, VU by MS at 12/29/2024 1517                                      User Key       Initials Effective Dates Name Provider Type Discipline    MS 07/13/22 -  Kay Booker, CALEB Occupational Therapist OT                  OT Recommendation and Plan  Planned Therapy Interventions (OT): activity tolerance training, adaptive equipment training, BADL retraining, functional balance retraining, IADL retraining, neuromuscular control/coordination retraining, cognitive/visual perception retraining, occupation/activity based interventions, passive ROM/stretching, patient/caregiver education/training, ROM/therapeutic exercise, transfer/mobility retraining, strengthening exercise  Therapy Frequency (OT): 5 times/wk  Plan of Care Review  Plan of Care Reviewed With: patient  Progress: no change  Outcome Evaluation: Pt is an 80 y/o M admitted to [facility] 12/22/24 with dyspnea, hospital dx PNA. At baseline pt resides with son, DIL and grandchild, SSH with 0 LILIA. Pt typically (I) with ADLs and does not drive. Pt wears home O2, however unable to verify amount. Pt cleared for OT by nursing, oriented x2 disoriented to time and situation. Pt on 12L HF and sitting up in chair upon arrival. Pt requires min A to sit up in chair unsupported with lean to  L side and requires min A to maintain unsupported seated balance. Pt requires min A to come to standing, requiring verbal cues for hand placement. Pt noted to have posterior lean in standing, bearing weight through heels of feel, demo difficulty weight shifting to gain balance himself. Pt tremulous in standing and demo difficulty static standing and global weakness. Pt will require significant assist with ADLroutine and mobility, recommending SNF when medically appropriate for dc. Pt O2 noted to desaturate with seated mobility and with standing, however questionable pleth, unable to determine accuracy. OT will follow and progress as appropriate.     Time Calculation:                   Kay Booker OT  12/29/2024

## 2024-12-29 NOTE — PLAN OF CARE
Goal Outcome Evaluation:                    Pt is on 12L HFNC at this time. Pt states he uses 6L oxygen at home. Per report, last night pt could not come off bipap and use HFNC without his sats dropping low. Pt did well for some time without issues, then he started to drop. I raised him up to 15L for a few minutes and he did recover slowly and I was able to set it back down to 10 where it was. I decided to titrate his O2 to 12L because he was not maintaining well at 10. Pt also has airvo if HFNC is not able to be tolerated. Pt has a stg 2 wound on his left hip, dressed was changed. Pt is on droplet for Rhinovirus. Pt gets slightly confused when he first wakes up and will start rambling sounds that last only a few minutes then he is alert and oriented. Pt was in NSR when I came in this morning, he then converted to afib, then back to NSR as he is now. [facility] has a bed ready for him tomorrow. The plan is to d/c him there and they will send him home with hospice. Hospice is following. Pt is currently up in the chair with help from 2 people, call light in reach.

## 2024-12-29 NOTE — PLAN OF CARE
Problem: Adult Inpatient Plan of Care  Goal: Plan of Care Review  Outcome: Progressing  Goal: Patient-Specific Goal (Individualized)  Outcome: Progressing  Goal: Absence of Hospital-Acquired Illness or Injury  Outcome: Progressing  Intervention: Identify and Manage Fall Risk  Recent Flowsheet Documentation  Taken 12/29/2024 0400 by Tiny Zacarias RN  Safety Promotion/Fall Prevention:   safety round/check completed   room organization consistent   nonskid shoes/slippers when out of bed   fall prevention program maintained   clutter free environment maintained   assistive device/personal items within reach  Taken 12/29/2024 0045 by Tiny Zacarias RN  Safety Promotion/Fall Prevention:   safety round/check completed   room organization consistent   nonskid shoes/slippers when out of bed   fall prevention program maintained   clutter free environment maintained   assistive device/personal items within reach  Taken 12/28/2024 2045 by Tiny Zacarias RN  Safety Promotion/Fall Prevention:   safety round/check completed   room organization consistent   nonskid shoes/slippers when out of bed   fall prevention program maintained   clutter free environment maintained   assistive device/personal items within reach  Intervention: Prevent Skin Injury  Recent Flowsheet Documentation  Taken 12/29/2024 0400 by Tiny Zacarias RN  Body Position: supine  Skin Protection:   incontinence pads utilized   transparent dressing maintained  Taken 12/29/2024 0045 by Tiny Zacarias RN  Body Position: right  Skin Protection:   incontinence pads utilized   transparent dressing maintained  Taken 12/28/2024 2045 by Tiny Zacarias RN  Body Position: supine  Skin Protection:   transparent dressing maintained   incontinence pads utilized  Intervention: Prevent and Manage VTE (Venous Thromboembolism) Risk  Recent Flowsheet Documentation  Taken 12/29/2024 0400 by Tiny Zacarias RN  VTE Prevention/Management:   SCDs (sequential compression  devices) off   patient refused intervention  Taken 12/29/2024 0045 by Tiny Zacarias RN  VTE Prevention/Management:   SCDs (sequential compression devices) off   patient refused intervention  Taken 12/28/2024 2045 by Tiny Zacarias RN  VTE Prevention/Management:   SCDs (sequential compression devices) off   patient refused intervention  Intervention: Prevent Infection  Recent Flowsheet Documentation  Taken 12/29/2024 0400 by Tiny Zacarias RN  Infection Prevention:   single patient room provided   rest/sleep promoted   personal protective equipment utilized   hand hygiene promoted   equipment surfaces disinfected  Taken 12/29/2024 0045 by Tiny Zacarias RN  Infection Prevention:   single patient room provided   rest/sleep promoted   personal protective equipment utilized   hand hygiene promoted   equipment surfaces disinfected  Taken 12/28/2024 2045 by Tiny Zacarias RN  Infection Prevention:   single patient room provided   rest/sleep promoted   personal protective equipment utilized   hand hygiene promoted   equipment surfaces disinfected  Goal: Optimal Comfort and Wellbeing  Outcome: Progressing  Intervention: Provide Person-Centered Care  Recent Flowsheet Documentation  Taken 12/29/2024 0400 by Tiny Zacarias RN  Trust Relationship/Rapport:   care explained   choices provided   questions answered  Taken 12/29/2024 0045 by Tiny Zacarias RN  Trust Relationship/Rapport:   care explained   choices provided   questions answered  Taken 12/28/2024 2045 by Tiny Zacarias RN  Trust Relationship/Rapport:   care explained   choices provided   questions answered  Goal: Readiness for Transition of Care  Outcome: Progressing     Problem: Fall Injury Risk  Goal: Absence of Fall and Fall-Related Injury  Outcome: Progressing  Intervention: Identify and Manage Contributors  Recent Flowsheet Documentation  Taken 12/29/2024 0400 by Tiny Zacarias RN  Medication Review/Management: medications reviewed  Self-Care Promotion:  BADL personal objects within reach  Taken 12/29/2024 0045 by Tiny Zacarias RN  Medication Review/Management: medications reviewed  Self-Care Promotion: BADL personal objects within reach  Taken 12/28/2024 2045 by Tiny Zacarias RN  Medication Review/Management: medications reviewed  Self-Care Promotion: BADL personal objects within reach  Intervention: Promote Injury-Free Environment  Recent Flowsheet Documentation  Taken 12/29/2024 0400 by Tiny Zacarias RN  Safety Promotion/Fall Prevention:   safety round/check completed   room organization consistent   nonskid shoes/slippers when out of bed   fall prevention program maintained   clutter free environment maintained   assistive device/personal items within reach  Taken 12/29/2024 0045 by Tiny Zacarias RN  Safety Promotion/Fall Prevention:   safety round/check completed   room organization consistent   nonskid shoes/slippers when out of bed   fall prevention program maintained   clutter free environment maintained   assistive device/personal items within reach  Taken 12/28/2024 2045 by Tiny Zacarias RN  Safety Promotion/Fall Prevention:   safety round/check completed   room organization consistent   nonskid shoes/slippers when out of bed   fall prevention program maintained   clutter free environment maintained   assistive device/personal items within reach     Problem: Skin Injury Risk Increased  Goal: Skin Health and Integrity  Outcome: Progressing  Intervention: Optimize Skin Protection  Recent Flowsheet Documentation  Taken 12/29/2024 0400 by Tiny Zacarias RN  Pressure Reduction Techniques:   frequent weight shift encouraged   weight shift assistance provided   positioned off wounds  Head of Bed (HOB) Positioning: HOB elevated  Pressure Reduction Devices:   pressure-redistributing mattress utilized   positioning supports utilized   foam padding utilized  Skin Protection:   incontinence pads utilized   transparent dressing maintained  Taken 12/29/2024  0045 by Tiny Zacarias RN  Pressure Reduction Techniques:   frequent weight shift encouraged   weight shift assistance provided   positioned off wounds  Head of Bed (HOB) Positioning: HOB elevated  Pressure Reduction Devices:   pressure-redistributing mattress utilized   positioning supports utilized  Skin Protection:   incontinence pads utilized   transparent dressing maintained  Taken 12/28/2024 2045 by Tiny Zacarias RN  Pressure Reduction Techniques:   frequent weight shift encouraged   weight shift assistance provided   positioned off wounds  Head of Bed (HOB) Positioning: HOB elevated  Pressure Reduction Devices:   pressure-redistributing mattress utilized   positioning supports utilized  Skin Protection:   transparent dressing maintained   incontinence pads utilized     Problem: Comorbidity Management  Goal: Blood Pressure in Desired Range  Outcome: Progressing  Intervention: Maintain Blood Pressure Management  Recent Flowsheet Documentation  Taken 12/29/2024 0400 by Tiny Zacarias RN  Medication Review/Management: medications reviewed  Taken 12/29/2024 0045 by Tiny Zacarias RN  Medication Review/Management: medications reviewed  Taken 12/28/2024 2045 by Tiny Zacarias RN  Medication Review/Management: medications reviewed     Problem: Noninvasive Ventilation Acute  Goal: Effective Unassisted Ventilation and Oxygenation  Outcome: Progressing  Intervention: Monitor and Manage Noninvasive Ventilation  Recent Flowsheet Documentation  Taken 12/29/2024 0400 by Tiny Zacarias RN  Airway/Ventilation Management: pulmonary hygiene promoted  NPPV/CPAP Maintenance: proper fit/secure  Taken 12/29/2024 0045 by Tiny Zacarias RN  Airway/Ventilation Management: pulmonary hygiene promoted  NPPV/CPAP Maintenance: proper fit/secure  Taken 12/28/2024 2045 by Tiny Zacarias RN  Airway/Ventilation Management: pulmonary hygiene promoted  NPPV/CPAP Maintenance: proper fit/secure     Problem: Malnutrition  Goal: Improved  Nutritional Intake  Outcome: Progressing   Goal Outcome Evaluation:

## 2024-12-29 NOTE — PROGRESS NOTES
Daily Progress Note          Assessment    Pneumonia due to unspecified pathogen  COPD: At home on Trelegy inhaler  Rhinovirus infection  Hypoxemia: At home 6 L of oxygen  CAD  PVD  HTN  HLD  A-fib  Anemia  Former smoker quit 2022 after 60 pack years  Echo 2/1/2024 EF 61-65% mild pulmonary hypertension 35-45 mmHg     CT scan of the chest in June 2024 showing a noncalcified nodule in the right upper lobe around 2 x 1.1 x 1 cm. Patient was also noted to have patchy airspace disease and clustered micronodules in the lingula. He then underwent PET scan showing a 1.7 x 1.1 cm partially calcified irregular shaped nodule in the right upper lobe to be metabolically active with SUV of 4.84 and additional 2 metabolically active irregular nodular densities in the right upper lobe. Inferior apical segment with intervening areas of metabolically active interstitial thickening.     PFTs: Very severe airflow obstruction with significant air trapping and reduced diffusion capacity. FEV1/FVC postbronchodilator is 0.39 with FEV1 of 0.71 L or 28% predicted and FVC of 1.82 L or 47% predicted. No significant bronchodilator response noted. Total lung capacity of 7.77 L or 131% predicted and residual volume of 5.82 L or 226% predicted and DLCO of 61% predicted noted.         Recommendations:  The wheezing is improving, slowly wean down steroids    patient is very cachectic with poor prognosis, patient decided for DNR and home hospice, awaiting placement in [facility]     antibiotics: Unasyn completed 5 days  Nebulized Mucomyst to complete today  Encourage use of incentive spirometry and flutter valve  Theophylline    Nebulized Pulmicort  Oxygen supplement and titration to maintain saturation 90 to 95%: Currently on 6 L per nasal cannula  Bronchodilators  Mucinex     Atorvastatin  Plavix     I personally reviewed the radiological studies             LOS: 6 days     Subjective     Chronic cough and shortness of breath    Objective      Vital signs for last 24 hours:  Vitals:    12/29/24 0743 12/29/24 0906 12/29/24 1114 12/29/24 1118   BP:  125/52     BP Location:  Right arm     Patient Position:  Lying     Pulse: 58 60 90 106   Resp: 15 20 11 11   Temp:  97.3 °F (36.3 °C)     TempSrc:  Axillary     SpO2: 100% 100% 94% 97%   Weight:       Height:           Intake/Output last 3 shifts:  I/O last 3 completed shifts:  In: -   Out: 2675 [Urine:2675]  Intake/Output this shift:  I/O this shift:  In: 360 [P.O.:360]  Out: 400 [Urine:400]      Radiology  Imaging Results (Last 24 Hours)       ** No results found for the last 24 hours. **            Labs:  Results from last 7 days   Lab Units 12/29/24  0353   WBC 10*3/mm3 3.57   HEMOGLOBIN g/dL 8.4*   HEMATOCRIT % 26.8*   PLATELETS 10*3/mm3 145     Results from last 7 days   Lab Units 12/29/24  0353 12/24/24  0427 12/23/24  0614 12/22/24  2203   SODIUM mmol/L 137   < > 142 140   POTASSIUM mmol/L 4.8   < > 3.8 4.3   CHLORIDE mmol/L 101   < > 104 102   CO2 mmol/L 32.3*   < > 28.7 29.4*   BUN mg/dL 22   < > 32* 32*   CREATININE mg/dL 0.99   < > 0.95 1.03   CALCIUM mg/dL 8.6   < > 8.3* 9.0   BILIRUBIN mg/dL  --   --  0.5 0.7   ALK PHOS U/L  --   --   --  78   ALT (SGPT) U/L  --   --   --  13   AST (SGOT) U/L  --   --   --  24   GLUCOSE mg/dL 103*   < > 104* 131*    < > = values in this interval not displayed.     Results from last 7 days   Lab Units 12/23/24  0454   PH, ARTERIAL pH units 7.355   PO2 ART mm Hg 173.6*   PCO2, ARTERIAL mm Hg 62.2*   HCO3 ART mmol/L 34.8*     Results from last 7 days   Lab Units 12/22/24  2203   ALBUMIN g/dL 3.3*                               Meds:   SCHEDULE  arformoterol, 15 mcg, Nebulization, BID - RT  atorvastatin, 10 mg, Oral, Daily  budesonide, 0.5 mg, Nebulization, BID - RT  clopidogrel, 75 mg, Oral, Daily  guaiFENesin, 1,200 mg, Oral, Q12H  heparin (porcine), 5,000 Units, Subcutaneous, Q8H  ipratropium-albuterol, 3 mL, Nebulization, Q4H - RT  multivitamin, 1 tablet, Oral,  Daily  pantoprazole, 40 mg, Oral, Q AM  polyethylene glycol, 17 g, Oral, Daily  predniSONE, 40 mg, Oral, Daily With Breakfast  sodium chloride, 10 mL, Intravenous, Q12H  tamsulosin, 0.4 mg, Oral, Daily  theophylline, 300 mg, Oral, Daily      Infusions       PRNs    acetaminophen    aluminum-magnesium hydroxide-simethicone    senna-docusate sodium **AND** polyethylene glycol **AND** bisacodyl **AND** bisacodyl    Calcium Replacement - Follow Nurse / BPA Driven Protocol    hydrALAZINE    ipratropium-albuterol    Magnesium Standard Dose Replacement - Follow Nurse / BPA Driven Protocol    melatonin    ondansetron ODT **OR** ondansetron    Phosphorus Replacement - Follow Nurse / BPA Driven Protocol    Potassium Replacement - Follow Nurse / BPA Driven Protocol    sodium chloride    sodium chloride    sodium chloride    Physical Exam:  General Appearance:  Alert, looks chronically ill and cachectic  HEENT:  Normocephalic, without obvious abnormality, Conjunctiva/corneas clear,.   Nares normal, no drainage     Neck:  Supple, symmetrical, trachea midline.   Lungs /Chest wall: Expiration and mild bilateral basal rhonchi, respirations unlabored, symmetrical wall movement.     Heart:  Regular rate and rhythm, S1 S2 normal  Abdomen: Soft, non-tender, no masses, no organomegaly.    Extremities: No edema, no clubbing or cyanosis     ROS  Constitutional: Negative for chills, fever and malaise/fatigue.   HENT: Negative.    Eyes: Negative.    Cardiovascular: Negative.    Respiratory: Positive for chronic cough and shortness of breath.    Skin: Negative.    Musculoskeletal: Negative.    Gastrointestinal: Negative.    Genitourinary: Negative.    Neurological: Generalized weakness      I reviewed the recent clinical results  I personally reviewed the latest radiological studies    Part of this note may be an electronic transcription/translation of spoken language to printed text using the Dragon Dictation System.

## 2024-12-29 NOTE — SIGNIFICANT NOTE
12/29/24 0808   Post Acute Pre-Cert Documentation   Verification from Payer Yes   Date Post Acute Pre-Cert Completed 12/29/24   All Clinicals Submitted? Yes   Had Accepting Facility at Time of Submission Yes   Response Received from Insurance? Approval   Post Acute Pre-Cert Initiated Comment CLAUDIA RN checked precert via [employer]- approved 12/28/2024-1/3/2025- cm made aware

## 2024-12-29 NOTE — PLAN OF CARE
Goal Outcome Evaluation:   Pt seen for clinical swallow eval. Pt has had no difficulty at meals per nurse and takes multiple pills at once from a cup with water with no s/s of aspiration. Pt was upright in chair and fed himself. Pt was seen during lunch meal consuming roast beef, green beans, mashed potatoes, cheesecake and drinking milk by straw. Pt is edentulous but had functional mastication of the solids. Oral transit also functional. No pocketing or oral residual occurred. Visualization of swallow suggests timely initiation. After the swallow pt had clear vocal quality and no cough or other overt s/s of aspiration on any consistency assessed. Pt appears to tolerate regular and soft solids and liquids with no difficulty.     It is rec that pt continue regular diet with thin liquids as tolerated. Pt should be upright at 90 degrees for all PO and for pills. He should eat at a slow rate andtake small bites and sips. Education given to pt  on safe swallow strategies. He verbalized understanding. ST will follow to assure tolerance of diet and make further recs as indicated.                             SLP Swallowing Diagnosis: functional oral phase, R/O pharyngeal dysphagia (12/29/24 1700)

## 2024-12-29 NOTE — PROGRESS NOTES
[facility] MEDICINE SERVICE  DAILY PROGRESS NOTE    NAME: Kali Garcia  : 1945  MRN: [id number]      LOS: 6 days     PROVIDER OF SERVICE: Sho Capps MD    Chief Complaint: Pneumonia    Subjective:     Interval History:  History taken from: patient    Currently on on 10 L nasal cannula high flow, was seen to be in atrial fibrillation on telemetry monitor.        Review of Systems:   Review of Systems    Objective:     Vital Signs  Temp:  [97.3 °F (36.3 °C)-98.4 °F (36.9 °C)] 97.3 °F (36.3 °C)  Heart Rate:  [] 106  Resp:  [11-23] 11  BP: (125-152)/(50-68) 125/52  Flow (L/min) (Oxygen Therapy):  [8-35] 10   Body mass index is 16.3 kg/m².    Physical Exam  Physical Exam       Diagnostic Data    Results from last 7 days   Lab Units 24  0353 24  0427 24  0614 24  2203   WBC 10*3/mm3 3.57   < > 6.75 8.27   HEMOGLOBIN g/dL 8.4*   < > 7.1* 7.8*   HEMATOCRIT % 26.8*   < > 21.9* 24.5*   PLATELETS 10*3/mm3 145   < > 144 142   GLUCOSE mg/dL 103*   < > 104* 131*   CREATININE mg/dL 0.99   < > 0.95 1.03   BUN mg/dL 22   < > 32* 32*   SODIUM mmol/L 137   < > 142 140   POTASSIUM mmol/L 4.8   < > 3.8 4.3   AST (SGOT) U/L  --   --   --  24   ALT (SGPT) U/L  --   --   --  13   ALK PHOS U/L  --   --   --  78   BILIRUBIN mg/dL  --   --  0.5 0.7   ANION GAP mmol/L 3.7*   < > 9.3 8.6    < > = values in this interval not displayed.       No radiology results for the last day      I reviewed the patient's new clinical results.    Assessment/Plan:     Active and Resolved Problems  Active Hospital Problems    Diagnosis  POA    **Pneumonia [J18.9]  Yes    Rhinovirus [B34.8]  Yes    Anemia [D64.9]  Yes    Acute on chronic respiratory failure with hypoxia [J96.21]  Yes    Coronary artery disease [I25.10]  Yes    Hypertension [I10]  Yes    COPD (chronic obstructive pulmonary disease) [J44.9]  Yes    Severe malnutrition [E43]  Yes      Resolved Hospital Problems   No resolved problems  to display.       Acute on chronic hypoxemic hypercapnia respiratory  failure   Lower lobe pneumonia   COPD with acute exacerbation   Rhinovirus infection   -Continue droplet precautions for rhinovirus   -Duonebs prn   -Continue mucomyst and completed Unasyn  -Pulmonology following     CAD   Atrial fibrillation   PAD   -Continue home plavix and aspirin   -Continue home albuterol  -Telemonitoring  TSH ordered-     Urinary retention   Hematuria   -No blood noted in catheter   -Continue springer catheter  -Patient wishes to discharge with springer and hospice following, no voiding trial required      Hypernatremia, resolved      VTE Prophylaxis:  Pharmacologic & mechanical VTE prophylaxis orders are present.                Anticipated Date of Discharge: 12/30/2024, awaiting placement         Time: 35 minutes     Code Status and Medical Interventions: No CPR (Do Not Attempt to Resuscitate); Limited Support; No intubation (DNI), No cardioversion   Ordered at: 12/24/24 1547     Medical Intervention Limits:    No intubation (DNI)    No cardioversion     Level Of Support Discussed With:    Next of Kin (If No Surrogate)     Code Status (Patient has no pulse and is not breathing):    No CPR (Do Not Attempt to Resuscitate)     Medical Interventions (Patient has pulse or is breathing):    Limited Support       Signature: Electronically signed by Sho Capps MD, 12/29/24, 11:45 EST.  [facility] Hospitalist Team

## 2024-12-29 NOTE — THERAPY EVALUATION
Acute Care - Speech Language Pathology   Swallow Initial Evaluation [facility]     Patient Name: Kali Garcia  : 1945  MRN: [id number]  Today's Date: 2024               Admit Date: 2024    Visit Dx:     ICD-10-CM ICD-9-CM   1. Pneumonia of left lower lobe due to infectious organism  J18.9 486   2. Rhinovirus  B34.8 079.3   3. Acute on chronic respiratory failure with hypoxia  J96.21 518.84     799.02   4. Dehydration  E86.0 276.51   5. Anemia, unspecified type  D64.9 285.9     Patient Active Problem List   Diagnosis    Severe malnutrition    Abnormal CT scan, esophagus    Pneumonia due to COVID-19 virus    Pneumonia    Rhinovirus    Anemia    Acute on chronic respiratory failure with hypoxia    Coronary artery disease    Hypertension    COPD (chronic obstructive pulmonary disease)     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 and endoscopic clipping x 1;  Surgeon: Luz Jacuqes MD;  Location: [facility] ENDOSCOPY;  Service: Gastroenterology;  Laterality: N/A;  post op: cecal AVM    CORONARY STENT PLACEMENT      ENDOSCOPY N/A 2022    Procedure: ESOPHAGOGASTRODUODENOSCOPY WITH BIOPSY X1 AREA;  Surgeon: Luz Jacques MD;  Location: [facility] ENDOSCOPY;  Service: Gastroenterology;  Laterality: N/A;  esophagitis, gastritis, HH    ENDOSCOPY N/A 2022    Procedure: ESOPHAGOGASTRODUODENOSCOPY;  Surgeon: Luz Jacques MD;  Location: [facility] ENDOSCOPY;  Service: Gastroenterology;  Laterality: N/A;  post op: hiatal hernia, eosphagitis    EYE SURGERY      FEMORAL ARTERY STENT      KNEE SURGERY Left     KNEE SURGERY Left     LUNG SURGERY         SLP  Recommendation and Plan  SLP Swallowing Diagnosis: functional oral phase, R/O pharyngeal dysphagia (12/29/24 1700)  SLP Diet Recommendation: regular textures, thin liquids (12/29/24 1700)  Recommended Precautions and Strategies: upright posture during/after eating, small bites of food and sips of liquid, alternate between small bites of food and sips of liquid, general aspiration precautions, reflux precautions (12/29/24 1700)  SLP Rec. for Method of Medication Administration: meds whole, meds crushed, as tolerated (12/29/24 1700)     Monitor for Signs of Aspiration: yes, notify SLP if any concerns, cough, gurgly voice, throat clearing (12/29/24 1700)  Recommended Diagnostics: reassess via clinical swallow evaluation (12/29/24 1700)  Swallow Criteria for Skilled Therapeutic Interventions Met: demonstrates skilled criteria (12/29/24 1700)     Rehab Potential/Prognosis, Swallowing: good, to achieve stated therapy goals (12/29/24 1700)  Therapy Frequency (Swallow): PRN (12/29/24 1700)  Predicted Duration Therapy Intervention (Days): until discharge (12/29/24 1700)  Oral Care Recommendations: Oral Care BID/PRN (12/29/24 1700)                                               SWALLOW EVALUATION (Last 72 Hours)       SLP Adult Swallow Evaluation       Row Name 12/29/24 1700       Rehab Evaluation    Document Type evaluation  -CP    Subjective Information no complaints  -CP    Patient Observations alert;cooperative  -CP    Patient/Family/Caregiver Comments/Observations No family present  -CP    Patient Effort good  -CP       General Information    Patient Profile Reviewed yes  -CP    Pertinent History Of Current Problem Kali Garcia is a 79 y.o. male with PMH of COPD/chronic respiratory failure on 6L O2, atrial fibrillation, CAD, PVD, hypertension, hyperlipidemia, OA presented to [facility] ED 12/22/2024 with complaints of shortness of breath, cough, congestion for two weeks. He has had some intermittent right sided chest  soreness. He has felt weak. Swallow eval ordered due to pneumonia. pt has been on high flow O2 until today and unable to be evaluated. pt now on 12L.  -CP    Current Method of Nutrition regular textures;thin liquids  -CP    Prior Level of Function-Swallowing no diet consistency restrictions;regular textures;thin liquids;concerns regarding malnutrition  -CP    Plans/Goals Discussed with patient  -CP    Barriers to Rehab none identified  -CP       Oral Motor Structure and Function    Dentition Assessment edentulous, does not have dentures  -CP    Secretion Management WNL/WFL  -CP    Mucosal Quality moist, healthy  -CP       Oral Musculature and Cranial Nerve Assessment    Oral Motor General Assessment WFL  -CP       General Eating/Swallowing Observations    Respiratory Support Currently in Use high-flow nasal cannula  -CP    O2 Liters other (see comments)  12L  -CP    Eating/Swallowing Skills self-fed  -CP    Positioning During Eating upright 90 degree;upright in chair  -CP    Utensils Used straw  -CP    Consistencies Trialed thin liquids;pureed;soft to chew textures;regular textures  -CP       Clinical Swallow Eval    Clinical Swallow Evaluation Summary Pt seen for clinical swallow eval. Pt has had no difficulty at meals per nurse and takes multiple pills at once from a cup with water with no s/s of aspiration. Pt was upright in chair and fed himself. Pt was seen during lunch meal consuming roast beef, green beans, mashed potatoes, cheesecake and drinking milk by straw. Pt is edentulous but had functional mastication of the solids. Oral transit also functional. No pocketing or oral residual occurred. Visualization of swallow suggests timely initiation. After the swallow pt had clear vocal quality and no cough or other overt s/s of aspiration on any consistency assessed. Pt appears to tolerate regular and soft solids and liquids with no difficulty. It is rec that pt continue regular diet with thin liquids as  tolerated. Pt should be upright at 90 degrees for all PO and for pills. He should eat at a slow rate andtake small bites and sips. Education given to pt  on safe swallow strategies. He verbalized understanding. ST will follow to assure tolerance of diet and make further recs as indicated.  -CP       SLP Evaluation Clinical Impression    SLP Swallowing Diagnosis functional oral phase;R/O pharyngeal dysphagia  -CP    Functional Impact risk of aspiration/pneumonia  -CP    Rehab Potential/Prognosis, Swallowing good, to achieve stated therapy goals  -CP    Swallow Criteria for Skilled Therapeutic Interventions Met demonstrates skilled criteria  -CP       SLP Treatment Clinical Impressions    Care Plan Review evaluation/treatment results reviewed  -CP       Recommendations    Therapy Frequency (Swallow) PRN  -CP    Predicted Duration Therapy Intervention (Days) until discharge  -CP    SLP Diet Recommendation regular textures;thin liquids  -CP    Recommended Diagnostics reassess via clinical swallow evaluation  -CP    Recommended Precautions and Strategies upright posture during/after eating;small bites of food and sips of liquid;alternate between small bites of food and sips of liquid;general aspiration precautions;reflux precautions  -CP    Oral Care Recommendations Oral Care BID/PRN  -CP    SLP Rec. for Method of Medication Administration meds whole;meds crushed;as tolerated  -CP    Monitor for Signs of Aspiration yes;notify SLP if any concerns;cough;gurgly voice;throat clearing  -CP       Swallow Goals (SLP)    Swallow LTGs Swallow Long Term Goal (free text)  -CP    Swallow STGs diet tolerance goal selection (SLP)  -CP    Diet Tolerance Goal Selection (SLP) Swallow Short Term Goal 1;Swallow Short Term Goal 2  -CP       (LTG) Swallow    (LTG) Swallow Pt will maximize swallow function for least restrictive PO diet, exhibiting no complication associated with dysphagia, adequate PO intake, and demonstrating independent use  of swallow compensations  -CP    Time Frame (Swallow Long Term Goal) by discharge  -CP    Progress/Outcomes (Swallow Long Term Goal) new goal  -CP       (STG) Swallow 1    (STG) Swallow 1 The patient will participate in a meal/follow-up assessment to determine safety and adequacy of recommended diet, independent use of safe swallow compensations, pt/family education and additional goals/recommendations to follow.  -CP    Time Frame (Swallow Short Term Goal 1) 1 week  -CP    Progress/Outcomes (Swallow Short Term Goal 1) new goal  -CP       (STG) Swallow 2    (STG) Swallow 2 Pt will participate in ongoing swallow assessment to include VFSS if indicated, and caregiver teaching.  -CP    Time Frame (Swallow Short Term Goal 2) 1 week  -CP    Progress/Outcomes (Swallow Short Term Goal 2) new goal  -CP              User Key  (r) = Recorded By, (t) = Taken By, (c) = Cosigned By      Initials Name Effective Dates    CP Amparo Vera, SLP 06/16/21 -                     EDUCATION  The patient has been educated in the following areas:   Dysphagia (Swallowing Impairment) Oral Care/Hydration.        SLP GOALS       Row Name 12/29/24 1700             (LTG) Swallow    (LTG) Swallow Pt will maximize swallow function for least restrictive PO diet, exhibiting no complication associated with dysphagia, adequate PO intake, and demonstrating independent use of swallow compensations  -CP      Time Frame (Swallow Long Term Goal) by discharge  -CP      Progress/Outcomes (Swallow Long Term Goal) new goal  -CP         (STG) Swallow 1    (STG) Swallow 1 The patient will participate in a meal/follow-up assessment to determine safety and adequacy of recommended diet, independent use of safe swallow compensations, pt/family education and additional goals/recommendations to follow.  -CP      Time Frame (Swallow Short Term Goal 1) 1 week  -CP      Progress/Outcomes (Swallow Short Term Goal 1) new goal  -CP         (STG) Swallow 2    (STG) Swallow 2  Pt will participate in ongoing swallow assessment to include VFSS if indicated, and caregiver teaching.  -CP      Time Frame (Swallow Short Term Goal 2) 1 week  -CP      Progress/Outcomes (Swallow Short Term Goal 2) new goal  -CP                User Key  (r) = Recorded By, (t) = Taken By, (c) = Cosigned By      Initials Name Provider Type    CP Amparo Vera, SLP Speech and Language Pathologist                         Time Calculation:                ADRIANNE Cid  12/29/2024

## 2024-12-29 NOTE — THERAPY TREATMENT NOTE
"Subjective: Pt agreeable to therapeutic plan of care.    Objective:     Precautions - falls, droplet isol decreased sats    Bed mobility - Min-A  Transfers - Min-A  Ambulation -  feet N/A or Not attempted.    Vitals: tachy, afib decreased sats. Was on 5L hf and nsg had to incr to 10 L hf due to sats dropped to 79% and only recovered to 83%. Nsg and RT working with pt on whether to change back on airvo.     Pain: 0 VAS     Education: Provided education on the importance of mobility in the acute care setting, Verbal/Tactile Cues, Transfer Training, and Energy conservation strategies    Assessment: Kali Garcia presents with functional mobility impairments which indicate the need for skilled intervention. Tolerating session today without incident. Pt very cooperative but anxious. Very restless in bed and wanted to get to chair. Plans on rehab at [place].Will continue to follow and progress as tolerated.     Plan/Recommendations:   If medically appropriate, Moderate Intensity Therapy recommended post-acute care. This is recommended as therapy feels the patient would require 3-4 days per week and wouldn't tolerate \"3 hour daily\" rehab intensity. SNF would be the preferred choice. If the patient does not agree to SNF, arrange HH or OP depending on home bound status. If patient is medically complex, consider LTACH. Pt requires no DME at discharge.     Pt desires Skilled Rehab placement at discharge. Pt cooperative; agreeable to therapeutic recommendations and plan of care.         Basic Mobility 6-click:  Rollin = Total, A lot = 2, A little = 3; 4 = None  Supine>Sit:   1 = Total, A lot = 2, A little = 3; 4 = None   Sit>Stand with arms:  1 = Total, A lot = 2, A little = 3; 4 = None  Bed>Chair:   1 = Total, A lot = 2, A little = 3; 4 = None  Ambulate in room:  1 = Total, A lot = 2, A little = 3; 4 = None  3-5 Steps with railin = Total, A lot = 2, A little = 3; 4 = None  Score: 14    Post-Tx Position: Up in " Chair, Staff Present, Alarms activated, and Call light and personal items within reach  PPE: gloves and surgical mask    Therapy Charges for Today       Code Description Service Date Service Provider Modifiers Qty    44038720721 HC PT THERAPEUTIC ACT EA 15 MIN 12/29/2024 Gudelia Link PTA GP 2    85160801226 HC PT SELF CARE/MGMT/TRAIN EA 15 MIN 12/29/2024 Gudelia Link PTA GP 1    25400309202 HC PT NEUROMUSC RE EDUCATION EA 15 MIN 12/29/2024 Gudelia Link PTA GP 1           PT Charges       Row Name 12/29/24 1548             Time Calculation    Start Time 1015  -      Stop Time 1054  -      Time Calculation (min) 39 min  -      PT Received On 12/29/24  -      PT - Next Appointment 12/30/24  -         Time Calculation- PT    Total Timed Code Minutes- PT 39 minute(s)  -                User Key  (r) = Recorded By, (t) = Taken By, (c) = Cosigned By      Initials Name Provider Type     Gudelia Link PTA Physical Therapist Assistant                    None

## 2024-12-29 NOTE — PLAN OF CARE
Assessment: Kali Garcia presents with functional mobility impairments which indicate the need for skilled intervention. Tolerating session today without incident. Pt very cooperative but anxious. Very restless in bed and wanted to get to chair. Plans on rehab at [place].Will continue to follow and progress as tolerated.

## 2024-12-29 NOTE — CASE MANAGEMENT/SOCIAL WORK
Continued Stay Note  [facility]     Patient Name: Kali Garcia  MRN: [id number]  Today's Date: 12/29/2024    Admit Date: 12/22/2024    Plan: [facility]. Bed ready tomorrow 12/30/24. Precert approved 12/28/24-1/3/25. PASRR approved. [facility] following. Current home 02 at 6L through [employer].   Discharge Plan       Row Name 12/29/24 0947       Plan    Plan [facility] accepted. Bed ready tomorrow 12/30/24. Precert approved 12/28/24-1/3/25. PASRR approved. [facility] following. Current home 02 at 6L through [employer].    Plan Comments Received update from [facility] that merlyn approved 12/28/24-1/3/25. Per [facility] liaison; bed available tomorrow 12/30. MD notified via secure chat.     Corrine Damon RN BSN  Weekend   [facility]  Phone: [phone]  Fax: [phone]

## 2024-12-29 NOTE — PLAN OF CARE
Goal Outcome Evaluation:  Plan of Care Reviewed With: patient        Progress: no change  Outcome Evaluation: Pt is an 80 y/o M admitted to [facility] 12/22/24 with dyspnea, hospital dx PNA. At baseline pt resides with son, PHILIPP and grandchild, SSH with 0 LILIA. Pt typically (I) with ADLs and does not drive. Pt wears home O2, however unable to verify amount. Pt cleared for OT by nursing, oriented x2 disoriented to time and situation. Pt on 12L HF and sitting up in chair upon arrival. Pt requires min A to sit up in chair unsupported with lean to L side and requires min A to maintain unsupported seated balance. Pt requires min A to come to standing, requiring verbal cues for hand placement. Pt noted to have posterior lean in standing, bearing weight through heels of feel, demo difficulty weight shifting to gain balance himself. Pt tremulous in standing and demo difficulty static standing and global weakness. Pt will require significant assist with ADLroutine and mobility, recommending SNF when medically appropriate for dc. Pt O2 noted to desaturate with seated mobility and with standing, however questionable pleth, unable to determine accuracy. OT will follow and progress as appropriate.    Anticipated Discharge Disposition (OT): skilled nursing facility

## 2024-12-30 LAB
ANION GAP SERPL CALCULATED.3IONS-SCNC: 4.4 MMOL/L (ref 5–15)
BASOPHILS # BLD AUTO: 0.01 10*3/MM3 (ref 0–0.2)
BASOPHILS NFR BLD AUTO: 0.2 % (ref 0–1.5)
BUN SERPL-MCNC: 28 MG/DL (ref 8–23)
BUN/CREAT SERPL: 22.4 (ref 7–25)
CALCIUM SPEC-SCNC: 8.7 MG/DL (ref 8.6–10.5)
CHLORIDE SERPL-SCNC: 102 MMOL/L (ref 98–107)
CO2 SERPL-SCNC: 32.6 MMOL/L (ref 22–29)
CREAT SERPL-MCNC: 1.25 MG/DL (ref 0.76–1.27)
DEPRECATED RDW RBC AUTO: 52.6 FL (ref 37–54)
EGFRCR SERPLBLD CKD-EPI 2021: 58.2 ML/MIN/1.73
EOSINOPHIL # BLD AUTO: 0 10*3/MM3 (ref 0–0.4)
EOSINOPHIL NFR BLD AUTO: 0 % (ref 0.3–6.2)
ERYTHROCYTE [DISTWIDTH] IN BLOOD BY AUTOMATED COUNT: 15.2 % (ref 12.3–15.4)
GLUCOSE SERPL-MCNC: 131 MG/DL (ref 65–99)
HCT VFR BLD AUTO: 27.7 % (ref 37.5–51)
HGB BLD-MCNC: 8.9 G/DL (ref 13–17.7)
IMM GRANULOCYTES # BLD AUTO: 0.13 10*3/MM3 (ref 0–0.05)
IMM GRANULOCYTES NFR BLD AUTO: 3.1 % (ref 0–0.5)
LYMPHOCYTES # BLD AUTO: 0.22 10*3/MM3 (ref 0.7–3.1)
LYMPHOCYTES NFR BLD AUTO: 5.3 % (ref 19.6–45.3)
MCH RBC QN AUTO: 30.2 PG (ref 26.6–33)
MCHC RBC AUTO-ENTMCNC: 32.1 G/DL (ref 31.5–35.7)
MCV RBC AUTO: 93.9 FL (ref 79–97)
MONOCYTES # BLD AUTO: 0.23 10*3/MM3 (ref 0.1–0.9)
MONOCYTES NFR BLD AUTO: 5.5 % (ref 5–12)
NEUTROPHILS NFR BLD AUTO: 3.57 10*3/MM3 (ref 1.7–7)
NEUTROPHILS NFR BLD AUTO: 85.9 % (ref 42.7–76)
NRBC BLD AUTO-RTO: 0 /100 WBC (ref 0–0.2)
PLATELET # BLD AUTO: 152 10*3/MM3 (ref 140–450)
PMV BLD AUTO: 10.3 FL (ref 6–12)
POTASSIUM SERPL-SCNC: 4.9 MMOL/L (ref 3.5–5.2)
QT INTERVAL: 377 MS
QTC INTERVAL: 523 MS
RBC # BLD AUTO: 2.95 10*6/MM3 (ref 4.14–5.8)
SODIUM SERPL-SCNC: 139 MMOL/L (ref 136–145)
WBC NRBC COR # BLD AUTO: 4.16 10*3/MM3 (ref 3.4–10.8)

## 2024-12-30 PROCEDURE — 25010000002 HEPARIN (PORCINE) PER 1000 UNITS

## 2024-12-30 PROCEDURE — 94799 UNLISTED PULMONARY SVC/PX: CPT

## 2024-12-30 PROCEDURE — 85025 COMPLETE CBC W/AUTO DIFF WBC: CPT | Performed by: NURSE PRACTITIONER

## 2024-12-30 PROCEDURE — 92526 ORAL FUNCTION THERAPY: CPT

## 2024-12-30 PROCEDURE — 63710000001 PREDNISONE PER 5 MG: Performed by: INTERNAL MEDICINE

## 2024-12-30 PROCEDURE — 63710000001 PREDNISONE PER 1 MG: Performed by: INTERNAL MEDICINE

## 2024-12-30 PROCEDURE — 94761 N-INVAS EAR/PLS OXIMETRY MLT: CPT

## 2024-12-30 PROCEDURE — 93005 ELECTROCARDIOGRAM TRACING: CPT

## 2024-12-30 PROCEDURE — 80048 BASIC METABOLIC PNL TOTAL CA: CPT | Performed by: NURSE PRACTITIONER

## 2024-12-30 PROCEDURE — 94660 CPAP INITIATION&MGMT: CPT

## 2024-12-30 PROCEDURE — 25010000002 DIGOXIN PER 500 MCG: Performed by: INTERNAL MEDICINE

## 2024-12-30 RX ORDER — AMIODARONE HYDROCHLORIDE 200 MG/1
200 TABLET ORAL EVERY 12 HOURS SCHEDULED
Status: DISCONTINUED | OUTPATIENT
Start: 2024-12-30 | End: 2025-01-01

## 2024-12-30 RX ORDER — DIGOXIN 0.25 MG/ML
500 INJECTION INTRAMUSCULAR; INTRAVENOUS ONCE
Status: COMPLETED | OUTPATIENT
Start: 2024-12-30 | End: 2024-12-30

## 2024-12-30 RX ORDER — FAMOTIDINE 20 MG/1
20 TABLET, FILM COATED ORAL ONCE
Status: COMPLETED | OUTPATIENT
Start: 2024-12-31 | End: 2024-12-31

## 2024-12-30 RX ADMIN — BUDESONIDE INHALATION 0.5 MG: 0.5 SUSPENSION RESPIRATORY (INHALATION) at 20:20

## 2024-12-30 RX ADMIN — Medication 10 ML: at 22:22

## 2024-12-30 RX ADMIN — TAMSULOSIN HYDROCHLORIDE 0.4 MG: 0.4 CAPSULE ORAL at 09:46

## 2024-12-30 RX ADMIN — HEPARIN SODIUM 5000 UNITS: 5000 INJECTION INTRAVENOUS; SUBCUTANEOUS at 21:46

## 2024-12-30 RX ADMIN — IPRATROPIUM BROMIDE AND ALBUTEROL SULFATE 3 ML: .5; 3 SOLUTION RESPIRATORY (INHALATION) at 12:49

## 2024-12-30 RX ADMIN — ACETAMINOPHEN 650 MG: 325 TABLET, FILM COATED ORAL at 21:46

## 2024-12-30 RX ADMIN — HEPARIN SODIUM 5000 UNITS: 5000 INJECTION INTRAVENOUS; SUBCUTANEOUS at 06:20

## 2024-12-30 RX ADMIN — IPRATROPIUM BROMIDE AND ALBUTEROL SULFATE 3 ML: .5; 3 SOLUTION RESPIRATORY (INHALATION) at 03:24

## 2024-12-30 RX ADMIN — CLOPIDOGREL BISULFATE 75 MG: 75 TABLET ORAL at 09:46

## 2024-12-30 RX ADMIN — THEOPHYLLINE 300 MG: 300 TABLET, EXTENDED RELEASE ORAL at 09:46

## 2024-12-30 RX ADMIN — BUDESONIDE INHALATION 0.5 MG: 0.5 SUSPENSION RESPIRATORY (INHALATION) at 08:27

## 2024-12-30 RX ADMIN — HEPARIN SODIUM 5000 UNITS: 5000 INJECTION INTRAVENOUS; SUBCUTANEOUS at 13:56

## 2024-12-30 RX ADMIN — ARFORMOTEROL TARTRATE 15 MCG: 15 SOLUTION RESPIRATORY (INHALATION) at 20:21

## 2024-12-30 RX ADMIN — PREDNISONE 30 MG: 10 TABLET ORAL at 09:46

## 2024-12-30 RX ADMIN — Medication 5 MG: at 22:22

## 2024-12-30 RX ADMIN — DIGOXIN 500 MCG: 0.25 INJECTION INTRAMUSCULAR; INTRAVENOUS at 13:56

## 2024-12-30 RX ADMIN — ALUMINUM HYDROXIDE, MAGNESIUM HYDROXIDE, AND DIMETHICONE 15 ML: 400; 400; 40 SUSPENSION ORAL at 23:15

## 2024-12-30 RX ADMIN — GUAIFENESIN 1200 MG: 600 TABLET, EXTENDED RELEASE ORAL at 09:46

## 2024-12-30 RX ADMIN — Medication 12.5 MG: at 09:46

## 2024-12-30 RX ADMIN — AMIODARONE HYDROCHLORIDE 200 MG: 200 TABLET ORAL at 09:46

## 2024-12-30 RX ADMIN — PANTOPRAZOLE SODIUM 40 MG: 40 TABLET, DELAYED RELEASE ORAL at 06:20

## 2024-12-30 RX ADMIN — Medication 10 ML: at 09:47

## 2024-12-30 RX ADMIN — GUAIFENESIN 1200 MG: 600 TABLET, EXTENDED RELEASE ORAL at 21:46

## 2024-12-30 RX ADMIN — ARFORMOTEROL TARTRATE 15 MCG: 15 SOLUTION RESPIRATORY (INHALATION) at 08:21

## 2024-12-30 RX ADMIN — IPRATROPIUM BROMIDE AND ALBUTEROL SULFATE 3 ML: .5; 3 SOLUTION RESPIRATORY (INHALATION) at 15:37

## 2024-12-30 RX ADMIN — Medication 12.5 MG: at 21:46

## 2024-12-30 RX ADMIN — ATORVASTATIN CALCIUM 10 MG: 10 TABLET ORAL at 09:46

## 2024-12-30 RX ADMIN — THERA TABS 1 TABLET: TAB at 09:46

## 2024-12-30 RX ADMIN — IPRATROPIUM BROMIDE AND ALBUTEROL SULFATE 3 ML: .5; 3 SOLUTION RESPIRATORY (INHALATION) at 23:45

## 2024-12-30 RX ADMIN — POLYETHYLENE GLYCOL 3350 17 G: 17 POWDER, FOR SOLUTION ORAL at 09:47

## 2024-12-30 NOTE — CONSULTS
HP      Name: Kali Garcia ADMIT: 2024   : 1945  PCP: Tami Rasmussen APRN    MRN: [id number] LOS: 7 days   AGE/SEX: 79 y.o. male  ROOM:      Chief Complaint   Patient presents with    Shortness of Breath       Subjective        History of present illness  Kali Garcia is an 79-year-old male patient who has advanced COPD/emphysema, is admitted to the hospital due to COPD exacerbation and respiratory failure.  Upon admission he was in sinus rhythm, however he developed atrial flutter with rapid ventricular rates on 2024.  Of note that patient was also admitted in 2024 for similar issues and during that hospitalization he also developed atrial fibrillation but self converted to sinus rhythm and then discharged home on amiodarone currently on 200 mg once a day.    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 and endoscopic clipping x 1;  Surgeon: Luz Jacques MD;  Location: [facility] ENDOSCOPY;  Service: Gastroenterology;  Laterality: N/A;  post op: cecal AVM    CORONARY STENT PLACEMENT      ENDOSCOPY N/A 2022    Procedure: ESOPHAGOGASTRODUODENOSCOPY WITH BIOPSY X1 AREA;  Surgeon: Luz Jacques MD;  Location: [facility] ENDOSCOPY;  Service: Gastroenterology;  Laterality: N/A;  esophagitis, gastritis, HH    ENDOSCOPY N/A 2022    Procedure: ESOPHAGOGASTRODUODENOSCOPY;  Surgeon: Luz Jacques MD;  Location: [facility] ENDOSCOPY;  Service: Gastroenterology;  Laterality: N/A;  post op: hiatal hernia, eosphagitis    EYE SURGERY      FEMORAL ARTERY STENT      KNEE SURGERY Left     KNEE SURGERY Left     LUNG SURGERY       History  reviewed. No pertinent family history.  Social History     Tobacco Use    Smoking status: Former     Current packs/day: 0.00     Average packs/day: 1 pack/day for 63.0 years (63.0 ttl pk-yrs)     Types: Cigarettes     Start date: 1960     Quit date: 2023     Years since quittin.0   Vaping Use    Vaping status: Never Used   Substance Use Topics    Alcohol use: Not Currently    Drug use: Never     Medications Prior to Admission   Medication Sig Dispense Refill Last Dose/Taking    amiodarone (PACERONE) 200 MG tablet Take 1 tablet by mouth Daily.   Taking    amLODIPine (NORVASC) 10 MG tablet Take 1 tablet by mouth Daily.   Taking    clopidogrel (PLAVIX) 75 MG tablet Take 1 tablet by mouth Daily. Indications: Percutaneous Coronary Intervention   2024    Fluticasone-Umeclidin-Vilant (Trelegy Ellipta) 200-62.5-25 MCG/ACT inhaler Inhale 1 puff Daily.   Taking    ipratropium-albuterol (DUO-NEB) 0.5-2.5 mg/3 ml nebulizer Take 3 mL by nebulization Every 4 (Four) Hours As Needed for Wheezing. Indications: Chronic Obstructive Lung Disease   2024    polyethylene glycol (MIRALAX) 17 g packet Take 17 g by mouth Daily. Indications: Constipation   Past Week    pravastatin (PRAVACHOL) 40 MG tablet Take 1 tablet by mouth Daily. Indications: Disease involving Lipid Deposits in the Arteries   2024    traZODone (DESYREL) 100 MG tablet Take 1 tablet by mouth Daily As Needed for Sleep.        Allergies:  Codeine    Review of systems    Constitutional: Negative.    Respiratory and cardiovascular: As detailed in HPI section.  Gastrointestinal: Negative for constipation, nausea and vomiting negative for abdominal distention, abdominal pain and diarrhea.   Genitourinary: Negative for difficulty urinating and flank pain.   Musculoskeletal: Negative for arthralgias, joint swelling and myalgias.   Skin: Negative for color change, rash and wound.   Neurological: Negative for dizziness, syncope, weakness and  headaches.   Hematological: Negative for adenopathy.   Psychiatric/Behavioral: Negative for confusion.   All other systems reviewed and are negative.       Physical Exam  VITALS REVIEWED    General:      well developed, in no acute distress.    Head:      normocephalic and atraumatic.    Eyes:      PERRL/EOM intact, conjunctiva and sclera clear with out nystagmus.    Neck:      no masses, thyromegaly,  trachea central with normal respiratory effort and PMI displaced laterally  Lungs:      Clear to auscultation bilaterally  Heart:       Irregular rhythm, tachycardic  Msk:      no deformity or scoliosis noted of thoracic or lumbar spine.    Pulses:      pulses normal in all 4 extremities.    Extremities:       No lower extremity edema  Neurologic:      no focal deficits.   alert oriented x3  Skin:      intact without lesions or rashes.    Psych:      alert and cooperative; normal mood and affect; normal attention span and concentration.      Result Review :               Pertinent cardiac workup    Telemetry 12/26/2024 sinus rhythm  EKG 12/30/2024 atrial flutter  EKG 2/5/2024 atrial fibrillation  Echo 2/4/2024 ejection fraction 60 to 65%      Assessment and Plan         Pneumonia    Severe malnutrition    Rhinovirus    Anemia    Acute on chronic respiratory failure with hypoxia    Coronary artery disease    Hypertension    COPD (chronic obstructive pulmonary disease)      Kali Garcia is an 79-year-old male patient who has severe emphysema, oxygen dependent, is admitted to the hospital due to COPD exacerbation, rhinovirus infection.  Patient also has history of paroxysmal atrial fibrillation, he was in A-fib in February 2024 when he was admitted for COPD exacerbation.  He had self converted to sinus rhythm and he was discharged home on amiodarone.  He did not seem to be on anticoagulation at home, possibly due to anemia (7.8 on admission).  Patient was in sinus rhythm on admission, however he developed atrial  flutter with rapid ventricular rates.  His amiodarone was increased to 200 mg twice a day, also on metoprolol 12.5 twice a day.  In his case, mainly because of his advanced COPD I do not think he would be a good candidate for ablation.  It seems like he is now DNR and contemplating hospice.  I think it is best to figure out what his long-term wishes are prior to deciding what to do in regards to his arrhythmia.  Will reassess tomorrow.    No follow-ups on file.  Patient was given instructions and counseling regarding his condition or for health maintenance advice. Please see specific information pulled into the AVS if appropriate.        Electronically signed by Eliot Mcgarry MD, 12/30/24, 4:48 PM EST.

## 2024-12-30 NOTE — PLAN OF CARE
Pt seen on this date for meal assessment to determine safety and tolerance of current diet Regular/Thin. Pt required feeding assistance on this date. PO observed: mashed potatoes, meatloaf, green beans, and pop. Pt with good labial seal, good oral prep and transit. Mild residues that clear with liquid wash. Pt with cough x1, no other overt s/s of aspiration at bedside. Pt denies pain or globus sensation. Oxygen saturation remained in the 90's. It is recommended pt remain on regular/thin diet with meds as tolerated. Pt may require feeding assistance - pt reported he sometimes needs it and sometimes doesn't, he needed assistance on this date. ST with little more to offer, signing off on dysphagia.

## 2024-12-30 NOTE — THERAPY TREATMENT NOTE
Acute Care - Speech Language Pathology   Swallow Treatment Note [facility]     Patient Name: Kali Garcia  : 1945  MRN: [id number]  Today's Date: 2024               Admit Date: 2024    Visit Dx:     ICD-10-CM ICD-9-CM   1. Pneumonia of left lower lobe due to infectious organism  J18.9 486   2. Rhinovirus  B34.8 079.3   3. Acute on chronic respiratory failure with hypoxia  J96.21 518.84     799.02   4. Dehydration  E86.0 276.51   5. Anemia, unspecified type  D64.9 285.9     Patient Active Problem List   Diagnosis    Severe malnutrition    Abnormal CT scan, esophagus    Pneumonia due to COVID-19 virus    Pneumonia    Rhinovirus    Anemia    Acute on chronic respiratory failure with hypoxia    Coronary artery disease    Hypertension    COPD (chronic obstructive pulmonary disease)     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 and endoscopic clipping x 1;  Surgeon: Luz Jacques MD;  Location: [facility] ENDOSCOPY;  Service: Gastroenterology;  Laterality: N/A;  post op: cecal AVM    CORONARY STENT PLACEMENT      ENDOSCOPY N/A 2022    Procedure: ESOPHAGOGASTRODUODENOSCOPY WITH BIOPSY X1 AREA;  Surgeon: Luz Jacques MD;  Location: [facility] ENDOSCOPY;  Service: Gastroenterology;  Laterality: N/A;  esophagitis, gastritis, HH    ENDOSCOPY N/A 2022    Procedure: ESOPHAGOGASTRODUODENOSCOPY;  Surgeon: Luz Jacques MD;  Location: [facility] ENDOSCOPY;  Service: Gastroenterology;  Laterality: N/A;  post op: hiatal hernia, eosphagitis    EYE SURGERY      FEMORAL ARTERY STENT      KNEE SURGERY Left     KNEE SURGERY Left     LUNG SURGERY         SLP Recommendation  and Plan                                                                                      SWALLOW EVALUATION (Last 72 Hours)       SLP Adult Swallow Evaluation       Row Name 12/29/24 0541                   Rehab Evaluation    Document Type evaluation  -CP        Subjective Information no complaints  -CP        Patient Observations alert;cooperative  -CP        Patient/Family/Caregiver Comments/Observations No family present  -CP        Patient Effort good  -CP           General Information    Patient Profile Reviewed yes  -CP        Pertinent History Of Current Problem Kali Garcia is a 79 y.o. male with PMH of COPD/chronic respiratory failure on 6L O2, atrial fibrillation, CAD, PVD, hypertension, hyperlipidemia, OA presented to [facility] ED 12/22/2024 with complaints of shortness of breath, cough, congestion for two weeks. He has had some intermittent right sided chest soreness. He has felt weak. Swallow eval ordered due to pneumonia. pt has been on high flow O2 until today and unable to be evaluated. pt now on 12L.  -CP        Current Method of Nutrition regular textures;thin liquids  -CP        Prior Level of Function-Swallowing no diet consistency restrictions;regular textures;thin liquids;concerns regarding malnutrition  -CP        Plans/Goals Discussed with patient  -CP        Barriers to Rehab none identified  -CP           Oral Motor Structure and Function    Dentition Assessment edentulous, does not have dentures  -CP        Secretion Management WNL/WFL  -CP        Mucosal Quality moist, healthy  -CP           Oral Musculature and Cranial Nerve Assessment    Oral Motor General Assessment WFL  -CP           General Eating/Swallowing Observations    Respiratory Support Currently in Use high-flow nasal cannula  -CP        O2 Liters other (see comments)  12L  -CP        Eating/Swallowing Skills self-fed  -CP        Positioning During Eating upright 90 degree;upright in chair  -CP        Utensils Used straw   -CP        Consistencies Trialed thin liquids;pureed;soft to chew textures;regular textures  -CP           Clinical Swallow Eval    Clinical Swallow Evaluation Summary Pt seen for clinical swallow eval. Pt has had no difficulty at meals per nurse and takes multiple pills at once from a cup with water with no s/s of aspiration. Pt was upright in chair and fed himself. Pt was seen during lunch meal consuming roast beef, green beans, mashed potatoes, cheesecake and drinking milk by straw. Pt is edentulous but had functional mastication of the solids. Oral transit also functional. No pocketing or oral residual occurred. Visualization of swallow suggests timely initiation. After the swallow pt had clear vocal quality and no cough or other overt s/s of aspiration on any consistency assessed. Pt appears to tolerate regular and soft solids and liquids with no difficulty. It is rec that pt continue regular diet with thin liquids as tolerated. Pt should be upright at 90 degrees for all PO and for pills. He should eat at a slow rate andtake small bites and sips. Education given to pt  on safe swallow strategies. He verbalized understanding. ST will follow to assure tolerance of diet and make further recs as indicated.  -CP           SLP Evaluation Clinical Impression    SLP Swallowing Diagnosis functional oral phase;R/O pharyngeal dysphagia  -CP        Functional Impact risk of aspiration/pneumonia  -CP        Rehab Potential/Prognosis, Swallowing good, to achieve stated therapy goals  -CP        Swallow Criteria for Skilled Therapeutic Interventions Met demonstrates skilled criteria  -CP           SLP Treatment Clinical Impressions    Care Plan Review evaluation/treatment results reviewed  -CP           Recommendations    Therapy Frequency (Swallow) PRN  -CP        Predicted Duration Therapy Intervention (Days) until discharge  -CP        SLP Diet Recommendation regular textures;thin liquids  -CP        Recommended  Diagnostics reassess via clinical swallow evaluation  -CP        Recommended Precautions and Strategies upright posture during/after eating;small bites of food and sips of liquid;alternate between small bites of food and sips of liquid;general aspiration precautions;reflux precautions  -CP        Oral Care Recommendations Oral Care BID/PRN  -CP        SLP Rec. for Method of Medication Administration meds whole;meds crushed;as tolerated  -CP        Monitor for Signs of Aspiration yes;notify SLP if any concerns;cough;gurgly voice;throat clearing  -CP           Swallow Goals (SLP)    Swallow LTGs Swallow Long Term Goal (free text)  -CP        Swallow STGs diet tolerance goal selection (SLP)  -CP        Diet Tolerance Goal Selection (SLP) Swallow Short Term Goal 1;Swallow Short Term Goal 2  -CP           (LTG) Swallow    (LTG) Swallow Pt will maximize swallow function for least restrictive PO diet, exhibiting no complication associated with dysphagia, adequate PO intake, and demonstrating independent use of swallow compensations  -CP        Time Frame (Swallow Long Term Goal) by discharge  -CP        Progress/Outcomes (Swallow Long Term Goal) new goal  -CP           (STG) Swallow 1    (STG) Swallow 1 The patient will participate in a meal/follow-up assessment to determine safety and adequacy of recommended diet, independent use of safe swallow compensations, pt/family education and additional goals/recommendations to follow.  -CP        Time Frame (Swallow Short Term Goal 1) 1 week  -CP        Progress/Outcomes (Swallow Short Term Goal 1) new goal  -CP           (STG) Swallow 2    (STG) Swallow 2 Pt will participate in ongoing swallow assessment to include VFSS if indicated, and caregiver teaching.  -CP        Time Frame (Swallow Short Term Goal 2) 1 week  -CP        Progress/Outcomes (Swallow Short Term Goal 2) new goal  -CP                  User Key  (r) = Recorded By, (t) = Taken By, (c) = Cosigned By      Initials  Name Effective Dates    CP Amparo Vera, SLP 06/16/21 -                     EDUCATION  The patient has been educated in the following areas:   Dysphagia (Swallowing Impairment).        SLP GOALS       Row Name 12/30/24 1400       (LTG) Swallow    (LTG) Swallow Pt will maximize swallow function for least restrictive PO diet, exhibiting no complication associated with dysphagia, adequate PO intake, and demonstrating independent use of swallow compensations  -AA    Time Frame (Swallow Long Term Goal) by discharge  -AA    Progress/Outcomes (Swallow Long Term Goal) goal ongoing  -AA       (STG) Swallow 1    (STG) Swallow 1 The patient will participate in a meal/follow-up assessment to determine safety and adequacy of recommended diet, independent use of safe swallow compensations, pt/family education and additional goals/recommendations to follow.  -AA    Time Frame (Swallow Short Term Goal 1) 1 week  -AA    Progress/Outcomes (Swallow Short Term Goal 1) goal ongoing  -AA    Comment (Swallow Short Term Goal 1) Pt seen on this date for meal assessment to determine safety and tolerance of current diet Regular/Thin. Pt required feeding assistance on this date. PO observed: mashed potatoes, meatloaf, green beans, and pop. Pt with good labial seal, good oral prep and transit. Mild residues that clear with liquid wash. Pt with cough x1, no other overt s/s of aspiration at bedside. Pt denies pain or globus sensation. Oxygen saturation remained in the 90's. It is recommended pt remain on regular/thin diet with meds as tolerated. Pt may require feeding assistance - pt reported he sometimes needs it and sometimes doesn't, he needed assistance on this date. ST with little more to offer, signing off on dysphagia.   -AA       (STG) Swallow 2    (STG) Swallow 2 --    Time Frame (Swallow Short Term Goal 2) --    Progress/Outcomes (Swallow Short Term Goal 2) --              User Key  (r) = Recorded By, (t) = Taken By, (c) = Cosigned By       Initials Name Provider Type    AA Bessy Etienne, SLP Speech and Language Pathologist               ADRIANNE Bill  12/30/2024

## 2024-12-30 NOTE — CASE MANAGEMENT/SOCIAL WORK
Continued Stay Note   [facility]     Patient Name: Kali Garcia  MRN: [id number]  Today's Date: 12/30/2024    Admit Date: 12/22/2024    Plan: [facility] (can accept up to 15L O2). Precert approved (valid 12/28/24-1/3/25). PASRR approved. [facility] following. Current home 02 at 6L through [employer].   Discharge Plan       Row Name 12/30/24 1252       Plan    Plan [facility] accepted (can accept up to 15L O2). Precert approved (valid 12/28/24-1/3/25). PASRR approved. [facility] following. Current home 02 at 6L through [employer].    Plan Comments DC Barriers: Cardiology consult pending, patient noted to be on 5L O2 however per discussion with MD/RN in unit rounds, patient requiring 12L O2. Confirmed with  lialisha Quintero that per RT at facility, they can accommodate wtih a high flow portable O2 and go up to 15L if needed.                  Viridiana Clifford RN     Office Phone: [phone]  Office Cell: [phone]

## 2024-12-30 NOTE — PROGRESS NOTES
[facility] MEDICINE SERVICE  DAILY PROGRESS NOTE    NAME: Kali Garcia  : 1945  MRN: [id number]      LOS: 7 days     PROVIDER OF SERVICE: Sho Capps MD    Chief Complaint: Pneumonia    Subjective:     Interval History:  History taken from: patient    Patient seen and examined at bedside this morning.  Continues to be in A-fib with RVR however feels better today.        Review of Systems: Negative except described above  Review of Systems    Objective:     Vital Signs  Temp:  [97.4 °F (36.3 °C)-99.2 °F (37.3 °C)] 98 °F (36.7 °C)  Heart Rate:  [] 112  Resp:  [14-27] 14  BP: (106-134)/(54-71) 122/65  Flow (L/min) (Oxygen Therapy):  [5-12] 5   Body mass index is 16.23 kg/m².    Physical Exam  Physical Exam  Constitutional:       Comments: NAD    Cardiovascular:      Rate and Rhythm: Tachycardia present. Rhythm irregular.      Comments:  S1 & S2   Pulmonary:      Comments:  Lungs CTA   Abdominal:      Comments:  ABD soft, NT            Diagnostic Data    Results from last 7 days   Lab Units 24  0050   WBC 10*3/mm3 4.16   HEMOGLOBIN g/dL 8.9*   HEMATOCRIT % 27.7*   PLATELETS 10*3/mm3 152   GLUCOSE mg/dL 131*   CREATININE mg/dL 1.25   BUN mg/dL 28*   SODIUM mmol/L 139   POTASSIUM mmol/L 4.9   ANION GAP mmol/L 4.4*       No radiology results for the last day      I reviewed the patient's new clinical results.    Assessment/Plan:     Active and Resolved Problems  Active Hospital Problems    Diagnosis  POA    **Pneumonia [J18.9]  Yes    Rhinovirus [B34.8]  Yes    Anemia [D64.9]  Yes    Acute on chronic respiratory failure with hypoxia [J96.21]  Yes    Coronary artery disease [I25.10]  Yes    Hypertension [I10]  Yes    COPD (chronic obstructive pulmonary disease) [J44.9]  Yes    Severe malnutrition [E43]  Yes      Resolved Hospital Problems   No resolved problems to display.       Acute on chronic hypoxemic hypercapnia respiratory  failure   Lower lobe pneumonia   COPD with acute  exacerbation   Rhinovirus infection   -Continue droplet precautions for rhinovirus   -Duonebs prn   -Continue mucomyst and completed Unasyn  -Pulmonology following     CAD   Atrial fibrillation   PAD   -Continue home plavix and aspirin  Currently on amiodarone, added metoprolol low-dose as blood pressure a little bit soft  -Continue home albuterol  -Telemonitoring  TSH WNL  Cardiology consulted     Urinary retention   Hematuria   -No blood noted in catheter   -Continue springer catheter  -Patient wishes to discharge with springer and hospice following, no voiding trial required      Hypernatremia, resolved          VTE Prophylaxis:  Pharmacologic & mechanical VTE prophylaxis orders are present.             Disposition Planning:        Anticipated Date of Discharge: 1/2/2025         Time: 35 minutes     Code Status and Medical Interventions: No CPR (Do Not Attempt to Resuscitate); Limited Support; No intubation (DNI), No cardioversion   Ordered at: 12/24/24 1547     Medical Intervention Limits:    No intubation (DNI)    No cardioversion     Level Of Support Discussed With:    Next of Kin (If No Surrogate)     Code Status (Patient has no pulse and is not breathing):    No CPR (Do Not Attempt to Resuscitate)     Medical Interventions (Patient has pulse or is breathing):    Limited Support       Signature: Electronically signed by Sho Capps MD, 12/30/24, 11:22 EST.  [facility] Hospitalist Team

## 2024-12-30 NOTE — CONSULTS
Nutrition Services    Patient Name: Kali Garcia  YOB: 1945  MRN: [id number]  Admission date: 12/22/2024    Comment:    -- Severe chronic disease related malnutrition related to chronic medical conditions including COPD as evidenced by severe fat/muscle loss per physical exam and PO intakes less than 75% for greater than 1 month.  See MSA below.    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 and endoscopic clipping x 1;  Surgeon: Luz Jacques MD;  Location: [facility] ENDOSCOPY;  Service: Gastroenterology;  Laterality: N/A;  post op: cecal AVM    CORONARY STENT PLACEMENT      ENDOSCOPY N/A 4/14/2022    Procedure: ESOPHAGOGASTRODUODENOSCOPY WITH BIOPSY X1 AREA;  Surgeon: Luz Jacques MD;  Location: [facility] ENDOSCOPY;  Service: Gastroenterology;  Laterality: N/A;  esophagitis, gastritis, HH    ENDOSCOPY N/A 8/4/2022    Procedure: ESOPHAGOGASTRODUODENOSCOPY;  Surgeon: Luz Jacques MD;  Location: [facility] ENDOSCOPY;  Service: Gastroenterology;  Laterality: N/A;  post op: hiatal hernia, eosphagitis    EYE SURGERY      FEMORAL ARTERY STENT      KNEE SURGERY Left     KNEE SURGERY Left     LUNG SURGERY          Current Problems   Acute on chronic respiratory failure with hypoxia  COPD exacerbation   Left lower lobe pneumonia  Rhinovirus  - pulmonology following    Anemia     CAD  Atrial  "fibrillation  Hypertension  Hyperlipidemia  PVD     Severe protein malnutrition  - See MSA 12/23/24       Encounter Information        Trending Narrative     12/30: Checking in to monitor po diet tolerance and intake. SLP evaluated pt yesterday and recommends that pt continue regular diet with thin liquids. Pt had episodes of sats dropping low yesterday and required increased supplemental O2 with 12L HF NC. Plan is currently for hospice after discharge. Pt is tolerating po diet and consuming supplement. PO intake and weight improved since last RD review.     12/23: Admitted for SOB, cough, congestion and diagnosed with PNA.  RD visited patient at bedside.  Patient reports poor appetite.  Willing to get Boost (prefers chocolate).  Reports OCA840-124# range.  NFPE completed, consistent with nutrition diagnosis of malnutrition using AND/ASPEN criteria. See MSA below.        Anthropometrics        Current Height, Weight Height: 180.3 cm (71\")  Weight: 52.8 kg (116 lb 6.5 oz) (12/30/24 0600)       Usual Body Weight (UBW) 130-140# range       Trending Weight Hx     This admission: 12/30: 116# (5% weight gain in the last week)  12/23: 110#             PTA: No recent significant weight loss to note     Wt Readings from Last 30 Encounters:   12/30/24 0600 52.8 kg (116 lb 6.5 oz)   12/29/24 0514 53 kg (116 lb 13.5 oz)   12/28/24 0538 51.4 kg (113 lb 5.1 oz)   12/27/24 0647 50.4 kg (111 lb 1.8 oz)   12/26/24 0610 49.9 kg (110 lb 0.2 oz)   12/25/24 0600 49.6 kg (109 lb 5.6 oz)   12/23/24 0531 50.1 kg (110 lb 7.2 oz)   12/22/24 2139 49.9 kg (110 lb 0.2 oz)   03/04/24 1452 53 kg (116 lb 12.1 oz)   02/19/24 1303 50.5 kg (111 lb 5 oz)   02/16/24 2101 50 kg (110 lb 2.3 oz)   02/16/24 2054 51.8 kg (114 lb 2.8 oz)   02/15/24 1416 52.9 kg (116 lb 10.3 oz)   02/04/24 0315 51.5 kg (113 lb 8.6 oz)   02/03/24 1415 51.3 kg (113 lb)   02/03/24 0345 51.5 kg (113 lb 8.6 oz)   02/02/24 1524 51.8 kg (114 lb 3.2 oz)   02/02/24 0337 54.4 kg (120 " "lb)   02/01/24 1330 52.1 kg (114 lb 14.5 oz)   08/04/22 1124 51.1 kg (112 lb 10.5 oz)   07/26/22 1511 59 kg (130 lb)   04/15/22 0436 58.3 kg (128 lb 8.5 oz)   04/13/22 0355 54.6 kg (120 lb 5.9 oz)   04/12/22 2037 128 kg (282 lb 3 oz)      BMI kg/m2 Body mass index is 16.23 kg/m².       Labs        Pertinent Labs Reviewed. Management per attending.    Results from last 7 days   Lab Units 12/30/24  0050 12/29/24  0353 12/28/24  0604   SODIUM mmol/L 139 137 140   POTASSIUM mmol/L 4.9 4.8 4.4   CHLORIDE mmol/L 102 101 103   CO2 mmol/L 32.6* 32.3* 31.9*   BUN mg/dL 28* 22 26*   CREATININE mg/dL 1.25 0.99 0.77   CALCIUM mg/dL 8.7 8.6 8.5*   GLUCOSE mg/dL 131* 103* 92     Results from last 7 days   Lab Units 12/30/24  0050   HEMOGLOBIN g/dL 8.9*   HEMATOCRIT % 27.7*     No results found for: \"HGBA1C\"     Medications    Scheduled Medications amiodarone, 200 mg, Oral, Q24H  arformoterol, 15 mcg, Nebulization, BID - RT  atorvastatin, 10 mg, Oral, Daily  budesonide, 0.5 mg, Nebulization, BID - RT  clopidogrel, 75 mg, Oral, Daily  guaiFENesin, 1,200 mg, Oral, Q12H  heparin (porcine), 5,000 Units, Subcutaneous, Q8H  ipratropium-albuterol, 3 mL, Nebulization, Q4H - RT  metoprolol tartrate, 12.5 mg, Oral, Q12H  multivitamin, 1 tablet, Oral, Daily  pantoprazole, 40 mg, Oral, Q AM  polyethylene glycol, 17 g, Oral, Daily  predniSONE, 30 mg, Oral, Daily With Breakfast  sodium chloride, 10 mL, Intravenous, Q12H  tamsulosin, 0.4 mg, Oral, Daily  theophylline, 300 mg, Oral, Daily        Infusions      PRN Medications   acetaminophen    aluminum-magnesium hydroxide-simethicone    senna-docusate sodium **AND** polyethylene glycol **AND** bisacodyl **AND** bisacodyl    Calcium Replacement - Follow Nurse / BPA Driven Protocol    hydrALAZINE    ipratropium-albuterol    Magnesium Standard Dose Replacement - Follow Nurse / BPA Driven Protocol    melatonin    ondansetron ODT **OR** ondansetron    Phosphorus Replacement - Follow Nurse / BPA " Driven Protocol    Potassium Replacement - Follow Nurse / BPA Driven Protocol    sodium chloride    sodium chloride    sodium chloride     Physical Findings        Trending Physical   Appearance, NFPE 12/30: NFPE completed, consistent with nutrition diagnosis of malnutrition using AND/ASPEN criteria. See MSA below.     12/23: NFPE completed, consistent with nutrition diagnosis of malnutrition using AND/ASPEN criteria. See MSA below.     --  Edema  No edema documented      Bowel Function Last documented BM 12/28      Tubes No feeding tube      Chewing/Swallowing No known issues      Skin Left hip area     --  Current Nutrition Orders & Evaluation of Intake       Oral Nutrition     Food Allergies NKFA   Current PO Diet Diet: Regular/House; Fluid Consistency: Thin (IDDSI 0)   Supplement Boost Original BID (Provides 480 kcals, 20 g protein if consumed)      PO Evaluation     Trending % PO Intake 12/30: 75% average po intake x last 8 documented meals.   12/23: None documented    --  Nutritional Risk Screening        NRS-2002 Score          Nutrition Diagnosis         Nutrition Dx Problem 1 Severe chronic disease related malnutrition related to chronic medical conditions including COPD as evidenced by severe fat/muscle loss per physical exam and PO intakes less than 75% for greater than 1 month.        Nutrition Dx Problem 2        Intervention Goal         Intervention Goal(s) Accept ONS  No weight loss  PO intakes at least 60%     Nutrition Intervention        RD Action Continue current po diet and ONS       Nutrition Prescription          Diet Prescription Regular    Supplement Prescription Boost Original BID   --   Monitor/Evaluation        Monitor Per protocol, I&O, PO intake, Supplement intake, Pertinent labs, Weight, Hemodynamic stability     Malnutrition Severity Assessment      Patient meets criteria for : Severe Malnutrition         Electronically signed by:  Patito Ochoa RD  12/30/24 12:31  EST

## 2024-12-30 NOTE — PLAN OF CARE
Goal Outcome Evaluation:           Problem: Adult Inpatient Plan of Care  Goal: Plan of Care Review  Outcome: Progressing  Goal: Patient-Specific Goal (Individualized)  Outcome: Progressing  Goal: Absence of Hospital-Acquired Illness or Injury  Outcome: Progressing  Intervention: Identify and Manage Fall Risk  Recent Flowsheet Documentation  Taken 12/30/2024 0600 by Linnette Sampson RN  Safety Promotion/Fall Prevention:   safety round/check completed   room organization consistent  Taken 12/30/2024 0200 by Linnette Sampson RN  Safety Promotion/Fall Prevention:   safety round/check completed   room organization consistent  Taken 12/30/2024 0000 by Linnette Sampson RN  Safety Promotion/Fall Prevention:   safety round/check completed   room organization consistent  Taken 12/29/2024 2215 by Linnette Sampson RN  Safety Promotion/Fall Prevention:   safety round/check completed   room organization consistent  Taken 12/29/2024 2032 by Linnette Sampson RN  Safety Promotion/Fall Prevention:   safety round/check completed   room organization consistent  Intervention: Prevent Skin Injury  Recent Flowsheet Documentation  Taken 12/29/2024 2032 by Linnette Sampson RN  Skin Protection:   transparent dressing maintained   incontinence pads utilized   protective footwear used  Intervention: Prevent and Manage VTE (Venous Thromboembolism) Risk  Recent Flowsheet Documentation  Taken 12/29/2024 2032 by Linnette Sampson RN  VTE Prevention/Management:   bilateral   SCDs (sequential compression devices) off   patient refused intervention  Intervention: Prevent Infection  Recent Flowsheet Documentation  Taken 12/30/2024 0200 by Linnette Sampson RN  Infection Prevention:   single patient room provided   rest/sleep promoted   personal protective equipment utilized   hand hygiene promoted  Taken 12/30/2024 0000 by Linnette Sampson RN  Infection Prevention:   single patient room provided   rest/sleep promoted   personal protective equipment utilized   hand hygiene  promoted  Taken 12/29/2024 2215 by Linnette Sampson RN  Infection Prevention:   single patient room provided   rest/sleep promoted   personal protective equipment utilized   hand hygiene promoted  Taken 12/29/2024 2032 by Linnette Sampson RN  Infection Prevention:   single patient room provided   rest/sleep promoted   personal protective equipment utilized   hand hygiene promoted  Goal: Optimal Comfort and Wellbeing  Outcome: Progressing  Intervention: Provide Person-Centered Care  Recent Flowsheet Documentation  Taken 12/30/2024 0000 by Linnette Sampson RN  Trust Relationship/Rapport:   care explained   choices provided   questions answered   questions encouraged  Taken 12/29/2024 2032 by Linnette Sampson RN  Trust Relationship/Rapport:   care explained   choices provided   questions answered   questions encouraged  Goal: Readiness for Transition of Care  Outcome: Progressing     Problem: Fall Injury Risk  Goal: Absence of Fall and Fall-Related Injury  Outcome: Progressing  Intervention: Identify and Manage Contributors  Recent Flowsheet Documentation  Taken 12/30/2024 0200 by Linnette Sampson RN  Medication Review/Management: medications reviewed  Taken 12/30/2024 0000 by Linnette Sampson RN  Medication Review/Management: medications reviewed  Taken 12/29/2024 2215 by Linnette Sampson RN  Medication Review/Management: medications reviewed  Taken 12/29/2024 2032 by Linentte Sampson RN  Medication Review/Management: medications reviewed  Intervention: Promote Injury-Free Environment  Recent Flowsheet Documentation  Taken 12/30/2024 0600 by Linnette Sampson RN  Safety Promotion/Fall Prevention:   safety round/check completed   room organization consistent  Taken 12/30/2024 0200 by Linnette Sampson RN  Safety Promotion/Fall Prevention:   safety round/check completed   room organization consistent  Taken 12/30/2024 0000 by Linnette Sampson RN  Safety Promotion/Fall Prevention:   safety round/check completed   room organization consistent  Taken  12/29/2024 2215 by Linnette Sampson RN  Safety Promotion/Fall Prevention:   safety round/check completed   room organization consistent  Taken 12/29/2024 2032 by Linnette Sampson RN  Safety Promotion/Fall Prevention:   safety round/check completed   room organization consistent     Problem: Skin Injury Risk Increased  Goal: Skin Health and Integrity  Outcome: Progressing  Intervention: Optimize Skin Protection  Recent Flowsheet Documentation  Taken 12/29/2024 2032 by iLnnette Sampson RN  Pressure Reduction Techniques:   frequent weight shift encouraged   sit time limited to 2 hours   weight shift assistance provided  Pressure Reduction Devices:   positioning supports utilized   pressure-redistributing mattress utilized  Skin Protection:   transparent dressing maintained   incontinence pads utilized   protective footwear used     Problem: Comorbidity Management  Goal: Blood Pressure in Desired Range  Outcome: Progressing  Intervention: Maintain Blood Pressure Management  Recent Flowsheet Documentation  Taken 12/30/2024 0200 by Linnette Sampson RN  Medication Review/Management: medications reviewed  Taken 12/30/2024 0000 by Linnette Sampson RN  Medication Review/Management: medications reviewed  Taken 12/29/2024 2215 by Linnette Sampson RN  Medication Review/Management: medications reviewed  Taken 12/29/2024 2032 by Linnette Sampson RN  Medication Review/Management: medications reviewed     Problem: Noninvasive Ventilation Acute  Goal: Effective Unassisted Ventilation and Oxygenation  Outcome: Progressing  Intervention: Monitor and Manage Noninvasive Ventilation  Recent Flowsheet Documentation  Taken 12/30/2024 0000 by Linnette Sampson RN  Airway/Ventilation Management: calming measures promoted  Taken 12/29/2024 2032 by Linnette Sampson RN  Airway/Ventilation Management:   calming measures promoted   oxygen therapy provided     Problem: Malnutrition  Goal: Improved Nutritional Intake  Outcome: Progressing     Problem: Sepsis/Septic  Shock  Goal: Optimal Coping  Outcome: Progressing  Intervention: Support Patient and Family Response  Recent Flowsheet Documentation  Taken 12/30/2024 0000 by Linnette Sampson RN  Supportive Measures: active listening utilized  Family/Support System Care: support provided  Taken 12/29/2024 2032 by Linnette Sampson RN  Supportive Measures: active listening utilized  Family/Support System Care:   support provided   self-care encouraged   presence promoted   involvement promoted  Goal: Absence of Bleeding  Outcome: Progressing  Goal: Blood Glucose Level Within Target Range  Outcome: Progressing  Goal: Absence of Infection Signs and Symptoms  Outcome: Progressing  Intervention: Initiate Sepsis Management  Recent Flowsheet Documentation  Taken 12/30/2024 0200 by Linnette Sampson RN  Infection Prevention:   single patient room provided   rest/sleep promoted   personal protective equipment utilized   hand hygiene promoted  Isolation Precautions:   droplet   precautions maintained  Taken 12/30/2024 0000 by Linnette Sampson RN  Infection Prevention:   single patient room provided   rest/sleep promoted   personal protective equipment utilized   hand hygiene promoted  Isolation Precautions:   droplet   precautions maintained  Taken 12/29/2024 2215 by Linnette Sampson RN  Infection Prevention:   single patient room provided   rest/sleep promoted   personal protective equipment utilized   hand hygiene promoted  Isolation Precautions:   droplet   precautions maintained  Taken 12/29/2024 2032 by Linnette Sampson RN  Infection Prevention:   single patient room provided   rest/sleep promoted   personal protective equipment utilized   hand hygiene promoted  Isolation Precautions:   precautions maintained   droplet  Intervention: Promote Stabilization  Recent Flowsheet Documentation  Taken 12/29/2024 2032 by Linnette Sampson RN  Fluid/Electrolyte Management: fluids provided  Intervention: Promote Recovery  Recent Flowsheet Documentation  Taken 12/30/2024  0000 by Linnette Sampson, RN  Airway/Ventilation Management: calming measures promoted  Sleep/Rest Enhancement: consistent schedule promoted  Taken 12/29/2024 2032 by Linnette Sampson, RN  Airway/Ventilation Management:   calming measures promoted   oxygen therapy provided  Sleep/Rest Enhancement: consistent schedule promoted  Goal: Optimal Nutrition Delivery  Outcome: Progressing

## 2024-12-30 NOTE — CONSULTS
Cardiology consult note  Berto Barroso MD, PhD      Patient Care Team:  Tami Rasmussen APRN as PCP - General (Nurse Practitioner)    CHIEF COMPLAINT: A-fib RVR        HISTORY OF PRESENT ILLNESS:  This is an 79-year-old gentleman who presented with shortness of breath fatigue with underlying severe COPD emphysema, acute respiratory failure requiring 12 L of oxygen, history of hypertension hyperlipidemia peripheral vascular disease along with coronary disease, rhinovirus infection COPD on home Trelegy, underlying atrial fibrillation, last echo 2024 with EF of 60% with mild pulmonary hypertension.  Presented with active COPD evaluation exacerbation wheezing placed on steroids, rhythm appears like atrial flutter with  rate of 120s, he is on amiodarone as well as gentle beta-blockers at baseline.  He has poor muscle mass cachectic with poor overall prognosis decided DNR per pulmonary physician awaiting placement at [facility].  He has completed antibiotics to date cardiology was consulted for management of atrial fibrillation flutter.  He is on nasal cannula speaking in complete sentences, volume appears stable he is without any chest pain    Review of systems otherwise negative x 14 point review of systems except as mentioned above  Historical data copied forward from previous encounters in EMR is unchanged       telemetry reviewed and interpreted by me demonstrates atrial flutter 120s        Past Medical History:   Diagnosis Date    COPD (chronic obstructive pulmonary disease)     Coronary artery disease     Emphysema lung     Hyperlipidemia     Hypertension     Osteoarthritis      Past Surgical History:   Procedure Laterality Date    CARDIAC CATHETERIZATION      COLONOSCOPY N/A 8/4/2022    Procedure: COLONOSCOPY with 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 and endoscopic clipping x 1;  Surgeon: Luz Jacques MD;  Location: [facility] ENDOSCOPY;  Service: Gastroenterology;  Laterality:  N/A;  post op: cecal AVM    CORONARY STENT PLACEMENT      ENDOSCOPY N/A 2022    Procedure: ESOPHAGOGASTRODUODENOSCOPY WITH BIOPSY X1 AREA;  Surgeon: Luz Jacques MD;  Location: [facility] ENDOSCOPY;  Service: Gastroenterology;  Laterality: N/A;  esophagitis, gastritis, HH    ENDOSCOPY N/A 2022    Procedure: ESOPHAGOGASTRODUODENOSCOPY;  Surgeon: Luz Jacques MD;  Location: [facility] ENDOSCOPY;  Service: Gastroenterology;  Laterality: N/A;  post op: hiatal hernia, eosphagitis    EYE SURGERY      FEMORAL ARTERY STENT      KNEE SURGERY Left     KNEE SURGERY Left     LUNG SURGERY       History reviewed. No pertinent family history.  Social History     Tobacco Use    Smoking status: Former     Current packs/day: 0.00     Average packs/day: 1 pack/day for 63.0 years (63.0 ttl pk-yrs)     Types: Cigarettes     Start date: 1960     Quit date: 2023     Years since quittin.0   Vaping Use    Vaping status: Never Used   Substance Use Topics    Alcohol use: Not Currently    Drug use: Never     Medications Prior to Admission   Medication Sig Dispense Refill Last Dose/Taking    amiodarone (PACERONE) 200 MG tablet Take 1 tablet by mouth Daily.   Taking    amLODIPine (NORVASC) 10 MG tablet Take 1 tablet by mouth Daily.   Taking    clopidogrel (PLAVIX) 75 MG tablet Take 1 tablet by mouth Daily. Indications: Percutaneous Coronary Intervention   2024    Fluticasone-Umeclidin-Vilant (Trelegy Ellipta) 200-62.5-25 MCG/ACT inhaler Inhale 1 puff Daily.   Taking    ipratropium-albuterol (DUO-NEB) 0.5-2.5 mg/3 ml nebulizer Take 3 mL by nebulization Every 4 (Four) Hours As Needed for Wheezing. Indications: Chronic Obstructive Lung Disease   2024    polyethylene glycol (MIRALAX) 17 g packet Take 17 g by mouth Daily. Indications: Constipation   Past Week    pravastatin (PRAVACHOL) 40 MG tablet Take 1 tablet by mouth Daily. Indications: Disease involving Lipid Deposits in the Arteries   2024    traZODone  "(DESYREL) 100 MG tablet Take 1 tablet by mouth Daily As Needed for Sleep.        Allergies:  Codeine    REVIEW OF SYSTEMS:  Please see the above history of present illness for pertinent positives and negatives.  The remainder of the patient's systems have been reviewed and are negative.     Vital Signs  Temp:  [97.6 °F (36.4 °C)-99.2 °F (37.3 °C)] 97.8 °F (36.6 °C)  Heart Rate:  [] 125  Resp:  [14-27] 19  BP: (106-134)/(65-71) 118/70    Flowsheet Rows      Flowsheet Row First Filed Value   Admission Height 180.3 cm (71\") Documented at 12/22/2024 2139   Admission Weight 49.9 kg (110 lb 0.2 oz) Documented at 12/22/2024 2139             Physical Exam:  Physical Exam   Constitutional: Patient appears tachycardic, thin cachectic deconditioned   HEENT:   Head: Normocephalic and atraumatic.   Eyes:  Pupils are equal, round, and reactive to light. EOM are intact. Sclerae are anicteric and noninjected.  Mouth and Throat: Patient has moist mucous membranes. Oropharynx is clear of any erythema or exudate.     Neck: Neck supple. No JVD present. No thyromegaly present. No lymphadenopathy present.  Cardiovascular: tachycardic and regular S1 normal and S2 normal.  Exam reveals no gallop and no friction rub.  1/6 WENDI  Pulmonary/Chest: Lungs are clear to auscultation bilaterally anteriorly with delayed expiratory phase no audible wheezing grossly no rhonchi. No respiratory distress.   Abdominal: Soft. Bowel sounds are normal. No distension and no mass. There is no hepatosplenomegaly. There is no tenderness.   Musculoskeletal: Normal muscle tone  Extremities: No edema. Pulses are palpable in all 4 extremities.  Neurological: Patient is alert and oriented to person, place, and time. Cranial nerves II-XII are grossly intact with no focal deficits.  Skin: Skin is warm. No rash noted. Nails show no clubbing.  No cyanosis or erythema.     Results Review:    I reviewed the patient's new clinical results.  Lab Results (most recent)  "      Procedure Component Value Units Date/Time    Basic Metabolic Panel [[id number]]  (Abnormal) Collected: 12/30/24 0050    Specimen: Blood from Arm, Left Updated: 12/30/24 0123     Glucose 131 mg/dL      BUN 28 mg/dL      Creatinine 1.25 mg/dL      Sodium 139 mmol/L      Potassium 4.9 mmol/L      Chloride 102 mmol/L      CO2 32.6 mmol/L      Calcium 8.7 mg/dL      BUN/Creatinine Ratio 22.4     Anion Gap 4.4 mmol/L      eGFR 58.2 mL/min/1.73     Narrative:      GFR Categories in Chronic Kidney Disease (CKD)      GFR Category          GFR (mL/min/1.73)    Interpretation  G1                     90 or greater         Normal or high (1)  G2                      60-89                Mild decrease (1)  G3a                   45-59                Mild to moderate decrease  G3b                   30-44                Moderate to severe decrease  G4                    15-29                Severe decrease  G5                    14 or less           Kidney failure          (1)In the absence of evidence of kidney disease, neither GFR category G1 or G2 fulfill the criteria for CKD.    eGFR calculation 2021 CKD-EPI creatinine equation, which does not include race as a factor    CBC Auto Differential [[id number]]  (Abnormal) Collected: 12/30/24 0050    Specimen: Blood from Arm, Left Updated: 12/30/24 0056     WBC 4.16 10*3/mm3      RBC 2.95 10*6/mm3      Hemoglobin 8.9 g/dL      Hematocrit 27.7 %      MCV 93.9 fL      MCH 30.2 pg      MCHC 32.1 g/dL      RDW 15.2 %      RDW-SD 52.6 fl      MPV 10.3 fL      Platelets 152 10*3/mm3      Neutrophil % 85.9 %      Lymphocyte % 5.3 %      Monocyte % 5.5 %      Eosinophil % 0.0 %      Basophil % 0.2 %      Immature Grans % 3.1 %      Neutrophils, Absolute 3.57 10*3/mm3      Lymphocytes, Absolute 0.22 10*3/mm3      Monocytes, Absolute 0.23 10*3/mm3      Eosinophils, Absolute 0.00 10*3/mm3      Basophils, Absolute 0.01 10*3/mm3      Immature Grans, Absolute 0.13 10*3/mm3      nRBC 0.0 /100 WBC      TSH [[id number]]  (Normal) Collected: 12/29/24 1334    Specimen: Blood Updated: 12/29/24 1412     TSH 3.470 uIU/mL     Basic Metabolic Panel [[id number]]  (Abnormal) Collected: 12/29/24 0353    Specimen: Blood Updated: 12/29/24 0449     Glucose 103 mg/dL      BUN 22 mg/dL      Creatinine 0.99 mg/dL      Sodium 137 mmol/L      Potassium 4.8 mmol/L      Chloride 101 mmol/L      CO2 32.3 mmol/L      Calcium 8.6 mg/dL      BUN/Creatinine Ratio 22.2     Anion Gap 3.7 mmol/L      eGFR 77.0 mL/min/1.73     Narrative:      GFR Categories in Chronic Kidney Disease (CKD)      GFR Category          GFR (mL/min/1.73)    Interpretation  G1                     90 or greater         Normal or high (1)  G2                      60-89                Mild decrease (1)  G3a                   45-59                Mild to moderate decrease  G3b                   30-44                Moderate to severe decrease  G4                    15-29                Severe decrease  G5                    14 or less           Kidney failure          (1)In the absence of evidence of kidney disease, neither GFR category G1 or G2 fulfill the criteria for CKD.    eGFR calculation 2021 CKD-EPI creatinine equation, which does not include race as a factor    CBC Auto Differential [[id number]]  (Abnormal) Collected: 12/29/24 0353    Specimen: Blood Updated: 12/29/24 0406     WBC 3.57 10*3/mm3      RBC 2.86 10*6/mm3      Hemoglobin 8.4 g/dL      Hematocrit 26.8 %      MCV 93.7 fL      MCH 29.4 pg      MCHC 31.3 g/dL      RDW 15.1 %      RDW-SD 52.0 fl      MPV 10.2 fL      Platelets 145 10*3/mm3      Neutrophil % 81.5 %      Lymphocyte % 8.7 %      Monocyte % 4.2 %      Eosinophil % 0.0 %      Basophil % 0.0 %      Immature Grans % 5.6 %      Neutrophils, Absolute 2.91 10*3/mm3      Lymphocytes, Absolute 0.31 10*3/mm3      Monocytes, Absolute 0.15 10*3/mm3      Eosinophils, Absolute 0.00 10*3/mm3      Basophils, Absolute 0.00 10*3/mm3      Immature Grans,  Absolute 0.20 10*3/mm3      nRBC 0.0 /100 WBC     Blood Culture - Blood, Arm, Right [[id number]]  (Normal) Collected: 12/22/24 2203    Specimen: Blood from Arm, Right Updated: 12/27/24 2215     Blood Culture No growth at 5 days    Scan Slide [[id number]] Collected: 12/26/24 0332    Specimen: Blood Updated: 12/26/24 0424     Scan Slide --     Comment: See Manual Differential Results       Manual Differential [[id number]]  (Abnormal) Collected: 12/26/24 0332    Specimen: Blood Updated: 12/26/24 0424     Neutrophil % 81.8 %      Lymphocyte % 5.1 %      Bands %  9.1 %      Metamyelocyte % 2.0 %      Atypical Lymphocyte % 2.0 %      Neutrophils Absolute 5.33 10*3/mm3      Lymphocytes Absolute 0.42 10*3/mm3      Crenated RBC's Slight/1+     Dacrocytes Slight/1+     Poikilocytes Slight/1+     WBC Morphology Normal     Platelet Estimate Adequate    Blood Culture - Blood, Arm, Left [[id number]]  (Abnormal) Collected: 12/22/24 2203    Specimen: Blood from Arm, Left Updated: 12/25/24 0622     Blood Culture Staphylococcus, coagulase negative     Isolated from Aerobic Bottle     Gram Stain Aerobic Bottle Gram positive cocci in clusters    Narrative:      Probable contaminant requires clinical correlation, susceptibility not performed unless requested by physician.      Reticulocytes [[id number]]  (Normal) Collected: 12/25/24 0534    Specimen: Blood from Arm, Left Updated: 12/25/24 0540     Reticulocyte % 0.99 %      Reticulocyte Absolute 0.0281 10*6/mm3     Pathology Consultation [[id number]] Collected: 12/24/24 0427    Specimen: Blood from Arm, Right Updated: 12/24/24 0927     Final Diagnosis --     Left shift  Normocytic anemia with rouleaux and red blood cell fragments  No blasts identified       Case Report --     Surgical Pathology Report                         Case: [id number]                                  Authorizing Provider:  Nurys Thorpe APRN Collected:           12/24/2024 04:27 AM          Ordering Location:      [facility]       Received:            12/24/2024 07:42 AM                                 [facility]                                                          Pathologist:           Shane Dwyer MD                                                             Specimen:    Arm, Right                                                                                 POC Glucose Once [[id number]]  (Abnormal) Collected: 12/24/24 0747    Specimen: Blood Updated: 12/24/24 0751     Glucose 125 mg/dL      Comment: Serial Number: [id number]:  [id number]       Scan Slide [[id number]] Collected: 12/24/24 0427    Specimen: Blood from Arm, Right Updated: 12/24/24 0650     Scan Slide --     Comment: See Manual Differential Results       Manual Differential [[id number]]  (Abnormal) Collected: 12/24/24 0427    Specimen: Blood from Arm, Right Updated: 12/24/24 0650     Neutrophil % 70.0 %      Lymphocyte % 1.0 %      Monocyte % 2.0 %      Bands %  19.0 %      Metamyelocyte % 6.0 %      Atypical Lymphocyte % 2.0 %      Neutrophils Absolute 6.17 10*3/mm3      Lymphocytes Absolute 0.21 10*3/mm3      Monocytes Absolute 0.14 10*3/mm3      Acanthocytes Slight/1+     RBC Fragments Slight/1+     Rouleaux Slight/1+     WBC Morphology Normal     Platelet Morphology Normal    Path Consult Reflex [[id number]] Collected: 12/24/24 0427    Specimen: Blood from Arm, Right Updated: 12/24/24 0650     Pathology Review Yes    Blood Culture ID, PCR - Blood, Arm, Left [[id number]]  (Abnormal) Collected: 12/22/24 2203    Specimen: Blood from Arm, Left Updated: 12/24/24 0055     BCID, PCR Staph spp, not aureus or lugdunensis. Identification by BCID2 PCR.     BOTTLE TYPE Aerobic Bottle    Vitamin B12 [[id number]]  (Normal) Collected: 12/23/24 0614    Specimen: Blood from Arm, Right Updated: 12/23/24 1106     Vitamin B-12 321 pg/mL     Narrative:      Results may be falsely increased if patient taking Biotin.      Folate [[id number]]  (Normal)  Collected: 12/23/24 0614    Specimen: Blood from Arm, Right Updated: 12/23/24 1106     Folate 8.15 ng/mL     Narrative:      Results may be falsely increased if patient taking Biotin.      Haptoglobin [[id number]]  (Abnormal) Collected: 12/23/24 0614    Specimen: Blood from Arm, Right Updated: 12/23/24 1054     Haptoglobin 315 mg/dL     Bilirubin, Total & Direct [[id number]] Collected: 12/23/24 0614    Specimen: Blood from Arm, Right Updated: 12/23/24 0657     Total Bilirubin 0.5 mg/dL      Bilirubin, Direct 0.3 mg/dL      Bilirubin, Indirect 0.2 mg/dL     Ferritin [[id number]]  (Abnormal) Collected: 12/23/24 0614    Specimen: Blood from Arm, Right Updated: 12/23/24 0657     Ferritin 421.00 ng/mL     Narrative:      Results may be falsely decreased if patient taking Biotin.      Iron Profile [[id number]]  (Abnormal) Collected: 12/23/24 0614    Specimen: Blood from Arm, Right Updated: 12/23/24 0657     Iron 12 mcg/dL      Iron Saturation (TSAT) 5 %      Transferrin 150 mg/dL      TIBC 224 mcg/dL     Lactate Dehydrogenase [[id number]]  (Normal) Collected: 12/23/24 0614    Specimen: Blood from Arm, Right Updated: 12/23/24 0657      U/L     Reticulocytes [[id number]]  (Normal) Collected: 12/23/24 0614    Specimen: Blood from Arm, Right Updated: 12/23/24 0626     Reticulocyte % 1.03 %      Reticulocyte Absolute 0.0238 10*6/mm3     Blood Gas, Arterial - [[id number]]  (Abnormal) Collected: 12/23/24 0454    Specimen: Arterial Blood Updated: 12/23/24 0457     Site Right Radial     Christian's Test Positive     pH, Arterial 7.355 pH units      pCO2, Arterial 62.2 mm Hg      pO2, Arterial 173.6 mm Hg      HCO3, Arterial 34.8 mmol/L      Base Excess, Arterial 8.3 mmol/L      Comment: Serial Number: [id number]:  [id number]        O2 Saturation, Arterial 99.4 %      CO2 Content 36.7 mmol/L      Barometric Pressure for Blood Gas --     Comment: N/A        Modality BiPap     FIO2 60 %      PEEP 5     PIP 10 cmH2O      Hemodilution No      Respiratory Rate 20     PO2/FIO2 289    Blood Gas, Arterial - [[id number]]  (Abnormal) Collected: 12/23/24 0301    Specimen: Arterial Blood Updated: 12/23/24 0304     Site Left Radial     Christian's Test Positive     pH, Arterial 7.313 pH units      pCO2, Arterial 67.9 mm Hg      pO2, Arterial 63.5 mm Hg      HCO3, Arterial 34.4 mmol/L      Base Excess, Arterial 7.2 mmol/L      Comment: Serial Number: [id number]:  [id number]        O2 Saturation, Arterial 88.8 %      CO2 Content 36.5 mmol/L      Barometric Pressure for Blood Gas --     Comment: N/A        Modality Cannula     FIO2 44 %      Hemodilution No     PO2/FIO2 144    BNP [[id number]]  (Normal) Collected: 12/22/24 2203    Specimen: Blood Updated: 12/23/24 0241     proBNP 1,788.0 pg/mL     Narrative:      This assay is used as an aid in the diagnosis of individuals suspected of having heart failure. It can be used as an aid in the diagnosis of acute decompensated heart failure (ADHF) in patients presenting with signs and symptoms of ADHF to the emergency department (ED). In addition, NT-proBNP of <300 pg/mL indicates ADHF is not likely.    Age Range Result Interpretation  NT-proBNP Concentration (pg/mL:      <50             Positive            >450                   Gray                 300-450                    Negative             <300    50-75           Positive            >900                  Gray                300-900                  Negative            <300      >75             Positive            >1800                  Gray                300-1800                  Negative            <300    Respiratory Panel PCR w/COVID-19(SARS-CoV-2) JOSEFINA/MERE/FAROOQ/PAD/COR/VANDA In-House, NP Swab in [facility], 2 HR TAT - Swab, Nasopharynx [[id number]]  (Abnormal) Collected: 12/22/24 2204    Specimen: Swab from Nasopharynx Updated: 12/22/24 2320     ADENOVIRUS, PCR Not Detected     Coronavirus 229E Not Detected     Coronavirus HKU1 Not Detected     Coronavirus NL63 Not  Detected     Coronavirus OC43 Not Detected     COVID19 Not Detected     Human Metapneumovirus Not Detected     Human Rhinovirus/Enterovirus Detected     Influenza A PCR Not Detected     Influenza B PCR Not Detected     Parainfluenza Virus 1 Not Detected     Parainfluenza Virus 2 Not Detected     Parainfluenza Virus 3 Not Detected     Parainfluenza Virus 4 Not Detected     RSV, PCR Not Detected     Bordetella pertussis pcr Not Detected     Bordetella parapertussis PCR Not Detected     Chlamydophila pneumoniae PCR Not Detected     Mycoplasma pneumo by PCR Not Detected    Narrative:      In the setting of a positive respiratory panel with a viral infection PLUS a negative procalcitonin without other underlying concern for bacterial infection, consider observing off antibiotics or discontinuation of antibiotics and continue supportive care. If the respiratory panel is positive for atypical bacterial infection (Bordetella pertussis, Chlamydophila pneumoniae, or Mycoplasma pneumoniae), consider antibiotic de-escalation to target atypical bacterial infection.    CBC & Differential [[id number]]  (Abnormal) Collected: 12/22/24 2203    Specimen: Blood Updated: 12/22/24 2253    Narrative:      The following orders were created for panel order CBC & Differential.  Procedure                               Abnormality         Status                     ---------                               -----------         ------                     CBC Auto Differential[[id number]]        Abnormal            Final result               Scan Slide[[id number]]                                       Final result                 Please view results for these tests on the individual orders.    Comprehensive Metabolic Panel [[id number]]  (Abnormal) Collected: 12/22/24 2203    Specimen: Blood Updated: 12/22/24 2238     Glucose 131 mg/dL      BUN 32 mg/dL      Creatinine 1.03 mg/dL      Sodium 140 mmol/L      Potassium 4.3 mmol/L      Chloride 102 mmol/L   "    CO2 29.4 mmol/L      Calcium 9.0 mg/dL      Total Protein 7.6 g/dL      Albumin 3.3 g/dL      ALT (SGPT) 13 U/L      AST (SGOT) 24 U/L      Alkaline Phosphatase 78 U/L      Total Bilirubin 0.7 mg/dL      Globulin 4.3 gm/dL      A/G Ratio 0.8 g/dL      BUN/Creatinine Ratio 31.1     Anion Gap 8.6 mmol/L      eGFR 73.4 mL/min/1.73     Narrative:      GFR Categories in Chronic Kidney Disease (CKD)      GFR Category          GFR (mL/min/1.73)    Interpretation  G1                     90 or greater         Normal or high (1)  G2                      60-89                Mild decrease (1)  G3a                   45-59                Mild to moderate decrease  G3b                   30-44                Moderate to severe decrease  G4                    15-29                Severe decrease  G5                    14 or less           Kidney failure          (1)In the absence of evidence of kidney disease, neither GFR category G1 or G2 fulfill the criteria for CKD.    eGFR calculation 2021 CKD-EPI creatinine equation, which does not include race as a factor    Procalcitonin [[id number]]  (Abnormal) Collected: 12/22/24 2203    Specimen: Blood Updated: 12/22/24 2238     Procalcitonin 0.60 ng/mL     Narrative:      As a Marker for Sepsis (Non-Neonates):    1. <0.5 ng/mL represents a low risk of severe sepsis and/or septic shock.  2. >2 ng/mL represents a high risk of severe sepsis and/or septic shock.    As a Marker for Lower Respiratory Tract Infections that require antibiotic therapy:    PCT on Admission    Antibiotic Therapy       6-12 Hrs later    >0.5                Strongly Recommended  >0.25 - <0.5        Recommended   0.1 - 0.25          Discouraged              Remeasure/reassess PCT  <0.1                Strongly Discouraged     Remeasure/reassess PCT    As 28 day mortality risk marker: \"Change in Procalcitonin Result\" (>80% or <=80%) if Day 0 (or Day 1) and Day 4 values are available. Refer to " http://www.[facility]-pct-calculator.com    Change in PCT <=80%  A decrease of PCT levels below or equal to 80% defines a positive change in PCT test result representing a higher risk for 28-day all-cause mortality of patients diagnosed with severe sepsis for septic shock.    Change in PCT >80%  A decrease of PCT levels of more than 80% defines a negative change in PCT result representing a lower risk for 28-day all-cause mortality of patients diagnosed with severe sepsis or septic shock.       [place] Draw [[id number]] Collected: 12/22/24 2203    Specimen: Blood Updated: 12/22/24 2215    Narrative:      The following orders were created for panel order [place] Draw.  Procedure                               Abnormality         Status                     ---------                               -----------         ------                     Green Top (Gel)[[id number]]                                  Final result               Lavender Top[[id number]]                                     Final result               Gold Top - SST[[id number]]                                   Final result               Light Blue Top[[id number]]                                   Final result                 Please view results for these tests on the individual orders.    Green Top (Gel) [[id number]] Collected: 12/22/24 2203    Specimen: Blood Updated: 12/22/24 2215     Extra Tube Hold for add-ons.     Comment: Auto resulted.       Lavender Top [[id number]] Collected: 12/22/24 2203    Specimen: Blood Updated: 12/22/24 2215     Extra Tube hold for add-on     Comment: Auto resulted       Gold Top - SST [[id number]] Collected: 12/22/24 2203    Specimen: Blood Updated: 12/22/24 2215     Extra Tube Hold for add-ons.     Comment: Auto resulted.       Light Blue Top [[id number]] Collected: 12/22/24 2203    Specimen: Blood Updated: 12/22/24 2215     Extra Tube Hold for add-ons.     Comment: Auto resulted       POC Lactate [[id number]]  (Normal) Collected:  12/22/24 2204    Specimen: Blood Updated: 12/22/24 2206     Lactate 1.4 mmol/L      Comment: Serial Number: [id number]:  [id number]               Imaging Results (Most Recent)       Procedure Component Value Units Date/Time    XR Chest 1 View [[id number]] Collected: 12/22/24 2256     Updated: 12/22/24 2259    Narrative:      XR CHEST 1 VW    Date of Exam: 12/22/2024 10:18 PM EST    Indication: Simple Sepsis Protocol    Comparison: 2/2/2024 and PET/CT 10/10/2024.    Findings:  Stable nodular thickening in the right lung apex. Stable diffuse coarse interstitial disease in both lungs and severe emphysema. There is increased opacity in the left lung base that could reflect a superimposed pneumonia. No pneumothorax or pleural   effusion. Heart size is normal. Pulmonary vasculature is largely obscured. No acute osseous abnormalities.      Impression:      Impression:  1.Increased opacity in the left lung base could reflect pneumonia.  2.Stable nodular thickening in the right lung apex.  3.Severe emphysema and chronic interstitial disease.          Electronically Signed: Sandro Saravia MD    12/22/2024 10:57 PM EST    Workstation ID: [id number]          reviewed    ECG/EMG Results (most recent)       Procedure Component Value Units Date/Time    ECG 12 Lead Dyspnea [[id number]] Collected: 12/22/24 2153     Updated: 12/23/24 0633     QT Interval 466 ms      QTC Interval 501 ms     Narrative:      HEART RATE=70  bpm  RR Cdpsfxkf=100  ms  MT Interval=  ms  P Horizontal Axis=  deg  P Front Axis=  deg  QRSD Kwuhxbqi=074  ms  QT Uoyhrohx=822  ms  TNfI=342  ms  QRS Axis=44  deg  T Wave Axis=  deg  - ABNORMAL ECG -  IVCD, consider RBBB  Abnormal T, consider ischemia, inferior leads  When compared with ECG of 03-Feb-2024 09:53:17,  Significant change in rhythm: previously sinus  New or worsened ischemia or infarction  New conduction abnormality  Electronically Signed By: Huseyin Sutherland ([facility]) 2024-12-23 06:32:39  Date and Time of  Study:2024-12-22 21:53:44    Telemetry Scan [[id number]] Resulted: 12/22/24     Updated: 12/25/24 2021    Telemetry Scan [[id number]] Resulted: 12/22/24     Updated: 12/26/24 1610    Telemetry Scan [[id number]] Resulted: 12/22/24     Updated: 12/26/24 1611    Telemetry Scan [[id number]] Resulted: 12/22/24     Updated: 12/26/24 1613    ECG 12 Lead Rhythm Change [[id number]] Collected: 12/30/24 0721     Updated: 12/30/24 0723     QT Interval 377 ms      QTC Interval 523 ms     Narrative:      HEART IADE=347  bpm  RR Kgqtfsbt=608  ms  ID Interval=  ms  P Horizontal Axis=  deg  P Front Axis=  deg  QRSD Cqisgwui=448  ms  QT Jjqobgll=559  ms  BBdQ=489  ms  QRS Axis=68  deg  T Wave Axis=36  deg  - ABNORMAL ECG -  Junctional tachycardia  Anteroseptal infarct, age indeterminate  Prolonged QT interval  Date and Time of Study:2024-12-30 07:21:56    Telemetry Scan [[id number]] Resulted: 12/22/24     Updated: 12/30/24 1029    Telemetry Scan [[id number]] Resulted: 12/22/24     Updated: 12/30/24 1100    Telemetry Scan [[id number]] Resulted: 12/22/24     Updated: 12/30/24 1125          reviewed    Assessment & Plan     Acute exacerbation of COPD emphysema pneumonia exacerbating A-fib RVR now organized and atrial flutter with  Atrial flutter RVR  Essential hypertension hyperlipidemia CAD PVD  Cachexia deconditioning poor functional status    Increase amiodarone 200 twice daily  Single dose digoxin  Metoprolol  Consult EP for evaluation may require cardioversion  From pulmonary standpoint for poor overall prognosis may attempt cardioversion without any sort of ablation, will again consult with EP on evaluation prior to discharge       further recommendations to follow findings and clinical course          I discussed the patient's findings and my recommendations with patient and .     Berto Barroso MD  12/30/24  13:47 EST

## 2024-12-30 NOTE — PROGRESS NOTES
Daily Progress Note          Assessment    Pneumonia due to unspecified pathogen  COPD: At home on Trelegy inhaler  Rhinovirus infection  Hypoxemia: At home 6 L of oxygen  CAD  PVD  HTN  HLD  A-fib  Anemia  Former smoker quit 2022 after 60 pack years  Echo 2/1/2024 EF 61-65% mild pulmonary hypertension 35-45 mmHg     CT scan of the chest in June 2024 showing a noncalcified nodule in the right upper lobe around 2 x 1.1 x 1 cm. Patient was also noted to have patchy airspace disease and clustered micronodules in the lingula. He then underwent PET scan showing a 1.7 x 1.1 cm partially calcified irregular shaped nodule in the right upper lobe to be metabolically active with SUV of 4.84 and additional 2 metabolically active irregular nodular densities in the right upper lobe. Inferior apical segment with intervening areas of metabolically active interstitial thickening.     PFTs: Very severe airflow obstruction with significant air trapping and reduced diffusion capacity. FEV1/FVC postbronchodilator is 0.39 with FEV1 of 0.71 L or 28% predicted and FVC of 1.82 L or 47% predicted. No significant bronchodilator response noted. Total lung capacity of 7.77 L or 131% predicted and residual volume of 5.82 L or 226% predicted and DLCO of 61% predicted noted.         Recommendations:  The wheezing is improving, slowly wean down steroids    patient is very cachectic with poor prognosis, patient decided for DNR and home hospice, awaiting placement in [facility]     antibiotics: Unasyn completed 5 days  Nebulized Mucomyst completed  Encourage use of incentive spirometry and flutter valve  Theophylline    Nebulized Pulmicort  Oxygen supplement and titration to maintain saturation 90 to 95%: Currently on 5 L per nasal cannula  Bronchodilators  Mucinex     Atorvastatin  Plavix     I personally reviewed the radiological studies             LOS: 7 days     Subjective     Chronic cough and shortness of breath    Objective     Vital  signs for last 24 hours:  Vitals:    12/30/24 0827 12/30/24 0830 12/30/24 0832 12/30/24 0946   BP:   106/66 122/65   BP Location:   Right arm    Patient Position:   Lying    Pulse: 79 102 116 112   Resp: 18 21 14    Temp:   98 °F (36.7 °C)    TempSrc:   Oral    SpO2: 98% 98% 98%    Weight:       Height:           Intake/Output last 3 shifts:  I/O last 3 completed shifts:  In: 600 [P.O.:600]  Out: 3775 [Urine:3775]  Intake/Output this shift:  No intake/output data recorded.      Radiology  Imaging Results (Last 24 Hours)       ** No results found for the last 24 hours. **            Labs:  Results from last 7 days   Lab Units 12/30/24  0050   WBC 10*3/mm3 4.16   HEMOGLOBIN g/dL 8.9*   HEMATOCRIT % 27.7*   PLATELETS 10*3/mm3 152     Results from last 7 days   Lab Units 12/30/24  0050   SODIUM mmol/L 139   POTASSIUM mmol/L 4.9   CHLORIDE mmol/L 102   CO2 mmol/L 32.6*   BUN mg/dL 28*   CREATININE mg/dL 1.25   CALCIUM mg/dL 8.7   GLUCOSE mg/dL 131*                                 Results from last 7 days   Lab Units 12/29/24  1334   TSH uIU/mL 3.470           Meds:   SCHEDULE  amiodarone, 200 mg, Oral, Q24H  arformoterol, 15 mcg, Nebulization, BID - RT  atorvastatin, 10 mg, Oral, Daily  budesonide, 0.5 mg, Nebulization, BID - RT  clopidogrel, 75 mg, Oral, Daily  guaiFENesin, 1,200 mg, Oral, Q12H  heparin (porcine), 5,000 Units, Subcutaneous, Q8H  ipratropium-albuterol, 3 mL, Nebulization, Q4H - RT  metoprolol tartrate, 12.5 mg, Oral, Q12H  multivitamin, 1 tablet, Oral, Daily  pantoprazole, 40 mg, Oral, Q AM  polyethylene glycol, 17 g, Oral, Daily  predniSONE, 30 mg, Oral, Daily With Breakfast  sodium chloride, 10 mL, Intravenous, Q12H  tamsulosin, 0.4 mg, Oral, Daily  theophylline, 300 mg, Oral, Daily      Infusions       PRNs    acetaminophen    aluminum-magnesium hydroxide-simethicone    senna-docusate sodium **AND** polyethylene glycol **AND** bisacodyl **AND** bisacodyl    Calcium Replacement - Follow Nurse / BPA  Driven Protocol    hydrALAZINE    ipratropium-albuterol    Magnesium Standard Dose Replacement - Follow Nurse / BPA Driven Protocol    melatonin    ondansetron ODT **OR** ondansetron    Phosphorus Replacement - Follow Nurse / BPA Driven Protocol    Potassium Replacement - Follow Nurse / BPA Driven Protocol    sodium chloride    sodium chloride    sodium chloride    Physical Exam:  General Appearance:  Alert, looks chronically ill and cachectic  HEENT:  Normocephalic, without obvious abnormality, Conjunctiva/corneas clear,.   Nares normal, no drainage     Neck:  Supple, symmetrical, trachea midline.   Lungs /Chest wall: Prolonged expiration and mild bilateral basal rhonchi, respirations unlabored, symmetrical wall movement.     Heart:  Regular rate and rhythm, S1 S2 normal  Abdomen: Soft, non-tender, no masses, no organomegaly.    Extremities: No edema, no clubbing or cyanosis     ROS  Constitutional: Negative for chills, fever and malaise/fatigue.   HENT: Negative.    Eyes: Negative.    Cardiovascular: Negative.    Respiratory: Positive for chronic cough and shortness of breath.    Skin: Negative.    Musculoskeletal: Negative.    Gastrointestinal: Negative.    Genitourinary: Negative.    Neurological: Generalized weakness      I reviewed the recent clinical results  I personally reviewed the latest radiological studies    Part of this note may be an electronic transcription/translation of spoken language to printed text using the Dragon Dictation System.

## 2024-12-31 LAB
ANION GAP SERPL CALCULATED.3IONS-SCNC: 5 MMOL/L (ref 5–15)
BASOPHILS # BLD AUTO: 0.01 10*3/MM3 (ref 0–0.2)
BASOPHILS NFR BLD AUTO: 0.1 % (ref 0–1.5)
BUN SERPL-MCNC: 33 MG/DL (ref 8–23)
BUN/CREAT SERPL: 32 (ref 7–25)
CALCIUM SPEC-SCNC: 8.6 MG/DL (ref 8.6–10.5)
CHLORIDE SERPL-SCNC: 103 MMOL/L (ref 98–107)
CO2 SERPL-SCNC: 31 MMOL/L (ref 22–29)
CREAT SERPL-MCNC: 1.03 MG/DL (ref 0.76–1.27)
DEPRECATED RDW RBC AUTO: 52.8 FL (ref 37–54)
EGFRCR SERPLBLD CKD-EPI 2021: 73.4 ML/MIN/1.73
EOSINOPHIL # BLD AUTO: 0 10*3/MM3 (ref 0–0.4)
EOSINOPHIL NFR BLD AUTO: 0 % (ref 0.3–6.2)
ERYTHROCYTE [DISTWIDTH] IN BLOOD BY AUTOMATED COUNT: 15.2 % (ref 12.3–15.4)
GLUCOSE SERPL-MCNC: 134 MG/DL (ref 65–99)
HCT VFR BLD AUTO: 28.4 % (ref 37.5–51)
HGB BLD-MCNC: 9.2 G/DL (ref 13–17.7)
IMM GRANULOCYTES # BLD AUTO: 0.57 10*3/MM3 (ref 0–0.05)
IMM GRANULOCYTES NFR BLD AUTO: 7.6 % (ref 0–0.5)
LYMPHOCYTES # BLD AUTO: 0.18 10*3/MM3 (ref 0.7–3.1)
LYMPHOCYTES NFR BLD AUTO: 2.4 % (ref 19.6–45.3)
MCH RBC QN AUTO: 30.5 PG (ref 26.6–33)
MCHC RBC AUTO-ENTMCNC: 32.4 G/DL (ref 31.5–35.7)
MCV RBC AUTO: 94 FL (ref 79–97)
MONOCYTES # BLD AUTO: 0.57 10*3/MM3 (ref 0.1–0.9)
MONOCYTES NFR BLD AUTO: 7.6 % (ref 5–12)
NEUTROPHILS NFR BLD AUTO: 6.21 10*3/MM3 (ref 1.7–7)
NEUTROPHILS NFR BLD AUTO: 82.3 % (ref 42.7–76)
NRBC BLD AUTO-RTO: 0 /100 WBC (ref 0–0.2)
PLATELET # BLD AUTO: 158 10*3/MM3 (ref 140–450)
PMV BLD AUTO: 10.9 FL (ref 6–12)
POTASSIUM SERPL-SCNC: 5 MMOL/L (ref 3.5–5.2)
QT INTERVAL: 552 MS
QTC INTERVAL: 553 MS
RBC # BLD AUTO: 3.02 10*6/MM3 (ref 4.14–5.8)
SODIUM SERPL-SCNC: 139 MMOL/L (ref 136–145)
WBC NRBC COR # BLD AUTO: 7.54 10*3/MM3 (ref 3.4–10.8)

## 2024-12-31 PROCEDURE — 97112 NEUROMUSCULAR REEDUCATION: CPT

## 2024-12-31 PROCEDURE — 94799 UNLISTED PULMONARY SVC/PX: CPT

## 2024-12-31 PROCEDURE — 80048 BASIC METABOLIC PNL TOTAL CA: CPT | Performed by: NURSE PRACTITIONER

## 2024-12-31 PROCEDURE — 93005 ELECTROCARDIOGRAM TRACING: CPT | Performed by: NURSE PRACTITIONER

## 2024-12-31 PROCEDURE — 85025 COMPLETE CBC W/AUTO DIFF WBC: CPT | Performed by: NURSE PRACTITIONER

## 2024-12-31 PROCEDURE — 97110 THERAPEUTIC EXERCISES: CPT

## 2024-12-31 PROCEDURE — 94660 CPAP INITIATION&MGMT: CPT

## 2024-12-31 PROCEDURE — 63710000001 PREDNISONE PER 1 MG: Performed by: INTERNAL MEDICINE

## 2024-12-31 PROCEDURE — 25010000002 HEPARIN (PORCINE) PER 1000 UNITS

## 2024-12-31 PROCEDURE — 63710000001 PREDNISONE PER 5 MG: Performed by: INTERNAL MEDICINE

## 2024-12-31 PROCEDURE — 94761 N-INVAS EAR/PLS OXIMETRY MLT: CPT

## 2024-12-31 PROCEDURE — 97116 GAIT TRAINING THERAPY: CPT

## 2024-12-31 PROCEDURE — 94664 DEMO&/EVAL PT USE INHALER: CPT

## 2024-12-31 RX ORDER — PREDNISONE 20 MG/1
20 TABLET ORAL
Status: DISCONTINUED | OUTPATIENT
Start: 2025-01-01 | End: 2025-01-03 | Stop reason: HOSPADM

## 2024-12-31 RX ADMIN — Medication 10 ML: at 09:23

## 2024-12-31 RX ADMIN — IPRATROPIUM BROMIDE AND ALBUTEROL SULFATE 3 ML: .5; 3 SOLUTION RESPIRATORY (INHALATION) at 23:59

## 2024-12-31 RX ADMIN — Medication 5 MG: at 22:25

## 2024-12-31 RX ADMIN — BUDESONIDE INHALATION 0.5 MG: 0.5 SUSPENSION RESPIRATORY (INHALATION) at 07:30

## 2024-12-31 RX ADMIN — HEPARIN SODIUM 5000 UNITS: 5000 INJECTION INTRAVENOUS; SUBCUTANEOUS at 22:25

## 2024-12-31 RX ADMIN — Medication 12.5 MG: at 09:22

## 2024-12-31 RX ADMIN — AMIODARONE HYDROCHLORIDE 200 MG: 200 TABLET ORAL at 09:21

## 2024-12-31 RX ADMIN — PREDNISONE 30 MG: 10 TABLET ORAL at 09:21

## 2024-12-31 RX ADMIN — IPRATROPIUM BROMIDE AND ALBUTEROL SULFATE 3 ML: .5; 3 SOLUTION RESPIRATORY (INHALATION) at 15:20

## 2024-12-31 RX ADMIN — IPRATROPIUM BROMIDE AND ALBUTEROL SULFATE 3 ML: .5; 3 SOLUTION RESPIRATORY (INHALATION) at 11:37

## 2024-12-31 RX ADMIN — HEPARIN SODIUM 5000 UNITS: 5000 INJECTION INTRAVENOUS; SUBCUTANEOUS at 06:06

## 2024-12-31 RX ADMIN — ARFORMOTEROL TARTRATE 15 MCG: 15 SOLUTION RESPIRATORY (INHALATION) at 07:30

## 2024-12-31 RX ADMIN — GUAIFENESIN 1200 MG: 600 TABLET, EXTENDED RELEASE ORAL at 09:20

## 2024-12-31 RX ADMIN — Medication 10 ML: at 22:28

## 2024-12-31 RX ADMIN — THERA TABS 1 TABLET: TAB at 09:21

## 2024-12-31 RX ADMIN — IPRATROPIUM BROMIDE AND ALBUTEROL SULFATE 3 ML: .5; 3 SOLUTION RESPIRATORY (INHALATION) at 03:37

## 2024-12-31 RX ADMIN — PANTOPRAZOLE SODIUM 40 MG: 40 TABLET, DELAYED RELEASE ORAL at 06:06

## 2024-12-31 RX ADMIN — ARFORMOTEROL TARTRATE 15 MCG: 15 SOLUTION RESPIRATORY (INHALATION) at 18:50

## 2024-12-31 RX ADMIN — BUDESONIDE INHALATION 0.5 MG: 0.5 SUSPENSION RESPIRATORY (INHALATION) at 18:56

## 2024-12-31 RX ADMIN — GUAIFENESIN 1200 MG: 600 TABLET, EXTENDED RELEASE ORAL at 22:25

## 2024-12-31 RX ADMIN — CLOPIDOGREL BISULFATE 75 MG: 75 TABLET ORAL at 09:21

## 2024-12-31 RX ADMIN — TAMSULOSIN HYDROCHLORIDE 0.4 MG: 0.4 CAPSULE ORAL at 09:21

## 2024-12-31 RX ADMIN — HEPARIN SODIUM 5000 UNITS: 5000 INJECTION INTRAVENOUS; SUBCUTANEOUS at 13:55

## 2024-12-31 RX ADMIN — FAMOTIDINE 20 MG: 20 TABLET, FILM COATED ORAL at 00:10

## 2024-12-31 RX ADMIN — THEOPHYLLINE 300 MG: 300 TABLET, EXTENDED RELEASE ORAL at 09:20

## 2024-12-31 RX ADMIN — ATORVASTATIN CALCIUM 10 MG: 10 TABLET ORAL at 09:21

## 2024-12-31 NOTE — PROGRESS NOTES
Daily Progress Note          Assessment    Pneumonia due to unspecified pathogen  COPD: At home on Trelegy inhaler  Rhinovirus infection  Hypoxemia: At home 6 L of oxygen  CAD  PVD  HTN  HLD  A-fib  Anemia  Former smoker quit 2022 after 60 pack years  Echo 2/1/2024 EF 61-65% mild pulmonary hypertension 35-45 mmHg     CT scan of the chest in June 2024 showing a noncalcified nodule in the right upper lobe around 2 x 1.1 x 1 cm. Patient was also noted to have patchy airspace disease and clustered micronodules in the lingula. He then underwent PET scan showing a 1.7 x 1.1 cm partially calcified irregular shaped nodule in the right upper lobe to be metabolically active with SUV of 4.84 and additional 2 metabolically active irregular nodular densities in the right upper lobe. Inferior apical segment with intervening areas of metabolically active interstitial thickening.     PFTs: Very severe airflow obstruction with significant air trapping and reduced diffusion capacity. FEV1/FVC postbronchodilator is 0.39 with FEV1 of 0.71 L or 28% predicted and FVC of 1.82 L or 47% predicted. No significant bronchodilator response noted. Total lung capacity of 7.77 L or 131% predicted and residual volume of 5.82 L or 226% predicted and DLCO of 61% predicted noted.         Recommendations:  The wheezing is improving, slowly wean down steroids    patient is very cachectic with poor prognosis, patient decided for DNR and home hospice, awaiting placement in [facility]     antibiotics: Unasyn completed 5 days  Nebulized Mucomyst completed  Encourage use of incentive spirometry and flutter valve  Theophylline    Nebulized Pulmicort  Oxygen supplement and titration to maintain saturation 90 to 95%: Currently on 5 L per nasal cannula  Bronchodilators  Mucinex     Atorvastatin  Plavix     I personally reviewed the radiological studies             LOS: 8 days     Subjective     Chronic cough and shortness of breath    Objective     Vital  signs for last 24 hours:  Vitals:    12/31/24 0921 12/31/24 0922 12/31/24 1137 12/31/24 1141   BP: 116/75 116/75     BP Location:       Patient Position:       Pulse: 61 61 59 59   Resp:   16 16   Temp:       TempSrc:       SpO2:   90% 99%   Weight:       Height:           Intake/Output last 3 shifts:  I/O last 3 completed shifts:  In: 480 [P.O.:480]  Out: 2150 [Urine:2150]  Intake/Output this shift:  I/O this shift:  In: 240 [P.O.:240]  Out: -       Radiology  Imaging Results (Last 24 Hours)       ** No results found for the last 24 hours. **            Labs:  Results from last 7 days   Lab Units 12/31/24  0031   WBC 10*3/mm3 7.54   HEMOGLOBIN g/dL 9.2*   HEMATOCRIT % 28.4*   PLATELETS 10*3/mm3 158     Results from last 7 days   Lab Units 12/31/24  0031   SODIUM mmol/L 139   POTASSIUM mmol/L 5.0   CHLORIDE mmol/L 103   CO2 mmol/L 31.0*   BUN mg/dL 33*   CREATININE mg/dL 1.03   CALCIUM mg/dL 8.6   GLUCOSE mg/dL 134*                                 Results from last 7 days   Lab Units 12/29/24  1334   TSH uIU/mL 3.470           Meds:   SCHEDULE  amiodarone, 200 mg, Oral, Q12H  arformoterol, 15 mcg, Nebulization, BID - RT  atorvastatin, 10 mg, Oral, Daily  budesonide, 0.5 mg, Nebulization, BID - RT  clopidogrel, 75 mg, Oral, Daily  guaiFENesin, 1,200 mg, Oral, Q12H  heparin (porcine), 5,000 Units, Subcutaneous, Q8H  ipratropium-albuterol, 3 mL, Nebulization, Q4H - RT  [Held by provider] metoprolol tartrate, 12.5 mg, Oral, Q12H  multivitamin, 1 tablet, Oral, Daily  pantoprazole, 40 mg, Oral, Q AM  polyethylene glycol, 17 g, Oral, Daily  predniSONE, 30 mg, Oral, Daily With Breakfast  sodium chloride, 10 mL, Intravenous, Q12H  tamsulosin, 0.4 mg, Oral, Daily  theophylline, 300 mg, Oral, Daily      Infusions       PRNs    acetaminophen    aluminum-magnesium hydroxide-simethicone    senna-docusate sodium **AND** polyethylene glycol **AND** bisacodyl **AND** bisacodyl    Calcium Replacement - Follow Nurse / BPA Driven  Protocol    hydrALAZINE    ipratropium-albuterol    Magnesium Standard Dose Replacement - Follow Nurse / BPA Driven Protocol    melatonin    ondansetron ODT **OR** ondansetron    Phosphorus Replacement - Follow Nurse / BPA Driven Protocol    Potassium Replacement - Follow Nurse / BPA Driven Protocol    sodium chloride    sodium chloride    sodium chloride    Physical Exam:  General Appearance:  Alert, looks chronically ill and cachectic  HEENT:  Normocephalic, without obvious abnormality, Conjunctiva/corneas clear,.   Nares normal, no drainage     Neck:  Supple, symmetrical, trachea midline.   Lungs /Chest wall: Prolonged expiration and mild bilateral basal rhonchi, respirations unlabored, symmetrical wall movement.     Heart:  Regular rate and rhythm, S1 S2 normal  Abdomen: Soft, non-tender, no masses, no organomegaly.    Extremities: No edema, no clubbing or cyanosis     ROS  Constitutional: Negative for chills, fever and malaise/fatigue.   HENT: Negative.    Eyes: Negative.    Cardiovascular: Negative.    Respiratory: Positive for chronic cough and shortness of breath.    Skin: Negative.    Musculoskeletal: Negative.    Gastrointestinal: Negative.    Genitourinary: Negative.    Neurological: Generalized weakness      I reviewed the recent clinical results  I personally reviewed the latest radiological studies    Part of this note may be an electronic transcription/translation of spoken language to printed text using the Dragon Dictation System.

## 2024-12-31 NOTE — PROGRESS NOTES
[facility] MEDICINE SERVICE  DAILY PROGRESS NOTE    NAME: Kali Garcia  : 1945  MRN: [id number]      LOS: 8 days     PROVIDER OF SERVICE: Sho Capps MD    Chief Complaint: Pneumonia    Subjective:     Interval History:  History taken from: patient    Patient seen and examined at bedside this morning.  No acute complaints overnight, seen to be in NSR rate controlled        Review of Systems: Negative except described above  Review of Systems    Objective:     Vital Signs  Temp:  [97.7 °F (36.5 °C)-98.1 °F (36.7 °C)] 98.1 °F (36.7 °C)  Heart Rate:  [] 61  Resp:  [14-27] 16  BP: (103-142)/(54-75) 116/75  Flow (L/min) (Oxygen Therapy):  [4-5] 5   Body mass index is 15.71 kg/m².    Physical Exam  Physical Exam  Constitutional:       Comments: NAD    Cardiovascular:      Comments:  RRR, S1 & S2   Pulmonary:      Comments:  Lungs CTA   Abdominal:      Comments:  ABD soft, NT            Diagnostic Data    Results from last 7 days   Lab Units 24  0031   WBC 10*3/mm3 7.54   HEMOGLOBIN g/dL 9.2*   HEMATOCRIT % 28.4*   PLATELETS 10*3/mm3 158   GLUCOSE mg/dL 134*   CREATININE mg/dL 1.03   BUN mg/dL 33*   SODIUM mmol/L 139   POTASSIUM mmol/L 5.0   ANION GAP mmol/L 5.0       No radiology results for the last day      I reviewed the patient's new clinical results.    Assessment/Plan:     Active and Resolved Problems  Active Hospital Problems    Diagnosis  POA    **Pneumonia [J18.9]  Yes    Rhinovirus [B34.8]  Yes    Anemia [D64.9]  Yes    Acute on chronic respiratory failure with hypoxia [J96.21]  Yes    Coronary artery disease [I25.10]  Yes    Hypertension [I10]  Yes    COPD (chronic obstructive pulmonary disease) [J44.9]  Yes    Severe malnutrition [E43]  Yes      Resolved Hospital Problems   No resolved problems to display.       Acute on chronic hypoxemic hypercapnia respiratory  failure   Lower lobe pneumonia   COPD with acute exacerbation   Rhinovirus infection   -Continue  droplet precautions for rhinovirus   -Duonebs prn   -Continue mucomyst and completed Unasyn  -Pulmonology following     CAD   Atrial fibrillation   PAD   -Converted to sinus rhythm and rate controlled  -Continue home plavix and aspirin  Currently on amiodarone, metoprolol being held as per cardiology  -Continue home albuterol  -Telemonitoring  TSH WNL  Cardiology consulted, was also evaluated by EP who stated not an ideal candidate for ablation     Urinary retention   Hematuria   -No blood noted in catheter   -Continue springer catheter  -Patient wishes to discharge with springer and hospice following, no voiding trial required      Hypernatremia, resolved    VTE Prophylaxis:  Pharmacologic & mechanical VTE prophylaxis orders are present.             Disposition Planning:        Anticipated Date of Discharge: 1/1/2025         Time: 35 minutes     Code Status and Medical Interventions: No CPR (Do Not Attempt to Resuscitate); Limited Support; No intubation (DNI), No cardioversion   Ordered at: 12/24/24 1547     Medical Intervention Limits:    No intubation (DNI)    No cardioversion     Level Of Support Discussed With:    Next of Kin (If No Surrogate)     Code Status (Patient has no pulse and is not breathing):    No CPR (Do Not Attempt to Resuscitate)     Medical Interventions (Patient has pulse or is breathing):    Limited Support       Signature: Electronically signed by Sho Capps MD, 12/31/24, 11:29 EST.  [facility]

## 2024-12-31 NOTE — PROGRESS NOTES
Cardiology [place]        LOS:  LOS: 8 days   Patient Name: Kali Garcia  Age/Sex: 79 y.o. male  : 1945  MRN: [id number]    Day of Service: 24   Length of Stay: 8  Encounter Provider: FADIA Kamara  Place of Service: [facility] CARDIOLOGY  Patient Care Team:  Tami Rasmussen APRN as PCP - General (Nurse Practitioner)    Subjective:     Chief Complaint: f/u Atrial Flutter    Subjective: Seen and examined, patient is converted to sinus rhythm with change in his antiarrhythmics yesterday he is now in sinus rhythm related 58-62  He is sitting up in bed on my encounter 2 L nasal cannula, remains cachectic and severely deconditioned volume status is stable  He is on metoprolol 12.5 twice daily along with amiodarone 200 twice daily  They are considering hospice evaluation for pulmonary  I discussed with the patient at length I do not think a ablation would be in his best interest at this time given prognosis from a lung standpoint, cancer prognosis and overall severe COPD and emphysema with chronic respiratory failure.        Current Medications:   Scheduled Meds:amiodarone, 200 mg, Oral, Q12H  arformoterol, 15 mcg, Nebulization, BID - RT  atorvastatin, 10 mg, Oral, Daily  budesonide, 0.5 mg, Nebulization, BID - RT  clopidogrel, 75 mg, Oral, Daily  guaiFENesin, 1,200 mg, Oral, Q12H  heparin (porcine), 5,000 Units, Subcutaneous, Q8H  ipratropium-albuterol, 3 mL, Nebulization, Q4H - RT  metoprolol tartrate, 12.5 mg, Oral, Q12H  multivitamin, 1 tablet, Oral, Daily  pantoprazole, 40 mg, Oral, Q AM  polyethylene glycol, 17 g, Oral, Daily  predniSONE, 30 mg, Oral, Daily With Breakfast  sodium chloride, 10 mL, Intravenous, Q12H  tamsulosin, 0.4 mg, Oral, Daily  theophylline, 300 mg, Oral, Daily      Continuous Infusions:     Allergies:  Allergies   Allergen Reactions    Codeine GI Intolerance       Review of Systems   Constitutional: Positive for malaise/fatigue.  Negative for chills and diaphoresis.   Cardiovascular:  Positive for dyspnea on exertion. Negative for chest pain, irregular heartbeat, leg swelling, near-syncope, orthopnea, palpitations, paroxysmal nocturnal dyspnea and syncope.   Respiratory:  Positive for cough and shortness of breath. Negative for sleep disturbances due to breathing and sputum production.    Gastrointestinal:  Negative for change in bowel habit.   Genitourinary:  Negative for urgency.   Neurological:  Negative for dizziness and headaches.   Psychiatric/Behavioral:  Negative for altered mental status.          Objective:     Temp:  [97.7 °F (36.5 °C)-98.1 °F (36.7 °C)] 98.1 °F (36.7 °C)  Heart Rate:  [] 61  Resp:  [14-27] 16  BP: (103-142)/(54-75) 116/75     Intake/Output Summary (Last 24 hours) at 12/31/2024 1023  Last data filed at 12/31/2024 0922  Gross per 24 hour   Intake 480 ml   Output 1100 ml   Net -620 ml     Body mass index is 15.71 kg/m².      12/29/24  0514 12/30/24  0600 12/31/24  0501   Weight: 53 kg (116 lb 13.5 oz) 52.8 kg (116 lb 6.5 oz) 51.1 kg (112 lb 10.5 oz)         General Appearance:    Alert, cooperative, in no acute distress, frailty deconditioning noted                                Head: Atraumatic, normocephalic, PERRLA               Neck:   supple, no JVD   Lungs:     Supplemental O2, diminished bilat bases, no gross wheezes    Heart:    Regular rhythm and bradycardic rate 58 no new rubs murmurs, normal S1 and S2   Abdomen:     Normal bowel sounds, no masses, no organomegaly, soft  nontender, nondistended, no guarding, no rebound  tenderness   Extremities:   Moves all extremities well, no edema, no cyanosis, no  redness, global muscle wasting   Pulses:   Pulses palpable and equal bilaterally   Skin:   No bleeding, bruising or rash   Neurologic:   Awake, alert, oriented x3   Scribed findings above      Lab Review:   Results from last 7 days   Lab Units 12/31/24  0031 12/30/24  0050   SODIUM mmol/L 139 139  "  POTASSIUM mmol/L 5.0 4.9   CHLORIDE mmol/L 103 102   CO2 mmol/L 31.0* 32.6*   BUN mg/dL 33* 28*   CREATININE mg/dL 1.03 1.25   GLUCOSE mg/dL 134* 131*   CALCIUM mg/dL 8.6 8.7         Results from last 7 days   Lab Units 12/31/24  0031 12/30/24  0050   WBC 10*3/mm3 7.54 4.16   HEMOGLOBIN g/dL 9.2* 8.9*   HEMATOCRIT % 28.4* 27.7*   PLATELETS 10*3/mm3 158 152                   Invalid input(s): \"LDLCALC\"      Results from last 7 days   Lab Units 12/29/24  1334   TSH uIU/mL 3.470       Recent Radiology:  Imaging Results (Most Recent)       Procedure Component Value Units Date/Time    XR Chest 1 View [[id number]] Collected: 12/22/24 2256     Updated: 12/22/24 2259    Narrative:      XR CHEST 1 VW    Date of Exam: 12/22/2024 10:18 PM EST    Indication: Simple Sepsis Protocol    Comparison: 2/2/2024 and PET/CT 10/10/2024.    Findings:  Stable nodular thickening in the right lung apex. Stable diffuse coarse interstitial disease in both lungs and severe emphysema. There is increased opacity in the left lung base that could reflect a superimposed pneumonia. No pneumothorax or pleural   effusion. Heart size is normal. Pulmonary vasculature is largely obscured. No acute osseous abnormalities.      Impression:      Impression:  1.Increased opacity in the left lung base could reflect pneumonia.  2.Stable nodular thickening in the right lung apex.  3.Severe emphysema and chronic interstitial disease.          Electronically Signed: Sandro Saravia MD    12/22/2024 10:57 PM EST    Workstation ID: [id number]            ECHOCARDIOGRAM:    Results for orders placed during the hospital encounter of 02/01/24    Adult Transthoracic Echo Limited W/ Cont if Necessary Per Protocol    Interpretation Summary    Left ventricular systolic function is normal. Left ventricular ejection fraction appears to be 61 - 65%.    Left ventricular diastolic function was normal.    The left atrial cavity is mildly dilated.    Left atrial volume is mildly " increased.    There is moderate calcification of the aortic valve mainly affecting the left coronary and right coronary cusp(s).    Estimated right ventricular systolic pressure from tricuspid regurgitation is mildly elevated (35-45 mmHg).    Mild pulmonary hypertension is present.        I reviewed the patient's new clinical results.    EKG:      Assessment:       Pneumonia    Severe malnutrition    Rhinovirus    Anemia    Acute on chronic respiratory failure with hypoxia    Coronary artery disease    Hypertension    COPD (chronic obstructive pulmonary disease)    Acute exacerbation of COPD emphysema pneumonia exacerbating A-fib RVR now organized and atrial flutter with  Atrial flutter RVR- now rates controlled  Essential hypertension hyperlipidemia CAD PVD  Cachexia deconditioning poor functional status  Pneumonia  Rhinovirus  Anemia  CAD unknown details- on ASA / Plavix    Plan:   Patient converted to sinus rhythm rate 58-62  He is on metoprolol 12.5mg BID, amiodarone 200mg BID  No further digoxin therapy  No a/c secondary to h/o Anemia with Hgb 7.9, given overall prognosis possible hospice would not advise [facility]  EP evaluated patient- not ideal candidate for ablation, patient DNR and possible discussion for hospice, EP planning to await long term plan decisions      Continue to follow  Optimize antiarrhythmics and rate control  Discussed goals of care ongoing    Berto Barroso MD, PhD    Darlyn Guan, APRN  12/31/24  10:23 EST

## 2024-12-31 NOTE — PLAN OF CARE
Goal Outcome Evaluation:      Patient heart rhythm sinus arrhythmia. Held amiodarone 200mg r/t BP and HR. Patient was bradycardic last night even though amiodarone held. Turn Q2h. Call light in reach.

## 2024-12-31 NOTE — PLAN OF CARE
Assessment: Kali Garcia is an 80 y/o M (+) rhinovirus and PNA who presents with functional mobility impairments which indicate the need for skilled intervention. Pt demonstrates improvement in mobility this date, able to come to standing with CGA and verbal cues, and progress ambulation with Jerod. Pt highly unsteady on his feet but responds well to cues and visual demonstration. Ambulated 2 x 4 feet with Jerod and RW. Pt reports 9/10 RPE. Tolerating session today without incident. Will continue to follow and progress as tolerated.     Anticipated Discharge Disposition (PT): skilled nursing facility

## 2024-12-31 NOTE — THERAPY TREATMENT NOTE
"Subjective: Pt agreeable to therapeutic plan of care.    Objective:     Precautions - 5L HFNC; falls risk    Bed mobility - SBA with use of bed-rails.    Transfers - CGA for STS from low bed surface with verbal cues for hand-placement on bed-rail and RW.    Ambulation - 2 x 4 feet Min-A and with rolling walker. Midline crossing, tremorous movement BLE, and significant postural sway. Required seated rest break x 5 minutes before second attempt. Gait pattern improved mildly with visual demonstration and cues for widened ESTIVEN, however still tremorous with reduced motor control.    Therapeutic Exercise - 10 Reps B LE AROM lying supine and unsupported sitting / EOB: ankle pumps, straight leg raise, seated LAQ    Vitals: WNL. HR 59-61. SpO2 > 90% on 5L HFNC throughout. /69 sitting EOB > 128/50 after activity.    Pain: 0 VAS   Location: N/A  Intervention for pain: N/A    Education: Provided education on the importance of mobility in the acute care setting, Verbal/Tactile Cues, Transfer Training, Gait Training, and Energy conservation strategies    Assessment: Kali Garcia is an 78 y/o M (+) rhinovirus and PNA who presents with functional mobility impairments which indicate the need for skilled intervention. Pt demonstrates improvement in mobility this date, able to come to standing with CGA and verbal cues, and progress ambulation with Jerod. Pt highly unsteady on his feet but responds well to cues and visual demonstration. Ambulated 2 x 4 feet with Jerod and RW. Pt reports 9/10 RPE. Tolerating session today without incident. Will continue to follow and progress as tolerated.     Plan/Recommendations:   If medically appropriate, Moderate Intensity Therapy recommended post-acute care. This is recommended as therapy feels the patient would require 3-4 days per week and wouldn't tolerate \"3 hour daily\" rehab intensity. SNF would be the preferred choice. If the patient does not agree to SNF, arrange HH or OP depending " on home bound status. If patient is medically complex, consider LTACH. Pt requires no DME at discharge.     Pt desires Skilled Rehab placement at discharge. Pt cooperative; agreeable to therapeutic recommendations and plan of care.         Basic Mobility 6-click:  Rollin = Total, A lot = 2, A little = 3; 4 = None  Supine>Sit:   1 = Total, A lot = 2, A little = 3; 4 = None   Sit>Stand with arms:  1 = Total, A lot = 2, A little = 3; 4 = None  Bed>Chair:   1 = Total, A lot = 2, A little = 3; 4 = None  Ambulate in room:  1 = Total, A lot = 2, A little = 3; 4 = None  3-5 Steps with railin = Total, A lot = 2, A little = 3; 4 = None  Score: 18    Modified [place]: N/A = No pre-op stroke/TIA    Post-Tx Position: Up in Chair, Alarms activated, and Call light and personal items within reach  PPE: gloves and surgical mask    Therapy Charges for Today       Code Description Service Date Service Provider Modifiers Qty    36917930909 HC PT NEUROMUSC RE EDUCATION EA 15 MIN 2024 Anne Marie De León, PT GP 1    85653265817 HC PT THER PROC EA 15 MIN 2024 Anne Marie De León, PT GP 1    96591914915 HC GAIT TRAINING EA 15 MIN 2024 Anne Marie De León, PT GP 1           PT Charges       Row Name 24 1148             Time Calculation    Start Time 0906  -OD      Stop Time 0935  -OD      Time Calculation (min) 29 min  -OD      PT Received On 24  -OD      PT - Next Appointment 25  -OD         Time Calculation- PT    Total Timed Code Minutes- PT 29 minute(s)  -OD                User Key  (r) = Recorded By, (t) = Taken By, (c) = Cosigned By      Initials Name Provider Type    OD Anne Marie De León PT Physical Therapist

## 2025-01-01 PROBLEM — J18.9 PNEUMONIA, UNSPECIFIED ORGANISM: Status: ACTIVE | Noted: 2025-01-01

## 2025-01-01 LAB
ANION GAP SERPL CALCULATED.3IONS-SCNC: 3.9 MMOL/L (ref 5–15)
BASOPHILS # BLD AUTO: 0 10*3/MM3 (ref 0–0.2)
BASOPHILS NFR BLD AUTO: 0 % (ref 0–1.5)
BUN SERPL-MCNC: 30 MG/DL (ref 8–23)
BUN/CREAT SERPL: 41.7 (ref 7–25)
CALCIUM SPEC-SCNC: 8.3 MG/DL (ref 8.6–10.5)
CHLORIDE SERPL-SCNC: 105 MMOL/L (ref 98–107)
CO2 SERPL-SCNC: 32.1 MMOL/L (ref 22–29)
CREAT SERPL-MCNC: 0.72 MG/DL (ref 0.76–1.27)
DEPRECATED RDW RBC AUTO: 52.8 FL (ref 37–54)
EGFRCR SERPLBLD CKD-EPI 2021: 92.4 ML/MIN/1.73
EOSINOPHIL # BLD AUTO: 0 10*3/MM3 (ref 0–0.4)
EOSINOPHIL NFR BLD AUTO: 0 % (ref 0.3–6.2)
ERYTHROCYTE [DISTWIDTH] IN BLOOD BY AUTOMATED COUNT: 15.4 % (ref 12.3–15.4)
GLUCOSE SERPL-MCNC: 115 MG/DL (ref 65–99)
HCT VFR BLD AUTO: 26.1 % (ref 37.5–51)
HGB BLD-MCNC: 8.3 G/DL (ref 13–17.7)
IMM GRANULOCYTES # BLD AUTO: 0.34 10*3/MM3 (ref 0–0.05)
IMM GRANULOCYTES NFR BLD AUTO: 8 % (ref 0–0.5)
LYMPHOCYTES # BLD AUTO: 0.22 10*3/MM3 (ref 0.7–3.1)
LYMPHOCYTES NFR BLD AUTO: 5.2 % (ref 19.6–45.3)
MCH RBC QN AUTO: 30.1 PG (ref 26.6–33)
MCHC RBC AUTO-ENTMCNC: 31.8 G/DL (ref 31.5–35.7)
MCV RBC AUTO: 94.6 FL (ref 79–97)
MONOCYTES # BLD AUTO: 0.22 10*3/MM3 (ref 0.1–0.9)
MONOCYTES NFR BLD AUTO: 5.2 % (ref 5–12)
NEUTROPHILS NFR BLD AUTO: 3.46 10*3/MM3 (ref 1.7–7)
NEUTROPHILS NFR BLD AUTO: 81.6 % (ref 42.7–76)
NRBC BLD AUTO-RTO: 0 /100 WBC (ref 0–0.2)
PLATELET # BLD AUTO: 137 10*3/MM3 (ref 140–450)
PMV BLD AUTO: 10.7 FL (ref 6–12)
POTASSIUM SERPL-SCNC: 4.5 MMOL/L (ref 3.5–5.2)
RBC # BLD AUTO: 2.76 10*6/MM3 (ref 4.14–5.8)
SODIUM SERPL-SCNC: 141 MMOL/L (ref 136–145)
WBC NRBC COR # BLD AUTO: 4.24 10*3/MM3 (ref 3.4–10.8)

## 2025-01-01 PROCEDURE — 94761 N-INVAS EAR/PLS OXIMETRY MLT: CPT

## 2025-01-01 PROCEDURE — 94799 UNLISTED PULMONARY SVC/PX: CPT

## 2025-01-01 PROCEDURE — 94660 CPAP INITIATION&MGMT: CPT

## 2025-01-01 PROCEDURE — 80048 BASIC METABOLIC PNL TOTAL CA: CPT | Performed by: NURSE PRACTITIONER

## 2025-01-01 PROCEDURE — 85025 COMPLETE CBC W/AUTO DIFF WBC: CPT | Performed by: NURSE PRACTITIONER

## 2025-01-01 PROCEDURE — 63710000001 PREDNISONE PER 1 MG: Performed by: INTERNAL MEDICINE

## 2025-01-01 PROCEDURE — 25010000002 HEPARIN (PORCINE) PER 1000 UNITS

## 2025-01-01 PROCEDURE — 94664 DEMO&/EVAL PT USE INHALER: CPT

## 2025-01-01 PROCEDURE — 93005 ELECTROCARDIOGRAM TRACING: CPT

## 2025-01-01 RX ORDER — PREDNISONE 20 MG/1
TABLET ORAL
Qty: 8 TABLET | Refills: 0 | Status: SHIPPED | OUTPATIENT
Start: 2025-01-01 | End: 2025-01-11

## 2025-01-01 RX ORDER — THEOPHYLLINE 300 MG/1
300 TABLET, EXTENDED RELEASE ORAL DAILY
Qty: 30 TABLET | Refills: 0 | Status: SHIPPED | OUTPATIENT
Start: 2025-01-01 | End: 2025-01-01

## 2025-01-01 RX ORDER — TAMSULOSIN HYDROCHLORIDE 0.4 MG/1
0.4 CAPSULE ORAL DAILY
Qty: 30 CAPSULE | Refills: 0 | Status: ON HOLD | OUTPATIENT
Start: 2025-01-01 | End: 2025-01-31

## 2025-01-01 RX ORDER — BUDESONIDE 0.5 MG/2ML
0.5 INHALANT ORAL
Qty: 120 ML | Refills: 0 | Status: ON HOLD | OUTPATIENT
Start: 2025-01-01 | End: 2025-01-31

## 2025-01-01 RX ORDER — METOPROLOL TARTRATE 25 MG/1
25 TABLET, FILM COATED ORAL EVERY 12 HOURS SCHEDULED
Status: DISCONTINUED | OUTPATIENT
Start: 2025-01-01 | End: 2025-01-03 | Stop reason: HOSPADM

## 2025-01-01 RX ORDER — AMIODARONE HYDROCHLORIDE 200 MG/1
200 TABLET ORAL 2 TIMES DAILY
Qty: 60 TABLET | Refills: 0 | Status: ON HOLD | OUTPATIENT
Start: 2025-01-01 | End: 2025-01-31

## 2025-01-01 RX ORDER — ARFORMOTEROL TARTRATE 15 UG/2ML
15 SOLUTION RESPIRATORY (INHALATION)
Qty: 120 ML | Refills: 0 | Status: ON HOLD | OUTPATIENT
Start: 2025-01-01 | End: 2025-01-31

## 2025-01-01 RX ORDER — TAMSULOSIN HYDROCHLORIDE 0.4 MG/1
0.4 CAPSULE ORAL DAILY
Qty: 30 CAPSULE | Refills: 0 | Status: SHIPPED | OUTPATIENT
Start: 2025-01-01 | End: 2025-01-01

## 2025-01-01 RX ORDER — BUDESONIDE 0.5 MG/2ML
0.5 INHALANT ORAL
Qty: 120 ML | Refills: 0 | Status: SHIPPED | OUTPATIENT
Start: 2025-01-01 | End: 2025-01-01

## 2025-01-01 RX ORDER — ARFORMOTEROL TARTRATE 15 UG/2ML
15 SOLUTION RESPIRATORY (INHALATION)
Qty: 120 ML | Refills: 0 | Status: SHIPPED | OUTPATIENT
Start: 2025-01-01 | End: 2025-01-01

## 2025-01-01 RX ORDER — PREDNISONE 20 MG/1
TABLET ORAL
Qty: 8 TABLET | Refills: 0 | Status: SHIPPED | OUTPATIENT
Start: 2025-01-01 | End: 2025-01-01

## 2025-01-01 RX ORDER — AMIODARONE HYDROCHLORIDE 200 MG/1
200 TABLET ORAL 2 TIMES DAILY
Qty: 60 TABLET | Refills: 0 | Status: SHIPPED | OUTPATIENT
Start: 2025-01-01 | End: 2025-01-01

## 2025-01-01 RX ORDER — THEOPHYLLINE 300 MG/1
300 TABLET, EXTENDED RELEASE ORAL DAILY
Qty: 30 TABLET | Refills: 0 | Status: ON HOLD | OUTPATIENT
Start: 2025-01-01 | End: 2025-01-31

## 2025-01-01 RX ADMIN — BUDESONIDE INHALATION 0.5 MG: 0.5 SUSPENSION RESPIRATORY (INHALATION) at 20:37

## 2025-01-01 RX ADMIN — HEPARIN SODIUM 5000 UNITS: 5000 INJECTION INTRAVENOUS; SUBCUTANEOUS at 06:12

## 2025-01-01 RX ADMIN — BUDESONIDE INHALATION 0.5 MG: 0.5 SUSPENSION RESPIRATORY (INHALATION) at 08:31

## 2025-01-01 RX ADMIN — ATORVASTATIN CALCIUM 10 MG: 10 TABLET ORAL at 09:39

## 2025-01-01 RX ADMIN — PREDNISONE 20 MG: 20 TABLET ORAL at 09:39

## 2025-01-01 RX ADMIN — AMIODARONE HYDROCHLORIDE 200 MG: 200 TABLET ORAL at 09:39

## 2025-01-01 RX ADMIN — GUAIFENESIN 1200 MG: 600 TABLET, EXTENDED RELEASE ORAL at 09:39

## 2025-01-01 RX ADMIN — HEPARIN SODIUM 5000 UNITS: 5000 INJECTION INTRAVENOUS; SUBCUTANEOUS at 17:08

## 2025-01-01 RX ADMIN — THEOPHYLLINE 300 MG: 300 TABLET, EXTENDED RELEASE ORAL at 09:39

## 2025-01-01 RX ADMIN — Medication 10 ML: at 10:11

## 2025-01-01 RX ADMIN — IPRATROPIUM BROMIDE AND ALBUTEROL SULFATE 3 ML: .5; 3 SOLUTION RESPIRATORY (INHALATION) at 15:57

## 2025-01-01 RX ADMIN — GUAIFENESIN 1200 MG: 600 TABLET, EXTENDED RELEASE ORAL at 20:55

## 2025-01-01 RX ADMIN — PANTOPRAZOLE SODIUM 40 MG: 40 TABLET, DELAYED RELEASE ORAL at 06:12

## 2025-01-01 RX ADMIN — ARFORMOTEROL TARTRATE 15 MCG: 15 SOLUTION RESPIRATORY (INHALATION) at 08:31

## 2025-01-01 RX ADMIN — Medication 10 ML: at 20:56

## 2025-01-01 RX ADMIN — IPRATROPIUM BROMIDE AND ALBUTEROL SULFATE 3 ML: .5; 3 SOLUTION RESPIRATORY (INHALATION) at 03:18

## 2025-01-01 RX ADMIN — ARFORMOTEROL TARTRATE 15 MCG: 15 SOLUTION RESPIRATORY (INHALATION) at 20:31

## 2025-01-01 RX ADMIN — IPRATROPIUM BROMIDE AND ALBUTEROL SULFATE 3 ML: .5; 3 SOLUTION RESPIRATORY (INHALATION) at 12:32

## 2025-01-01 RX ADMIN — HEPARIN SODIUM 5000 UNITS: 5000 INJECTION INTRAVENOUS; SUBCUTANEOUS at 20:56

## 2025-01-01 RX ADMIN — THERA TABS 1 TABLET: TAB at 09:39

## 2025-01-01 RX ADMIN — METOPROLOL TARTRATE 25 MG: 25 TABLET, FILM COATED ORAL at 22:33

## 2025-01-01 RX ADMIN — TAMSULOSIN HYDROCHLORIDE 0.4 MG: 0.4 CAPSULE ORAL at 09:39

## 2025-01-01 RX ADMIN — CLOPIDOGREL BISULFATE 75 MG: 75 TABLET ORAL at 09:39

## 2025-01-01 NOTE — PLAN OF CARE
Problem: Adult Inpatient Plan of Care  Goal: Plan of Care Review  Outcome: Not Progressing  Flowsheets (Taken 1/1/2025 1639)  Progress: declining  Outcome Evaluation: patient continues to be on 6L HFNC. patient was to DC to [facility] today with hospice following. Patient had 4-5 symptomatic second pauses x 3 during this shift. MD notified. cardiology adjusted meds and patient on schedule to get pacemaker tomorrow afternoon.  Plan of Care Reviewed With:   patient   family  Goal: Patient-Specific Goal (Individualized)  Outcome: Not Progressing  Goal: Absence of Hospital-Acquired Illness or Injury  Outcome: Not Progressing  Intervention: Identify and Manage Fall Risk  Description: Perform standard risk assessment on admission using a validated tool or comprehensive approach appropriate to the patient; reassess fall risk frequently, with change in status or transfer to another level of care.  Communicate risk to interprofessional healthcare team; ensure fall risk visible cue.  Determine need for increased observation, equipment and environmental modification, as well as use of supportive, nonskid footwear.  Adjust safety measures to individual needs and identified risk factors.  Reinforce the importance of active participation with fall risk prevention, safety, and physical activity with the patient and family.  Perform regular intentional rounding to assess need for position change, pain assessment and personal needs, including assistance with toileting.  Recent Flowsheet Documentation  Taken 1/1/2025 1600 by Rosalie Rojas, RN  Safety Promotion/Fall Prevention:   safety round/check completed   fall prevention program maintained  Taken 1/1/2025 1400 by Rosalie Rojas, RN  Safety Promotion/Fall Prevention:   safety round/check completed   fall prevention program maintained  Taken 1/1/2025 1200 by Rosalie Rojas, RN  Safety Promotion/Fall Prevention:   safety round/check completed   fall prevention program  maintained  Taken 1/1/2025 1000 by Rosalie Rojas RN  Safety Promotion/Fall Prevention: safety round/check completed  Taken 1/1/2025 0800 by Rosalie Rojas RN  Safety Promotion/Fall Prevention:   safety round/check completed   fall prevention program maintained  Intervention: Prevent Skin Injury  Description: Perform a screening for skin injury risk, such as pressure or moisture-associated skin damage on admission and at regular intervals throughout hospital stay.  Keep all areas of skin (especially folds) clean and dry.  Maintain adequate skin hydration.  Relieve and redistribute pressure and protect bony prominences and skin at risk for injury; implement measures based on patient-specific risk factors.  Match turning and repositioning schedule to clinical condition.  Encourage weight shift frequently; assist with reposition if unable to complete independently.  Float heels off bed; avoid pressure on the Achilles tendon.  Keep skin free from extended contact with medical devices.  Optimize nutrition and hydration.  Encourage functional activity and mobility, as early as tolerated.  Use aids (e.g., slide boards, mechanical lift) during transfer.  Recent Flowsheet Documentation  Taken 1/1/2025 1600 by Rosalie Rojas RN  Skin Protection:   incontinence pads utilized   silicone foam dressing in place   protective footwear used  Taken 1/1/2025 1200 by Rosalie Rojas RN  Skin Protection: incontinence pads utilized  Taken 1/1/2025 0800 by Rosalie Rojas RN  Skin Protection: incontinence pads utilized  Intervention: Prevent and Manage VTE (Venous Thromboembolism) Risk  Description: Assess for VTE (venous thromboembolism) risk.  Promote early mobilization; encourage both active and passive leg exercises, if unable to ambulate.  Initiate and maintain compression or other therapy, as indicated, based on identified risk in accordance with organizational protocol and provider order.  Recognize the patient's  individual risk for bleeding before initiating pharmacologic thromboprophylaxis.  Recent Flowsheet Documentation  Taken 1/1/2025 1200 by Rosalie Rojas RN  VTE Prevention/Management: SCDs (sequential compression devices) off  Taken 1/1/2025 0800 by Rosalie Rojas RN  VTE Prevention/Management: SCDs (sequential compression devices) off  Goal: Optimal Comfort and Wellbeing  Outcome: Not Progressing  Intervention: Provide Person-Centered Care  Description: Use a family-focused approach to care; encourage support system presence and participation.  Develop trust and rapport by proactively providing information, encouraging questions, addressing concerns and offering reassurance.  Acknowledge emotional response to hospitalization.  Recognize and utilize personal coping strategies and strengths; develop goals via shared decision-making.  Honor spiritual and cultural preferences.  Recent Flowsheet Documentation  Taken 1/1/2025 1600 by Rosalie Rojas RN  Trust Relationship/Rapport:   care explained   choices provided   emotional support provided  Taken 1/1/2025 1200 by Rosalie Rojas RN  Trust Relationship/Rapport:   care explained   choices provided  Taken 1/1/2025 0800 by Rosalie Rojas RN  Trust Relationship/Rapport:   choices provided   care explained   emotional support provided  Goal: Readiness for Transition of Care  Outcome: Not Progressing     Problem: Fall Injury Risk  Goal: Absence of Fall and Fall-Related Injury  Outcome: Not Progressing  Intervention: Promote Injury-Free Environment  Description: Provide a safe, barrier-free environment that encourages independent activity.  Keep care area uncluttered and well-lighted.  Determine need for increased observation or monitoring.  Avoid use of devices that minimize mobility, such as restraints or indwelling urinary catheter.  Recent Flowsheet Documentation  Taken 1/1/2025 1600 by Rosalie Rojas RN  Safety Promotion/Fall Prevention:   safety  round/check completed   fall prevention program maintained  Taken 1/1/2025 1400 by Rosalie Rojas RN  Safety Promotion/Fall Prevention:   safety round/check completed   fall prevention program maintained  Taken 1/1/2025 1200 by Rosalie Rojas RN  Safety Promotion/Fall Prevention:   safety round/check completed   fall prevention program maintained  Taken 1/1/2025 1000 by Rosalie Rojas RN  Safety Promotion/Fall Prevention: safety round/check completed  Taken 1/1/2025 0800 by Rosalie Rojas RN  Safety Promotion/Fall Prevention:   safety round/check completed   fall prevention program maintained     Problem: Skin Injury Risk Increased  Goal: Skin Health and Integrity  Outcome: Not Progressing  Intervention: Optimize Skin Protection  Description: Perform a full pressure injury risk assessment, as indicated by screening, upon admission to care unit.  Reassess skin (full inspection and injury risk, including skin temperature, consistency and color) frequently (e.g., scheduled interval, with change in condition) to provide optimal early detection and prevention.  Maintain adequate tissue perfusion (e.g., encourage fluid balance; avoid crossing legs, constrictive clothing or devices) to promote tissue oxygenation.  Maintain head of bed at lowest degree of elevation tolerated, considering medical condition and other restrictions. Use positioning supports to prevent sliding and friction. Consider low friction textiles.  Avoid positioning onto an area that remains reddened or on bony prominences.  Minimize incontinence and moisture (e.g., toileting schedule; moisture-wicking pad, diaper or incontinence collection device; skin moisture barrier).  Cleanse skin promptly and gently, when soiled, utilizing a pH-balanced cleanser.  Relieve and redistribute pressure (e.g., scheduled position changes, weight shifts, use of support surface, medical device repositioning, protective dressing application, use of positioning  device, microclimate control, use of pressure-injury-monitor  Encourage increased activity, such as sitting in a chair at the bedside or early mobilization, when able to tolerate. Avoid prolonged sitting.  Recent Flowsheet Documentation  Taken 1/1/2025 1600 by Rosalie Rojas RN  Pressure Reduction Techniques:   frequent weight shift encouraged   weight shift assistance provided   pressure points protected  Pressure Reduction Devices:   positioning supports utilized   specialty bed utilized   pressure-redistributing mattress utilized  Skin Protection:   incontinence pads utilized   silicone foam dressing in place   protective footwear used  Taken 1/1/2025 1200 by Rosalie Rojas RN  Pressure Reduction Techniques:   frequent weight shift encouraged   weight shift assistance provided  Pressure Reduction Devices:   positioning supports utilized   specialty bed utilized   pressure-redistributing mattress utilized  Skin Protection: incontinence pads utilized  Taken 1/1/2025 0800 by Rosalie Rojas RN  Pressure Reduction Techniques:   frequent weight shift encouraged   weight shift assistance provided  Pressure Reduction Devices:   specialty bed utilized   pressure-redistributing mattress utilized   positioning supports utilized  Skin Protection: incontinence pads utilized     Problem: Comorbidity Management  Goal: Blood Pressure in Desired Range  Outcome: Not Progressing     Problem: Noninvasive Ventilation Acute  Goal: Effective Unassisted Ventilation and Oxygenation  Outcome: Not Progressing     Problem: Malnutrition  Goal: Improved Nutritional Intake  Outcome: Not Progressing     Problem: Sepsis/Septic Shock  Goal: Optimal Coping  Outcome: Not Progressing  Intervention: Support Patient and Family Response  Description: Acknowledge, normalize, validate intensity and complexity of patient and support system response to situation.  Provide opportunity for expression of thoughts, feelings and concerns; respond with  compassion and reassurance.  Decrease stress and anxiety by providing information about patient's status and treatment.  Facilitate support system presence and participation in care; consider providing a diary in intensive care situation.  Support coping by recognizing current coping strategies; provide aid in developing new strategies.  Assess and monitor for signs and symptoms of psychologic distress, anxiety and depression.  Utilize complementary therapy, such as music, massage or guided imagery.  Engage in proactive and ongoing discussion about goals of care and facilitate shared decision-making.  Connect with community resources for ongoing support, such as counseling and group support.  Recent Flowsheet Documentation  Taken 1/1/2025 1600 by Rosalie Rojas, RN  Supportive Measures: active listening utilized  Family/Support System Care:   self-care encouraged   support provided  Taken 1/1/2025 1200 by Rosalie Rojas, RN  Family/Support System Care:   support provided   self-care encouraged  Taken 1/1/2025 0800 by Rosalie Rojas, RN  Family/Support System Care:   self-care encouraged   support provided  Goal: Absence of Bleeding  Outcome: Not Progressing  Goal: Blood Glucose Level Within Target Range  Outcome: Not Progressing  Goal: Absence of Infection Signs and Symptoms  Outcome: Not Progressing  Intervention: Promote Recovery  Description: Encourage pulmonary hygiene, such as cough-enhancement and airway-clearance techniques, that may include use of incentive spirometry, deep breathing and cough.  Encourage early rehabilitation and physical activity to optimize functional ability and activity tolerance, as well as minimize delirium.  Promote energy conservation; minimize oxygen demand and consumption by adjusting environment, decreasing stimulation, maintaining normothermia and treating pain.  Optimize fluid balance, nutrition intake, sleep and glycemic control to maintain tissue perfusion and enhance  immune response.  Recent Flowsheet Documentation  Taken 1/1/2025 1600 by Rosalie Rojas, RN  Sleep/Rest Enhancement:   awakenings minimized   consistent schedule promoted  Taken 1/1/2025 1200 by Rosalie Rojas, RN  Sleep/Rest Enhancement:   awakenings minimized   consistent schedule promoted  Taken 1/1/2025 0800 by Rosalie Rojas, RN  Sleep/Rest Enhancement:   awakenings minimized   consistent schedule promoted   family presence promoted  Goal: Optimal Nutrition Delivery  Outcome: Not Progressing   Goal Outcome Evaluation:  Plan of Care Reviewed With: patient, family        Progress: declining  Outcome Evaluation: patient continues to be on 6L HFNC. patient was to DC to [facility] today with hospice following. Patient had 4-5 symptomatic second pauses x 3 during this shift. MD notified. cardiology adjusted meds and patient on schedule to get pacemaker tomorrow afternoon.

## 2025-01-01 NOTE — PROGRESS NOTES
Daily Progress Note          Assessment    Pneumonia due to unspecified pathogen  COPD: At home on Trelegy inhaler  Rhinovirus infection  Hypoxemia: At home 6 L of oxygen  CAD  PVD  HTN  HLD  A-fib  Anemia  Former smoker quit 2022 after 60 pack years  Echo 2/1/2024 EF 61-65% mild pulmonary hypertension 35-45 mmHg     CT scan of the chest in June 2024 showing a noncalcified nodule in the right upper lobe around 2 x 1.1 x 1 cm. Patient was also noted to have patchy airspace disease and clustered micronodules in the lingula. He then underwent PET scan showing a 1.7 x 1.1 cm partially calcified irregular shaped nodule in the right upper lobe to be metabolically active with SUV of 4.84 and additional 2 metabolically active irregular nodular densities in the right upper lobe. Inferior apical segment with intervening areas of metabolically active interstitial thickening.     PFTs: Very severe airflow obstruction with significant air trapping and reduced diffusion capacity. FEV1/FVC postbronchodilator is 0.39 with FEV1 of 0.71 L or 28% predicted and FVC of 1.82 L or 47% predicted. No significant bronchodilator response noted. Total lung capacity of 7.77 L or 131% predicted and residual volume of 5.82 L or 226% predicted and DLCO of 61% predicted noted.         Recommendations:  Respiratory status is close to his baseline, he has chronic mild wheezing and chronic scattered crackles  Slowly wean down steroids    patient is very cachectic with poor prognosis, patient decided for DNR and home hospice, awaiting placement in [facility]     antibiotics: Unasyn completed 5 days  Nebulized Mucomyst completed  Encourage use of incentive spirometry and flutter valve  Theophylline    Nebulized Pulmicort  Oxygen supplement and titration to maintain saturation 90 to 95%: Currently on 6 L per nasal cannula  Bronchodilators  Mucinex     Atorvastatin  Plavix     I personally reviewed the radiological studies             LOS: 9 days      Subjective     Chronic cough and shortness of breath    Objective     Vital signs for last 24 hours:  Vitals:    01/01/25 0840 01/01/25 1153 01/01/25 1232 01/01/25 1239   BP:  125/69     BP Location:       Patient Position:       Pulse: 84 95 75 90   Resp: 19 21 16 16   Temp:       TempSrc:       SpO2: 98% 92% 99% 97%   Weight:       Height:           Intake/Output last 3 shifts:  I/O last 3 completed shifts:  In: 480 [P.O.:480]  Out: 1200 [Urine:1200]  Intake/Output this shift:  I/O this shift:  In: 120 [P.O.:120]  Out: 700 [Urine:700]      Radiology  Imaging Results (Last 24 Hours)       ** No results found for the last 24 hours. **            Labs:  Results from last 7 days   Lab Units 01/01/25  0110   WBC 10*3/mm3 4.24   HEMOGLOBIN g/dL 8.3*   HEMATOCRIT % 26.1*   PLATELETS 10*3/mm3 137*     Results from last 7 days   Lab Units 01/01/25  0110   SODIUM mmol/L 141   POTASSIUM mmol/L 4.5   CHLORIDE mmol/L 105   CO2 mmol/L 32.1*   BUN mg/dL 30*   CREATININE mg/dL 0.72*   CALCIUM mg/dL 8.3*   GLUCOSE mg/dL 115*                                 Results from last 7 days   Lab Units 12/29/24  1334   TSH uIU/mL 3.470           Meds:   SCHEDULE  arformoterol, 15 mcg, Nebulization, BID - RT  atorvastatin, 10 mg, Oral, Daily  budesonide, 0.5 mg, Nebulization, BID - RT  clopidogrel, 75 mg, Oral, Daily  guaiFENesin, 1,200 mg, Oral, Q12H  heparin (porcine), 5,000 Units, Subcutaneous, Q8H  ipratropium-albuterol, 3 mL, Nebulization, Q4H - RT  metoprolol tartrate, 25 mg, Oral, Q12H  multivitamin, 1 tablet, Oral, Daily  pantoprazole, 40 mg, Oral, Q AM  polyethylene glycol, 17 g, Oral, Daily  predniSONE, 20 mg, Oral, Daily With Breakfast  sodium chloride, 10 mL, Intravenous, Q12H  tamsulosin, 0.4 mg, Oral, Daily  theophylline, 300 mg, Oral, Daily      Infusions       PRNs    acetaminophen    aluminum-magnesium hydroxide-simethicone    senna-docusate sodium **AND** polyethylene glycol **AND** bisacodyl **AND** bisacodyl     Calcium Replacement - Follow Nurse / BPA Driven Protocol    hydrALAZINE    ipratropium-albuterol    Magnesium Standard Dose Replacement - Follow Nurse / BPA Driven Protocol    melatonin    ondansetron ODT **OR** ondansetron    Phosphorus Replacement - Follow Nurse / BPA Driven Protocol    Potassium Replacement - Follow Nurse / BPA Driven Protocol    sodium chloride    sodium chloride    sodium chloride    Physical Exam:  General Appearance:  Alert, looks chronically ill and cachectic  HEENT:  Normocephalic, without obvious abnormality, Conjunctiva/corneas clear,.   Nares normal, no drainage     Neck:  Supple, symmetrical, trachea midline.   Lungs /Chest wall: Prolonged expiration and mild bilateral basal rhonchi, respirations unlabored, symmetrical wall movement.     Heart:  Regular rate and rhythm, S1 S2 normal  Abdomen: Soft, non-tender, no masses, no organomegaly.    Extremities: No edema, no clubbing or cyanosis     ROS  Constitutional: Negative for chills, fever and malaise/fatigue.   HENT: Negative.    Eyes: Negative.    Cardiovascular: Negative.    Respiratory: Positive for chronic cough and shortness of breath.    Skin: Negative.    Musculoskeletal: Negative.    Gastrointestinal: Negative.    Genitourinary: Negative.    Neurological: Generalized weakness      I reviewed the recent clinical results  I personally reviewed the latest radiological studies    Part of this note may be an electronic transcription/translation of spoken language to printed text using the Dragon Dictation System.

## 2025-01-01 NOTE — PROGRESS NOTES
"    Reason for follow-up: A-fib     Patient Care Team:  Tami Rasmussen APRN as PCP - General (Nurse Practitioner)    Subjective .   Kali Garcia is doing fair today     ROS    Codeine    Scheduled Meds:arformoterol, 15 mcg, Nebulization, BID - RT  atorvastatin, 10 mg, Oral, Daily  budesonide, 0.5 mg, Nebulization, BID - RT  clopidogrel, 75 mg, Oral, Daily  guaiFENesin, 1,200 mg, Oral, Q12H  heparin (porcine), 5,000 Units, Subcutaneous, Q8H  ipratropium-albuterol, 3 mL, Nebulization, Q4H - RT  metoprolol tartrate, 25 mg, Oral, Q12H  multivitamin, 1 tablet, Oral, Daily  pantoprazole, 40 mg, Oral, Q AM  polyethylene glycol, 17 g, Oral, Daily  predniSONE, 20 mg, Oral, Daily With Breakfast  sodium chloride, 10 mL, Intravenous, Q12H  tamsulosin, 0.4 mg, Oral, Daily  theophylline, 300 mg, Oral, Daily      Continuous Infusions:   PRN Meds:.  acetaminophen    aluminum-magnesium hydroxide-simethicone    senna-docusate sodium **AND** polyethylene glycol **AND** bisacodyl **AND** bisacodyl    Calcium Replacement - Follow Nurse / BPA Driven Protocol    hydrALAZINE    ipratropium-albuterol    Magnesium Standard Dose Replacement - Follow Nurse / BPA Driven Protocol    melatonin    ondansetron ODT **OR** ondansetron    Phosphorus Replacement - Follow Nurse / BPA Driven Protocol    Potassium Replacement - Follow Nurse / BPA Driven Protocol    sodium chloride    sodium chloride    sodium chloride      VITAL SIGNS  Vitals:    01/01/25 0831 01/01/25 0835 01/01/25 0836 01/01/25 0840   BP:       BP Location:       Patient Position:       Pulse: 84 89 87 84   Resp: 26 17 12 19   Temp:       TempSrc:       SpO2: 93% 98% 99% 98%   Weight:       Height:           Flowsheet Rows      Flowsheet Row First Filed Value   Admission Height 180.3 cm (71\") Documented at 12/22/2024 2139   Admission Weight 49.9 kg (110 lb 0.2 oz) Documented at 12/22/2024 2139               Physical Exam  VITALS REVIEWED    General:      well developed, in " no acute distress.    Head:      normocephalic and atraumatic.    Eyes:      PERRL/EOM intact, conjunctiva and sclera clear with out nystagmus.    Neck:      no masses, thyromegaly,  trachea central with normal respiratory effort and PMI displaced laterally  Lungs:      Decreased air entry  Heart:       Irregular rhythm  Msk:      no deformity or scoliosis noted of thoracic or lumbar spine.    Pulses:      pulses normal in all 4 extremities.    Extremities:       No lower extremity edema  Neurologic:      no focal deficits.   alert oriented x3  Skin:      intact without lesions or rashes.    Psych:      alert and cooperative; normal mood and affect; normal attention span and concentration.          LAB RESULTS (LAST 7 DAYS)    CBC  Results from last 7 days   Lab Units 01/01/25  0110 12/31/24  0031 12/30/24  0050 12/29/24  0353 12/28/24  0015 12/27/24  0331 12/26/24  0332   WBC 10*3/mm3 4.24 7.54 4.16 3.57 4.32 4.60 5.86   RBC 10*6/mm3 2.76* 3.02* 2.95* 2.86* 2.76* 2.67* 2.46*   HEMOGLOBIN g/dL 8.3* 9.2* 8.9* 8.4* 8.3* 7.7* 7.5*   HEMATOCRIT % 26.1* 28.4* 27.7* 26.8* 26.6* 25.8* 23.5*   MCV fL 94.6 94.0 93.9 93.7 96.4 96.6 95.5   PLATELETS 10*3/mm3 137* 158 152 145 137* 142 138*       BMP  Results from last 7 days   Lab Units 01/01/25  0110 12/31/24  0031 12/30/24  0050 12/29/24  0353 12/28/24  0604 12/28/24  0015 12/27/24  1647   SODIUM mmol/L 141 139 139 137 140 142 144   POTASSIUM mmol/L 4.5 5.0 4.9 4.8 4.4 4.7 4.6   CHLORIDE mmol/L 105 103 102 101 103 104 105   CO2 mmol/L 32.1* 31.0* 32.6* 32.3* 31.9* 32.3* 32.0*   BUN mg/dL 30* 33* 28* 22 26* 28* 30*   CREATININE mg/dL 0.72* 1.03 1.25 0.99 0.77 0.83 0.90   GLUCOSE mg/dL 115* 134* 131* 103* 92 122* 167*       CMP   Results from last 7 days   Lab Units 01/01/25  0110 12/31/24  0031 12/30/24  0050 12/29/24  0353 12/28/24  0604 12/28/24  0015 12/27/24  1647   SODIUM mmol/L 141 139 139 137 140 142 144   POTASSIUM mmol/L 4.5 5.0 4.9 4.8 4.4 4.7 4.6   CHLORIDE mmol/L  105 103 102 101 103 104 105   CO2 mmol/L 32.1* 31.0* 32.6* 32.3* 31.9* 32.3* 32.0*   BUN mg/dL 30* 33* 28* 22 26* 28* 30*   CREATININE mg/dL 0.72* 1.03 1.25 0.99 0.77 0.83 0.90   GLUCOSE mg/dL 115* 134* 131* 103* 92 122* 167*         BNP        TROPONIN        CoAg        Creatinine Clearance  Estimated Creatinine Clearance: 59.3 mL/min (A) (by C-G formula based on SCr of 0.72 mg/dL (L)).    ABG          EKG    I personally reviewed the patient's EKG/Telemetry data: Atrial fibrillation      Assessment & Plan       Pneumonia    Severe malnutrition    Rhinovirus    Anemia    Acute on chronic respiratory failure with hypoxia    Coronary artery disease    Hypertension    COPD (chronic obstructive pulmonary disease)    Pneumonia, unspecified organism      Kali Garcia is an 79-year-old male patient who has severe emphysema, oxygen dependent, is admitted to the hospital due to COPD exacerbation, rhinovirus infection.  Patient also has history of paroxysmal atrial fibrillation, he was in A-fib in February 2024 when he was admitted for COPD exacerbation.  He had self converted to sinus rhythm and he was discharged home on amiodarone.  He did not seem to be on anticoagulation at home, possibly due to anemia (7.8 on admission).  Patient was in sinus rhythm on admission, however he developed atrial flutter with rapid ventricular rates.  His amiodarone was increased to 200 mg twice a day, also on metoprolol 12.5 twice a day.  In his case, mainly because of his advanced COPD I do not think he would be a good candidate for ablation.    Patient is now back in atrial fibrillation, heart rate in the 70s, some variability but no profound bradycardia.  I think in this case rate control would be the best option.  Will start him on metoprolol 25 twice a day, stop amiodarone.    Patient is now DNR and is going home with hospice      Eliot Mcgarry MD  01/01/25  11:04 EST        Electronically signed by Eliot Mcgarry MD,  01/01/25, 11:07 AM EST.

## 2025-01-01 NOTE — CASE MANAGEMENT/SOCIAL WORK
Continued Stay Note   [facility]     Patient Name: Kali Garcia  MRN: [id number]  Today's Date: 1/1/2025    Admit Date: 12/22/2024    Plan: [facility] accepted (can accept up to 15L O2). Precert approved (valid 12/28/24-1/3/25). PASRR approved. [facility] following. Current home 02 at 6L through [employer].   Discharge Plan       Row Name 01/01/25 1048       Plan    Plan [facility] accepted (can accept up to 15L O2). Precert approved (valid 12/28/24-1/3/25). PASRR approved. [facility] following. Current home 02 at 6L through [employer].    Patient/Family in Agreement with Plan yes    Provided Post Acute Provider List? N/A    Provided Post Acute Provider Quality & Resource List? N/A    Plan Comments CM reviewed chart documentation for clinical updates. DC orders in place. CM reached out to liaison to follow up on bed availability and she confirms it is ready. No transport available. CM updated pharmacy in [facility] and asked MD to redirect DC meds to [facility]. CM noitified MD and nursing of bed being ready. Patient family will need to provide transport. Patient does not qualify for EMS transport. CM reminded nursing to ask family to bring a full transport tank from patient home when they arrive to take him to facility. No barriers.                 Expected Discharge Date and Time       Expected Discharge Date Expected Discharge Time    Jan 1, 2025           Phone communication or documentation only- no physical contact with patient or family.      Naomi Rincon RN    Office Phone: [phone]  Office Cell:     [phone]

## 2025-01-01 NOTE — NURSING NOTE
"Patient U wave prominent and patient converted back into afib with controlled rate. cardiologist notified. Medications adjusted.     Patient had 3-5 second pause on monitor x2. Patient c/o \"hot flashes to my head' patients heart rate in 80s-90s afib on monitor as of current. MD stat paged. MD wants RN to call patients family to see if they would want pacemaker and to hold lopressor at this time. This RN called patients son Ivis that is listed on emergency contact. Explained that patients heart rate is having pauses and the doctor could do pacemaker if they wanted it. Ivis stated he will be up her shortly. MD notified via secure chat that family will be here to determine if they want patient to get pacemaker    Patients heart rate now sustaining 120s-130s- per MD okay to give lopressor. When this RN went to give lopressor patient had another pause and heart rate dropped to 80s.     Patients family came to bedside and have questions about pacemaker. MD called. MD putting patient on schedule for tomorrow afternoon for pacemaker insertion. Per patients family they will be at bedside to discuss procedure with MD in the morning   "

## 2025-01-01 NOTE — PLAN OF CARE
Goal Outcome Evaluation:      Patient HR has been teetering between louann and normal for duration of shift. RN did not administer amiodarone 200mg R/T heart rate and high risk of making heart rate lower. Patient had an abnormal rhythm change 0415. EKG obtained and its interpretation is sinus rhythm with abnormal T. Patient asymptomatic. Patient resting with call light in reach.

## 2025-01-02 ENCOUNTER — ANESTHESIA (OUTPATIENT)
Dept: CARDIOLOGY | Facility: HOSPITAL | Age: 80
End: 2025-01-02
Payer: MEDICARE

## 2025-01-02 ENCOUNTER — PREP FOR SURGERY (OUTPATIENT)
Dept: OTHER | Facility: HOSPITAL | Age: 80
End: 2025-01-02
Payer: MEDICARE

## 2025-01-02 ENCOUNTER — ANESTHESIA EVENT (OUTPATIENT)
Dept: CARDIOLOGY | Facility: HOSPITAL | Age: 80
End: 2025-01-02
Payer: MEDICARE

## 2025-01-02 DIAGNOSIS — I49.5 SICK SINUS SYNDROME: Primary | ICD-10-CM

## 2025-01-02 LAB
ACANTHOCYTES BLD QL SMEAR: ABNORMAL
ANION GAP SERPL CALCULATED.3IONS-SCNC: 5.3 MMOL/L (ref 5–15)
BUN SERPL-MCNC: 24 MG/DL (ref 8–23)
BUN/CREAT SERPL: 35.3 (ref 7–25)
CALCIUM SPEC-SCNC: 8.3 MG/DL (ref 8.6–10.5)
CHLORIDE SERPL-SCNC: 105 MMOL/L (ref 98–107)
CO2 SERPL-SCNC: 29.7 MMOL/L (ref 22–29)
CREAT SERPL-MCNC: 0.68 MG/DL (ref 0.76–1.27)
DEPRECATED RDW RBC AUTO: 53.6 FL (ref 37–54)
EGFRCR SERPLBLD CKD-EPI 2021: 94 ML/MIN/1.73
ERYTHROCYTE [DISTWIDTH] IN BLOOD BY AUTOMATED COUNT: 15.8 % (ref 12.3–15.4)
GLUCOSE SERPL-MCNC: 144 MG/DL (ref 65–99)
HCT VFR BLD AUTO: 26.1 % (ref 37.5–51)
HGB BLD-MCNC: 8.4 G/DL (ref 13–17.7)
LYMPHOCYTES # BLD MANUAL: 0.32 10*3/MM3 (ref 0.7–3.1)
LYMPHOCYTES NFR BLD MANUAL: 4 % (ref 5–12)
MCH RBC QN AUTO: 30.4 PG (ref 26.6–33)
MCHC RBC AUTO-ENTMCNC: 32.2 G/DL (ref 31.5–35.7)
MCV RBC AUTO: 94.6 FL (ref 79–97)
MONOCYTES # BLD: 0.26 10*3/MM3 (ref 0.1–0.9)
NEUTROPHILS # BLD AUTO: 5.88 10*3/MM3 (ref 1.7–7)
NEUTROPHILS NFR BLD MANUAL: 87 % (ref 42.7–76)
NEUTS BAND NFR BLD MANUAL: 4 % (ref 0–5)
PLAT MORPH BLD: NORMAL
PLATELET # BLD AUTO: 156 10*3/MM3 (ref 140–450)
PMV BLD AUTO: 10.7 FL (ref 6–12)
POIKILOCYTOSIS BLD QL SMEAR: ABNORMAL
POTASSIUM SERPL-SCNC: 4.5 MMOL/L (ref 3.5–5.2)
QT INTERVAL: 419 MS
QTC INTERVAL: 422 MS
RBC # BLD AUTO: 2.76 10*6/MM3 (ref 4.14–5.8)
SCAN SLIDE: NORMAL
SODIUM SERPL-SCNC: 140 MMOL/L (ref 136–145)
TOXIC GRANULATION: ABNORMAL
VARIANT LYMPHS NFR BLD MANUAL: 5 % (ref 19.6–45.3)
WBC NRBC COR # BLD AUTO: 6.46 10*3/MM3 (ref 3.4–10.8)

## 2025-01-02 PROCEDURE — 25010000002 MIDAZOLAM PER 1 MG

## 2025-01-02 PROCEDURE — C1760 CLOSURE DEV, VASC: HCPCS | Performed by: INTERNAL MEDICINE

## 2025-01-02 PROCEDURE — 25010000002 PROPOFOL 200 MG/20ML EMULSION: Performed by: NURSE ANESTHETIST, CERTIFIED REGISTERED

## 2025-01-02 PROCEDURE — 94799 UNLISTED PULMONARY SVC/PX: CPT

## 2025-01-02 PROCEDURE — 80048 BASIC METABOLIC PNL TOTAL CA: CPT | Performed by: INTERNAL MEDICINE

## 2025-01-02 PROCEDURE — C1786 PMKR, SINGLE, RATE-RESP: HCPCS | Performed by: INTERNAL MEDICINE

## 2025-01-02 PROCEDURE — 25010000002 HEPARIN (PORCINE) PER 1000 UNITS: Performed by: INTERNAL MEDICINE

## 2025-01-02 PROCEDURE — C1894 INTRO/SHEATH, NON-LASER: HCPCS | Performed by: INTERNAL MEDICINE

## 2025-01-02 PROCEDURE — 85007 BL SMEAR W/DIFF WBC COUNT: CPT | Performed by: NURSE PRACTITIONER

## 2025-01-02 PROCEDURE — C1769 GUIDE WIRE: HCPCS | Performed by: INTERNAL MEDICINE

## 2025-01-02 PROCEDURE — 25010000002 VANCOMYCIN 1 G RECONSTITUTED SOLUTION

## 2025-01-02 PROCEDURE — 25010000002 FENTANYL CITRATE (PF) 100 MCG/2ML SOLUTION

## 2025-01-02 PROCEDURE — 25510000001 IOPAMIDOL PER 1 ML: Performed by: INTERNAL MEDICINE

## 2025-01-02 PROCEDURE — 33274 TCAT INSJ/RPL PERM LDLS PM: CPT | Performed by: INTERNAL MEDICINE

## 2025-01-02 PROCEDURE — 94664 DEMO&/EVAL PT USE INHALER: CPT

## 2025-01-02 PROCEDURE — 25010000002 HEPARIN (PORCINE) PER 1000 UNITS

## 2025-01-02 PROCEDURE — 02HK3NZ INSERTION OF INTRACARDIAC PACEMAKER INTO RIGHT VENTRICLE, PERCUTANEOUS APPROACH: ICD-10-PCS | Performed by: INTERNAL MEDICINE

## 2025-01-02 PROCEDURE — 25810000003 SODIUM CHLORIDE 0.9 % SOLUTION

## 2025-01-02 PROCEDURE — 94660 CPAP INITIATION&MGMT: CPT

## 2025-01-02 PROCEDURE — 25010000002 HEPARIN (PORCINE) PER 1000 UNITS: Performed by: NURSE ANESTHETIST, CERTIFIED REGISTERED

## 2025-01-02 PROCEDURE — 25010000002 LIDOCAINE 2% SOLUTION: Performed by: INTERNAL MEDICINE

## 2025-01-02 PROCEDURE — 63710000001 PREDNISONE PER 1 MG: Performed by: INTERNAL MEDICINE

## 2025-01-02 PROCEDURE — 94761 N-INVAS EAR/PLS OXIMETRY MLT: CPT

## 2025-01-02 PROCEDURE — 85025 COMPLETE CBC W/AUTO DIFF WBC: CPT | Performed by: INTERNAL MEDICINE

## 2025-01-02 DEVICE — SYS PACE MICRA LD/LESS AV2: Type: IMPLANTABLE DEVICE | Status: FUNCTIONAL

## 2025-01-02 RX ORDER — VANCOMYCIN HYDROCHLORIDE 1 G/20ML
INJECTION, POWDER, LYOPHILIZED, FOR SOLUTION INTRAVENOUS AS NEEDED
Status: DISCONTINUED | OUTPATIENT
Start: 2025-01-02 | End: 2025-01-02 | Stop reason: SURG

## 2025-01-02 RX ORDER — ONDANSETRON 2 MG/ML
4 INJECTION INTRAMUSCULAR; INTRAVENOUS ONCE AS NEEDED
Status: DISCONTINUED | OUTPATIENT
Start: 2025-01-02 | End: 2025-01-03 | Stop reason: HOSPADM

## 2025-01-02 RX ORDER — LIDOCAINE HYDROCHLORIDE 20 MG/ML
INJECTION, SOLUTION INFILTRATION; PERINEURAL
Status: DISCONTINUED | OUTPATIENT
Start: 2025-01-02 | End: 2025-01-02 | Stop reason: HOSPADM

## 2025-01-02 RX ORDER — HEPARIN SODIUM 1000 [USP'U]/ML
INJECTION, SOLUTION INTRAVENOUS; SUBCUTANEOUS AS NEEDED
Status: DISCONTINUED | OUTPATIENT
Start: 2025-01-02 | End: 2025-01-02 | Stop reason: SURG

## 2025-01-02 RX ORDER — NITROGLYCERIN 0.4 MG/1
0.4 TABLET SUBLINGUAL
Status: DISCONTINUED | OUTPATIENT
Start: 2025-01-02 | End: 2025-01-03 | Stop reason: HOSPADM

## 2025-01-02 RX ORDER — EPHEDRINE SULFATE 5 MG/ML
5 INJECTION INTRAVENOUS ONCE AS NEEDED
Status: DISCONTINUED | OUTPATIENT
Start: 2025-01-02 | End: 2025-01-03 | Stop reason: HOSPADM

## 2025-01-02 RX ORDER — MIDAZOLAM HYDROCHLORIDE 1 MG/ML
INJECTION, SOLUTION INTRAMUSCULAR; INTRAVENOUS AS NEEDED
Status: DISCONTINUED | OUTPATIENT
Start: 2025-01-02 | End: 2025-01-02 | Stop reason: SURG

## 2025-01-02 RX ORDER — PROPOFOL 10 MG/ML
INJECTION, EMULSION INTRAVENOUS AS NEEDED
Status: DISCONTINUED | OUTPATIENT
Start: 2025-01-02 | End: 2025-01-02 | Stop reason: SURG

## 2025-01-02 RX ORDER — HYDRALAZINE HYDROCHLORIDE 20 MG/ML
5 INJECTION INTRAMUSCULAR; INTRAVENOUS
Status: CANCELLED | OUTPATIENT
Start: 2025-01-02

## 2025-01-02 RX ORDER — LABETALOL HYDROCHLORIDE 5 MG/ML
5 INJECTION, SOLUTION INTRAVENOUS
Status: DISCONTINUED | OUTPATIENT
Start: 2025-01-02 | End: 2025-01-03 | Stop reason: HOSPADM

## 2025-01-02 RX ORDER — SODIUM CHLORIDE 9 MG/ML
INJECTION, SOLUTION INTRAVENOUS CONTINUOUS PRN
Status: DISCONTINUED | OUTPATIENT
Start: 2025-01-02 | End: 2025-01-02 | Stop reason: SURG

## 2025-01-02 RX ORDER — IOPAMIDOL 755 MG/ML
INJECTION, SOLUTION INTRAVASCULAR
Status: DISCONTINUED | OUTPATIENT
Start: 2025-01-02 | End: 2025-01-02 | Stop reason: HOSPADM

## 2025-01-02 RX ORDER — FENTANYL CITRATE 50 UG/ML
INJECTION, SOLUTION INTRAMUSCULAR; INTRAVENOUS AS NEEDED
Status: DISCONTINUED | OUTPATIENT
Start: 2025-01-02 | End: 2025-01-02 | Stop reason: SURG

## 2025-01-02 RX ORDER — IPRATROPIUM BROMIDE AND ALBUTEROL SULFATE 2.5; .5 MG/3ML; MG/3ML
3 SOLUTION RESPIRATORY (INHALATION) ONCE AS NEEDED
Status: DISCONTINUED | OUTPATIENT
Start: 2025-01-02 | End: 2025-01-03 | Stop reason: HOSPADM

## 2025-01-02 RX ADMIN — ATORVASTATIN CALCIUM 10 MG: 10 TABLET ORAL at 10:08

## 2025-01-02 RX ADMIN — THEOPHYLLINE 300 MG: 300 TABLET, EXTENDED RELEASE ORAL at 10:08

## 2025-01-02 RX ADMIN — Medication 10 ML: at 22:00

## 2025-01-02 RX ADMIN — Medication 10 ML: at 10:09

## 2025-01-02 RX ADMIN — HEPARIN SODIUM 3000 UNITS: 1000 INJECTION, SOLUTION INTRAVENOUS; SUBCUTANEOUS at 17:34

## 2025-01-02 RX ADMIN — TAMSULOSIN HYDROCHLORIDE 0.4 MG: 0.4 CAPSULE ORAL at 10:08

## 2025-01-02 RX ADMIN — FENTANYL CITRATE 25 MCG: 50 INJECTION, SOLUTION INTRAMUSCULAR; INTRAVENOUS at 17:04

## 2025-01-02 RX ADMIN — PANTOPRAZOLE SODIUM 40 MG: 40 TABLET, DELAYED RELEASE ORAL at 04:54

## 2025-01-02 RX ADMIN — GUAIFENESIN 1200 MG: 600 TABLET, EXTENDED RELEASE ORAL at 10:08

## 2025-01-02 RX ADMIN — HEPARIN SODIUM 5000 UNITS: 5000 INJECTION INTRAVENOUS; SUBCUTANEOUS at 23:36

## 2025-01-02 RX ADMIN — IPRATROPIUM BROMIDE AND ALBUTEROL SULFATE 3 ML: .5; 3 SOLUTION RESPIRATORY (INHALATION) at 03:11

## 2025-01-02 RX ADMIN — PROPOFOL 20 MG: 10 INJECTION, EMULSION INTRAVENOUS at 17:24

## 2025-01-02 RX ADMIN — MIDAZOLAM 0.5 MG: 1 INJECTION INTRAMUSCULAR; INTRAVENOUS at 17:13

## 2025-01-02 RX ADMIN — METOPROLOL TARTRATE 25 MG: 25 TABLET, FILM COATED ORAL at 10:09

## 2025-01-02 RX ADMIN — IPRATROPIUM BROMIDE AND ALBUTEROL SULFATE 3 ML: .5; 3 SOLUTION RESPIRATORY (INHALATION) at 15:40

## 2025-01-02 RX ADMIN — IPRATROPIUM BROMIDE AND ALBUTEROL SULFATE 3 ML: .5; 3 SOLUTION RESPIRATORY (INHALATION) at 12:00

## 2025-01-02 RX ADMIN — HEPARIN SODIUM 5000 UNITS: 5000 INJECTION INTRAVENOUS; SUBCUTANEOUS at 04:54

## 2025-01-02 RX ADMIN — ARFORMOTEROL TARTRATE 15 MCG: 15 SOLUTION RESPIRATORY (INHALATION) at 07:46

## 2025-01-02 RX ADMIN — VANCOMYCIN HYDROCHLORIDE 1 G: 1 INJECTION, POWDER, LYOPHILIZED, FOR SOLUTION INTRAVENOUS at 17:11

## 2025-01-02 RX ADMIN — SODIUM CHLORIDE: 9 INJECTION, SOLUTION INTRAVENOUS at 16:50

## 2025-01-02 RX ADMIN — BUDESONIDE INHALATION 0.5 MG: 0.5 SUSPENSION RESPIRATORY (INHALATION) at 07:46

## 2025-01-02 RX ADMIN — Medication 10 ML: at 23:36

## 2025-01-02 RX ADMIN — PREDNISONE 20 MG: 20 TABLET ORAL at 10:08

## 2025-01-02 RX ADMIN — THERA TABS 1 TABLET: TAB at 10:07

## 2025-01-02 NOTE — PLAN OF CARE
Goal Outcome Evaluation:  ST orders placed for swallowing d/t nursing reporting difficulty w/ liquids. ST department familiar w/ this pt w/ most recent recommendation of regular and thin liquids on 12/30/24. Spoke w/ pt's RN and pt currently NPO for possible pacemaker placement. Per chart review, plans for MD to discuss w/ family regarding pacemaker placement vs hospice. Will f/u w/ pt tomorrow and attempt evaluation if deemed appropriate.

## 2025-01-02 NOTE — PROGRESS NOTES
[facility] MEDICINE SERVICE  DAILY PROGRESS NOTE    NAME: Kali Garcia  : 1945  MRN: [id number]      LOS: 10 days     PROVIDER OF SERVICE: Sho Capps MD    Chief Complaint: Pneumonia    Subjective:     Interval History:  History taken from: patient    Seen and examined at bedside this morning.  No acute complaints overnight.  Discussed with son and daughter at bedside regarding possibility of pacemaker placement        Review of Systems: Negative except described above  Review of Systems    Objective:     Vital Signs  Temp:  [97.7 °F (36.5 °C)-98.1 °F (36.7 °C)] 97.7 °F (36.5 °C)  Heart Rate:  [] 63  Resp:  [12-28] 17  BP: (103-134)/(51-72) 105/72  Flow (L/min) (Oxygen Therapy):  [2-6] 2   Body mass index is 15.93 kg/m².    Physical Exam  Physical Exam  Constitutional:       Comments: NAD    Cardiovascular:      Comments:  RRR, S1 & S2   Pulmonary:      Comments:  Lungs CTA   Abdominal:      Comments:  ABD soft, NT            Diagnostic Data    Results from last 7 days   Lab Units 25  0157   WBC 10*3/mm3 6.46   HEMOGLOBIN g/dL 8.4*   HEMATOCRIT % 26.1*   PLATELETS 10*3/mm3 156   GLUCOSE mg/dL 144*   CREATININE mg/dL 0.68*   BUN mg/dL 24*   SODIUM mmol/L 140   POTASSIUM mmol/L 4.5   ANION GAP mmol/L 5.3       No radiology results for the last day      I reviewed the patient's new clinical results.    Assessment/Plan:     Active and Resolved Problems  Active Hospital Problems    Diagnosis  POA    **Pneumonia [J18.9]  Yes    Sick sinus syndrome [I49.5]  Unknown    Pneumonia, unspecified organism [J18.9]  Yes    Rhinovirus [B34.8]  Yes    Anemia [D64.9]  Yes    Acute on chronic respiratory failure with hypoxia [J96.21]  Yes    Coronary artery disease [I25.10]  Yes    Hypertension [I10]  Yes    COPD (chronic obstructive pulmonary disease) [J44.9]  Yes    Severe malnutrition [E43]  Yes      Resolved Hospital Problems   No resolved problems to display.       Hospital  Course:  Acute on chronic hypoxemic hypercapnia respiratory  failure   Lower lobe pneumonia   COPD with acute exacerbation   Rhinovirus infection   -Conservative management for rhinovirus  -Completed course of Unasyn while in the hospital  -Was being followed by pulmonology in the hospital  -Will send in a short prednisone taper on discharge  -Continue supplemental oxygen to maintain O2 sat > 90     CAD   Atrial fibrillation   PAD   -Converted to sinus rhythm and rate controlled  -Continue home plavix and aspirin  Will continue patient on amiodarone as ordered per cardiology on discharge and hold metoprolol as his heart rate has been on the lower side  -Telemonitoring  [facility] WN  Cardiology consulted, was also evaluated by EP who stated not an ideal candidate for ablation    SSS  Cardiology following, schedule patient for pacemaker placement as he was seen to have frequent sinus pauses on telemetry     Urinary retention   Hematuria   -No blood noted in catheter   -Continue springer catheter  -Patient wishes to discharge with springer and hospice following, no voiding trial required      Hypernatremia, resolved    VTE Prophylaxis:  Pharmacologic & mechanical VTE prophylaxis orders are present.                Time: 35 minutes     Code Status and Medical Interventions: No CPR (Do Not Attempt to Resuscitate); Limited Support; No intubation (DNI), No cardioversion   Ordered at: 12/24/24 1547     Medical Intervention Limits:    No intubation (DNI)    No cardioversion     Level Of Support Discussed With:    Next of Kin (If No Surrogate)     Code Status (Patient has no pulse and is not breathing):    No CPR (Do Not Attempt to Resuscitate)     Medical Interventions (Patient has pulse or is breathing):    Limited Support       Signature: Electronically signed by Sho Capps MD, 01/02/25, 17:47 EST.  [facility] Hospitalist Team

## 2025-01-02 NOTE — SIGNIFICANT NOTE
"   01/02/25 1533   OTHER   Discipline physical therapist   Rehab Time/Intention   Session Not Performed unable to treat, medical status change  (RN reports pt had a \"pause\" earlier today and they have decided on pacemaker placement to occur this afternoon. Will hold until s/p pacemaker.)   Therapy Assessment/Plan (PT)   Criteria for Skilled Interventions Met (PT) yes;meets criteria;skilled treatment is necessary   Recommendation   PT - Next Appointment 01/03/25       "

## 2025-01-02 NOTE — CASE MANAGEMENT/SOCIAL WORK
Continued Stay Note  [facility]     Patient Name: Kali Garcia  MRN: [id number]  Today's Date: 1/2/2025    Admit Date: 12/22/2024    Plan: [facility] accepted (can accept up to 15L O2). Precert approved (valid 12/28/24-1/3/25). PASRR approved. [facility] following. Current home 02 at 6L through [employer].   Discharge Plan       Row Name 01/02/25 1538       Plan    Plan Comments CM provided update to [facility] liaison. Barrier to D/C: 3L O2, PPM placement today (1800).                      Expected Discharge Date and Time       Expected Discharge Date Expected Discharge Time    Nate 3, 2025           Phone communication or documentation only - no physical contact with patient or family.     BILL GregoryN, RN    [address] [address]    Office: [phone]  Fax: [phone]

## 2025-01-02 NOTE — ANESTHESIA PREPROCEDURE EVALUATION
Anesthesia Evaluation     Patient summary reviewed and Nursing notes reviewed   NPO Solid Status: > 8 hours             Airway   Mallampati: II  TM distance: >3 FB  Neck ROM: full  No difficulty expected  Dental    (+) edentulous    Pulmonary - normal exam   (+) a smoker Current, COPD,home oxygen  Cardiovascular - normal exam    ECG reviewed    (+) hypertension, CAD, dysrhythmias Bradycardia, ESQUIVEL  (-) angina      Neuro/Psych- negative ROS  GI/Hepatic/Renal/Endo - negative ROS     ROS Comment: Malnutrition     Musculoskeletal     Abdominal  - normal exam    Bowel sounds: normal.   Substance History - negative use     OB/GYN negative ob/gyn ROS         Other   arthritis,                 Anesthesia Plan    ASA 4     general       Anesthetic plan, risks, benefits, and alternatives have been provided, discussed and informed consent has been obtained with: patient.    CODE STATUS:    Medical Intervention Limits: No intubation (DNI); No cardioversion  Level Of Support Discussed With: Next of Kin (If No Surrogate)  Code Status (Patient has no pulse and is not breathing): No CPR (Do Not Attempt to Resuscitate)  Medical Interventions (Patient has pulse or is breathing): Limited Support

## 2025-01-02 NOTE — PROGRESS NOTES
[facility]iology [facility]      Patient Care Team:  Tami Rasmussen APRN as PCP - General (Nurse Practitioner)    For follow-up: Atrial flutter, pauses    Subjective    Interval History and ROS:     Patient seen as he is lying in bed.  He is somewhat confused/forgetful, however answers questions appropriately.  He has no complaints of chest pain, palpitations, or numbness tingling.  He states that he is short of breath, however he feels that he is at his baseline.  Patient was to discharge to [facility] with hospice with atrial flutter controlled rates, however he had a number of significant pauses yesterday and through the night. I discussed with him Dr. Soto will be in to speak with him when his family arrives regarding treatment plan for his heart rhythm/pauses.  No other acute adverse overnight events reported by patient or bedside RN.    Review of Systems   Constitutional: Positive for malaise/fatigue. Negative for diaphoresis.   Eyes:  Negative for visual disturbance.   Cardiovascular:  Positive for dyspnea on exertion. Negative for chest pain, cyanosis, irregular heartbeat, leg swelling, near-syncope, orthopnea, palpitations, paroxysmal nocturnal dyspnea and syncope.   Respiratory:  Positive for cough and shortness of breath. Negative for hemoptysis and sleep disturbances due to breathing.    Hematologic/Lymphatic: Negative for bleeding problem.   Skin:  Negative for color change.   Musculoskeletal:  Negative for joint swelling, muscle cramps and myalgias.   Gastrointestinal:  Negative for abdominal pain, change in bowel habit, nausea and vomiting.   Genitourinary:  Negative for dysuria.   Neurological:  Positive for weakness. Negative for dizziness, focal weakness, headaches, light-headedness, loss of balance, numbness, paresthesias and vertigo.   Psychiatric/Behavioral:  Negative for altered mental status.    All other systems reviewed and are negative.      Objective    Vital Signs  Temp:   "[97.7 °F (36.5 °C)-98.4 °F (36.9 °C)] 97.7 °F (36.5 °C)  Heart Rate:  [] 97  Resp:  [14-28] 19  BP: (103-139)/(51-78) 105/72  Oxygen Therapy  SpO2: 94 %  Pulse Oximetry Type: Continuous  Device (Oxygen Therapy): humidified, high-flow nasal cannula  $ High Flow Nasal Cannula Set-Up: yes  Flow (L/min) (Oxygen Therapy): 6  Oxygen Concentration (%): 55}  Flowsheet Rows      Flowsheet Row First Filed Value   Admission Height 180.3 cm (71\") Documented at 12/22/2024 2139   Admission Weight 49.9 kg (110 lb 0.2 oz) Documented at 12/22/2024 2139                Physical Exam:    Physical Exam  Constitutional:       Appearance: Normal appearance. He is not diaphoretic.   HENT:      Head: Normocephalic and atraumatic.   Eyes:      Extraocular Movements: Extraocular movements intact.      Pupils: Pupils are equal, round, and reactive to light.   Neck:      Vascular: No JVD.   Cardiovascular:      Rate and Rhythm: Normal rate. Rhythm irregularly irregular.      Pulses: Normal pulses.      Heart sounds: Normal heart sounds.   Pulmonary:      Effort: Pulmonary effort is normal.      Breath sounds: Rhonchi present.   Abdominal:      General: Abdomen is flat. There is no distension.      Palpations: Abdomen is soft.   Musculoskeletal:         General: Normal range of motion.      Right lower leg: No edema.      Left lower leg: No edema.   Skin:     General: Skin is warm and dry.   Neurological:      General: No focal deficit present.      Mental Status: He is alert and oriented to person, place, and time.   Psychiatric:         Mood and Affect: Mood normal.         Behavior: Behavior normal.         Thought Content: Thought content normal.         Judgment: Judgment normal.         Results Review:     I reviewed the patient's new clinical results.    Lab Results (last 24 hours)       Procedure Component Value Units Date/Time    CBC Auto Differential [[id number]]  (Abnormal) Collected: 01/02/25 0157    Specimen: Blood from Arm, " Left Updated: 01/02/25 0247     WBC 6.46 10*3/mm3      RBC 2.76 10*6/mm3      Hemoglobin 8.4 g/dL      Hematocrit 26.1 %      MCV 94.6 fL      MCH 30.4 pg      MCHC 32.2 g/dL      RDW 15.8 %      RDW-SD 53.6 fl      MPV 10.7 fL      Platelets 156 10*3/mm3     Narrative:      The previously reported component NRBC is no longer being reported. Previous result was 0.0 /100 WBC (Reference Range: 0.0-0.2 /100 WBC) on 1/2/2025 at 0209 EST.    Scan Slide [[id number]] Collected: 01/02/25 0157    Specimen: Blood from Arm, Left Updated: 01/02/25 0247     Scan Slide --     Comment: See Manual Differential Results       Manual Differential [[id number]]  (Abnormal) Collected: 01/02/25 0157    Specimen: Blood from Arm, Left Updated: 01/02/25 0247     Neutrophil % 87.0 %      Lymphocyte % 5.0 %      Monocyte % 4.0 %      Bands %  4.0 %      Neutrophils Absolute 5.88 10*3/mm3      Lymphocytes Absolute 0.32 10*3/mm3      Monocytes Absolute 0.26 10*3/mm3      Acanthocytes Slight/1+     Poikilocytes Slight/1+     Toxic Granulation Mod/2+     Platelet Morphology Normal    Basic Metabolic Panel [[id number]]  (Abnormal) Collected: 01/02/25 0157    Specimen: Blood from Arm, Left Updated: 01/02/25 0242     Glucose 144 mg/dL      BUN 24 mg/dL      Creatinine 0.68 mg/dL      Sodium 140 mmol/L      Potassium 4.5 mmol/L      Chloride 105 mmol/L      CO2 29.7 mmol/L      Calcium 8.3 mg/dL      BUN/Creatinine Ratio 35.3     Anion Gap 5.3 mmol/L      eGFR 94.0 mL/min/1.73     Narrative:      GFR Categories in Chronic Kidney Disease (CKD)      GFR Category          GFR (mL/min/1.73)    Interpretation  G1                     90 or greater         Normal or high (1)  G2                      60-89                Mild decrease (1)  G3a                   45-59                Mild to moderate decrease  G3b                   30-44                Moderate to severe decrease  G4                    15-29                Severe decrease  G5                    14  or less           Kidney failure          (1)In the absence of evidence of kidney disease, neither GFR category G1 or G2 fulfill the criteria for CKD.    eGFR calculation 2021 CKD-EPI creatinine equation, which does not include race as a factor            Imaging Results (Last 24 Hours)       ** No results found for the last 24 hours. **            ECG/EMG Results (most recent)       Procedure Component Value Units Date/Time    ECG 12 Lead Dyspnea [[id number]] Collected: 12/22/24 2153     Updated: 12/23/24 0633     QT Interval 466 ms      QTC Interval 501 ms     Narrative:      HEART RATE=70  bpm  RR Ckloqrap=932  ms  NJ Interval=  ms  P Horizontal Axis=  deg  P Front Axis=  deg  QRSD Txyidhha=181  ms  QT Aevpviam=089  ms  EDiE=641  ms  QRS Axis=44  deg  T Wave Axis=  deg  - ABNORMAL ECG -  IVCD, consider RBBB  Abnormal T, consider ischemia, inferior leads  When compared with ECG of 03-Feb-2024 09:53:17,  Significant change in rhythm: previously sinus  New or worsened ischemia or infarction  New conduction abnormality  Electronically Signed By: Huseyin Sutherland ([facility]) 2024-12-23 06:32:39  Date and Time of Study:2024-12-22 21:53:44    Telemetry Scan [[id number]] Resulted: 12/22/24     Updated: 12/25/24 2021    Telemetry Scan [[id number]] Resulted: 12/22/24     Updated: 12/26/24 1610    Telemetry Scan [[id number]] Resulted: 12/22/24     Updated: 12/26/24 1611    Telemetry Scan [[id number]] Resulted: 12/22/24     Updated: 12/26/24 1613    Telemetry Scan [[id number]] Resulted: 12/22/24     Updated: 12/30/24 1029    Telemetry Scan [[id number]] Resulted: 12/22/24     Updated: 12/30/24 1100    Telemetry Scan [[id number]] Resulted: 12/22/24     Updated: 12/30/24 1125    ECG 12 Lead Rhythm Change [[id number]] Collected: 12/30/24 0721     Updated: 12/30/24 1845     QT Interval 377 ms      QTC Interval 523 ms     Narrative:      HEART MZZQ=142  bpm  RR Cfbwuqrc=232  ms  NJ Interval=  ms  P Horizontal Axis=  deg  P Front Axis=  deg  QRSD  Hdncxleg=858  ms  QT Whsqndnk=065  ms  HKjC=337  ms  QRS Axis=68  deg  T Wave Axis=36  deg  - ABNORMAL ECG -  Unclear rhythm, possible junctional tachycardia  Anteroseptal infarct, old  Prolonged QT interval  When compared with ECG of 22-Dec-2024 21:53:44,  Significant change in rhythm  New or worsened ischemia or infarction  Electronically Signed By: Erick Hall ([facility]) 2024-12-30 18:45:03  Date and Time of Study:2024-12-30 07:21:56    Telemetry Scan [[id number]] Resulted: 12/22/24     Updated: 12/31/24 0545    Telemetry Scan [[id number]] Resulted: 12/22/24     Updated: 12/31/24 1156    Telemetry Scan [[id number]] Resulted: 12/22/24     Updated: 12/31/24 1351    Telemetry Scan [[id number]] Resulted: 12/22/24     Updated: 12/31/24 1351    Telemetry Scan [[id number]] Resulted: 12/22/24     Updated: 12/31/24 1351    Telemetry Scan [[id number]] Resulted: 12/22/24     Updated: 12/31/24 1353    Telemetry Scan [[id number]] Resulted: 12/22/24     Updated: 12/31/24 1354    Telemetry Scan [[id number]] Resulted: 12/22/24     Updated: 12/31/24 1354    Telemetry Scan [[id number]] Resulted: 12/22/24     Updated: 12/31/24 1356    Telemetry Scan [[id number]] Resulted: 12/22/24     Updated: 12/31/24 1357    Telemetry Scan [[id number]] Resulted: 12/22/24     Updated: 12/31/24 1358    Telemetry Scan [[id number]] Resulted: 12/22/24     Updated: 12/31/24 1359    Telemetry Scan [[id number]] Resulted: 12/22/24     Updated: 12/31/24 1359    Telemetry Scan [[id number]] Resulted: 12/22/24     Updated: 12/31/24 1402    Telemetry Scan [[id number]] Resulted: 12/22/24     Updated: 12/31/24 1402    Telemetry Scan [[id number]] Resulted: 12/22/24     Updated: 12/31/24 1403    Telemetry Scan [[id number]] Resulted: 12/22/24     Updated: 12/31/24 1404    Telemetry Scan [[id number]] Resulted: 12/22/24     Updated: 12/31/24 1405    Telemetry Scan [[id number]] Resulted: 12/22/24     Updated: 12/31/24 1406    Telemetry Scan [[id number]] Resulted: 12/22/24     Updated: 12/31/24  1410    Telemetry Scan [[id number]] Resulted: 12/22/24     Updated: 12/31/24 1410    Telemetry Scan [[id number]] Resulted: 12/22/24     Updated: 12/31/24 1410    Telemetry Scan [[id number]] Resulted: 12/22/24     Updated: 12/31/24 1410    Telemetry Scan [[id number]] Resulted: 12/22/24     Updated: 12/31/24 1411    Telemetry Scan [[id number]] Resulted: 12/22/24     Updated: 12/31/24 1412    Telemetry Scan [[id number]] Resulted: 12/22/24     Updated: 12/31/24 1412    Telemetry Scan [[id number]] Resulted: 12/22/24     Updated: 12/31/24 1414    Telemetry Scan [[id number]] Resulted: 12/22/24     Updated: 12/31/24 1414    ECG 12 Lead Rhythm Change [[id number]] Collected: 12/31/24 1215     Updated: 12/31/24 1600     QT Interval 552 ms      QTC Interval 553 ms     Narrative:      HEART RATE=60  bpm  RR Biksxgyj=157  ms  VA Oatkghrs=649  ms  P Horizontal Axis=26  deg  P Front Axis=87  deg  QRSD Gqdtwwuf=329  ms  QT Vqerekaw=777  ms  OKnJ=222  ms  QRS Axis=38  deg  T Wave Axis=77  deg  - ABNORMAL ECG -  Sinus rhythm  Anteroseptal  infarct, age indeterminate  Prolonged QT interval  When compared with ECG of 30-Dec-2024 07:21:56,  Significant change in rhythm  New or worsened ischemia or infarction  Electronically Signed By: Juan Diego Davis ([facility]) 2024-12-31 15:58:46  Date and Time of Study:2024-12-31 12:15:41    ECG 12 Lead Rhythm Change [[id number]] Collected: 01/01/25 0441     Updated: 01/02/25 0625     QT Interval 419 ms      QTC Interval 422 ms     Narrative:      HEART RATE=61  bpm  RR Vrmuseay=186  ms  VA Viufkavh=265  ms  P Horizontal Axis=-68  deg  P Front Axis=93  deg  QRSD Xgxmfybg=335  ms  QT Jfqteanx=041  ms  JPjJ=884  ms  QRS Axis=57  deg  T Wave Axis=84  deg  - ABNORMAL ECG -  Sinus rhythm  Abnormal T, consider ischemia, lateral leads  When compared with ECG of 31-Dec-2024 12:15:41,  Significant repolarization change  Electronically Signed By: Juan Diego Davis ([facility]) 2025-01-02 06:19:06  Date and Time of Study:2025-01-01 04:41:41     Telemetry Scan [[id number]] Resulted: 12/22/24     Updated: 01/02/25 0859    Telemetry Scan [[id number]] Resulted: 12/22/24     Updated: 01/02/25 1051            Medication Review:   I have reviewed the patient's current medication list    Current Facility-Administered Medications:     acetaminophen (TYLENOL) tablet 650 mg, 650 mg, Oral, Q6H PRN, Achterberg, Nurys, APRN, 650 mg at 12/30/24 2146    aluminum-magnesium hydroxide-simethicone (MAALOX MAX) 400-400-40 MG/5ML suspension 15 mL, 15 mL, Oral, Q6H PRN, Achterberg, Nurys, APRN, 15 mL at 12/30/24 2315    arformoterol (BROVANA) nebulizer solution 15 mcg, 15 mcg, Nebulization, BID - RT, Ellie Castellanos MD, 15 mcg at 01/02/25 0746    atorvastatin (LIPITOR) tablet 10 mg, 10 mg, Oral, Daily, Achterberg, Nurys, APRN, 10 mg at 01/02/25 1008    sennosides-docusate (PERICOLACE) 8.6-50 MG per tablet 2 tablet, 2 tablet, Oral, BID PRN, 2 tablet at 12/24/24 2339 **AND** polyethylene glycol (MIRALAX) packet 17 g, 17 g, Oral, Daily PRN **AND** bisacodyl (DULCOLAX) EC tablet 5 mg, 5 mg, Oral, Daily PRN **AND** bisacodyl (DULCOLAX) suppository 10 mg, 10 mg, Rectal, Daily PRN, Achterberg, Nurys, APRN    budesonide (PULMICORT) nebulizer solution 0.5 mg, 0.5 mg, Nebulization, BID - RT, Achterberg, Nurys, APRN, 0.5 mg at 01/02/25 0746    Calcium Replacement - Follow Nurse / BPA Driven Protocol, , Not Applicable, PRN, Achterberg, Nurys, APRN    clopidogrel (PLAVIX) tablet 75 mg, 75 mg, Oral, Daily, Achterberg, Nurys, APRN, 75 mg at 01/01/25 0939    guaiFENesin (MUCINEX) 12 hr tablet 1,200 mg, 1,200 mg, Oral, Q12H, Ellie Castellanos MD, 1,200 mg at 01/02/25 1008    heparin (porcine) 5000 UNIT/ML injection 5,000 Units, 5,000 Units, Subcutaneous, Q8H, Sho Capps MD, 5,000 Units at 01/02/25 3099    hydrALAZINE (APRESOLINE) injection 10 mg, 10 mg, Intravenous, Q4H PRN, Sonal Hutson APRN    ipratropium-albuterol (DUO-NEB) nebulizer solution 3 mL, 3 mL,  Nebulization, Q4H PRN, Nurys Thorpe APRN, 3 mL at 12/23/24 0250    ipratropium-albuterol (DUO-NEB) nebulizer solution 3 mL, 3 mL, Nebulization, Q4H - RT, Nurys Thorpe APRN, 3 mL at 01/02/25 0311    Magnesium Standard Dose Replacement - Follow Nurse / BPA Driven Protocol, , Not Applicable, PRN, Nurys Thorpe APRN    melatonin tablet 5 mg, 5 mg, Oral, Nightly PRN, Nurys Thorpe APRN, 5 mg at 12/31/24 2225    metoprolol tartrate (LOPRESSOR) tablet 25 mg, 25 mg, Oral, Q12H, Eliot Mcgarry MD, 25 mg at 01/02/25 1009    multivitamin (THERAGRAN) tablet 1 tablet, 1 tablet, Oral, Daily, Brammell, Timothy Duane, MD, 1 tablet at 01/02/25 1007    ondansetron ODT (ZOFRAN-ODT) disintegrating tablet 4 mg, 4 mg, Oral, Q6H PRN **OR** ondansetron (ZOFRAN) injection 4 mg, 4 mg, Intravenous, Q6H PRN, Nurys Thorpe APRN    pantoprazole (PROTONIX) EC tablet 40 mg, 40 mg, Oral, Q AM, Brammell, Timothy Duane, MD, 40 mg at 01/02/25 0454    Phosphorus Replacement - Follow Nurse / BPA Driven Protocol, , Not Applicable, PRN, Nurys Thorpe APRN    polyethylene glycol (MIRALAX) packet 17 g, 17 g, Oral, Daily, Nurys Thorpe APRN, 17 g at 12/30/24 0947    Potassium Replacement - Follow Nurse / BPA Driven Protocol, , Not Applicable, PRN, Nurys Thorpe APRN    predniSONE (DELTASONE) tablet 20 mg, 20 mg, Oral, Daily With Breakfast, Ellie Castellanos MD, 20 mg at 01/02/25 1008    sodium chloride 0.9 % flush 10 mL, 10 mL, Intravenous, PRN, Huseyin Sutherland MD    sodium chloride 0.9 % flush 10 mL, 10 mL, Intravenous, Q12H, SethterRosalva diazle, APRN, 10 mL at 01/02/25 1009    sodium chloride 0.9 % flush 10 mL, 10 mL, Intravenous, PRN, Achterjoe, Nurys, APRN    sodium chloride 0.9 % infusion 40 mL, 40 mL, Intravenous, PRN, Achterberg, Nurys, APRN    tamsulosin (FLOMAX) 24 hr capsule 0.4 mg, 0.4 mg, Oral, Daily, Brammell, Timothy Duane, MD, 0.4 mg at 01/02/25 1008    theophylline  (THEODUR) 12 hr tablet 300 mg, 300 mg, Oral, Daily, Ellie Castellanos MD, 300 mg at 01/02/25 1008    Assessment & Plan      Pneumonia    Severe malnutrition    Rhinovirus    Anemia    Acute on chronic respiratory failure with hypoxia    Coronary artery disease    Hypertension    COPD (chronic obstructive pulmonary disease)    A-fib/flutter      Acute on chronic respiratory failure with hypoxia/COPD/rhinovirus/pneumonia  Remains on high flow oxygen at 6 L  Pulmonary following and states respiratory status close to baseline and will wean off steroids    CAD  No Complaints of chest pain  Lipitor Plavix and metoprolol    Hypertension  Controlled, systolic blood pressures currently 100-130     A-fib/flutter  Sinus rhythm on admission, converted to a flutter with RVR, currently in the 80s  Not on AC, likely due to anemia, 7.8 on admission  Patient did have multiple symptomatic pauses yesterday and during the night  Dr. Soto to speak with patient and family today regarding pacemaker versus hospice with metoprolol    FADIA Layne  01/02/25  11:09 EST

## 2025-01-02 NOTE — PROGRESS NOTES
Daily Progress Note          Assessment    Pneumonia due to unspecified pathogen  COPD: At home on Trelegy inhaler  Rhinovirus infection  Hypoxemia: At home 6 L of oxygen  CAD  PVD  HTN  HLD  A-fib  Anemia  Former smoker quit 2022 after 60 pack years  Echo 2/1/2024 EF 61-65% mild pulmonary hypertension 35-45 mmHg     CT scan of the chest in June 2024 showing a noncalcified nodule in the right upper lobe around 2 x 1.1 x 1 cm. Patient was also noted to have patchy airspace disease and clustered micronodules in the lingula. He then underwent PET scan showing a 1.7 x 1.1 cm partially calcified irregular shaped nodule in the right upper lobe to be metabolically active with SUV of 4.84 and additional 2 metabolically active irregular nodular densities in the right upper lobe. Inferior apical segment with intervening areas of metabolically active interstitial thickening.     PFTs: Very severe airflow obstruction with significant air trapping and reduced diffusion capacity. FEV1/FVC postbronchodilator is 0.39 with FEV1 of 0.71 L or 28% predicted and FVC of 1.82 L or 47% predicted. No significant bronchodilator response noted. Total lung capacity of 7.77 L or 131% predicted and residual volume of 5.82 L or 226% predicted and DLCO of 61% predicted noted.         Recommendations:  Respiratory status is close to his baseline, he has chronic mild wheezing and chronic scattered crackles  Slowly wean down steroids    patient is very cachectic with poor prognosis, patient decided for DNR and home hospice, awaiting placement in [facility]     antibiotics: Unasyn completed 5 days  Nebulized Mucomyst completed  Encourage use of incentive spirometry and flutter valve  Theophylline    Nebulized Pulmicort  Oxygen supplement and titration to maintain saturation 90 to 95%: Currently on 6 L per nasal cannula  Bronchodilators  Mucinex     Atorvastatin  Plavix     I personally reviewed the radiological studies             LOS: 10 days      Subjective     Chronic cough and shortness of breath    Objective     Vital signs for last 24 hours:  Vitals:    01/02/25 0750 01/02/25 0816 01/02/25 1200 01/02/25 1207   BP:  105/72     BP Location:  Right arm     Patient Position:  Lying     Pulse: 78 97 59 59   Resp: 22 19 12 12   Temp:  97.7 °F (36.5 °C)     TempSrc:  Oral     SpO2: 100% 94% 95% 100%   Weight:       Height:           Intake/Output last 3 shifts:  I/O last 3 completed shifts:  In: 360 [P.O.:360]  Out: 3000 [Urine:3000]  Intake/Output this shift:  No intake/output data recorded.      Radiology  Imaging Results (Last 24 Hours)       ** No results found for the last 24 hours. **            Labs:  Results from last 7 days   Lab Units 01/02/25  0157   WBC 10*3/mm3 6.46   HEMOGLOBIN g/dL 8.4*   HEMATOCRIT % 26.1*   PLATELETS 10*3/mm3 156     Results from last 7 days   Lab Units 01/02/25  0157   SODIUM mmol/L 140   POTASSIUM mmol/L 4.5   CHLORIDE mmol/L 105   CO2 mmol/L 29.7*   BUN mg/dL 24*   CREATININE mg/dL 0.68*   CALCIUM mg/dL 8.3*   GLUCOSE mg/dL 144*                                 Results from last 7 days   Lab Units 12/29/24  1334   TSH uIU/mL 3.470           Meds:   SCHEDULE  arformoterol, 15 mcg, Nebulization, BID - RT  atorvastatin, 10 mg, Oral, Daily  budesonide, 0.5 mg, Nebulization, BID - RT  clopidogrel, 75 mg, Oral, Daily  guaiFENesin, 1,200 mg, Oral, Q12H  heparin (porcine), 5,000 Units, Subcutaneous, Q8H  ipratropium-albuterol, 3 mL, Nebulization, Q4H - RT  metoprolol tartrate, 25 mg, Oral, Q12H  multivitamin, 1 tablet, Oral, Daily  pantoprazole, 40 mg, Oral, Q AM  polyethylene glycol, 17 g, Oral, Daily  predniSONE, 20 mg, Oral, Daily With Breakfast  sodium chloride, 10 mL, Intravenous, Q12H  tamsulosin, 0.4 mg, Oral, Daily  theophylline, 300 mg, Oral, Daily      Infusions       PRNs    acetaminophen    aluminum-magnesium hydroxide-simethicone    senna-docusate sodium **AND** polyethylene glycol **AND** bisacodyl **AND**  bisacodyl    Calcium Replacement - Follow Nurse / BPA Driven Protocol    hydrALAZINE    ipratropium-albuterol    Magnesium Standard Dose Replacement - Follow Nurse / BPA Driven Protocol    melatonin    ondansetron ODT **OR** ondansetron    Phosphorus Replacement - Follow Nurse / BPA Driven Protocol    Potassium Replacement - Follow Nurse / BPA Driven Protocol    sodium chloride    sodium chloride    sodium chloride    Physical Exam:  General Appearance:  Alert, looks chronically ill and cachectic  HEENT:  Normocephalic, without obvious abnormality, Conjunctiva/corneas clear,.   Nares normal, no drainage     Neck:  Supple, symmetrical, trachea midline.   Lungs /Chest wall: Prolonged expiration and mild bilateral basal rhonchi, respirations unlabored, symmetrical wall movement.     Heart:  Regular rate and rhythm, S1 S2 normal  Abdomen: Soft, non-tender, no masses, no organomegaly.    Extremities: No edema, no clubbing or cyanosis     ROS  Constitutional: Negative for chills, fever and malaise/fatigue.   HENT: Negative.    Eyes: Negative.    Cardiovascular: Negative.    Respiratory: Positive for chronic cough and shortness of breath.    Skin: Negative.    Musculoskeletal: Negative.    Gastrointestinal: Negative.    Genitourinary: Negative.    Neurological: Generalized weakness      I reviewed the recent clinical results  I personally reviewed the latest radiological studies    Part of this note may be an electronic transcription/translation of spoken language to printed text using the Dragon Dictation System.

## 2025-01-03 ENCOUNTER — APPOINTMENT (OUTPATIENT)
Dept: GENERAL RADIOLOGY | Facility: HOSPITAL | Age: 80
DRG: 981 | End: 2025-01-03
Payer: COMMERCIAL

## 2025-01-03 VITALS
DIASTOLIC BLOOD PRESSURE: 56 MMHG | HEART RATE: 56 BPM | WEIGHT: 112.43 LBS | OXYGEN SATURATION: 98 % | RESPIRATION RATE: 23 BRPM | SYSTOLIC BLOOD PRESSURE: 121 MMHG | HEIGHT: 71 IN | TEMPERATURE: 98.3 F | BODY MASS INDEX: 15.74 KG/M2

## 2025-01-03 LAB
ANION GAP SERPL CALCULATED.3IONS-SCNC: 4 MMOL/L (ref 5–15)
BASOPHILS # BLD AUTO: 0 10*3/MM3 (ref 0–0.2)
BASOPHILS NFR BLD AUTO: 0 % (ref 0–1.5)
BUN SERPL-MCNC: 24 MG/DL (ref 8–23)
BUN/CREAT SERPL: 36.9 (ref 7–25)
CALCIUM SPEC-SCNC: 8.2 MG/DL (ref 8.6–10.5)
CHLORIDE SERPL-SCNC: 105 MMOL/L (ref 98–107)
CO2 SERPL-SCNC: 29 MMOL/L (ref 22–29)
CREAT SERPL-MCNC: 0.65 MG/DL (ref 0.76–1.27)
DEPRECATED RDW RBC AUTO: 55.2 FL (ref 37–54)
EGFRCR SERPLBLD CKD-EPI 2021: 95.3 ML/MIN/1.73
EOSINOPHIL # BLD AUTO: 0 10*3/MM3 (ref 0–0.4)
EOSINOPHIL NFR BLD AUTO: 0 % (ref 0.3–6.2)
ERYTHROCYTE [DISTWIDTH] IN BLOOD BY AUTOMATED COUNT: 15.9 % (ref 12.3–15.4)
GLUCOSE SERPL-MCNC: 83 MG/DL (ref 65–99)
HCT VFR BLD AUTO: 26 % (ref 37.5–51)
HGB BLD-MCNC: 8.1 G/DL (ref 13–17.7)
IMM GRANULOCYTES # BLD AUTO: 0.05 10*3/MM3 (ref 0–0.05)
IMM GRANULOCYTES NFR BLD AUTO: 1.4 % (ref 0–0.5)
LYMPHOCYTES # BLD AUTO: 0.34 10*3/MM3 (ref 0.7–3.1)
LYMPHOCYTES NFR BLD AUTO: 9.4 % (ref 19.6–45.3)
MCH RBC QN AUTO: 29.5 PG (ref 26.6–33)
MCHC RBC AUTO-ENTMCNC: 31.2 G/DL (ref 31.5–35.7)
MCV RBC AUTO: 94.5 FL (ref 79–97)
MONOCYTES # BLD AUTO: 0.29 10*3/MM3 (ref 0.1–0.9)
MONOCYTES NFR BLD AUTO: 8 % (ref 5–12)
NEUTROPHILS NFR BLD AUTO: 2.95 10*3/MM3 (ref 1.7–7)
NEUTROPHILS NFR BLD AUTO: 81.2 % (ref 42.7–76)
NRBC BLD AUTO-RTO: 0 /100 WBC (ref 0–0.2)
PLATELET # BLD AUTO: 162 10*3/MM3 (ref 140–450)
PMV BLD AUTO: 10.9 FL (ref 6–12)
POTASSIUM SERPL-SCNC: 5 MMOL/L (ref 3.5–5.2)
RBC # BLD AUTO: 2.75 10*6/MM3 (ref 4.14–5.8)
SODIUM SERPL-SCNC: 138 MMOL/L (ref 136–145)
WBC NRBC COR # BLD AUTO: 3.63 10*3/MM3 (ref 3.4–10.8)

## 2025-01-03 PROCEDURE — 63710000001 PREDNISONE PER 1 MG: Performed by: INTERNAL MEDICINE

## 2025-01-03 PROCEDURE — 97110 THERAPEUTIC EXERCISES: CPT

## 2025-01-03 PROCEDURE — 94799 UNLISTED PULMONARY SVC/PX: CPT

## 2025-01-03 PROCEDURE — 94664 DEMO&/EVAL PT USE INHALER: CPT

## 2025-01-03 PROCEDURE — 97112 NEUROMUSCULAR REEDUCATION: CPT

## 2025-01-03 PROCEDURE — 92610 EVALUATE SWALLOWING FUNCTION: CPT

## 2025-01-03 PROCEDURE — 25010000002 HEPARIN (PORCINE) PER 1000 UNITS: Performed by: INTERNAL MEDICINE

## 2025-01-03 PROCEDURE — 94660 CPAP INITIATION&MGMT: CPT

## 2025-01-03 PROCEDURE — 94761 N-INVAS EAR/PLS OXIMETRY MLT: CPT

## 2025-01-03 PROCEDURE — 97530 THERAPEUTIC ACTIVITIES: CPT

## 2025-01-03 PROCEDURE — 92611 MOTION FLUOROSCOPY/SWALLOW: CPT

## 2025-01-03 PROCEDURE — 80048 BASIC METABOLIC PNL TOTAL CA: CPT | Performed by: INTERNAL MEDICINE

## 2025-01-03 PROCEDURE — 74230 X-RAY XM SWLNG FUNCJ C+: CPT

## 2025-01-03 PROCEDURE — 85025 COMPLETE CBC W/AUTO DIFF WBC: CPT | Performed by: INTERNAL MEDICINE

## 2025-01-03 RX ORDER — METOPROLOL TARTRATE 25 MG/1
12.5 TABLET, FILM COATED ORAL EVERY 12 HOURS SCHEDULED
Qty: 30 TABLET | Refills: 0 | Status: SHIPPED | OUTPATIENT
Start: 2025-01-03 | End: 2025-01-18

## 2025-01-03 RX ADMIN — BUDESONIDE INHALATION 0.5 MG: 0.5 SUSPENSION RESPIRATORY (INHALATION) at 07:24

## 2025-01-03 RX ADMIN — ATORVASTATIN CALCIUM 10 MG: 10 TABLET ORAL at 09:35

## 2025-01-03 RX ADMIN — APIXABAN 2.5 MG: 2.5 TABLET, FILM COATED ORAL at 12:27

## 2025-01-03 RX ADMIN — IPRATROPIUM BROMIDE AND ALBUTEROL SULFATE 3 ML: .5; 3 SOLUTION RESPIRATORY (INHALATION) at 16:05

## 2025-01-03 RX ADMIN — METOPROLOL TARTRATE 25 MG: 25 TABLET, FILM COATED ORAL at 09:34

## 2025-01-03 RX ADMIN — THEOPHYLLINE 300 MG: 300 TABLET, EXTENDED RELEASE ORAL at 09:35

## 2025-01-03 RX ADMIN — PANTOPRAZOLE SODIUM 40 MG: 40 TABLET, DELAYED RELEASE ORAL at 05:03

## 2025-01-03 RX ADMIN — ARFORMOTEROL TARTRATE 15 MCG: 15 SOLUTION RESPIRATORY (INHALATION) at 07:20

## 2025-01-03 RX ADMIN — BARIUM SULFATE 50 ML: 400 SUSPENSION ORAL at 11:09

## 2025-01-03 RX ADMIN — POLYETHYLENE GLYCOL 3350 17 G: 17 POWDER, FOR SOLUTION ORAL at 09:34

## 2025-01-03 RX ADMIN — TAMSULOSIN HYDROCHLORIDE 0.4 MG: 0.4 CAPSULE ORAL at 09:34

## 2025-01-03 RX ADMIN — GUAIFENESIN 1200 MG: 600 TABLET, EXTENDED RELEASE ORAL at 09:35

## 2025-01-03 RX ADMIN — CLOPIDOGREL BISULFATE 75 MG: 75 TABLET ORAL at 09:35

## 2025-01-03 RX ADMIN — IPRATROPIUM BROMIDE AND ALBUTEROL SULFATE 3 ML: .5; 3 SOLUTION RESPIRATORY (INHALATION) at 03:13

## 2025-01-03 RX ADMIN — HEPARIN SODIUM 5000 UNITS: 5000 INJECTION INTRAVENOUS; SUBCUTANEOUS at 05:03

## 2025-01-03 RX ADMIN — THERA TABS 1 TABLET: TAB at 09:35

## 2025-01-03 RX ADMIN — Medication 10 ML: at 09:35

## 2025-01-03 RX ADMIN — PREDNISONE 20 MG: 20 TABLET ORAL at 09:35

## 2025-01-03 NOTE — PROGRESS NOTES
Nutrition Services    Patient Name:  Kali Garcia  YOB: 1945  MRN: [id number]  Admit Date:  12/22/2024    Brief progress note to check back on diet advancement. Since yesterday, pt has been re-assessed by ST, with diagnosis of moderate oral dysphagia and mild pharyngeal dysphagia, with treatment plan to include pureed foods and nectar thick liquids.    Will D/C Boost supplements and switch to Magic Cups at lunch/dinner (Provides 580 kcals, 18 g protein if consumed)   for NTL consistency requirements.     Electronically signed by:  Laurie Jaime RD  01/03/25 15:54 EST

## 2025-01-03 NOTE — PLAN OF CARE
Goal Outcome Evaluation:      Kali Garcia is an 78 y/o M POD #1 leadless pacemaker placement on 1/2/2025 with small incision site at R groin. Presents with functional mobility impairments which indicate the need for skilled intervention. Tolerating session today without incident though continues to be far from baseline, fatigues quickly, and requires frequent rest breaks. Pt required MIN A for supine to sit transfer, MIN A for sit to/from stand transfer with RW, and MIN A for short-distance ambulatory transfer 2-3 ft to recliner with RW. Pt hypotensive with positional changes (not truly orthostatic) with improvement in BP sitting in recliner. Pt also desaturates with limited mobility. Recommending SNF at d/c and will continue to follow and progress as tolerated. PPE: gloves, mask      Che Taylor, PT, DPT

## 2025-01-03 NOTE — MBS/VFSS/FEES
Acute Care - Speech Language Pathology   Swallow Initial Evaluation [facility]     Patient Name: Kali Garcia  : 1945  MRN: [id number]  Today's Date: 1/3/2025               Admit Date: 2024    Visit Dx:     ICD-10-CM ICD-9-CM   1. Pneumonia of left lower lobe due to infectious organism  J18.9 486   2. Rhinovirus  B34.8 079.3   3. Acute on chronic respiratory failure with hypoxia  J96.21 518.84     799.02   4. Dehydration  E86.0 276.51   5. Anemia, unspecified type  D64.9 285.9   6. Sick sinus syndrome  I49.5 427.81     Patient Active Problem List   Diagnosis    Severe malnutrition    Abnormal CT scan, esophagus    Pneumonia due to COVID-19 virus    Pneumonia    Rhinovirus    Anemia    Acute on chronic respiratory failure with hypoxia    Coronary artery disease    Hypertension    COPD (chronic obstructive pulmonary disease)    Pneumonia, unspecified organism    Sick sinus syndrome     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 and endoscopic clipping x 1;  Surgeon: Luz Jacques MD;  Location: [facility] ENDOSCOPY;  Service: Gastroenterology;  Laterality: N/A;  post op: cecal AVM    CORONARY STENT PLACEMENT      ENDOSCOPY N/A 2022    Procedure: ESOPHAGOGASTRODUODENOSCOPY WITH BIOPSY X1 AREA;  Surgeon: Luz Jacques MD;  Location: [facility] ENDOSCOPY;  Service: Gastroenterology;  Laterality: N/A;  esophagitis, gastritis, HH    ENDOSCOPY N/A 2022    Procedure: ESOPHAGOGASTRODUODENOSCOPY;  Surgeon: Luz Jacques MD;  Location: [facility] ENDOSCOPY;  Service: Gastroenterology;  Laterality: N/A;  post op: hiatal hernia, eosphagitis    EYE SURGERY      FEMORAL  ARTERY STENT      KNEE SURGERY Left     KNEE SURGERY Left     LUNG SURGERY         SLP Recommendation and Plan  SLP Swallowing Diagnosis: moderate, oral dysphagia, mild, pharyngeal dysphagia (01/03/25 1200)  SLP Diet Recommendation: puree, nectar thick liquids (01/03/25 1200)  Recommended Precautions and Strategies: upright posture during/after eating, small bites of food and sips of liquid, alternate between small bites of food and sips of liquid, general aspiration precautions, reflux precautions, fatigue precautions (01/03/25 1200)  SLP Rec. for Method of Medication Administration: meds whole, meds crushed, with puree (01/03/25 1200)     Monitor for Signs of Aspiration: yes, notify SLP if any concerns, cough, gurgly voice, throat clearing (01/03/25 1200)     Swallow Criteria for Skilled Therapeutic Interventions Met: demonstrates skilled criteria (01/03/25 1200)     Rehab Potential/Prognosis, Swallowing: good, to achieve stated therapy goals (01/03/25 1200)  Therapy Frequency (Swallow): PRN (01/03/25 1200)  Predicted Duration Therapy Intervention (Days): until discharge (01/03/25 1200)  Oral Care Recommendations: Oral Care BID/PRN (01/03/25 1200)                              Plan for Continued Treatment (SLP): continue treatment per plan of care (01/03/25 1200)                SWALLOW EVALUATION (Last 72 Hours)       SLP Adult Swallow Evaluation       Row Name 01/03/25 1200       Rehab Evaluation    Document Type evaluation  -CP    Subjective Information no complaints  -CP    Patient Observations alert;cooperative  -CP    Patient/Family/Caregiver Comments/Observations --    Patient Effort good  -CP    Symptoms Noted During/After Treatment --       General Information    Patient Profile Reviewed yes  -CP    Pertinent History Of Current Problem Kali Garcia is a 79 y.o. male with PMH of COPD/chronic respiratory failure on 6L O2, atrial fibrillation, CAD, PVD, hypertension, hyperlipidemia, OA presented to [facility] ED  12/22/2024 with complaints of shortness of breath, cough, congestion for two weeks. He has had some intermittent right sided chest soreness. He has felt weak. Swallow eval ordered due to pneumonia. pt has been on high flow O2 until today and unable to be evaluated. pt now on 12L. VFSS rec after bedside swallow  eval.  -CP    Current Method of Nutrition NPO  -CP    Precautions/Limitations, Vision --    Precautions/Limitations, Hearing --    Prior Level of Function-Communication --    Prior Level of Function-Swallowing no diet consistency restrictions;regular textures;thin liquids;concerns regarding malnutrition  -CP    Plans/Goals Discussed with patient  -CP    Patient's Goals for Discharge --       Pain    Pretreatment Pain Rating 0/10 - no pain  -CP    Posttreatment Pain Rating 0/10 - no pain  -CP    Additional Documentation --       Pain Scale: FACES Pre/Post-Treatment    Pain: FACES Scale, Pretreatment --    Posttreatment Pain Rating --       Oral Motor Structure and Function    Dentition Assessment edentulous, does not have dentures  -CP    Secretion Management WNL/WFL  -CP    Mucosal Quality dry  -CP    Volitional Swallow --    Volitional Cough --       Oral Musculature and Cranial Nerve Assessment    Oral Motor General Assessment WFL  -CP       General Eating/Swallowing Observations    Respiratory Support Currently in Use nasal cannula  -CP    O2 Liters 3L  -CP    Eating/Swallowing Skills fed by SLP  -CP    Positioning During Eating upright 90 degree;upright in chair  -CP    Utensils Used --    Consistencies Trialed --       Respiratory    Respiratory Status --       Clinical Swallow Eval    Oral Prep Phase --    Oral Transit --    Oral Residue --    Pharyngeal Phase --    Esophageal Phase --    Clinical Swallow Evaluation Summary --       Pharyngeal Phase Concerns    Pharyngeal Phase Concerns --    Cough --    Throat Clear --       MBS/VFSS    Utensils Used spoon;straw  -CP    Consistencies Trialed  nectar/syrup-thick liquids;pureed;thin liquids  -CP       MBS/VFSS Interpretation    VFSS Summary Pt seen for video-swallow study seated at 90 degrees in the lateral position. Pt given trials of NT barium by spoon x3, barium coated applesauce x2, NT barium by straw x2  and thin barium by spoon x2 and by straw x3 to complete the study. Solids not attempted due to pt being edentulous and slow, disorganized oral transit on puree and liquids. Pt had good labial closure over the spoon and straw. No labial spillage occurred. A-P transit for puree and liquids was slow and disorganized with premature spillage over the BOT to the valleculae prior to swallow. Once swallow initiated, it was delayed from 6 seconds on puree to 3 seconds on liquids, allowing each bolus to spill into and over the valleculae prior to swallow. This spillage causes penetration before the swallow and increases pt's aspiration risk. Adequate laryngeal elevation and anterior hyoid excursion occurred. Epiglottic inversion and laryngeal vestibular closure were also adequate. Pt exhibited coating under the epiglottis after thin liquids, that did not clear. No other penetration or aspiration of any consistency occurred. There was min residue remaining on the BOT and in the valleculae after the swallow.  Pt presents with moderate oral dysphagia and mild pharyngeal dysphagia. Thin liquids present as an 3 on  the Rosenbeck penetration-aspiration scale indicating that material entrs the airway, remain above the vocal folds and is not ejected from the airway. All other consistencies trialed present as a 1 on the Rosenbeck penetration-aspiration scale, indicating that material does not enter the airway.  It is recommended that pt initiate a puree diet with NT liquids. Pt remains at risk for aspiration due to the premature spillage of all trials over the BOT, and fatigue could also increase aspiration risk. Monitor pt closely for s/s of aspiration and make NPO if  she shows these s/s. Pt should eat at a slow rate and be up at 90 degrees for all PO. He should take small bites and small single sips.  Full results and recommendations from this study were explained to pt and his nurse and pt was shown images from the IM[facility]. Pt expressed understanding. ST will follow to assure tolerance of diet and make upgrades as pt improves.  -CP       SLP Evaluation Clinical Impression    SLP Swallowing Diagnosis moderate;oral dysphagia;mild;pharyngeal dysphagia  -CP    Functional Impact risk of aspiration/pneumonia;risk of malnutrition  -CP    Rehab Potential/Prognosis, Swallowing good, to achieve stated therapy goals  -CP    Swallow Criteria for Skilled Therapeutic Interventions Met demonstrates skilled criteria  -CP       SLP Treatment Clinical Impressions    Plan for Continued Treatment (SLP) continue treatment per plan of care  -CP    Care Plan Review evaluation/treatment results reviewed  -CP       Recommendations    Therapy Frequency (Swallow) PRN  -CP    Predicted Duration Therapy Intervention (Days) until discharge  -CP    SLP Diet Recommendation puree;nectar thick liquids  -CP    Recommended Diagnostics --    Recommended Precautions and Strategies upright posture during/after eating;small bites of food and sips of liquid;alternate between small bites of food and sips of liquid;general aspiration precautions;reflux precautions;fatigue precautions  -CP    Oral Care Recommendations Oral Care BID/PRN  -CP    SLP Rec. for Method of Medication Administration meds whole;meds crushed;with puree  -CP    Monitor for Signs of Aspiration yes;notify SLP if any concerns;cough;gurgly voice;throat clearing  -CP    Anticipated Discharge Disposition (SLP) --       Swallow Goals (SLP)    Swallow LTGs Swallow Long Term Goal (free text)  -CP    Swallow STGs diet tolerance goal selection (SLP)  -CP    Diet Tolerance Goal Selection (SLP) Swallow Short Term Goal 1;Swallow Short Term Goal 2  -CP       (LTG)  Swallow    (LTG) Swallow Pt will maximize swallow function for least restrictive PO diet, exhibiting no complication associated with dysphagia, adequate PO intake, and demonstrating independent use of swallow compensations  -CP    [place] (Swallow Long Term Goal) independently (over 90% accuracy)  -CP    Time Frame (Swallow Long Term Goal) by discharge  -CP    Progress/Outcomes (Swallow Long Term Goal) goal ongoing  -CP    Comment (Swallow Long Term Goal) See above VFSS  -CP       (STG) Swallow 1    (STG) Swallow 1 The patient will participate in a meal/follow-up assessment to determine safety and adequacy of recommended diet, independent use of safe swallow compensations, pt/family education and additional goals/recommendations to follow.  -CP    [place] (Swallow Short Term Goal 1) independently (over 90% accuracy)  -CP    Time Frame (Swallow Short Term Goal 1) by discharge  -CP    Progress/Outcomes (Swallow Short Term Goal 1) goal ongoing  -CP       (STG) Swallow 2    (STG) Swallow 2 Pt will participate in ongoing swallow assessment to include VFSS if indicated, and caregiver teaching.  -CP    [place] (Swallow Short Term Goal 2) independently (over 90% accuracy)  -CP    Time Frame (Swallow Short Term Goal 2) by discharge  -CP    Progress/Outcomes (Swallow Short Term Goal 2) goal ongoing  -CP    Comment (Swallow Short Term Goal 2) See above VFSS  -CP              User Key  (r) = Recorded By, (t) = Taken By, (c) = Cosigned By      Initials Name Effective Dates    CP Amparo Vera SLP 06/16/21 -     Mike Hair SLP 04/22/24 -                     EDUCATION  The patient has been educated in the following areas:   Dysphagia (Swallowing Impairment) Oral Care/Hydration Modified Diet Instruction.        SLP GOALS       Row Name 01/03/25 1200 01/03/25 0900          (LTG) Swallow    (LTG) Swallow Pt will maximize swallow function for least restrictive PO diet, exhibiting no complication associated  with dysphagia, adequate PO intake, and demonstrating independent use of swallow compensations  -CP Pt will maximize swallow function for least restrictive PO diet, exhibiting no complication associated with dysphagia, adequate PO intake, and demonstrating independent use of swallow compensations  -MS     [place] (Swallow Long Term Goal) independently (over 90% accuracy)  -CP independently (over 90% accuracy)  -MS     Time Frame (Swallow Long Term Goal) by discharge  -CP by discharge  -MS     Progress/Outcomes (Swallow Long Term Goal) goal ongoing  -CP new goal  -MS     Comment (Swallow Long Term Goal) See above VFSS  -CP --        (STG) Swallow 1    (STG) Swallow 1 The patient will participate in a meal/follow-up assessment to determine safety and adequacy of recommended diet, independent use of safe swallow compensations, pt/family education and additional goals/recommendations to follow.  -CP The patient will participate in a meal/follow-up assessment to determine safety and adequacy of recommended diet, independent use of safe swallow compensations, pt/family education and additional goals/recommendations to follow.  -MS     [place] (Swallow Short Term Goal 1) independently (over 90% accuracy)  -CP independently (over 90% accuracy)  -MS     Time Frame (Swallow Short Term Goal 1) by discharge  -CP by discharge  -MS     Progress/Outcomes (Swallow Short Term Goal 1) goal ongoing  -CP goal ongoing  -MS        (STG) Swallow 2    (STG) Swallow 2 Pt will participate in ongoing swallow assessment to include VFSS if indicated, and caregiver teaching.  -CP Pt will participate in ongoing swallow assessment to include VFSS if indicated, and caregiver teaching.  -MS     [place] (Swallow Short Term Goal 2) independently (over 90% accuracy)  -CP independently (over 90% accuracy)  -MS     Time Frame (Swallow Short Term Goal 2) by discharge  -CP by discharge  -MS     Progress/Outcomes (Swallow Short Term Goal 2)  goal ongoing  -CP new goal  -MS     Comment (Swallow Short Term Goal 2) See above VFSS  -CP --               User Key  (r) = Recorded By, (t) = Taken By, (c) = Cosigned By      Initials Name Provider Type    CP Amparo Vera, SLP Speech and Language Pathologist    Mike Hair, SLP Speech and Language Pathologist                         Time Calculation:                ADRIANNE Cid  1/3/2025

## 2025-01-03 NOTE — PROGRESS NOTES
Daily Progress Note          Assessment    Pneumonia due to unspecified pathogen  COPD: At home on Trelegy inhaler  Rhinovirus infection  Hypoxemia: At home 6 L of oxygen  CAD  PVD  HTN  HLD  A-fib  Anemia  Former smoker quit 2022 after 60 pack years  Echo 2/1/2024 EF 61-65% mild pulmonary hypertension 35-45 mmHg     CT scan of the chest in June 2024 showing a noncalcified nodule in the right upper lobe around 2 x 1.1 x 1 cm. Patient was also noted to have patchy airspace disease and clustered micronodules in the lingula. He then underwent PET scan showing a 1.7 x 1.1 cm partially calcified irregular shaped nodule in the right upper lobe to be metabolically active with SUV of 4.84 and additional 2 metabolically active irregular nodular densities in the right upper lobe. Inferior apical segment with intervening areas of metabolically active interstitial thickening.     PFTs: Very severe airflow obstruction with significant air trapping and reduced diffusion capacity. FEV1/FVC postbronchodilator is 0.39 with FEV1 of 0.71 L or 28% predicted and FVC of 1.82 L or 47% predicted. No significant bronchodilator response noted. Total lung capacity of 7.77 L or 131% predicted and residual volume of 5.82 L or 226% predicted and DLCO of 61% predicted noted.         Recommendations:  Respiratory status is close to his baseline, he has chronic mild wheezing and chronic scattered crackles  Slowly wean down steroids    Awaiting result of swallowing study    patient is very cachectic with poor prognosis, patient decided for DNR and home hospice, awaiting [facility] in [facility]     antibiotics: Unasyn completed 5 days  Nebulized Mucomyst completed  Encourage use of incentive spirometry and flutter valve  Theophylline    Nebulized Pulmicort  Oxygen supplement and titration to maintain saturation 90 to 95%: Currently on 2 L per nasal cannula  Bronchodilators  Mucinex     Atorvastatin  Plavix     I personally reviewed the  radiological studies             LOS: 11 days     Subjective     Chronic cough and shortness of breath    Objective     Vital signs for last 24 hours:  Vitals:    01/03/25 0720 01/03/25 0723 01/03/25 0724 01/03/25 0727   BP:       BP Location:       Patient Position:       Pulse: 55  52 52   Resp: 16 12 12 14   Temp:       TempSrc:       SpO2: 95%  96% 97%   Weight:       Height:           Intake/Output last 3 shifts:  I/O last 3 completed shifts:  In: 440 [P.O.:240; I.V.:200]  Out: 1700 [Urine:1700]  Intake/Output this shift:  No intake/output data recorded.      Radiology  Imaging Results (Last 24 Hours)       Procedure Component Value Units Date/Time    FL Video Swallow With Speech Single Contrast [[id number]] Resulted: 01/03/25 1110     Updated: 01/03/25 1110    Narrative:      This procedure was auto-finalized with no dictation required.            Labs:  Results from last 7 days   Lab Units 01/03/25  0118   WBC 10*3/mm3 3.63   HEMOGLOBIN g/dL 8.1*   HEMATOCRIT % 26.0*   PLATELETS 10*3/mm3 162     Results from last 7 days   Lab Units 01/03/25  0118   SODIUM mmol/L 138   POTASSIUM mmol/L 5.0   CHLORIDE mmol/L 105   CO2 mmol/L 29.0   BUN mg/dL 24*   CREATININE mg/dL 0.65*   CALCIUM mg/dL 8.2*   GLUCOSE mg/dL 83                                 Results from last 7 days   Lab Units 12/29/24  1334   TSH uIU/mL 3.470           Meds:   SCHEDULE  apixaban, 2.5 mg, Oral, Q12H  arformoterol, 15 mcg, Nebulization, BID - RT  atorvastatin, 10 mg, Oral, Daily  budesonide, 0.5 mg, Nebulization, BID - RT  clopidogrel, 75 mg, Oral, Daily  guaiFENesin, 1,200 mg, Oral, Q12H  ipratropium-albuterol, 3 mL, Nebulization, Q4H - RT  metoprolol tartrate, 25 mg, Oral, Q12H  multivitamin, 1 tablet, Oral, Daily  pantoprazole, 40 mg, Oral, Q AM  polyethylene glycol, 17 g, Oral, Daily  predniSONE, 20 mg, Oral, Daily With Breakfast  sodium chloride, 10 mL, Intravenous, Q12H  tamsulosin, 0.4 mg, Oral, Daily  theophylline, 300 mg, Oral,  Daily      Infusions       PRNs    acetaminophen    aluminum-magnesium hydroxide-simethicone    atropine    senna-docusate sodium **AND** polyethylene glycol **AND** bisacodyl **AND** bisacodyl    Calcium Replacement - Follow Nurse / BPA Driven Protocol    ePHEDrine Sulfate (Pressors)    hydrALAZINE    ipratropium-albuterol    ipratropium-albuterol    labetalol    Magnesium Standard Dose Replacement - Follow Nurse / BPA Driven Protocol    melatonin    nitroglycerin    ondansetron ODT **OR** ondansetron    ondansetron    Phosphorus Replacement - Follow Nurse / BPA Driven Protocol    Potassium Replacement - Follow Nurse / BPA Driven Protocol    sodium chloride    sodium chloride    sodium chloride    Physical Exam:  General Appearance:  Alert, looks chronically ill and cachectic  HEENT:  Normocephalic, without obvious abnormality, Conjunctiva/corneas clear,.   Nares normal, no drainage     Neck:  Supple, symmetrical, trachea midline.   Lungs /Chest wall: Prolonged expiration and mild bilateral basal rhonchi, respirations unlabored, symmetrical wall movement.     Heart:  Regular rate and rhythm, S1 S2 normal  Abdomen: Soft, non-tender, no masses, no organomegaly.    Extremities: No edema, no clubbing or cyanosis     ROS  Constitutional: Negative for chills, fever and malaise/fatigue.   HENT: Negative.    Eyes: Negative.    Cardiovascular: Negative.    Respiratory: Positive for chronic cough and shortness of breath.    Skin: Negative.    Musculoskeletal: Negative.    Gastrointestinal: Negative.    Genitourinary: Negative.    Neurological: Generalized weakness      I reviewed the recent clinical results  I personally reviewed the latest radiological studies    Part of this note may be an electronic transcription/translation of spoken language to printed text using the Dragon Dictation System.

## 2025-01-03 NOTE — CASE MANAGEMENT/SOCIAL WORK
Continued Stay Note  [facility]     Patient Name: Kali Garcia  MRN: [id number]  Today's Date: 1/3/2025    Admit Date: 12/22/2024    Plan: [facility] accepted (can accept up to 15L O2). Precert approved (valid 12/28/24-1/3/25). PASRR approved. Bee following. Current home 02 at 6L through [employer].   Discharge Plan       Row Name 01/03/25 1310       Plan    Plan Comments CM confirmed with [facility] liaison that patient can admit today. Per RN, patient's son can transport at 4pm, will bring home O2 tank. CM updated [facility] liaison and [facility] liaison.    Final Discharge Disposition Code 03 - skilled nursing facility (SNF)    Final Note dann Schuler                      Expected Discharge Date and Time       Expected Discharge Date Expected Discharge Time    Nate 3, 2025           Case Management Discharge Note      Final Note: dann Schuler    Provided Post Acute Provider List?: N/A  Post Acute Provider List: Nursing Home  Provided Post Acute Provider Quality & Resource List?: N/A  Post Acute Provider Quality and Resource List: Nursing Home  Delivered To: Support Person  Support Person: Ivis - vitor  Method of Delivery: Telephone (left at bedside)    Selected Continued Care - Admitted Since 12/22/2024       Destination Coordination complete.      Service Provider Services Address Phone Fax Patient Preferred    [facility] [place] [facility] [address] [place] [address] [place] [address] [phone] --                 Transportation Services  Private: Car (son)    Final Discharge Disposition Code: 03 - skilled nursing facility (SNF)    Phone communication or documentation only - no physical contact with patient or family.   BILL GregoryN, RN    [address] [place] [address]    Office: [phone]  Fax: [phone]

## 2025-01-03 NOTE — SIGNIFICANT NOTE
01/03/25 1258   OTHER   Discipline occupational therapist   Rehab Time/Intention   Session Not Performed other (see comments)  (Pt expected to d/c this date. OT will follow-up as appropriate.)   Therapy Assessment/Plan (PT)   Criteria for Skilled Interventions Met (PT) yes;meets criteria;skilled treatment is necessary   Recommendation   OT - Next Appointment 01/05/25

## 2025-01-03 NOTE — PROGRESS NOTES
Nutrition Services  Patient Name: Kali Garcia  YOB: 1945  MRN: [id number]  Admission date: 12/22/2024    PROGRESS NOTE      Nutrition Intervention Updates: Monitor for readiness to advance diet. Suggest goal of Regular diet when ready to resume solid foods.    Can also provide Boost Original BID (Provides 480 kcals, 20 g protein if consumed) when diet resumes       Encounter Information: 1/2: Progress note to check on PO intakes. Pt generally eating about 50% at recent meals when diet order active. Noted plan for hospice to follow after D/C. Given this, aggressive nutrition support not presently indicated. Will monitor for diet re-advancement when clinically appropriate.        PO Diet: Diet order cancelled today for procedure    PO Supplements: None ordered    PO Intake:  50% when diet order active        Current nutrition support:    Nutrition support review:        Labs (reviewed below): Reviewed        GI Function:  BM 12/30 (x3 days ago) - bowel meds in place        Brief weight review   Wt Readings from Last 10 Encounters:   01/02/25 0557 51.8 kg (114 lb 3.2 oz)   01/01/25 0500 51.2 kg (112 lb 14 oz)   12/31/24 0501 51.1 kg (112 lb 10.5 oz)   12/30/24 0600 52.8 kg (116 lb 6.5 oz)   12/29/24 0514 53 kg (116 lb 13.5 oz)   12/28/24 0538 51.4 kg (113 lb 5.1 oz)   12/27/24 0647 50.4 kg (111 lb 1.8 oz)   12/26/24 0610 49.9 kg (110 lb 0.2 oz)   12/25/24 0600 49.6 kg (109 lb 5.6 oz)   12/23/24 0531 50.1 kg (110 lb 7.2 oz)   12/22/24 2139 49.9 kg (110 lb 0.2 oz)   03/04/24 1452 53 kg (116 lb 12.1 oz)   02/19/24 1303 50.5 kg (111 lb 5 oz)   02/16/24 2101 50 kg (110 lb 2.3 oz)   02/16/24 2054 51.8 kg (114 lb 2.8 oz)   02/15/24 1416 52.9 kg (116 lb 10.3 oz)   02/04/24 0315 51.5 kg (113 lb 8.6 oz)   02/03/24 1415 51.3 kg (113 lb)   02/03/24 0345 51.5 kg (113 lb 8.6 oz)   02/02/24 1524 51.8 kg (114 lb 3.2 oz)   02/02/24 0337 54.4 kg (120 lb)   02/01/24 1330 52.1 kg (114 lb 14.5 oz)   08/04/22 1124 51.1  kg (112 lb 10.5 oz)   07/26/22 1511 59 kg (130 lb)   04/15/22 0436 58.3 kg (128 lb 8.5 oz)   04/13/22 0355 54.6 kg (120 lb 5.9 oz)   04/12/22 2037 128 kg (282 lb 3 oz)        Results from last 7 days   Lab Units 01/03/25  0118 01/02/25  0157 01/01/25  0110   SODIUM mmol/L 138 140 141   POTASSIUM mmol/L 5.0 4.5 4.5   CHLORIDE mmol/L 105 105 105   CO2 mmol/L 29.0 29.7* 32.1*   BUN mg/dL 24* 24* 30*   CREATININE mg/dL 0.65* 0.68* 0.72*   CALCIUM mg/dL 8.2* 8.3* 8.3*   GLUCOSE mg/dL 83 144* 115*     Results from last 7 days   Lab Units 01/03/25  0118   HEMOGLOBIN g/dL 8.1*   HEMATOCRIT % 26.0*     RD to follow up per protocol.    Electronically signed by:  Laurie Jaime RD

## 2025-01-03 NOTE — THERAPY TREATMENT NOTE
Subjective: RN okays treatment. Pt s/p leadless pacemaker placement on 2025 with incision site at R groin (no longer on bedrest, no other precautions per RN)    Objective:     Precautions - Hypotension, desaturates, Falls Risk, urinary catheter    Bed mobility - Min-A (supine to sit)  Transfers - Min-A and with rolling walker (sit to/from stand x 2)  Ambulation - 2-3 feet to recliner Min-A and with rolling walker    Seated balance - Pt completed 5 minute sitting bout EOB requiring SBA with no LOB.    Standing balance - Pt completed 3 minute standing bout EOB with BUE support on RW requiring CGA and Vc'ing for upright trunk.    Therapeutic Exercise - Pt completed 1x10 seated MIP, LAQ, and heel/toe raises, required rest breaks 2*/2 fatigue/SOA.    Vitals: Desaturates and Hypotensive (BP supine: 98/44 (72 MAP), sitting EOB: 96/43 (71 MAP), standin/37 (55 MAP), sitting in recliner: 108/55 (72 MAP), Sp02 87-91% on 3L, HR 51-54 bpm    Pain: Pt denies pain but c/o occasional chest tightness with mobility and with LLE LAQ (RN notified and aware, resolves within <5 seconds of rest and with no change in vital signs    Education: Provided education on the importance of mobility in the acute care setting, Verbal/Tactile Cues, Transfer Training, and HEP    Assessment: Kali Garcia is an 78 y/o M POD #1 leadless pacemaker placement on 2025 with small incision site at R groin. Presents with functional mobility impairments which indicate the need for skilled intervention. Tolerating session today without incident though continues to be far from baseline, fatigues quickly, and requires frequent rest breaks. Pt required MIN A for supine to sit transfer, MIN A for sit to/from stand transfer with RW, and MIN A for short-distance ambulatory transfer 2-3 ft to recliner with RW. Pt hypotensive with positional changes (not truly orthostatic) with improvement in BP sitting in recliner. Pt also desaturates with limited  "mobility. Recommending SNF at d/c and will continue to follow and progress as tolerated. PPE: gloves, mask    Plan/Recommendations:   If medically appropriate, Moderate Intensity Therapy recommended post-acute care. This is recommended as therapy feels the patient would require 3-4 days per week and wouldn't tolerate \"3 hour daily\" rehab intensity. SNF would be the preferred choice. If the patient does not agree to SNF, arrange HH or OP depending on home bound status. If patient is medically complex, consider LTACH. Pt requires no DME at discharge.     Pt desires Skilled Rehab placement at discharge. Pt cooperative; agreeable to therapeutic recommendations and plan of care.         Basic Mobility 6-click:  Rollin = Total, A lot = 2, A little = 3; 4 = None  Supine>Sit:   1 = Total, A lot = 2, A little = 3; 4 = None   Sit>Stand with arms:  1 = Total, A lot = 2, A little = 3; 4 = None  Bed>Chair:   1 = Total, A lot = 2, A little = 3; 4 = None  Ambulate in room:  1 = Total, A lot = 2, A little = 3; 4 = None  3-5 Steps with railin = Total, A lot = 2, A little = 3; 4 = None  Score: 15    Post-Tx Position: Up in Chair, Alarms activated, and Call light and personal items within reach  PPE: gloves and surgical mask    Therapy Charges for Today       Code Description Service Date Service Provider Modifiers Qty    59024163050 HC PT THER PROC EA 15 MIN 1/3/2025 Che Taylor, PT GP 1    83043004948 HC PT THERAPEUTIC ACT EA 15 MIN 1/3/2025 Che Taylor, PT GP 1    [id number]  PT NEUROMUSC RE EDUCATION EA 15 MIN 1/3/2025 Che Taylor, PT GP 1           PT Charges       Row Name 25 1139             Time Calculation    Start Time 1113  -AO      Stop Time 1137  -AO      Time Calculation (min) 24 min  -AO      PT Received On 25  -AO      PT - Next Appointment 25  -AO         Time Calculation- PT    Total Timed Code Minutes- PT 24 minute(s)  -AO                User Key  (r) = Recorded " By, (t) = Taken By, (c) = Cosigned By      Initials Name Provider Type    Che Pugh, PT Physical Therapist

## 2025-01-03 NOTE — PLAN OF CARE
Goal Outcome Evaluation:  Plan of Care Reviewed With: patient        Progress: improving          Pt resting well overnight. R groin site has been clean, dry, and intact. Pt's HR has remained in SR. He has no complaints at this time.

## 2025-01-03 NOTE — PROGRESS NOTES
"Cardiology [place]      Patient Care Team:  Tami Rasmussen APRN as PCP - General (Nurse Practitioner)    For follow-up: Atrial flutter, pauses    Subjective    Interval History and ROS:     Sitting up in chair  NAD  Paced at 50  S/p Micra PM         Review of Systems   Constitutional: Positive for malaise/fatigue. Negative for diaphoresis.   Eyes:  Negative for visual disturbance.   Cardiovascular:  Positive for dyspnea on exertion. Negative for chest pain, cyanosis, irregular heartbeat, leg swelling, near-syncope, orthopnea, palpitations, paroxysmal nocturnal dyspnea and syncope.   Respiratory:  Positive for cough and shortness of breath. Negative for hemoptysis and sleep disturbances due to breathing.    Hematologic/Lymphatic: Negative for bleeding problem.   Skin:  Negative for color change.   Musculoskeletal:  Negative for joint swelling, muscle cramps and myalgias.   Gastrointestinal:  Negative for abdominal pain, change in bowel habit, nausea and vomiting.   Genitourinary:  Negative for dysuria.   Neurological:  Positive for weakness. Negative for dizziness, focal weakness, headaches, light-headedness, loss of balance, numbness, paresthesias and vertigo.   Psychiatric/Behavioral:  Negative for altered mental status.    All other systems reviewed and are negative.      Objective    Vital Signs  Temp:  [97.4 °F (36.3 °C)-97.9 °F (36.6 °C)] 97.5 °F (36.4 °C)  Heart Rate:  [52-64] 52  Resp:  [12-19] 14  BP: (116-135)/(46-60) 126/47  Oxygen Therapy  SpO2: 97 %  Pulse Oximetry Type: Continuous  Device (Oxygen Therapy): nasal cannula  $ High Flow Nasal Cannula Set-Up: yes  Flow (L/min) (Oxygen Therapy): 2  Oxygen Concentration (%): 28}  Flowsheet Rows      Flowsheet Row First Filed Value   Admission Height 180.3 cm (71\") Documented at 12/22/2024 2139   Admission Weight 49.9 kg (110 lb 0.2 oz) Documented at 12/22/2024 2139                Physical Exam:    Physical Exam  Constitutional:       Appearance: " Normal appearance. He is not diaphoretic.   HENT:      Head: Normocephalic and atraumatic.   Eyes:      Extraocular Movements: Extraocular movements intact.      Pupils: Pupils are equal, round, and reactive to light.   Neck:      Vascular: No JVD.   Cardiovascular:      Rate and Rhythm: Normal rate. Rhythm irregularly irregular.      Pulses: Normal pulses.      Heart sounds: Normal heart sounds.   Pulmonary:      Effort: Pulmonary effort is normal.      Breath sounds: Rhonchi present.   Abdominal:      General: Abdomen is flat. There is no distension.      Palpations: Abdomen is soft.   Musculoskeletal:         General: Normal range of motion.      Right lower leg: No edema.      Left lower leg: No edema.   Skin:     General: Skin is warm and dry.   Neurological:      General: No focal deficit present.      Mental Status: He is alert and oriented to person, place, and time.   Psychiatric:         Mood and Affect: Mood normal.         Behavior: Behavior normal.         Thought Content: Thought content normal.         Judgment: Judgment normal.         Results Review:     I reviewed the patient's new clinical results.    Lab Results (last 24 hours)       Procedure Component Value Units Date/Time    Basic Metabolic Panel [[id number]]  (Abnormal) Collected: 01/03/25 0118    Specimen: Blood Updated: 01/03/25 0148     Glucose 83 mg/dL      BUN 24 mg/dL      Creatinine 0.65 mg/dL      Sodium 138 mmol/L      Potassium 5.0 mmol/L      Comment: Specimen hemolyzed.  Result may be falsely elevated.        Chloride 105 mmol/L      CO2 29.0 mmol/L      Calcium 8.2 mg/dL      BUN/Creatinine Ratio 36.9     Anion Gap 4.0 mmol/L      eGFR 95.3 mL/min/1.73     Narrative:      GFR Categories in Chronic Kidney Disease (CKD)      GFR Category          GFR (mL/min/1.73)    Interpretation  G1                     90 or greater         Normal or high (1)  G2                      60-89                Mild decrease (1)  G3a                    45-59                Mild to moderate decrease  G3b                   30-44                Moderate to severe decrease  G4                    15-29                Severe decrease  G5                    14 or less           Kidney failure          (1)In the absence of evidence of kidney disease, neither GFR category G1 or G2 fulfill the criteria for CKD.    eGFR calculation 2021 CKD-EPI creatinine equation, which does not include race as a factor    CBC Auto Differential [[id number]]  (Abnormal) Collected: 01/03/25 0118    Specimen: Blood Updated: 01/03/25 0126     WBC 3.63 10*3/mm3      RBC 2.75 10*6/mm3      Hemoglobin 8.1 g/dL      Hematocrit 26.0 %      MCV 94.5 fL      MCH 29.5 pg      MCHC 31.2 g/dL      RDW 15.9 %      RDW-SD 55.2 fl      MPV 10.9 fL      Platelets 162 10*3/mm3      Neutrophil % 81.2 %      Lymphocyte % 9.4 %      Monocyte % 8.0 %      Eosinophil % 0.0 %      Basophil % 0.0 %      Immature Grans % 1.4 %      Neutrophils, Absolute 2.95 10*3/mm3      Lymphocytes, Absolute 0.34 10*3/mm3      Monocytes, Absolute 0.29 10*3/mm3      Eosinophils, Absolute 0.00 10*3/mm3      Basophils, Absolute 0.00 10*3/mm3      Immature Grans, Absolute 0.05 10*3/mm3      nRBC 0.0 /100 WBC             Imaging Results (Last 24 Hours)       Procedure Component Value Units Date/Time    FL Video Swallow With Speech Single Contrast [[id number]] Resulted: 01/03/25 1110     Updated: 01/03/25 1110    Narrative:      This procedure was auto-finalized with no dictation required.            ECG/EMG Results (most recent)       Procedure Component Value Units Date/Time    ECG 12 Lead Dyspnea [[id number]] Collected: 12/22/24 2153     Updated: 12/23/24 0633     QT Interval 466 ms      QTC Interval 501 ms     Narrative:      HEART RATE=70  bpm  RR Izszridd=606  ms  MT Interval=  ms  P Horizontal Axis=  deg  P Front Axis=  deg  QRSD Cfnifywd=063  ms  QT Nxsdyrau=891  ms  HImA=184  ms  QRS Axis=44  deg  T Wave Axis=  deg  - ABNORMAL ECG  -  IVCD, consider RBBB  Abnormal T, consider ischemia, inferior leads  When compared with ECG of 03-Feb-2024 09:53:17,  Significant change in rhythm: previously sinus  New or worsened ischemia or infarction  New conduction abnormality  Electronically Signed By: Huseyin Sutherland ([facility]) 2024-12-23 06:32:39  Date and Time of Study:2024-12-22 21:53:44    Telemetry Scan [[id number]] Resulted: 12/22/24     Updated: 12/25/24 2021    Telemetry Scan [[id number]] Resulted: 12/22/24     Updated: 12/26/24 1610    Telemetry Scan [[id number]] Resulted: 12/22/24     Updated: 12/26/24 1611    Telemetry Scan [[id number]] Resulted: 12/22/24     Updated: 12/26/24 1613    Telemetry Scan [[id number]] Resulted: 12/22/24     Updated: 12/30/24 1029    Telemetry Scan [[id number]] Resulted: 12/22/24     Updated: 12/30/24 1100    Telemetry Scan [[id number]] Resulted: 12/22/24     Updated: 12/30/24 1125    ECG 12 Lead Rhythm Change [[id number]] Collected: 12/30/24 0721     Updated: 12/30/24 1845     QT Interval 377 ms      QTC Interval 523 ms     Narrative:      HEART LZVH=795  bpm  RR Zdjmjses=270  ms  PA Interval=  ms  P Horizontal Axis=  deg  P Front Axis=  deg  QRSD Pfkzvllm=510  ms  QT Jhupdkpe=074  ms  DKwT=859  ms  QRS Axis=68  deg  T Wave Axis=36  deg  - ABNORMAL ECG -  Unclear rhythm, possible junctional tachycardia  Anteroseptal infarct, old  Prolonged QT interval  When compared with ECG of 22-Dec-2024 21:53:44,  Significant change in rhythm  New or worsened ischemia or infarction  Electronically Signed By: Erick Hall ([facility]) 2024-12-30 18:45:03  Date and Time of Study:2024-12-30 07:21:56    Telemetry Scan [[id number]] Resulted: 12/22/24     Updated: 12/31/24 0545    Telemetry Scan [[id number]] Resulted: 12/22/24     Updated: 12/31/24 1156    Telemetry Scan [[id number]] Resulted: 12/22/24     Updated: 12/31/24 1351    Telemetry Scan [[id number]] Resulted: 12/22/24     Updated: 12/31/24 1351    Telemetry Scan [[id number]] Resulted: 12/22/24      Updated: 12/31/24 1351    Telemetry Scan [[id number]] Resulted: 12/22/24     Updated: 12/31/24 1353    Telemetry Scan [[id number]] Resulted: 12/22/24     Updated: 12/31/24 1354    Telemetry Scan [[id number]] Resulted: 12/22/24     Updated: 12/31/24 1354    Telemetry Scan [[id number]] Resulted: 12/22/24     Updated: 12/31/24 1356    Telemetry Scan [[id number]] Resulted: 12/22/24     Updated: 12/31/24 1357    Telemetry Scan [[id number]] Resulted: 12/22/24     Updated: 12/31/24 1358    Telemetry Scan [[id number]] Resulted: 12/22/24     Updated: 12/31/24 1359    Telemetry Scan [[id number]] Resulted: 12/22/24     Updated: 12/31/24 1359    Telemetry Scan [[id number]] Resulted: 12/22/24     Updated: 12/31/24 1402    Telemetry Scan [[id number]] Resulted: 12/22/24     Updated: 12/31/24 1402    Telemetry Scan [[id number]] Resulted: 12/22/24     Updated: 12/31/24 1403    Telemetry Scan [[id number]] Resulted: 12/22/24     Updated: 12/31/24 1404    Telemetry Scan [[id number]] Resulted: 12/22/24     Updated: 12/31/24 1405    Telemetry Scan [[id number]] Resulted: 12/22/24     Updated: 12/31/24 1406    Telemetry Scan [[id number]] Resulted: 12/22/24     Updated: 12/31/24 1410    Telemetry Scan [[id number]] Resulted: 12/22/24     Updated: 12/31/24 1410    Telemetry Scan [[id number]] Resulted: 12/22/24     Updated: 12/31/24 1410    Telemetry Scan [[id number]] Resulted: 12/22/24     Updated: 12/31/24 1410    Telemetry Scan [[id number]] Resulted: 12/22/24     Updated: 12/31/24 1411    Telemetry Scan [[id number]] Resulted: 12/22/24     Updated: 12/31/24 1412    Telemetry Scan [[id number]] Resulted: 12/22/24     Updated: 12/31/24 1412    Telemetry Scan [[id number]] Resulted: 12/22/24     Updated: 12/31/24 1414    Telemetry Scan [[id number]] Resulted: 12/22/24     Updated: 12/31/24 1414    ECG 12 Lead Rhythm Change [[id number]] Collected: 12/31/24 1215     Updated: 12/31/24 1600     QT Interval 552 ms      QTC Interval 553 ms     Narrative:      HEART RATE=60   bpm  RR Jxrmfjki=717  ms  KS Wsrtrizq=111  ms  P Horizontal Axis=26  deg  P Front Axis=87  deg  QRSD Jhukorrd=810  ms  QT Httpjpbi=735  ms  PVgO=879  ms  QRS Axis=38  deg  T Wave Axis=77  deg  - ABNORMAL ECG -  Sinus rhythm  Anteroseptal  infarct, age indeterminate  Prolonged QT interval  When compared with ECG of 30-Dec-2024 07:21:56,  Significant change in rhythm  New or worsened ischemia or infarction  Electronically Signed By: Juan Diego Davis ([facility]) 2024-12-31 15:58:46  Date and Time of Study:2024-12-31 12:15:41    ECG 12 Lead Rhythm Change [[id number]] Collected: 01/01/25 0441     Updated: 01/02/25 0625     QT Interval 419 ms      QTC Interval 422 ms     Narrative:      HEART RATE=61  bpm  RR Gytczrro=274  ms  KS Fdmoxwae=487  ms  P Horizontal Axis=-68  deg  P Front Axis=93  deg  QRSD Hboymqyg=449  ms  QT Kbxrylza=086  ms  LSfH=038  ms  QRS Axis=57  deg  T Wave Axis=84  deg  - ABNORMAL ECG -  Sinus rhythm  Abnormal T, consider ischemia, lateral leads  When compared with ECG of 31-Dec-2024 12:15:41,  Significant repolarization change  Electronically Signed By: Juan Diego Davis ([facility]) 2025-01-02 06:19:06  Date and Time of Study:2025-01-01 04:41:41    Telemetry Scan [[id number]] Resulted: 12/22/24     Updated: 01/02/25 0859    Telemetry Scan [[id number]] Resulted: 12/22/24     Updated: 01/02/25 1051    EP/CRM Study [[id number]] Resulted: 01/02/25 1809     Updated: 01/02/25 1853    Narrative:      Kali Garcia1945     Patient Care Team:  Tami Rasmussen APRN as PCP - General (Nurse Practitioner)    : Eliot Mcgarry MD    Date of procedure: 1/2/2025    Procedure indications: Sick sinus syndrome, symptomatic sinus pauses of   greater than 6 seconds.  Severe COPD oxygen dependent.    Procedure performed: Implantation of Micra leadless pacemaker    Procedure details    The patient was taken to the EP lab in a fasting state, the right groin   area was prepped and draped in the usual sterile fashion.  Sedation was   performed by the anesthesia department.  The right femoral vein was accessed under ultrasound guidance and the   access site was dilated using series of dilators and the Micra sheath was   introduced over a long stiff wire into the right atrium. The dilator and   the wire were then taken out. The Micra delivery system was then   introduced through the sheath and advanced into the right atrium and then   introduced into the right ventricle and positioned in the mid septum.   Contrast injection was performed to ensure good contact with the septum.   The Micra was then deployed. A tug test was performed which showed good   anchoring of the device. Device interrogation was performed at this point   of the procedure which showed adequate parameters. The tether was then   released and the sheath and delivery system were pulled out and hemostasis   was achieved with 2 pre-deployed Perclose devices. Adequate hemostasis was   achieved. The patient was then transferred to the recovery area.    Device information  Micra model number [id number]    serial number [id number]       Parameters  RV: Threshold 0.38 V@0.24 ms     Sensin.0 mV     Impedance: 700 ohms    MRI: Compatible    Complications: None    Blood loss: 20 cc    Conclusion: Successful implantation Micra AV leadless pacemaker.       Electronically signed by Eliot Mcgarry MD, 25, 6:09 PM EST.            Medication Review:   I have reviewed the patient's current medication list    Current Facility-Administered Medications:     acetaminophen (TYLENOL) tablet 650 mg, 650 mg, Oral, Q6H PRN, Eliot Mcgarry MD, 650 mg at 24 2146    aluminum-magnesium hydroxide-simethicone (MAALOX MAX) 400-400-40 MG/5ML suspension 15 mL, 15 mL, Oral, Q6H PRN, Eliot Mcgarry MD, 15 mL at 24 2315    apixaban (ELIQUIS) tablet 2.5 mg, 2.5 mg, Oral, Q12H, Ingrid Kelly APRN    arformoterol (BROVANA) nebulizer solution 15 mcg, 15 mcg, Nebulization,  BID - RT, Eliot Mcgarry MD, 15 mcg at 01/03/25 0720    atorvastatin (LIPITOR) tablet 10 mg, 10 mg, Oral, Daily, Eliot Mcgarry MD, 10 mg at 01/03/25 0935    atropine sulfate injection 0.5 mg, 0.5 mg, Intravenous, Once PRN, Liv Bullock CRNA    sennosides-docusate (PERICOLACE) 8.6-50 MG per tablet 2 tablet, 2 tablet, Oral, BID PRN, 2 tablet at 12/24/24 2339 **AND** polyethylene glycol (MIRALAX) packet 17 g, 17 g, Oral, Daily PRN **AND** bisacodyl (DULCOLAX) EC tablet 5 mg, 5 mg, Oral, Daily PRN **AND** bisacodyl (DULCOLAX) suppository 10 mg, 10 mg, Rectal, Daily PRN, Eliot Mcgarry MD    budesonide (PULMICORT) nebulizer solution 0.5 mg, 0.5 mg, Nebulization, BID - RT, Eliot Mcgarry MD, 0.5 mg at 01/03/25 0724    Calcium Replacement - Follow Nurse / BPA Driven Protocol, , Not Applicable, PRN, Eliot Mcgarry MD    clopidogrel (PLAVIX) tablet 75 mg, 75 mg, Oral, Daily, Eliot Mcgarry MD, 75 mg at 01/03/25 0935    ePHEDrine Sulfate (Pressors) 5 MG/ML injection 5 mg, 5 mg, Intravenous, Once PRN, Liv Bullock CRNA    guaiFENesin (MUCINEX) 12 hr tablet 1,200 mg, 1,200 mg, Oral, Q12H, Eliot Mcgarry MD, 1,200 mg at 01/03/25 0935    hydrALAZINE (APRESOLINE) injection 10 mg, 10 mg, Intravenous, Q4H PRN, Eliot Mcgarry MD    ipratropium-albuterol (DUO-NEB) nebulizer solution 3 mL, 3 mL, Nebulization, Q4H PRN, Eliot Mcgarry MD, 3 mL at 12/23/24 0250    ipratropium-albuterol (DUO-NEB) nebulizer solution 3 mL, 3 mL, Nebulization, Q4H - RT, Eliot Mcgarry MD, 3 mL at 01/03/25 0313    ipratropium-albuterol (DUO-NEB) nebulizer solution 3 mL, 3 mL, Nebulization, Once PRN, Liv Bullock CRNA    labetalol (NORMODYNE,TRANDATE) injection 5 mg, 5 mg, Intravenous, Q5 Min PRN, Liv Bullock CRNA    Magnesium Standard Dose Replacement - Follow Nurse / BPA Driven Protocol, , Not Applicable, PRN, Eliot Mcgarry MD    melatonin tablet 5 mg, 5 mg,  Oral, Nightly PRN, Eliot Mcgarry MD, 5 mg at 12/31/24 2225    metoprolol tartrate (LOPRESSOR) tablet 25 mg, 25 mg, Oral, Q12H, Eliot Mcgarry MD, 25 mg at 01/03/25 0934    multivitamin (THERAGRAN) tablet 1 tablet, 1 tablet, Oral, Daily, Eliot Mcgarry MD, 1 tablet at 01/03/25 0935    nitroglycerin (NITROSTAT) SL tablet 0.4 mg, 0.4 mg, Sublingual, Q5 Min PRN, Eliot Mcgarry MD    ondansetron ODT (ZOFRAN-ODT) disintegrating tablet 4 mg, 4 mg, Oral, Q6H PRN **OR** ondansetron (ZOFRAN) injection 4 mg, 4 mg, Intravenous, Q6H PRN, Eliot Mcgarry MD    ondansetron (ZOFRAN) injection 4 mg, 4 mg, Intravenous, Once PRN, Liv Bullock CRNA    pantoprazole (PROTONIX) EC tablet 40 mg, 40 mg, Oral, Q AM, Eliot Mcgarry MD, 40 mg at 01/03/25 0503    Phosphorus Replacement - Follow Nurse / BPA Driven Protocol, , Not Applicable, PRN, Eliot Mcgarry MD    polyethylene glycol (MIRALAX) packet 17 g, 17 g, Oral, Daily, Eliot Mcgarry MD, 17 g at 01/03/25 0934    Potassium Replacement - Follow Nurse / BPA Driven Protocol, , Not Applicable, PRN, Eliot Mcgarry MD    predniSONE (DELTASONE) tablet 20 mg, 20 mg, Oral, Daily With Breakfast, Eliot Mcgarry MD, 20 mg at 01/03/25 0935    sodium chloride 0.9 % flush 10 mL, 10 mL, Intravenous, PRN, Eliot Mcgarry MD    sodium chloride 0.9 % flush 10 mL, 10 mL, Intravenous, Q12H, Eliot Mcgarry MD, 10 mL at 01/03/25 0935    sodium chloride 0.9 % flush 10 mL, 10 mL, Intravenous, PRN, Eliot Mcgarry MD    sodium chloride 0.9 % infusion 40 mL, 40 mL, Intravenous, PRN, Eliot Mcgarry MD    tamsulosin (FLOMAX) 24 hr capsule 0.4 mg, 0.4 mg, Oral, Daily, Eliot Mcgarry MD, 0.4 mg at 01/03/25 0934    theophylline (THEODUR) 12 hr tablet 300 mg, 300 mg, Oral, Daily, Eliot Mcgarry MD, 300 mg at 01/03/25 0935    Assessment & Plan      Pneumonia    Severe malnutrition    Rhinovirus    Anemia    Acute on chronic respiratory failure  with hypoxia    Coronary artery disease    Hypertension    COPD (chronic obstructive pulmonary disease)    A-fib/flutter      Acute on chronic respiratory failure with hypoxia/COPD/rhinovirus/pneumonia  Remains on high flow oxygen at 6 L  Pulmonary following and states respiratory status close to baseline and will wean off steroids    CAD  No Complaints of chest pain  Lipitor Plavix and metoprolol    Hypertension  Controlled, systolic blood pressures currently 100-130    A-fib/flutter  Sinus rhythm on admission, converted to a flutter with RVR   Intermittent V pacing  S/p Micra PM  Not on AC, likely due to anemia, 8.1 today      Margi Aburto, APRN  01/03/25  11:23 EST

## 2025-01-03 NOTE — THERAPY EVALUATION
Acute Care - Speech Language Pathology   Swallow Initial Evaluation [facility]     Patient Name: Kali Garcia  : 1945  MRN: [id number]  Today's Date: 1/3/2025               Admit Date: 2024    Visit Dx:     ICD-10-CM ICD-9-CM   1. Pneumonia of left lower lobe due to infectious organism  J18.9 486   2. Rhinovirus  B34.8 079.3   3. Acute on chronic respiratory failure with hypoxia  J96.21 518.84     799.02   4. Dehydration  E86.0 276.51   5. Anemia, unspecified type  D64.9 285.9   6. Sick sinus syndrome  I49.5 427.81     Patient Active Problem List   Diagnosis    Severe malnutrition    Abnormal CT scan, esophagus    Pneumonia due to COVID-19 virus    Pneumonia    Rhinovirus    Anemia    Acute on chronic respiratory failure with hypoxia    Coronary artery disease    Hypertension    COPD (chronic obstructive pulmonary disease)    Pneumonia, unspecified organism    Sick sinus syndrome     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 and endoscopic clipping x 1;  Surgeon: Luz Jacques MD;  Location: [facility] ENDOSCOPY;  Service: Gastroenterology;  Laterality: N/A;  post op: cecal AVM    CORONARY STENT PLACEMENT      ENDOSCOPY N/A 2022    Procedure: ESOPHAGOGASTRODUODENOSCOPY WITH BIOPSY X1 AREA;  Surgeon: Luz Jacques MD;  Location: [facility] ENDOSCOPY;  Service: Gastroenterology;  Laterality: N/A;  esophagitis, gastritis, HH    ENDOSCOPY N/A 2022    Procedure: ESOPHAGOGASTRODUODENOSCOPY;  Surgeon: Luz Jacques MD;  Location: [facility] ENDOSCOPY;  Service: Gastroenterology;  Laterality: N/A;  post op: hiatal hernia, eosphagitis    EYE SURGERY      FEMORAL  ARTERY STENT      KNEE SURGERY Left     KNEE SURGERY Left     LUNG SURGERY         SLP Recommendation and Plan  SLP Swallowing Diagnosis: suspected pharyngeal dysphagia (01/03/25 0900)  SLP Diet Recommendation: NPO, ice chips between meals after oral care, with supervision, water between meals after oral care, with supervision (01/03/25 0900)  Recommended Precautions and Strategies: general aspiration precautions (01/03/25 0900)  SLP Rec. for Method of Medication Administration: meds via alternate route (01/03/25 0900)     Monitor for Signs of Aspiration: yes, notify SLP if any concerns (01/03/25 0900)  Recommended Diagnostics: VFSS ([facility]) (01/03/25 0900)  Swallow Criteria for Skilled Therapeutic Interventions Met: demonstrates skilled criteria (01/03/25 0900)  Anticipated Discharge Disposition (SLP): unknown (01/03/25 0900)  Rehab Potential/Prognosis, Swallowing: good, to achieve stated therapy goals (01/03/25 0900)  Therapy Frequency (Swallow): PRN (01/03/25 0900)  Predicted Duration Therapy Intervention (Days): until discharge (01/03/25 0900)  Oral Care Recommendations: Oral Care before breakfast, after meals and PRN (01/03/25 0900)     Plan for Continued Treatment (SLP): continue treatment per plan of care (01/03/25 0900)         SWALLOW EVALUATION (Last 72 Hours)       SLP Adult Swallow Evaluation       Row Name 01/03/25 0900       Rehab Evaluation    Document Type evaluation  -MS    Subjective Information no complaints  -MS    Patient Observations alert;cooperative;agree to therapy  -MS    Patient/Family/Caregiver Comments/Observations n/a  -MS    Patient Effort good  -MS    Symptoms Noted During/After Treatment none  -MS       General Information    Patient Profile Reviewed yes  -MS    Pertinent History Of Current Problem Kali Garcia is a 79 y.o. male with PMH of COPD/chronic respiratory failure on 6L O2, atrial fibrillation, CAD, PVD, hypertension, hyperlipidemia, OA presented to [facility] ED 12/22/2024 with  "complaints of shortness of breath, cough, congestion for two weeks. He has had some intermittent right sided chest soreness. He has felt weak.      In the ED the patient had normal WBC, lactate normal 1.4, procalcitonin 0.60, hgb 7.8 (compared to 10.1 Feb 2024). CXR showed increased opacity in the left lung base could reflect pneumonia.  Stable nodular thickening in the right lung apex.  Severe emphysema chronic interstitial disease.  Respiratory viral panel detected rhinovirus. He was started on IV unasyn and zithromax. He was admitted for further treatment of acute on chronic respiratory failure with hypoxia, pneumonia, rhinovirus, and anemia.\"  ST consulted for dysphagia evaluation due to nurse report of pt coughing on thin liquids.      -MS    Current Method of Nutrition NPO  -MS    Precautions/Limitations, Vision WFL  -MS    Precautions/Limitations, Hearing WFL  -MS    Prior Level of Function-Communication WFL  -MS    Prior Level of Function-Swallowing no diet consistency restrictions;regular textures;thin liquids;concerns regarding malnutrition  -MS    Plans/Goals Discussed with patient  -MS    Patient's Goals for Discharge patient did not state  -MS       Pain    Pretreatment Pain Rating 0/10 - no pain  -MS    Posttreatment Pain Rating 0/10 - no pain  -MS    Additional Documentation Pain Scale: FACES Pre/Post-Treatment (Group)  -MS       Pain Scale: FACES Pre/Post-Treatment    Pain: FACES Scale, Pretreatment 0-->no hurt  -MS    Posttreatment Pain Rating 0-->no hurt  -MS       Oral Motor Structure and Function    Dentition Assessment edentulous, does not have dentures  -MS    Secretion Management WNL/WFL  -MS    Mucosal Quality dry  -MS    Volitional Swallow WFL  -MS    Volitional Cough weak;non-productive  -MS       Oral Musculature and Cranial Nerve Assessment    Oral Motor General Assessment WFL  -MS       General Eating/Swallowing Observations    Respiratory Support Currently in Use nasal cannula  -MS    " O2 Liters 3L  -MS    Eating/Swallowing Skills fed by SLP  -MS    Positioning During Eating upright 90 degree;upright in bed  -MS    Utensils Used spoon;cup;straw  -MS    Consistencies Trialed ice chips;pureed;thin liquids  -MS       Respiratory    Respiratory Status WFL  -MS       Clinical Swallow Eval    Oral Prep Phase WFL  -MS    Oral Transit WFL  -MS    Oral Residue WFL  -MS    Pharyngeal Phase suspected pharyngeal impairment  -MS    Esophageal Phase unremarkable  -MS    Clinical Swallow Evaluation Summary Clinical swallow evaluation completed. Pt was awake and alert, able to follow commands, mildly confused. Able to verbalize. Upon room entry, pt was lying in bed awake. Pt on 3L O2NC. SLP adjusted pt to sitting at 90 degrees for swallow evaluation. Pt was NPO at the time of evaluation. Pt is edentulous w/o dentures. Pt reported a baseline diet of regular and thin liquids. No prior history of dysphagia. Oral motor exam revealed overall generalized oral motor weakness. Trials assessed: ice chips x1, thin by cup x2, thin by straw x2, puree x2. Pt had adequate bolus control of ice chip, all trials given via SLP. Adequate labial seal w/straw. No anterior loss. Oral transit appeared timely. No pocketing or residue. Pt demonstrated cough x2 during thin liquid trials. Wet vocal quality. SLP provided recommendations for VFSS to pt. Pt verbalized agreement. Nursing staff made aware. Per above, suspecting pharyngeal phase dysphagia at this time. It is recommended pt remain NPO w/ FWP. Meds via alternate route. ST will continue to follow for swallow re-evaluation.  VFSS pending.    SLP Plan & Recommendation:      - NPO w/ exception of FWP- see below   -VFSS pending   - Water/ice only when pt is fully awake and alert  - Meds: via alternate route  - Safe swallow strategies: HOB at a 90 degree angle, slow rate of intake, small sips  -Oral care at least x2 daily     The rationale to recommend water/ice chips when a PO diet  cannot appropriately/functionally sustain nutrition is because water is low risk for aspiration pna when compared to aspiration of food or other liquids.       Benefits of a water protocol include but are not limited to:      Oral gratification   Engagement of oropharyngeal swallow musculature   Decrease likelihood of dehydration       Guidelines for proper implementation include:  Thorough oral care prior to consuming water  Upright at 90 degree hip flexion  Small sips at slow rate     Monitor for any changes in respiratory status and discontinue if distress noted          -MS       Pharyngeal Phase Concerns    Pharyngeal Phase Concerns cough;throat clear  -MS    Cough thin  -MS    Throat Clear thin  -MS       SLP Evaluation Clinical Impression    SLP Swallowing Diagnosis suspected pharyngeal dysphagia  -MS    Functional Impact risk of aspiration/pneumonia;risk of malnutrition;risk of dehydration  -MS    Rehab Potential/Prognosis, Swallowing good, to achieve stated therapy goals  -MS    Swallow Criteria for Skilled Therapeutic Interventions Met demonstrates skilled criteria  -MS       SLP Treatment Clinical Impressions    Plan for Continued Treatment (SLP) continue treatment per plan of care  -MS    Care Plan Review evaluation/treatment results reviewed  -MS       Recommendations    Therapy Frequency (Swallow) PRN  -MS    Predicted Duration Therapy Intervention (Days) until discharge  -MS    SLP Diet Recommendation NPO;ice chips between meals after oral care, with supervision;water between meals after oral care, with supervision  -MS    Recommended Diagnostics VFSS ([facility])  -MS    Recommended Precautions and Strategies general aspiration precautions  -MS    Oral Care Recommendations Oral Care before breakfast, after meals and PRN  -MS    SLP Rec. for Method of Medication Administration meds via alternate route  -MS    Monitor for Signs of Aspiration yes;notify SLP if any concerns  -MS    Anticipated Discharge  Disposition (SLP) unknown  -MS       Swallow Goals (SLP)    Swallow LTGs Swallow Long Term Goal (free text)  -MS    Swallow STGs diet tolerance goal selection (SLP)  -MS    Diet Tolerance Goal Selection (SLP) Swallow Short Term Goal 1;Swallow Short Term Goal 2  -MS       (LTG) Swallow    (LTG) Swallow Pt will maximize swallow function for least restrictive PO diet, exhibiting no complication associated with dysphagia, adequate PO intake, and demonstrating independent use of swallow compensations  -MS    [place] (Swallow Long Term Goal) independently (over 90% accuracy)  -MS    Time Frame (Swallow Long Term Goal) by discharge  -MS    Progress/Outcomes (Swallow Long Term Goal) new goal  -MS       (STG) Swallow 1    (STG) Swallow 1 The patient will participate in a meal/follow-up assessment to determine safety and adequacy of recommended diet, independent use of safe swallow compensations, pt/family education and additional goals/recommendations to follow.  -MS    [place] (Swallow Short Term Goal 1) independently (over 90% accuracy)  -MS    Time Frame (Swallow Short Term Goal 1) by discharge  -MS    Progress/Outcomes (Swallow Short Term Goal 1) goal ongoing  -MS       (STG) Swallow 2    (STG) Swallow 2 Pt will participate in ongoing swallow assessment to include VFSS if indicated, and caregiver teaching.  -MS    [place] (Swallow Short Term Goal 2) independently (over 90% accuracy)  -MS    Time Frame (Swallow Short Term Goal 2) by discharge  -MS    Progress/Outcomes (Swallow Short Term Goal 2) new goal  -MS              User Key  (r) = Recorded By, (t) = Taken By, (c) = Cosigned By      Initials Name Effective Dates    Mike Hair, SLP 04/22/24 -                     EDUCATION  The patient has been educated in the following areas:   Dysphagia (Swallowing Impairment) Oral Care/Hydration NPO rationale.        SLP GOALS       Row Name 01/03/25 0900       (LTG) Swallow    (LTG) Swallow Pt will maximize  swallow function for least restrictive PO diet, exhibiting no complication associated with dysphagia, adequate PO intake, and demonstrating independent use of swallow compensations  -MS    [place] (Swallow Long Term Goal) independently (over 90% accuracy)  -MS    Time Frame (Swallow Long Term Goal) by discharge  -MS    Progress/Outcomes (Swallow Long Term Goal) new goal  -MS       (STG) Swallow 1    (STG) Swallow 1 The patient will participate in a meal/follow-up assessment to determine safety and adequacy of recommended diet, independent use of safe swallow compensations, pt/family education and additional goals/recommendations to follow.  -MS    [place] (Swallow Short Term Goal 1) independently (over 90% accuracy)  -MS    Time Frame (Swallow Short Term Goal 1) by discharge  -MS    Progress/Outcomes (Swallow Short Term Goal 1) goal ongoing  -MS       (STG) Swallow 2    (STG) Swallow 2 Pt will participate in ongoing swallow assessment to include VFSS if indicated, and caregiver teaching.  -MS    [place] (Swallow Short Term Goal 2) independently (over 90% accuracy)  -MS    Time Frame (Swallow Short Term Goal 2) by discharge  -MS    Progress/Outcomes (Swallow Short Term Goal 2) new goal  -MS              User Key  (r) = Recorded By, (t) = Taken By, (c) = Cosigned By      Initials Name Provider Type    Mike Hair, SLP Speech and Language Pathologist                  ADRIANNE Funes  1/3/2025

## 2025-01-03 NOTE — PROGRESS NOTES
[facility] MEDICINE SERVICE  DAILY PROGRESS NOTE    NAME: Kali Garcia  : 1945  MRN: [id number]      LOS: 11 days     PROVIDER OF SERVICE: Sho Capps MD    Chief Complaint: Pneumonia    Subjective:     Interval History:  History taken from: patient    Patient seen and examined at bedside this morning.  No acute complaints overnight        Review of Systems: Negative except as described above  Review of Systems    Objective:     Vital Signs  Temp:  [97.4 °F (36.3 °C)-97.9 °F (36.6 °C)] 97.5 °F (36.4 °C)  Heart Rate:  [52-64] 52  Resp:  [12-19] 14  BP: (116-135)/(46-60) 126/47  Flow (L/min) (Oxygen Therapy):  [2-10] 2   Body mass index is 15.68 kg/m².    Physical Exam  Physical Exam  Constitutional:       Comments: NAD    Cardiovascular:      Comments:  RRR, S1 & S2   Pulmonary:      Comments:  Lungs CTA   Abdominal:      Comments:  ABD soft, NT            Diagnostic Data    Results from last 7 days   Lab Units 25  0118   WBC 10*3/mm3 3.63   HEMOGLOBIN g/dL 8.1*   HEMATOCRIT % 26.0*   PLATELETS 10*3/mm3 162   GLUCOSE mg/dL 83   CREATININE mg/dL 0.65*   BUN mg/dL 24*   SODIUM mmol/L 138   POTASSIUM mmol/L 5.0   ANION GAP mmol/L 4.0*       No radiology results for the last day      I reviewed the patient's new clinical results.    Assessment/Plan:     Active and Resolved Problems  Active Hospital Problems    Diagnosis  POA    **Pneumonia [J18.9]  Yes    Sick sinus syndrome [I49.5]  Unknown    Pneumonia, unspecified organism [J18.9]  Yes    Rhinovirus [B34.8]  Yes    Anemia [D64.9]  Yes    Acute on chronic respiratory failure with hypoxia [J96.21]  Yes    Coronary artery disease [I25.10]  Yes    Hypertension [I10]  Yes    COPD (chronic obstructive pulmonary disease) [J44.9]  Yes    Severe malnutrition [E43]  Yes      Resolved Hospital Problems   No resolved problems to display.       Hospital Course:  Acute on chronic hypoxemic hypercapnia respiratory  failure   Lower lobe  pneumonia   COPD with acute exacerbation   Rhinovirus infection   -Conservative management for rhinovirus  -Completed course of Unasyn while in the hospital  -Was being followed by pulmonology in the hospital  -Will send in a short prednisone taper on discharge  -Continue supplemental oxygen to maintain O2 sat > 90     CAD   Atrial fibrillation   PAD   -Converted to sinus rhythm and rate controlled  -Continue home plavix and aspirin  Will continue patient on amiodarone as ordered per cardiology on discharge    -Telemonitoring  TSH WNL  Cardiology consulted, was also evaluated by EP who stated not an ideal candidate for ablation     SSS  Cardiology following, schedule patient for pacemaker placement as he was seen to have frequent sinus pauses on telemetry     Urinary retention   Hematuria   -No blood noted in catheter   -Continue springer catheter  -Patient wishes to discharge with springer and hospice following, no voiding trial required      Hypernatremia, resolved    VTE Prophylaxis:  Pharmacologic & mechanical VTE prophylaxis orders are present.                Time: 35 minutes     Code Status and Medical Interventions: No CPR (Do Not Attempt to Resuscitate); Limited Support; No intubation (DNI), No cardioversion   Ordered at: 12/24/24 1547     Medical Intervention Limits:    No intubation (DNI)    No cardioversion     Level Of Support Discussed With:    Next of Kin (If No Surrogate)     Code Status (Patient has no pulse and is not breathing):    No CPR (Do Not Attempt to Resuscitate)     Medical Interventions (Patient has pulse or is breathing):    Limited Support       Signature: Electronically signed by Sho Capps MD, 01/03/25, 12:39 EST.  [facility] Hospitalist Team

## 2025-01-03 NOTE — ANESTHESIA POSTPROCEDURE EVALUATION
Patient: Kali Garcia    Procedure Summary       Date: 01/02/25 Room / Location: [place] CATH LAB 3 /  [facility] CATH INVASIVE LOCATION    Anesthesia Start: 1650 Anesthesia Stop: 1813    Procedure: Micra MDT Diagnosis:       Sick sinus syndrome      (sick sinus syndrome)    Providers: Eliot Mcgarry MD Provider: Shane Odom MD    Anesthesia Type: general ASA Status: 4            Anesthesia Type: general    Vitals  Vitals Value Taken Time   /55 01/02/25 1902   Temp     Pulse 57 01/02/25 1911   Resp 14 01/02/25 1815   SpO2 91 % 01/02/25 1911   Vitals shown include unfiled device data.        Post Anesthesia Care and Evaluation    Patient location during evaluation: PACU  Patient participation: complete - patient participated  Level of consciousness: awake  Pain score: 0  Pain management: adequate  Anesthetic complications: No anesthetic complications  PONV Status: none  Cardiovascular status: acceptable  Respiratory status: acceptable  Hydration status: acceptable

## 2025-01-03 NOTE — PLAN OF CARE
Goal Outcome Evaluation:   Pt seen for video-swallow study seated at 90 degrees in the lateral position. Pt given trials of NT barium by spoon x3, barium coated applesauce x2, NT barium by straw x2 and thin barium by spoon x2 and by straw x3 to complete the study. Solids not attempted due to pt being edentulous and slow, disorganized oral transit on puree and liquids. Pt had good labial closure over the spoon and straw. No labial spillage occurred. A-P transit for puree and liquids was slow and disorganized with premature spillage over the BOT to the valleculae prior to swallow. Once swallow initiated, it was delayed from 6 seconds on puree to 3 seconds on liquids, allowing each bolus to spill into and over the valleculae prior to swallow. This spillage causes penetration before the swallow and increases pt's aspiration risk. Adequate laryngeal elevation and anterior hyoid excursion occurred. Epiglottic inversion and laryngeal vestibular closure were also adequate. Pt exhibited coating under the epiglottis after thin liquids, that did not clear. No other penetration or aspiration of any consistency occurred. There was min residue remaining on the BOT and in the valleculae after the swallow.     Pt presents with moderate oral dysphagia and mild pharyngeal dysphagia. Thin liquids present as an 3 on the Rosenbeck penetration-aspiration scale indicating that material entrs the airway, remain above the vocal folds and is not ejected from the airway. All other consistencies trialed present as a 1 on the Rosenbeck penetration-aspiration scale, indicating that material does not enter the airway. It is recommended that pt initiate a puree diet with NT liquids. Pt remains at risk for aspiration due to the premature spillage of all trials over the BOT, and fatigue could also increase aspiration risk. Monitor pt closely for s/s of aspiration and make NPO if she shows these s/s. Pt should eat at a slow rate and be up at 90  degrees for all PO. He should take small bites and small single sips. Full results and recommendations from this study were explained to pt and his nurse and pt was shown images from the IMAN. Pt expressed understanding. ST will follow to assure tolerance of diet and make upgrades as pt improves.                            SLP Swallowing Diagnosis: moderate, oral dysphagia, mild, pharyngeal dysphagia (01/03/25 1200)        Plan for Continued Treatment (SLP): continue treatment per plan of care (01/03/25 1200)

## 2025-01-03 NOTE — PLAN OF CARE
Goal Outcome Evaluation:   Clinical swallow evaluation completed. Pt was awake and alert, able to follow commands, mildly confused. Able to verbalize. Upon room entry, pt was lying in bed awake. Pt on 3L O2NC. SLP adjusted pt to sitting at 90 degrees for swallow evaluation. Pt was NPO at the time of evaluation. Pt is edentulous w/o dentures. Pt reported a baseline diet of regular and thin liquids. No prior history of dysphagia. Oral motor exam revealed overall generalized oral motor weakness. Trials assessed: ice chips x1, thin by cup x2, thin by straw x2, puree x2. Pt had adequate bolus control of ice chip, all trials given via SLP. Adequate labial seal w/straw. No anterior loss. Oral transit appeared timely. No pocketing or residue. Pt demonstrated cough x2 during thin liquid trials. Wet vocal quality. SLP provided recommendations for VFSS to pt. Pt verbalized agreement. Nursing staff made aware. Per above, suspecting pharyngeal phase dysphagia at this time. It is recommended pt remain NPO w/ FWP. Meds via alternate route. ST will continue to follow for swallow re-evaluation.  VFSS pending.    SLP Plan & Recommendation:      - NPO w/ exception of FWP- see below   -VFSS pending   - Water/ice only when pt is fully awake and alert  - Meds: via alternate route  - Safe swallow strategies: HOB at a 90 degree angle, slow rate of intake, small sips  -Oral care at least x2 daily     The rationale to recommend water/ice chips when a PO diet cannot appropriately/functionally sustain nutrition is because water is low risk for aspiration pna when compared to aspiration of food or other liquids.       Benefits of a water protocol include but are not limited to:      Oral gratification   Engagement of oropharyngeal swallow musculature   Decrease likelihood of dehydration       Guidelines for proper implementation include:  Thorough oral care prior to consuming water  Upright at 90 degree hip flexion  Small sips at slow rate      Monitor for any changes in respiratory status and discontinue if distress noted        Anticipated Discharge Disposition (SLP): unknown          SLP Swallowing Diagnosis: suspected pharyngeal dysphagia (01/03/25 0900)        Plan for Continued Treatment (SLP): continue treatment per plan of care (01/03/25 0900)

## 2025-01-18 ENCOUNTER — HOSPITAL ENCOUNTER (INPATIENT)
Facility: HOSPITAL | Age: 80
LOS: 3 days | Discharge: HOME | End: 2025-01-23
Attending: EMERGENCY MEDICINE | Admitting: INTERNAL MEDICINE
Payer: MEDICARE

## 2025-01-18 ENCOUNTER — APPOINTMENT (OUTPATIENT)
Dept: CT IMAGING | Facility: HOSPITAL | Age: 80
End: 2025-01-18
Payer: MEDICARE

## 2025-01-18 ENCOUNTER — APPOINTMENT (OUTPATIENT)
Dept: GENERAL RADIOLOGY | Facility: HOSPITAL | Age: 80
End: 2025-01-18
Payer: MEDICARE

## 2025-01-18 DIAGNOSIS — J01.30 ACUTE NON-RECURRENT SPHENOIDAL SINUSITIS: ICD-10-CM

## 2025-01-18 DIAGNOSIS — R55 SYNCOPE AND COLLAPSE: Primary | ICD-10-CM

## 2025-01-18 DIAGNOSIS — J32.0 LEFT MAXILLARY SINUSITIS: ICD-10-CM

## 2025-01-18 DIAGNOSIS — H74.92 MASTOID DISORDER, LEFT: ICD-10-CM

## 2025-01-18 DIAGNOSIS — J18.9 PNEUMONIA OF BOTH LOWER LOBES DUE TO INFECTIOUS ORGANISM: ICD-10-CM

## 2025-01-18 DIAGNOSIS — J44.1 ACUTE EXACERBATION OF CHRONIC OBSTRUCTIVE PULMONARY DISEASE (COPD): ICD-10-CM

## 2025-01-18 DIAGNOSIS — N39.0 ACUTE URINARY TRACT INFECTION: ICD-10-CM

## 2025-01-18 PROBLEM — R40.4 UNRESPONSIVE EPISODE: Status: ACTIVE | Noted: 2025-01-18

## 2025-01-18 LAB
ALBUMIN SERPL-MCNC: 3 G/DL (ref 3.5–5.2)
ALBUMIN/GLOB SERPL: 0.9 G/DL
ALP SERPL-CCNC: 70 U/L (ref 39–117)
ALT SERPL W P-5'-P-CCNC: 42 U/L (ref 1–41)
AMORPH URATE CRY URNS QL MICRO: ABNORMAL /HPF
ANION GAP SERPL CALCULATED.3IONS-SCNC: 10.8 MMOL/L (ref 5–15)
ANISOCYTOSIS BLD QL: NORMAL
AST SERPL-CCNC: 37 U/L (ref 1–40)
B PARAPERT DNA SPEC QL NAA+PROBE: NOT DETECTED
B PERT DNA SPEC QL NAA+PROBE: NOT DETECTED
BACTERIA UR QL AUTO: ABNORMAL /HPF
BASOPHILS # BLD AUTO: 0.01 10*3/MM3 (ref 0–0.2)
BASOPHILS NFR BLD AUTO: 0.2 % (ref 0–1.5)
BILIRUB SERPL-MCNC: 0.6 MG/DL (ref 0–1.2)
BILIRUB UR QL STRIP: ABNORMAL
BUN SERPL-MCNC: 16 MG/DL (ref 8–23)
BUN/CREAT SERPL: 21.9 (ref 7–25)
C PNEUM DNA NPH QL NAA+NON-PROBE: NOT DETECTED
CALCIUM SPEC-SCNC: 8.5 MG/DL (ref 8.6–10.5)
CHLORIDE SERPL-SCNC: 101 MMOL/L (ref 98–107)
CLARITY UR: ABNORMAL
CO2 SERPL-SCNC: 24.2 MMOL/L (ref 22–29)
COLOR UR: ABNORMAL
CREAT SERPL-MCNC: 0.73 MG/DL (ref 0.76–1.27)
D-LACTATE SERPL-SCNC: 1 MMOL/L (ref 0.3–2)
DEPRECATED RDW RBC AUTO: 61.4 FL (ref 37–54)
EGFRCR SERPLBLD CKD-EPI 2021: 92 ML/MIN/1.73
EOSINOPHIL # BLD AUTO: 0 10*3/MM3 (ref 0–0.4)
EOSINOPHIL NFR BLD AUTO: 0 % (ref 0.3–6.2)
ERYTHROCYTE [DISTWIDTH] IN BLOOD BY AUTOMATED COUNT: 17.2 % (ref 12.3–15.4)
FLUAV SUBTYP SPEC NAA+PROBE: NOT DETECTED
FLUBV RNA ISLT QL NAA+PROBE: NOT DETECTED
GEN 5 1HR TROPONIN T REFLEX: 25 NG/L
GLOBULIN UR ELPH-MCNC: 3.4 GM/DL
GLUCOSE SERPL-MCNC: 101 MG/DL (ref 65–99)
GLUCOSE UR STRIP-MCNC: NEGATIVE MG/DL
HADV DNA SPEC NAA+PROBE: NOT DETECTED
HCOV 229E RNA SPEC QL NAA+PROBE: NOT DETECTED
HCOV HKU1 RNA SPEC QL NAA+PROBE: NOT DETECTED
HCOV NL63 RNA SPEC QL NAA+PROBE: NOT DETECTED
HCOV OC43 RNA SPEC QL NAA+PROBE: NOT DETECTED
HCT VFR BLD AUTO: 29.1 % (ref 37.5–51)
HGB BLD-MCNC: 9 G/DL (ref 13–17.7)
HGB UR QL STRIP.AUTO: ABNORMAL
HMPV RNA NPH QL NAA+NON-PROBE: NOT DETECTED
HPIV1 RNA ISLT QL NAA+PROBE: NOT DETECTED
HPIV2 RNA SPEC QL NAA+PROBE: NOT DETECTED
HPIV3 RNA NPH QL NAA+PROBE: NOT DETECTED
HPIV4 P GENE NPH QL NAA+PROBE: NOT DETECTED
HYALINE CASTS UR QL AUTO: ABNORMAL /LPF
IMM GRANULOCYTES # BLD AUTO: 0.76 10*3/MM3 (ref 0–0.05)
IMM GRANULOCYTES NFR BLD AUTO: 17.9 % (ref 0–0.5)
KETONES UR QL STRIP: ABNORMAL
LARGE PLATELETS: NORMAL
LEUKOCYTE ESTERASE UR QL STRIP.AUTO: ABNORMAL
LYMPHOCYTES # BLD AUTO: 0.28 10*3/MM3 (ref 0.7–3.1)
LYMPHOCYTES NFR BLD AUTO: 6.6 % (ref 19.6–45.3)
M PNEUMO IGG SER IA-ACNC: NOT DETECTED
MCH RBC QN AUTO: 30 PG (ref 26.6–33)
MCHC RBC AUTO-ENTMCNC: 30.9 G/DL (ref 31.5–35.7)
MCV RBC AUTO: 97 FL (ref 79–97)
MONOCYTES # BLD AUTO: 0.35 10*3/MM3 (ref 0.1–0.9)
MONOCYTES NFR BLD AUTO: 8.2 % (ref 5–12)
MUCOUS THREADS URNS QL MICRO: ABNORMAL /HPF
NEUTROPHILS NFR BLD AUTO: 2.85 10*3/MM3 (ref 1.7–7)
NEUTROPHILS NFR BLD AUTO: 67.1 % (ref 42.7–76)
NITRITE UR QL STRIP: POSITIVE
NRBC BLD AUTO-RTO: 0 /100 WBC (ref 0–0.2)
PH UR STRIP.AUTO: 7.5 [PH] (ref 5–8)
PLATELET # BLD AUTO: 116 10*3/MM3 (ref 140–450)
PMV BLD AUTO: 10.5 FL (ref 6–12)
POTASSIUM SERPL-SCNC: 4.4 MMOL/L (ref 3.5–5.2)
PROT SERPL-MCNC: 6.4 G/DL (ref 6–8.5)
PROT UR QL STRIP: ABNORMAL
RBC # BLD AUTO: 3 10*6/MM3 (ref 4.14–5.8)
RBC # UR STRIP: ABNORMAL /HPF
REF LAB TEST METHOD: ABNORMAL
RHINOVIRUS RNA SPEC NAA+PROBE: NOT DETECTED
RSV RNA NPH QL NAA+NON-PROBE: NOT DETECTED
SARS-COV-2 RNA RESP QL NAA+PROBE: NOT DETECTED
SODIUM SERPL-SCNC: 136 MMOL/L (ref 136–145)
SP GR UR STRIP: 1.02 (ref 1–1.03)
SQUAMOUS #/AREA URNS HPF: ABNORMAL /HPF
THEOPHYLLINE SERPL-MCNC: 8.8 MCG/ML (ref 10–20)
TROPONIN T % DELTA: -4
TROPONIN T NUMERIC DELTA: -1 NG/L
TROPONIN T SERPL HS-MCNC: 26 NG/L
UROBILINOGEN UR QL STRIP: ABNORMAL
WBC # UR STRIP: ABNORMAL /HPF
WBC MORPH BLD: NORMAL
WBC NRBC COR # BLD AUTO: 4.25 10*3/MM3 (ref 3.4–10.8)

## 2025-01-18 PROCEDURE — 83605 ASSAY OF LACTIC ACID: CPT | Performed by: EMERGENCY MEDICINE

## 2025-01-18 PROCEDURE — 94799 UNLISTED PULMONARY SVC/PX: CPT

## 2025-01-18 PROCEDURE — 70450 CT HEAD/BRAIN W/O DYE: CPT

## 2025-01-18 PROCEDURE — 25810000003 SODIUM CHLORIDE 0.9 % SOLUTION: Performed by: EMERGENCY MEDICINE

## 2025-01-18 PROCEDURE — 51702 INSERT TEMP BLADDER CATH: CPT

## 2025-01-18 PROCEDURE — 93005 ELECTROCARDIOGRAM TRACING: CPT | Performed by: EMERGENCY MEDICINE

## 2025-01-18 PROCEDURE — 25010000002 CEFEPIME PER 500 MG: Performed by: EMERGENCY MEDICINE

## 2025-01-18 PROCEDURE — 0T9B70Z DRAINAGE OF BLADDER WITH DRAINAGE DEVICE, VIA NATURAL OR ARTIFICIAL OPENING: ICD-10-PCS | Performed by: EMERGENCY MEDICINE

## 2025-01-18 PROCEDURE — G0378 HOSPITAL OBSERVATION PER HR: HCPCS

## 2025-01-18 PROCEDURE — 87086 URINE CULTURE/COLONY COUNT: CPT | Performed by: EMERGENCY MEDICINE

## 2025-01-18 PROCEDURE — 99285 EMERGENCY DEPT VISIT HI MDM: CPT

## 2025-01-18 PROCEDURE — 87040 BLOOD CULTURE FOR BACTERIA: CPT | Performed by: EMERGENCY MEDICINE

## 2025-01-18 PROCEDURE — 36415 COLL VENOUS BLD VENIPUNCTURE: CPT

## 2025-01-18 PROCEDURE — 80053 COMPREHEN METABOLIC PANEL: CPT | Performed by: EMERGENCY MEDICINE

## 2025-01-18 PROCEDURE — 94761 N-INVAS EAR/PLS OXIMETRY MLT: CPT

## 2025-01-18 PROCEDURE — 94640 AIRWAY INHALATION TREATMENT: CPT

## 2025-01-18 PROCEDURE — 85025 COMPLETE CBC W/AUTO DIFF WBC: CPT | Performed by: EMERGENCY MEDICINE

## 2025-01-18 PROCEDURE — 84484 ASSAY OF TROPONIN QUANT: CPT | Performed by: EMERGENCY MEDICINE

## 2025-01-18 PROCEDURE — 87077 CULTURE AEROBIC IDENTIFY: CPT | Performed by: EMERGENCY MEDICINE

## 2025-01-18 PROCEDURE — 87186 SC STD MICRODIL/AGAR DIL: CPT | Performed by: EMERGENCY MEDICINE

## 2025-01-18 PROCEDURE — 85007 BL SMEAR W/DIFF WBC COUNT: CPT | Performed by: EMERGENCY MEDICINE

## 2025-01-18 PROCEDURE — 71045 X-RAY EXAM CHEST 1 VIEW: CPT

## 2025-01-18 PROCEDURE — 81001 URINALYSIS AUTO W/SCOPE: CPT | Performed by: EMERGENCY MEDICINE

## 2025-01-18 PROCEDURE — 25010000002 METHYLPREDNISOLONE PER 125 MG: Performed by: EMERGENCY MEDICINE

## 2025-01-18 PROCEDURE — 80198 ASSAY OF THEOPHYLLINE: CPT | Performed by: EMERGENCY MEDICINE

## 2025-01-18 PROCEDURE — 0202U NFCT DS 22 TRGT SARS-COV-2: CPT | Performed by: EMERGENCY MEDICINE

## 2025-01-18 PROCEDURE — 94664 DEMO&/EVAL PT USE INHALER: CPT

## 2025-01-18 RX ORDER — HYDROXYZINE HYDROCHLORIDE 25 MG/1
25 TABLET, FILM COATED ORAL NIGHTLY
COMMUNITY
End: 2025-01-29

## 2025-01-18 RX ORDER — METHYLPREDNISOLONE SODIUM SUCCINATE 125 MG/2ML
125 INJECTION, POWDER, LYOPHILIZED, FOR SOLUTION INTRAMUSCULAR; INTRAVENOUS ONCE
Status: COMPLETED | OUTPATIENT
Start: 2025-01-18 | End: 2025-01-18

## 2025-01-18 RX ORDER — ERGOCALCIFEROL 1.25 MG/1
50000 CAPSULE, LIQUID FILLED ORAL
Status: DISCONTINUED | OUTPATIENT
Start: 2025-01-23 | End: 2025-01-23 | Stop reason: HOSPADM

## 2025-01-18 RX ORDER — IPRATROPIUM BROMIDE AND ALBUTEROL SULFATE 2.5; .5 MG/3ML; MG/3ML
3 SOLUTION RESPIRATORY (INHALATION) ONCE
Status: COMPLETED | OUTPATIENT
Start: 2025-01-18 | End: 2025-01-18

## 2025-01-18 RX ORDER — SODIUM CHLORIDE 9 MG/ML
40 INJECTION, SOLUTION INTRAVENOUS AS NEEDED
Status: DISCONTINUED | OUTPATIENT
Start: 2025-01-18 | End: 2025-01-23 | Stop reason: HOSPADM

## 2025-01-18 RX ORDER — BISACODYL 10 MG
10 SUPPOSITORY, RECTAL RECTAL DAILY PRN
Status: DISCONTINUED | OUTPATIENT
Start: 2025-01-18 | End: 2025-01-23 | Stop reason: HOSPADM

## 2025-01-18 RX ORDER — SODIUM CHLORIDE 0.9 % (FLUSH) 0.9 %
10 SYRINGE (ML) INJECTION EVERY 12 HOURS SCHEDULED
Status: DISCONTINUED | OUTPATIENT
Start: 2025-01-18 | End: 2025-01-23 | Stop reason: HOSPADM

## 2025-01-18 RX ORDER — ALUMINA, MAGNESIA, AND SIMETHICONE 2400; 2400; 240 MG/30ML; MG/30ML; MG/30ML
15 SUSPENSION ORAL EVERY 6 HOURS PRN
Status: DISCONTINUED | OUTPATIENT
Start: 2025-01-18 | End: 2025-01-23 | Stop reason: HOSPADM

## 2025-01-18 RX ORDER — POLYETHYLENE GLYCOL 3350 17 G/17G
17 POWDER, FOR SOLUTION ORAL DAILY PRN
Status: DISCONTINUED | OUTPATIENT
Start: 2025-01-18 | End: 2025-01-23 | Stop reason: HOSPADM

## 2025-01-18 RX ORDER — ATORVASTATIN CALCIUM 10 MG/1
10 TABLET, FILM COATED ORAL NIGHTLY
Status: DISCONTINUED | OUTPATIENT
Start: 2025-01-18 | End: 2025-01-23 | Stop reason: HOSPADM

## 2025-01-18 RX ORDER — POLYETHYLENE GLYCOL 3350 17 G/17G
17 POWDER, FOR SOLUTION ORAL DAILY
Status: DISCONTINUED | OUTPATIENT
Start: 2025-01-19 | End: 2025-01-23 | Stop reason: HOSPADM

## 2025-01-18 RX ORDER — PANTOPRAZOLE SODIUM 40 MG/1
40 TABLET, DELAYED RELEASE ORAL DAILY
Status: ON HOLD | COMMUNITY

## 2025-01-18 RX ORDER — ARFORMOTEROL TARTRATE 15 UG/2ML
15 SOLUTION RESPIRATORY (INHALATION)
Status: DISCONTINUED | OUTPATIENT
Start: 2025-01-18 | End: 2025-01-23 | Stop reason: HOSPADM

## 2025-01-18 RX ORDER — SODIUM CHLORIDE 0.9 % (FLUSH) 0.9 %
10 SYRINGE (ML) INJECTION AS NEEDED
Status: DISCONTINUED | OUTPATIENT
Start: 2025-01-18 | End: 2025-01-23 | Stop reason: HOSPADM

## 2025-01-18 RX ORDER — THEOPHYLLINE 300 MG/1
300 TABLET, EXTENDED RELEASE ORAL DAILY
Status: DISCONTINUED | OUTPATIENT
Start: 2025-01-19 | End: 2025-01-23 | Stop reason: HOSPADM

## 2025-01-18 RX ORDER — AMIODARONE HYDROCHLORIDE 200 MG/1
200 TABLET ORAL 2 TIMES DAILY
Status: DISCONTINUED | OUTPATIENT
Start: 2025-01-18 | End: 2025-01-23 | Stop reason: HOSPADM

## 2025-01-18 RX ORDER — BUDESONIDE 0.5 MG/2ML
0.5 INHALANT ORAL
Status: DISCONTINUED | OUTPATIENT
Start: 2025-01-18 | End: 2025-01-23 | Stop reason: HOSPADM

## 2025-01-18 RX ORDER — HYDROXYZINE HYDROCHLORIDE 25 MG/1
25 TABLET, FILM COATED ORAL NIGHTLY
Status: DISCONTINUED | OUTPATIENT
Start: 2025-01-18 | End: 2025-01-19

## 2025-01-18 RX ORDER — TAMSULOSIN HYDROCHLORIDE 0.4 MG/1
0.4 CAPSULE ORAL DAILY
Status: DISCONTINUED | OUTPATIENT
Start: 2025-01-19 | End: 2025-01-23 | Stop reason: HOSPADM

## 2025-01-18 RX ORDER — PANTOPRAZOLE SODIUM 40 MG/1
40 TABLET, DELAYED RELEASE ORAL DAILY
Status: DISCONTINUED | OUTPATIENT
Start: 2025-01-19 | End: 2025-01-23 | Stop reason: HOSPADM

## 2025-01-18 RX ORDER — ONDANSETRON 4 MG/1
4 TABLET, ORALLY DISINTEGRATING ORAL EVERY 6 HOURS PRN
Status: DISCONTINUED | OUTPATIENT
Start: 2025-01-18 | End: 2025-01-18

## 2025-01-18 RX ORDER — CLOPIDOGREL BISULFATE 75 MG/1
75 TABLET ORAL DAILY
Status: DISCONTINUED | OUTPATIENT
Start: 2025-01-19 | End: 2025-01-23 | Stop reason: HOSPADM

## 2025-01-18 RX ORDER — AMOXICILLIN 250 MG
2 CAPSULE ORAL 2 TIMES DAILY PRN
Status: DISCONTINUED | OUTPATIENT
Start: 2025-01-18 | End: 2025-01-23 | Stop reason: HOSPADM

## 2025-01-18 RX ORDER — TRAZODONE HYDROCHLORIDE 100 MG/1
100 TABLET ORAL NIGHTLY PRN
Status: DISCONTINUED | OUTPATIENT
Start: 2025-01-18 | End: 2025-01-19

## 2025-01-18 RX ORDER — ATORVASTATIN CALCIUM 10 MG/1
10 TABLET, FILM COATED ORAL NIGHTLY
Status: ON HOLD | COMMUNITY

## 2025-01-18 RX ORDER — BISACODYL 5 MG/1
5 TABLET, DELAYED RELEASE ORAL DAILY PRN
Status: DISCONTINUED | OUTPATIENT
Start: 2025-01-18 | End: 2025-01-23 | Stop reason: HOSPADM

## 2025-01-18 RX ORDER — ONDANSETRON 2 MG/ML
4 INJECTION INTRAMUSCULAR; INTRAVENOUS EVERY 6 HOURS PRN
Status: DISCONTINUED | OUTPATIENT
Start: 2025-01-18 | End: 2025-01-18

## 2025-01-18 RX ORDER — IPRATROPIUM BROMIDE AND ALBUTEROL SULFATE 2.5; .5 MG/3ML; MG/3ML
3 SOLUTION RESPIRATORY (INHALATION)
Status: DISCONTINUED | OUTPATIENT
Start: 2025-01-18 | End: 2025-01-23 | Stop reason: HOSPADM

## 2025-01-18 RX ADMIN — BUDESONIDE INHALATION 0.5 MG: 0.5 SUSPENSION RESPIRATORY (INHALATION) at 21:28

## 2025-01-18 RX ADMIN — ARFORMOTEROL TARTRATE 15 MCG: 15 SOLUTION RESPIRATORY (INHALATION) at 21:33

## 2025-01-18 RX ADMIN — IPRATROPIUM BROMIDE AND ALBUTEROL SULFATE 3 ML: .5; 3 SOLUTION RESPIRATORY (INHALATION) at 17:15

## 2025-01-18 RX ADMIN — AMIODARONE HYDROCHLORIDE 200 MG: 200 TABLET ORAL at 23:54

## 2025-01-18 RX ADMIN — HYDROXYZINE HYDROCHLORIDE 25 MG: 25 TABLET, FILM COATED ORAL at 23:55

## 2025-01-18 RX ADMIN — Medication 10 ML: at 23:55

## 2025-01-18 RX ADMIN — CEFEPIME 2000 MG: 2 INJECTION, POWDER, FOR SOLUTION INTRAVENOUS at 20:02

## 2025-01-18 RX ADMIN — METHYLPREDNISOLONE SODIUM SUCCINATE 125 MG: 125 INJECTION, POWDER, FOR SOLUTION INTRAMUSCULAR; INTRAVENOUS at 18:03

## 2025-01-18 RX ADMIN — APIXABAN 2.5 MG: 2.5 TABLET, FILM COATED ORAL at 23:55

## 2025-01-18 RX ADMIN — ATORVASTATIN CALCIUM 10 MG: 10 TABLET ORAL at 23:54

## 2025-01-18 RX ADMIN — SODIUM CHLORIDE 1000 ML: 9 INJECTION, SOLUTION INTRAVENOUS at 20:02

## 2025-01-19 LAB
ANION GAP SERPL CALCULATED.3IONS-SCNC: 13.6 MMOL/L (ref 5–15)
ANISOCYTOSIS BLD QL: ABNORMAL
ARTERIAL PATENCY WRIST A: POSITIVE
ATMOSPHERIC PRESS: ABNORMAL MM[HG]
BASE EXCESS BLDA CALC-SCNC: 0.5 MMOL/L (ref 0–3)
BDY SITE: ABNORMAL
BUN SERPL-MCNC: 18 MG/DL (ref 8–23)
BUN/CREAT SERPL: 27.7 (ref 7–25)
CALCIUM SPEC-SCNC: 7.9 MG/DL (ref 8.6–10.5)
CHLORIDE SERPL-SCNC: 105 MMOL/L (ref 98–107)
CO2 BLDA-SCNC: 26 MMOL/L (ref 22–29)
CO2 SERPL-SCNC: 20.4 MMOL/L (ref 22–29)
CREAT SERPL-MCNC: 0.65 MG/DL (ref 0.76–1.27)
DEPRECATED RDW RBC AUTO: 65.4 FL (ref 37–54)
EGFRCR SERPLBLD CKD-EPI 2021: 95.3 ML/MIN/1.73
ERYTHROCYTE [DISTWIDTH] IN BLOOD BY AUTOMATED COUNT: 17.5 % (ref 12.3–15.4)
GLUCOSE SERPL-MCNC: 115 MG/DL (ref 65–99)
HCO3 BLDA-SCNC: 24.9 MMOL/L (ref 21–28)
HCT VFR BLD AUTO: 30.5 % (ref 37.5–51)
HEMODILUTION: NO
HGB BLD-MCNC: 9.2 G/DL (ref 13–17.7)
INHALED O2 CONCENTRATION: 34 %
LARGE PLATELETS: ABNORMAL
LYMPHOCYTES # BLD MANUAL: 0.06 10*3/MM3 (ref 0.7–3.1)
LYMPHOCYTES NFR BLD MANUAL: 2 % (ref 5–12)
MCH RBC QN AUTO: 30.6 PG (ref 26.6–33)
MCHC RBC AUTO-ENTMCNC: 30.2 G/DL (ref 31.5–35.7)
MCV RBC AUTO: 101.3 FL (ref 79–97)
MODALITY: ABNORMAL
MONOCYTES # BLD: 0.06 10*3/MM3 (ref 0.1–0.9)
MYELOCYTES NFR BLD MANUAL: 2 % (ref 0–0)
NEUTROPHILS # BLD AUTO: 2.86 10*3/MM3 (ref 1.7–7)
NEUTROPHILS NFR BLD MANUAL: 93 % (ref 42.7–76)
NEUTS BAND NFR BLD MANUAL: 1 % (ref 0–5)
PCO2 BLDA: 37.7 MM HG (ref 35–48)
PH BLDA: 7.43 PH UNITS (ref 7.35–7.45)
PLATELET # BLD AUTO: 128 10*3/MM3 (ref 140–450)
PMV BLD AUTO: 11.4 FL (ref 6–12)
PO2 BLD: 178 MM[HG] (ref 0–500)
PO2 BLDA: 60.4 MM HG (ref 83–108)
POTASSIUM SERPL-SCNC: 4.2 MMOL/L (ref 3.5–5.2)
QT INTERVAL: 433 MS
QTC INTERVAL: 561 MS
RBC # BLD AUTO: 3.01 10*6/MM3 (ref 4.14–5.8)
SAO2 % BLDCOA: 91.5 % (ref 94–98)
SCAN SLIDE: NORMAL
SODIUM SERPL-SCNC: 139 MMOL/L (ref 136–145)
VARIANT LYMPHS NFR BLD MANUAL: 2 % (ref 19.6–45.3)
WBC MORPH BLD: NORMAL
WBC NRBC COR # BLD AUTO: 3.04 10*3/MM3 (ref 3.4–10.8)

## 2025-01-19 PROCEDURE — 25010000002 CEFEPIME PER 500 MG: Performed by: NURSE PRACTITIONER

## 2025-01-19 PROCEDURE — 94664 DEMO&/EVAL PT USE INHALER: CPT

## 2025-01-19 PROCEDURE — 97166 OT EVAL MOD COMPLEX 45 MIN: CPT

## 2025-01-19 PROCEDURE — 36600 WITHDRAWAL OF ARTERIAL BLOOD: CPT

## 2025-01-19 PROCEDURE — 85007 BL SMEAR W/DIFF WBC COUNT: CPT | Performed by: NURSE PRACTITIONER

## 2025-01-19 PROCEDURE — 94761 N-INVAS EAR/PLS OXIMETRY MLT: CPT

## 2025-01-19 PROCEDURE — 94799 UNLISTED PULMONARY SVC/PX: CPT

## 2025-01-19 PROCEDURE — 25010000002 CEFEPIME PER 500 MG: Performed by: HOSPITALIST

## 2025-01-19 PROCEDURE — 85025 COMPLETE CBC W/AUTO DIFF WBC: CPT | Performed by: NURSE PRACTITIONER

## 2025-01-19 PROCEDURE — 80048 BASIC METABOLIC PNL TOTAL CA: CPT | Performed by: NURSE PRACTITIONER

## 2025-01-19 PROCEDURE — G0378 HOSPITAL OBSERVATION PER HR: HCPCS

## 2025-01-19 PROCEDURE — 82803 BLOOD GASES ANY COMBINATION: CPT

## 2025-01-19 RX ORDER — ACETAMINOPHEN 325 MG/1
650 TABLET ORAL EVERY 6 HOURS PRN
Status: DISCONTINUED | OUTPATIENT
Start: 2025-01-19 | End: 2025-01-23 | Stop reason: HOSPADM

## 2025-01-19 RX ADMIN — CEFEPIME 2000 MG: 2 INJECTION, POWDER, FOR SOLUTION INTRAVENOUS at 10:11

## 2025-01-19 RX ADMIN — APIXABAN 2.5 MG: 2.5 TABLET, FILM COATED ORAL at 21:00

## 2025-01-19 RX ADMIN — ARFORMOTEROL TARTRATE 15 MCG: 15 SOLUTION RESPIRATORY (INHALATION) at 18:46

## 2025-01-19 RX ADMIN — IPRATROPIUM BROMIDE AND ALBUTEROL SULFATE 3 ML: .5; 3 SOLUTION RESPIRATORY (INHALATION) at 10:52

## 2025-01-19 RX ADMIN — BUDESONIDE INHALATION 0.5 MG: 0.5 SUSPENSION RESPIRATORY (INHALATION) at 06:50

## 2025-01-19 RX ADMIN — BUDESONIDE INHALATION 0.5 MG: 0.5 SUSPENSION RESPIRATORY (INHALATION) at 18:46

## 2025-01-19 RX ADMIN — ATORVASTATIN CALCIUM 10 MG: 10 TABLET ORAL at 21:00

## 2025-01-19 RX ADMIN — ARFORMOTEROL TARTRATE 15 MCG: 15 SOLUTION RESPIRATORY (INHALATION) at 06:54

## 2025-01-19 RX ADMIN — IPRATROPIUM BROMIDE AND ALBUTEROL SULFATE 3 ML: .5; 3 SOLUTION RESPIRATORY (INHALATION) at 14:51

## 2025-01-19 RX ADMIN — CEFEPIME 2000 MG: 2 INJECTION, POWDER, FOR SOLUTION INTRAVENOUS at 18:16

## 2025-01-19 RX ADMIN — AMIODARONE HYDROCHLORIDE 200 MG: 200 TABLET ORAL at 21:00

## 2025-01-19 RX ADMIN — IPRATROPIUM BROMIDE AND ALBUTEROL SULFATE 3 ML: .5; 3 SOLUTION RESPIRATORY (INHALATION) at 22:11

## 2025-01-19 RX ADMIN — IPRATROPIUM BROMIDE AND ALBUTEROL SULFATE 3 ML: .5; 3 SOLUTION RESPIRATORY (INHALATION) at 03:56

## 2025-01-19 RX ADMIN — Medication 10 ML: at 09:10

## 2025-01-19 RX ADMIN — Medication 10 ML: at 21:00

## 2025-01-19 NOTE — PLAN OF CARE
Goal Outcome Evaluation:   Outcome Evaluation: Pt resting in bed with no response to painful stimuli. Pt did shake head when nurse explained care at beginning of shift. Yap care completed and pt turns self with minimal assistance. Safety measures in place and call light within reach.

## 2025-01-19 NOTE — PLAN OF CARE
Goal Outcome Evaluation:  Plan of Care Reviewed With: patient        Progress: no change  Outcome Evaluation: Patient new admit today and has been confused this evening. Easily reoriented, care ongoing.

## 2025-01-19 NOTE — H&P
[facility] Medicine Services    Hospitalist History and Physical     Kali Garcia : 1945 MRN:[id number] LOS:0 ROOM:      Reason for admission: HCAP (healthcare-associated pneumonia)     Assessment / Plan     Found down/unresponsive episode  Acute COPD exacerbation in patient with severe emphysema   Bibasilar pneumonia  Sinusitis  Left mastoid with complete opacification  Acute UTI  - reportedly found down at ECF  - CT head: no acute intracranial abnormality.  Acute left maxillary and sphenoid sinusitis.  Complete opacification of the left mastoid air cells which appears acute and new from 10/10/2024.  - CXR: bibasilar consolidations which could reflect aspiration pneumonia.  Small bilateral pleural effusions.  Emphysema.  - normal WBC, normal lactate, high-sensitivity troponin with repeat 25, BUN 16, creatinine 0.73, calcium 8.5, albumin 3.0, hemoglobin 9.0, platelets 116.    - Urinalysis: trace ketones, large blood, positive nitrites, moderate leukocytes, 21-50 RBCs, 21-50 WBCs, 1+ bacteria.  - pt chronically on 4-6L O2 at [facility], currently on 4L O2  - started on IV cefepime in the ER and given IV solumedrol 125mg and 1L IVFs. Continue cefepime  - RVP negative  - follow blood cultures, urine culture  - EKG: Ventricular-paced rhythm rate 101. HS troponin 26 with repeat 25  - pt awake, alert, conversational, oriented to person, place and thinks it is , poor historian. Moves all extremities   - duonebs, pulmicort, continue home theophylline  - Yap catheter was placed in the ER, monitor urine output  - cont home flomax  - continuous pulse oximetry   - will need outpatient ENT evaluation   - DNR, [place] [facility] patient      CAD  Atrial fibrillation  SSS s/p implantation mirca AV leadless pacemaker  Hypertension  Hyperlipidemia  PVD  - cont home amiodarone, eliquis, statin    Anemia of chronic disease  - hgb 9.0  - monitor CBC     Severe protein malnutrition  - BMI 15.6  - nutrition  consult     Nutrition:   NPO Diet NPO Type: Strict NPO until swallow study by bedside RN, resume ECF diet     VTE Prophylaxis:  Pharmacologic & mechanical VTE prophylaxis orders are present.      History of Present illness     Kali Garcia is a 79 y.o. male with PMH of COPD/chronic respiratory failure on 3L O2 per the patient's son, atrial fibrillation, SSS s/p pacemaker 1/2/2025, CAD, PVD, hypertension, hyperlipidemia, OA presented to [facility] ED 1/18/2025 with report of being found down at [facility]. The patient is oriented to person and thinks he is in a hospital or a group home and stated he remembered feeling like the room was spinning and thinks he fell. He has been coughing and short of breath. He stated the food they are giving is not good so he has not been eating much.     Spoke with son Ivis over the phone. He does not want his father returning to [facility] due to the fall and him being found down. The plan since his hospital discharge on 1/3/2025 had been to rehab at [facility] and then return home with hospice under the care of his son Ivis. The son feels like the patient has not made any progress and does not feel he is well taken care of at [facility]. During this admission he want the patient to either be discharged home under hospice if he is able to do so or go to a different rehab facility at discharge with plans to return home with hospice [facility] (Starla is contact).     ED course:     CT head showed no acute intracranial abnormality.  Acute left maxillary and sphenoid sinusitis.  Complete opacification of the left mastoid air cells which appears acute and new from 10/10/2024.  CXR shows bibasilar consolidations which could reflect aspiration pneumonia.  Small bilateral pleural effusions.  Emphysema.  Labs showed normal WBC, normal lactate, high-sensitivity troponin with repeat 25, BUN 16, creatinine 0.73, calcium 8.5, albumin 3.0, hemoglobin 9.0, platelets 116.  Urinalysis  showed trace ketones, large blood, positive nitrites, moderate leukocytes, 21-50 RBCs, 21-50 WBCs, 1+ bacteria.  Patient is on 3L O2 via nasal cannula. He was given IV cefepime, IV solumedrol 125mg, 1L IVFs, duoneb. Yap catheter was placed. He was admitted to the hospitalist team for further treatment of syncope, pneumonia, COPD exacerbation, UTI.     Subjective / Review of systems     Review of Systems   Constitutional:  Positive for fatigue.   HENT: Negative.     Eyes: Negative.    Respiratory:  Positive for shortness of breath.    Cardiovascular: Negative.    Gastrointestinal: Negative.    Genitourinary: Negative.    Musculoskeletal: Negative.    Skin: Negative.    Neurological:         Fall   Psychiatric/Behavioral: Negative.          Past Medical/Surgical/Social/Family History & Allergies     Past Medical History:   Diagnosis Date    COPD (chronic obstructive pulmonary disease)     Coronary artery disease     Emphysema lung     Hyperlipidemia     Hypertension     Osteoarthritis       Past Surgical History:   Procedure Laterality Date    CARDIAC CATHETERIZATION      CARDIAC ELECTROPHYSIOLOGY PROCEDURE N/A 1/2/2025    Procedure: Bijan GLASERT;  Surgeon: Eliot Mcgarry MD;  Location: [facility] CATH INVASIVE LOCATION;  Service: Cardiovascular;  Laterality: N/A;    COLONOSCOPY N/A 8/4/2022    Procedure: COLONOSCOPY with 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 and endoscopic clipping x 1;  Surgeon: Luz Jacques MD;  Location: [facility] ENDOSCOPY;  Service: Gastroenterology;  Laterality: N/A;  post op: cecal AVM    CORONARY STENT PLACEMENT      ENDOSCOPY N/A 4/14/2022    Procedure: ESOPHAGOGASTRODUODENOSCOPY WITH BIOPSY X1 AREA;  Surgeon: Luz Jacques MD;  Location: [facility] ENDOSCOPY;  Service: Gastroenterology;  Laterality: N/A;  esophagitis, gastritis, HH    ENDOSCOPY N/A 8/4/2022    Procedure: ESOPHAGOGASTRODUODENOSCOPY;  Surgeon: Luz Jacques MD;  Location: [facility] ENDOSCOPY;  Service:  Gastroenterology;  Laterality: N/A;  post op: hiatal hernia, eosphagitis    EYE SURGERY      FEMORAL ARTERY STENT      KNEE SURGERY Left     KNEE SURGERY Left     LUNG SURGERY        Social History     Socioeconomic History    Marital status:    Tobacco Use    Smoking status: Former     Current packs/day: 0.00     Average packs/day: 1 pack/day for 63.0 years (63.0 ttl pk-yrs)     Types: Cigarettes     Start date: 1960     Quit date: 2023     Years since quittin.1   Vaping Use    Vaping status: Never Used   Substance and Sexual Activity    Alcohol use: Not Currently    Drug use: Never    Sexual activity: Defer      No family history on file.   Allergies   Allergen Reactions    Codeine GI Intolerance      Social Drivers of Health     Tobacco Use: Medium Risk (2024)    Patient History     Smoking Tobacco Use: Former     Smokeless Tobacco Use: Unknown     Passive Exposure: Not on file   Alcohol Use: Not At Risk (2024)    AUDIT-C     Frequency of Alcohol Consumption: Never     Average Number of Drinks: Patient does not drink     Frequency of Binge Drinking: Never   Financial Resource Strain: Not on file   Food Insecurity: No Food Insecurity (2024)    Hunger Vital Sign     Worried About Running Out of Food in the Last Year: Never true     Ran Out of Food in the Last Year: Never true   Transportation Needs: No Transportation Needs (2024)    PRAPARE - Transportation     Lack of Transportation (Medical): No     Lack of Transportation (Non-Medical): No   Physical Activity: Not on file   Stress: Not on file   Social Connections: Feeling Socially Integrated (3/11/2024)    OASIS : Social Isolation     Frequency of experiencing loneliness or isolation: Never   Interpersonal Safety: Not At Risk (2025)    Abuse Screen     Unsafe at Home or Work/School: no     Feels Threatened by Someone?: no     Does Anyone Keep You from Contacting Others or Doint Things Outside the Home?: no      Physical Sign of Abuse Present: no   Depression: Not on file   Housing Stability: Not At Risk (12/23/2024)    Housing Stability     Current Living Arrangements: home     Potentially Unsafe Housing Conditions: none   Utilities: Not At Risk (12/23/2024)    [facility] Utilities     Threatened with loss of utilities: No   Health Literacy: Unknown (12/23/2024)    Education     Help with school or training?: Not on file     Preferred Language: English   Employment: Not on file   Disabilities: At Risk (12/23/2024)    Disabilities     Concentrating, Remembering, or Making Decisions Difficulty: no     Doing Errands Independently Difficulty: yes        Home Medications     Prior to Admission medications    Medication Sig Start Date End Date Taking? Authorizing Provider   amiodarone (PACERONE) 200 MG tablet Take 1 tablet by mouth 2 (Two) Times a Day for 30 days. 1/1/25 1/31/25  Sho Capps MD   apixaban (ELIQUIS) 2.5 MG tablet tablet Take 1 tablet by mouth Every 12 (Twelve) Hours for 30 days. Indications: Atrial Fibrillation 1/3/25 2/2/25  Sho Capps MD   arformoterol (BROVANA) 15 MCG/2ML nebulizer solution Take 2 mL by nebulization 2 (Two) Times a Day for 30 days. 1/1/25 1/31/25  Sho Capps MD   budesonide (PULMICORT) 0.5 MG/2ML nebulizer solution Take 2 mL by nebulization 2 (Two) Times a Day for 30 days. 1/1/25 1/31/25  Sho Capps MD   clopidogrel (PLAVIX) 75 MG tablet Take 1 tablet by mouth Daily. Indications: Percutaneous Coronary Intervention    Provider, MD Gwendolyn   ipratropium-albuterol (DUO-NEB) 0.5-2.5 mg/3 ml nebulizer Take 3 mL by nebulization Every 4 (Four) Hours As Needed for Wheezing. Indications: Chronic Obstructive Lung Disease    ProviderGwendolyn MD   metoprolol tartrate (LOPRESSOR) 25 MG tablet Take 0.5 tablets by mouth Every 12 (Twelve) Hours for 30 days. 1/3/25 2/2/25  Sho Capps MD   polyethylene glycol (MIRALAX) 17 g packet Take  17 g by mouth Daily. Indications: Constipation 2/1/24   Gwendolyn Martínez MD   pravastatin (PRAVACHOL) 40 MG tablet Take 1 tablet by mouth Daily. Indications: Disease involving Lipid Deposits in the Arteries    Gwendolyn Martínez MD   tamsulosin (FLOMAX) 0.4 MG capsule 24 hr capsule Take 1 capsule by mouth Daily for 30 days. 1/1/25 1/31/25  Sho Capps MD   theophylline (THEODUR) 300 MG 12 hr tablet Take 1 tablet by mouth Daily for 30 days. 1/1/25 1/31/25  Sho Capps MD   traZODone (DESYREL) 100 MG tablet Take 1 tablet by mouth Daily As Needed for Sleep.    Provider, MD Gwendolyn        Objective / Physical Exam     Vital signs:  BP: 141/59  Heart Rate: 77  Resp: 20  SpO2: 97 %  Weight: 51 kg (112 lb 7 oz)    Admission Weight: Weight: 51 kg (112 lb 7 oz)    Physical Exam  Vitals and nursing note reviewed.   Constitutional:       Appearance: He is underweight. He is cachectic.   HENT:      Head: Normocephalic and atraumatic.   Eyes:      Extraocular Movements: Extraocular movements intact.      Pupils: Pupils are equal, round, and reactive to light.   Cardiovascular:      Rate and Rhythm: Regular rhythm. Tachycardia present.      Pulses: Normal pulses.      Heart sounds: Normal heart sounds.   Pulmonary:      Effort: Pulmonary effort is normal.      Breath sounds: Examination of the right-upper field reveals decreased breath sounds and wheezing. Examination of the left-upper field reveals decreased breath sounds and wheezing. Examination of the right-lower field reveals decreased breath sounds. Examination of the left-lower field reveals decreased breath sounds.   Abdominal:      General: Bowel sounds are normal.      Palpations: Abdomen is soft.      Tenderness: There is no abdominal tenderness.   Musculoskeletal:         General: Normal range of motion.   Skin:     General: Skin is warm and dry.   Neurological:      General: No focal deficit present.      Mental Status: He is  alert. Mental status is at baseline.          Labs     Results from last 7 days   Lab Units 01/18/25  1654   WBC 10*3/mm3 4.25   HEMOGLOBIN g/dL 9.0*   HEMATOCRIT % 29.1*   PLATELETS 10*3/mm3 116*      Results from last 7 days   Lab Units 01/18/25  1654   ALK PHOS U/L 70   AST (SGOT) U/L 37   ALT (SGPT) U/L 42*           Results from last 7 days   Lab Units 01/18/25  1654   SODIUM mmol/L 136   POTASSIUM mmol/L 4.4   CHLORIDE mmol/L 101   CO2 mmol/L 24.2   BUN mg/dL 16   CREATININE mg/dL 0.73*   GLUCOSE mg/dL 101*        Imaging     XR Chest 1 View    Result Date: 1/18/2025  XR CHEST 1 VW Date of Exam: 1/18/2025 5:46 PM EST Indication: Cough fever Comparison: December 22, 2024 Findings: There are emphysematous changes. There are bibasilar consolidations with small bilateral pleural effusions. There is some stable scarring in the right lung apex. The heart and mediastinal contours appear normal. The pulmonary vasculature appears normal. The osseous structures appear intact.     Impression: 1.Bibasilar consolidations, which could reflect aspiration or pneumonia. 2.Small bilateral pleural effusions. 3.Emphysema. Electronically Signed: Jona Bland MD  1/18/2025 6:18 PM EST  Workstation ID: [id number]    CT Head Without Contrast    Result Date: 1/18/2025  CT HEAD WO CONTRAST Date of Exam: 1/18/2025 4:58 PM EST Indication: Mental status altered. Comparison: PET/CT 10/10/2024 Technique: Axial CT images were obtained of the head without contrast administration.  Coronal reconstructions were performed.  Automated exposure control and iterative reconstruction methods were used. Findings: There is mild generalized parenchymal volume loss. There is no acute infarct or hemorrhage. No abnormal extra-axial fluid collections are identified. No mass effect or hydrocephalus. Globes and orbits are within normal limits. There are air-fluid levels within the left maxillary and sphenoid sinuses consistent with acute sinusitis. There is  near complete opacification of the left mastoid air cells. Left middle ear cavity is clear. Right mastoid air cells are clear.     Impression: 1. No acute intracranial abnormality. 2. Acute left maxillary and sphenoid sinusitis. 3. Complete opacification of the left mastoid air cells which appears acute and new from 10/10/2024. Electronically Signed: Mike Celaya MD  1/18/2025 5:46 PM EST  Workstation ID: [id number]          Current Medications     Scheduled Meds:  amiodarone, 200 mg, Oral, BID  apixaban, 2.5 mg, Oral, Q12H  arformoterol, 15 mcg, Nebulization, BID - RT  atorvastatin, 10 mg, Oral, Nightly  budesonide, 0.5 mg, Nebulization, BID - RT  [START ON 1/19/2025] cefepime, 2,000 mg, Intravenous, Q12H  [START ON 1/19/2025] clopidogrel, 75 mg, Oral, Daily  hydrOXYzine, 25 mg, Oral, Nightly  ipratropium-albuterol, 3 mL, Nebulization, Q4H - RT  [START ON 1/19/2025] pantoprazole, 40 mg, Oral, Daily  [START ON 1/19/2025] polyethylene glycol, 17 g, Oral, Daily  sodium chloride, 10 mL, Intravenous, Q12H  [START ON 1/19/2025] tamsulosin, 0.4 mg, Oral, Daily  [START ON 1/19/2025] theophylline, 300 mg, Oral, Daily  [START ON 1/23/2025] vitamin D, 50,000 Units, Oral, Q7 Days           Nurys Thor[facility]Del[facility]  01/18/25   21:48 EST

## 2025-01-19 NOTE — ED PROVIDER NOTES
Subjective   History of Present Illness  79-year-old male was found on the floor and was initially slow to respond.  The patient had reportedly had increased coughing in the last 24 hours.  The patient had had audible wheezing noted by EMS.  The patient has had subjective fever and chills this afternoon.  The patient has had no reports of vomiting or diarrhea.  The patient had brief epistaxis apparently as a result of the fall.  The patient had no reports of leg pain or swelling      Review of Systems   Constitutional:  Positive for chills and fatigue.   HENT:  Positive for sinus pressure and sinus pain. Negative for trouble swallowing.    Respiratory:  Positive for cough, chest tightness, shortness of breath and wheezing.    Genitourinary:  Positive for dysuria.   Hematological:  Does not bruise/bleed easily.   All other systems reviewed and are negative.      Past Medical History:   Diagnosis Date    COPD (chronic obstructive pulmonary disease)     Coronary artery disease     Emphysema lung     Hyperlipidemia     Hypertension     Osteoarthritis        Allergies   Allergen Reactions    Codeine GI Intolerance       Past Surgical History:   Procedure Laterality Date    CARDIAC CATHETERIZATION      CARDIAC ELECTROPHYSIOLOGY PROCEDURE N/A 1/2/2025    Procedure: Bijan GLASERT;  Surgeon: Eliot Mcgarry MD;  Location: [facility] CATH INVASIVE LOCATION;  Service: Cardiovascular;  Laterality: N/A;    COLONOSCOPY N/A 8/4/2022    Procedure: COLONOSCOPY with 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 and endoscopic clipping x 1;  Surgeon: Luz Jacques MD;  Location: [facility] ENDOSCOPY;  Service: Gastroenterology;  Laterality: N/A;  post op: cecal AVM    CORONARY STENT PLACEMENT      ENDOSCOPY N/A 4/14/2022    Procedure: ESOPHAGOGASTRODUODENOSCOPY WITH BIOPSY X1 AREA;  Surgeon: Luz Jacques MD;  Location: [facility] ENDOSCOPY;  Service: Gastroenterology;  Laterality: N/A;  esophagitis, gastritis, HH    ENDOSCOPY N/A  2022    Procedure: ESOPHAGOGASTRODUODENOSCOPY;  Surgeon: Luz Jacques MD;  Location: [facility] ENDOSCOPY;  Service: Gastroenterology;  Laterality: N/A;  post op: hiatal hernia, eosphagitis    EYE SURGERY      FEMORAL ARTERY STENT      KNEE SURGERY Left     KNEE SURGERY Left     LUNG SURGERY         No family history on file.    Social History     Socioeconomic History    Marital status:    Tobacco Use    Smoking status: Former     Current packs/day: 0.00     Average packs/day: 1 pack/day for 63.0 years (63.0 ttl pk-yrs)     Types: Cigarettes     Start date: 1960     Quit date: 2023     Years since quittin.1   Vaping Use    Vaping status: Never Used   Substance and Sexual Activity    Alcohol use: Not Currently    Drug use: Never    Sexual activity: Defer           Objective   Physical Exam  Alert [place] Coma Scale 15 some confusion   HEENT: Pupils equal and reactive to light. Conjunctivae are not injected. Normal tympanic membranes. Oropharynx and nares are normal.  Tenderness to percussion over frontal and maxillary sinuses   Neck: Supple. Midline trachea. No JVD. No goiter.   Chest: Wheezes in all lobes, bibasilar Rales and equal breath sounds bilaterally, regular rate and rhythm without murmur or rub.   Abdomen: Positive bowel sounds suprapubic pain is noted, nondistended. No rebound or peritoneal signs. No CVA tenderness.   Extremities no clubbing. cyanosis or edema. Motor sensory exam is normal. The full range of motion is intact   Skin: Warm and dry, no rashes or petechia.   Lymphatic: No regional lymphadenopathy. No calf pain, swelling or Homans sign    Procedures           ED Course        Labs Reviewed   COMPREHENSIVE METABOLIC PANEL - Abnormal; Notable for the following components:       Result Value    Glucose 101 (*)     Creatinine 0.73 (*)     Calcium 8.5 (*)     Albumin 3.0 (*)     ALT (SGPT) 42 (*)     All other components within normal limits    Narrative:     GFR Categories  in Chronic Kidney Disease (CKD)      GFR Category          GFR (mL/min/1.73)    Interpretation  G1                     90 or greater         Normal or high (1)  G2                      60-89                Mild decrease (1)  G3a                   45-59                Mild to moderate decrease  G3b                   30-44                Moderate to severe decrease  G4                    15-29                Severe decrease  G5                    14 or less           Kidney failure          (1)In the absence of evidence of kidney disease, neither GFR category G1 or G2 fulfill the criteria for CKD.    eGFR calculation 2021 CKD-EPI creatinine equation, which does not include race as a factor   URINALYSIS W/ CULTURE IF INDICATED - Abnormal; Notable for the following components:    Color, UA Orange (*)     Appearance, UA Turbid (*)     Ketones, UA Trace (*)     Bilirubin, UA Small (1+) (*)     Blood, UA Large (3+) (*)     Protein, UA >=300 mg/dL (3+) (*)     Leuk Esterase, UA Moderate (2+) (*)     Nitrite, UA Positive (*)     Urobilinogen, UA 4.0 E.U./dL (*)     All other components within normal limits    Narrative:     In absence of clinical symptoms, the presence of pyuria, bacteria, and/or nitrites on the urinalysis result does not correlate with infection.   TROPONIN - Abnormal; Notable for the following components:    HS Troponin T 26 (*)     All other components within normal limits    Narrative:     High Sensitive Troponin T Reference Range:  <14.0 ng/L- Negative Female for AMI  <22.0 ng/L- Negative Male for AMI  >=14 - Abnormal Female indicating possible myocardial injury.  >=22 - Abnormal Male indicating possible myocardial injury.   Clinicians would have to utilize clinical acumen, EKG, Troponin, and serial changes to determine if it is an Acute Myocardial Infarction or myocardial injury due to an underlying chronic condition.        THEOPHYLLINE LEVEL - Abnormal; Notable for the following components:     Theophylline Level 8.8 (*)     All other components within normal limits   CBC WITH AUTO DIFFERENTIAL - Abnormal; Notable for the following components:    RBC 3.00 (*)     Hemoglobin 9.0 (*)     Hematocrit 29.1 (*)     MCHC 30.9 (*)     RDW 17.2 (*)     RDW-SD 61.4 (*)     Platelets 116 (*)     Lymphocyte % 6.6 (*)     Eosinophil % 0.0 (*)     Immature Grans % 17.9 (*)     Lymphocytes, Absolute 0.28 (*)     Immature Grans, Absolute 0.76 (*)     All other components within normal limits   URINALYSIS, MICROSCOPIC ONLY - Abnormal; Notable for the following components:    RBC, UA 21-50 (*)     WBC, UA 21-50 (*)     Bacteria, UA 1+ (*)     Mucus, UA Small/1+ (*)     All other components within normal limits   HIGH SENSITIVITIY TROPONIN T 1HR - Abnormal; Notable for the following components:    HS Troponin T 25 (*)     All other components within normal limits    Narrative:     High Sensitive Troponin T Reference Range:  <14.0 ng/L- Negative Female for AMI  <22.0 ng/L- Negative Male for AMI  >=14 - Abnormal Female indicating possible myocardial injury.  >=22 - Abnormal Male indicating possible myocardial injury.   Clinicians would have to utilize clinical acumen, EKG, Troponin, and serial changes to determine if it is an Acute Myocardial Infarction or myocardial injury due to an underlying chronic condition.        RESPIRATORY PANEL PCR W/ COVID-19 (SARS-COV-2), NP SWAB IN UTM/VTP, 2 HR TAT - Normal    Narrative:     In the setting of a positive respiratory panel with a viral infection PLUS a negative procalcitonin without other underlying concern for bacterial infection, consider observing off antibiotics or discontinuation of antibiotics and continue supportive care. If the respiratory panel is positive for atypical bacterial infection (Bordetella pertussis, Chlamydophila pneumoniae, or Mycoplasma pneumoniae), consider antibiotic de-escalation to target atypical bacterial infection.   POC LACTATE - Normal   BLOOD  CULTURE   BLOOD CULTURE   URINE CULTURE   SCAN SLIDE    Narrative:     No visible platelet clumping.   CBC AND DIFFERENTIAL    Narrative:     The following orders were created for panel order CBC & Differential.  Procedure                               Abnormality         Status                     ---------                               -----------         ------                     CBC Auto Differential[[id number]]        Abnormal            Final result               Scan Slide[[id number]]                                       Final result                 Please view results for these tests on the individual orders.     Medications   cefepime 2000 mg IVPB in 100 mL NS (MBP) (has no administration in time range)   sodium chloride 0.9 % bolus 1,000 mL (has no administration in time range)   ipratropium-albuterol (DUO-NEB) nebulizer solution 3 mL (3 mL Nebulization Given 1/18/25 1715)   methylPREDNISolone sodium succinate (SOLU-Medrol) injection 125 mg (125 mg Intravenous Given 1/18/25 1803)     XR Chest 1 View    Result Date: 1/18/2025  Impression: 1.Bibasilar consolidations, which could reflect aspiration or pneumonia. 2.Small bilateral pleural effusions. 3.Emphysema. Electronically Signed: Jona Bland MD  1/18/2025 6:18 PM EST  Workstation ID: [id number]    CT Head Without Contrast    Result Date: 1/18/2025  Impression: 1. No acute intracranial abnormality. 2. Acute left maxillary and sphenoid sinusitis. 3. Complete opacification of the left mastoid air cells which appears acute and new from 10/10/2024. Electronically Signed: Mike Celaya MD  1/18/2025 5:46 PM EST  Workstation ID: [id number]                                                  Medical Decision Making  The patient's oxygen saturations were stable Emergency Department wheezing decreased after the treatment.  The patient will be admitted for continued oxygen support and bronchodilator therapy.  The patient be continued on IV antibiotics and steroids.   The case was discussed the hospitalist service.  The patient was agreeable to this plan of treatment    Amount and/or Complexity of Data Reviewed  Labs: ordered.  Radiology: ordered.    Risk  Prescription drug management.        Final diagnoses:   Syncope and collapse   Acute urinary tract infection   Pneumonia of both lower lobes due to infectious organism   Acute exacerbation of chronic obstructive pulmonary disease (COPD)   Left maxillary sinusitis   Acute non-recurrent sphenoidal sinusitis   Mastoid disorder, left       ED Disposition  ED Disposition       ED Disposition   Decision to Admit    Condition   --    Comment   Level of Care: Telemetry [5]   Diagnosis: Syncope and collapse [780.2.ICD-9-CM]   Admitting Physician: LLANES ALVAREZ, CARLOS [[id number]]   Attending Physician: LLANES ALVAREZ, CARLOS [[id number]]                 No follow-up provider specified.       Medication List      No changes were made to your prescriptions during this visit.            Kali Castro MD  01/18/25 1938

## 2025-01-19 NOTE — PROGRESS NOTES
Kali Garcia is an 79 y.o. male admitted with syncope.     Recent Labs     01/18/25  1654   CREATININE 0.73*   BUN 16      K 4.4      CO2 24.2     Estimated Creatinine Clearance: 58.2 mL/min (A) (by C-G formula based on SCr of 0.73 mg/dL (L)).    Assessment/Plan  Cefepime renally adjusted to q12h per the Adult Renal Dosing of Medication policy for estCrCl 30-59 mL/min.    Sophia Olivares, PharmD  1/18/2025

## 2025-01-19 NOTE — PROGRESS NOTES
[facility] MEDICINE SERVICE  DAILY PROGRESS NOTE    NAME: Kali Garcia  : 1945  MRN: [id number]      LOS: 0 days     PROVIDER OF SERVICE: Timothy Duane Brammell, MD    Chief Complaint: HCAP (healthcare-associated pneumonia)    Subjective:     Interval History:  History taken from: patient RN    After extensive effort we got patient open his eyes.  He is essentially nonverbal with maybe 1 or 2 words.  He does not track me when he looks.  Nurses unaware of any other additional acute concerns.        Review of Systems:   Review of Systems   Unable to perform ROS: Acuity of condition       Objective:     Vital Signs  Temp:  [97 °F (36.1 °C)-98 °F (36.7 °C)] 97 °F (36.1 °C)  Heart Rate:  [58-78] 59  Resp:  [12-23] 16  BP: (108-171)/(42-77) 122/42  Flow (L/min) (Oxygen Therapy):  [3-7] 3   Body mass index is 14.86 kg/m².    Physical Exam  Physical Exam  HENT:      Head: Normocephalic.   Cardiovascular:      Rate and Rhythm: Normal rate and regular rhythm.   Pulmonary:      Comments: Poor breath sounds  Abdominal:      Palpations: Abdomen is soft.   Neurological:      Comments: Appears to move upper extremities.  Little spontaneous movement without stimulation.            Diagnostic Data    Results from last 7 days   Lab Units 25  0249 25  1654   WBC 10*3/mm3 3.04* 4.25   HEMOGLOBIN g/dL 9.2* 9.0*   HEMATOCRIT % 30.5* 29.1*   PLATELETS 10*3/mm3 128* 116*   GLUCOSE mg/dL 115* 101*   CREATININE mg/dL 0.65* 0.73*   BUN mg/dL 18 16   SODIUM mmol/L 139 136   POTASSIUM mmol/L 4.2 4.4   AST (SGOT) U/L  --  37   ALT (SGPT) U/L  --  42*   ALK PHOS U/L  --  70   BILIRUBIN mg/dL  --  0.6   ANION GAP mmol/L 13.6 10.8       XR Chest 1 View    Result Date: 2025  Impression: 1.Bibasilar consolidations, which could reflect aspiration or pneumonia. 2.Small bilateral pleural effusions. 3.Emphysema. Electronically Signed: Jona Bland MD  2025 6:18 PM EST  Workstation ID: [id number]    CT Head  Without Contrast    Result Date: 1/18/2025  Impression: 1. No acute intracranial abnormality. 2. Acute left maxillary and sphenoid sinusitis. 3. Complete opacification of the left mastoid air cells which appears acute and new from 10/10/2024. Electronically Signed: Mike Celaya MD  1/18/2025 5:46 PM EST  Workstation ID: [id number]           Assessment:  Found down.  COPD  Probable pneumonia  Sinusitis  UTI  Encephalopathy  Chronic hypoxemic respiratory failure  History of atrial fibs  History of pacemaker implantation  Anemia  Severe protein malnutrition     Plan.  Discontinue any potential sedating medications.  Check ABG.  That history and physical reflects that patient was more alert at that time.      Active and Resolved Problems  Active Hospital Problems    Diagnosis  POA    **HCAP (healthcare-associated pneumonia) [J18.9]  Yes    Unresponsive episode [R40.4]  Yes    Acute UTI (urinary tract infection) [N39.0]  Yes    Sick sinus syndrome [I49.5]  Yes    Anemia [D64.9]  Yes    Coronary artery disease [I25.10]  Yes    COPD (chronic obstructive pulmonary disease) [J44.9]  Yes    Severe malnutrition [E43]  Yes      Resolved Hospital Problems   No resolved problems to display.           VTE Prophylaxis:  Pharmacologic & mechanical VTE prophylaxis orders are present.             Disposition Planning:     Barriers to Discharge:clinical stability  Anticipated Date of Discharge: 1/24  Place of Discharge: snf      Time: 30 minutes     Code Status and Medical Interventions: No CPR (Do Not Attempt to Resuscitate); Limited Support; No intubation (DNI)   Ordered at: 01/18/25 2045     Medical Intervention Limits:    No intubation (DNI)     Code Status (Patient has no pulse and is not breathing):    No CPR (Do Not Attempt to Resuscitate)     Medical Interventions (Patient has pulse or is breathing):    Limited Support       Signature: Electronically signed by Timothy Duane Brammell, MD, 01/19/25, 13:35 EST.  Veronica Ann  Hospitalist Team

## 2025-01-19 NOTE — PROGRESS NOTES
Kali Garcia is a 79 y.o. male admitted with HCAP.     Recent Labs     01/18/25  1654 01/19/25  0249   CREATININE 0.73* 0.65*   BUN 16 18    139   K 4.4 4.2    105   CO2 24.2 20.4*     Estimated Creatinine Clearance: 61.9 mL/min (A) (by C-G formula based on SCr of 0.65 mg/dL (L)).    Assessment/Plan  Cefepime renally adjusted from 2000 mg IV q12h to q8h per the Adult Renal Dosing of Medication policy for estCrCl > 60 mL/min    Farhan Smith, [facility]  1/19/2025

## 2025-01-19 NOTE — PLAN OF CARE
Goal Outcome Evaluation:           Progress: no change  Outcome Evaluation: Pt is an 80 y/o M admitted to [facility] recently on 12/22/24 with dyspnea, hospital dx PNA. Pt wears home O2, however unable to verify amount. Pt d/c on 1/3/25 to [facility] on 6L o2. PMHx significant for COPD, severe malnutrition, PVD, HTN, and HLD. At baseline pt resides with son, DIL and grandchild, SSH with 0 LILIA. Pt typically (I) with ADLs and does not drive. Pt was found down at [facility] per ER provider note. He was under Hospice care there. Pt family are now wanting to take pt home under home hospice program at d/. They report he had not made any progress in rehab. Pt is found thus far to have possible aspiration PNA & UTI. D/C plan thus far is to d/c home with son & Hospice versus d/c to a different rehab. Writer is unsure what pt needs to be able to do in order to go home with family, but would assume he would need to be able to transfer or walk a little with minimal assist and to participate in his self care to some extent. This date pt was able to sit up EOB for several minutes with min (A) but he did not interact verbally. He made eye contact very briefly 2X, but mainly had eyes closed or stared in an expression of trembling fear at the ceiling. OT recommends he discharge to SNF.    Anticipated Discharge Disposition (OT): skilled nursing facility

## 2025-01-19 NOTE — THERAPY EVALUATION
Patient Name: Kali Garcia  : 1945    MRN: [id number]                              Today's Date: 2025       Admit Date: 2025    Visit Dx:     ICD-10-CM ICD-9-CM   1. Syncope and collapse  R55 780.2   2. Acute urinary tract infection  N39.0 599.0   3. Pneumonia of both lower lobes due to infectious organism  J18.9 486   4. Acute exacerbation of chronic obstructive pulmonary disease (COPD)  J44.1 491.21   5. Left maxillary sinusitis  J32.0 473.0   6. Acute non-recurrent sphenoidal sinusitis  J01.30 461.3   7. Mastoid disorder, left  H74.92 385.9     Patient Active Problem List   Diagnosis    Severe malnutrition    Abnormal CT scan, esophagus    Pneumonia due to COVID-19 virus    Pneumonia    Rhinovirus    Anemia    Acute on chronic respiratory failure with hypoxia    Coronary artery disease    Hypertension    COPD (chronic obstructive pulmonary disease)    Pneumonia, unspecified organism    Sick sinus syndrome    Unresponsive episode    HCAP (healthcare-associated pneumonia)    Acute UTI (urinary tract infection)     Past Medical History:   Diagnosis Date    COPD (chronic obstructive pulmonary disease)     Coronary artery disease     Emphysema lung     Hyperlipidemia     Hypertension     Osteoarthritis      Past Surgical History:   Procedure Laterality Date    CARDIAC CATHETERIZATION      CARDIAC ELECTROPHYSIOLOGY PROCEDURE N/A 2025    Procedure: Bijan SIMS;  Surgeon: Eliot Mcgarry MD;  Location: [facility] CATH INVASIVE LOCATION;  Service: Cardiovascular;  Laterality: N/A;    COLONOSCOPY N/A 2022    Procedure: COLONOSCOPY with 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 and endoscopic clipping x 1;  Surgeon: Luz Jacques MD;  Location: [facility] ENDOSCOPY;  Service: Gastroenterology;  Laterality: N/A;  post op: cecal AVM    CORONARY STENT PLACEMENT      ENDOSCOPY N/A 2022    Procedure: ESOPHAGOGASTRODUODENOSCOPY WITH BIOPSY X1 AREA;  Surgeon: Luz Jacques MD;   Location: [facility] ENDOSCOPY;  Service: Gastroenterology;  Laterality: N/A;  esophagitis, gastritis, HH    ENDOSCOPY N/A 8/4/2022    Procedure: ESOPHAGOGASTRODUODENOSCOPY;  Surgeon: Luz Jacques MD;  Location: [facility] ENDOSCOPY;  Service: Gastroenterology;  Laterality: N/A;  post op: hiatal hernia, eosphagitis    EYE SURGERY      FEMORAL ARTERY STENT      KNEE SURGERY Left     KNEE SURGERY Left     LUNG SURGERY        General Information       Row Name 01/19/25 1512          OT Time and Intention    Document Type evaluation  -     Patient Effort adequate  -     Symptoms Noted During/After Treatment fatigue  -     Comment withdrawn, wide-eyed stare, trembling at times, mouth remaining open & gurgling breath sounds, no command-following  -       Row Name 01/19/25 1512          General Information    Prior Level of Function independent:  -     Existing Precautions/Restrictions fall;oxygen therapy device and L/min  -     Barriers to Rehab medically complex;previous functional deficit;cognitive status;physical barrier  -       Row Name 01/19/25 1512          Living Environment    People in Home child(concepción), adult  -       Row Name 01/19/25 1512          Cognition    Orientation Status (Cognition) disoriented to;person;place;situation;time  -       Row Name 01/19/25 1512          Safety Issues/Impairments Affecting Functional Mobility    Safety Issues Affecting Function (Mobility) ability to follow commands  -     Impairments Affecting Function (Mobility) shortness of breath  -               User Key  (r) = Recorded By, (t) = Taken By, (c) = Cosigned By      Initials Name Provider Type     Diana Hogue OT Occupational Therapist                     Mobility/ADL's       Row Name 01/19/25 1515          Bed Mobility    Bed Mobility scooting/bridging;supine-sit;sit-supine  -     Scooting/Bridging [place] (Bed Mobility) dependent (less than 25% patient effort)  -     Supine-Sit [place]  (Bed Mobility) maximum assist (25% patient effort)  -     Sit-Supine [place] (Bed Mobility) maximum assist (25% patient effort)  -     Bed Mobility, Safety Issues cognitive deficits limit understanding;decreased use of arms for pushing/pulling;decreased use of legs for bridging/pushing;impaired trunk control for bed mobility  -       Row Name 01/19/25 1515          Transfers    Comment, (Transfers) did not attempt. Pt appeared capable but since he was not following commands and appeared unkepmt & unwell OT returned him to supine. Staff arrived to draw for blood gas check.  -       Row Name 01/19/25 1515          Activities of Daily Living    BADL Assessment/Intervention bathing;lower body dressing;toileting;grooming;feeding  -[facility] Name 01/19/25 1515          Bathing Assessment/Intervention    [place] Level (Bathing) bathing skills;dependent (less than 25% patient effort)  -[facility] Name 01/19/25 1515          Lower Body Dressing Assessment/Training    [place] Level (Lower Body Dressing) dependent (less than 25% patient effort);don;socks  -[facility] Name 01/19/25 1515          Toileting Assessment/Training    [place] Level (Toileting) toileting skills;dependent (less than 25% patient effort)  -     Comment, (Toileting) springer with tea-colored output & blood at insertion into urinary meatus  -[facility] Name 01/19/25 1515          Grooming Assessment/Training    [place] Level (Grooming) grooming skills;dependent (less than 25% patient effort)  -     Comment, (Grooming) pt did not hold washcloth or interact with objects or writer  -[facility] Name 01/19/25 1515          Self-Feeding Assessment/Training    [place] Level (Feeding) feeding skills;dependent (less than 25% patient effort)  -     Comment, (Feeding) NPO  -               User Key  (r) = Recorded By, (t) = Taken By, (c) = Cosigned By      Initials Name Provider Type    Diana Magallanes, OT  Occupational Therapist                   Obj/Interventions       Row Name 01/19/25 1519          Sensory Assessment (Somatosensory)    Sensory Assessment (Somatosensory) unable/difficult to assess  -[facility] Name 01/19/25 1519          Vision Assessment/Intervention    Vision Assessment Comment pupils pinpoint  -[facility] Name 01/19/25 1519          Range of Motion Comprehensive    General Range of Motion no range of motion deficits identified  -     Comment, General Range of Motion PROM WNL of all limbs, lt shld slightly stiff at end range  -[facility] Name 01/19/25 1519          Strength Comprehensive (MMT)    Comment, General Manual Muscle Testing (MMT) Assessment difficult to assess but appeared grossly 3+/5  -[facility] Name 01/19/25 1520          Balance    Balance Assessment sitting static balance;sitting dynamic balance  -     Static Sitting Balance minimal assist  -     Dynamic Sitting Balance moderate assist  -               User Key  (r) = Recorded By, (t) = Taken By, (c) = Cosigned By      Initials Name Provider Type     Diana Hogue, OT Occupational Therapist                   Goals/Plan       [facility] Name 01/19/25 1528          Bed Mobility Goal 1 (OT)    Activity/Assistive Device (Bed Mobility Goal 1, OT) bed mobility activities, all  -     [place] Level/Cues Needed (Bed Mobility Goal 1, OT) minimum assist (75% or more patient effort)  -     Time Frame (Bed Mobility Goal 1, OT) 2 weeks  -[facility] Name 01/19/25 1528          Transfer Goal 1 (OT)    Activity/Assistive Device (Transfer Goal 1, OT) sit-to-stand/stand-to-sit;bed-to-chair/chair-to-bed;commode, bedside with drop arms  -     [place] Level/Cues Needed (Transfer Goal 1, OT) minimum assist (75% or more patient effort)  -     Time Frame (Transfer Goal 1, OT) 2 weeks  -[facility] Name 01/19/25 1528          Grooming Goal 1 (OT)    Activity/Device (Grooming Goal 1, OT) grooming skills, all  -      [place] (Grooming Goal 1, OT) moderate assist (50-74% patient effort)  -     Time Frame (Grooming Goal 1, OT) 2 weeks  -       Row Name 01/19/25 1528          Therapy Assessment/Plan (OT)    Planned Therapy Interventions (OT) activity tolerance training;adaptive equipment training;BADL retraining;functional balance retraining;passive ROM/stretching;occupation/activity based interventions;transfer/mobility retraining;ROM/therapeutic exercise  -               User Key  (r) = Recorded By, (t) = Taken By, (c) = Cosigned By      Initials Name Provider Type     Diana Hogue, OT Occupational Therapist                   Clinical Impression       Row Name 01/19/25 1521          Pain Assessment    Additional Documentation Pain Scale: FACES Pre/Post-Treatment (Group)  -       Row Name 01/19/25 1521          Pain Scale: FACES Pre/Post-Treatment    Pain: FACES Scale, Pretreatment 2-->hurts little bit  -     Posttreatment Pain Rating 2-->hurts little bit  -       Row Name 01/19/25 1529 01/19/25 1521       Plan of Care Review    Progress -- no change  -    Outcome Evaluation Pt is an 80 y/o M admitted to [facility] recently on 12/22/24 with dyspnea, hospital dx PNA. Pt wears home O2, however unable to verify amount. Pt d/c on 1/3/25 to [facility] on 6L o2. PMHx significant for COPD, severe malnutrition, PVD, HTN, and HLD. At baseline pt resides with son, DIL and grandchild, SSH with 0 LLIIA. Pt typically (I) with ADLs and does not drive. Pt was found down at [facility] per ER provider note. He was under Hospice care there. Pt family are now wanting to take pt home under home hospice program at d/c. They report he had not made any progress in rehab. Pt is found thus far to have possible aspiration PNA & UTI. D/C plan thus far is to d/c home with son & Hospice versus d/c to a different rehab. Writer is unsure what pt needs to be able to do in order to go home with family, but would assume he would need to be able  to transfer or walk a little with minimal assist and to participate in his self care to some extent. This date pt was able to sit up EOB for several minutes with min (A) but he did not interact verbally. He made eye contact very briefly 2X, but mainly had eyes closed or stared in an expression of trembling fear at the ceiling. OT recommends he discharge to SNF.  - --      Row Name 01/19/25 1521          Therapy Assessment/Plan (OT)    Rehab Potential (OT) limited  -     Therapy Frequency (OT) 3 times/wk  -     Predicted Duration of Therapy Intervention (OT) until d/c  -       Row Name 01/19/25 1521          Therapy Plan Review/Discharge Plan (OT)    Anticipated Discharge Disposition (OT) skilled nursing facility  -       Row Name 01/19/25 1521          Vital Signs    O2 Delivery Pre Treatment supplemental O2  -     O2 Delivery Intra Treatment supplemental O2  -     O2 Delivery Post Treatment supplemental O2  -MH     Pre Patient Position Supine  -     Intra Patient Position Sitting  -     Post Patient Position Supine  -       Row Name 01/19/25 1521          Positioning and Restraints    Pre-Treatment Position in bed  -     Post Treatment Position bed  -MH     In Bed notified nsg;fowlers;exit alarm on;call light within reach;with other staff  -               User Key  (r) = Recorded By, (t) = Taken By, (c) = Cosigned By      Initials Name Provider Type    Diana Magallanes, OT Occupational Therapist                   Outcome Measures       Row Name 01/19/25 0800          How much help from another person do you currently need...    Turning from your back to your side while in flat bed without using bedrails? 2  -TB     Moving from lying on back to sitting on the side of a flat bed without bedrails? 2  -TB     Moving to and from a bed to a chair (including a wheelchair)? 1  -TB     Standing up from a chair using your arms (e.g., wheelchair, bedside chair)? 1  -TB     Climbing 3-5 steps with a  railing? 1  -TB     To walk in hospital room? 1  -TB     AM-PAC 6 Clicks Score (PT) 8  -TB               User Key  (r) = Recorded By, (t) = Taken By, (c) = Cosigned By      Initials Name Provider Type     Jaqueline Ramon, RN Registered Nurse                    Occupational Therapy Education       Title: PT OT SLP Therapies (In Progress)       Topic: Occupational Therapy (In Progress)       Point: ADL training (Not Started)       Description:   Instruct learner(s) on proper safety adaptation and remediation techniques during self care or transfers.   Instruct in proper use of assistive devices.                  Learner Progress:  Not documented in this visit.              Point: Home exercise program (Not Started)       Description:   Instruct learner(s) on appropriate technique for monitoring, assisting and/or progressing therapeutic exercises/activities.                  Learner Progress:  Not documented in this visit.              Point: Precautions (In Progress)       Description:   Instruct learner(s) on prescribed precautions during self-care and functional transfers.                  Learning Progress Summary            Patient Acceptance, E,D, NL,NR by  at 1/19/2025 1522                      Point: Body mechanics (In Progress)       Description:   Instruct learner(s) on proper positioning and spine alignment during self-care, functional mobility activities and/or exercises.                  Learning Progress Summary            Patient Acceptance, E,D, NL,NR by  at 1/19/2025 1525                                      User Key       Initials Effective Dates Name Provider Type [facility] 06/16/21 -  Diana Hogue OT Occupational Therapist OT                  OT Recommendation and Plan  Planned Therapy Interventions (OT): activity tolerance training, adaptive equipment training, BADL retraining, functional balance retraining, passive ROM/stretching, occupation/activity based interventions,  transfer/mobility retraining, ROM/therapeutic exercise  Therapy Frequency (OT): 3 times/wk  Plan of Care Review  Progress: no change  Outcome Evaluation: Pt is an 80 y/o M admitted to [facility] recently on 12/22/24 with dyspnea, hospital dx PNA. Pt wears home O2, however unable to verify amount. Pt d/c on 1/3/25 to [facility] on 6L o2. PMHx significant for COPD, severe malnutrition, PVD, HTN, and HLD. At baseline pt resides with son, DIL and grandchild, SSH with 0 LILIA. Pt typically (I) with ADLs and does not drive. Pt was found down at [facility] per ER provider note. He was under Hospice care there. Pt family are now wanting to take pt home under home hospice program at /. They report he had not made any progress in rehab. Pt is found thus far to have possible aspiration PNA & UTI. D/C plan thus far is to d/c home with son & Hospice versus d/c to a different rehab. Writer is unsure what pt needs to be able to do in order to go home with family, but would assume he would need to be able to transfer or walk a little with minimal assist and to participate in his self care to some extent. This date pt was able to sit up EOB for several minutes with min (A) but he did not interact verbally. He made eye contact very briefly 2X, but mainly had eyes closed or stared in an expression of trembling fear at the ceiling. OT recommends he discharge to SNF.     Time Calculation:   Evaluation Complexity (OT)  Review Occupational Profile/Medical/Therapy History Complexity: expanded/moderate complexity  Assessment, Occupational Performance/Identification of Deficit Complexity: 5 or more performance deficits  Clinical Decision Making Complexity (OT): detailed assessment/moderate complexity  Overall Complexity of Evaluation (OT): moderate complexity     Time Calculation- OT       Row Name 01/19/25 1535             Time Calculation- OT    OT Start Time 1435  -MH      OT Stop Time 1500  -MH      OT Time Calculation (min) 25 min  -       Total Timed Code Minutes- OT 0 minute(s)  -      OT Received On 01/19/25  -      OT - Next Appointment 01/21/25  -      OT Goal Re-Cert Due Date 02/02/25  -                User Key  (r) = Recorded By, (t) = Taken By, (c) = Cosigned By      Initials Name Provider Type     Diana Hogue, OT Occupational Therapist                  Therapy Charges for Today       Code Description Service Date Service Provider Modifiers Qty    [id number] HC OT EVAL MOD COMPLEXITY 3 1/19/2025 Diana Hogue OT GO 1                 Diana Hogue OT  1/19/2025

## 2025-01-20 PROBLEM — R55 SYNCOPE AND COLLAPSE: Status: ACTIVE | Noted: 2025-01-20

## 2025-01-20 LAB
ANION GAP SERPL CALCULATED.3IONS-SCNC: 9.4 MMOL/L (ref 5–15)
BUN SERPL-MCNC: 25 MG/DL (ref 8–23)
BUN/CREAT SERPL: 33.8 (ref 7–25)
BURR CELLS BLD QL SMEAR: ABNORMAL
C3 FRG RBC-MCNC: ABNORMAL
CALCIUM SPEC-SCNC: 8.1 MG/DL (ref 8.6–10.5)
CHLORIDE SERPL-SCNC: 108 MMOL/L (ref 98–107)
CO2 SERPL-SCNC: 24.6 MMOL/L (ref 22–29)
CREAT SERPL-MCNC: 0.74 MG/DL (ref 0.76–1.27)
DACRYOCYTES BLD QL SMEAR: ABNORMAL
DEPRECATED RDW RBC AUTO: 61.1 FL (ref 37–54)
EGFRCR SERPLBLD CKD-EPI 2021: 91.6 ML/MIN/1.73
ERYTHROCYTE [DISTWIDTH] IN BLOOD BY AUTOMATED COUNT: 17.6 % (ref 12.3–15.4)
GIANT PLATELETS: ABNORMAL
GLUCOSE SERPL-MCNC: 75 MG/DL (ref 65–99)
HCT VFR BLD AUTO: 26.9 % (ref 37.5–51)
HGB BLD-MCNC: 8.6 G/DL (ref 13–17.7)
LARGE PLATELETS: ABNORMAL
LYMPHOCYTES # BLD MANUAL: 0.32 10*3/MM3 (ref 0.7–3.1)
LYMPHOCYTES NFR BLD MANUAL: 3 % (ref 5–12)
MCH RBC QN AUTO: 30.3 PG (ref 26.6–33)
MCHC RBC AUTO-ENTMCNC: 32 G/DL (ref 31.5–35.7)
MCV RBC AUTO: 94.7 FL (ref 79–97)
METAMYELOCYTES NFR BLD MANUAL: 2 % (ref 0–0)
MONOCYTES # BLD: 0.14 10*3/MM3 (ref 0.1–0.9)
MRSA DNA SPEC QL NAA+PROBE: NORMAL
NEUTROPHILS # BLD AUTO: 3.97 10*3/MM3 (ref 1.7–7)
NEUTROPHILS NFR BLD MANUAL: 82 % (ref 42.7–76)
NEUTS BAND NFR BLD MANUAL: 6 % (ref 0–5)
PLATELET # BLD AUTO: 132 10*3/MM3 (ref 140–450)
PMV BLD AUTO: 10.1 FL (ref 6–12)
POIKILOCYTOSIS BLD QL SMEAR: ABNORMAL
POTASSIUM SERPL-SCNC: 3.9 MMOL/L (ref 3.5–5.2)
RBC # BLD AUTO: 2.84 10*6/MM3 (ref 4.14–5.8)
SCAN SLIDE: NORMAL
SMALL PLATELETS BLD QL SMEAR: ABNORMAL
SODIUM SERPL-SCNC: 142 MMOL/L (ref 136–145)
VARIANT LYMPHS NFR BLD MANUAL: 2 % (ref 0–5)
VARIANT LYMPHS NFR BLD MANUAL: 5 % (ref 19.6–45.3)
WBC MORPH BLD: NORMAL
WBC NRBC COR # BLD AUTO: 4.51 10*3/MM3 (ref 3.4–10.8)

## 2025-01-20 PROCEDURE — 25010000002 CEFEPIME PER 500 MG: Performed by: HOSPITALIST

## 2025-01-20 PROCEDURE — 92610 EVALUATE SWALLOWING FUNCTION: CPT

## 2025-01-20 PROCEDURE — 85007 BL SMEAR W/DIFF WBC COUNT: CPT | Performed by: NURSE PRACTITIONER

## 2025-01-20 PROCEDURE — 25010000002 CEFEPIME PER 500 MG: Performed by: INTERNAL MEDICINE

## 2025-01-20 PROCEDURE — 80048 BASIC METABOLIC PNL TOTAL CA: CPT | Performed by: NURSE PRACTITIONER

## 2025-01-20 PROCEDURE — 94761 N-INVAS EAR/PLS OXIMETRY MLT: CPT

## 2025-01-20 PROCEDURE — 94799 UNLISTED PULMONARY SVC/PX: CPT

## 2025-01-20 PROCEDURE — 97162 PT EVAL MOD COMPLEX 30 MIN: CPT

## 2025-01-20 PROCEDURE — 94664 DEMO&/EVAL PT USE INHALER: CPT

## 2025-01-20 PROCEDURE — 85025 COMPLETE CBC W/AUTO DIFF WBC: CPT | Performed by: NURSE PRACTITIONER

## 2025-01-20 PROCEDURE — 87641 MR-STAPH DNA AMP PROBE: CPT | Performed by: INTERNAL MEDICINE

## 2025-01-20 PROCEDURE — 25010000002 METHYLPREDNISOLONE PER 40 MG: Performed by: INTERNAL MEDICINE

## 2025-01-20 RX ORDER — METHYLPREDNISOLONE SODIUM SUCCINATE 40 MG/ML
40 INJECTION, POWDER, LYOPHILIZED, FOR SOLUTION INTRAMUSCULAR; INTRAVENOUS EVERY 12 HOURS
Status: DISCONTINUED | OUTPATIENT
Start: 2025-01-20 | End: 2025-01-22

## 2025-01-20 RX ADMIN — BUDESONIDE INHALATION 0.5 MG: 0.5 SUSPENSION RESPIRATORY (INHALATION) at 19:07

## 2025-01-20 RX ADMIN — CLOPIDOGREL BISULFATE 75 MG: 75 TABLET ORAL at 10:25

## 2025-01-20 RX ADMIN — IPRATROPIUM BROMIDE AND ALBUTEROL SULFATE 3 ML: .5; 3 SOLUTION RESPIRATORY (INHALATION) at 23:10

## 2025-01-20 RX ADMIN — IPRATROPIUM BROMIDE AND ALBUTEROL SULFATE 3 ML: .5; 3 SOLUTION RESPIRATORY (INHALATION) at 11:05

## 2025-01-20 RX ADMIN — APIXABAN 2.5 MG: 2.5 TABLET, FILM COATED ORAL at 10:25

## 2025-01-20 RX ADMIN — Medication 10 ML: at 20:49

## 2025-01-20 RX ADMIN — CEFEPIME 2000 MG: 2 INJECTION, POWDER, FOR SOLUTION INTRAVENOUS at 02:21

## 2025-01-20 RX ADMIN — AMIODARONE HYDROCHLORIDE 200 MG: 200 TABLET ORAL at 20:47

## 2025-01-20 RX ADMIN — METHYLPREDNISOLONE SODIUM SUCCINATE 40 MG: 40 INJECTION, POWDER, FOR SOLUTION INTRAMUSCULAR; INTRAVENOUS at 14:35

## 2025-01-20 RX ADMIN — PANTOPRAZOLE SODIUM 40 MG: 40 TABLET, DELAYED RELEASE ORAL at 10:25

## 2025-01-20 RX ADMIN — AMIODARONE HYDROCHLORIDE 200 MG: 200 TABLET ORAL at 10:25

## 2025-01-20 RX ADMIN — ARFORMOTEROL TARTRATE 15 MCG: 15 SOLUTION RESPIRATORY (INHALATION) at 19:01

## 2025-01-20 RX ADMIN — BUDESONIDE INHALATION 0.5 MG: 0.5 SUSPENSION RESPIRATORY (INHALATION) at 06:59

## 2025-01-20 RX ADMIN — ATORVASTATIN CALCIUM 10 MG: 10 TABLET ORAL at 20:47

## 2025-01-20 RX ADMIN — ARFORMOTEROL TARTRATE 15 MCG: 15 SOLUTION RESPIRATORY (INHALATION) at 06:55

## 2025-01-20 RX ADMIN — TAMSULOSIN HYDROCHLORIDE 0.4 MG: 0.4 CAPSULE ORAL at 10:25

## 2025-01-20 RX ADMIN — CEFEPIME 2000 MG: 2 INJECTION, POWDER, FOR SOLUTION INTRAVENOUS at 14:36

## 2025-01-20 RX ADMIN — APIXABAN 2.5 MG: 2.5 TABLET, FILM COATED ORAL at 20:47

## 2025-01-20 RX ADMIN — Medication 10 ML: at 10:26

## 2025-01-20 RX ADMIN — POLYETHYLENE GLYCOL 3350 17 G: 17 POWDER, FOR SOLUTION ORAL at 10:24

## 2025-01-20 RX ADMIN — IPRATROPIUM BROMIDE AND ALBUTEROL SULFATE 3 ML: .5; 3 SOLUTION RESPIRATORY (INHALATION) at 15:00

## 2025-01-20 RX ADMIN — THEOPHYLLINE 300 MG: 300 TABLET, EXTENDED RELEASE ORAL at 10:25

## 2025-01-20 NOTE — PLAN OF CARE
Goal Outcome Evaluation:     Clinical swallow evaluation completed. Pt is familiar to [facility] ST department having received VFSS on 1/3/2025 previous admission. Resulting in recs of puree/NTL and pt was discharged to skilled nursing facility ([facility]) later that day. While pt was at [facility] care [facility], pt participated in repeat VFSS and was rec'd mech soft/thins. Pt was seen today for clinical swallow evaluation. Pt is currently receiving puree/NTL at the time of evaluation. Pt was alert and responsive. He was upright and declined puree and NTL trials, but was amenable to [facility] soft solids x2 and thin by cup x2 and thin by straw x2. Oral [facility] exam revealed generalized oral motor weakness. Poor dentition w/ many missing teeth and pt reported he does not wear dentures. He was self-fed all trials. SLP. No difficulty using cup, straw, or spoon. No labial spillage occurred. Mastication extended and disorganized on [facility] soft solids w/ min lingual residual, cleared w/ thin liquid wash, but functional. Oral transit unremarkable. There was no pocketing or oral holding. Digital palpation suggests timely swallow. Pharyngeal phase marked by clear vocal quality w/ no cough, throat clear, or any sign of respiratory distress. Recommend upgrade from puree to mechanical ground w/ NTL and [place] Water Protocol (see guidelines below).  D/t h/o dysphagia resulting in modified diet and aspiration, VFSS is indicated to further objectively assess and r/o pharyngeal impairment. D/w patient and nurse regarding recs and plan for VFSS tomorrow w/ recs to follow. All in agreement with plan.       Recommendations:  - Mechanical ground w/ NTL and [place]y Water Protocol (see guidelines below)  - Meds: whole in NTL or whole/crushed in puree   - Safe swallow compensations: HOB at 90 degrees when eating/drinking, small sips/bites, slow rate of intake, alternate liquids/solids  - ST will continue to follow to ensure tolerance of current diet  with further recommendations as indicated and appropriate  - D/w patient and nurse regarding recs and plan for VFSS tomorrow w/ recs to follow. All in agreement with plan   - Pt may have single small ice chips when pt is awake/alert, sitting upright, and resp status is stable upon thorough oral care     Water Protocol:  The rationale to recommend water when a PO diet cannot appropriately/functionally sustain nutrition is because water is low risk for aspiration pna when compared to aspiration of food or other liquids.    Benefits of a water protocol include but are not limited to:     Oral gratification  Engagement of oropharyngeal swallow musculature  Decrease likelihood of dehydration       Guidelines for proper implementation include:  Thorough oral care prior to consuming water  Upright at 90 degree hip flexion  Small sips at slow rate  Monitor for any changes in respiratory status and discontinue if distress noted     The [place] Free Water Protocol is a research-based protocol established in 1984.  -PF                        SLP Swallowing Diagnosis: mild, oral dysphagia (further assess pharyngeal swallow) (01/20/25 1400)

## 2025-01-20 NOTE — CONSULTS
Nutrition Services    Patient Name: Kali Garcia  YOB: 1945  MRN: [id number]  Admission date: 1/18/2025    *See MSA at bottom of this note.     Severe chronic disease related malnutrition R/t multiple catabolic chronic medical problems; as evidenced by severe muscle and fat wasting and weight loss greater than 5% in one month.     Will add Magic Cups at lunch/dinner (Provides 580 kcals, 18 g protein if consumed)   Cannot add Boost at this time due to requirement for NTL.    CLINICAL NUTRITION ASSESSMENT      Reason for Assessment 1/20: Low BMI     H&P      Past Medical History:   Diagnosis Date    COPD (chronic obstructive pulmonary disease)     Coronary artery disease     Emphysema lung     Hyperlipidemia     Hypertension     Osteoarthritis        Past Surgical History:   Procedure Laterality Date    CARDIAC CATHETERIZATION      CARDIAC ELECTROPHYSIOLOGY PROCEDURE N/A 1/2/2025    Procedure: Bijan SIMS;  Surgeon: lEiot Mcgarry MD;  Location: [facility] CATH INVASIVE LOCATION;  Service: Cardiovascular;  Laterality: N/A;    COLONOSCOPY N/A 8/4/2022    Procedure: COLONOSCOPY with 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 and endoscopic clipping x 1;  Surgeon: Luz Jacques MD;  Location: [facility] ENDOSCOPY;  Service: Gastroenterology;  Laterality: N/A;  post op: cecal AVM    CORONARY STENT PLACEMENT      ENDOSCOPY N/A 4/14/2022    Procedure: ESOPHAGOGASTRODUODENOSCOPY WITH BIOPSY X1 AREA;  Surgeon: Luz Jacques MD;  Location: [facility] ENDOSCOPY;  Service: Gastroenterology;  Laterality: N/A;  esophagitis, gastritis, HH    ENDOSCOPY N/A 8/4/2022    Procedure: ESOPHAGOGASTRODUODENOSCOPY;  Surgeon: Luz Jacques MD;  Location: [facility] ENDOSCOPY;  Service: Gastroenterology;  Laterality: N/A;  post op: hiatal hernia, eosphagitis    EYE SURGERY      FEMORAL ARTERY STENT      KNEE SURGERY Left     KNEE SURGERY Left     LUNG SURGERY          Current Problems   Acute bilateral pneumonia,  "possible aspiration pneumonia  -ST approved today for mechanical ground foods and NTL     Acute metabolic encephalopathy     Acute COPD exacerbation  -Secondary to underlying pneumonia    Severe malnutrition   - chronic     Other chronic medical problems: CAD, A Fib, dyslipidemia, sick sinus syndrome; anemia         Encounter Information        Trending Narrative     1/20: Pt assessed due to low BMI. Pt was just recently admitted and during prior admission required pureed foods and NTL, and when re-assessed this stay by ST, he now has approval for mechanical ground foods. Upon visit, pt with some confusion and not able to provide details of intake/diet at [facility] in the interim between prior admission and this one, but has continued to lose weight. Pt endorses good appetite today - willing also to try ONS. Will add Magic Cups, as pt is not cleared for thin liquids yet.     Anthropometrics        Current Height, Weight Height: 180.3 cm (70.98\")  Weight: 48.3 kg (106 lb 7.7 oz) (01/20/25 0510)       Usual Body Weight (UBW) Chronically underweight        Trending Weight Hx     This admission: 1/20: Scale weight 48.3 kg              PTA: Additional 5.3% loss in just the last three weeks     Wt Readings from Last 30 Encounters:   01/20/25 0510 48.3 kg (106 lb 7.7 oz)   01/19/25 0500 48.3 kg (106 lb 7.7 oz)   01/18/25 2332 48.3 kg (106 lb 7.7 oz)   01/18/25 1625 51 kg (112 lb 7 oz)   01/03/25 0500 51 kg (112 lb 7 oz)   01/02/25 0557 51.8 kg (114 lb 3.2 oz)   01/01/25 0500 51.2 kg (112 lb 14 oz)   12/31/24 0501 51.1 kg (112 lb 10.5 oz)   12/30/24 0600 52.8 kg (116 lb 6.5 oz)   12/29/24 0514 53 kg (116 lb 13.5 oz)   12/28/24 0538 51.4 kg (113 lb 5.1 oz)   12/27/24 0647 50.4 kg (111 lb 1.8 oz)   12/26/24 0610 49.9 kg (110 lb 0.2 oz)   12/25/24 0600 49.6 kg (109 lb 5.6 oz)   12/23/24 0531 50.1 kg (110 lb 7.2 oz)   12/22/24 2139 49.9 kg (110 lb 0.2 oz)   03/04/24 1452 53 kg (116 lb 12.1 oz)   02/19/24 1303 50.5 kg (111 lb 5 oz) " "  02/16/24 2101 50 kg (110 lb 2.3 oz)   02/16/24 2054 51.8 kg (114 lb 2.8 oz)   02/15/24 1416 52.9 kg (116 lb 10.3 oz)   02/04/24 0315 51.5 kg (113 lb 8.6 oz)   02/03/24 1415 51.3 kg (113 lb)   02/03/24 0345 51.5 kg (113 lb 8.6 oz)   02/02/24 1524 51.8 kg (114 lb 3.2 oz)   02/02/24 0337 54.4 kg (120 lb)   02/01/24 1330 52.1 kg (114 lb 14.5 oz)   08/04/22 1124 51.1 kg (112 lb 10.5 oz)   07/26/22 1511 59 kg (130 lb)   04/15/22 0436 58.3 kg (128 lb 8.5 oz)   04/13/22 0355 54.6 kg (120 lb 5.9 oz)   04/12/22 2037 128 kg (282 lb 3 oz)      BMI kg/m2 Body mass index is 14.86 kg/m².       Labs        Pertinent Labs    Results from last 7 days   Lab Units 01/20/25  0215 01/19/25  0249 01/18/25  1654   SODIUM mmol/L 142 139 136   POTASSIUM mmol/L 3.9 4.2 4.4   CHLORIDE mmol/L 108* 105 101   CO2 mmol/L 24.6 20.4* 24.2   BUN mg/dL 25* 18 16   CREATININE mg/dL 0.74* 0.65* 0.73*   CALCIUM mg/dL 8.1* 7.9* 8.5*   BILIRUBIN mg/dL  --   --  0.6   ALK PHOS U/L  --   --  70   ALT (SGPT) U/L  --   --  42*   AST (SGOT) U/L  --   --  37   GLUCOSE mg/dL 75 115* 101*     Results from last 7 days   Lab Units 01/20/25  0215   HEMOGLOBIN g/dL 8.6*   HEMATOCRIT % 26.9*     No results found for: \"HGBA1C\"     Medications    Scheduled Medications amiodarone, 200 mg, Oral, BID  apixaban, 2.5 mg, Oral, Q12H  arformoterol, 15 mcg, Nebulization, BID - RT  atorvastatin, 10 mg, Oral, Nightly  budesonide, 0.5 mg, Nebulization, BID - RT  cefepime, 2,000 mg, Intravenous, Q12H  clopidogrel, 75 mg, Oral, Daily  ipratropium-albuterol, 3 mL, Nebulization, Q4H - RT  methylPREDNISolone sodium succinate, 40 mg, Intravenous, Q12H  pantoprazole, 40 mg, Oral, Daily  polyethylene glycol, 17 g, Oral, Daily  sodium chloride, 10 mL, Intravenous, Q12H  tamsulosin, 0.4 mg, Oral, Daily  theophylline, 300 mg, Oral, Daily  [START ON 1/23/2025] vitamin D, 50,000 Units, Oral, Q7 Days        Infusions      PRN Medications   acetaminophen    aluminum-magnesium " hydroxide-simethicone    senna-docusate sodium **AND** polyethylene glycol **AND** bisacodyl **AND** bisacodyl    Calcium Replacement - Follow Nurse / BPA Driven Protocol    Magnesium Standard Dose Replacement - Follow Nurse / BPA Driven Protocol    melatonin    Phosphorus Replacement - Follow Nurse / BPA Driven Protocol    Potassium Replacement - Follow Nurse / BPA Driven Protocol    sodium chloride    sodium chloride     Physical Findings        Trending Physical   Appearance, NFPE 1/20: NFPE completed, consistent with nutrition diagnosis of malnutrition using AND/ASPEN criteria. See MSA below.      --  Edema  None documented      Bowel Function No BM documented yet this admission      Tubes No feeding tube in place      Chewing/Swallowing Per ST can have mechanical ground foods and Nectar thick liquids      Skin Bruising to trochanter per wound documentation      --  Current Nutrition Orders & Evaluation of Intake       Oral Nutrition     Food Allergies NKFA   Current PO Diet Diet: Regular/House; Texture: Mechanical Ground (NDD 2); Fluid Consistency: Nectar Thick   Supplement None ordered    PO Evaluation     Trending % PO Intake 1/20: 100% intake at recent meals    --  Nutritional Risk Screening        NRS-2002 Score          Nutrition Diagnosis         Nutrition Dx Problem 1 Severe chronic disease related malnutrition R/t multiple catabolic chronic medical problems; as evidenced by severe muscle and fat wasting and weight loss greater than 5% in one month.       Nutrition Dx Problem 2        Intervention Goal         Intervention Goal(s) Intakes continue 75% or better at meals   Accepting ONS  Weight stabilizing/increasing      Nutrition Intervention        RD Action Will add ONS     Nutrition Prescription          Diet Prescription Mechanical ground diet, NTL   Supplement Prescription Magic Cups at lunch/dinner (Provides 580 kcals, 18 g protein if consumed)      --  Monitor/Evaluation        Monitor PO intake,  Supplement intake, Weight, Skin status, GI status, POC/GOC     Malnutrition Severity Assessment      Patient meets criteria for : Severe Malnutrition  Malnutrition Type (Last 8 Hours)       Malnutrition Severity Assessment       Row Name 01/20/25 2347       Malnutrition Severity Assessment    Malnutrition Type Chronic Disease - Related Malnutrition      Row Name 01/20/25 2347       Unintentional Weight Loss     Unintentional Weight Loss Findings Severe    Unintentional Weight Loss  Weight loss greater than 5% in one month      Row Name 01/20/25 2347       Muscle Loss    Loss of Muscle Mass Findings Severe    [place] Region Severe - deep hollowing/scooping, lack of muscle to touch, facial bones well defined    Clavicle Bone Region Severe - protruding prominent bone    Acromion Bone Region Severe - squared shoulders, bones, and acromion process protrusion prominent    Dorsal Hand Region Severe - prominent depression    Patellar Region Severe - prominent bone, square looking, very little muscle definition    Anterior Thigh Region Severe - line/depression along thigh, obviously thin    Posterior Calf Region Severe - thin with very little definition/firmness      Row Name 01/20/25 2347       Fat Loss    Subcutaneous Fat Loss Findings Severe    Orbital Region  Severe - pronounced hollowness/depression, dark circles, loose saggy skin    Upper Arm Region Severe - mostly skin, very little space between folds, fingers touch    Thoracic & Lumbar Region Severe - ribs visible with prominent depressions, iliac crest very prominent      Row Name 01/20/25 1787       Criteria Met (Must meet criteria for severity in at least 2 of these categories: M Wasting, Fat Loss, Fluid, Secondary Signs, Wt. Status, Intake)    Patient meets criteria for  Severe Malnutrition                       Electronically signed by:  Laurie Jaime RD  01/20/25 16:28 EST

## 2025-01-20 NOTE — PLAN OF CARE
"Goal Outcome Evaluation:              Outcome Evaluation: Pt is an 78 y/o male found down/unresponsive at [facility]; acute COPD exacerbation with severe emphysema. Pt chronically on 4-6L O2 at [facility], currently on 4L O2. Pt recently admitted to [facility] on 12/22/24 with dyspnea and pneumonia. Pt discharged to [facility] on 1/3/25.   PMHx significant for COPD, severe malnutrition, PVD, HTN, and HLD.  Chest XR 1/18: Bibasilar consolidations, which could reflect aspiration or pneumonia.  CT head 1/18: No acute intracranial abnormality. Pt A&O to person and place only; reports \"a lot of oxygen machines at home\"  On 4L NC at rest, SaO2 95%.   Sitting SaO2 93-95%with 4L NC, pulse 57 bpm, BP 86/40 mmHg (nurse notified),  SUPINE SaO2 89-93% with 4L NC, pulse 60 bpm, /53 mmHg Pt reports PLOF living with son, DIL and grandchild, in [facility] with walk in entry. Pt typically (I) with ADLs; Mod(I) with Rwx for household ambulation, and doesn't drive. Pt was found down at [facility] per ER provider note. He was under Hospice care there. D/C plan thus far is to d/c home with son & Hospice versus d/c to a different rehab. Pt required MAX A with supine to/from sit and unable to assess transfers due to dizziness and hypotension in sitting. Pending 24/7 sup/assist from family, return to SNF recommended based on mobility decline / medical complexity at time of PT eval.    Anticipated Discharge Disposition (PT): skilled nursing facility                        "

## 2025-01-20 NOTE — PLAN OF CARE
Goal Outcome Evaluation:  Plan of Care Reviewed With: patient        Progress: no change

## 2025-01-20 NOTE — PROGRESS NOTES
Kali Garcia is a 79 y.o. male admitted with HCAP.     Recent Labs     01/18/25  1654 01/19/25  0249 01/20/25  0215   CREATININE 0.73* 0.65* 0.74*   BUN 16 18 25*    139 142   K 4.4 4.2 3.9    105 108*   CO2 24.2 20.4* 24.6     Estimated Creatinine Clearance: 54.4 mL/min (A) (by C-G formula based on SCr of 0.74 mg/dL (L)).    Assessment/Plan  Cefepime renally adjusted from 2000 mg IV q8h to q12h per the Adult Renal Dosing of Medication policy for estCrCl 30-59 mL/min    Farhan Smith, [facility]  1/20/2025

## 2025-01-20 NOTE — PROGRESS NOTES
[facility] MEDICINE SERVICE  DAILY PROGRESS NOTE    NAME: Kali Garcia  : 1945  MRN: [id number]      LOS: 0 days     PROVIDER OF SERVICE: Altaf Plummer MD    Chief Complaint: HCAP (healthcare-associated pneumonia)    Subjective:     Interval History: Patient is more alert and oriented today.  He states he is hungry and thirsty.  He denies any significant dyspnea.        Review of Systems:   Review of Systems    Objective:     Vital Signs  Temp:  [97.5 °F (36.4 °C)-98 °F (36.7 °C)] 97.9 °F (36.6 °C)  Heart Rate:  [50-66] 57  Resp:  [11-19] 18  BP: ()/(48-81) 122/54  Flow (L/min) (Oxygen Therapy):  [2-3] 2   Body mass index is 14.86 kg/m².    Physical Exam  Physical Exam  General Appearance:  Alert, cooperative, no distress, appears stated age  Head:  Normocephalic, without obvious abnormality, atraumatic  Eyes:  PERRL, conjunctiva/corneas clear, EOM's intact, fundi benign, both eyes  Ears:  Normal TM's and external ear canals, both ears  Nose: Nares normal, septum midline, mucosa normal, no drainage or sinus tenderness  Throat: Lips, mucosa, and tongue normal; teeth and gums normal  Neck: Supple, symmetrical, trachea midline, no adenopathy, thyroid: not enlarged, symmetric, no tenderness/mass/nodules, no carotid bruit or JVD  Lungs:   Bibasilar rhonchi, respirations unlabored  Heart:  Regular rate and rhythm, S1, S2 normal, no murmur, rub or gallop  Abdomen:  Soft, non-tender, bowel sounds active all four quadrants,  no masses, no organomegaly  Extremities: Extremities normal, atraumatic, no cyanosis or edema  Pulses: 2+ and symmetric  Skin: Skin color, texture, turgor normal, no rashes or lesions  Neurologic: Normal         Diagnostic Data    Results from last 7 days   Lab Units 25  0215 25  0249 25  1654   WBC 10*3/mm3 4.51   < > 4.25   HEMOGLOBIN g/dL 8.6*   < > 9.0*   HEMATOCRIT % 26.9*   < > 29.1*   PLATELETS 10*3/mm3 132*   < > 116*   GLUCOSE mg/dL 75   < > 101*    CREATININE mg/dL 0.74*   < > 0.73*   BUN mg/dL 25*   < > 16   SODIUM mmol/L 142   < > 136   POTASSIUM mmol/L 3.9   < > 4.4   AST (SGOT) U/L  --   --  37   ALT (SGPT) U/L  --   --  42*   ALK PHOS U/L  --   --  70   BILIRUBIN mg/dL  --   --  0.6   ANION GAP mmol/L 9.4   < > 10.8    < > = values in this interval not displayed.       XR Chest 1 View    Result Date: 1/18/2025  Impression: 1.Bibasilar consolidations, which could reflect aspiration or pneumonia. 2.Small bilateral pleural effusions. 3.Emphysema. Electronically Signed: Jona Bland MD  1/18/2025 6:18 PM EST  Workstation ID: [id number]    CT Head Without Contrast    Result Date: 1/18/2025  Impression: 1. No acute intracranial abnormality. 2. Acute left maxillary and sphenoid sinusitis. 3. Complete opacification of the left mastoid air cells which appears acute and new from 10/10/2024. Electronically Signed: Mike Celaya MD  1/18/2025 5:46 PM EST  Workstation ID: [id number]       I reviewed the patient's new clinical results.    Assessment/Plan:     Active and Resolved Problems  Active Hospital Problems    Diagnosis  POA    **HCAP (healthcare-associated pneumonia) [J18.9]  Yes    Unresponsive episode [R40.4]  Yes    Acute UTI (urinary tract infection) [N39.0]  Yes    Sick sinus syndrome [I49.5]  Yes    Anemia [D64.9]  Yes    Coronary artery disease [I25.10]  Yes    COPD (chronic obstructive pulmonary disease) [J44.9]  Yes    Severe malnutrition [E43]  Yes      Resolved Hospital Problems   No resolved problems to display.       Acute bilateral pneumonia, possible aspiration pneumonia  -Patient presents with imaging concerning for bibasilar pneumonia, especially aspiration pneumonia given his history of dysphagia  -Initiated on antibiotics with cefepime  -Check MRSA screen; if positive he may require vancomycin for coverage of MRSA pneumonia  -Clinically improving  -Speech therapy evaluation to assess dysphagia; on previous admission the patient was  recommended to have puréed diet with nectar thick liquids    Acute metabolic encephalopathy  -Likely secondary to underlying pneumonia but also poss related to medications  -Hold all sedating medications  -Patient's mental status has improved with treatment of pneumonia as well    Acute COPD exacerbation  -Secondary to underlying pneumonia  -Initiate Solu-Medrol, nebulizers  -Encourage pulmonary toileting with I-S, flutter valve if patient can tolerate    CAD  -Resume home GDMT    Chronic A-fib  -Restart home amiodarone, Eliquis    Dyslipidemia  -Continue home statin therapy    Sick sinus syndrome  -Patient was recently admitted and underwent PPM placement    Anemia of chronic disease  -H&H fairly stable  -No indication for transfusion at this time    Severe protein calorie malnutrition  -Encourage p.o. intake and liberalize diet is much as possible based on his dysphagia evaluation     VTE Prophylaxis:  Pharmacologic & mechanical VTE prophylaxis orders are present.             Disposition Planning:     Barriers to Discharge: Clinical improvement  Anticipated Date of Discharge: 1/23  Place of Discharge: SNF      Time: 40 minutes     Code Status and Medical Interventions: No CPR (Do Not Attempt to Resuscitate); Limited Support; No intubation (DNI)   Ordered at: 01/18/25 2045     Medical Intervention Limits:    No intubation (DNI)     Code Status (Patient has no pulse and is not breathing):    No CPR (Do Not Attempt to Resuscitate)     Medical Interventions (Patient has pulse or is breathing):    Limited Support       Signature: Electronically signed by Altaf Plummer MD, 01/20/25, 13:10 EST.  [facility] Hospitalist Team

## 2025-01-20 NOTE — THERAPY EVALUATION
Patient Name: Kali Garcia  : 1945    MRN: [id number]                              Today's Date: 2025       Admit Date: 2025    Visit Dx:     ICD-10-CM ICD-9-CM   1. Syncope and collapse  R55 780.2   2. Acute urinary tract infection  N39.0 599.0   3. Pneumonia of both lower lobes due to infectious organism  J18.9 486   4. Acute exacerbation of chronic obstructive pulmonary disease (COPD)  J44.1 491.21   5. Left maxillary sinusitis  J32.0 473.0   6. Acute non-recurrent sphenoidal sinusitis  J01.30 461.3   7. Mastoid disorder, left  H74.92 385.9     Patient Active Problem List   Diagnosis    Severe malnutrition    Abnormal CT scan, esophagus    Pneumonia due to COVID-19 virus    Pneumonia    Rhinovirus    Anemia    Acute on chronic respiratory failure with hypoxia    Coronary artery disease    Hypertension    COPD (chronic obstructive pulmonary disease)    Pneumonia, unspecified organism    Sick sinus syndrome    Unresponsive episode    HCAP (healthcare-associated pneumonia)    Acute UTI (urinary tract infection)     Past Medical History:   Diagnosis Date    COPD (chronic obstructive pulmonary disease)     Coronary artery disease     Emphysema lung     Hyperlipidemia     Hypertension     Osteoarthritis      Past Surgical History:   Procedure Laterality Date    CARDIAC CATHETERIZATION      CARDIAC ELECTROPHYSIOLOGY PROCEDURE N/A 2025    Procedure: Bijan SIMS;  Surgeon: Eliot Mcgarry MD;  Location: [facility] CATH INVASIVE LOCATION;  Service: Cardiovascular;  Laterality: N/A;    COLONOSCOPY N/A 2022    Procedure: COLONOSCOPY with 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 and endoscopic clipping x 1;  Surgeon: Luz Jacques MD;  Location: [facility] ENDOSCOPY;  Service: Gastroenterology;  Laterality: N/A;  post op: cecal AVM    CORONARY STENT PLACEMENT      ENDOSCOPY N/A 2022    Procedure: ESOPHAGOGASTRODUODENOSCOPY WITH BIOPSY X1 AREA;  Surgeon: Luz Jaqcues MD;   Location: [facility] ENDOSCOPY;  Service: Gastroenterology;  Laterality: N/A;  esophagitis, gastritis, HH    ENDOSCOPY N/A 8/4/2022    Procedure: ESOPHAGOGASTRODUODENOSCOPY;  Surgeon: Luz Jacques MD;  Location: [facility] ENDOSCOPY;  Service: Gastroenterology;  Laterality: N/A;  post op: hiatal hernia, eosphagitis    EYE SURGERY      FEMORAL ARTERY STENT      KNEE SURGERY Left     KNEE SURGERY Left     LUNG SURGERY        General Information       Row Name 01/20/25 1322          Physical Therapy Time and Intention    Document Type evaluation  -JV     Mode of Treatment physical therapy  -JV       Row Name 01/20/25 1322          General Information    Patient Profile Reviewed yes  -JV     Prior Level of Function independent:;all household mobility  -JV     Existing Precautions/Restrictions fall;oxygen therapy device and L/min  -JV     Barriers to Rehab medically complex;previous functional deficit;cognitive status  -JV       Row Name 01/20/25 1322          Living Environment    People in Home child(concepción), adult  -JV       Row Name 01/20/25 1322          Home Main Entrance    Number of Stairs, Main Entrance none  -JV       Row Name 01/20/25 1322          Stairs Within Home, Primary    Number of Stairs, Within Home, Primary none  -JV       Row Name 01/20/25 1322          Cognition    Orientation Status (Cognition) oriented to;person;place;disoriented to;situation;time  -JV       Row Name 01/20/25 1322          Safety Issues/Impairments Affecting Functional Mobility    Safety Issues Affecting Function (Mobility) ability to follow commands;awareness of need for assistance;safety precaution awareness  -JV     Impairments Affecting Function (Mobility) balance;endurance/activity tolerance;cognition;strength;shortness of breath  -JV     Cognitive Impairments, Mobility Safety/Performance attention;awareness, need for assistance;insight into deficits/self-awareness  -JV               User Key  (r) = Recorded By, (t) = Taken By, (c) =  Cosigned By      Initials Name Provider Type    Haylee Mitchell Physical Therapist                   Mobility       Row Name 01/20/25 1324          Bed Mobility    Bed Mobility supine-sit-supine  -JV     Supine-Sit [place] (Bed Mobility) maximum assist (25% patient effort)  -JV     Sit-Supine [place] (Bed Mobility) maximum assist (25% patient effort)  -JV       Row Name 01/20/25 1324          Transfers    Comment, (Transfers) RICHARD - hypotension while sitting EOB  -JV       Row Name 01/20/25 1324          Bed-Chair Transfer    Bed-Chair [place] (Transfers) unable to assess  -JV       Row Name 01/20/25 1324          Sit-Stand Transfer    Sit-Stand [place] (Transfers) unable to assess  -JV       Row Name 01/20/25 1324          Gait/Stairs (Locomotion)    [place] Level (Gait) unable to assess  -JV     Patient was able to Ambulate no, other medical factors prevent ambulation  -JV     Reason Patient was unable to Ambulate Excessive Weakness;Hypotension;Dizziness  -JV               User Key  (r) = Recorded By, (t) = Taken By, (c) = Cosigned By      Initials Name Provider Type    Haylee Mitchell Physical Therapist                   Obj/Interventions       Row Name 01/20/25 1325          Range of Motion Comprehensive    General Range of Motion bilateral lower extremity ROM WFL  -JV       Row Name 01/20/25 1325          Strength Comprehensive (MMT)    Comment, General Manual Muscle Testing (MMT) Assessment BLE grossly 3/5  -JV       Row Name 01/20/25 1325          Balance    Static Sitting Balance minimal assist  -JV     Dynamic Sitting Balance moderate assist  -JV       Row Name 01/20/25 1325          Sensory Assessment (Somatosensory)    Sensory Assessment (Somatosensory) LE sensation intact  -JV               User Key  (r) = Recorded By, (t) = Taken By, (c) = Cosigned By      Initials Name Provider Type    Haylee Mitchell Physical Therapist                   Goals/Plan       Row  Name 01/20/25 1332          Bed Mobility Goal 1 (PT)    Activity/Assistive Device (Bed Mobility Goal 1, PT) bed mobility activities, all  -JV     [place] Level/Cues Needed (Bed Mobility Goal 1, PT) contact guard required  -JV     Time Frame (Bed Mobility Goal 1, PT) long term goal (LTG);2 weeks  -JV       Row Name 01/20/25 133          Transfer Goal 1 (PT)    Activity/Assistive Device (Transfer Goal 1, PT) sit-to-stand/stand-to-sit;bed-to-chair/chair-to-bed;walker, rolling  -JV     [place] Level/Cues Needed (Transfer Goal 1, PT) contact guard required  -JV     Time Frame (Transfer Goal 1, PT) long term goal (LTG);2 weeks  -JV       Row Name 01/20/25 1332          Gait Training Goal 1 (PT)    Activity/Assistive Device (Gait Training Goal 1, PT) gait (walking locomotion);assistive device use  -JV     [place] Level (Gait Training Goal 1, PT) minimum assist (75% or more patient effort)  -JV     Distance (Gait Training Goal 1, PT) 10 ft  -JV     Time Frame (Gait Training Goal 1, PT) long term goal (LTG);2 weeks  -JV       Row Name 01/20/25 1496          Therapy Assessment/Plan (PT)    Planned Therapy Interventions (PT) balance training;bed mobility training;gait training;home exercise program;strengthening;patient/family education;transfer training  -JV               User Key  (r) = Recorded By, (t) = Taken By, (c) = Cosigned By      Initials Name Provider Type    Haylee Mitchell Physical Therapist                   Clinical Impression       Row Name 01/20/25 1320          Pain Scale: FACES Pre/Post-Treatment    Pain: FACES Scale, Pretreatment 0-->no hurt  -JV     Posttreatment Pain Rating 0-->no hurt  -JV       Row Name 01/20/25 8977          Plan of Care Review    Outcome Evaluation Pt is an 80 y/o [facility]; acute COPD exacerbation with severe emphysema. [facility], currently on 4L O2. Pt recently admitted to [facility] on 12/22/24 with dyspnea and  "pneumonia. Pt discharged to [facility] on 1/3/25.   PMHx significant for COPD, severe malnutrition, PVD, HTN, and HLD.  Chest XR 1/18: Bibasilar consolidations, which could reflect aspiration or pneumonia.  CT head 1/18: No acute intracranial abnormality. Pt A&O to person and place only; reports \"a lot of oxygen machines at home\"  On 4L NC at rest, SaO2 95%.   Sitting SaO2 93-95%with 4L NC, pulse 57 bpm, BP 86/40 mmHg (nurse notified),  SUPINE SaO2 89-93% with 4L NC, pulse 60 bpm, /53 mmHg Pt reports PLOF living with son, DIL and grandchild, in [facility] with walk in entry. Pt typically (I) with ADLs; Mod(I) with Rwx for household ambulation, and doesn't drive. Pt was found down at [facility] per ER provider note. He was under Hospice care there. D/C plan thus far is to d/c home with son & Hospice versus d/c to a different rehab. Pt required MAX A with supine to/from sit and unable to assess transfers due to dizziness and hypotension in sitting. Pending 24/7 sup/assist from family, return to SNF recommended based on mobility decline / medical complexity at time of PT eval.  -JV       Row Name 01/20/25 1325          Therapy Assessment/Plan (PT)    Criteria for Skilled Interventions Met (PT) yes;meets criteria;skilled treatment is necessary  -JV     Therapy Frequency (PT) 5 times/wk  -JV     Predicted Duration of Therapy Intervention (PT) until d/c  -JV       Row Name 01/20/25 1325          Vital Signs    Intra Systolic BP Rehab 86  -JV     Intra Treatment Diastolic BP 40  -JV     Post Systolic BP Rehab 122  -JV     Post Treatment Diastolic BP 53  -JV     Intratreatment Heart Rate (beats/min) 57  -JV     Posttreatment Heart Rate (beats/min) 60  -JV     Pre SpO2 (%) 95  -JV     O2 Delivery Pre Treatment nasal cannula  4L  -JV     Intra SpO2 (%) 93  -JV     O2 Delivery Intra Treatment nasal cannula  4L  -JV     Post SpO2 (%) 89  -JV     O2 Delivery Post Treatment nasal cannula  4L  -JV       Row Name 01/20/25 1325 " "         Positioning and Restraints    Pre-Treatment Position in bed  -JV     Post Treatment Position bed  -JV     In Bed notified nsg;fowlers;call light within reach;encouraged to call for assist;exit alarm on;SCD pump applied;with other staff  -               User Key  (r) = Recorded By, (t) = Taken By, (c) = Cosigned By      Initials Name Provider Type    Haylee Mitchell Physical Therapist                   Outcome Measures    No documentation.                                Physical Therapy Education       Title: PT OT SLP Therapies (In Progress)       Topic: Physical Therapy (In Progress)       Point: Mobility training (In Progress)       Learning Progress Summary            Patient Nonacceptance, E, NR,NL by STEFANY at 1/20/2025 1334                                      User Key       Initials Effective Dates Name Provider Type Discipline     03/30/21 -  Haylee Rai Physical Therapist PT                  PT Recommendation and Plan  Planned Therapy Interventions (PT): balance training, bed mobility training, gait training, home exercise program, strengthening, patient/family education, transfer training  Outcome Evaluation: Pt is an 78 y/o male found down/unresponsive at [facility]; acute COPD exacerbation with severe emphysema. Pt chronically on 4-6L O2 at [facility], currently on 4L O2. Pt recently admitted to [facility] on 12/22/24 with dyspnea and pneumonia. Pt discharged to [facility] on 1/3/25.   PMHx significant for COPD, severe malnutrition, PVD, HTN, and HLD.  Chest XR 1/18: Bibasilar consolidations, which could reflect aspiration or pneumonia.  CT head 1/18: No acute intracranial abnormality. Pt A&O to person and place only; reports \"a lot of oxygen machines at home\"  On 4L NC at rest, SaO2 95%.   Sitting SaO2 93-95%with 4L NC, pulse 57 bpm, BP 86/40 mmHg (nurse notified),  SUPINE SaO2 89-93% with 4L NC, pulse 60 bpm, /53 mmHg Pt reports PLOF living with son, DIL and grandchild, in [facility] with walk in " entry. Pt typically (I) with ADLs; Mod(I) with Rwx for household ambulation, and doesn't drive. Pt was found down at [facility] per ER provider note. He was under Hospice care there. D/C plan thus far is to d/c home with son & Hospice versus d/c to a different rehab. Pt required MAX A with supine to/from sit and unable to assess transfers due to dizziness and hypotension in sitting. Pending 24/7 sup/assist from family, return to SNF recommended based on mobility decline / medical complexity at time of PT eval.     Time Calculation:         PT Charges       Row Name 01/20/25 1335             Time Calculation    Start Time 1054  -JV      Stop Time 1117  -JV      Time Calculation (min) 23 min  -JV      PT Received On 01/20/25  -JV      PT - Next Appointment 01/21/25  -JLEXI      PT Goal Re-Cert Due Date 02/03/25  -JV         Time Calculation- PT    Total Timed Code Minutes- PT 0 minute(s)  -JV                User Key  (r) = Recorded By, (t) = Taken By, (c) = Cosigned By      Initials Name Provider Type    Haylee Mitchell Physical Therapist                  Therapy Charges for Today       Code Description Service Date Service Provider Modifiers Qty    [id number] HC PT EVAL MOD COMPLEXITY 4 1/20/2025 Haylee Rai GP 1            PT G-Codes  AM-PAC 6 Clicks Score (PT): 8  PT Discharge Summary  Anticipated Discharge Disposition (PT): skilled nursing facility    Haylee Rai  1/20/2025

## 2025-01-20 NOTE — THERAPY EVALUATION
Acute Care - Speech Language Pathology   Swallow Initial Evaluation [facility]     Patient Name: Kali Garcia  : 1945  MRN: [id number]  Today's Date: 2025               Admit Date: 2025    Visit Dx:     ICD-10-CM ICD-9-CM   1. Syncope and collapse  R55 780.2   2. Acute urinary tract infection  N39.0 599.0   3. Pneumonia of both lower lobes due to infectious organism  J18.9 486   4. Acute exacerbation of chronic obstructive pulmonary disease (COPD)  J44.1 491.21   5. Left maxillary sinusitis  J32.0 473.0   6. Acute non-recurrent sphenoidal sinusitis  J01.30 461.3   7. Mastoid disorder, left  H74.92 385.9     Patient Active Problem List   Diagnosis    Severe malnutrition    Abnormal CT scan, esophagus    Pneumonia due to COVID-19 virus    Pneumonia    Rhinovirus    Anemia    Acute on chronic respiratory failure with hypoxia    Coronary artery disease    Hypertension    COPD (chronic obstructive pulmonary disease)    Pneumonia, unspecified organism    Sick sinus syndrome    Unresponsive episode    HCAP (healthcare-associated pneumonia)    Acute UTI (urinary tract infection)     Past Medical History:   Diagnosis Date    COPD (chronic obstructive pulmonary disease)     Coronary artery disease     Emphysema lung     Hyperlipidemia     Hypertension     Osteoarthritis      Past Surgical History:   Procedure Laterality Date    CARDIAC CATHETERIZATION      CARDIAC ELECTROPHYSIOLOGY PROCEDURE N/A 2025    Procedure: Bijan SIMS;  Surgeon: Eliot Mcgarry MD;  Location: [facility] CATH INVASIVE LOCATION;  Service: Cardiovascular;  Laterality: N/A;    COLONOSCOPY N/A 2022    Procedure: COLONOSCOPY with 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 and endoscopic clipping x 1;  Surgeon: Luz Jacques MD;  Location: [facility] ENDOSCOPY;  Service: Gastroenterology;  Laterality: N/A;  post op: cecal AVM    CORONARY STENT PLACEMENT      ENDOSCOPY N/A 2022    Procedure: ESOPHAGOGASTRODUODENOSCOPY  WITH BIOPSY X1 AREA;  Surgeon: Luz Jacques MD;  Location: [facility] ENDOSCOPY;  Service: Gastroenterology;  Laterality: N/A;  esophagitis, gastritis, HH    ENDOSCOPY N/A 8/4/2022    Procedure: ESOPHAGOGASTRODUODENOSCOPY;  Surgeon: Luz Jacques MD;  Location: [facility] ENDOSCOPY;  Service: Gastroenterology;  Laterality: N/A;  post op: hiatal hernia, eosphagitis    EYE SURGERY      FEMORAL ARTERY STENT      KNEE SURGERY Left     KNEE SURGERY Left     LUNG SURGERY         SLP Recommendation and Plan  SLP Swallowing Diagnosis: mild, oral dysphagia (further assess pharyngeal swallow) (01/20/25 1400)  SLP Diet Recommendation: mechanical ground textures, nectar thick liquids (01/20/25 1400)  Recommended Precautions and Strategies: upright posture during/after eating, small bites of food and sips of liquid, multiple swallows per bite of food, multiple swallows per sip of liquid, alternate between small bites of food and sips of liquid (01/20/25 1400)  SLP Rec. for Method of Medication Administration: meds whole, with thick liquids, with puree (01/20/25 1400)     Monitor for Signs of Aspiration: yes, notify SLP if any concerns (01/20/25 1400)  Recommended Diagnostics: reassess via clinical swallow evaluation (01/20/25 1400)  Swallow Criteria for Skilled Therapeutic Interventions Met: demonstrates skilled criteria (01/20/25 1400)     Rehab Potential/Prognosis, Swallowing: adequate, monitor progress closely (01/20/25 1400)  Therapy Frequency (Swallow): 3 days per week, 4 days per week (01/20/25 1400)  Predicted Duration Therapy Intervention (Days): until discharge (01/20/25 1400)  Oral Care Recommendations: Oral Care BID/PRN, Before ice/water (01/20/25 1400)       SWALLOW EVALUATION (Last 72 Hours)       SLP Adult Swallow Evaluation       Row Name 01/20/25 1400       Rehab Evaluation    Document Type evaluation  -PF    Subjective Information no complaints  -PF    Patient Observations alert  -PF    Patient Effort adequate  -PF     Symptoms Noted During/After Treatment none  -PF       General Information    Patient Profile Reviewed yes  -PF    Pertinent History Of Current Problem Per H&P: 79-year-old male was found on the floor and was initially slow to respond.  The patient had reportedly had increased coughing in the last 24 hours.  The patient had had audible wheezing noted by EMS.  The patient has had subjective fever and chills this afternoon.  The patient has had no reports of vomiting or diarrhea.  The patient had brief epistaxis apparently as a result of the fall. The patient had no reports of leg pain or swelling. Pt is familiar to [facility] department having received VFSS on 1/3/2025 previous admission. Resulting in recs of puree/NTL and pt was discharged to skilled nursing facility ([facility]) later that day. While pt was at [facility], pt participated in repeat VFSS and was rec'd [facility] soft/thins. Pt was seen today for clinical swallow evaluation. Pt is currently receiving puree/NTL at the time of evaluation.  -PF    Current Method of Nutrition pureed;nectar/syrup-thick liquids  -PF    Precautions/Limitations, Vision WFL;for purposes of eval  -PF    Precautions/Limitations, Hearing WFL;for purposes of eval  -PF    Prior Level of Function-Communication WFL  -PF    Prior Level of Function-Swallowing puree;nectar thick liquids  -PF    Plans/Goals Discussed with patient  -PF    Barriers to Rehab uncooperative;resistant to information  -PF       Pain Scale: FACES Pre/Post-Treatment    Pain: FACES Scale, Pretreatment 0-->no hurt  -PF    Posttreatment Pain Rating 0-->no hurt  -PF       Oral Motor Structure and Function    Dentition Assessment edentulous;edentulous, does not have dentures  -PF    Secretion Management WNL/WFL  -PF    Mucosal Quality dry  -PF       Oral Musculature and Cranial Nerve Assessment    Oral Motor General Assessment generalized oral motor weakness  -PF       General Eating/Swallowing Observations     Respiratory Support Currently in Use room air  -PF    Eating/Swallowing Skills self-fed  -PF    Positioning During Eating upright 90 degree;upright in bed  -PF    Utensils Used spoon;cup;straw  -PF    Consistencies Trialed thin liquids;mechanical ground textures  -PF       Clinical Swallow Eval    Clinical Swallow Evaluation Summary Clinical swallow evaluation completed. Pt is familiar to [facility] department having received VFSS on 1/3/2025 previous admission. Resulting in recs of puree/NTL and pt was discharged to skilled nursing facility ([facility]) later that day. While pt was at [facility], pt participated in repeat VFSS and was rec'd [facility] soft/thins. Pt was seen today for clinical swallow evaluation. Pt is currently receiving puree/NTL at the time of evaluation. Pt was alert and responsive. He was upright and declined puree and NTL trials, but was amenable to [facility] soft solids x2 and thin by cup x2 and thin by straw x2. Oral [facility] exam revealed generalized oral motor weakness. Poor dentition w/ many missing teeth and pt reported he does not wear dentures. He was self-fed all trials. SLP. No difficulty using cup, straw, or spoon. No labial spillage occurred. Mastication extended and disorganized on [facility] soft solids w/ min lingual residual, cleared w/ thin liquid wash, but functional. Oral transit unremarkable. There was no pocketing or oral holding. Digital palpation suggests timely swallow. Pharyngeal phase marked by clear vocal quality w/ no cough, throat clear, or any sign of respiratory distress. Recommend upgrade from puree to mechanical ground w/ NTL and Chery Water Protocol (see guidelines below).  D/t h/o dysphagia resulting in modified diet and aspiration, VFSS is indicated to further objectively assess and r/o pharyngeal impairment. D/w patient and nurse regarding recs and plan for VFSS tomorrow w/ recs to follow. All in agreement with plan.       Recommendations:  - Mechanical ground w/  NTL and Chery Water Protocol (see guidelines below)  - Meds: whole in NTL or whole/crushed in puree   - Safe swallow compensations: HOB at 90 degrees when eating/drinking, small sips/bites, slow rate of intake, alternate liquids/solids  - ST will continue to follow to ensure tolerance of current diet with further recommendations as indicated and appropriate  - D/w patient and nurse regarding recs and plan for VFSS tomorrow w/ recs to follow. All in agreement with plan   - Pt may have single small ice chips when pt is awake/alert, sitting upright, and resp status is stable upon thorough oral care     Water Protocol:  The rationale to recommend water when a PO diet cannot appropriately/functionally sustain nutrition is because water is low risk for aspiration pna when compared to aspiration of food or other liquids.    Benefits of a water protocol include but are not limited to:     Oral gratification  Engagement of oropharyngeal swallow musculature  Decrease likelihood of dehydration       Guidelines for proper implementation include:  Thorough oral care prior to consuming water  Upright at 90 degree hip flexion  Small sips at slow rate  Monitor for any changes in respiratory status and discontinue if distress noted     The Chery Free Water Protocol is a research-based protocol established in 1984.  -PF       SLP Evaluation Clinical Impression    SLP Swallowing Diagnosis mild;oral dysphagia  further assess pharyngeal swallow  -PF    Functional Impact risk of aspiration/pneumonia;risk of malnutrition;risk of dehydration  -PF    Rehab Potential/Prognosis, Swallowing adequate, monitor progress closely  -PF    Swallow Criteria for Skilled Therapeutic Interventions Met demonstrates skilled criteria  -PF       Recommendations    Therapy Frequency (Swallow) 3 days per week;4 days per week  -PF    Predicted Duration Therapy Intervention (Days) until discharge  -PF    SLP Diet Recommendation mechanical ground  textures;nectar thick liquids  -PF    Recommended Diagnostics reassess via clinical swallow evaluation  -PF    Recommended Precautions and Strategies upright posture during/after eating;small bites of food and sips of liquid;multiple swallows per bite of food;multiple swallows per sip of liquid;alternate between small bites of food and sips of liquid  -PF    Oral Care Recommendations Oral Care BID/PRN;Before ice/water  -PF    SLP Rec. for Method of Medication Administration meds whole;with thick liquids;with puree  -PF    Monitor for Signs of Aspiration yes;notify SLP if any concerns  -PF       Swallow Goals (SLP)    Swallow LTGs Swallow Long Term Goal (free text)  -PF    Swallow STGs diet tolerance goal selection (SLP)  -PF    Diet Tolerance Goal Selection (SLP) Swallow Short Term Goal 1;Swallow Short Term Goal 2  -PF       (LTG) Swallow    (LTG) Swallow Pt will maximize swallow function for least restrictive PO diet, exhibiting no complication associated with dysphagia, adequate PO intake, and demonstrating independent use of swallow compensation.  -PF    Stuart (Swallow Long Term Goal) with minimal cues (75-90% accuracy)  -PF    Time Frame (Swallow Long Term Goal) by discharge  -PF    Progress/Outcomes (Swallow Long Term Goal) new goal  -PF       (STG) Swallow 1    (STG) Swallow 1 Patient will participate in ongoing assessment of swallow, including reevaluation clinically and/or including instrumental assessment of swallow if indicated, to further assess swallow function  -PF    Stuart (Swallow Short Term Goal 1) with minimal cues (75-90% accuracy)  -PF    Time Frame (Swallow Short Term Goal 1) 1 week  -PF    Progress/Outcomes (Swallow Short Term Goal 1) new goal  -PF       (STG) Swallow 2    (STG) Swallow 2 The patient will participate in a full meal assessment to determine safety and adequacy of recommended diet, independent use of safe swallow compensations, and additional goals/recommendations to  follow.  -PF    Carver (Swallow Short Term Goal 2) with minimal cues (75-90% accuracy)  -PF    Time Frame (Swallow Short Term Goal 2) 1 week  -PF    Progress/Outcomes (Swallow Short Term Goal 2) new goal  -PF              User Key  (r) = Recorded By, (t) = Taken By, (c) = Cosigned By      Initials Name Effective Dates    PF Ingrid Nance, SLP 05/08/23 -                     EDUCATION  The patient has been educated in the following areas:   Dysphagia (Swallowing Impairment) Oral Care/Hydration Modified Diet Instruction.        SLP GOALS       Row Name 01/20/25 1400       (LTG) Swallow    (LTG) Swallow Pt will maximize swallow function for least restrictive PO diet, exhibiting no complication associated with dysphagia, adequate PO intake, and demonstrating independent use of swallow compensation.  -PF    Carver (Swallow Long Term Goal) with minimal cues (75-90% accuracy)  -PF    Time Frame (Swallow Long Term Goal) by discharge  -PF    Progress/Outcomes (Swallow Long Term Goal) new goal  -PF       (STG) Swallow 1    (STG) Swallow 1 Patient will participate in ongoing assessment of swallow, including reevaluation clinically and/or including instrumental assessment of swallow if indicated, to further assess swallow function  -PF    Carver (Swallow Short Term Goal 1) with minimal cues (75-90% accuracy)  -PF    Time Frame (Swallow Short Term Goal 1) 1 week  -PF    Progress/Outcomes (Swallow Short Term Goal 1) new goal  -PF       (STG) Swallow 2    (STG) Swallow 2 The patient will participate in a full meal assessment to determine safety and adequacy of recommended diet, independent use of safe swallow compensations, and additional goals/recommendations to follow.  -PF    Carver (Swallow Short Term Goal 2) with minimal cues (75-90% accuracy)  -PF    Time Frame (Swallow Short Term Goal 2) 1 week  -PF    Progress/Outcomes (Swallow Short Term Goal 2) new goal  -PF              User Key  (r) = Recorded  By, (t) = Taken By, (c) = Cosigned By      Initials Name Provider Type    PF Fakto, Prangchat, SLP Speech and Language Pathologist               ADRIANNE Koenig  1/20/2025

## 2025-01-20 NOTE — CASE MANAGEMENT/SOCIAL WORK
Discharge Planning Assessment   Seth     Patient Name: Kali Garcia  MRN: [id number]  Today's Date: 1/20/2025    Admit Date: 1/18/2025    Plan: Anticipate return to [facility]. Will require precert, no PASRR needed for return. [facility] following for after SNF. Prior to SNF, from home with son and DIL with home O2 6L through [employer].   Discharge Needs Assessment       Row Name 01/20/25 1631       Living Environment    People in Home facility resident    Current Living Arrangements extended care facility  SNF    Potentially Unsafe Housing Conditions none    In the past 12 months has the electric, gas, oil, or water company threatened to shut off services in your home? No    Primary Care Provided by other (see comments)    Provides Primary Care For no one    Family Caregiver if Needed child(concepción), adult    Family Caregiver Names Son- Ivis and daughter-in-law    Quality of Family Relationships helpful;involved;supportive    Able to Return to Prior Arrangements yes       Resource/Environmental Concerns    Resource/Environmental Concerns none    Transportation Concerns none       Transportation Needs    In the past 12 months, has lack of transportation kept you from medical appointments or from getting medications? no    In the past 12 months, has lack of transportation kept you from meetings, work, or from getting things needed for daily living? No       Food Insecurity    Within the past 12 months, you worried that your food would run out before you got the money to buy more. Never true    Within the past 12 months, the food you bought just didn't last and you didn't have money to get more. Never true       Transition Planning    Patient/Family Anticipates Transition to other (see comments)  SNF    Patient/Family Anticipated Services at Transition none    Transportation Anticipated health plan transportation;family or friend will provide       Discharge Needs Assessment    Readmission Within  the Last 30 Days other (see comments)  Previous hospitalization from 12/22-1/3, not considered readmission at this time due to observation status    Current Outpatient/Agency/Support Group skilled nursing facility    Equipment Currently Used at Home oxygen    Concerns to be Addressed care coordination/care conferences;discharge planning    Anticipated Changes Related to Illness none    Equipment Needed After Discharge none    Outpatient/Agency/Support Group Needs skilled nursing facility    Discharge Facility/Level of Care Needs nursing facility, skilled    Provided Post Acute Provider List? N/A                   Discharge Plan       Row Name 01/20/25 4534       Plan    Plan Anticipate return to [facility]. Will require precert, no PASRR needed for return. [facility] following for after SNF. Prior to SNF, from home with son and DIL with home O2 6L through [employer].    Plan Comments CM met with patient at bedside to discuss dc planning. PCP and pharmacy confirmed, updated in EPIC. Confirmed he was from [facility] and plans to return at d/c. Reported that prior to hospitalization he lived with his family, his son and daughter-in-law. Confirmed he had O2 at home through [employer] when he needed it. Per chart review, previous hospitalization 12/22-1/3. Discharged to [facility] following. Current home O2 6L through [employer]. CM added in [facility] basket and contacted lialisha Starkey reported that patient was found unresponsive and not responding to sternal rub so was emergently sent out to [facility] ER for eval and treatment. CM added in [facility] basket and contacted [facility] who reported that they are still following, but plan was for him to do rehab to get a little stronger then return home with family and hospice. Reported that patient lives at home with his kids but they work during the day.         Patient/Family in Agreement with Plan yes                   Continued Care and Services - Admitted Since 1/18/2025       Destination Coordination complete.      Service Provider Request Status Services Address Phone Fax Patient Preferred    [facility] [place]  Selected Skilled Nursing [address] [place] [address] [place] [address] [phone]               Home Medical Care       Service Provider Request Status Services Address Phone Fax Patient Preferred    [facility] Accepted -- [address] [facility], [place] [address] [phone] --                   Demographic Summary       Row Name 01/20/25 1631       General Information    Admission Type observation    Arrived From emergency department    Required Notices Provided Observation Status Notice    Referral Source admission list    Reason for Consult discharge planning    Preferred Language English       Contact Information    Permission Granted to Share Info With                 Functional Status       Row Name 01/20/25 1631       Functional Status    Usual Activity Tolerance fair    Current Activity Tolerance fair       Functional Status, IADL    Medications assistive person    Meal Preparation assistive equipment and person    Housekeeping assistive equipment and person    Laundry assistive equipment and person    Shopping assistive equipment and person           Viridiana Clifford RN     Office Phone: [phone]  Office Cell: [phone]

## 2025-01-20 NOTE — DISCHARGE PLACEMENT REQUEST
"Kali Gould (79 y.o. Male)       Date of Birth   1945    Social Security Number       Address   [address] [place] [address]  [facility] [place] [address]    Home Phone   [phone]    MRN   [id number]       U[place]din   None    Marital Status                               Admission Date   1/18/25    Admission Type   Emergency    Admitting Provider   Llanes Alvarez, Carlos, MD    Attending Provider   Altaf Plummer MD    Department, Room/Bed   [facility], 2109/1       Discharge Date       Discharge Disposition       Discharge Destination                                 Attending Provider: Altaf Plummer MD    Allergies: Codeine    Isolation: Droplet   Infection: Rhinovirus  (12/22/24)   Code Status: No CPR    Ht: 180.3 cm (70.98\")   Wt: 48.3 kg (106 lb 7.7 oz)    Admission Cmt: None   Principal Problem: HCAP (healthcare-associated pneumonia) [J18.9]                   Active Insurance as of 1/18/2025       Primary Coverage       Payor Plan Insurance Group Employer/Plan Group    ANTHEM MEDICARE REPLACEMENT ANTHEM MED ADV PPO [id number]       Payor Plan Address Payor Plan Phone Number Payor Plan Fax Number Effective Dates    [address] [phone]  1/1/2024 - None Entered    [facility] [id number]         Subscriber Name Subscriber Birth Date Member ID       KALI GOULD 1945 [id number]                     Emergency Contacts        (Rel.) Home Phone Work Phone Mobile Phone    SRINIVAS GOULD (Son) [phone] -- [phone]    JUAN GOULD (Other) [phone] -- [phone]    Luisito Gould (Son) -- -- --              "

## 2025-01-21 ENCOUNTER — APPOINTMENT (OUTPATIENT)
Dept: GENERAL RADIOLOGY | Facility: HOSPITAL | Age: 80
End: 2025-01-21
Payer: MEDICARE

## 2025-01-21 LAB
ANION GAP SERPL CALCULATED.3IONS-SCNC: 7.3 MMOL/L (ref 5–15)
ANISOCYTOSIS BLD QL: ABNORMAL
BACTERIA SPEC AEROBE CULT: ABNORMAL
BUN SERPL-MCNC: 25 MG/DL (ref 8–23)
BUN/CREAT SERPL: 36.2 (ref 7–25)
CALCIUM SPEC-SCNC: 8.1 MG/DL (ref 8.6–10.5)
CHLORIDE SERPL-SCNC: 107 MMOL/L (ref 98–107)
CO2 SERPL-SCNC: 25.7 MMOL/L (ref 22–29)
CREAT SERPL-MCNC: 0.69 MG/DL (ref 0.76–1.27)
DEPRECATED RDW RBC AUTO: 60.2 FL (ref 37–54)
EGFRCR SERPLBLD CKD-EPI 2021: 93.6 ML/MIN/1.73
ERYTHROCYTE [DISTWIDTH] IN BLOOD BY AUTOMATED COUNT: 17.2 % (ref 12.3–15.4)
GLUCOSE SERPL-MCNC: 208 MG/DL (ref 65–99)
HCT VFR BLD AUTO: 26.8 % (ref 37.5–51)
HGB BLD-MCNC: 8.5 G/DL (ref 13–17.7)
LARGE PLATELETS: ABNORMAL
LYMPHOCYTES # BLD MANUAL: 0.14 10*3/MM3 (ref 0.7–3.1)
LYMPHOCYTES NFR BLD MANUAL: 2 % (ref 5–12)
MCH RBC QN AUTO: 30.1 PG (ref 26.6–33)
MCHC RBC AUTO-ENTMCNC: 31.7 G/DL (ref 31.5–35.7)
MCV RBC AUTO: 95 FL (ref 79–97)
MONOCYTES # BLD: 0.09 10*3/MM3 (ref 0.1–0.9)
NEUTROPHILS # BLD AUTO: 4.28 10*3/MM3 (ref 1.7–7)
NEUTROPHILS NFR BLD MANUAL: 93 % (ref 42.7–76)
NEUTS BAND NFR BLD MANUAL: 2 % (ref 0–5)
PLATELET # BLD AUTO: 132 10*3/MM3 (ref 140–450)
PMV BLD AUTO: 10.9 FL (ref 6–12)
POIKILOCYTOSIS BLD QL SMEAR: ABNORMAL
POTASSIUM SERPL-SCNC: 4.1 MMOL/L (ref 3.5–5.2)
RBC # BLD AUTO: 2.82 10*6/MM3 (ref 4.14–5.8)
SCAN SLIDE: NORMAL
SMALL PLATELETS BLD QL SMEAR: ABNORMAL
SODIUM SERPL-SCNC: 140 MMOL/L (ref 136–145)
VARIANT LYMPHS NFR BLD MANUAL: 3 % (ref 19.6–45.3)
WBC MORPH BLD: NORMAL
WBC NRBC COR # BLD AUTO: 4.5 10*3/MM3 (ref 3.4–10.8)

## 2025-01-21 PROCEDURE — 25010000002 PIPERACILLIN SOD-TAZOBACTAM PER 1 G: Performed by: INTERNAL MEDICINE

## 2025-01-21 PROCEDURE — 92526 ORAL FUNCTION THERAPY: CPT

## 2025-01-21 PROCEDURE — 94761 N-INVAS EAR/PLS OXIMETRY MLT: CPT

## 2025-01-21 PROCEDURE — 85007 BL SMEAR W/DIFF WBC COUNT: CPT | Performed by: NURSE PRACTITIONER

## 2025-01-21 PROCEDURE — 94799 UNLISTED PULMONARY SVC/PX: CPT

## 2025-01-21 PROCEDURE — 94664 DEMO&/EVAL PT USE INHALER: CPT

## 2025-01-21 PROCEDURE — 25010000002 CEFEPIME PER 500 MG: Performed by: INTERNAL MEDICINE

## 2025-01-21 PROCEDURE — 92611 MOTION FLUOROSCOPY/SWALLOW: CPT

## 2025-01-21 PROCEDURE — 85025 COMPLETE CBC W/AUTO DIFF WBC: CPT | Performed by: NURSE PRACTITIONER

## 2025-01-21 PROCEDURE — 80048 BASIC METABOLIC PNL TOTAL CA: CPT | Performed by: NURSE PRACTITIONER

## 2025-01-21 PROCEDURE — 74230 X-RAY XM SWLNG FUNCJ C+: CPT

## 2025-01-21 PROCEDURE — 25010000002 METHYLPREDNISOLONE PER 40 MG: Performed by: INTERNAL MEDICINE

## 2025-01-21 RX ADMIN — BARIUM SULFATE 183 ML: 960 POWDER, FOR SUSPENSION ORAL at 15:17

## 2025-01-21 RX ADMIN — AMIODARONE HYDROCHLORIDE 200 MG: 200 TABLET ORAL at 20:15

## 2025-01-21 RX ADMIN — Medication 10 ML: at 10:18

## 2025-01-21 RX ADMIN — BUDESONIDE INHALATION 0.5 MG: 0.5 SUSPENSION RESPIRATORY (INHALATION) at 08:25

## 2025-01-21 RX ADMIN — METHYLPREDNISOLONE SODIUM SUCCINATE 40 MG: 40 INJECTION, POWDER, FOR SOLUTION INTRAMUSCULAR; INTRAVENOUS at 14:17

## 2025-01-21 RX ADMIN — BARIUM SULFATE 50 ML: 400 SUSPENSION ORAL at 15:17

## 2025-01-21 RX ADMIN — CEFEPIME 2000 MG: 2 INJECTION, POWDER, FOR SOLUTION INTRAVENOUS at 03:09

## 2025-01-21 RX ADMIN — METHYLPREDNISOLONE SODIUM SUCCINATE 40 MG: 40 INJECTION, POWDER, FOR SOLUTION INTRAMUSCULAR; INTRAVENOUS at 03:09

## 2025-01-21 RX ADMIN — AMIODARONE HYDROCHLORIDE 200 MG: 200 TABLET ORAL at 10:17

## 2025-01-21 RX ADMIN — BUDESONIDE INHALATION 0.5 MG: 0.5 SUSPENSION RESPIRATORY (INHALATION) at 18:32

## 2025-01-21 RX ADMIN — ARFORMOTEROL TARTRATE 15 MCG: 15 SOLUTION RESPIRATORY (INHALATION) at 18:27

## 2025-01-21 RX ADMIN — IPRATROPIUM BROMIDE AND ALBUTEROL SULFATE 3 ML: .5; 3 SOLUTION RESPIRATORY (INHALATION) at 23:16

## 2025-01-21 RX ADMIN — POLYETHYLENE GLYCOL 3350 17 G: 17 POWDER, FOR SOLUTION ORAL at 10:17

## 2025-01-21 RX ADMIN — ARFORMOTEROL TARTRATE 15 MCG: 15 SOLUTION RESPIRATORY (INHALATION) at 08:25

## 2025-01-21 RX ADMIN — ATORVASTATIN CALCIUM 10 MG: 10 TABLET ORAL at 20:15

## 2025-01-21 RX ADMIN — PIPERACILLIN AND TAZOBACTAM 3.38 G: 3; .375 INJECTION, POWDER, LYOPHILIZED, FOR SOLUTION INTRAVENOUS at 20:15

## 2025-01-21 RX ADMIN — APIXABAN 2.5 MG: 2.5 TABLET, FILM COATED ORAL at 10:17

## 2025-01-21 RX ADMIN — PIPERACILLIN AND TAZOBACTAM 3.38 G: 3; .375 INJECTION, POWDER, FOR SOLUTION INTRAVENOUS at 14:17

## 2025-01-21 RX ADMIN — TAMSULOSIN HYDROCHLORIDE 0.4 MG: 0.4 CAPSULE ORAL at 10:17

## 2025-01-21 RX ADMIN — CLOPIDOGREL BISULFATE 75 MG: 75 TABLET ORAL at 10:17

## 2025-01-21 RX ADMIN — PANTOPRAZOLE SODIUM 40 MG: 40 TABLET, DELAYED RELEASE ORAL at 10:17

## 2025-01-21 RX ADMIN — APIXABAN 2.5 MG: 2.5 TABLET, FILM COATED ORAL at 20:15

## 2025-01-21 RX ADMIN — IPRATROPIUM BROMIDE AND ALBUTEROL SULFATE 3 ML: .5; 3 SOLUTION RESPIRATORY (INHALATION) at 12:01

## 2025-01-21 RX ADMIN — IPRATROPIUM BROMIDE AND ALBUTEROL SULFATE 3 ML: .5; 3 SOLUTION RESPIRATORY (INHALATION) at 02:47

## 2025-01-21 RX ADMIN — BARIUM SULFATE 135 ML: 980 POWDER, FOR SUSPENSION ORAL at 15:17

## 2025-01-21 RX ADMIN — THEOPHYLLINE 300 MG: 300 TABLET, EXTENDED RELEASE ORAL at 10:17

## 2025-01-21 RX ADMIN — Medication 10 ML: at 20:15

## 2025-01-21 NOTE — CASE MANAGEMENT/SOCIAL WORK
Continued Stay Note   Seth     Patient Name: Kali Garcia  MRN: [id number]  Today's Date: 1/21/2025    Admit Date: 1/18/2025    Plan: Anticipate return to [facility], skilled vs. other SNF choices pending. Will require precert, no PASRR needed for return. [facility] following for after SNF. Prior to SNF, from home with son and DIL with home O2 6L through [employer].   Discharge Plan       Row Name 01/21/25 1318       Plan    Plan Anticipate return to [facility], skilled vs. other SNF choices pending. Will require precert, no PASRR needed for return. [facility] following for after SNF. Prior to SNF, from home with son and DIL with home O2 6L through [employer].    Patient/Family in Agreement with Plan yes    Provided Post Acute Provider List? Yes    Post Acute Provider List Nursing Home    Provided Post Acute Provider Quality & Resource List? Yes    Post Acute Provider Quality and Resource List Nursing Home    Delivered To Support Person    Support Person Ruel- Ivis    Method of Delivery Other (comment)  Secure mail email as requested    Plan Comments CM contacted patient’s sonIvis ([phone]) to discuss dc planning. He reported that they were interested in getting patient to a different facility at d/c rather than returning to [facility]. He reported concerns regarding a UTI that was “not caught” and a nose bleed that lasted for hours. Also had concerns regarding patient stating he “fell off the couch” but he wonders if patient fell out of his bed while there. He inquired about patient going to [facility]. SID discussed IRF level of care and insurance not approving a precertification approval with current recommendations for SNF level of care being more appropriate. SID offered to provide SNF list and he requested it be sent via secure mail email to [email or web address]. SID sent with CM name and phone number for callback regarding other SNF choices for new referral.                 Viridiana Clifford RN     Office Phone: [phone]  Office Cell: [phone]

## 2025-01-21 NOTE — PLAN OF CARE
Goal Outcome Evaluation:      Oriented but forgetful.  Does not have a realistic feel for the passing of time.  Has been asking for ice cream throughout the day from everyone whom enters his room.  Abx changed.  Seen by ST and is currently off the floor for video swallow.    Had episode of hypoxia into low 80's with slow return after being off oxygen for approximately 15-30 minutes.  Was placed on 9L per high-flow N/C, but able to wean back to 3L within a short period of time.  No other episodes.  Remains in 90's with no c/o SOA.      Problem: Skin Injury Risk Increased  Goal: Skin Health and Integrity  Outcome: Progressing  Intervention: Optimize Skin Protection  Recent Flowsheet Documentation  Taken 1/21/2025 1400 by Geneva Guzman RN  Activity Management: bedrest  Head of Bed (HOB) Positioning: HOB at 60 degrees  Taken 1/21/2025 1200 by Geneva Guzman RN  Activity Management: bedrest  Taken 1/21/2025 0800 by Geneva Guzman RN  Activity Management: bedrest  Pressure Reduction Techniques: frequent weight shift encouraged  Pressure Reduction Devices: pressure-redistributing mattress utilized  Skin Protection: incontinence pads utilized     Problem: Fall Injury Risk  Goal: Absence of Fall and Fall-Related Injury  Outcome: Progressing  Intervention: Identify and Manage Contributors  Recent Flowsheet Documentation  Taken 1/21/2025 1400 by Geneva Guzman RN  Medication Review/Management: medications reviewed  Taken 1/21/2025 1200 by Geneva Guzman RN  Medication Review/Management: medications reviewed  Taken 1/21/2025 1000 by Geneva Guzman RN  Medication Review/Management: medications reviewed  Taken 1/21/2025 0800 by Geneva Guzman RN  Medication Review/Management: medications reviewed  Intervention: Promote Injury-Free Environment  Recent Flowsheet Documentation  Taken 1/21/2025 1400 by Geneva Guzman RN  Safety Promotion/Fall Prevention:   safety round/check completed   nonskid shoes/slippers when out of  bed   fall prevention program maintained   activity supervised  Taken 1/21/2025 1200 by Geneva Guzman, RN  Safety Promotion/Fall Prevention:   safety round/check completed   nonskid shoes/slippers when out of bed   fall prevention program maintained   activity supervised  Taken 1/21/2025 1000 by Geneva Guzman, RN  Safety Promotion/Fall Prevention:   safety round/check completed   nonskid shoes/slippers when out of bed   fall prevention program maintained   activity supervised  Taken 1/21/2025 0800 by Geneva Guzman, RN  Safety Promotion/Fall Prevention:   safety round/check completed   nonskid shoes/slippers when out of bed   fall prevention program maintained   activity supervised     Problem: Malnutrition  Goal: Improved Nutritional Intake  Outcome: Progressing

## 2025-01-21 NOTE — PLAN OF CARE
"Per [facility] SLP evaluation yesterday 1/20/2024 - \"Per H&P: 79-year-old male was found on the floor and was initially slow to respond.  The patient had reportedly had increased coughing in the last 24 hours.  The patient had had audible wheezing noted by EMS.  The patient has had subjective fever and chills this afternoon.  The patient has had no reports of vomiting or diarrhea.  The patient had brief epistaxis apparently as a result of the fall. The patient had no reports of leg pain or swelling. Pt is familiar to [facility] ST department having received VFSS on 1/3/2025 previous admission. Resulting in recs of puree/NTL and pt was discharged to skilled nursing facility ([facility]) later that day. While pt was at [facility], pt participated in repeat VFSS and was rec'd mech soft/thins. Pt was seen today for clinical swallow evaluation. Pt is currently receiving puree/NTL at the time of evaluation.\"    Pt seen for video-swallow study seated at 90 degrees in the lateral position this date. 5L O2 via nasal canula present. Pt w/ one tooth present - no dentures present or reported. No penetration or aspiration of any consistency occurred across trials of thins via cup/straw, nectar thick via cup, puree, or mixed consistency  (peaches) noted. Pt had adequate labial closure over the cup, straw, and spoon. Pt was self-fed. No labial spillage occurred. Across x5 trials of thins via cup - pt demonstrated premature spillage in 2/5 trials, swallow was timely across other thin liquid trials. Trace BOT residue noted in 2/5 trials w/ mild BOT residue noted x1. Pt demonstrated timely swallow initiation and no residue across nectar thick liquid and puree trials. Across x2 mixed consistency trials (peaches), pt demonstrated prolonged but functional mastication, swallow initiated at the level of the vallecula, and mild BOT residue. Adequate laryngeal elevation and anterior hyoid excursion occurred. Epiglottic inversion and " laryngeal vestibular closure were also adequate.     Recommendation: THIN liquids w/ MECHANICAL SOFT food texture w/ standard safe swallow precautions. Fatigue precautions.     Full results and recommendations from this study were explained to pt and his nurse who both verbalized understanding. ST will follow to assure tolerance of diet/adjust as clinically indicated.

## 2025-01-21 NOTE — CASE MANAGEMENT/SOCIAL WORK
Continued Stay Note  LONA Ann     Patient Name: Kali Garcia  MRN: [id number]  Today's Date: 1/21/2025    Admit Date: 1/18/2025    Plan: Anticipate return to [facility] vs. other SNF choices pending. Will require precert, no PASRR needed for return. [facility] following for after SNF. Prior to SNF, from home with son and DIL with home O2 6L through [employer].   Discharge Plan       Row Name 01/21/25 1332       Plan    Patient/Family in Agreement with Plan yes    Plan Comments Received call from patient’s son, Ivis who inquired about [facility]. New referral added in basket and sent to liaison Talita MCCALL to review. Liaison reported no beds available and not anticipated for this week. CM updated Ivis and requested additional choices.               Viridiana Clifford RN     Office Phone: [phone]  Office Cell: [phone]

## 2025-01-21 NOTE — CASE MANAGEMENT/SOCIAL WORK
Continued Stay Note   Seth     Patient Name: Kali Garcia  MRN: [id number]  Today's Date: 1/21/2025    Admit Date: 1/18/2025    Plan: [facility] referrals pending acceptance vs. return to [facility]. Will require precert, no PASRR needed for return. [facility] following for after SNF. Prior to SNF, from home with son and DIL with home O2 6L through [employer].   Discharge Plan       Row Name 01/21/25 1407       Plan    Plan [facility] referrals pending acceptance vs. return to [facility]. Will require precert, no PASRR needed for return. [facility] following for after SNF. Prior to SNF, from home with son and DIL with home O2 6L through [employer].    Patient/Family in Agreement with Plan yes    Plan Comments Received call from patient's sonIvis who inquired about [facility]. CM added new referrals in basket and sent to liaison Orquidea AWAD to review.               Viridiana Clifford RN     Office Phone: [phone]  Office Cell: [phone]

## 2025-01-21 NOTE — CASE MANAGEMENT/SOCIAL WORK
Case Management Readmission Assessment Note    Case Management Readmission Assessment (all recorded)       Readmission Interview       Row Name 01/21/25 1118             Readmission Indications    Is the patient and/or family able to complete the readmission assessment questions? Yes      Is this hospitalization related to the prior hospital diagnosis? Yes        Row Name 01/21/25 1118             Recommendation for rehospitalization    Did you speak with your physician prior to coming to the hospital No        [facility] Name 01/21/25 1118             Follow-up Appointments    Did you have an appointment with PCP after your hospitalization? Yes      When was your appointment scheduled? --  Per Facility MD      Did you go to appointment? Yes      Did you have an appointment with a Specialist? Yes  Pulmonary      When was your appointment scheduled? --  Date unknown      Did you go to appointment? Yes      Are you current with the Pulmonary Clinic? No      Are you current with the CHF Clinic? No        Row Name 01/21/25 1118             Medications    Did you have newly prescribed medications at discharge? Yes      Did you understand the reasons for your medications at discharge and how to take them? Yes      Did you understand the side effects of your medications? Yes      Are you taking all of you prescribed medications? Yes      What pharmacy was used to fill prescription(s)? [facility]      Were medications picked up? Yes        Row Name 01/21/25 1118             Discharge Instructions    Did you understand your discharge instructions? Yes      Did your family/caregiver hear your instructions? Yes      Were you told to eat a special diet? No      Did you adhere to the diet? No      Were you given a number of someone to call if you had questions or concerns? Yes        Row Name 01/21/25 1118             Index discharge location/services    Where did you go upon discharge? Skilled Nursing  Facility      Do you have supportive family or friends in the home? Yes      Was the provider seen at the facility? No  Pt found down      Which Skilled Nursing Facility were your admitted from? [facility]        Row Name 01/21/25 1118             Discharge Readiness    On a scale of 1-5 (5 being well prepared), how ready were you for discharge 5      Recommendation based on interview Additional caregiver support;Goals of care discussion/advanced care planning        Row Name 01/21/25 1118             Palliative Care/Hospice    Are you current with Palliative Care? No      Are you current with Hospice Care? No        Row Name 01/21/25 1118 01/18/25 2314          Advance Directives (For Healthcare)    Pre-existing AND/MOST/POLST Order No No     Advance Directive Status Patient does not have advance directive Patient does not have advance directive     Have you reviewed your Advance Directive and is it valid for this stay? No No     Literature Provided on Advance Directives No No     Patient Requests Assistance on Advance Directives Patient Declined Patient Declined       Row Name 01/21/25 1118             Readmission Assessment Final Comments    Final Comments Previous 12/22-1/3 To ED from home with SOA, cough, congestion, weakness, R sided chest soreness. CXR showed possible PNA. +Rhino. Episode of Afib - decresed metoprolol, added Amio. Due to sinus pauses pacemaker placement. D/t urinary retention the patient was d/c with racheal. To [facility] following at d/c. IV abx. Current 1/18- Found on floor at SNF and was slow to respond. Also, increased cough over last 24 hours, fever, chills, audible wheezing. H/o dysphagia so eval by SLP - Mechanical ground/NTL. Possible aspiration PNA. Nebs, IV abx, IV steroid, 9L HF NC. [facility] following. LACE 9.

## 2025-01-21 NOTE — NURSING NOTE
This writer received a call from patient's family. Elisabeth and Ivis, son and daughter in law, are concerned about patient returning to [facility]. Family would like patient to be discharged to a different facility and not return to [facility].

## 2025-01-21 NOTE — PROGRESS NOTES
[facility] MEDICINE SERVICE  DAILY PROGRESS NOTE    NAME: Kali Garcia  : 1945  MRN: [id number]      LOS: 1 day     PROVIDER OF SERVICE: Altaf Plummer MD    Chief Complaint: HCAP (healthcare-associated pneumonia)    Subjective:     Interval History: Patient is doing well today, he does state that he has some cough with congestion.  He also feels very weak.  He is hungry and wanting to eat ice cream.        Review of Systems:   Review of Systems    Objective:     Vital Signs  Temp:  [97.7 °F (36.5 °C)-97.9 °F (36.6 °C)] 97.9 °F (36.6 °C)  Heart Rate:  [51-61] 56  Resp:  [12-21] 13  BP: (121-138)/(49-60) 130/54  Flow (L/min) (Oxygen Therapy):  [3-9] 3   Body mass index is 15.01 kg/m².    Physical Exam  Physical Exam  General Appearance:  Alert, cooperative, no distress, appears stated age  Head:  Normocephalic, without obvious abnormality, atraumatic  Eyes:  PERRL, conjunctiva/corneas clear, EOM's intact, fundi benign, both eyes  Ears:  Normal TM's and external ear canals, both ears  Nose: Nares normal, septum midline, mucosa normal, no drainage or sinus tenderness  Throat: Lips, mucosa, and tongue normal; teeth and gums normal  Neck: Supple, symmetrical, trachea midline, no adenopathy, thyroid: not enlarged, symmetric, no tenderness/mass/nodules, no carotid bruit or JVD  Lungs:   Bibasilar rhonchi, respirations unlabored  Heart:  Regular rate and rhythm, S1, S2 normal, no murmur, rub or gallop  Abdomen:  Soft, non-tender, bowel sounds active all four quadrants,  no masses, no organomegaly  Extremities: Extremities normal, atraumatic, no cyanosis or edema  Pulses: 2+ and symmetric  Skin: Skin color, texture, turgor normal, no rashes or lesions  Neurologic: Normal         Diagnostic Data    Results from last 7 days   Lab Units 25  0314 25  0249 25  1654   WBC 10*3/mm3 4.50   < > 4.25   HEMOGLOBIN g/dL 8.5*   < > 9.0*   HEMATOCRIT % 26.8*   < > 29.1*   PLATELETS 10*3/mm3 132*   <  > 116*   GLUCOSE mg/dL 208*   < > 101*   CREATININE mg/dL 0.69*   < > 0.73*   BUN mg/dL 25*   < > 16   SODIUM mmol/L 140   < > 136   POTASSIUM mmol/L 4.1   < > 4.4   AST (SGOT) U/L  --   --  37   ALT (SGPT) U/L  --   --  42*   ALK PHOS U/L  --   --  70   BILIRUBIN mg/dL  --   --  0.6   ANION GAP mmol/L 7.3   < > 10.8    < > = values in this interval not displayed.       No radiology results for the last day      I reviewed the patient's new clinical results.    Assessment/Plan:     Active and Resolved Problems  Active Hospital Problems    Diagnosis  POA    **HCAP (healthcare-associated pneumonia) [J18.9]  Yes    Syncope and collapse [R55]  Yes    Unresponsive episode [R40.4]  Yes    Acute UTI (urinary tract infection) [N39.0]  Yes    Sick sinus syndrome [I49.5]  Yes    Anemia [D64.9]  Yes    Coronary artery disease [I25.10]  Yes    COPD (chronic obstructive pulmonary disease) [J44.9]  Yes    Severe malnutrition [E43]  Yes      Resolved Hospital Problems   No resolved problems to display.       Acute bilateral pneumonia, possible aspiration pneumonia  -Patient presents with imaging concerning for bibasilar pneumonia, especially aspiration pneumonia given his history of dysphagia  -Initiated on antibiotics with cefepime, but will switch to Zosyn for possible aspiration pneumonia  -MRSA screen negative  -Clinically improving  -Speech therapy evaluation to assess dysphagia; on previous admission the patient was recommended to have puréed diet with nectar thick liquids    Acute metabolic encephalopathy  -Likely secondary to underlying pneumonia but also poss related to medications  -Hold all sedating medications  -Patient's mental status has improved with treatment of pneumonia as well    Acute COPD exacerbation  -Secondary to underlying pneumonia  -Initiate Solu-Medrol, nebulizers  -Encourage pulmonary toileting with I-S, flutter valve if patient can tolerate    Acute Pseudomonas UTI  -Urine culture positive for  Pseudomonas  -Switched to IV Zosyn for coverage of Pseudomonas in addition to aspiration pneumonia; will continue for a short course    CAD  -Resume home GDMT    Chronic A-fib  -Restart home amiodarone, Eliquis    Dyslipidemia  -Continue home statin therapy    Sick sinus syndrome  -Patient was recently admitted and underwent PPM placement    Anemia of chronic disease  -H&H fairly stable  -No indication for transfusion at this time    Severe protein calorie malnutrition  -Encourage p.o. intake and liberalize diet is much as possible based on his dysphagia evaluation     VTE Prophylaxis:  Pharmacologic & mechanical VTE prophylaxis orders are present.             Disposition Planning:     Barriers to Discharge: Clinical improvement  Anticipated Date of Discharge: 1/23  Place of Discharge: SNF      Time: 40 minutes     Code Status and Medical Interventions: No CPR (Do Not Attempt to Resuscitate); Limited Support; No intubation (DNI)   Ordered at: 01/18/25 2045     Medical Intervention Limits:    No intubation (DNI)     Code Status (Patient has no pulse and is not breathing):    No CPR (Do Not Attempt to Resuscitate)     Medical Interventions (Patient has pulse or is breathing):    Limited Support       Signature: Electronically signed by Altaf Plummer MD, 01/21/25, 14:00 EST.  [facility] Hospitalist Team

## 2025-01-22 LAB
ANION GAP SERPL CALCULATED.3IONS-SCNC: 5.3 MMOL/L (ref 5–15)
ANISOCYTOSIS BLD QL: ABNORMAL
BUN SERPL-MCNC: 21 MG/DL (ref 8–23)
BUN/CREAT SERPL: 33.9 (ref 7–25)
CALCIUM SPEC-SCNC: 8.5 MG/DL (ref 8.6–10.5)
CHLORIDE SERPL-SCNC: 106 MMOL/L (ref 98–107)
CO2 SERPL-SCNC: 27.7 MMOL/L (ref 22–29)
CREAT SERPL-MCNC: 0.62 MG/DL (ref 0.76–1.27)
DEPRECATED RDW RBC AUTO: 58.8 FL (ref 37–54)
EGFRCR SERPLBLD CKD-EPI 2021: 96.6 ML/MIN/1.73
ERYTHROCYTE [DISTWIDTH] IN BLOOD BY AUTOMATED COUNT: 17.2 % (ref 12.3–15.4)
GLUCOSE SERPL-MCNC: 127 MG/DL (ref 65–99)
HCT VFR BLD AUTO: 25.7 % (ref 37.5–51)
HGB BLD-MCNC: 8 G/DL (ref 13–17.7)
LYMPHOCYTES # BLD MANUAL: 0.34 10*3/MM3 (ref 0.7–3.1)
LYMPHOCYTES NFR BLD MANUAL: 1 % (ref 5–12)
MCH RBC QN AUTO: 29.4 PG (ref 26.6–33)
MCHC RBC AUTO-ENTMCNC: 31.1 G/DL (ref 31.5–35.7)
MCV RBC AUTO: 94.5 FL (ref 79–97)
MONOCYTES # BLD: 0.05 10*3/MM3 (ref 0.1–0.9)
NEUTROPHILS # BLD AUTO: 4.44 10*3/MM3 (ref 1.7–7)
NEUTROPHILS NFR BLD MANUAL: 91 % (ref 42.7–76)
NEUTS BAND NFR BLD MANUAL: 1 % (ref 0–5)
NEUTS VAC BLD QL SMEAR: ABNORMAL
PLAT MORPH BLD: NORMAL
PLATELET # BLD AUTO: 134 10*3/MM3 (ref 140–450)
PMV BLD AUTO: 10 FL (ref 6–12)
POIKILOCYTOSIS BLD QL SMEAR: ABNORMAL
POTASSIUM SERPL-SCNC: 4.3 MMOL/L (ref 3.5–5.2)
RBC # BLD AUTO: 2.72 10*6/MM3 (ref 4.14–5.8)
SCAN SLIDE: NORMAL
SODIUM SERPL-SCNC: 139 MMOL/L (ref 136–145)
TOXIC GRANULATION: ABNORMAL
VARIANT LYMPHS NFR BLD MANUAL: 7 % (ref 19.6–45.3)
WBC NRBC COR # BLD AUTO: 4.83 10*3/MM3 (ref 3.4–10.8)

## 2025-01-22 PROCEDURE — 63710000001 PREDNISONE PER 1 MG: Performed by: INTERNAL MEDICINE

## 2025-01-22 PROCEDURE — 94761 N-INVAS EAR/PLS OXIMETRY MLT: CPT

## 2025-01-22 PROCEDURE — 80048 BASIC METABOLIC PNL TOTAL CA: CPT | Performed by: NURSE PRACTITIONER

## 2025-01-22 PROCEDURE — 94799 UNLISTED PULMONARY SVC/PX: CPT

## 2025-01-22 PROCEDURE — 25010000002 METHYLPREDNISOLONE PER 40 MG: Performed by: INTERNAL MEDICINE

## 2025-01-22 PROCEDURE — 94664 DEMO&/EVAL PT USE INHALER: CPT

## 2025-01-22 PROCEDURE — 85025 COMPLETE CBC W/AUTO DIFF WBC: CPT | Performed by: NURSE PRACTITIONER

## 2025-01-22 PROCEDURE — 97530 THERAPEUTIC ACTIVITIES: CPT

## 2025-01-22 PROCEDURE — 85007 BL SMEAR W/DIFF WBC COUNT: CPT | Performed by: NURSE PRACTITIONER

## 2025-01-22 PROCEDURE — 97112 NEUROMUSCULAR REEDUCATION: CPT

## 2025-01-22 PROCEDURE — 25010000002 PIPERACILLIN SOD-TAZOBACTAM PER 1 G: Performed by: INTERNAL MEDICINE

## 2025-01-22 RX ORDER — PREDNISONE 20 MG/1
40 TABLET ORAL
Status: DISCONTINUED | OUTPATIENT
Start: 2025-01-22 | End: 2025-01-23 | Stop reason: HOSPADM

## 2025-01-22 RX ADMIN — IPRATROPIUM BROMIDE AND ALBUTEROL SULFATE 3 ML: .5; 3 SOLUTION RESPIRATORY (INHALATION) at 15:47

## 2025-01-22 RX ADMIN — TAMSULOSIN HYDROCHLORIDE 0.4 MG: 0.4 CAPSULE ORAL at 08:45

## 2025-01-22 RX ADMIN — ATORVASTATIN CALCIUM 10 MG: 10 TABLET ORAL at 20:25

## 2025-01-22 RX ADMIN — ARFORMOTEROL TARTRATE 15 MCG: 15 SOLUTION RESPIRATORY (INHALATION) at 07:40

## 2025-01-22 RX ADMIN — BUDESONIDE INHALATION 0.5 MG: 0.5 SUSPENSION RESPIRATORY (INHALATION) at 07:45

## 2025-01-22 RX ADMIN — Medication 10 ML: at 20:20

## 2025-01-22 RX ADMIN — METHYLPREDNISOLONE SODIUM SUCCINATE 40 MG: 40 INJECTION, POWDER, FOR SOLUTION INTRAMUSCULAR; INTRAVENOUS at 03:23

## 2025-01-22 RX ADMIN — IPRATROPIUM BROMIDE AND ALBUTEROL SULFATE 3 ML: .5; 3 SOLUTION RESPIRATORY (INHALATION) at 23:25

## 2025-01-22 RX ADMIN — APIXABAN 2.5 MG: 2.5 TABLET, FILM COATED ORAL at 08:45

## 2025-01-22 RX ADMIN — APIXABAN 2.5 MG: 2.5 TABLET, FILM COATED ORAL at 20:25

## 2025-01-22 RX ADMIN — IPRATROPIUM BROMIDE AND ALBUTEROL SULFATE 3 ML: .5; 3 SOLUTION RESPIRATORY (INHALATION) at 03:44

## 2025-01-22 RX ADMIN — CLOPIDOGREL BISULFATE 75 MG: 75 TABLET ORAL at 08:45

## 2025-01-22 RX ADMIN — PIPERACILLIN AND TAZOBACTAM 3.38 G: 3; .375 INJECTION, POWDER, LYOPHILIZED, FOR SOLUTION INTRAVENOUS at 13:16

## 2025-01-22 RX ADMIN — THEOPHYLLINE 300 MG: 300 TABLET, EXTENDED RELEASE ORAL at 08:45

## 2025-01-22 RX ADMIN — PANTOPRAZOLE SODIUM 40 MG: 40 TABLET, DELAYED RELEASE ORAL at 08:45

## 2025-01-22 RX ADMIN — PIPERACILLIN AND TAZOBACTAM 3.38 G: 3; .375 INJECTION, POWDER, LYOPHILIZED, FOR SOLUTION INTRAVENOUS at 03:23

## 2025-01-22 RX ADMIN — IPRATROPIUM BROMIDE AND ALBUTEROL SULFATE 3 ML: .5; 3 SOLUTION RESPIRATORY (INHALATION) at 10:46

## 2025-01-22 RX ADMIN — PIPERACILLIN AND TAZOBACTAM 3.38 G: 3; .375 INJECTION, POWDER, LYOPHILIZED, FOR SOLUTION INTRAVENOUS at 20:25

## 2025-01-22 RX ADMIN — Medication 10 ML: at 08:44

## 2025-01-22 RX ADMIN — ARFORMOTEROL TARTRATE 15 MCG: 15 SOLUTION RESPIRATORY (INHALATION) at 20:00

## 2025-01-22 RX ADMIN — AMIODARONE HYDROCHLORIDE 200 MG: 200 TABLET ORAL at 08:45

## 2025-01-22 RX ADMIN — BUDESONIDE INHALATION 0.5 MG: 0.5 SUSPENSION RESPIRATORY (INHALATION) at 20:00

## 2025-01-22 RX ADMIN — PREDNISONE 40 MG: 20 TABLET ORAL at 08:56

## 2025-01-22 RX ADMIN — Medication 10 ML: at 03:23

## 2025-01-22 RX ADMIN — AMIODARONE HYDROCHLORIDE 200 MG: 200 TABLET ORAL at 20:25

## 2025-01-22 RX ADMIN — POLYETHYLENE GLYCOL 3350 17 G: 17 POWDER, FOR SOLUTION ORAL at 08:44

## 2025-01-22 NOTE — CASE MANAGEMENT/SOCIAL WORK
Continued Stay Note   Seth     Patient Name: Kali Garcia  MRN: [id number]  Today's Date: 1/22/2025    Admit Date: 1/18/2025    Plan: [facility] accepted pending precert. Precert started 1/22, no PASRR needed (from [facility], did not wish to return). [facility] following for after SNF. Prior to SNF, from home with son and DIL with home O2 6L through [employer].   Discharge Plan       Row Name 01/22/25 1554       Plan    Plan [facility] accepted pending precert. Precert started 1/22, no PASRR needed (from [facility], did not wish to return). [facility] following for after SNF. Prior to SNF, from home with son and DIL with home O2 6L through [employer].                    Expected Discharge Date and Time       Expected Discharge Date Expected Discharge Time    Jan 23, 2025           Viridiana Clifford RN     Office Phone: [phone]  Office Cell: [phone]

## 2025-01-22 NOTE — THERAPY TREATMENT NOTE
"Subjective: Pt agreeable to therapeutic plan of care. Pt reports his goal is to be able to walk to his back porch.    Objective:     Precautions - 3L O2; orthostatic hypotension    Bed mobility - Min-A    Transfers - Min-A, Assist x 2, and with rolling walker, 2nd person for safety. For sit to stands and stand-pivot transfer.    Ambulation - 2 feet Min-A with RW during short ambulatory transfer to recliner    Balance - Jerod in standing for posterior lean and postural sway    Therapeutic Exercise - x10 BLE: ankle pumps, SLR, knee to chest, seated LAQ    Vitals: Orthostatic: BP 90s/40s after standing. Improved to 133/54    Pain: 4 VAS   Location: BLE  Intervention for pain: Repositioned, RN notified, Increased Activity, and Therapeutic Presence    Education: Provided education on the importance of mobility in the acute care setting, Verbal/Tactile Cues, Transfer Training, Gait Training, and Energy conservation strategies    Assessment: Kali Garcia presents with functional mobility impairments which indicate the need for skilled intervention. Pt is pleasant and determined to improve strength/balance for goal of being able to walk to his back porch when he is able to go back home. Limited due to (+) orthostatic hypotension but otherwise required minAx2 for transfers and small steps to the recliner. Tolerating session today without incident. Will continue to follow and progress as tolerated.     Plan/Recommendations:   If medically appropriate, Moderate Intensity Therapy recommended post-acute care. This is recommended as therapy feels the patient would require 3-4 days per week and wouldn't tolerate \"3 hour daily\" rehab intensity. SNF would be the preferred choice. If the patient does not agree to SNF, arrange HH or OP depending on home bound status. If patient is medically complex, consider LTACH. Pt requires no DME at discharge.     Pt desires Skilled Rehab placement at discharge. Pt cooperative; agreeable to " therapeutic recommendations and plan of care.         Basic Mobility 6-click:  Rollin = Total, A lot = 2, A little = 3; 4 = None  Supine>Sit:   1 = Total, A lot = 2, A little = 3; 4 = None   Sit>Stand with arms:  1 = Total, A lot = 2, A little = 3; 4 = None  Bed>Chair:   1 = Total, A lot = 2, A little = 3; 4 = None  Ambulate in room:  1 = Total, A lot = 2, A little = 3; 4 = None  3-5 Steps with railin = Total, A lot = 2, A little = 3; 4 = None  Score: 18    Modified Casey: N/A = No pre-op stroke/TIA    Post-Tx Position: Up in Chair, Alarms activated, and Call light and personal items within reach  PPE: gloves and surgical mask    Therapy Charges for Today       Code Description Service Date Service Provider Modifiers Qty    44898724120 HC PT THERAPEUTIC ACT EA 15 MIN 2025 Anne Marie De León, PT GP 1    79297672958 HC PT NEUROMUSC RE EDUCATION EA 15 MIN 2025 Anne Marie De León, PT GP 1           PT Charges       Row Name 25 1513             Time Calculation    Start Time 1349  -OD      Stop Time 1410  -OD      Time Calculation (min) 21 min  -OD      PT Received On 25  -OD      PT - Next Appointment 25  -OD         Time Calculation- PT    Total Timed Code Minutes- PT 21 minute(s)  -OD                User Key  (r) = Recorded By, (t) = Taken By, (c) = Cosigned By      Initials Name Provider Type    OD Anne Marie De León, PT Physical Therapist

## 2025-01-22 NOTE — PLAN OF CARE
Assessment: Kali Garcia presents with functional mobility impairments which indicate the need for skilled intervention. Pt is pleasant and determined to improve strength/balance for goal of being able to walk to his back porch when he is able to go back home. Limited due to (+) orthostatic hypotension but otherwise required minAx2 for transfers and small steps to the recliner. Tolerating session today without incident. Will continue to follow and progress as tolerated.

## 2025-01-22 NOTE — PROGRESS NOTES
[facility] MEDICINE SERVICE  DAILY PROGRESS NOTE    NAME: Kali Garcia  : 1945  MRN: [id number]      LOS: 2 days     PROVIDER OF SERVICE: Altaf Plummer MD    Chief Complaint: HCAP (healthcare-associated pneumonia)    Subjective:     Interval History: Patient states his cough and congestion are better although he did have some mild shortness of breath this morning and requested a breathing treatment.  He is tolerating p.o. intake well now that his diet has been upgraded.        Review of Systems:   Review of Systems    Objective:     Vital Signs  Temp:  [97.5 °F (36.4 °C)-98 °F (36.7 °C)] 97.7 °F (36.5 °C)  Heart Rate:  [53-64] 62  Resp:  [15-21] 18  BP: (127-145)/(53-68) 145/61  Flow (L/min) (Oxygen Therapy):  [3-4.5] 4   Body mass index is 14.95 kg/m².    Physical Exam  Physical Exam  General Appearance:  Alert, cooperative, no distress, appears stated age  Head:  Normocephalic, without obvious abnormality, atraumatic  Eyes:  PERRL, conjunctiva/corneas clear, EOM's intact, fundi benign, both eyes  Ears:  Normal TM's and external ear canals, both ears  Nose: Nares normal, septum midline, mucosa normal, no drainage or sinus tenderness  Throat: Lips, mucosa, and tongue normal; teeth and gums normal  Neck: Supple, symmetrical, trachea midline, no adenopathy, thyroid: not enlarged, symmetric, no tenderness/mass/nodules, no carotid bruit or JVD  Lungs:   Bibasilar rhonchi, respirations unlabored  Heart:  Regular rate and rhythm, S1, S2 normal, no murmur, rub or gallop  Abdomen:  Soft, non-tender, bowel sounds active all four quadrants,  no masses, no organomegaly  Extremities: Extremities normal, atraumatic, no cyanosis or edema  Pulses: 2+ and symmetric  Skin: Skin color, texture, turgor normal, no rashes or lesions  Neurologic: Normal         Diagnostic Data    Results from last 7 days   Lab Units 25  0333 25  0249 25  1654   WBC 10*3/mm3 4.83   < > 4.25   HEMOGLOBIN g/dL 8.0*    < > 9.0*   HEMATOCRIT % 25.7*   < > 29.1*   PLATELETS 10*3/mm3 134*   < > 116*   GLUCOSE mg/dL 127*   < > 101*   CREATININE mg/dL 0.62*   < > 0.73*   BUN mg/dL 21   < > 16   SODIUM mmol/L 139   < > 136   POTASSIUM mmol/L 4.3   < > 4.4   AST (SGOT) U/L  --   --  37   ALT (SGPT) U/L  --   --  42*   ALK PHOS U/L  --   --  70   BILIRUBIN mg/dL  --   --  0.6   ANION GAP mmol/L 5.3   < > 10.8    < > = values in this interval not displayed.       No radiology results for the last day      I reviewed the patient's new clinical results.    Assessment/Plan:     Active and Resolved Problems  Active Hospital Problems    Diagnosis  POA    **HCAP (healthcare-associated pneumonia) [J18.9]  Yes    Syncope and collapse [R55]  Yes    Unresponsive episode [R40.4]  Yes    Acute UTI (urinary tract infection) [N39.0]  Yes    Sick sinus syndrome [I49.5]  Yes    Anemia [D64.9]  Yes    Coronary artery disease [I25.10]  Yes    COPD (chronic obstructive pulmonary disease) [J44.9]  Yes    Severe malnutrition [E43]  Yes      Resolved Hospital Problems   No resolved problems to display.       Acute bilateral pneumonia, possible aspiration pneumonia  -Patient presents with imaging concerning for bibasilar pneumonia, especially aspiration pneumonia given his history of dysphagia  -Initiated on antibiotics with cefepime, but switched to Zosyn for possible aspiration pneumonia  -MRSA screen negative  -Clinically improving  -Speech therapy evaluation to assess dysphagia; on previous admission the patient was recommended to have puréed diet with nectar thick liquids    Acute metabolic encephalopathy  -Likely secondary to underlying pneumonia but also poss related to medications  -Hold all sedating medications  -Patient's mental status has improved with treatment of pneumonia as well    Acute COPD exacerbation  -Secondary to underlying pneumonia  -Initiate Solu-Medrol, nebulizers  -Encourage pulmonary toileting with I-S, flutter valve if patient can  tolerate    Acute Pseudomonas UTI  -Urine culture positive for Pseudomonas  -Switched to IV Zosyn for coverage of Pseudomonas in addition to aspiration pneumonia; will continue for a short course    CAD  -Resume home GDMT    Chronic A-fib  -Restart home amiodarone, Eliquis    Dyslipidemia  -Continue home statin therapy    Sick sinus syndrome  -Patient was recently admitted and underwent PPM placement    Anemia of chronic disease  -H&H fairly stable although slowly decreasing in the setting of acute illness  -No indication for transfusion at this time    Severe protein calorie malnutrition  -Encourage p.o. intake and liberalize diet is much as possible based on his dysphagia evaluation     VTE Prophylaxis:  Pharmacologic & mechanical VTE prophylaxis orders are present.             Disposition Planning:     Barriers to Discharge: Clinical improvement, precertification  Anticipated Date of Discharge: 1/23  Place of Discharge: SNF      Time: 40 minutes     Code Status and Medical Interventions: No CPR (Do Not Attempt to Resuscitate); Limited Support; No intubation (DNI)   Ordered at: 01/18/25 2045     Medical Intervention Limits:    No intubation (DNI)     Code Status (Patient has no pulse and is not breathing):    No CPR (Do Not Attempt to Resuscitate)     Medical Interventions (Patient has pulse or is breathing):    Limited Support       Signature: Electronically signed by Altaf Plummer MD, 01/22/25, 12:16 EST.  [facility] Hospitalist Team

## 2025-01-22 NOTE — THERAPY TREATMENT NOTE
"Subjective: Pt agreeable to therapeutic plan of care.  Cognition: oriented to Person, Place, and Time    Objective:     Precautions - orthostatic hypotension    Bed Mobility: Min-A   Functional Transfers: Min-A     Balance: supported, static, and standing Min-A  Functional Ambulation: N/A or Not attempted.    Feeding: Min-A and Mod-A  ADL Position: supported sitting  Vitals: Desaturates and Orthostatic    Pain: 4 VAS  Location: BLE  Interventions for pain: Repositioned  Education: Provided education on the importance of mobility in the acute care setting      Assessment: Kali Garcia presents with ADL impairments affecting function including balance, endurance / activity tolerance, pain, and strength. Demonstrated functioning below baseline abilities indicate the need for continued skilled intervention while inpatient. Tolerating session today without incident. Will continue to follow and progress as tolerated.     Plan/Recommendations:   Moderate Intensity Therapy recommended post-acute care. This is recommended as therapy feels the patient would require 3-4 days per week and wouldn't tolerate \"3 hour daily\" rehab intensity. SNF would be the preferred choice. If the patient does not agree to SNF, arrange HH or OP depending on home bound status. If patient is medically complex, consider LTACH.. Pt requires no DME at discharge.     Pt desires Skilled Rehab placement at discharge. Pt cooperative; agreeable to therapeutic recommendations and plan of care.     Modified [place]: N/A = No pre-op stroke/TIA    Post-Tx Position: Up in Chair  PPE: gloves    Therapy Charges for Today       Code Description Service Date Service Provider Modifiers Qty    [id number]  OT THERAPEUTIC ACT EA 15 MIN 1/22/2025 Mukul Townsend OT GO 2           Time Calculation- OT       Row Name 01/22/25 1436             Time Calculation- OT    OT Start Time 1352  -MP      OT Stop Time 1415  -MP      OT Time Calculation (min) 23 min  -MP   "    Total Timed Code Minutes- OT 23 minute(s)  -MP      OT Received On 01/22/25  -MP      OT - Next Appointment 01/24/25  -MP                User Key  (r) = Recorded By, (t) = Taken By, (c) = Cosigned By      Initials Name Provider Type    Mukul Narayanan, OT Occupational Therapist

## 2025-01-22 NOTE — CASE MANAGEMENT/SOCIAL WORK
Continued Stay Note   [facility]     Patient Name: Kali Garcia  MRN: [id number]  Today's Date: 1/22/2025    Admit Date: 1/18/2025    Plan: [facility] accepted pending precert. Precert to start 1/22, no PASRR needed (from [facility], did not wish to return). [facility] following for after SNF. Prior to SNF, from home with son and DIL with home O2 6L through [employer].   Discharge Plan       Row Name 01/22/25 1231       Plan    Plan [facility] accepted pending precert. Precert to start 1/22, no PASRR needed (from [facility], did not wish to return). [facility] following for after SNF. Prior to SNF, from home with son and DIL with home O2 6L through [employer].    Patient/Family in Agreement with Plan yes    Plan Comments CM contacted [facility] liaison Orquidea AWAD. She reported that Avelino from [facility] would visit patient at bedside. Received update after visit that [facility] and so can [facility], but no bed currently available at [facility] for at least a few days. CM contacted patient’s son, Ivis ([phone]) to notify of acceptance. He reported that they do prefer [facility] but agreeable to [facility] to start precert. CM discussed with MD/RN in unit rounds that precert to start today, 1/23. SID sent request for precert to be started by JEANNETTE, Mike MORTON and/or Care Coordination Supervisor, Salome HERNANDES CM requested updated therapy notes from [facility]. Pharmacy updated for facility. CM provided update to [facility] liaison Ingrid BRYANT that family wishes for patient to not return to .                  Expected Discharge Date and Time       Expected Discharge Date Expected Discharge Time    Jan 23, 2025           Viridiana Clifford RN     Office Phone: [phone]  Office Cell: [phone]

## 2025-01-22 NOTE — PROGRESS NOTES
Nutrition Services  Patient Name: Kali Garcia  YOB: 1945  MRN: [id number]  Admission date: 1/18/2025    PROGRESS NOTE      Nutrition Intervention Updates: Continue current po diet per SLP    Continue Magic Cups at lunch/dinner (Provides 580 kcals, 18 g protein if consumed)          Encounter Information: Checking in to monitor po diet tolerance and Nutrition POC. VFSS completed yesterday and advanced po diet to Mechanical ground diet with thin liquids.        PO Diet: Diet: Regular/House; Texture: Mechanical Ground (NDD 2); Fluid Consistency: Thin (IDDSI 0)   PO Supplements: Magic Cups at lunch/dinner (Provides 580 kcals, 18 g protein if consumed)      PO Intake:  88% average po intake x last 6 documented meals        Current nutrition support: None    Nutrition support review: None        Labs (reviewed below): Reviewed. Management per attending.        GI Function:  No BM documented since admission (x 4 days)  PRN bowel meds available       Brief weight review   Wt Readings from Last 10 Encounters:   01/22/25 0500 48.6 kg (107 lb 2.3 oz)   01/21/25 0539 48.8 kg (107 lb 9.4 oz)   01/20/25 0510 48.3 kg (106 lb 7.7 oz)   01/19/25 0500 48.3 kg (106 lb 7.7 oz)   01/18/25 2332 48.3 kg (106 lb 7.7 oz)   01/18/25 1625 51 kg (112 lb 7 oz)   01/03/25 0500 51 kg (112 lb 7 oz)   01/02/25 0557 51.8 kg (114 lb 3.2 oz)   01/01/25 0500 51.2 kg (112 lb 14 oz)   12/31/24 0501 51.1 kg (112 lb 10.5 oz)   12/30/24 0600 52.8 kg (116 lb 6.5 oz)   12/29/24 0514 53 kg (116 lb 13.5 oz)   12/28/24 0538 51.4 kg (113 lb 5.1 oz)   12/27/24 0647 50.4 kg (111 lb 1.8 oz)   12/26/24 0610 49.9 kg (110 lb 0.2 oz)   12/25/24 0600 49.6 kg (109 lb 5.6 oz)   12/23/24 0531 50.1 kg (110 lb 7.2 oz)   12/22/24 2139 49.9 kg (110 lb 0.2 oz)   03/04/24 1452 53 kg (116 lb 12.1 oz)   02/19/24 1303 50.5 kg (111 lb 5 oz)   02/16/24 2101 50 kg (110 lb 2.3 oz)   02/16/24 2054 51.8 kg (114 lb 2.8 oz)   02/15/24 1416 52.9 kg (116 lb 10.3 oz)    02/04/24 0315 51.5 kg (113 lb 8.6 oz)   02/03/24 1415 51.3 kg (113 lb)   02/03/24 0345 51.5 kg (113 lb 8.6 oz)   02/02/24 1524 51.8 kg (114 lb 3.2 oz)   02/02/24 0337 54.4 kg (120 lb)   02/01/24 1330 52.1 kg (114 lb 14.5 oz)   08/04/22 1124 51.1 kg (112 lb 10.5 oz)   07/26/22 1511 59 kg (130 lb)   04/15/22 0436 58.3 kg (128 lb 8.5 oz)   04/13/22 0355 54.6 kg (120 lb 5.9 oz)   04/12/22 2037 128 kg (282 lb 3 oz)        Results from last 7 days   Lab Units 01/22/25  0333 01/21/25  0314 01/20/25  0215 01/19/25  0249 01/18/25  1654   SODIUM mmol/L 139 140 142   < > 136   POTASSIUM mmol/L 4.3 4.1 3.9   < > 4.4   CHLORIDE mmol/L 106 107 108*   < > 101   CO2 mmol/L 27.7 25.7 24.6   < > 24.2   BUN mg/dL 21 25* 25*   < > 16   CREATININE mg/dL 0.62* 0.69* 0.74*   < > 0.73*   CALCIUM mg/dL 8.5* 8.1* 8.1*   < > 8.5*   BILIRUBIN mg/dL  --   --   --   --  0.6   ALK PHOS U/L  --   --   --   --  70   ALT (SGPT) U/L  --   --   --   --  42*   AST (SGOT) U/L  --   --   --   --  37   GLUCOSE mg/dL 127* 208* 75   < > 101*    < > = values in this interval not displayed.     Results from last 7 days   Lab Units 01/22/25  0333   HEMOGLOBIN g/dL 8.0*   HEMATOCRIT % 25.7*         RD to follow up per protocol.    Electronically signed by:  Patito Ochoa RD  01/22/25 10:35 EST

## 2025-01-22 NOTE — PLAN OF CARE
Goal Outcome Evaluation:      Pt on ST caseload for meal assessment. Currently assigned a mechanical ground/ thin diet. Unable to see this date d/t scheduling. Will follow up on next available date.

## 2025-01-22 NOTE — DISCHARGE PLACEMENT REQUEST
"Kali Garcia (79 y.o. Male)       Date of Birth   1945    Social Security Number       Address   [address] [place] [address]  [facility] [place] [address]    Home Phone   [phone]    MRN   [id number]       Congregation   None    Marital Status                               Admission Date   25    Admission Type   Emergency    Admitting Provider   Altaf Plummer MD    Attending Provider   Altaf Plummer MD    Department, Room/Bed   [facility],        Discharge Date       Discharge Disposition       Discharge Destination                                 Attending Provider: Altaf Plummer MD    Allergies: Codeine    Isolation: None   Infection: None   Code Status: No CPR    Ht: 180.3 cm (70.98\")   Wt: 48.6 kg (107 lb 2.3 oz)    Admission Cmt: None   Principal Problem: HCAP (healthcare-associated pneumonia) [J18.9]                   Active Insurance as of 2025       Primary Coverage       Payor Plan Insurance Group Employer/Plan Group    ANTHEM MEDICARE REPLACEMENT ANTHEM MED ADV PPO [id number]       Payor Plan Address Payor Plan Phone Number Payor Plan Fax Number Effective Dates    [address] [phone]  2024 - None Entered    [facility] [address]         Subscriber Name Subscriber Birth Date Member ID       KALI GARCIA 1945 [id number]                     Emergency Contacts        (Rel.) Home Phone Work Phone Mobile Phone    SRINIVAS GARCIA (Son) [phone] -- [phone]    JUAN GARCIA (Other) [phone] -- [phone]    GarciaLuisito (Son) -- -- --                 History & Physical        Nurys Thorpe APRN at 25       Attestation signed by Llanes Alvarez, Carlos, MD at 25 0340    I have reviewed this documentation and agree.                      [facility] Medicine Services    Hospitalist History and Physical     Kali Garcia : 1945 MRN:[id number] " LOS:0 ROOM: 28/28     Reason for admission: HCAP (healthcare-associated pneumonia)     Assessment / Plan     Found down/unresponsive episode  Acute COPD exacerbation in patient with severe emphysema   Bibasilar pneumonia  Sinusitis  Left mastoid with complete opacification  Acute UTI  - reportedly found down at ECF  - CT head: no acute intracranial abnormality.  Acute left maxillary and sphenoid sinusitis.  Complete opacification of the left mastoid air cells which appears acute and new from 10/10/2024.  - CXR: bibasilar consolidations which could reflect aspiration pneumonia.  Small bilateral pleural effusions.  Emphysema.  - normal WBC, normal lactate, high-sensitivity troponin with repeat 25, BUN 16, creatinine 0.73, calcium 8.5, albumin 3.0, hemoglobin 9.0, platelets 116.    - Urinalysis: trace ketones, large blood, positive nitrites, moderate leukocytes, 21-50 RBCs, 21-50 WBCs, 1+ bacteria.  - pt chronically on 4-6L O2 at ECF, currently on 4L O2  - started on IV cefepime in the ER and given IV solumedrol 125mg and 1L IVFs. Continue cefepime  - RVP negative  - follow blood cultures, urine culture  - EKG: Ventricular-paced rhythm rate 101. HS troponin 26 with repeat 25  - pt awake, alert, conversational, oriented to person, place and thinks it is 2024, poor historian. Moves all extremities   - duonebs, pulmicort, continue home theophylline  - Yap catheter was placed in the ER, monitor urine output  - cont home flomax  - continuous pulse oximetry   - will need outpatient ENT evaluation   - DNR, [facility] hospice patient      CAD  Atrial fibrillation  SSS s/p implantation mirca AV leadless pacemaker  Hypertension  Hyperlipidemia  PVD  - cont home amiodarone, eliquis, statin    Anemia of chronic disease  - hgb 9.0  - monitor CBC     Severe protein malnutrition  - BMI 15.6  - nutrition consult     Nutrition:   NPO Diet NPO Type: Strict NPO until swallow study by bedside RN, resume ECF diet     VTE  Prophylaxis:  Pharmacologic & mechanical VTE prophylaxis orders are present.      History of Present illness     Kali Garcia is a 79 y.o. male with PMH of COPD/chronic respiratory failure on 3L O2 per the patient's son, atrial fibrillation, SSS s/p pacemaker 1/2/2025, CAD, PVD, hypertension, hyperlipidemia, OA presented to [facility] ED 1/18/2025 with report of being found down at [facility]. The patient is oriented to person and thinks he is in a hospital or a nursing home and stated he remembered feeling like the room was spinning and thinks he fell. He has been coughing and short of breath. He stated the food they are giving is not good so he has not been eating much.     Spoke with son Ivis over the phone. He does not want his father returning to [facility] due to the fall and him being found down. The plan since his hospital discharge on 1/3/2025 had been to rehab at [facility] and then return home with hospice under the care of his son Ivis. The son feels like the patient has not made any progress and does not feel he is well taken care of at [facility]. During this admission he want the patient to either be discharged home under hospice if he is able to do so or go to a different rehab facility at discharge with plans to return home with hospice [facility] (Starla is contact).     ED course:     CT head showed no acute intracranial abnormality.  Acute left maxillary and sphenoid sinusitis.  Complete opacification of the left mastoid air cells which appears acute and new from 10/10/2024.  CXR shows bibasilar consolidations which could reflect aspiration pneumonia.  Small bilateral pleural effusions.  Emphysema.  Labs showed normal WBC, normal lactate, high-sensitivity troponin with repeat 25, BUN 16, creatinine 0.73, calcium 8.5, albumin 3.0, hemoglobin 9.0, platelets 116.  Urinalysis showed trace ketones, large blood, positive nitrites, moderate leukocytes, 21-50 RBCs, 21-50 WBCs, 1+  bacteria.  Patient is on 3L O2 via nasal cannula. He was given IV cefepime, IV solumedrol 125mg, 1L IVFs, duoneb. Yap catheter was placed. He was admitted to the hospitalist team for further treatment of syncope, pneumonia, COPD exacerbation, UTI.     Subjective / Review of systems     Review of Systems   Constitutional:  Positive for fatigue.   HENT: Negative.     Eyes: Negative.    Respiratory:  Positive for shortness of breath.    Cardiovascular: Negative.    Gastrointestinal: Negative.    Genitourinary: Negative.    Musculoskeletal: Negative.    Skin: Negative.    Neurological:         Fall   Psychiatric/Behavioral: Negative.          Past Medical/Surgical/Social/Family History & Allergies     Past Medical History:   Diagnosis Date    COPD (chronic obstructive pulmonary disease)     Coronary artery disease     Emphysema lung     Hyperlipidemia     Hypertension     Osteoarthritis       Past Surgical History:   Procedure Laterality Date    CARDIAC CATHETERIZATION      CARDIAC ELECTROPHYSIOLOGY PROCEDURE N/A 1/2/2025    Procedure: Bijan GLASERT;  Surgeon: Eliot Mcgarry MD;  Location: [facility] CATH INVASIVE LOCATION;  Service: Cardiovascular;  Laterality: N/A;    COLONOSCOPY N/A 8/4/2022    Procedure: COLONOSCOPY with 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 and endoscopic clipping x 1;  Surgeon: Luz Jacques MD;  Location: [facility] ENDOSCOPY;  Service: Gastroenterology;  Laterality: N/A;  post op: cecal AVM    CORONARY STENT PLACEMENT      ENDOSCOPY N/A 4/14/2022    Procedure: ESOPHAGOGASTRODUODENOSCOPY WITH BIOPSY X1 AREA;  Surgeon: Luz Jacques MD;  Location: [facility] ENDOSCOPY;  Service: Gastroenterology;  Laterality: N/A;  esophagitis, gastritis, HH    ENDOSCOPY N/A 8/4/2022    Procedure: ESOPHAGOGASTRODUODENOSCOPY;  Surgeon: Luz Jacques MD;  Location: [facility] ENDOSCOPY;  Service: Gastroenterology;  Laterality: N/A;  post op: hiatal hernia, eosphagitis    EYE SURGERY      FEMORAL ARTERY STENT       KNEE SURGERY Left     KNEE SURGERY Left     LUNG SURGERY        Social History     Socioeconomic History    Marital status:    Tobacco Use    Smoking status: Former     Current packs/day: 0.00     Average packs/day: 1 pack/day for 63.0 years (63.0 ttl pk-yrs)     Types: Cigarettes     Start date: 1960     Quit date: 2023     Years since quittin.1   Vaping Use    Vaping status: Never Used   Substance and Sexual Activity    Alcohol use: Not Currently    Drug use: Never    Sexual activity: Defer      No family history on file.   Allergies   Allergen Reactions    Codeine GI Intolerance      Social Drivers of Health     Tobacco Use: Medium Risk (2024)    Patient History     Smoking Tobacco Use: Former     Smokeless Tobacco Use: Unknown     Passive Exposure: Not on file   Alcohol Use: Not At Risk (2024)    AUDIT-C     Frequency of Alcohol Consumption: Never     Average Number of Drinks: Patient does not drink     Frequency of Binge Drinking: Never   Financial Resource Strain: Not on file   Food Insecurity: No Food Insecurity (2024)    Hunger Vital Sign     Worried About Running Out of Food in the Last Year: Never true     Ran Out of Food in the Last Year: Never true   Transportation Needs: No Transportation Needs (2024)    PRAPARE - Transportation     Lack of Transportation (Medical): No     Lack of Transportation (Non-Medical): No   Physical Activity: Not on file   Stress: Not on file   Social Connections: Feeling Socially Integrated (3/11/2024)    OASIS : Social Isolation     Frequency of experiencing loneliness or isolation: Never   Interpersonal Safety: Not At Risk (2025)    Abuse Screen     Unsafe at Home or Work/School: no     Feels Threatened by Someone?: no     Does Anyone Keep You from Contacting Others or Doint Things Outside the Home?: no     Physical Sign of Abuse Present: no   Depression: Not on file   Housing Stability: Not At Risk (2024)     Housing Stability     Current Living Arrangements: home     Potentially Unsafe Housing Conditions: none   Utilities: Not At Risk (12/23/2024)    [facility] Utilities     Threatened with loss of utilities: No   Health Literacy: Unknown (12/23/2024)    Education     Help with school or training?: Not on file     Preferred Language: English   Employment: Not on file   Disabilities: At Risk (12/23/2024)    Disabilities     Concentrating, Remembering, or Making Decisions Difficulty: no     Doing Errands Independently Difficulty: yes        Home Medications     Prior to Admission medications    Medication Sig Start Date End Date Taking? Authorizing Provider   amiodarone (PACERONE) 200 MG tablet Take 1 tablet by mouth 2 (Two) Times a Day for 30 days. 1/1/25 1/31/25  Sho Capps MD   apixaban (ELIQUIS) 2.5 MG tablet tablet Take 1 tablet by mouth Every 12 (Twelve) Hours for 30 days. Indications: Atrial Fibrillation 1/3/25 2/2/25  Sho Capps MD   arformoterol (BROVANA) 15 MCG/2ML nebulizer solution Take 2 mL by nebulization 2 (Two) Times a Day for 30 days. 1/1/25 1/31/25  Sho Capps MD   budesonide (PULMICORT) 0.5 MG/2ML nebulizer solution Take 2 mL by nebulization 2 (Two) Times a Day for 30 days. 1/1/25 1/31/25  Sho Capps MD   clopidogrel (PLAVIX) 75 MG tablet Take 1 tablet by mouth Daily. Indications: Percutaneous Coronary Intervention    ProviderGwendolyn MD   ipratropium-albuterol (DUO-NEB) 0.5-2.5 mg/3 ml nebulizer Take 3 mL by nebulization Every 4 (Four) Hours As Needed for Wheezing. Indications: Chronic Obstructive Lung Disease    ProviderGwendolyn MD   metoprolol tartrate (LOPRESSOR) 25 MG tablet Take 0.5 tablets by mouth Every 12 (Twelve) Hours for 30 days. 1/3/25 2/2/25  Sho Capps MD   polyethylene glycol (MIRALAX) 17 g packet Take 17 g by mouth Daily. Indications: Constipation 2/1/24   ProviderGwendolyn MD   pravastatin (PRAVACHOL) 40 MG  tablet Take 1 tablet by mouth Daily. Indications: Disease involving Lipid Deposits in the Arteries    Provider, MD Gwendolyn   tamsulosin (FLOMAX) 0.4 MG capsule 24 hr capsule Take 1 capsule by mouth Daily for 30 days. 1/1/25 1/31/25  Sho Capps MD   theophylline (THEODUR) 300 MG 12 hr tablet Take 1 tablet by mouth Daily for 30 days. 1/1/25 1/31/25  Sho Capps MD   traZODone (DESYREL) 100 MG tablet Take 1 tablet by mouth Daily As Needed for Sleep.    Provider, MD Gwendolyn        Objective / Physical Exam     Vital signs:  BP: 141/59  Heart Rate: 77  Resp: 20  SpO2: 97 %  Weight: 51 kg (112 lb 7 oz)    Admission Weight: Weight: 51 kg (112 lb 7 oz)    Physical Exam  Vitals and nursing note reviewed.   Constitutional:       Appearance: He is underweight. He is cachectic.   HENT:      Head: Normocephalic and atraumatic.   Eyes:      Extraocular Movements: Extraocular movements intact.      Pupils: Pupils are equal, round, and reactive to light.   Cardiovascular:      Rate and Rhythm: Regular rhythm. Tachycardia present.      Pulses: Normal pulses.      Heart sounds: Normal heart sounds.   Pulmonary:      Effort: Pulmonary effort is normal.      Breath sounds: Examination of the right-upper field reveals decreased breath sounds and wheezing. Examination of the left-upper field reveals decreased breath sounds and wheezing. Examination of the right-lower field reveals decreased breath sounds. Examination of the left-lower field reveals decreased breath sounds.   Abdominal:      General: Bowel sounds are normal.      Palpations: Abdomen is soft.      Tenderness: There is no abdominal tenderness.   Musculoskeletal:         General: Normal range of motion.   Skin:     General: Skin is warm and dry.   Neurological:      General: No focal deficit present.      Mental Status: He is alert. Mental status is at baseline.          Labs     Results from last 7 days   Lab Units 01/18/25  1654   WBC  10*3/mm3 4.25   HEMOGLOBIN g/dL 9.0*   HEMATOCRIT % 29.1*   PLATELETS 10*3/mm3 116*      Results from last 7 days   Lab Units 01/18/25  1654   ALK PHOS U/L 70   AST (SGOT) U/L 37   ALT (SGPT) U/L 42*           Results from last 7 days   Lab Units 01/18/25  1654   SODIUM mmol/L 136   POTASSIUM mmol/L 4.4   CHLORIDE mmol/L 101   CO2 mmol/L 24.2   BUN mg/dL 16   CREATININE mg/dL 0.73*   GLUCOSE mg/dL 101*        Imaging     XR Chest 1 View    Result Date: 1/18/2025  XR CHEST 1 VW Date of Exam: 1/18/2025 5:46 PM EST Indication: Cough fever Comparison: December 22, 2024 Findings: There are emphysematous changes. There are bibasilar consolidations with small bilateral pleural effusions. There is some stable scarring in the right lung apex. The heart and mediastinal contours appear normal. The pulmonary vasculature appears normal. The osseous structures appear intact.     Impression: 1.Bibasilar consolidations, which could reflect aspiration or pneumonia. 2.Small bilateral pleural effusions. 3.Emphysema. Electronically Signed: Jona Bland MD  1/18/2025 6:18 PM EST  Workstation ID: [id number]    CT Head Without Contrast    Result Date: 1/18/2025  CT HEAD WO CONTRAST Date of Exam: 1/18/2025 4:58 PM EST Indication: Mental status altered. Comparison: PET/CT 10/10/2024 Technique: Axial CT images were obtained of the head without contrast administration.  Coronal reconstructions were performed.  Automated exposure control and iterative reconstruction methods were used. Findings: There is mild generalized parenchymal volume loss. There is no acute infarct or hemorrhage. No abnormal extra-axial fluid collections are identified. No mass effect or hydrocephalus. Globes and orbits are within normal limits. There are air-fluid levels within the left maxillary and sphenoid sinuses consistent with acute sinusitis. There is near complete opacification of the left mastoid air cells. Left middle ear cavity is clear. Right mastoid air  cells are clear.     Impression: 1. No acute intracranial abnormality. 2. Acute left maxillary and sphenoid sinusitis. 3. Complete opacification of the left mastoid air cells which appears acute and new from 10/10/2024. Electronically Signed: Mike Celaya MD  2025 5:46 PM EST  Workstation ID: [id number]          Current Medications     Scheduled Meds:  amiodarone, 200 mg, Oral, BID  apixaban, 2.5 mg, Oral, Q12H  arformoterol, 15 mcg, Nebulization, BID - RT  atorvastatin, 10 mg, Oral, Nightly  budesonide, 0.5 mg, Nebulization, BID - RT  [START ON 2025] cefepime, 2,000 mg, Intravenous, Q12H  [START ON 2025] clopidogrel, 75 mg, Oral, Daily  hydrOXYzine, 25 mg, Oral, Nightly  ipratropium-albuterol, 3 mL, Nebulization, Q4H - RT  [START ON 2025] pantoprazole, 40 mg, Oral, Daily  [START ON 2025] polyethylene glycol, 17 g, Oral, Daily  sodium chloride, 10 mL, Intravenous, Q12H  [START ON 2025] tamsulosin, 0.4 mg, Oral, Daily  [START ON 2025] theophylline, 300 mg, Oral, Daily  [START ON 2025] vitamin D, 50,000 Units, Oral, Q7 Days           [facility]  25   21:48 EST     Electronically signed by Llanes Alvarez, Carlos, MD at 25 0340       Operative/Procedure Notes (all)    No notes of this type exist for this encounter.          Physician Progress Notes (last 48 hours)        Altaf Plummer MD at 25 1216              [facility] MEDICINE SERVICE  DAILY PROGRESS NOTE    NAME: Kali Garcia  : 1945  MRN: [id number]      LOS: 2 days     PROVIDER OF SERVICE: Altaf Plummer MD    Chief Complaint: HCAP (healthcare-associated pneumonia)    Subjective:     Interval History: Patient states his cough and congestion are better although he did have some mild shortness of breath this morning and requested a breathing treatment.  He is tolerating p.o. intake well now that his diet has been upgraded.        Review of Systems:    Review of Systems    Objective:     Vital Signs  Temp:  [97.5 °F (36.4 °C)-98 °F (36.7 °C)] 97.7 °F (36.5 °C)  Heart Rate:  [53-64] 62  Resp:  [15-21] 18  BP: (127-145)/(53-68) 145/61  Flow (L/min) (Oxygen Therapy):  [3-4.5] 4   Body mass index is 14.95 kg/m².    Physical Exam  Physical Exam  General Appearance:  Alert, cooperative, no distress, appears stated age  Head:  Normocephalic, without obvious abnormality, atraumatic  Eyes:  PERRL, conjunctiva/corneas clear, EOM's intact, fundi benign, both eyes  Ears:  Normal TM's and external ear canals, both ears  Nose: Nares normal, septum midline, mucosa normal, no drainage or sinus tenderness  Throat: Lips, mucosa, and tongue normal; teeth and gums normal  Neck: Supple, symmetrical, trachea midline, no adenopathy, thyroid: not enlarged, symmetric, no tenderness/mass/nodules, no carotid bruit or JVD  Lungs:   Bibasilar rhonchi, respirations unlabored  Heart:  Regular rate and rhythm, S1, S2 normal, no murmur, rub or gallop  Abdomen:  Soft, non-tender, bowel sounds active all four quadrants,  no masses, no organomegaly  Extremities: Extremities normal, atraumatic, no cyanosis or edema  Pulses: 2+ and symmetric  Skin: Skin color, texture, turgor normal, no rashes or lesions  Neurologic: Normal         Diagnostic Data    Results from last 7 days   Lab Units 01/22/25  0333 01/19/25  0249 01/18/25  1654   WBC 10*3/mm3 4.83   < > 4.25   HEMOGLOBIN g/dL 8.0*   < > 9.0*   HEMATOCRIT % 25.7*   < > 29.1*   PLATELETS 10*3/mm3 134*   < > 116*   GLUCOSE mg/dL 127*   < > 101*   CREATININE mg/dL 0.62*   < > 0.73*   BUN mg/dL 21   < > 16   SODIUM mmol/L 139   < > 136   POTASSIUM mmol/L 4.3   < > 4.4   AST (SGOT) U/L  --   --  37   ALT (SGPT) U/L  --   --  42*   ALK PHOS U/L  --   --  70   BILIRUBIN mg/dL  --   --  0.6   ANION GAP mmol/L 5.3   < > 10.8    < > = values in this interval not displayed.       No radiology results for the last day      I reviewed the patient's new  clinical results.    Assessment/Plan:     Active and Resolved Problems  Active Hospital Problems    Diagnosis  POA    **HCAP (healthcare-associated pneumonia) [J18.9]  Yes    Syncope and collapse [R55]  Yes    Unresponsive episode [R40.4]  Yes    Acute UTI (urinary tract infection) [N39.0]  Yes    Sick sinus syndrome [I49.5]  Yes    Anemia [D64.9]  Yes    Coronary artery disease [I25.10]  Yes    COPD (chronic obstructive pulmonary disease) [J44.9]  Yes    Severe malnutrition [E43]  Yes      Resolved Hospital Problems   No resolved problems to display.       Acute bilateral pneumonia, possible aspiration pneumonia  -Patient presents with imaging concerning for bibasilar pneumonia, especially aspiration pneumonia given his history of dysphagia  -Initiated on antibiotics with cefepime, but switched to Zosyn for possible aspiration pneumonia  -MRSA screen negative  -Clinically improving  -Speech therapy evaluation to assess dysphagia; on previous admission the patient was recommended to have puréed diet with nectar thick liquids    Acute metabolic encephalopathy  -Likely secondary to underlying pneumonia but also poss related to medications  -Hold all sedating medications  -Patient's mental status has improved with treatment of pneumonia as well    Acute COPD exacerbation  -Secondary to underlying pneumonia  -Initiate Solu-Medrol, nebulizers  -Encourage pulmonary toileting with I-S, flutter valve if patient can tolerate    Acute Pseudomonas UTI  -Urine culture positive for Pseudomonas  -Switched to IV Zosyn for coverage of Pseudomonas in addition to aspiration pneumonia; will continue for a short course    CAD  -Resume home GDMT    Chronic A-fib  -Restart home amiodarone, Eliquis    Dyslipidemia  -Continue home statin therapy    Sick sinus syndrome  -Patient was recently admitted and underwent PPM placement    Anemia of chronic disease  -H&H fairly stable although slowly decreasing in the setting of acute illness  -No  indication for transfusion at this time    Severe protein calorie malnutrition  -Encourage p.o. intake and liberalize diet is much as possible based on his dysphagia evaluation     VTE Prophylaxis:  Pharmacologic & mechanical VTE prophylaxis orders are present.             Disposition Planning:     Barriers to Discharge: Clinical improvement, precertification  Anticipated Date of Discharge:   Place of Discharge: SNF      Time: 40 minutes     Code Status and Medical Interventions: No CPR (Do Not Attempt to Resuscitate); Limited Support; No intubation (DNI)   Ordered at: 25     Medical Intervention Limits:    No intubation (DNI)     Code Status (Patient has no pulse and is not breathing):    No CPR (Do Not Attempt to Resuscitate)     Medical Interventions (Patient has pulse or is breathing):    Limited Support       Signature: Electronically signed by Altaf Plummer MD, 25, 12:16 [facility] Hospitalist Team    Electronically signed by Altaf Plummer MD at 25 1218       Altaf Plummer MD at 25 1400              [facility] MEDICINE SERVICE  DAILY PROGRESS NOTE    NAME: Kali Garcia  : 1945  MRN: [id number]      LOS: 1 day     PROVIDER OF SERVICE: Altaf Plummer MD    Chief Complaint: HCAP (healthcare-associated pneumonia)    Subjective:     Interval History: Patient is doing well today, he does state that he has some cough with congestion.  He also feels very weak.  He is hungry and wanting to eat ice cream.        Review of Systems:   Review of Systems    Objective:     Vital Signs  Temp:  [97.7 °F (36.5 °C)-97.9 °F (36.6 °C)] 97.9 °F (36.6 °C)  Heart Rate:  [51-61] 56  Resp:  [12-21] 13  BP: (121-138)/(49-60) 130/54  Flow (L/min) (Oxygen Therapy):  [3-9] 3   Body mass index is 15.01 kg/m².    Physical Exam  Physical Exam  General Appearance:  Alert, cooperative, no distress, appears stated age  Head:  Normocephalic, without obvious abnormality,  atraumatic  Eyes:  PERRL, conjunctiva/corneas clear, EOM's intact, fundi benign, both eyes  Ears:  Normal TM's and external ear canals, both ears  Nose: Nares normal, septum midline, mucosa normal, no drainage or sinus tenderness  Throat: Lips, mucosa, and tongue normal; teeth and gums normal  Neck: Supple, symmetrical, trachea midline, no adenopathy, thyroid: not enlarged, symmetric, no tenderness/mass/nodules, no carotid bruit or JVD  Lungs:   Bibasilar rhonchi, respirations unlabored  Heart:  Regular rate and rhythm, S1, S2 normal, no murmur, rub or gallop  Abdomen:  Soft, non-tender, bowel sounds active all four quadrants,  no masses, no organomegaly  Extremities: Extremities normal, atraumatic, no cyanosis or edema  Pulses: 2+ and symmetric  Skin: Skin color, texture, turgor normal, no rashes or lesions  Neurologic: Normal         Diagnostic Data    Results from last 7 days   Lab Units 01/21/25  0314 01/19/25  0249 01/18/25  1654   WBC 10*3/mm3 4.50   < > 4.25   HEMOGLOBIN g/dL 8.5*   < > 9.0*   HEMATOCRIT % 26.8*   < > 29.1*   PLATELETS 10*3/mm3 132*   < > 116*   GLUCOSE mg/dL 208*   < > 101*   CREATININE mg/dL 0.69*   < > 0.73*   BUN mg/dL 25*   < > 16   SODIUM mmol/L 140   < > 136   POTASSIUM mmol/L 4.1   < > 4.4   AST (SGOT) U/L  --   --  37   ALT (SGPT) U/L  --   --  42*   ALK PHOS U/L  --   --  70   BILIRUBIN mg/dL  --   --  0.6   ANION GAP mmol/L 7.3   < > 10.8    < > = values in this interval not displayed.       No radiology results for the last day      I reviewed the patient's new clinical results.    Assessment/Plan:     Active and Resolved Problems  Active Hospital Problems    Diagnosis  POA    **HCAP (healthcare-associated pneumonia) [J18.9]  Yes    Syncope and collapse [R55]  Yes    Unresponsive episode [R40.4]  Yes    Acute UTI (urinary tract infection) [N39.0]  Yes    Sick sinus syndrome [I49.5]  Yes    Anemia [D64.9]  Yes    Coronary artery disease [I25.10]  Yes    COPD (chronic obstructive  pulmonary disease) [J44.9]  Yes    Severe malnutrition [E43]  Yes      Resolved Hospital Problems   No resolved problems to display.       Acute bilateral pneumonia, possible aspiration pneumonia  -Patient presents with imaging concerning for bibasilar pneumonia, especially aspiration pneumonia given his history of dysphagia  -Initiated on antibiotics with cefepime, but will switch to Zosyn for possible aspiration pneumonia  -MRSA screen negative  -Clinically improving  -Speech therapy evaluation to assess dysphagia; on previous admission the patient was recommended to have puréed diet with nectar thick liquids    Acute metabolic encephalopathy  -Likely secondary to underlying pneumonia but also poss related to medications  -Hold all sedating medications  -Patient's mental status has improved with treatment of pneumonia as well    Acute COPD exacerbation  -Secondary to underlying pneumonia  -Initiate Solu-Medrol, nebulizers  -Encourage pulmonary toileting with I-S, flutter valve if patient can tolerate    Acute Pseudomonas UTI  -Urine culture positive for Pseudomonas  -Switched to IV Zosyn for coverage of Pseudomonas in addition to aspiration pneumonia; will continue for a short course    CAD  -Resume home GDMT    Chronic A-fib  -Restart home amiodarone, Eliquis    Dyslipidemia  -Continue home statin therapy    Sick sinus syndrome  -Patient was recently admitted and underwent PPM placement    Anemia of chronic disease  -H&H fairly stable  -No indication for transfusion at this time    Severe protein calorie malnutrition  -Encourage p.o. intake and liberalize diet is much as possible based on his dysphagia evaluation     VTE Prophylaxis:  Pharmacologic & mechanical VTE prophylaxis orders are present.             Disposition Planning:     Barriers to Discharge: Clinical improvement  Anticipated Date of Discharge: 1/23  Place of Discharge: SNF      Time: 40 minutes     Code Status and Medical Interventions: No CPR (Do  Not Attempt to Resuscitate); Limited Support; No intubation (DNI)   Ordered at: 01/18/25 2045     Medical Intervention Limits:    No intubation (DNI)     Code Status (Patient has no pulse and is not breathing):    No CPR (Do Not Attempt to Resuscitate)     Medical Interventions (Patient has pulse or is breathing):    Limited Support       Signature: Electronically signed by Altaf Plummer MD, 01/21/25, 14:00 EST.  [facility] Hospitalist Team    Electronically signed by Altaf Plummer MD at 01/21/25 1401       Consult Notes (last 48 hours)  Notes from 01/20/25 1550 through 01/22/25 1550   No notes of this type exist for this encounter.          Nutrition Notes (most recent note)        Patito Ochoa RD at 01/22/25 1035          Nutrition Services  Patient Name: Kali Garcia  YOB: 1945  MRN: [id number]  Admission date: 1/18/2025    PROGRESS NOTE      Nutrition Intervention Updates: Continue current po diet per SLP    Continue Magic Cups at lunch/dinner (Provides 580 kcals, 18 g protein if consumed)          Encounter Information: Checking in to monitor po diet tolerance and Nutrition POC. VFSS completed yesterday and advanced po diet to Mechanical ground diet with thin liquids.        PO Diet: Diet: Regular/House; Texture: Mechanical Ground (NDD 2); Fluid Consistency: Thin (IDDSI 0)   PO Supplements: Magic Cups at lunch/dinner (Provides 580 kcals, 18 g protein if consumed)      PO Intake:  88% average po intake x last 6 documented meals        Current nutrition support: None    Nutrition support review: None        Labs (reviewed below): Reviewed. Management per attending.        GI Function:  No BM documented since admission (x 4 days)  PRN bowel meds available       Brief weight review   Wt Readings from Last 10 Encounters:   01/22/25 0500 48.6 kg (107 lb 2.3 oz)   01/21/25 0539 48.8 kg (107 lb 9.4 oz)   01/20/25 0510 48.3 kg (106 lb 7.7 oz)   01/19/25 0500 48.3 kg (106 lb 7.7  oz)   01/18/25 2332 48.3 kg (106 lb 7.7 oz)   01/18/25 1625 51 kg (112 lb 7 oz)   01/03/25 0500 51 kg (112 lb 7 oz)   01/02/25 0557 51.8 kg (114 lb 3.2 oz)   01/01/25 0500 51.2 kg (112 lb 14 oz)   12/31/24 0501 51.1 kg (112 lb 10.5 oz)   12/30/24 0600 52.8 kg (116 lb 6.5 oz)   12/29/24 0514 53 kg (116 lb 13.5 oz)   12/28/24 0538 51.4 kg (113 lb 5.1 oz)   12/27/24 0647 50.4 kg (111 lb 1.8 oz)   12/26/24 0610 49.9 kg (110 lb 0.2 oz)   12/25/24 0600 49.6 kg (109 lb 5.6 oz)   12/23/24 0531 50.1 kg (110 lb 7.2 oz)   12/22/24 2139 49.9 kg (110 lb 0.2 oz)   03/04/24 1452 53 kg (116 lb 12.1 oz)   02/19/24 1303 50.5 kg (111 lb 5 oz)   02/16/24 2101 50 kg (110 lb 2.3 oz)   02/16/24 2054 51.8 kg (114 lb 2.8 oz)   02/15/24 1416 52.9 kg (116 lb 10.3 oz)   02/04/24 0315 51.5 kg (113 lb 8.6 oz)   02/03/24 1415 51.3 kg (113 lb)   02/03/24 0345 51.5 kg (113 lb 8.6 oz)   02/02/24 1524 51.8 kg (114 lb 3.2 oz)   02/02/24 0337 54.4 kg (120 lb)   02/01/24 1330 52.1 kg (114 lb 14.5 oz)   08/04/22 1124 51.1 kg (112 lb 10.5 oz)   07/26/22 1511 59 kg (130 lb)   04/15/22 0436 58.3 kg (128 lb 8.5 oz)   04/13/22 0355 54.6 kg (120 lb 5.9 oz)   04/12/22 2037 128 kg (282 lb 3 oz)        Results from last 7 days   Lab Units 01/22/25  0333 01/21/25  0314 01/20/25  0215 01/19/25  0249 01/18/25  1654   SODIUM mmol/L 139 140 142   < > 136   POTASSIUM mmol/L 4.3 4.1 3.9   < > 4.4   CHLORIDE mmol/L 106 107 108*   < > 101   CO2 mmol/L 27.7 25.7 24.6   < > 24.2   BUN mg/dL 21 25* 25*   < > 16   CREATININE mg/dL 0.62* 0.69* 0.74*   < > 0.73*   CALCIUM mg/dL 8.5* 8.1* 8.1*   < > 8.5*   BILIRUBIN mg/dL  --   --   --   --  0.6   ALK PHOS U/L  --   --   --   --  70   ALT (SGPT) U/L  --   --   --   --  42*   AST (SGOT) U/L  --   --   --   --  37   GLUCOSE mg/dL 127* 208* 75   < > 101*    < > = values in this interval not displayed.     Results from last 7 days   Lab Units 01/22/25  0333   HEMOGLOBIN g/dL 8.0*   HEMATOCRIT % 25.7*         RD to follow up per  "protocol.    Electronically signed by:  Patito Ochoa RD  01/22/25 10:35 EST      Electronically signed by Patito Ochoa RD at 01/22/25 1039          Speech Language Pathology Notes (most recent note)        Aneta Garner at 01/21/25 1557          Per [facility] SLP evaluation yesterday 1/20/2024 - \"Per H&P: 79-year-old male was found on the floor and was initially slow to respond.  The patient had reportedly had increased coughing in the last 24 hours.  The patient had had audible wheezing noted by EMS.  The patient has had subjective fever and chills this afternoon.  The patient has had no reports of vomiting or diarrhea.  The patient had brief epistaxis apparently as a result of the fall. The patient had no reports of leg pain or swelling. Pt is familiar to [facility] department having received VFSS on 1/3/2025 previous admission. Resulting in recs of puree/NTL and pt was discharged to skilled nursing facility ([facility]) later that day. While pt was at [facility], pt participated in repeat VFSS and was rec'd [facility] soft/thins. Pt was seen today for clinical swallow evaluation. Pt is currently receiving puree/NTL at the time of evaluation.\"    Pt seen for video-swallow study seated at 90 degrees in the lateral position this date. 5L O2 via nasal canula present. Pt w/ one tooth present - no dentures present or reported. No penetration or aspiration of any consistency occurred across trials of thins via cup/straw, nectar thick via cup, puree, or mixed consistency  (peaches) noted. Pt had adequate labial closure over the cup, straw, and spoon. Pt was self-fed. No labial spillage occurred. Across x5 trials of thins via cup - pt demonstrated premature spillage in 2/5 trials, swallow was timely across other thin liquid trials. Trace BOT residue noted in 2/5 trials w/ mild BOT residue noted x1. Pt demonstrated timely swallow initiation and no residue across nectar thick liquid and " puree trials. Across x2 mixed consistency trials (peaches), pt demonstrated prolonged but functional mastication, swallow initiated at the level of the vallecula, and mild BOT residue. Adequate laryngeal elevation and anterior hyoid excursion occurred. Epiglottic inversion and laryngeal vestibular closure were also adequate.     Recommendation: THIN liquids w/ MECHANICAL SOFT food texture w/ standard safe swallow precautions. Fatigue precautions.     Full results and recommendations from this study were explained to pt and his nurse who both verbalized understanding. ST will follow to assure tolerance of diet/adjust as clinically indicated.       Electronically signed by Aneta Garner at 25 1557       Haylee Rai   Physical Therapist  Physical Therapy  Therapy Evaluation      Signed  Date of Service:  25 133  Creation Time:  25     Signed        Expand All Collapse All  Patient Name: Kali Garcia             : 1945                      MRN: [id number]                              Today's Date: 2025                                   Admit Date: 2025                        Visit Dx:   Visit Diagnosis       ICD-10-CM ICD-9-CM   1. Syncope and collapse  R55 780.2   2. Acute urinary tract infection  N39.0 599.0   3. Pneumonia of both lower lobes due to infectious organism  J18.9 486   4. Acute exacerbation of chronic obstructive pulmonary disease (COPD)  J44.1 491.21   5. Left maxillary sinusitis  J32.0 473.0   6. Acute non-recurrent sphenoidal sinusitis  J01.30 461.3   7. Mastoid disorder, left  H74.92 385.9         Problem List       Patient Active Problem List   Diagnosis    Severe malnutrition    Abnormal CT scan, esophagus    Pneumonia due to COVID-19 virus    Pneumonia    Rhinovirus    Anemia    Acute on chronic respiratory failure with hypoxia    Coronary artery disease    Hypertension    COPD (chronic obstructive pulmonary disease)    Pneumonia, unspecified  organism    Sick sinus syndrome    Unresponsive episode    HCAP (healthcare-associated pneumonia)    Acute UTI (urinary tract infection)         Medical History        Past Medical History:   Diagnosis Date    COPD (chronic obstructive pulmonary disease)      Coronary artery disease      Emphysema lung      Hyperlipidemia      Hypertension      Osteoarthritis           Surgical History         Past Surgical History:   Procedure Laterality Date    CARDIAC CATHETERIZATION        CARDIAC ELECTROPHYSIOLOGY PROCEDURE N/A 1/2/2025     Procedure: Bijan SIMS;  Surgeon: Eliot Mcgarry MD;  Location: [facility] CATH INVASIVE LOCATION;  Service: Cardiovascular;  Laterality: N/A;    COLONOSCOPY N/A 8/4/2022     Procedure: COLONOSCOPY with 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 and endoscopic clipping x 1;  Surgeon: Luz Jacques MD;  Location: [facility] ENDOSCOPY;  Service: Gastroenterology;  Laterality: N/A;  post op: cecal AVM    CORONARY STENT PLACEMENT        ENDOSCOPY N/A 4/14/2022     Procedure: ESOPHAGOGASTRODUODENOSCOPY WITH BIOPSY X1 AREA;  Surgeon: Luz Jacuqes MD;  Location: [facility] ENDOSCOPY;  Service: Gastroenterology;  Laterality: N/A;  esophagitis, gastritis, HH    ENDOSCOPY N/A 8/4/2022     Procedure: ESOPHAGOGASTRODUODENOSCOPY;  Surgeon: Luz Jacques MD;  Location: [facility] ENDOSCOPY;  Service: Gastroenterology;  Laterality: N/A;  post op: hiatal hernia, eosphagitis    EYE SURGERY        FEMORAL ARTERY STENT        KNEE SURGERY Left      KNEE SURGERY Left      LUNG SURGERY               General Information         Row Name 01/20/25 1322                 Physical Therapy Time and Intention     Document Type evaluation  -JV       Mode of Treatment physical therapy  -JV          Row Name 01/20/25 1322                 General Information     Patient Profile Reviewed yes  -JV       Prior Level of Function independent:;all household mobility  -JV       Existing Precautions/Restrictions fall;oxygen  therapy device and L/min  -JV       Barriers to Rehab medically complex;previous functional deficit;cognitive status  -JV          Row Name 01/20/25 1322                 Living Environment     People in Home child(concepción), adult  -          Row Name 01/20/25 1322                 Home Main Entrance     Number of Stairs, Main Entrance none  -          Row Name 01/20/25 1322                 Stairs Within Home, Primary     Number of Stairs, Within Home, Primary none  -[facility] Name 01/20/25 1322                 Cognition     Orientation Status (Cognition) oriented to;person;place;disoriented to;situation;time  -JV          Row Name 01/20/25 1322                 Safety Issues/Impairments Affecting Functional Mobility     Safety Issues Affecting Function (Mobility) ability to follow commands;awareness of need for assistance;safety precaution awareness  -       Impairments Affecting Function (Mobility) balance;endurance/activity tolerance;cognition;strength;shortness of breath  -       Cognitive Impairments, Mobility Safety/Performance attention;awareness, need for assistance;insight into deficits/self-awareness  -                       User Key  (r) = Recorded By, (t) = Taken By, (c) = Cosigned By        Initials Name Provider Type      Haylee Rai Physical Therapist                            Mobility         Row Name 01/20/25 1324                 Bed Mobility     Bed Mobility supine-sit-supine  -       Supine-Sit [place] (Bed Mobility) maximum assist (25% patient effort)  -       Sit-Supine [place] (Bed Mobility) maximum assist (25% patient effort)  -          Row Name 01/20/25 1324                 Transfers     Comment, (Transfers) RICHARD - hypotension while sitting EOB  -          Row Name 01/20/25 1324                 Bed-Chair Transfer     Bed-Chair [place] (Transfers) unable to assess  -[facility] Name 01/20/25 1324                 Sit-Stand Transfer     Sit-Stand  [place] (Transfers) unable to assess  -JV          Row Name 01/20/25 1324                 Gait/Stairs (Locomotion)     [place] Level (Gait) unable to assess  -JV       Patient was able to Ambulate no, other medical factors prevent ambulation  -JV       Reason Patient was unable to Ambulate Excessive Weakness;Hypotension;Dizziness  -JV                       User Key  (r) = Recorded By, (t) = Taken By, (c) = Cosigned By        Initials Name Provider Type     Haylee Mitchell Physical Therapist                            Obj/Interventions         Row Name 01/20/25 1325                 Range of Motion Comprehensive     General Range of Motion bilateral lower extremity ROM WFL  -JV          Row Name 01/20/25 1325                 Strength Comprehensive (MMT)     Comment, General Manual Muscle Testing (MMT) Assessment BLE grossly 3/5  -JV          Row Name 01/20/25 1325                 Balance     Static Sitting Balance minimal assist  -JV       Dynamic Sitting Balance moderate assist  -JV          Row Name 01/20/25 1325                 Sensory Assessment (Somatosensory)     Sensory Assessment (Somatosensory) LE sensation intact  -JV                       User Key  (r) = Recorded By, (t) = Taken By, (c) = Cosigned By        Initials Name Provider Type     Haylee Reyes Physical Therapist                            Goals/Plan         Row Name 01/20/25 1332                 Bed Mobility Goal 1 (PT)     Activity/Assistive Device (Bed Mobility Goal 1, PT) bed mobility activities, all  -JV       [place] Level/Cues Needed (Bed Mobility Goal 1, PT) contact guard required  -JV       Time Frame (Bed Mobility Goal 1, PT) long term goal (LTG);2 weeks  -JV          Row Name 01/20/25 1332                 Transfer Goal 1 (PT)     Activity/Assistive Device (Transfer Goal 1, PT) sit-to-stand/stand-to-sit;bed-to-chair/chair-to-bed;walker, rolling  -JV       [place] Level/Cues Needed (Transfer Goal 1, PT)  "contact guard required  -JV       Time Frame (Transfer Goal 1, PT) long term goal (LTG);2 weeks  -JV          Row Name 01/20/25 8285                 Gait Training Goal 1 (PT)     Activity/Assistive Device (Gait Training Goal 1, PT) gait (walking locomotion);assistive device use  -JV       [place] Level (Gait Training Goal 1, PT) minimum assist (75% or more patient effort)  -JV       Distance (Gait Training Goal 1, PT) 10 ft  -JV       Time Frame (Gait Training Goal 1, PT) long term goal (LTG);2 weeks  -JV          Row Name 01/20/25 0202                 Therapy Assessment/Plan (PT)     Planned Therapy Interventions (PT) balance training;bed mobility training;gait training;home exercise program;strengthening;patient/family education;transfer training  -JV                    User Key  (r) = Recorded By, (t) = Taken By, (c) = Cosigned By        Initials Name Provider Type     Haylee Mitchell Physical Therapist                         Clinical Impression         Row Name 01/20/25 3999                 Pain Scale: FACES Pre/Post-Treatment     Pain: FACES Scale, Pretreatment 0-->no hurt  -JV       Posttreatment Pain Rating 0-->no hurt  -JV          Row Name 01/20/25 8350                 Plan of Care Review     Outcome Evaluation Pt is an 78 y/o male found down/unresponsive at [facility]; acute COPD exacerbation with severe emphysema. Pt chronically on 4-6L O2 at [facility], currently on 4L O2. Pt recently admitted to [facility] on 12/22/24 with dyspnea and pneumonia. Pt discharged to [facility] on 1/3/25.   PMHx significant for COPD, severe malnutrition, PVD, HTN, and HLD.  Chest XR 1/18: Bibasilar consolidations, which could reflect aspiration or pneumonia.  CT head 1/18: No acute intracranial abnormality. Pt A&O to person and place only; reports \"a lot of oxygen machines at home\"  On 4L NC at rest, SaO2 95%.   Sitting SaO2 93-95%with 4L NC, pulse 57 bpm, BP 86/40 mmHg (nurse notified),  SUPINE SaO2 89-93% with 4L NC, pulse 60 " bpm, /53 mmHg Pt reports PLOF living with son, DIL and grandchild, in [facility] with walk in entry. Pt typically (I) with ADLs; Mod(I) with Rwx for household ambulation, and doesn't drive. Pt was found down at [facility] per ER provider note. He was under Hospice care there. D/C plan thus far is to d/c home with son & Hospice versus d/c to a different rehab. Pt required MAX A with supine to/from sit and unable to assess transfers due to dizziness and hypotension in sitting. Pending 24/7 sup/assist from family, return to SNF recommended based on mobility decline / medical complexity at time of PT eval.  -JV          Row Name 01/20/25 1325                 Therapy Assessment/Plan (PT)     Criteria for Skilled Interventions Met (PT) yes;meets criteria;skilled treatment is necessary  -JV       Therapy Frequency (PT) 5 times/wk  -JV       Predicted Duration of Therapy Intervention (PT) until d/c  -JV          Row Name 01/20/25 1325                 Vital Signs     Intra Systolic BP Rehab 86  -JV       Intra Treatment Diastolic BP 40  -JV       Post Systolic BP Rehab 122  -JV       Post Treatment Diastolic BP 53  -JV       Intratreatment Heart Rate (beats/min) 57  -JV       Posttreatment Heart Rate (beats/min) 60  -JV       Pre SpO2 (%) 95  -JV       O2 Delivery Pre Treatment nasal cannula  4L  -JV       Intra SpO2 (%) 93  -JV       O2 Delivery Intra Treatment nasal cannula  4L  -JV       Post SpO2 (%) 89  -JV       O2 Delivery Post Treatment nasal cannula  4L  -JV          Row Name 01/20/25 1323                 Positioning and Restraints     Pre-Treatment Position in bed  -JV       Post Treatment Position bed  -JV       In Bed notified nsg;fowlers;call light within reach;encouraged to call for assist;exit alarm on;SCD pump applied;with other staff  -JV                       User Key  (r) = Recorded By, (t) = Taken By, (c) = Cosigned By        Initials Name Provider Type     Haylee Mitchell Physical Therapist        "                     Outcome Measures    No documentation.                            Physical Therapy Education            Title: PT OT SLP Therapies (In Progress)         Topic: Physical Therapy (In Progress)         Point: Mobility training (In Progress)         Learning Progress Summary             Patient Nonacceptance, E, NR,NL by CANDY at 1/20/2025 1334                                                User Key         Initials Effective Dates Name Provider Type Discipline     CANDY 03/30/21 -  Haylee Rai Physical Therapist PT                          PT Recommendation and Plan  Planned Therapy Interventions (PT): balance training, bed mobility training, gait training, home exercise program, strengthening, patient/family education, transfer training  Outcome Evaluation: Pt is an 78 y/o male found down/unresponsive at [facility]; acute COPD exacerbation with severe emphysema. Pt chronically on 4-6L O2 at [facility], currently on 4L O2. Pt recently admitted to [facility] on 12/22/24 with dyspnea and pneumonia. Pt discharged to [facility] on 1/3/25.   PMHx significant for COPD, severe malnutrition, PVD, HTN, and HLD.  Chest XR 1/18: Bibasilar consolidations, which could reflect aspiration or pneumonia.  CT head 1/18: No acute intracranial abnormality. Pt A&O to person and place only; reports \"a lot of oxygen machines at home\"  On 4L NC at rest, SaO2 95%.   Sitting SaO2 93-95%with 4L NC, pulse 57 bpm, BP 86/40 mmHg (nurse notified),  SUPINE SaO2 89-93% with 4L NC, pulse 60 bpm, /53 mmHg Pt reports PLOF living with son, DIL and grandchild, in [facility] with walk in entry. Pt typically (I) with ADLs; Mod(I) with Rwx for household ambulation, and doesn't drive. Pt was found down at [facility] per ER provider note. He was under Hospice care there. D/C plan thus far is to d/c home with son & Hospice versus d/c to a different rehab. Pt required MAX A with supine to/from sit and unable to assess transfers due to dizziness and " hypotension in sitting. Pending 24/7 sup/assist from family, return to SNF recommended based on mobility decline / medical complexity at time of PT eval.      Time Calculation:        PT Charges         Row Name 01/20/25 1335                       Time Calculation     Start Time 1054  -JV         Stop Time 1117  -JV         Time Calculation (min) 23 min  -JV         PT Received On 01/20/25  -JV         PT - Next Appointment 01/21/25  -JV         PT Goal Re-Cert Due Date 02/03/25  -JV                 Time Calculation- PT     Total Timed Code Minutes- PT 0 minute(s)  -JV                      User Key  (r) = Recorded By, (t) = Taken By, (c) = Cosigned By        Initials Name Provider Type     Haylee Mitchell Physical Therapist                       Therapy Charges for Today         Code Description Service Date Service Provider Modifiers Qty     15811561805 HC PT EVAL MOD COMPLEXITY 4 1/20/2025 Haylee Rai GP 1                PT G-Codes  AM-PAC 6 Clicks Score (PT): 8  PT Discharge Summary  Anticipated Discharge Disposition (PT): skilled nursing facility     Haylee Rai                   1/20/2025                                    Mukul Townsend OT   Occupational Therapist  Specialty:  Occupational Therapy  Therapy Treatment Note      Signed  Date of Service:  01/22/25 1436  Creation Time:  01/22/25 1436     Signed        Subjective: Pt agreeable to therapeutic plan of care.  Cognition: oriented to Person, Place, and Time     Objective:      Precautions - orthostatic hypotension     Bed Mobility: Min-A   Functional Transfers: Min-A     Balance: supported, static, and standing Min-A  Functional Ambulation: N/A or Not attempted.     Feeding: Min-A and Mod-A  ADL Position: supported sitting  Vitals: Desaturates and Orthostatic     Pain: 4 VAS  Location: BLE  Interventions for pain: Repositioned  Education: Provided education on the importance of mobility in the acute care setting        Assessment:  "Kali Garcia presents with ADL impairments affecting function including balance, endurance / activity tolerance, pain, and strength. Demonstrated functioning below baseline abilities indicate the need for continued skilled intervention while inpatient. Tolerating session today without incident. Will continue to follow and progress as tolerated.      Plan/Recommendations:   Moderate Intensity Therapy recommended post-acute care. This is recommended as therapy feels the patient would require 3-4 days per week and wouldn't tolerate \"3 hour daily\" rehab intensity. SNF would be the preferred choice. If the patient does not agree to SNF, arrange HH or OP depending on home bound status. If patient is medically complex, consider LTACH.. Pt requires no DME at discharge.      Pt desires Skilled Rehab placement at discharge. Pt cooperative; agreeable to therapeutic recommendations and plan of care.      Modified [place]: N/A = No pre-op stroke/TIA     Post-Tx Position: Up in Chair  PPE: gloves     Therapy Charges for Today         Code Description Service Date Service Provider Modifiers Qty     92396573337  OT THERAPEUTIC ACT EA 15 MIN 1/22/2025 Mukul Townsend OT GO 2               Time Calculation- OT         Row Name 01/22/25 1436                       Time Calculation- OT     OT Start Time 1352  -MP         OT Stop Time 1415  -MP         OT Time Calculation (min) 23 min  -MP         Total Timed Code Minutes- OT 23 minute(s)  -         OT Received On 01/22/25  -         OT - Next Appointment 01/24/25  -                      User Key  (r) = Recorded By, (t) = Taken By, (c) = Cosigned By        Initials Name Provider Type     Mukul Narayanan OT Occupational Therapist                                        Anne Marie De León, PT   Physical Therapist  Specialty:  Physical Therapy  Therapy Treatment Note      Signed  Date of Service:  01/22/25 1514  Creation Time:  01/22/25 1514     Signed        Subjective: Pt " "agreeable to therapeutic plan of care. Pt reports his goal is to be able to walk to his back porch.     Objective:      Precautions - 3L O2; orthostatic hypotension     Bed mobility - Min-A     Transfers - Min-A, Assist x 2, and with rolling walker, 2nd person for safety. For sit to stands and stand-pivot transfer.     Ambulation - 2 feet Min-A with RW during short ambulatory transfer to recliner     Balance - Jerod in standing for posterior lean and postural sway     Therapeutic Exercise - x10 BLE: ankle pumps, SLR, knee to chest, seated LAQ     Vitals: Orthostatic: BP 90s/40s after standing. Improved to 133/54     Pain: 4 VAS   Location: BLE  Intervention for pain: Repositioned, RN notified, Increased Activity, and Therapeutic Presence     Education: Provided education on the importance of mobility in the acute care setting, Verbal/Tactile Cues, Transfer Training, Gait Training, and Energy conservation strategies     Assessment: Kali Garcia presents with functional mobility impairments which indicate the need for skilled intervention. Pt is pleasant and determined to improve strength/balance for goal of being able to walk to his back porch when he is able to go back home. Limited due to (+) orthostatic hypotension but otherwise required minAx2 for transfers and small steps to the recliner. Tolerating session today without incident. Will continue to follow and progress as tolerated.      Plan/Recommendations:   If medically appropriate, Moderate Intensity Therapy recommended post-acute care. This is recommended as therapy feels the patient would require 3-4 days per week and wouldn't tolerate \"3 hour daily\" rehab intensity. SNF would be the preferred choice. If the patient does not agree to SNF, arrange HH or OP depending on home bound status. If patient is medically complex, consider LTACH. Pt requires no DME at discharge.      Pt desires Skilled Rehab placement at discharge. Pt cooperative; agreeable to " therapeutic recommendations and plan of care.            Basic Mobility 6-click:  Rollin = Total, A lot = 2, A little = 3; 4 = None  Supine>Sit:                      1 = Total, A lot = 2, A little = 3; 4 = None   Sit>Stand with arms:       1 = Total, A lot = 2, A little = 3; 4 = None  Bed>Chair:                      1 = Total, A lot = 2, A little = 3; 4 = None  Ambulate in room:           1 = Total, A lot = 2, A little = 3; 4 = None  3-5 Steps with railin = Total, A lot = 2, A little = 3; 4 = None  Score: 18     Modified [place]: N/A = No pre-op stroke/TIA     Post-Tx Position: Up in Chair, Alarms activated, and Call light and personal items within reach  PPE: gloves and surgical mask     Therapy Charges for Today         Code Description Service Date Service Provider Modifiers Qty     78929381394 HC PT THERAPEUTIC ACT EA 15 MIN 2025 Anne Marie De León, PT GP 1     01075141405 HC PT NEUROMUSC RE EDUCATION EA 15 MIN 2025 Anne Marie De León, PT GP 1               PT Charges         Row Name 25 1513                       Time Calculation     Start Time 1349  -OD         Stop Time 1410  -OD         Time Calculation (min) 21 min  -OD         PT Received On 25  -OD         PT - Next Appointment 25  -OD                 Time Calculation- PT     Total Timed Code Minutes- PT 21 minute(s)  -OD                      User Key  (r) = Recorded By, (t) = Taken By, (c) = Cosigned By        Initials Name Provider Type     OD Anne Marie De León, PT Physical Therapist

## 2025-01-22 NOTE — PLAN OF CARE
Goal Outcome Evaluation:      Pt. Demonstrates progress w/ functional mobility this date SPS transfer w/ min A for safety + rolling walker support. Pt. W/ limited standing tolerance secondary to orthostatic hypotension 90s/40s, recovers SBP > 110 once returned to armchair w/ BLEs elevated. Pt. Requires min-mod A for feeding this date, due to increased BUE tremor. Pt. Desats w/ prolonged activity < 83%. Recommend SNF placement to address aforementioned deficits.

## 2025-01-23 ENCOUNTER — READMISSION MANAGEMENT (OUTPATIENT)
Dept: CALL CENTER | Facility: HOSPITAL | Age: 80
End: 2025-01-23
Payer: MEDICARE

## 2025-01-23 VITALS
OXYGEN SATURATION: 96 % | RESPIRATION RATE: 14 BRPM | BODY MASS INDEX: 15.12 KG/M2 | HEART RATE: 62 BPM | DIASTOLIC BLOOD PRESSURE: 54 MMHG | TEMPERATURE: 97.6 F | WEIGHT: 108.03 LBS | SYSTOLIC BLOOD PRESSURE: 117 MMHG | HEIGHT: 71 IN

## 2025-01-23 LAB
ANION GAP SERPL CALCULATED.3IONS-SCNC: 5.9 MMOL/L (ref 5–15)
ANISOCYTOSIS BLD QL: ABNORMAL
BACTERIA SPEC AEROBE CULT: NORMAL
BACTERIA SPEC AEROBE CULT: NORMAL
BUN SERPL-MCNC: 18 MG/DL (ref 8–23)
BUN/CREAT SERPL: 32.1 (ref 7–25)
CALCIUM SPEC-SCNC: 8.1 MG/DL (ref 8.6–10.5)
CHLORIDE SERPL-SCNC: 106 MMOL/L (ref 98–107)
CO2 SERPL-SCNC: 28.1 MMOL/L (ref 22–29)
CREAT SERPL-MCNC: 0.56 MG/DL (ref 0.76–1.27)
DEPRECATED RDW RBC AUTO: 57.7 FL (ref 37–54)
EGFRCR SERPLBLD CKD-EPI 2021: 99.6 ML/MIN/1.73
EOSINOPHIL # BLD MANUAL: 0.05 10*3/MM3 (ref 0–0.4)
EOSINOPHIL NFR BLD MANUAL: 1 % (ref 0.3–6.2)
ERYTHROCYTE [DISTWIDTH] IN BLOOD BY AUTOMATED COUNT: 17 % (ref 12.3–15.4)
GLUCOSE SERPL-MCNC: 114 MG/DL (ref 65–99)
HCT VFR BLD AUTO: 24.1 % (ref 37.5–51)
HGB BLD-MCNC: 7.7 G/DL (ref 13–17.7)
LYMPHOCYTES # BLD MANUAL: 0.58 10*3/MM3 (ref 0.7–3.1)
LYMPHOCYTES NFR BLD MANUAL: 5 % (ref 5–12)
MCH RBC QN AUTO: 30.1 PG (ref 26.6–33)
MCHC RBC AUTO-ENTMCNC: 32 G/DL (ref 31.5–35.7)
MCV RBC AUTO: 94.1 FL (ref 79–97)
MONOCYTES # BLD: 0.24 10*3/MM3 (ref 0.1–0.9)
NEUTROPHILS # BLD AUTO: 3.99 10*3/MM3 (ref 1.7–7)
NEUTROPHILS NFR BLD MANUAL: 80 % (ref 42.7–76)
NEUTS BAND NFR BLD MANUAL: 2 % (ref 0–5)
PLAT MORPH BLD: NORMAL
PLATELET # BLD AUTO: 147 10*3/MM3 (ref 140–450)
PMV BLD AUTO: 10.6 FL (ref 6–12)
POIKILOCYTOSIS BLD QL SMEAR: ABNORMAL
POTASSIUM SERPL-SCNC: 4 MMOL/L (ref 3.5–5.2)
RBC # BLD AUTO: 2.56 10*6/MM3 (ref 4.14–5.8)
SCAN SLIDE: NORMAL
SODIUM SERPL-SCNC: 140 MMOL/L (ref 136–145)
TOXIC GRANULATION: ABNORMAL
VARIANT LYMPHS NFR BLD MANUAL: 12 % (ref 19.6–45.3)
WBC NRBC COR # BLD AUTO: 4.86 10*3/MM3 (ref 3.4–10.8)

## 2025-01-23 PROCEDURE — 80048 BASIC METABOLIC PNL TOTAL CA: CPT | Performed by: NURSE PRACTITIONER

## 2025-01-23 PROCEDURE — 94761 N-INVAS EAR/PLS OXIMETRY MLT: CPT

## 2025-01-23 PROCEDURE — 85007 BL SMEAR W/DIFF WBC COUNT: CPT | Performed by: NURSE PRACTITIONER

## 2025-01-23 PROCEDURE — 94799 UNLISTED PULMONARY SVC/PX: CPT

## 2025-01-23 PROCEDURE — 97116 GAIT TRAINING THERAPY: CPT

## 2025-01-23 PROCEDURE — 97110 THERAPEUTIC EXERCISES: CPT

## 2025-01-23 PROCEDURE — 63710000001 PREDNISONE PER 1 MG: Performed by: INTERNAL MEDICINE

## 2025-01-23 PROCEDURE — 97530 THERAPEUTIC ACTIVITIES: CPT

## 2025-01-23 PROCEDURE — 94664 DEMO&/EVAL PT USE INHALER: CPT

## 2025-01-23 PROCEDURE — 25010000002 PIPERACILLIN SOD-TAZOBACTAM PER 1 G: Performed by: INTERNAL MEDICINE

## 2025-01-23 PROCEDURE — 85025 COMPLETE CBC W/AUTO DIFF WBC: CPT | Performed by: NURSE PRACTITIONER

## 2025-01-23 RX ORDER — PREDNISONE 20 MG/1
40 TABLET ORAL
Qty: 6 TABLET | Refills: 0 | Status: SHIPPED | OUTPATIENT
Start: 2025-01-24 | End: 2025-01-27

## 2025-01-23 RX ADMIN — ARFORMOTEROL TARTRATE 15 MCG: 15 SOLUTION RESPIRATORY (INHALATION) at 07:04

## 2025-01-23 RX ADMIN — PANTOPRAZOLE SODIUM 40 MG: 40 TABLET, DELAYED RELEASE ORAL at 08:43

## 2025-01-23 RX ADMIN — AMIODARONE HYDROCHLORIDE 200 MG: 200 TABLET ORAL at 08:43

## 2025-01-23 RX ADMIN — IPRATROPIUM BROMIDE AND ALBUTEROL SULFATE 3 ML: .5; 3 SOLUTION RESPIRATORY (INHALATION) at 13:54

## 2025-01-23 RX ADMIN — POLYETHYLENE GLYCOL 3350 17 G: 17 POWDER, FOR SOLUTION ORAL at 08:43

## 2025-01-23 RX ADMIN — BUDESONIDE INHALATION 0.5 MG: 0.5 SUSPENSION RESPIRATORY (INHALATION) at 07:00

## 2025-01-23 RX ADMIN — TAMSULOSIN HYDROCHLORIDE 0.4 MG: 0.4 CAPSULE ORAL at 08:43

## 2025-01-23 RX ADMIN — PIPERACILLIN AND TAZOBACTAM 3.38 G: 3; .375 INJECTION, POWDER, LYOPHILIZED, FOR SOLUTION INTRAVENOUS at 12:05

## 2025-01-23 RX ADMIN — APIXABAN 2.5 MG: 2.5 TABLET, FILM COATED ORAL at 08:43

## 2025-01-23 RX ADMIN — PREDNISONE 40 MG: 20 TABLET ORAL at 08:43

## 2025-01-23 RX ADMIN — Medication 10 ML: at 08:42

## 2025-01-23 RX ADMIN — PIPERACILLIN AND TAZOBACTAM 3.38 G: 3; .375 INJECTION, POWDER, LYOPHILIZED, FOR SOLUTION INTRAVENOUS at 04:27

## 2025-01-23 RX ADMIN — IPRATROPIUM BROMIDE AND ALBUTEROL SULFATE 3 ML: .5; 3 SOLUTION RESPIRATORY (INHALATION) at 03:17

## 2025-01-23 RX ADMIN — Medication 10 ML: at 04:27

## 2025-01-23 RX ADMIN — CLOPIDOGREL BISULFATE 75 MG: 75 TABLET ORAL at 08:43

## 2025-01-23 RX ADMIN — THEOPHYLLINE 300 MG: 300 TABLET, EXTENDED RELEASE ORAL at 08:43

## 2025-01-23 RX ADMIN — ERGOCALCIFEROL 50000 UNITS: 1.25 CAPSULE ORAL at 08:43

## 2025-01-23 NOTE — THERAPY TREATMENT NOTE
"Subjective: Pt agreeable to therapeutic plan of care.    Objective:     Precautions -   high fall risk, 4L 02 nc    Bed mobility - Min-A  supine to sit.  Pt sat at edge of the bed for about 8 minutes for ble exercises  Transfers - Min-A and with rolling walker  Ambulation - 30 feet Min-A and with rolling walker    Therapeutic Exercise - 10 Reps B LE AROM lying supine and unsupported sitting / EOB    Vitals: WNL  pts sats dropped to 90% after gait training on 4L nc.  Pt was moderately short of air    Pain: 4 VAS   Location: ble's  Intervention for pain: Repositioned, Increased Activity, and Therapeutic Presence    Education: Provided education on the importance of mobility in the acute care setting, Verbal/Tactile Cues, Transfer Training, and Gait Training    Assessment: Kali Garcia presents with functional mobility impairments which indicate the need for skilled intervention. Tolerating session today without incident. Pt tolerated tx session well.  Pt still very weak which impairs his mobility.  Cont to recommend rehab at d/c. Will continue to follow and progress as tolerated.     Plan/Recommendations:   If medically appropriate, Moderate Intensity Therapy recommended post-acute care. This is recommended as therapy feels the patient would require 3-4 days per week and wouldn't tolerate \"3 hour daily\" rehab intensity. SNF would be the preferred choice. If the patient does not agree to SNF, arrange HH or OP depending on home bound status. If patient is medically complex, consider LTACH. Pt requires no DME at discharge.     Pt desires Skilled Rehab placement at discharge. Pt cooperative; agreeable to therapeutic recommendations and plan of care.     Basic Mobility 6-click:  Rollin = Total, A lot = 2, A little = 3; 4 = None  Supine>Sit:   1 = Total, A lot = 2, A little = 3; 4 = None   Sit>Stand with arms:  1 = Total, A lot = 2, A little = 3; 4 = None  Bed>Chair:   1 = Total, A lot = 2, A little = 3; 4 = " None  Ambulate in room:  1 = Total, A lot = 2, A little = 3; 4 = None  3-5 Steps with railin = Total, A lot = 2, A little = 3; 4 = None  Score: 15    Post-Tx Position: Up in Chair, Alarms activated, and Call light and personal items within reach  PPE: gloves    Therapy Charges for Today       Code Description Service Date Service Provider Modifiers Qty    83923258645 HC GAIT TRAINING EA 15 MIN 2025 Ingrid Stanley, PEDRO GP 1    98249735649 HC PT THERAPEUTIC ACT EA 15 MIN 2025 Ingrid Stanley, PEDRO GP 1    80133026233 HC PT THER PROC EA 15 MIN 2025 Ingrid Stanley, PEDRO GP 1           PT Charges       Row Name 25 1320             Time Calculation    Start Time 1047  -SC      Stop Time 1117  -SC      Time Calculation (min) 30 min  -SC      PT Received On 25  -SC      PT - Next Appointment 25  -SC         Time Calculation- PT    Total Timed Code Minutes- PT 30 minute(s)  -SC                User Key  (r) = Recorded By, (t) = Taken By, (c) = Cosigned By      Initials Name Provider Type    SC Ingrid Stanley PTA Physical Therapist Assistant

## 2025-01-23 NOTE — SIGNIFICANT NOTE
Case Management/Social Work    Patient Name:  Kali Garcia  YOB: 1945  MRN: [id number]  Admit Date:  1/18/2025 01/23/25 0819   Post Acute Pre-Cert Documentation   Date Post Acute Pre-Cert Completed 01/23/25   All Clinicals Submitted? Yes   Response Received from Insurance? Approval   Post Acute Pre-Cert Initiated Comment JEANNETTE verified SNF precert approval via [employer] portal. Auth ID [id number] . SNF precert valid 1/23-1/29. Approval letter indexed to media. CM made aware.           Electronically signed by:  Mike Franco CMA  01/23/25 08:20 EST    Mike Franco  Case Management Associate  [address] [address]  P: [phone]  F: [phone]

## 2025-01-23 NOTE — DISCHARGE SUMMARY
[facility] Medicine Services  Discharge Summary    Date of Service: 2025  Patient Name: Kali Garcia  : 1945  MRN: [id number]    Date of Admission: 2025  Discharge Diagnosis:   Acute bacterial pneumonia, possibly aspiration pneumonia, unspecified organism  Acute metabolic encephalopathy  Acute COPD exacerbation  Acute Pseudomonas UTI  CAD  Chronic A-fib  Dyslipidemia  Sick sinus syndrome  Anemia of chronic disease  Severe protein calorie malnutrition    Date of Discharge: 2025  Primary Care Physician: Tami Ward APRN      Presenting Problem:   Syncope and collapse [R55]  Acute exacerbation of chronic obstructive pulmonary disease (COPD) [J44.1]  Acute urinary tract infection [N39.0]  Left maxillary sinusitis [J32.0]  Mastoid disorder, left [H74.92]  Pneumonia of both lower lobes due to infectious organism [J18.9]  Acute non-recurrent sphenoidal sinusitis [J01.30]    Active and Resolved Hospital Problems:  Active Hospital Problems    Diagnosis POA    **HCAP (healthcare-associated pneumonia) [J18.9] Yes    Syncope and collapse [R55] Yes    Unresponsive episode [R40.4] Yes    Acute UTI (urinary tract infection) [N39.0] Yes    Sick sinus syndrome [I49.5] Yes    Anemia [D64.9] Yes    Coronary artery disease [I25.10] Yes    COPD (chronic obstructive pulmonary disease) [J44.9] Yes    Severe malnutrition [E43] Yes      Resolved Hospital Problems   No resolved problems to display.         Hospital Course     HPI:    Patient is an 79-year-old male who presented to the hospital with mental status changes.  Please see H&P for details.    Hospital Course:  The patient was admitted for acute metabolic encephalopathy with further workup revealing probable bilateral bacterial pneumonia, concerning for aspiration pneumonia.  He has a previous history of recurrent pneumonia and COPD exacerbation and was recently discharged from this facility for the same.  He has a history  of aspiration as well and it is unclear what type of diet he was receiving at his skilled rehab facility prior to this admission.  Regardless, the patient was started on broad-spectrum IV antibiotics initially with cefepime, however he was switched to Zosyn for possible aspiration pneumonia.  The patient did have improvement in his mental status with initiation of antibiotics and appeared to be back to his baseline prior to discharge.  He received a 5-day course of IV antibiotics during his hospital stay.  Urine culture also grew Pseudomonas and he received a total of 3 days of IV Zosyn for this.  He was also started on steroids and nebulizers for COPD exacerbation and this improved significantly.  He will continue a short course of oral steroids on discharge.  He was evaluated by speech therapy during his hospital stay and he underwent a video swallow study which did allow him to upgrade his diet to mechanical ground with thin liquids.  He tolerated p.o. intake very well.  He is stable for discharge.  Of note, the patient will be followed by hospice once he is discharged from SNF.        DISCHARGE Follow Up Recommendations for labs and diagnostics: Follow-up with your PCP in 2 weeks.  Follow-up with hospice upon discharge from SNF        Day of Discharge     Vital Signs:  Temp:  [97.5 °F (36.4 °C)-98.1 °F (36.7 °C)] 97.5 °F (36.4 °C)  Heart Rate:  [57-64] 57  Resp:  [12-22] 13  BP: (123-142)/(52-77) 127/55  Flow (L/min) (Oxygen Therapy):  [4-4.5] 4    Physical Exam:  Physical Exam   General Appearance:  Alert, cooperative, no distress, appears stated age  Head:  Normocephalic, without obvious abnormality, atraumatic  Eyes:  PERRL, conjunctiva/corneas clear, EOM's intact, fundi benign, both eyes  Ears:  Normal TM's and external ear canals, both ears  Nose: Nares normal, septum midline, mucosa normal, no drainage or sinus tenderness  Throat: Lips, mucosa, and tongue normal; teeth and gums normal  Neck: Supple,  symmetrical, trachea midline, no adenopathy, thyroid: not enlarged, symmetric, no tenderness/mass/nodules, no carotid bruit or JVD  Lungs:   Clear to auscultation bilaterally, respirations unlabored  Heart:  Regular rate and rhythm, S1, S2 normal, no murmur, rub or gallop  Abdomen:  Soft, non-tender, bowel sounds active all four quadrants,  no masses, no organomegaly  Extremities: Extremities normal, atraumatic, no cyanosis or edema  Pulses: 2+ and symmetric  Skin: Skin color, texture, turgor normal, no rashes or lesions  Neurologic: Normal        Pertinent  and/or Most Recent Results     LAB RESULTS:      Lab 01/23/25 0426 01/22/25 0333 01/21/25 0314 01/20/25 0215 01/19/25  0249 01/18/25  1700 01/18/25  1654   WBC 4.86 4.83 4.50 4.51 3.04*  --  4.25   HEMOGLOBIN 7.7* 8.0* 8.5* 8.6* 9.2*  --  9.0*   HEMATOCRIT 24.1* 25.7* 26.8* 26.9* 30.5*  --  29.1*   PLATELETS 147 134* 132* 132* 128*  --  116*   NEUTROS ABS 3.99 4.44 4.28 3.97 2.86  --  2.85   IMMATURE GRANS (ABS)  --   --   --   --   --   --  0.76*   LYMPHS ABS  --   --   --   --   --   --  0.28*   MONOS ABS  --   --   --   --   --   --  0.35   EOS ABS 0.05  --   --   --   --   --  0.00   MCV 94.1 94.5 95.0 94.7 101.3*  --  97.0   LACTATE  --   --   --   --   --  1.0  --          Lab 01/23/25 0426 01/22/25 0333 01/21/25 0314 01/20/25 0215 01/19/25  0249   SODIUM 140 139 140 142 139   POTASSIUM 4.0 4.3 4.1 3.9 4.2   CHLORIDE 106 106 107 108* 105   CO2 28.1 27.7 25.7 24.6 20.4*   ANION GAP 5.9 5.3 7.3 9.4 13.6   BUN 18 21 25* 25* 18   CREATININE 0.56* 0.62* 0.69* 0.74* 0.65*   EGFR 99.6 96.6 93.6 91.6 95.3   GLUCOSE 114* 127* 208* 75 115*   CALCIUM 8.1* 8.5* 8.1* 8.1* 7.9*         Lab 01/18/25  1654   TOTAL PROTEIN 6.4   ALBUMIN 3.0*   GLOBULIN 3.4   ALT (SGPT) 42*   AST (SGOT) 37   BILIRUBIN 0.6   ALK PHOS 70         Lab 01/18/25  1744 01/18/25  1654   HSTROP T 25* 26*                 Lab 01/19/25  1343   PH, ARTERIAL 7.427   PCO2, ARTERIAL 37.7   PO2 ART  60.4*   O2 SATURATION ART 91.5*   FIO2 34   HCO3 ART 24.9   BASE EXCESS ART 0.5     Brief Urine Lab Results  (Last result in the past 365 days)        Color   Clarity   Blood   Leuk Est   Nitrite   Protein   CREAT   Urine HCG        01/18/25 1654 Orange  Comment: Any Substance that causes an abnormal urine color can alter the accuracy of the chemical reactions.   Turbid   Large (3+)   Moderate (2+)   Positive   >=300 mg/dL (3+)                 Microbiology Results (last 10 days)       Procedure Component Value - Date/Time    MRSA Screen, PCR (Inpatient) - Swab, Nares [[id number]]  (Normal) Collected: 01/20/25 1443    Lab Status: Final result Specimen: Swab from Nares Updated: 01/20/25 1638     MRSA PCR No MRSA Detected    Narrative:      The negative predictive value of this diagnostic test is high and should only be used to consider de-escalating anti-MRSA therapy. A positive result may indicate colonization with MRSA and must be correlated clinically.    Blood Culture - Blood, Arm, Left [[id number]]  (Normal) Collected: 01/18/25 1744    Lab Status: Preliminary result Specimen: Blood from Arm, Left Updated: 01/22/25 1800     Blood Culture No growth at 4 days    Respiratory Panel PCR w/COVID-19(SARS-CoV-2) JOSEFINA/MERE/FAROOQ/PAD/COR/VANDA In-House, NP Swab in UTM/VTM, 2 HR TAT - Swab, Nasopharynx [[id number]]  (Normal) Collected: 01/18/25 1654    Lab Status: Final result Specimen: Swab from Nasopharynx Updated: 01/18/25 1759     ADENOVIRUS, PCR Not Detected     Coronavirus 229E Not Detected     Coronavirus HKU1 Not Detected     Coronavirus NL63 Not Detected     Coronavirus OC43 Not Detected     COVID19 Not Detected     Human Metapneumovirus Not Detected     Human Rhinovirus/Enterovirus Not Detected     Influenza A PCR Not Detected     Influenza B PCR Not Detected     Parainfluenza Virus 1 Not Detected     Parainfluenza Virus 2 Not Detected     Parainfluenza Virus 3 Not Detected     Parainfluenza Virus 4 Not Detected     RSV,  PCR Not Detected     Bordetella pertussis pcr Not Detected     Bordetella parapertussis PCR Not Detected     Chlamydophila pneumoniae PCR Not Detected     Mycoplasma pneumo by PCR Not Detected    Narrative:      In the setting of a positive respiratory panel with a viral infection PLUS a negative procalcitonin without other underlying concern for bacterial infection, consider observing off antibiotics or discontinuation of antibiotics and continue supportive care. If the respiratory panel is positive for atypical bacterial infection (Bordetella pertussis, Chlamydophila pneumoniae, or Mycoplasma pneumoniae), consider antibiotic de-escalation to target atypical bacterial infection.    Blood Culture - Blood, Arm, Left [[id number]]  (Normal) Collected: 01/18/25 1654    Lab Status: Preliminary result Specimen: Blood from Arm, Left Updated: 01/22/25 1700     Blood Culture No growth at 4 days    Narrative:      Less than seven (7) mL's of blood was collected.  Insufficient quantity may yield false negative results.    Urine Culture - Urine, Urine, Catheter [[id number]]  (Abnormal)  (Susceptibility) Collected: 01/18/25 1654    Lab Status: Final result Specimen: Urine, Catheter Updated: 01/21/25 0957     Urine Culture >100,000 CFU/mL Pseudomonas aeruginosa    Narrative:      Colonization of the urinary tract without infection is common. Treatment is discouraged unless the patient is symptomatic, pregnant, or undergoing an invasive urologic procedure.    Susceptibility        Pseudomonas aeruginosa      AHMET      Cefepime Intermediate      Ceftazidime Susceptible      Ciprofloxacin Susceptible      Levofloxacin Intermediate      Piperacillin + Tazobactam Susceptible      Tobramycin Susceptible                                   XR Chest 1 View    Result Date: 1/18/2025  Impression: Impression: 1.Bibasilar consolidations, which could reflect aspiration or pneumonia. 2.Small bilateral pleural effusions. 3.Emphysema. Electronically  Signed: Jona Bland MD  1/18/2025 6:18 PM EST  Workstation ID: [id number]    CT Head Without Contrast    Result Date: 1/18/2025  Impression: Impression: 1. No acute intracranial abnormality. 2. Acute left maxillary and sphenoid sinusitis. 3. Complete opacification of the left mastoid air cells which appears acute and new from 10/10/2024. Electronically Signed: Mike Celaya MD  1/18/2025 5:46 PM EST  Workstation ID: [id number]     Results for orders placed during the hospital encounter of 02/01/24    Bilateral Carotid Duplex    Interpretation Summary    Minimal atherosclerotic plaque proximal right internal carotid artery with less than 50% ICA stenosis.    Mild atherosclerotic plaque proximal left internal carotid artery with less than 50% ICA stenosis.      Results for orders placed during the hospital encounter of 02/01/24    Bilateral Carotid Duplex    Interpretation Summary    Minimal atherosclerotic plaque proximal right internal carotid artery with less than 50% ICA stenosis.    Mild atherosclerotic plaque proximal left internal carotid artery with less than 50% ICA stenosis.      Results for orders placed during the hospital encounter of 02/01/24    Adult Transthoracic Echo Limited W/ Cont if Necessary Per Protocol    Interpretation Summary    Left ventricular systolic function is normal. Left ventricular ejection fraction appears to be 61 - 65%.    Left ventricular diastolic function was normal.    The left atrial cavity is mildly dilated.    Left atrial volume is mildly increased.    There is moderate calcification of the aortic valve mainly affecting the left coronary and right coronary cusp(s).    Estimated right ventricular systolic pressure from tricuspid regurgitation is mildly elevated (35-45 mmHg).    Mild pulmonary hypertension is present.      Labs Pending at Discharge:  Pending Results       None            Procedures Performed           Consults:   Consults       Date and Time Order Name  Status Description    12/30/2024 11:22 AM Inpatient Cardiology Consult Completed     12/23/2024  2:10 AM Inpatient Pulmonology Consult Completed               Discharge Details        Discharge Medications        New Medications        Instructions Start Date   amoxicillin-clavulanate 875-125 MG per tablet  Commonly known as: AUGMENTIN   1 tablet, Oral, 2 Times Daily      predniSONE 20 MG tablet  Commonly known as: DELTASONE   40 mg, Oral, Daily With Breakfast   Start Date: January 24, 2025            Continue These Medications        Instructions Start Date   amiodarone 200 MG tablet  Commonly known as: PACERONE   200 mg, Oral, 2 Times Daily      apixaban 2.5 MG tablet tablet  Commonly known as: ELIQUIS   2.5 mg, Oral, Every 12 Hours Scheduled      arformoterol 15 MCG/2ML nebulizer solution  Commonly known as: BROVANA   15 mcg, Nebulization, 2 Times Daily - RT      atorvastatin 10 MG tablet  Commonly known as: LIPITOR   10 mg, Nightly      budesonide 0.5 MG/2ML nebulizer solution  Commonly known as: PULMICORT   0.5 mg, Nebulization, 2 Times Daily - RT      cholecalciferol 1.25 MG (68647 UT) capsule  Commonly known as: VITAMIN D3   50,000 Units, Every 7 Days      clopidogrel 75 MG tablet  Commonly known as: PLAVIX   1 tablet, Daily      hydrOXYzine 25 MG tablet  Commonly known as: ATARAX   25 mg, Nightly      ipratropium-albuterol 0.5-2.5 mg/3 ml nebulizer  Commonly known as: DUO-NEB   3 mL, Every 4 Hours PRN      Narcan 4 MG/0.1ML nasal spray  Generic drug: naloxone   1 spray, As Needed      pantoprazole 40 MG EC tablet  Commonly known as: PROTONIX   40 mg, Daily      polyethylene glycol 17 g packet  Commonly known as: MIRALAX   17 g, Daily      tamsulosin 0.4 MG capsule 24 hr capsule  Commonly known as: FLOMAX   0.4 mg, Oral, Daily      theophylline 300 MG 12 hr tablet  Commonly known as: THEODUR   300 mg, Oral, Daily      traZODone 100 MG tablet  Commonly known as: DESYREL   100 mg, Daily PRN                Allergies   Allergen Reactions    Codeine GI Intolerance         Discharge Disposition:     Home or Self Care    Diet:  Hospital:  Diet Order   Procedures    Diet: Regular/House; Texture: Mechanical Ground (NDD 2); Fluid Consistency: Thin (IDDSI 0)         Discharge Activity:         CODE STATUS:  Code Status and Medical Interventions: No CPR (Do Not Attempt to Resuscitate); Limited Support; No intubation (DNI)   Ordered at: 01/18/25 2045     Medical Intervention Limits:    No intubation (DNI)     Code Status (Patient has no pulse and is not breathing):    No CPR (Do Not Attempt to Resuscitate)     Medical Interventions (Patient has pulse or is breathing):    Limited Support         No future appointments.    Additional Instructions for the Follow-ups that You Need to Schedule       Discharge Follow-up with PCP   As directed       Currently Documented PCP:    Tami Ward APRN    PCP Phone Number:    [phone]     Follow Up Details: Follow-up with your PCP in 2 weeks.                Time spent on Discharge including face to face service: Greater than 30 minutes    Signature: Electronically signed by Altaf Plummer MD, 01/23/25, 12:55 EST.  [facility], [place], operated by [facility] Hospitalist Team

## 2025-01-23 NOTE — PLAN OF CARE
"Goal Outcome Evaluation:     Kali Garcia presents with functional mobility impairments which indicate the need for skilled intervention. Tolerating session today without incident. Pt tolerated tx session well.  Pt still very weak which impairs his mobility.  Cont to recommend rehab at d/c. Will continue to follow and progress as tolerated.     Plan/Recommendations:   If medically appropriate, Moderate Intensity Therapy recommended post-acute care. This is recommended as therapy feels the patient would require 3-4 days per week and wouldn't tolerate \"3 hour daily\" rehab intensity. SNF would be the preferred choice. If the patient does not agree to SNF, arrange HH or OP depending on home bound status. If patient is medically complex, consider LTACH. Pt requires no DME at discharge.     Pt desires Skilled Rehab placement at discharge. Pt cooperative; agreeable to therapeutic recommendations and plan of care.                                        "

## 2025-01-23 NOTE — CASE MANAGEMENT/SOCIAL WORK
Continued Stay Note  [facility]     Patient Name: Kali Garcia  MRN: [id number]  Today's Date: 1/23/2025    Admit Date: 1/18/2025    Plan: [facility], accepted. Precert approved (valid 1/23-1/29). No PASRR needed (from [facility], did not wish to return). [facility] following for after SNF. Prior to SNF, from home with son and DIL with home O2 6L through [employer].   Discharge Plan       Row Name 01/23/25 1040       Plan    Plan [facility], skilled, accepted. Precert approved (valid 1/23-1/29). No PASRR needed (from [facility], did not wish to return). [facility] following for after SNF. Prior to SNF, from home with son and DIL with home O2 6L through [employer].    Plan Comments CM notified by [facility] that precert approved. CM confirmed with [facility] liaison that precert approved. [facility] staff will deliver w/c and O2 tank for transport to hospital room and [facility] Transport will transport at 1pm. CM updated RN/MD.                      Expected Discharge Date and Time       Expected Discharge Date Expected Discharge Time    Jan 23, 2025           Phone communication or documentation only - no physical contact with patient or family.     BILL GregoryN, RN    [place] [id number]    Office: [phone]  Fax: [phone]

## 2025-01-23 NOTE — PLAN OF CARE
Problem: Skin Injury Risk Increased  Goal: Skin Health and Integrity  Outcome: Met  Intervention: Optimize Skin Protection  Recent Flowsheet Documentation  Taken 1/23/2025 0800 by Tiny aDvalos RN  Pressure Reduction Techniques: frequent weight shift encouraged  Pressure Reduction Devices: positioning supports utilized  Skin Protection: transparent dressing maintained     Problem: Fall Injury Risk  Goal: Absence of Fall and Fall-Related Injury  Outcome: Met  Intervention: Promote Injury-Free Environment  Recent Flowsheet Documentation  Taken 1/23/2025 1200 by Tiny Davalos RN  Safety Promotion/Fall Prevention: safety round/check completed  Taken 1/23/2025 1000 by Tiny Davalos RN  Safety Promotion/Fall Prevention: safety round/check completed  Taken 1/23/2025 0800 by Tiny Davalos RN  Safety Promotion/Fall Prevention: safety round/check completed     Problem: Malnutrition  Goal: Improved Nutritional Intake  Outcome: Met   Goal Outcome Evaluation:   [facility] JACKY arizmendi.

## 2025-01-24 NOTE — CASE MANAGEMENT/SOCIAL WORK
Case Management Discharge Note      Final Note: [facility] (skilled).    Provided Post Acute Provider List?: Yes  Post Acute Provider List: Nursing Home  Provided Post Acute Provider Quality & Resource List?: Yes  Post Acute Provider Quality and Resource List: Nursing Home  Delivered To: Support Person  Support Person: Dalia Langston  Method of Delivery: Other (comment) (Secure mail email as requested)    Selected Continued Care - Discharged on 1/23/2025 Admission date: 1/18/2025 - Discharge disposition: Home or Self Care      Destination Coordination complete.      Service Provider Services Address Phone Fax Patient Preferred    [facility] Skilled Nursing [facility] [address] [facility] [place] [address] [phone]                 Home Medical Care Coordination complete.      Service Provider Services Address Phone Fax Patient Preferred    [facility] [place] [address] [place] [address] [phone] --                 Transportation Services  Private: Car    Final Discharge Disposition Code: 03 - skilled nursing facility (SNF)

## 2025-01-24 NOTE — OUTREACH NOTE
Prep Survey      Flowsheet Row Responses   [facility] discharged [facility]? [facility]   Is LACE score < 7 ? No   Eligibility Not Eligible   What are the reasons patient is not eligible? [facility]  [facility]   Does the patient have one of the following disease processes/diagnoses(primary or secondary)? Pneumonia   Prep survey completed? Yes            EVANS JUNIOR - Registered Nurse

## 2025-01-29 ENCOUNTER — APPOINTMENT (OUTPATIENT)
Dept: GENERAL RADIOLOGY | Facility: HOSPITAL | Age: 80
End: 2025-01-29
Payer: MEDICARE

## 2025-01-29 ENCOUNTER — HOSPITAL ENCOUNTER (INPATIENT)
Facility: HOSPITAL | Age: 80
LOS: 17 days | Discharge: NURSING, REHAB OR LONG-TERM CARE | End: 2025-02-15
Attending: EMERGENCY MEDICINE | Admitting: INTERNAL MEDICINE
Payer: MEDICARE

## 2025-01-29 DIAGNOSIS — J18.9 PNEUMONIA OF BOTH LUNGS DUE TO INFECTIOUS ORGANISM, UNSPECIFIED PART OF LUNG: ICD-10-CM

## 2025-01-29 DIAGNOSIS — R06.03 ACUTE RESPIRATORY DISTRESS: Primary | ICD-10-CM

## 2025-01-29 LAB
ALBUMIN SERPL-MCNC: 3 G/DL (ref 3.5–5.2)
ALBUMIN/GLOB SERPL: 1.1 G/DL
ALP SERPL-CCNC: 62 U/L (ref 39–117)
ALT SERPL W P-5'-P-CCNC: 23 U/L (ref 1–41)
ANION GAP SERPL CALCULATED.3IONS-SCNC: 7.9 MMOL/L (ref 5–15)
APTT PPP: 29.6 SECONDS (ref 22.7–35.4)
ARTERIAL PATENCY WRIST A: POSITIVE
AST SERPL-CCNC: 29 U/L (ref 1–40)
ATMOSPHERIC PRESS: ABNORMAL MM[HG]
BASE EXCESS BLDA CALC-SCNC: 6.5 MMOL/L (ref 0–3)
BDY SITE: ABNORMAL
BILIRUB SERPL-MCNC: 0.4 MG/DL (ref 0–1.2)
BUN SERPL-MCNC: 16 MG/DL (ref 8–23)
BUN/CREAT SERPL: 22.5 (ref 7–25)
CALCIUM SPEC-SCNC: 8 MG/DL (ref 8.6–10.5)
CHLORIDE SERPL-SCNC: 102 MMOL/L (ref 98–107)
CO2 BLDA-SCNC: 31.7 MMOL/L (ref 22–29)
CO2 SERPL-SCNC: 28.1 MMOL/L (ref 22–29)
CREAT SERPL-MCNC: 0.71 MG/DL (ref 0.76–1.27)
DEPRECATED RDW RBC AUTO: 59.1 FL (ref 37–54)
EGFRCR SERPLBLD CKD-EPI 2021: 92.7 ML/MIN/1.73
ERYTHROCYTE [DISTWIDTH] IN BLOOD BY AUTOMATED COUNT: 17.3 % (ref 12.3–15.4)
GEN 5 1HR TROPONIN T REFLEX: 42 NG/L
GLOBULIN UR ELPH-MCNC: 2.8 GM/DL
GLUCOSE SERPL-MCNC: 121 MG/DL (ref 65–99)
HCO3 BLDA-SCNC: 30.5 MMOL/L (ref 21–28)
HCT VFR BLD AUTO: 28.2 % (ref 37.5–51)
HEMODILUTION: NO
HGB BLD-MCNC: 9 G/DL (ref 13–17.7)
INHALED O2 CONCENTRATION: 36 %
INR PPP: 1.31 (ref 0.9–1.1)
LYMPHOCYTES # BLD MANUAL: 0.23 10*3/MM3 (ref 0.7–3.1)
LYMPHOCYTES NFR BLD MANUAL: 14 % (ref 5–12)
MCH RBC QN AUTO: 30.2 PG (ref 26.6–33)
MCHC RBC AUTO-ENTMCNC: 31.9 G/DL (ref 31.5–35.7)
MCV RBC AUTO: 94.6 FL (ref 79–97)
MODALITY: ABNORMAL
MONOCYTES # BLD: 0.64 10*3/MM3 (ref 0.1–0.9)
NEUTROPHILS # BLD AUTO: 3.71 10*3/MM3 (ref 1.7–7)
NEUTROPHILS NFR BLD MANUAL: 81 % (ref 42.7–76)
NT-PROBNP SERPL-MCNC: 637 PG/ML (ref 0–1800)
PCO2 BLDA: 40.5 MM HG (ref 35–48)
PH BLDA: 7.49 PH UNITS (ref 7.35–7.45)
PLAT MORPH BLD: NORMAL
PLATELET # BLD AUTO: 120 10*3/MM3 (ref 140–450)
PMV BLD AUTO: 9.9 FL (ref 6–12)
PO2 BLD: 126 MM[HG] (ref 0–500)
PO2 BLDA: 45.4 MM HG (ref 83–108)
POTASSIUM SERPL-SCNC: 4.1 MMOL/L (ref 3.5–5.2)
PROT SERPL-MCNC: 5.8 G/DL (ref 6–8.5)
PROTHROMBIN TIME: 16.4 SECONDS (ref 11.7–14.2)
RBC # BLD AUTO: 2.98 10*6/MM3 (ref 4.14–5.8)
RBC MORPH BLD: NORMAL
SAO2 % BLDCOA: 84.3 % (ref 94–98)
SCAN SLIDE: NORMAL
SODIUM SERPL-SCNC: 138 MMOL/L (ref 136–145)
TOXIC GRANULATION: ABNORMAL
TROPONIN T % DELTA: -2
TROPONIN T NUMERIC DELTA: -1 NG/L
TROPONIN T SERPL HS-MCNC: 43 NG/L
VARIANT LYMPHS NFR BLD MANUAL: 5 % (ref 19.6–45.3)
WBC NRBC COR # BLD AUTO: 4.58 10*3/MM3 (ref 3.4–10.8)

## 2025-01-29 PROCEDURE — 25810000003 SODIUM CHLORIDE 0.9 % SOLUTION 250 ML FLEX CONT: Performed by: EMERGENCY MEDICINE

## 2025-01-29 PROCEDURE — 85007 BL SMEAR W/DIFF WBC COUNT: CPT | Performed by: EMERGENCY MEDICINE

## 2025-01-29 PROCEDURE — 85025 COMPLETE CBC W/AUTO DIFF WBC: CPT | Performed by: EMERGENCY MEDICINE

## 2025-01-29 PROCEDURE — 25010000002 METHYLPREDNISOLONE PER 125 MG: Performed by: EMERGENCY MEDICINE

## 2025-01-29 PROCEDURE — 80053 COMPREHEN METABOLIC PANEL: CPT | Performed by: EMERGENCY MEDICINE

## 2025-01-29 PROCEDURE — 85730 THROMBOPLASTIN TIME PARTIAL: CPT | Performed by: EMERGENCY MEDICINE

## 2025-01-29 PROCEDURE — 36600 WITHDRAWAL OF ARTERIAL BLOOD: CPT | Performed by: EMERGENCY MEDICINE

## 2025-01-29 PROCEDURE — 85610 PROTHROMBIN TIME: CPT | Performed by: EMERGENCY MEDICINE

## 2025-01-29 PROCEDURE — 94799 UNLISTED PULMONARY SVC/PX: CPT

## 2025-01-29 PROCEDURE — 82803 BLOOD GASES ANY COMBINATION: CPT | Performed by: EMERGENCY MEDICINE

## 2025-01-29 PROCEDURE — 25010000002 AZITHROMYCIN PER 500 MG

## 2025-01-29 PROCEDURE — 99291 CRITICAL CARE FIRST HOUR: CPT

## 2025-01-29 PROCEDURE — 25010000002 VANCOMYCIN 1 G RECONSTITUTED SOLUTION 1 EACH VIAL: Performed by: EMERGENCY MEDICINE

## 2025-01-29 PROCEDURE — 83880 ASSAY OF NATRIURETIC PEPTIDE: CPT | Performed by: EMERGENCY MEDICINE

## 2025-01-29 PROCEDURE — 25010000002 CEFEPIME PER 500 MG: Performed by: EMERGENCY MEDICINE

## 2025-01-29 PROCEDURE — 93005 ELECTROCARDIOGRAM TRACING: CPT | Performed by: EMERGENCY MEDICINE

## 2025-01-29 PROCEDURE — 71045 X-RAY EXAM CHEST 1 VIEW: CPT

## 2025-01-29 PROCEDURE — 25810000003 SODIUM CHLORIDE 0.9 % SOLUTION 250 ML FLEX CONT

## 2025-01-29 PROCEDURE — 93005 ELECTROCARDIOGRAM TRACING: CPT

## 2025-01-29 PROCEDURE — 84484 ASSAY OF TROPONIN QUANT: CPT | Performed by: EMERGENCY MEDICINE

## 2025-01-29 RX ORDER — POLYETHYLENE GLYCOL 3350 17 G/17G
17 POWDER, FOR SOLUTION ORAL DAILY PRN
Status: DISCONTINUED | OUTPATIENT
Start: 2025-01-29 | End: 2025-02-15 | Stop reason: HOSPADM

## 2025-01-29 RX ORDER — ACETAMINOPHEN 160 MG/5ML
650 SOLUTION ORAL EVERY 4 HOURS PRN
Status: DISCONTINUED | OUTPATIENT
Start: 2025-01-29 | End: 2025-02-15 | Stop reason: HOSPADM

## 2025-01-29 RX ORDER — ACETAMINOPHEN 325 MG/1
650 TABLET ORAL EVERY 4 HOURS PRN
Status: DISCONTINUED | OUTPATIENT
Start: 2025-01-29 | End: 2025-02-15 | Stop reason: HOSPADM

## 2025-01-29 RX ORDER — CITALOPRAM HYDROBROMIDE 10 MG/1
10 TABLET ORAL DAILY
COMMUNITY

## 2025-01-29 RX ORDER — NUTRITIONAL SUPPLEMENT
1 POWDER (GRAM) ORAL 2 TIMES DAILY
COMMUNITY

## 2025-01-29 RX ORDER — BISACODYL 5 MG/1
5 TABLET, DELAYED RELEASE ORAL DAILY PRN
Status: DISCONTINUED | OUTPATIENT
Start: 2025-01-29 | End: 2025-02-15 | Stop reason: HOSPADM

## 2025-01-29 RX ORDER — SODIUM CHLORIDE 0.9 % (FLUSH) 0.9 %
10 SYRINGE (ML) INJECTION EVERY 12 HOURS SCHEDULED
Status: DISCONTINUED | OUTPATIENT
Start: 2025-01-29 | End: 2025-02-15 | Stop reason: HOSPADM

## 2025-01-29 RX ORDER — ACETAMINOPHEN 650 MG/1
650 SUPPOSITORY RECTAL EVERY 4 HOURS PRN
Status: DISCONTINUED | OUTPATIENT
Start: 2025-01-29 | End: 2025-02-15 | Stop reason: HOSPADM

## 2025-01-29 RX ORDER — IPRATROPIUM BROMIDE AND ALBUTEROL SULFATE 2.5; .5 MG/3ML; MG/3ML
3 SOLUTION RESPIRATORY (INHALATION) EVERY 4 HOURS PRN
Status: DISCONTINUED | OUTPATIENT
Start: 2025-01-29 | End: 2025-01-30

## 2025-01-29 RX ORDER — NITROGLYCERIN 0.4 MG/1
0.4 TABLET SUBLINGUAL
Status: DISCONTINUED | OUTPATIENT
Start: 2025-01-29 | End: 2025-02-15 | Stop reason: HOSPADM

## 2025-01-29 RX ORDER — MULTIPLE VITAMINS W/ MINERALS TAB 9MG-400MCG
1 TAB ORAL DAILY
COMMUNITY

## 2025-01-29 RX ORDER — SODIUM CHLORIDE 9 MG/ML
40 INJECTION, SOLUTION INTRAVENOUS AS NEEDED
Status: DISCONTINUED | OUTPATIENT
Start: 2025-01-29 | End: 2025-02-15 | Stop reason: HOSPADM

## 2025-01-29 RX ORDER — SODIUM CHLORIDE 0.9 % (FLUSH) 0.9 %
10 SYRINGE (ML) INJECTION AS NEEDED
Status: DISCONTINUED | OUTPATIENT
Start: 2025-01-29 | End: 2025-02-15 | Stop reason: HOSPADM

## 2025-01-29 RX ORDER — ONDANSETRON 2 MG/ML
4 INJECTION INTRAMUSCULAR; INTRAVENOUS EVERY 6 HOURS PRN
Status: DISCONTINUED | OUTPATIENT
Start: 2025-01-29 | End: 2025-02-15 | Stop reason: HOSPADM

## 2025-01-29 RX ORDER — AMOXICILLIN 250 MG
2 CAPSULE ORAL 2 TIMES DAILY PRN
Status: DISCONTINUED | OUTPATIENT
Start: 2025-01-29 | End: 2025-02-15 | Stop reason: HOSPADM

## 2025-01-29 RX ORDER — MIRTAZAPINE 15 MG/1
15 TABLET, FILM COATED ORAL NIGHTLY
COMMUNITY

## 2025-01-29 RX ORDER — METHYLPREDNISOLONE SODIUM SUCCINATE 125 MG/2ML
80 INJECTION, POWDER, LYOPHILIZED, FOR SOLUTION INTRAMUSCULAR; INTRAVENOUS ONCE
Status: COMPLETED | OUTPATIENT
Start: 2025-01-29 | End: 2025-01-29

## 2025-01-29 RX ORDER — CLONAZEPAM 0.25 MG/1
0.25 TABLET, ORALLY DISINTEGRATING ORAL 2 TIMES DAILY
COMMUNITY

## 2025-01-29 RX ORDER — BISACODYL 10 MG
10 SUPPOSITORY, RECTAL RECTAL DAILY PRN
Status: DISCONTINUED | OUTPATIENT
Start: 2025-01-29 | End: 2025-02-15 | Stop reason: HOSPADM

## 2025-01-29 RX ADMIN — METHYLPREDNISOLONE SODIUM SUCCINATE 80 MG: 125 INJECTION, POWDER, FOR SOLUTION INTRAMUSCULAR; INTRAVENOUS at 18:59

## 2025-01-29 RX ADMIN — SODIUM CHLORIDE 500 MG: 9 INJECTION, SOLUTION INTRAVENOUS at 23:58

## 2025-01-29 RX ADMIN — SODIUM CHLORIDE 1000 MG: 9 INJECTION, SOLUTION INTRAVENOUS at 22:27

## 2025-01-29 RX ADMIN — CEFEPIME 2000 MG: 2 INJECTION, POWDER, FOR SOLUTION INTRAVENOUS at 21:41

## 2025-01-29 RX ADMIN — Medication 10 ML: at 22:29

## 2025-01-29 NOTE — LETTER
EMS Transport Request  For use at [facility], [place], [facility], [place], and [place] only   Patient Name: Kali Garcia : 1945   Weight:52 kg (114 lb 10.2 oz) Pick-up Location: [facility] BLS/ALS: BLS/ALS: BLS   Insurance: [employer] MEDICARE REPLACEMENT Auth End Date: 25   Pre-Cert #: D/C Summary complete: Y   Destination: Other [facility] ROOM [address] [place] [address]   Contact Precautions: None   Equipment (O2, Fluids, etc.): BiPAP and O2, settings currently on 60L [place]   Arrive By Date/Time: 25 Stretcher/WC: Stretcher   CM Requesting: Angeles Shane RN Ext: [phone]   Notes/Medical Necessity: increased O2 needs (currently on 60L Air[place]), generalized weakness, bedbound, max assist for bed mobility, unable to tolerate seated position for transport and unable to self support     ______________________________________________________________________    *Only 2 patient bags OR 1 carry-on size bag are permitted.  Wheelchairs and walkers CANNOT transported with the patient. Acknowledge: Yes

## 2025-01-29 NOTE — ED PROVIDER NOTES
Subjective   History of Present Illness  Chief complaint: Patient is an 79-year-old.  He has a history of COPD and is on 2 L.  He had a DuoNeb today and when the nursing staff checked on him again he was shaking and having trouble breathing.  His oxygen saturation was 70%.  He was brought in on a nonrebreather.  He has recently and has a history of COPD.  He states today has been a bad day but he does have chronic dyspnea.  He has not had a new fever that he knows of.  No swelling to his legs.  No chest pain.  He is chronically on Eliquis    Context:    Duration:    Timing:    Severity:    Associated Symptoms:        PCP:  LMP:      Review of Systems   HENT:  Positive for congestion.    Respiratory:  Positive for cough and shortness of breath.    Cardiovascular:  Negative for chest pain.   Gastrointestinal:  Negative for abdominal pain.       Past Medical History:   Diagnosis Date    COPD (chronic obstructive pulmonary disease)     Coronary artery disease     Emphysema lung     Hyperlipidemia     Hypertension     Osteoarthritis        Allergies   Allergen Reactions    Codeine GI Intolerance       Past Surgical History:   Procedure Laterality Date    CARDIAC CATHETERIZATION      CARDIAC ELECTROPHYSIOLOGY PROCEDURE N/A 1/2/2025    Procedure: Bijan GLASERT;  Surgeon: Eliot Mcgarry MD;  Location: [facility] CATH INVASIVE LOCATION;  Service: Cardiovascular;  Laterality: N/A;    COLONOSCOPY N/A 8/4/2022    Procedure: COLONOSCOPY with 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 and endoscopic clipping x 1;  Surgeon: uLz Jacques MD;  Location: [facility] ENDOSCOPY;  Service: Gastroenterology;  Laterality: N/A;  post op: cecal AVM    CORONARY STENT PLACEMENT      ENDOSCOPY N/A 4/14/2022    Procedure: ESOPHAGOGASTRODUODENOSCOPY WITH BIOPSY X1 AREA;  Surgeon: Luz Jacques MD;  Location: [facility] ENDOSCOPY;  Service: Gastroenterology;  Laterality: N/A;  esophagitis, gastritis, HH    ENDOSCOPY N/A 8/4/2022    Procedure:  ESOPHAGOGASTRODUODENOSCOPY;  Surgeon: Luz Jacques MD;  Location: [facility] ENDOSCOPY;  Service: Gastroenterology;  Laterality: N/A;  post op: hiatal hernia, eosphagitis    EYE SURGERY      FEMORAL ARTERY STENT      KNEE SURGERY Left     KNEE SURGERY Left     LUNG SURGERY         No family history on file.    Social History     Socioeconomic History    Marital status:    Tobacco Use    Smoking status: Former     Current packs/day: 0.00     Average packs/day: 1 pack/day for 63.0 years (63.0 ttl pk-yrs)     Types: Cigarettes     Start date: 1960     Quit date: 2023     Years since quittin.1     Passive exposure: Never   Vaping Use    Vaping status: Never Used   Substance and Sexual Activity    Alcohol use: Not Currently    Drug use: Never    Sexual activity: Defer           Objective   Physical Exam  Vitals and nursing note reviewed.   HENT:      Head: Normocephalic.   Cardiovascular:      Rate and Rhythm: Normal rate.   Pulmonary:      Effort: Pulmonary effort is normal.      Breath sounds: Decreased breath sounds present.   Abdominal:      Palpations: Abdomen is soft.      Tenderness: There is no abdominal tenderness.   Musculoskeletal:      Right lower leg: No tenderness. No edema.      Left lower leg: No tenderness. No edema.   Neurological:      Mental Status: He is alert.   Psychiatric:         Mood and Affect: Mood is anxious.         Procedures           ED Course                                           Results for orders placed or performed during the hospital encounter of 25   ECG 12 Lead Dyspnea    Collection Time: 25  5:58 PM   Result Value Ref Range    QT Interval 402 ms    QTC Interval 440 ms   Comprehensive Metabolic Panel    Collection Time: 25  6:34 PM    Specimen: Arm, Left; Blood   Result Value Ref Range    Glucose 121 (H) 65 - 99 mg/dL    BUN 16 8 - 23 mg/dL    Creatinine 0.71 (L) 0.76 - 1.27 mg/dL    Sodium 138 136 - 145 mmol/L    Potassium 4.1 3.5 - 5.2  mmol/L    Chloride 102 98 - 107 mmol/L    CO2 28.1 22.0 - 29.0 mmol/L    Calcium 8.0 (L) 8.6 - 10.5 mg/dL    Total Protein 5.8 (L) 6.0 - 8.5 g/dL    Albumin 3.0 (L) 3.5 - 5.2 g/dL    ALT (SGPT) 23 1 - 41 U/L    AST (SGOT) 29 1 - 40 U/L    Alkaline Phosphatase 62 39 - 117 U/L    Total Bilirubin 0.4 0.0 - 1.2 mg/dL    Globulin 2.8 gm/dL    A/G Ratio 1.1 g/dL    BUN/Creatinine Ratio 22.5 7.0 - 25.0    Anion Gap 7.9 5.0 - 15.0 mmol/L    eGFR 92.7 >60.0 mL/min/1.73   Protime-INR    Collection Time: 01/29/25  6:34 PM    Specimen: Arm, Left; Blood   Result Value Ref Range    Protime 16.4 (H) 11.7 - 14.2 Seconds    INR 1.31 (H) 0.90 - 1.10   aPTT    Collection Time: 01/29/25  6:34 PM    Specimen: Arm, Left; Blood   Result Value Ref Range    PTT 29.6 22.7 - 35.4 seconds   BNP    Collection Time: 01/29/25  6:34 PM    Specimen: Arm, Left; Blood   Result Value Ref Range    proBNP 637.0 0.0 - 1,800.0 pg/mL   High Sensitivity Troponin T    Collection Time: 01/29/25  6:34 PM    Specimen: Arm, Left; Blood   Result Value Ref Range    HS Troponin T 43 (H) <22 ng/L   CBC Auto Differential    Collection Time: 01/29/25  6:34 PM    Specimen: Arm, Left; Blood   Result Value Ref Range    WBC 4.58 3.40 - 10.80 10*3/mm3    RBC 2.98 (L) 4.14 - 5.80 10*6/mm3    Hemoglobin 9.0 (L) 13.0 - 17.7 g/dL    Hematocrit 28.2 (L) 37.5 - 51.0 %    MCV 94.6 79.0 - 97.0 fL    MCH 30.2 26.6 - 33.0 pg    MCHC 31.9 31.5 - 35.7 g/dL    RDW 17.3 (H) 12.3 - 15.4 %    RDW-SD 59.1 (H) 37.0 - 54.0 fl    MPV 9.9 6.0 - 12.0 fL    Platelets 120 (L) 140 - 450 10*3/mm3   Scan Slide    Collection Time: 01/29/25  6:34 PM    Specimen: Arm, Left; Blood   Result Value Ref Range    Scan Slide     Manual Differential    Collection Time: 01/29/25  6:34 PM    Specimen: Arm, Left; Blood   Result Value Ref Range    Neutrophil % 81.0 (H) 42.7 - 76.0 %    Lymphocyte % 5.0 (L) 19.6 - 45.3 %    Monocyte % 14.0 (H) 5.0 - 12.0 %    Neutrophils Absolute 3.71 1.70 - 7.00 10*3/mm3     Lymphocytes Absolute 0.23 (L) 0.70 - 3.10 10*3/mm3    Monocytes Absolute 0.64 0.10 - 0.90 10*3/mm3    RBC Morphology Normal Normal    Toxic Granulation Mod/2+ None Seen    Platelet Morphology Normal Normal   Blood Gas, Arterial -    Collection Time: 01/29/25  6:39 PM    Specimen: Arterial Blood   Result Value Ref Range    Site Right Radial     Christian's Test Positive     pH, Arterial 7.485 (H) 7.350 - 7.450 pH units    pCO2, Arterial 40.5 35.0 - 48.0 mm Hg    pO2, Arterial 45.4 (L) 83.0 - 108.0 mm Hg    HCO3, Arterial 30.5 (H) 21.0 - 28.0 mmol/L    Base Excess, Arterial 6.5 (H) 0.0 - 3.0 mmol/L    O2 Saturation, Arterial 84.3 (L) 94.0 - 98.0 %    CO2 Content 31.7 (H) 22 - 29 mmol/L    Barometric Pressure for Blood Gas      Modality Cannula     FIO2 36 %    Hemodilution No     PO2/FIO2 126 0 - 500   High Sensitivity Troponin T 1Hr    Collection Time: 01/29/25  8:18 PM    Specimen: Arm, Left; Blood   Result Value Ref Range    HS Troponin T 42 (H) <22 ng/L    Troponin T Numeric Delta -1 ng/L    Troponin T % Delta -2 Abnormal if >/= 20%         XR Chest 1 View    Result Date: 1/29/2025  Impression: Residual infiltrates are noted throughout the mid to lower lungs bilaterally. There is a small left pleural effusion. Diffuse interstitial changes are noted. These findings are stable to mildly progressed. The findings may indicate changes of CHF and pulmonary edema. Electronically Signed: Jonny Espinal MD  1/29/2025 7:10 PM EST  Workstation ID: [id number]             Medical Decision Making  Patient was seen and evaluated for the above problem    Differential diagnosis includes but is not limited to, COPD, CHF,    Patient's BNP was normal.  EKG interpreted by myself sinus rhythm rate 72.  He does have some progression on x-ray of his infiltrates.  He was hypoxic on his ABG.  He is on Airvo currently.  He is anticoagulated on Eliquis.  Less likely to be a pulmonary embolism.  CT chest considered and can be ordered after further  observation is warranted.  I spoke with Sonal on-call for the hospital staff agrees to admit the patient.  Critical care time 35 minutes    Problems Addressed:  Acute respiratory distress: complicated acute illness or injury  Pneumonia of both lungs due to infectious organism, unspecified part of lung: complicated acute illness or injury    Amount and/or Complexity of Data Reviewed  Labs: ordered. Decision-making details documented in ED Course.     Details: Labs reviewed by myself  Radiology: ordered and independent interpretation performed.     Details: X-ray reviewed by myself  ECG/medicine tests: ordered and independent interpretation performed.     Details: EKG interpreted by myself as above    Risk  Prescription drug management.  Decision regarding hospitalization.    Critical Care  Total time providing critical care: 35 minutes        Final diagnoses:   None   Acute respiratory failure  Pneumonia      ED Disposition  ED Disposition       None            No follow-up provider specified.       Medication List        ASK your doctor about these medications      amoxicillin-clavulanate 875-125 MG per tablet  Commonly known as: AUGMENTIN  Take 1 tablet by mouth 2 (Two) Times a Day for 5 days.  Ask about: Should I take this medication?                 Marlon Peters,   01/29/25 2130       Marlon Peters,   01/29/25 2130

## 2025-01-29 NOTE — Clinical Note
Level of Care: Med/Surg [1]   Admitting Physician: SEBASTIAN FISHER [[id number]]   Attending Physician: SEBASTIAN FISHER [[id number]]

## 2025-01-30 LAB
ANION GAP SERPL CALCULATED.3IONS-SCNC: 6.2 MMOL/L (ref 5–15)
B PARAPERT DNA SPEC QL NAA+PROBE: NOT DETECTED
B PERT DNA SPEC QL NAA+PROBE: NOT DETECTED
BUN SERPL-MCNC: 20 MG/DL (ref 8–23)
BUN/CREAT SERPL: 31.7 (ref 7–25)
C PNEUM DNA NPH QL NAA+NON-PROBE: NOT DETECTED
CALCIUM SPEC-SCNC: 7.6 MG/DL (ref 8.6–10.5)
CHLORIDE SERPL-SCNC: 105 MMOL/L (ref 98–107)
CO2 SERPL-SCNC: 25.8 MMOL/L (ref 22–29)
CREAT SERPL-MCNC: 0.63 MG/DL (ref 0.76–1.27)
DEPRECATED RDW RBC AUTO: 60 FL (ref 37–54)
EGFRCR SERPLBLD CKD-EPI 2021: 96.2 ML/MIN/1.73
ERYTHROCYTE [DISTWIDTH] IN BLOOD BY AUTOMATED COUNT: 17.3 % (ref 12.3–15.4)
FLUAV SUBTYP SPEC NAA+PROBE: NOT DETECTED
FLUBV RNA ISLT QL NAA+PROBE: NOT DETECTED
GLUCOSE SERPL-MCNC: 146 MG/DL (ref 65–99)
HADV DNA SPEC NAA+PROBE: NOT DETECTED
HCOV 229E RNA SPEC QL NAA+PROBE: NOT DETECTED
HCOV HKU1 RNA SPEC QL NAA+PROBE: NOT DETECTED
HCOV NL63 RNA SPEC QL NAA+PROBE: NOT DETECTED
HCOV OC43 RNA SPEC QL NAA+PROBE: NOT DETECTED
HCT VFR BLD AUTO: 23 % (ref 37.5–51)
HGB BLD-MCNC: 7.2 G/DL (ref 13–17.7)
HMPV RNA NPH QL NAA+NON-PROBE: NOT DETECTED
HPIV1 RNA ISLT QL NAA+PROBE: NOT DETECTED
HPIV2 RNA SPEC QL NAA+PROBE: NOT DETECTED
HPIV3 RNA NPH QL NAA+PROBE: NOT DETECTED
HPIV4 P GENE NPH QL NAA+PROBE: NOT DETECTED
L PNEUMO1 AG UR QL IA: NEGATIVE
M PNEUMO IGG SER IA-ACNC: NOT DETECTED
MCH RBC QN AUTO: 29.9 PG (ref 26.6–33)
MCHC RBC AUTO-ENTMCNC: 31.3 G/DL (ref 31.5–35.7)
MCV RBC AUTO: 95.4 FL (ref 79–97)
PLATELET # BLD AUTO: 109 10*3/MM3 (ref 140–450)
PMV BLD AUTO: 10.7 FL (ref 6–12)
POTASSIUM SERPL-SCNC: 4.4 MMOL/L (ref 3.5–5.2)
QT INTERVAL: 402 MS
QTC INTERVAL: 440 MS
RBC # BLD AUTO: 2.41 10*6/MM3 (ref 4.14–5.8)
RHINOVIRUS RNA SPEC NAA+PROBE: NOT DETECTED
RSV RNA NPH QL NAA+NON-PROBE: NOT DETECTED
S PNEUM AG SPEC QL LA: NEGATIVE
SARS-COV-2 RNA RESP QL NAA+PROBE: NOT DETECTED
SODIUM SERPL-SCNC: 137 MMOL/L (ref 136–145)
WBC NRBC COR # BLD AUTO: 3.63 10*3/MM3 (ref 3.4–10.8)

## 2025-01-30 PROCEDURE — 97162 PT EVAL MOD COMPLEX 30 MIN: CPT

## 2025-01-30 PROCEDURE — 94799 UNLISTED PULMONARY SVC/PX: CPT

## 2025-01-30 PROCEDURE — 92610 EVALUATE SWALLOWING FUNCTION: CPT

## 2025-01-30 PROCEDURE — 87449 NOS EACH ORGANISM AG IA: CPT

## 2025-01-30 PROCEDURE — 0202U NFCT DS 22 TRGT SARS-COV-2: CPT

## 2025-01-30 PROCEDURE — 85027 COMPLETE CBC AUTOMATED: CPT

## 2025-01-30 PROCEDURE — 94664 DEMO&/EVAL PT USE INHALER: CPT

## 2025-01-30 PROCEDURE — 80048 BASIC METABOLIC PNL TOTAL CA: CPT

## 2025-01-30 PROCEDURE — 25010000002 AMPICILLIN-SULBACTAM PER 1.5 G

## 2025-01-30 PROCEDURE — 0T9B70Z DRAINAGE OF BLADDER WITH DRAINAGE DEVICE, VIA NATURAL OR ARTIFICIAL OPENING: ICD-10-PCS

## 2025-01-30 PROCEDURE — 94761 N-INVAS EAR/PLS OXIMETRY MLT: CPT

## 2025-01-30 RX ORDER — CLOPIDOGREL BISULFATE 75 MG/1
75 TABLET ORAL DAILY
Status: DISCONTINUED | OUTPATIENT
Start: 2025-01-30 | End: 2025-02-15 | Stop reason: HOSPADM

## 2025-01-30 RX ORDER — ARFORMOTEROL TARTRATE 15 UG/2ML
15 SOLUTION RESPIRATORY (INHALATION)
Status: DISCONTINUED | OUTPATIENT
Start: 2025-01-30 | End: 2025-02-15 | Stop reason: HOSPADM

## 2025-01-30 RX ORDER — BUDESONIDE 0.5 MG/2ML
0.5 INHALANT ORAL
Status: DISCONTINUED | OUTPATIENT
Start: 2025-01-30 | End: 2025-02-15 | Stop reason: HOSPADM

## 2025-01-30 RX ORDER — ATORVASTATIN CALCIUM 10 MG/1
10 TABLET, FILM COATED ORAL NIGHTLY
Status: DISCONTINUED | OUTPATIENT
Start: 2025-01-30 | End: 2025-02-15 | Stop reason: HOSPADM

## 2025-01-30 RX ORDER — PANTOPRAZOLE SODIUM 40 MG/1
40 TABLET, DELAYED RELEASE ORAL DAILY
Status: DISCONTINUED | OUTPATIENT
Start: 2025-01-30 | End: 2025-02-15 | Stop reason: HOSPADM

## 2025-01-30 RX ORDER — MIRTAZAPINE 15 MG/1
15 TABLET, FILM COATED ORAL NIGHTLY
Status: DISCONTINUED | OUTPATIENT
Start: 2025-01-30 | End: 2025-02-15 | Stop reason: HOSPADM

## 2025-01-30 RX ORDER — AMIODARONE HYDROCHLORIDE 200 MG/1
200 TABLET ORAL 2 TIMES DAILY
Status: DISCONTINUED | OUTPATIENT
Start: 2025-01-30 | End: 2025-02-02

## 2025-01-30 RX ORDER — IPRATROPIUM BROMIDE AND ALBUTEROL SULFATE 2.5; .5 MG/3ML; MG/3ML
3 SOLUTION RESPIRATORY (INHALATION) EVERY 4 HOURS PRN
Status: DISCONTINUED | OUTPATIENT
Start: 2025-01-30 | End: 2025-02-15 | Stop reason: HOSPADM

## 2025-01-30 RX ORDER — MULTIPLE VITAMINS W/ MINERALS TAB 9MG-400MCG
1 TAB ORAL DAILY
Status: DISCONTINUED | OUTPATIENT
Start: 2025-01-30 | End: 2025-02-15 | Stop reason: HOSPADM

## 2025-01-30 RX ORDER — CLONAZEPAM 0.5 MG/1
0.25 TABLET ORAL 2 TIMES DAILY
Status: DISPENSED | OUTPATIENT
Start: 2025-01-30 | End: 2025-02-04

## 2025-01-30 RX ORDER — CITALOPRAM HYDROBROMIDE 20 MG/1
10 TABLET ORAL DAILY
Status: DISCONTINUED | OUTPATIENT
Start: 2025-01-30 | End: 2025-02-03

## 2025-01-30 RX ADMIN — APIXABAN 2.5 MG: 2.5 TABLET, FILM COATED ORAL at 09:34

## 2025-01-30 RX ADMIN — AMPICILLIN SODIUM AND SULBACTAM SODIUM 3 G: 2; 1 INJECTION, POWDER, FOR SOLUTION INTRAMUSCULAR; INTRAVENOUS at 18:27

## 2025-01-30 RX ADMIN — AMPICILLIN SODIUM AND SULBACTAM SODIUM 3 G: 2; 1 INJECTION, POWDER, FOR SOLUTION INTRAMUSCULAR; INTRAVENOUS at 06:25

## 2025-01-30 RX ADMIN — ARFORMOTEROL TARTRATE 15 MCG: 15 SOLUTION RESPIRATORY (INHALATION) at 19:48

## 2025-01-30 RX ADMIN — CITALOPRAM 10 MG: 20 TABLET, FILM COATED ORAL at 09:34

## 2025-01-30 RX ADMIN — CLOPIDOGREL BISULFATE 75 MG: 75 TABLET ORAL at 09:34

## 2025-01-30 RX ADMIN — APIXABAN 2.5 MG: 2.5 TABLET, FILM COATED ORAL at 20:50

## 2025-01-30 RX ADMIN — ARFORMOTEROL TARTRATE 15 MCG: 15 SOLUTION RESPIRATORY (INHALATION) at 07:51

## 2025-01-30 RX ADMIN — AMIODARONE HYDROCHLORIDE 200 MG: 200 TABLET ORAL at 20:50

## 2025-01-30 RX ADMIN — BUDESONIDE 0.5 MG: 0.5 INHALANT RESPIRATORY (INHALATION) at 07:47

## 2025-01-30 RX ADMIN — ATORVASTATIN CALCIUM 10 MG: 10 TABLET ORAL at 20:50

## 2025-01-30 RX ADMIN — IPRATROPIUM BROMIDE AND ALBUTEROL SULFATE 3 ML: .5; 3 SOLUTION RESPIRATORY (INHALATION) at 16:41

## 2025-01-30 RX ADMIN — BUDESONIDE 0.5 MG: 0.5 INHALANT RESPIRATORY (INHALATION) at 19:49

## 2025-01-30 RX ADMIN — Medication 10 ML: at 20:50

## 2025-01-30 RX ADMIN — Medication 10 ML: at 09:34

## 2025-01-30 RX ADMIN — CLONAZEPAM 0.25 MG: 0.5 TABLET ORAL at 20:50

## 2025-01-30 RX ADMIN — PANTOPRAZOLE SODIUM 40 MG: 40 TABLET, DELAYED RELEASE ORAL at 09:34

## 2025-01-30 RX ADMIN — Medication 1 TABLET: at 09:34

## 2025-01-30 RX ADMIN — AMPICILLIN SODIUM AND SULBACTAM SODIUM 3 G: 2; 1 INJECTION, POWDER, FOR SOLUTION INTRAMUSCULAR; INTRAVENOUS at 13:27

## 2025-01-30 RX ADMIN — MIRTAZAPINE 15 MG: 15 TABLET, FILM COATED ORAL at 20:50

## 2025-01-30 NOTE — PLAN OF CARE
Problem: Adult Inpatient Plan of Care  Goal: Plan of Care Review  Outcome: Progressing  Flowsheets (Taken 1/30/2025 1726)  Outcome Evaluation: Patient stable on 5 liters oxygen. No complaints or concerns. Yap catheter removed, Patient unable to void so far. Bladder scan shows 96mL thus far this evening. Patrient repositioned every 2 hours and pressure relief provided. Plan of care to continue.  Plan of Care Reviewed With: patient  Goal: Patient-Specific Goal (Individualized)  Outcome: Progressing  Goal: Absence of Hospital-Acquired Illness or Injury  Outcome: Progressing  Intervention: Identify and Manage Fall Risk  Description: Perform standard risk assessment on admission using a validated tool or comprehensive approach appropriate to the patient; reassess fall risk frequently, with change in status or transfer to another level of care.  Communicate risk to interprofessional healthcare team; ensure fall risk visible cue.  Determine need for increased observation, equipment and environmental modification, as well as use of supportive, nonskid footwear.  Adjust safety measures to individual needs and identified risk factors.  Reinforce the importance of active participation with fall risk prevention, safety, and physical activity with the patient and family.  Perform regular intentional rounding to assess need for position change, pain assessment and personal needs, including assistance with toileting.  Recent Flowsheet Documentation  Taken 1/30/2025 1600 by Mago Louis, RN  Safety Promotion/Fall Prevention: safety round/check completed  Taken 1/30/2025 1400 by Mago Louis, RN  Safety Promotion/Fall Prevention:   activity supervised   assistive device/personal items within reach   fall prevention program maintained   gait belt   nonskid shoes/slippers when out of bed   room organization consistent   safety round/check completed  Taken 1/30/2025 1200 by Mago Louis RN  Safety Promotion/Fall Prevention:    activity supervised   assistive device/personal items within reach   fall prevention program maintained   gait belt   nonskid shoes/slippers when out of bed   room organization consistent   safety round/check completed  Taken 1/30/2025 1000 by Mago Louis RN  Safety Promotion/Fall Prevention:   activity supervised   assistive device/personal items within reach   fall prevention program maintained   lighting adjusted   nonskid shoes/slippers when out of bed   room organization consistent   safety round/check completed  Taken 1/30/2025 0800 by Mago Louis RN  Safety Promotion/Fall Prevention:   activity supervised   assistive device/personal items within [facility]   fall prevention program maintained   gait belt   nonskid shoes/slippers when out of bed   room organization consistent   safety round/check completed  Intervention: Prevent Skin Injury  Description: Perform a screening for skin injury risk, such as pressure or moisture-associated skin damage on admission and at regular intervals throughout hospital stay.  Keep all areas of skin (especially folds) clean and dry.  Maintain adequate skin hydration.  Relieve and redistribute pressure and protect bony prominences and skin at risk for injury; implement measures based on patient-specific risk factors.  Match turning and repositioning schedule to clinical condition.  Encourage weight shift frequently; assist with reposition if unable to complete independently.  Float heels off bed; avoid pressure on the Achilles tendon.  Keep skin free from extended contact with medical devices.  Optimize nutrition and hydration.  Encourage functional activity and mobility, as early as tolerated.  Use aids (e.g., slide boards, mechanical lift) during transfer.  Recent Flowsheet Documentation  Taken 1/30/2025 1600 by Mago Louis RN  Body Position: position changed independently  Skin Protection:   incontinence pads utilized   transparent dressing maintained  Taken 1/30/2025  1400 by Mago Louis RN  Body Position: position changed independently  Taken 1/30/2025 1200 by Mago Louis RN  Body Position:   turned   tilted   weight shifting  Skin Protection:   incontinence pads utilized   transparent dressing maintained  Taken 1/30/2025 1000 by Mago Louis RN  Body Position:   turned   tilted   weight shifting  Taken 1/30/2025 0800 by Mago Louis RN  Body Position:   turned   tilted   weight shifting  Skin Protection:   incontinence pads utilized   transparent dressing maintained  Intervention: Prevent and Manage VTE (Venous Thromboembolism) Risk  Description: Assess for VTE (venous thromboembolism) risk.  Promote early mobilization; encourage both active and passive leg exercises, if unable to ambulate.  Initiate and maintain compression or other therapy, as indicated, based on identified risk in accordance with organizational protocol and provider order.  Recognize the patient's individual risk for bleeding before initiating pharmacologic thromboprophylaxis.  Recent Flowsheet Documentation  Taken 1/30/2025 1600 by Mago Louis RN  VTE Prevention/Management:   bilateral   SCDs (sequential compression devices) off   patient refused intervention  Taken 1/30/2025 0800 by Mago Louis RN  VTE Prevention/Management: (eliquis)   bilateral   SCDs (sequential compression devices) off  Goal: Optimal Comfort and Wellbeing  Outcome: Progressing  Intervention: Provide Person-Centered Care  Description: Use a family-focused approach to care; encourage support system presence and participation.  Develop trust and rapport by proactively providing information, encouraging questions, addressing concerns and offering reassurance.  Acknowledge emotional response to hospitalization.  Recognize and utilize personal coping strategies and strengths; develop goals via shared decision-making.  Honor spiritual and cultural preferences.  Recent Flowsheet Documentation  Taken 1/30/2025 0800 by Heriberto  Mago, RN  Trust Relationship/Rapport:   thoughts/feelings acknowledged   reassurance provided   choices provided   care explained  Goal: Readiness for Transition of Care  Outcome: Progressing  Intervention: Mutually Develop Transition Plan  Description: Identify available resources for support (e.g., family, friends, community).  Identify and address barriers to ongoing treatment and home management (e.g., environmental, financial).  Provide opportunities to practice self-management skills.  Assess and monitor emotional readiness for transition.  Establish or reconnect linkage with outpatient providers or community-based services.  Recent Flowsheet Documentation  Taken 1/30/2025 1509 by Mago Louis, RN  Equipment Currently Used at Home:   walker, standard   oxygen   Goal Outcome Evaluation:  Plan of Care Reviewed With: patient           Outcome Evaluation: Patient stable on 5 liters oxygen. No complaints or concerns. Yap catheter removed, Patient unable to void so far. Bladder scan shows 96mL thus far this evening. Patrient repositioned every 2 hours and pressure relief provided. Plan of care to continue.

## 2025-01-30 NOTE — CASE MANAGEMENT/SOCIAL WORK
Social Work Assessment  [facility]     Patient Name: Kali Garcia  MRN: [id number]  Today's Date: 1/30/2025    Admit Date: 1/29/2025     Discharge Plan       Row Name 01/30/25 1557       Plan    Plan Comments LSW informed that questions arose regarding POA and ACP planning. Went to bedside, patient sleeping at time of visit. No family at bedside. Will f/u at later time.                  CHRISTIANE Weathers, LSW, [facility]  Lead   Phone: [phone]  Cell: [phone]  Fax: [phone]  [email or web address]

## 2025-01-30 NOTE — CONSULTS
Group: [facility]         CONSULT NOTE    Patient Identification:  Kali Garcia  79 y.o.  male  1945  [id number]            Requesting physician: Attending physician    Reason for Consultation: Hypoxia      History of Present Illness:    79-year-old male admitted on 1/30/2025 with increasing shortness of breath and hypoxia O2 sats were 70%, arterial blood gas showed pCO2 of 45, elevated troponin, chest x-ray with bilateral lower lobe infiltrates      Assessment:    Hypoxic respiratory insufficiency  Chest x-ray bilateral alveolar infiltrate possible inflammation versus infection versus edema    History of severe COPD  Respiratory panel negative on 1/30/2025  Hypoxemia: On home oxygen  CAD  PVD  HTN  HLD  A-fib  Anemia  Former smoker quit 2022 after 60 pack years  Echo 2/1/2024 EF 61-65% mild pulmonary hypertension 35-45 mmHg     CT scan of the chest in June 2024 showing a noncalcified nodule in the right upper lobe around 2 x 1.1 x 1 cm. Patient was also noted to have patchy airspace disease and clustered micronodules in the lingula.     He then underwent PET scan 10/10/2024 showing a 1.7 x 1.1 cm partially calcified irregular shaped nodule in the right upper lobe to be metabolically active with SUV of 4.84 and additional 2 metabolically active irregular nodular densities in the right upper lobe. Inferior apical segment with intervening areas of metabolically active interstitial thickening.     PFTs: FEV1/FVC postbronchodilator is 0.39 with FEV1 of 0.71 L or 28% predicted and FVC of 1.82 L or 47% predicted.   Total lung capacity of 7.77 L or 131% predicted and residual volume of 5.82 L or 226% predicted and DLCO of 61% predicted noted.      Recommendations:      Chest CT without contrast monitor right upper lobe density as well as rule out amiodarone toxicity    Oxygen titration currently on 4 L    Antibiotics on IV Unasyn  Received 1 dose of IV vancomycin and cefepime  Bronchodilator  Pulmicort and DuoNeb  On amiodarone          Review of Sytems:  Review of Systems   Respiratory:  Positive for cough and shortness of breath. Negative for wheezing and stridor.    Cardiovascular:  Positive for palpitations. Negative for chest pain and leg swelling.       Past Medical History:  Past Medical History:   Diagnosis Date    COPD (chronic obstructive pulmonary disease)     Coronary artery disease     Emphysema lung     Hyperlipidemia     Hypertension     Osteoarthritis        Past Surgical History:  Past Surgical History:   Procedure Laterality Date    CARDIAC CATHETERIZATION      CARDIAC ELECTROPHYSIOLOGY PROCEDURE N/A 1/2/2025    Procedure: Bijan SIMS;  Surgeon: Eliot Mcgarry MD;  Location: [facility] CATH INVASIVE LOCATION;  Service: Cardiovascular;  Laterality: N/A;    COLONOSCOPY N/A 8/4/2022    Procedure: COLONOSCOPY with 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 and endoscopic clipping x 1;  Surgeon: Luz Jacques MD;  Location: [facility] ENDOSCOPY;  Service: Gastroenterology;  Laterality: N/A;  post op: cecal AVM    CORONARY STENT PLACEMENT      ENDOSCOPY N/A 4/14/2022    Procedure: ESOPHAGOGASTRODUODENOSCOPY WITH BIOPSY X1 AREA;  Surgeon: Luz Jacques MD;  Location: [facility] ENDOSCOPY;  Service: Gastroenterology;  Laterality: N/A;  esophagitis, gastritis, HH    ENDOSCOPY N/A 8/4/2022    Procedure: ESOPHAGOGASTRODUODENOSCOPY;  Surgeon: Luz Jacques MD;  Location: [facility] ENDOSCOPY;  Service: Gastroenterology;  Laterality: N/A;  post op: hiatal hernia, eosphagitis    EYE SURGERY      FEMORAL ARTERY STENT      KNEE SURGERY Left     KNEE SURGERY Left     LUNG SURGERY          Home Meds:  Medications Prior to Admission   Medication Sig Dispense Refill Last Dose/Taking    amiodarone (PACERONE) 200 MG tablet Take 1 tablet by mouth 2 (Two) Times a Day for 30 days. 60 tablet 0 Taking    apixaban (ELIQUIS) 2.5 MG tablet tablet Take 1 tablet by mouth Every 12 (Twelve) Hours for 30 days.  Indications: Atrial Fibrillation 60 tablet 0 Taking    arformoterol (BROVANA) 15 MCG/2ML nebulizer solution Take 2 mL by nebulization 2 (Two) Times a Day for 30 days. 120 mL 0 Taking    atorvastatin (LIPITOR) 10 MG tablet Take 1 tablet by mouth Every Night.   Taking    budesonide (PULMICORT) 0.5 MG/2ML nebulizer solution Take 2 mL by nebulization 2 (Two) Times a Day for 30 days. 120 mL 0 Taking    cholecalciferol (VITAMIN D3) 1.25 MG (85621 UT) capsule Take 1 capsule by mouth Every 7 (Seven) Days. thur   Taking    citalopram (CeleXA) 10 MG tablet Take 1 tablet by mouth Daily.   Taking    clonazePAM (KlonoPIN) 0.25 MG disintegrating tablet Place 1 tablet on the tongue 2 (Two) Times a Day.   Taking    clopidogrel (PLAVIX) 75 MG tablet Take 1 tablet by mouth Daily. Indications: Percutaneous Coronary Intervention   Taking    ipratropium-albuterol (DUO-NEB) 0.5-2.5 mg/3 ml nebulizer Take 3 mL by nebulization Every 4 (Four) Hours As Needed for Wheezing. Indications: Chronic Obstructive Lung Disease   Taking As Needed    mirtazapine (REMERON) 15 MG tablet Take 1 tablet by mouth Every Night.   Taking    multivitamin with minerals tablet tablet Take 1 tablet by mouth Daily.   Taking    Nutritional Supplements (Declan) powder Take 1 packet by mouth 2 (Two) Times a Day.   Taking    pantoprazole (PROTONIX) 40 MG EC tablet Take 1 tablet by mouth Daily.   Taking    polyethylene glycol (MIRALAX) 17 g packet Take 17 g by mouth Daily. Indications: Constipation   Taking    tamsulosin (FLOMAX) 0.4 MG capsule 24 hr capsule Take 1 capsule by mouth Daily for 30 days. 30 capsule 0 Taking    theophylline (THEODUR) 300 MG 12 hr tablet Take 1 tablet by mouth Daily for 30 days. 30 tablet 0 Taking    traZODone (DESYREL) 100 MG tablet Take 1 tablet by mouth Daily As Needed for Sleep.   Taking As Needed    naloxone (Narcan) 4 MG/0.1ML nasal spray Administer 1 spray into the nostril(s) as directed by provider As Needed for Opioid Reversal. Call  "911. Don't prime. Spray in 1 nostril for overdose. Repeat in 2-3 minutes in other nostril if no or minimal breathing/responsiveness.          Allergies:  Allergies   Allergen Reactions    Codeine GI Intolerance       Social History:   Social History     Socioeconomic History    Marital status:    Tobacco Use    Smoking status: Former     Current packs/day: 0.00     Average packs/day: 1 pack/day for 63.0 years (63.0 ttl pk-yrs)     Types: Cigarettes     Start date: 1960     Quit date: 2023     Years since quittin.1     Passive exposure: Never   Vaping Use    Vaping status: Never Used   Substance and Sexual Activity    Alcohol use: Not Currently    Drug use: Never    Sexual activity: Defer       Family History:  No family history on file.    Physical Exam:  BP 91/45 (BP Location: Right arm, Patient Position: Lying)   Pulse 51   Temp 97.9 °F (36.6 °C) (Oral)   Resp 10   Ht 180.3 cm (71\")   Wt 49.9 kg (110 lb)   SpO2 96%   BMI 15.34 kg/m²  Body mass index is 15.34 kg/m². 96% 49.9 kg (110 lb)  Physical Exam  Cardiovascular:      Heart sounds: Murmur heard.      No gallop.   Pulmonary:      Effort: No respiratory distress.      Breath sounds: No stridor. Rhonchi and rales present. No wheezing.   Chest:      Chest wall: No tenderness.         LABS:  Lab Results   Component Value Date    CALCIUM 7.6 (L) 2025     Results from last 7 days   Lab Units 25  0625 25  0331 25  1834   SODIUM mmol/L 137  --  138   POTASSIUM mmol/L 4.4  --  4.1   CHLORIDE mmol/L 105  --  102   CO2 mmol/L 25.8  --  28.1   BUN mg/dL 20  --  16   CREATININE mg/dL 0.63*  --  0.71*   GLUCOSE mg/dL 146*  --  121*   CALCIUM mg/dL 7.6*  --  8.0*   WBC 10*3/mm3  --  3.63 4.58   HEMOGLOBIN g/dL  --  7.2* 9.0*   PLATELETS 10*3/mm3  --  109* 120*   ALT (SGPT) U/L  --   --  23   AST (SGOT) U/L  --   --  29   PROBNP pg/mL  --   --  637.0     Lab Results   Component Value Date    TROPONINT 42 (H) 2025 "     Results from last 7 days   Lab Units 01/29/25 2018 01/29/25  1834   HSTROP T ng/L 42* 43*             Results from last 7 days   Lab Units 01/29/25  1839   PH, ARTERIAL pH units 7.485*   PCO2, ARTERIAL mm Hg 40.5   PO2 ART mm Hg 45.4*   O2 SATURATION ART % 84.3*   MODALITY  Cannula     Results from last 7 days   Lab Units 01/30/25  0719   ADENOVIRUS DETECTION BY PCR  Not Detected   CORONAVIRUS 229E  Not Detected   CORONAVIRUS HKU1  Not Detected   CORONAVIRUS NL63  Not Detected   CORONAVIRUS OC43  Not Detected   HUMAN METAPNEUMOVIRUS  Not Detected   HUMAN RHINOVIRUS/ENTEROVIRUS  Not Detected   INFLUENZA B PCR  Not Detected   PARAINFLUENZA 1  Not Detected   PARAINFLUENZA VIRUS 2  Not Detected   PARAINFLUENZA VIRUS 3  Not Detected   PARAINFLUENZA VIRUS 4  Not Detected   BORDETELLA PERTUSSIS PCR  Not Detected   CHLAMYDOPHILA PNEUMONIAE PCR  Not Detected   MYCOPLAMA PNEUMO PCR  Not Detected   INFLUENZA A PCR  Not Detected   RSV, PCR  Not Detected     Results from last 7 days   Lab Units 01/29/25  1834   INR  1.31*         Lab Results   Component Value Date    TSH 3.470 12/29/2024     Estimated Creatinine Clearance: 66 mL/min (A) (by C-G formula based on SCr of 0.63 mg/dL (L)).         Imaging:  Imaging Results (Last 24 Hours)       Procedure Component Value Units Date/Time    XR Chest 1 View [[id number]] Collected: 01/29/25 1908     Updated: 01/29/25 1912    Narrative:      XR CHEST 1 VW    Date of Exam: 1/29/2025 6:40 PM EST    Indication: soa    Comparison: 1/18/2025 at 1739 hours, 12/22/2024    Findings:  Residual infiltrates are noted throughout the mid to lower lungs bilaterally. There is a small left pleural effusion. Diffuse interstitial changes are noted. These findings are stable to mildly progressed. The findings are also superimposed on underlying   chronic changes throughout the lungs. The cardiac silhouette and mediastinum are stable.      Impression:      Impression:  Residual infiltrates are noted  throughout the mid to lower lungs bilaterally. There is a small left pleural effusion. Diffuse interstitial changes are noted. These findings are stable to mildly progressed. The findings may indicate changes of CHF and   pulmonary edema.        Electronically Signed: Jonny Espinal MD    1/29/2025 7:10 PM EST    Workstation ID: [id number]              Current Meds:   SCHEDULE  amiodarone, 200 mg, Oral, BID  ampicillin-sulbactam, 3 g, Intravenous, Q6H  apixaban, 2.5 mg, Oral, Q12H  arformoterol, 15 mcg, Nebulization, BID - RT  atorvastatin, 10 mg, Oral, Nightly  budesonide, 0.5 mg, Nebulization, BID - RT  citalopram, 10 mg, Oral, Daily  clonazePAM, 0.25 mg, Oral, BID  clopidogrel, 75 mg, Oral, Daily  mirtazapine, 15 mg, Oral, Nightly  multivitamin with minerals, 1 tablet, Oral, Daily  pantoprazole, 40 mg, Oral, Daily  sodium chloride, 10 mL, Intravenous, Q12H      Infusions     PRNs    acetaminophen **OR** acetaminophen **OR** acetaminophen    senna-docusate sodium **AND** polyethylene glycol **AND** bisacodyl **AND** bisacodyl    ipratropium-albuterol    melatonin    nitroglycerin    ondansetron    [COMPLETED] Insert Peripheral IV **AND** sodium chloride    sodium chloride    sodium chloride        Clari Kay MD  1/30/2025  12:32 EST      Much of this encounter note is an electronic transcription/translation of spoken language to printed text using Dragon Software.

## 2025-01-30 NOTE — CASE MANAGEMENT/SOCIAL WORK
Discharge Planning Assessment   Seth     Patient Name: Kali Garcia  MRN: [id number]  Today's Date: 1/30/2025    Admit Date: 1/29/2025    Plan: Return to [facility]. No PASRR needed. Precert required. Followed by [facility] facility.   Discharge Needs Assessment       Row Name 01/30/25 1255       Living Environment    People in Home facility resident    Name(s) of People in Home [facility]    Current Living Arrangements extended care facility    Potentially Unsafe Housing Conditions none    In the past 12 months has the electric, gas, oil, or water company threatened to shut off services in your home? No    Primary Care Provided by other (see comments)  staff at SNF    Provides Primary Care For no one, unable/limited ability to care for self    Family Caregiver if Needed none    Quality of Family Relationships supportive;involved    Able to Return to Prior Arrangements yes       Resource/Environmental Concerns    Resource/Environmental Concerns none    Transportation Concerns none       Transportation Needs    In the past 12 months, has lack of transportation kept you from medical appointments or from getting medications? no    In the past 12 months, has lack of transportation kept you from meetings, work, or from getting things needed for daily living? No       Food Insecurity    Within the past 12 months, you worried that your food would run out before you got the money to buy more. Never true    Within the past 12 months, the food you bought just didn't last and you didn't have money to get more. Never true       Transition Planning    Patient/Family Anticipates Transition to long-term care facility    Patient/Family Anticipated Services at Transition skilled nursing    Transportation Anticipated health plan transportation       Discharge Needs Assessment    Readmission Within the Last 30 Days previous discharge plan unsuccessful    Current Outpatient/Agency/Support Group skilled nursing facility     Equipment Currently Used at Home walker, standard;oxygen    Concerns to be Addressed discharge planning    Anticipated Changes Related to Illness inability to care for self    Equipment Needed After Discharge none                   Discharge Plan       Row Name 01/30/25 1257       Plan    Plan Return to [facility]. No PASRR needed. Precert required. Followed by [facility] facility.    Patient/Family in Agreement with Plan yes    Plan Comments CM called sonAgnes to discuss RA questions. Confirmed PCP. Confirmed patient is from [facility]. Previously he was at [facility]. Meds to beds denied.. Patient is being followed by [facility]. Patient will likely be EMS transport. DC Barriers: currently on airvo, IV abx, steroids, cardiac monitoring, swallow study.      Row Name 01/30/25 1104       Plan    Plan Return to [facility]. No PASRR needed. Precert required.                  Continued Care and Services - Admitted Since 1/29/2025       Destination Coordination complete.      Service Provider Request Status Services Address Phone Fax Patient Preferred    [facility]  Selected Skilled Nursing [facility] [address] [facility] [id number] [phone] --                  Demographic Summary       Row Name 01/30/25 1255       General Information    Admission Type inpatient    Arrived From emergency department    Referral Source admission list    Reason for Consult discharge planning    Preferred Language English       Contact Information    Permission Granted to Share Info With                    Functional Status       Row Name 01/30/25 1255       Functional Status    Usual Activity Tolerance fair    Current Activity Tolerance fair       Functional Status, IADL    Medications assistive person    Meal Preparation assistive person    Housekeeping assistive person    Laundry assistive person    Shopping assistive person    If for any reason  you need help with day-to-day activities such as bathing, preparing meals, shopping, managing finances, etc., do you get the help you need? I get all the help I need    IADL Comments SNF       Mental Status Summary    Recent Changes in Mental Status/Cognitive Functioning unable to assess             Belkis Guallpa RN     Office: [phone]

## 2025-01-30 NOTE — THERAPY EVALUATION
Acute Care - Speech Language Pathology   Swallow Initial Evaluation [facility]     Patient Name: Kali Garcia  : 1945  MRN: [id number]  Today's Date: 2025               Admit Date: 2025    Visit Dx:     ICD-10-CM ICD-9-CM   1. Acute respiratory distress  R06.03 518.82   2. Pneumonia of both lungs due to infectious organism, unspecified part of lung  J18.9 483.8     Patient Active Problem List   Diagnosis    Severe malnutrition    Abnormal CT scan, esophagus    Pneumonia due to COVID-19 virus    Pneumonia    Rhinovirus    Anemia    Acute on chronic respiratory failure with hypoxia    Coronary artery disease    Hypertension    COPD (chronic obstructive pulmonary disease)    Pneumonia, unspecified organism    Sick sinus syndrome    Unresponsive episode    HCAP (healthcare-associated pneumonia)    Acute UTI (urinary tract infection)    Syncope and collapse     Past Medical History:   Diagnosis Date    COPD (chronic obstructive pulmonary disease)     Coronary artery disease     Emphysema lung     Hyperlipidemia     Hypertension     Osteoarthritis      Past Surgical History:   Procedure Laterality Date    CARDIAC CATHETERIZATION      CARDIAC ELECTROPHYSIOLOGY PROCEDURE N/A 2025    Procedure: Bijan SIMS;  Surgeon: Eliot Mcgarry MD;  Location: [facility] CATH INVASIVE LOCATION;  Service: Cardiovascular;  Laterality: N/A;    COLONOSCOPY N/A 2022    Procedure: COLONOSCOPY with 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 and endoscopic clipping x 1;  Surgeon: Luz Jacques MD;  Location: [facility] ENDOSCOPY;  Service: Gastroenterology;  Laterality: N/A;  post op: cecal AVM    CORONARY STENT PLACEMENT      ENDOSCOPY N/A 2022    Procedure: ESOPHAGOGASTRODUODENOSCOPY WITH BIOPSY X1 AREA;  Surgeon: Luz Jacques MD;  Location: [facility] ENDOSCOPY;  Service: Gastroenterology;  Laterality: N/A;  esophagitis, gastritis, HH    ENDOSCOPY N/A 2022    Procedure: ESOPHAGOGASTRODUODENOSCOPY;   Surgeon: Luz Jacques MD;  Location: [facility] ENDOSCOPY;  Service: Gastroenterology;  Laterality: N/A;  post op: hiatal hernia, eosphagitis    EYE SURGERY      FEMORAL ARTERY STENT      KNEE SURGERY Left     KNEE SURGERY Left     LUNG SURGERY         SLP Recommendation and Plan  SLP Swallowing Diagnosis: oral dysphagia, mild (01/30/25 1300)  SLP Diet Recommendation: thin liquids, mechanical ground textures (01/30/25 1300)  Recommended Precautions and Strategies: general aspiration precautions, upright posture during/after eating (01/30/25 1300)  SLP Rec. for Method of Medication Administration: as tolerated (01/30/25 1300)           Swallow Criteria for Skilled Therapeutic Interventions Met: demonstrates skilled criteria (01/30/25 1300)  Anticipated Discharge Disposition (SLP): unknown (01/30/25 1300)  Rehab Potential/Prognosis, Swallowing: good, to achieve stated therapy goals (01/30/25 1300)  Therapy Frequency (Swallow): PRN (01/30/25 1300)  Predicted Duration Therapy Intervention (Days): until discharge (01/30/25 1300)  Oral Care Recommendations: Oral Care before breakfast, after meals and PRN (01/30/25 1300)     Plan for Continued Treatment (SLP): continue treatment per plan of care (01/30/25 1300)    SWALLOW EVALUATION (Last 72 Hours)       SLP Adult Swallow Evaluation       Row Name 01/30/25 1300       Rehab Evaluation    Document Type evaluation  -MS    Subjective Information no complaints  -MS    Patient Observations cooperative;agree to therapy;alert  -MS    Patient/Family/Caregiver Comments/Observations n/a  -MS    Patient Effort good  -MS    Symptoms Noted During/After Treatment none  -MS       General Information    Patient Profile Reviewed yes  -MS    Pertinent History Of Current Problem  Patient is an 79-year-old.  He has a history of COPD and is on 2 L.  He had a DuoNeb today and when the nursing staff checked on him again he was shaking and having trouble breathing.  His oxygen saturation was 70%.   He was brought in on a nonrebreather.  He has recently and has a history of COPD.  He states today has been a bad day but he does have chronic dyspnea.  He has not had a new fever that he knows of.  No swelling to his legs.  No chest pain.  He is chronically on Eliquis. ST consulted for dysphagia evaluation.                   -MS    Current Method of Nutrition regular textures;thin liquids  -MS    Precautions/Limitations, Vision WFL  -MS    Precautions/Limitations, Hearing WFL  -MS    Prior Level of Function-Communication WFL  -MS    Prior Level of Function-Swallowing mechanical ground textures;thin liquids  -MS    Plans/Goals Discussed with patient  -MS       Oral Motor Structure and Function    Dentition Assessment edentulous, does not have dentures  -MS    Secretion Management WNL/WFL  -MS    Mucosal Quality moist, healthy  -MS    Volitional Swallow WFL  -MS    Volitional Cough WFL  -MS       Oral Musculature and Cranial Nerve Assessment    Oral Motor General Assessment generalized oral motor weakness  -MS       General Eating/Swallowing Observations    Respiratory Support Currently in Use nasal cannula  -MS    O2 Liters 5L  -MS    Eating/Swallowing Skills self-fed  -MS    Positioning During Eating upright 90 degree;upright in chair  -MS    Utensils Used cup;straw;spoon  -MS    Consistencies Trialed soft to chew textures;mechanical ground textures;pureed;ice chips;thin liquids;mixed consistency  -MS       Respiratory    Respiratory Status WFL  -MS       Clinical Swallow Eval    Oral Prep Phase impaired  -MS    Oral Transit WFL  -MS    Oral Residue WFL  -MS    Pharyngeal Phase no overt signs/symptoms of pharyngeal impairment  -MS    Esophageal Phase unremarkable  -MS    Clinical Swallow Evaluation Summary Clinical swallow evaluation completed. Pt was awake and alert, able to follow commands, oriented x4. Upon room entry, pt was sitting upright in recliner finishing lunch. Pt on 5L O2NC. Pt was on a regular diet  and thin liquids at the time of evaluation. Pt is edentulous w/o dentures. Pt was in hospital last week which ST was following and completed VFSS on 1/21/25 and recommended a mechanical soft diet and thin liquids. Oral motor exam revealed overall generalized oral motor weakness. Trials assessed:  ice chips x1, thin by cup x2, thin by straw x2, puree x2, mixed/mech soft x2, & STC x1. Pt had good bolus control of ice chip and and able to self feed. Adequate labial seal w/straw. No anterior loss. Prolonged mastication during STC trial resulting in fatigue. Oral transit appeared timely. There was no pocketing. MILD lingual residue on STC, SLP cued pt to swallow x1 and follow w/ liquid wash which cleared residue. Digital palpation suggests adequate hyolaryngeal excursion. Pt demonstrated a delayed cough x1 about 1 minute after consumption of P.O. SLP not suspecting from aspiration. Pt demonstrated no other overt s/s of penetration/aspiration throughout the evaluation. Pt agreeable to recommendations. Nursing made aware.  Per above, pt presents w/ mild oral stage dysphagia. It is recommended pt initiate an mechanical soft diet w/ thin liquids.  Only feed when pt is fully awake and alert. Meds as tolerated. ST will continue to follow to ensure diet tolerance and safety.      SLP Plan & Recommendation:      - Mechanical soft diet & thin liquids  - Only feed when pt is fully awake and alert  - Meds: as tolerated   - Safe swallow strategies: HOB at a 90 degree angle, slow rate of intake, small bites/sips  -Oral care at least x2 daily   - ST will continue to complete meal assessment(s) to ensure tolerance and safety of rec'd diet, provide further recs as indicated.    -MS       Oral Prep Concerns    Oral Prep Concerns prolonged mastication  -MS    Prolonged Mastication --  STC  -MS       SLP Evaluation Clinical Impression    SLP Swallowing Diagnosis oral dysphagia;mild  -MS    Functional Impact risk of  aspiration/pneumonia;risk of malnutrition;risk of dehydration  -MS    Rehab Potential/Prognosis, Swallowing good, to achieve stated therapy goals  -MS    Swallow Criteria for Skilled Therapeutic Interventions Met demonstrates skilled criteria  -MS       SLP Treatment Clinical Impressions    Plan for Continued Treatment (SLP) continue treatment per plan of care  -MS    Care Plan Review evaluation/treatment results reviewed  -MS       Recommendations    Therapy Frequency (Swallow) PRN  -MS    Predicted Duration Therapy Intervention (Days) until discharge  -MS    SLP Diet Recommendation thin liquids;mechanical ground textures  -MS    Recommended Precautions and Strategies general aspiration precautions;upright posture during/after eating  -MS    Oral Care Recommendations Oral Care before breakfast, after meals and PRN  -MS    SLP Rec. for Method of Medication Administration as tolerated  -MS    Anticipated Discharge Disposition (SLP) unknown  -MS       Swallow Goals (SLP)    Swallow LTGs Patient will demonstrate functional swallow for  -MS    Swallow STGs diet tolerance goal selection (SLP)  -MS    Diet Tolerance Goal Selection (SLP) Patient will tolerate trials of  -MS       (LTG) Patient will demonstrate functional swallow for    Diet Texture (Demonstrate functional swallow) soft to chew (ground) textures  -MS    Liquid viscosity (Demonstrate functional swallow) thin liquids  -MS    [place] (Demonstrate functional swallow) independently (over 90% accuracy)  -MS    Time Frame (Demonstrate functional swallow) by discharge  -MS    Progress/Outcomes (Demonstrate functional swallow) new goal  -MS       (STG) Patient will tolerate trials of    Consistencies Trialed (Tolerate trials) thin liquids;soft to chew (ground) textures  -MS    Desired Outcome (Tolerate trials) without signs/symptoms of aspiration;without signs of distress;with adequate oral prep/transit/clearance  -MS    [place] (Tolerate trials)  independently (over 90% accuracy)  -MS    Time Frame (Tolerate trials) by discharge  -MS    Progress/Outcomes (Tolerate trials) new goal  -MS              User Key  (r) = Recorded By, (t) = Taken By, (c) = Cosigned By      Initials Name Effective Dates    Mike Hair SLP 04/22/24 -                     EDUCATION  The patient has been educated in the following areas:   Dysphagia (Swallowing Impairment).        SLP GOALS       Row Name 01/30/25 1300       (LTG) Patient will demonstrate functional swallow for    Diet Texture (Demonstrate functional swallow) soft to chew (ground) textures  -MS    Liquid viscosity (Demonstrate functional swallow) thin liquids  -MS    [place] (Demonstrate functional swallow) independently (over 90% accuracy)  -MS    Time Frame (Demonstrate functional swallow) by discharge  -MS    Progress/Outcomes (Demonstrate functional swallow) new goal  -MS       (STG) Patient will tolerate trials of    Consistencies Trialed (Tolerate trials) thin liquids;soft to chew (ground) textures  -MS    Desired Outcome (Tolerate trials) without signs/symptoms of aspiration;without signs of distress;with adequate oral prep/transit/clearance  -MS    [place] (Tolerate trials) independently (over 90% accuracy)  -MS    Time Frame (Tolerate trials) by discharge  -MS    Progress/Outcomes (Tolerate trials) new goal  -MS              User Key  (r) = Recorded By, (t) = Taken By, (c) = Cosigned By      Initials Name Provider Type    Mike Hair, SLP Speech and Language Pathologist                    ADRIANNE Funes  1/30/2025

## 2025-01-30 NOTE — DISCHARGE PLACEMENT REQUEST
"Kali Garcia (79 y.o. Male)       Date of Birth   1945    Social Security Number       Address   [address] [place] [place] [address]    Home Phone   [phone]    MRN   [id number]       Yazdanism   None    Marital Status                               Admission Date   1/29/25    Admission Type   Emergency    Admitting Provider   Noman Turcios MD    Attending Provider   Blair Ponce MD    Department, Room/Bed   [facility] 2F, 2212/1       Discharge Date       Discharge Disposition       Discharge Destination                                 Attending Provider: Blair Ponce MD    Allergies: Codeine    Isolation: None   Infection: None   Code Status: CPR    Ht: 180.3 cm (71\")   Wt: 49.9 kg (110 lb)    Admission Cmt: None   Principal Problem: Pneumonia [J18.9]                   Active Insurance as of 1/29/2025       Primary Coverage       Payor Plan Insurance Group Employer/Plan Group    ANTHEM MEDICARE REPLACEMENT ANTHEM MED ADV PPO [id number]       Payor Plan Address Payor Plan Phone Number Payor Plan Fax Number Effective Dates    [address] [phone]  1/1/2024 - None Entered    [facility] [address]         Subscriber Name Subscriber Birth Date Member ID       KALI GARCIA 1945 [id number]                     Emergency Contacts        (Rel.) Home Phone Work Phone Mobile Phone    SRINIVAS GARCIA (Son) [phone] -- [phone]    JUAN GARCIA (Other) [phone] -- [phone]    Luisito Garcia (Son) -- -- --            "

## 2025-01-30 NOTE — THERAPY EVALUATION
Patient Name: Kali Garcia  : 1945    MRN: [id number]                              Today's Date: 2025       Admit Date: 2025    Visit Dx:     ICD-10-CM ICD-9-CM   1. Acute respiratory distress  R06.03 518.82   2. Pneumonia of both lungs due to infectious organism, unspecified part of lung  J18.9 483.8     Patient Active Problem List   Diagnosis    Severe malnutrition    Abnormal CT scan, esophagus    Pneumonia due to COVID-19 virus    Pneumonia    Rhinovirus    Anemia    Acute on chronic respiratory failure with hypoxia    Coronary artery disease    Hypertension    COPD (chronic obstructive pulmonary disease)    Pneumonia, unspecified organism    Sick sinus syndrome    Unresponsive episode    HCAP (healthcare-associated pneumonia)    Acute UTI (urinary tract infection)    Syncope and collapse     Past Medical History:   Diagnosis Date    COPD (chronic obstructive pulmonary disease)     Coronary artery disease     Emphysema lung     Hyperlipidemia     Hypertension     Osteoarthritis      Past Surgical History:   Procedure Laterality Date    CARDIAC CATHETERIZATION      CARDIAC ELECTROPHYSIOLOGY PROCEDURE N/A 2025    Procedure: Bijan GLASERT;  Surgeon: Eliot Mcgarry MD;  Location: [facility] CATH INVASIVE LOCATION;  Service: Cardiovascular;  Laterality: N/A;    COLONOSCOPY N/A 2022    Procedure: COLONOSCOPY with 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 and endoscopic clipping x 1;  Surgeon: Luz Jacques MD;  Location: [facility] ENDOSCOPY;  Service: Gastroenterology;  Laterality: N/A;  post op: cecal AVM    CORONARY STENT PLACEMENT      ENDOSCOPY N/A 2022    Procedure: ESOPHAGOGASTRODUODENOSCOPY WITH BIOPSY X1 AREA;  Surgeon: Luz Jacques MD;  Location: [facility] ENDOSCOPY;  Service: Gastroenterology;  Laterality: N/A;  esophagitis, gastritis, HH    ENDOSCOPY N/A 2022    Procedure: ESOPHAGOGASTRODUODENOSCOPY;  Surgeon: Luz Jacques MD;  Location: [facility] ENDOSCOPY;   Service: Gastroenterology;  Laterality: N/A;  post op: hiatal hernia, eosphagitis    EYE SURGERY      FEMORAL ARTERY STENT      KNEE SURGERY Left     KNEE SURGERY Left     LUNG SURGERY        General Information       Row Name 01/30/25 1615          Physical Therapy Time and Intention    Document Type evaluation  -     Mode of Treatment physical therapy  -       Row Name 01/30/25 1615          General Information    Patient Profile Reviewed yes  -SS     Prior Level of Function max assist:;ADL's;transfer  -     Existing Precautions/Restrictions oxygen therapy device and L/min  currently on 4L  -     Barriers to Rehab medically complex;previous functional deficit  -       Row Name 01/30/25 1615          Living Environment    People in Home --  lived at home and was independent prior to dec 23, 2024 admission. has been in facility or hospital since  -       Row Name 01/30/25 1615          Cognition    Orientation Status (Cognition) oriented to;person;place;situation  -       Row Name 01/30/25 1615          Safety Issues/Impairments Affecting Functional Mobility    Impairments Affecting Function (Mobility) balance;endurance/activity tolerance;shortness of breath;postural/trunk control;strength  -               User Key  (r) = Recorded By, (t) = Taken By, (c) = Cosigned By      Initials Name Provider Type     Bia Magana, PT Physical Therapist                   Mobility       Row Name 01/30/25 1618          Bed Mobility    Bed Mobility bed mobility (all) activities  -     All Activities, [place] (Bed Mobility) maximum assist (25% patient effort)  -       Row Name 01/30/25 1618          Bed-Chair Transfer    Bed-Chair [place] (Transfers) moderate assist (50% patient effort)  -     Assistive Device (Bed-Chair Transfers) walker, front-wheeled  -       Row Name 01/30/25 1618          Gait/Stairs (Locomotion)    Comment, (Gait/Stairs) not yet able with standing tolerance for  ambulation  -               User Key  (r) = Recorded By, (t) = Taken By, (c) = Cosigned By      Initials Name Provider Type    SS Bia Magana PT Physical Therapist                   Obj/Interventions       Row Name 01/30/25 1619          Range of Motion Comprehensive    Comment, General Range of Motion decreased functional LE ROM  -       Row Name 01/30/25 1619          Strength Comprehensive (MMT)    Comment, General Manual Muscle Testing (MMT) Assessment able to lift B LE against gravity > 5 sec; B LE >3/5 but considerable functional weakness  -       Row Name 01/30/25 1619          Balance    Balance Assessment sitting static balance;standing static balance  -     Static Sitting Balance contact guard  -     Static Standing Balance moderate assist  -       Row Name 01/30/25 1619          Sensory Assessment (Somatosensory)    Sensory Assessment (Somatosensory) not tested  -               User Key  (r) = Recorded By, (t) = Taken By, (c) = Cosigned By      Initials Name Provider Type     Bia Magana PT Physical Therapist                   Goals/Plan       Row Name 01/30/25 1623          Bed Mobility Goal 1 (PT)    Activity/Assistive Device (Bed Mobility Goal 1, PT) bed mobility activities, all  -SS     [place] Level/Cues Needed (Bed Mobility Goal 1, PT) modified independence  -SS     Time Frame (Bed Mobility Goal 1, PT) long term goal (LTG);2 weeks  -       Row Name 01/30/25 1623          Transfer Goal 1 (PT)    Activity/Assistive Device (Transfer Goal 1, PT) transfers, all  -SS     [place] Level/Cues Needed (Transfer Goal 1, PT) modified independence  -SS     Time Frame (Transfer Goal 1, PT) long term goal (LTG);2 weeks  -       Row Name 01/30/25 1623          Gait Training Goal 1 (PT)    Activity/Assistive Device (Gait Training Goal 1, PT) gait (walking locomotion);walker, rolling  -SS     [place] Level (Gait Training Goal 1, PT) modified independence  -SS      Distance (Gait Training Goal 1, PT) 75'  -     Time Frame (Gait Training Goal 1, PT) long term goal (LTG);2 weeks  -       Row Name 01/30/25 9773          Therapy Assessment/Plan (PT)    Planned Therapy Interventions (PT) balance training;bed mobility training;gait training;home exercise program;transfer training;strengthening;patient/family education  -               User Key  (r) = Recorded By, (t) = Taken By, (c) = Cosigned By      Initials Name Provider Type     Bia Magana, PT Physical Therapist                   Clinical Impression       Row Name 01/30/25 1620          Pain    Additional Documentation Pain Scale: FACES Pre/Post-Treatment (Group)  -       Row Name 01/30/25 1620          Pain Scale: FACES Pre/Post-Treatment    Pain: FACES Scale, Pretreatment 4-->hurts little more  -     Posttreatment Pain Rating 4-->hurts little more  -       Row Name 01/30/25 1620          Plan of Care Review    Plan of Care Reviewed With patient  -     Outcome Evaluation 80 y/o M who presented with hypoxia from SNF. Patient has had 3 recent admissions for similar. Diagnosed with PNA. Patient was independent prior to first admission dec 23, 2024 but has been in SNFs since and is emaciated and frail. BMI 15. Patient is anxious to mobilize. States he has not ambulated recently and has rarely stood. Pt on 4L supp O2. max A for bed mobility, mod A for stand pivot transfer to chair with RW. Seated BP was WNL. Limited standing tolerance and unable to progress to ambulation. Recommending SNF at d/c. WIll progress as tolerated.  -       Row Name 01/30/25 5696          Therapy Assessment/Plan (PT)    Rehab Potential (PT) fair  -     Criteria for Skilled Interventions Met (PT) yes;meets criteria  -     Therapy Frequency (PT) 5 times/wk  -     Predicted Duration of Therapy Intervention (PT) until d/c  -               User Key  (r) = Recorded By, (t) = Taken By, (c) = Cosigned By      Initials Name  Provider Type     Bia Magana, PT Physical Therapist                   Outcome Measures       Row Name 01/30/25 0800 01/30/25 0600       How much help from another person do you currently need...    Turning from your back to your side while in flat bed without using bedrails? 3  -LS 3  -JN    Moving from lying on back to sitting on the side of a flat bed without bedrails? 3  -LS 2  -JN    Moving to and from a bed to a chair (including a wheelchair)? 3  -LS 2  -JN    Standing up from a chair using your arms (e.g., wheelchair, bedside chair)? 3  -LS 2  -JN    Climbing 3-5 steps with a railing? 2  -LS 2  -JN    To walk in hospital room? 2  -LS 2  -JN    AM-PAC 6 Clicks Score (PT) 16  -LS 13  -JN              User Key  (r) = Recorded By, (t) = Taken By, (c) = Cosigned By      Initials Name Provider Type    Mago Brannon, RN Registered Nurse    Sonia Crowder RN Registered Nurse                                 Physical Therapy Education       Title: PT OT SLP Therapies (Done)       Topic: Physical Therapy (Done)       Point: Mobility training (Done)       Learning Progress Summary            Patient Acceptance, E, VU by  at 1/30/2025 1624                                      User Key       Initials Effective Dates Name Provider Type Discipline     06/16/21 -  Bia Magana PT Physical Therapist PT                  PT Recommendation and Plan  Planned Therapy Interventions (PT): balance training, bed mobility training, gait training, home exercise program, transfer training, strengthening, patient/family education  Outcome Evaluation: 78 y/o M who presented with hypoxia from SNF. Patient has had 3 recent admissions for similar. Diagnosed with PNA. Patient was independent prior to first admission dec 23, 2024 but has been in SNFs since and is emaciated and frail. BMI 15. Patient is anxious to mobilize. States he has not ambulated recently and has rarely stood. Pt on 4L supp O2. max A for bed  mobility, mod A for stand pivot transfer to chair with RW. Seated BP was WNL. Limited standing tolerance and unable to progress to ambulation. Recommending SNF at d/c. WIll progress as tolerated.     Time Calculation:   PT Evaluation Complexity  History, PT Evaluation Complexity: 3 or more personal factors and/or comorbidities  Examination of Body Systems (PT Eval Complexity): total of 3 or more elements  Clinical Presentation (PT Evaluation Complexity): evolving  Clinical Decision Making (PT Evaluation Complexity): moderate complexity  Overall Complexity (PT Evaluation Complexity): moderate complexity     PT Charges       Row Name 01/30/25 1624             Time Calculation    Start Time 1227  -SS      Stop Time 1247  -SS      Time Calculation (min) 20 min  -SS      PT Received On 01/30/25  -      PT - Next Appointment 01/31/25  -      PT Goal Re-Cert Due Date 02/13/25  -         Time Calculation- PT    Total Timed Code Minutes- PT 0 minute(s)  -SS                User Key  (r) = Recorded By, (t) = Taken By, (c) = Cosigned By      Initials Name Provider Type     Bia Magana, PT Physical Therapist                  Therapy Charges for Today       Code Description Service Date Service Provider Modifiers Qty    [id number]  PT EVAL MOD COMPLEXITY 3 1/30/2025 Bia Magana, PT GP 1            PT G-Codes  AM-PAC 6 Clicks Score (PT): 16  PT Discharge Summary  Anticipated Discharge Disposition (PT): skilled nursing facility    Bia Magana PT  1/30/2025

## 2025-01-30 NOTE — SIGNIFICANT NOTE
01/30/25 1124   Readmission Indications   Is the patient and/or family able to complete the readmission assessment questions? Yes  (Son, Ivis)   Is this hospitalization related to the prior hospital diagnosis? Yes   Recommendation for rehospitalization   Did you speak with your physician prior to coming to the hospital No  (From [facility])   Follow-up Appointments   Do you have a PCP? Yes  (Dr Ward)   Did you have an appointment with PCP after your hospitalization? No   Did you have an appointment with a Specialist? No   Are you current with the Pulmonary Clinic? No   Are you current with the CHF Clinic? No   Medications   Did you have newly prescribed medications at discharge? Yes  (Prednisone, Augmentin)   Did you understand the reasons for your medications at discharge and how to take them? Yes   Did you understand the side effects of your medications? Yes   Are you taking all of you prescribed medications? Yes   [facility] to [facility]   Were medications picked up? Yes   Discharge Instructions   Did you understand your discharge instructions? Yes   Did your family/caregiver hear your instructions? Yes   Were you told to eat a special diet? Yes  (Mechanical ground)   Did you adhere to the diet? Yes   Were you given a number of someone to call if you had questions or concerns? Yes   Index discharge location/services   Where did you go upon discharge? Skilled Nursing Facility   Do you have supportive family or friends in the home? Yes   What services were arranged at discharge? Hospice  ([facility])   Was the provider seen at the facility?   (Unsure per son)   What actions were taken to avoid a readmit? Steroids/Antibiotics/Breathing treatments   Which Skilled Nursing Facility were your admitted from? [facility]   Discharge Readiness   On a scale of 1-5 (5 being well prepared), how ready were you for discharge 3  (Son states he wasn't present when he discharged)    Recommendation based on interview Goals of care discussion/advanced care planning  (Would like POA information)   Palliative Care/Hospice   Are you current with Palliative Care? No   Are you current with Hospice Care? Yes   Which Hospice Company? [facility]   Advance Directives (For Healthcare)   Pre-existing AND/MOST/POLST Order No   Advance Directive Status Patient does not have advance directive   Have you reviewed your Advance Directive and is it valid for this stay? Not applicable   Literature Provided on Advance Directives Other (Comment)  (Wants info)   Patient Requests Assistance on Advance Directives Yes, consult for Advance Care Planning   Readmission Assessment Final Comments   Final Comments Previous: Bacterial Pneumonia, acute UTI, COPD exacerbation. CXR showed bibasilar consolidations which could reflect aspiration pneumonia. Baseline oxygen 4-6L. Currently on 4L. IV cefepime. Urine culture positive for pseudomonas, received IV Zosyn. Yap catheter placed. Swallow study done, speech therapy upgraded diet to mechanical ground with thin liquids. Cardiology and pulmonology consult.  Improved mental status with IV abx.  [facility]charged to [facility] with oral steroids and antibiotics.  [facility] hospice after SNF. Current: Acute respiratory distress from [facility]. Requiring Airvo. ABG showed pH 7.48, CO2 40, pO2 45.4. Hemoglobin 7.2. Blood and urine cultures pending. CXR showed bilateral infiltrates. Troponin 43, 42. Pulm consult. IV Unasyn/Cefepime/Vancomycin. Yap catheter. Patient is lethargic. Speech for swallow study.  LACE 10.

## 2025-01-30 NOTE — PLAN OF CARE
Goal Outcome Evaluation:  Plan of Care Reviewed With: patient           Outcome Evaluation: 80 y/o M who presented with hypoxia from SNF. Patient has had 3 recent admissions for similar. Diagnosed with PNA. Patient was independent prior to first admission dec 23, 2024 but has been in SNFs since and is emaciated and frail. BMI 15. Patient is anxious to mobilize. States he has not ambulated recently and has rarely stood. Pt on 4L supp O2. max A for bed mobility, mod A for stand pivot transfer to chair with RW. Seated BP was WNL. Limited standing tolerance and unable to progress to ambulation. Recommending SNF at d/c. WIll progress as tolerated.    Anticipated Discharge Disposition (PT): skilled nursing facility

## 2025-01-30 NOTE — CONSULTS
Nutrition Services    Patient Name: Kali Garcia  YOB: 1945  MRN: [id number]  Admission date: 1/29/2025    Comment:  -- Continue current diet and encourage good PO intakes    -- Add Boost Original BID (Provides 480 kcals, 20 g protein if consumed)     -- Add Magic Cups at lunch/dinner (Provides 580 kcals, 18 g protein if consumed)     -- Severe chronic disease related malnutrition related to multiple catabolic chronic medical problems as evidenced by likely PO intakes less than 75% x 1 year and severe muscle and fat wasting per physical exam.  See MSA below.      CLINICAL NUTRITION ASSESSMENT      Reason for Assessment 1/30: BMI less than 19, history of malnutrition      H&P      Past Medical History:   Diagnosis Date    COPD (chronic obstructive pulmonary disease)     Coronary artery disease     Emphysema lung     Hyperlipidemia     Hypertension     Osteoarthritis        Past Surgical History:   Procedure Laterality Date    CARDIAC CATHETERIZATION      CARDIAC ELECTROPHYSIOLOGY PROCEDURE N/A 1/2/2025    Procedure: Bijan SIMS;  Surgeon: Eliot Mcgarry MD;  Location: [facility] CATH INVASIVE LOCATION;  Service: Cardiovascular;  Laterality: N/A;    COLONOSCOPY N/A 8/4/2022    Procedure: COLONOSCOPY with 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 and endoscopic clipping x 1;  Surgeon: Luz Jacques MD;  Location: [facility] ENDOSCOPY;  Service: Gastroenterology;  Laterality: N/A;  post op: cecal AVM    CORONARY STENT PLACEMENT      ENDOSCOPY N/A 4/14/2022    Procedure: ESOPHAGOGASTRODUODENOSCOPY WITH BIOPSY X1 AREA;  Surgeon: Luz Jacques MD;  Location: [facility] ENDOSCOPY;  Service: Gastroenterology;  Laterality: N/A;  esophagitis, gastritis, HH    ENDOSCOPY N/A 8/4/2022    Procedure: ESOPHAGOGASTRODUODENOSCOPY;  Surgeon: Luz Jacques MD;  Location: [facility] ENDOSCOPY;  Service: Gastroenterology;  Laterality: N/A;  post op: hiatal hernia, eosphagitis    EYE SURGERY      FEMORAL ARTERY  "STENT      KNEE SURGERY Left     KNEE SURGERY Left     LUNG SURGERY          Current Problems   Pneumonia     Chronic atrial fibrillation  Chronic CAD with HLD     Essential Hypertension     COPD       Encounter Information        Trending Narrative     1/30: Admitted after being found hypoxic by staff at his rehab facility.  RD visited patient at bedside.  Patient refused breakfast this AM but accepted chocolate Boost from RD.  Patient reports occasional nausea.  NFPE completed, consistent with nutrition diagnosis of malnutrition using AND/ASPEN criteria. See MSA below.         Anthropometrics        Current Height, Weight Height: 180.3 cm (71\")  Weight: 49.9 kg (110 lb) (01/29/25 1824)       Usual Body Weight (UBW) Unable to obtain from patient        Trending Weight Hx     This admission: 1/30: 110#             PTA: No significant weight loss     Wt Readings from Last 30 Encounters:   01/29/25 1824 49.9 kg (110 lb)   01/23/25 0524 49 kg (108 lb 0.4 oz)   01/22/25 0500 48.6 kg (107 lb 2.3 oz)   01/21/25 0539 48.8 kg (107 lb 9.4 oz)   01/20/25 0510 48.3 kg (106 lb 7.7 oz)   01/19/25 0500 48.3 kg (106 lb 7.7 oz)   01/18/25 2332 48.3 kg (106 lb 7.7 oz)   01/18/25 1625 51 kg (112 lb 7 oz)   01/03/25 0500 51 kg (112 lb 7 oz)   01/02/25 0557 51.8 kg (114 lb 3.2 oz)   01/01/25 0500 51.2 kg (112 lb 14 oz)   12/31/24 0501 51.1 kg (112 lb 10.5 oz)   12/30/24 0600 52.8 kg (116 lb 6.5 oz)   12/29/24 0514 53 kg (116 lb 13.5 oz)   12/28/24 0538 51.4 kg (113 lb 5.1 oz)   12/27/24 0647 50.4 kg (111 lb 1.8 oz)   12/26/24 0610 49.9 kg (110 lb 0.2 oz)   12/25/24 0600 49.6 kg (109 lb 5.6 oz)   12/23/24 0531 50.1 kg (110 lb 7.2 oz)   12/22/24 2139 49.9 kg (110 lb 0.2 oz)   03/04/24 1452 53 kg (116 lb 12.1 oz)   02/19/24 1303 50.5 kg (111 lb 5 oz)   02/16/24 2101 50 kg (110 lb 2.3 oz)   02/16/24 2054 51.8 kg (114 lb 2.8 oz)   02/15/24 1416 52.9 kg (116 lb 10.3 oz)   02/04/24 0315 51.5 kg (113 lb 8.6 oz)   02/03/24 1415 51.3 kg (113 lb) " "  02/03/24 0345 51.5 kg (113 lb 8.6 oz)   02/02/24 1524 51.8 kg (114 lb 3.2 oz)   02/02/24 0337 54.4 kg (120 lb)   02/01/24 1330 52.1 kg (114 lb 14.5 oz)   08/04/22 1124 51.1 kg (112 lb 10.5 oz)   07/26/22 1511 59 kg (130 lb)   04/15/22 0436 58.3 kg (128 lb 8.5 oz)   04/13/22 0355 54.6 kg (120 lb 5.9 oz)   04/12/22 2037 128 kg (282 lb 3 oz)      BMI kg/m2 Body mass index is 15.34 kg/m².       Labs        Pertinent Labs    Results from last 7 days   Lab Units 01/30/25  0625 01/29/25  1834   SODIUM mmol/L 137 138   POTASSIUM mmol/L 4.4 4.1   CHLORIDE mmol/L 105 102   CO2 mmol/L 25.8 28.1   BUN mg/dL 20 16   CREATININE mg/dL 0.63* 0.71*   CALCIUM mg/dL 7.6* 8.0*   BILIRUBIN mg/dL  --  0.4   ALK PHOS U/L  --  62   ALT (SGPT) U/L  --  23   AST (SGOT) U/L  --  29   GLUCOSE mg/dL 146* 121*     Results from last 7 days   Lab Units 01/30/25  0331   HEMOGLOBIN g/dL 7.2*   HEMATOCRIT % 23.0*     No results found for: \"HGBA1C\"     Medications    Scheduled Medications amiodarone, 200 mg, Oral, BID  ampicillin-sulbactam, 3 g, Intravenous, Q6H  apixaban, 2.5 mg, Oral, Q12H  arformoterol, 15 mcg, Nebulization, BID - RT  atorvastatin, 10 mg, Oral, Nightly  budesonide, 0.5 mg, Nebulization, BID - RT  citalopram, 10 mg, Oral, Daily  clonazePAM, 0.25 mg, Oral, BID  clopidogrel, 75 mg, Oral, Daily  mirtazapine, 15 mg, Oral, Nightly  multivitamin with minerals, 1 tablet, Oral, Daily  pantoprazole, 40 mg, Oral, Daily  sodium chloride, 10 mL, Intravenous, Q12H        Infusions      PRN Medications   acetaminophen **OR** acetaminophen **OR** acetaminophen    senna-docusate sodium **AND** polyethylene glycol **AND** bisacodyl **AND** bisacodyl    ipratropium-albuterol    melatonin    nitroglycerin    ondansetron    [COMPLETED] Insert Peripheral IV **AND** sodium chloride    sodium chloride    sodium chloride     Physical Findings        Trending Physical   Appearance, NFPE 1/30: NFPE completed, consistent with nutrition diagnosis of " malnutrition using AND/ASPEN criteria. See MSA below.      --  Edema  No edema documented      Bowel Function No BM since admission (yesterday)     Tubes No feeding tube      Chewing/Swallowing No known issues      Skin Stage 2 coccyx - no WOCN note at this time      --  Current Nutrition Orders & Evaluation of Intake       Oral Nutrition     Food Allergies NKFA   Current PO Diet Diet: Cardiac; Healthy Heart (2-3 Na+); Fluid Consistency: Thin (IDDSI 0)   Supplement None ordered    PO Evaluation     Trending % PO Intake 1/30: None documented    --  Nutritional Risk Screening        NRS-2002 Score          Nutrition Diagnosis         Nutrition Dx Problem 1 Severe chronic disease related malnutrition related to multiple catabolic chronic medical problems as evidenced by likely PO intakes less than 75% x 1 year and severe muscle and fat wasting per physical exam.        Nutrition Dx Problem 2        Intervention Goal         Intervention Goal(s) Accept ONS  No weight loss  PO intakes at least 60%  Improve skin integrity     Nutrition Intervention        RD Action Add ONS  Completed NFPE  Provided patient with Boost  Monitor PO intakes      Nutrition Prescription          Diet Prescription Heart Healthy   Supplement Prescription Magic Cup BID  Boost Original BID   --  Monitor/Evaluation        Monitor Per protocol, I&O, PO intake, Supplement intake, Pertinent labs, Weight     Malnutrition Severity Assessment      Patient meets criteria for : Severe Malnutrition  Malnutrition Type (Last 8 Hours)       Malnutrition Severity Assessment       Row Name 02/07/25 0714       Malnutrition Severity Assessment    Malnutrition Type Chronic Disease - Related Malnutrition      Row Name 02/07/25 0714       Insufficient Energy Intake     Insufficient Energy Intake Findings Severe    Insufficient Energy Intake  <75% of est. energy requirement for > or equal to 1 month      Row Name 02/07/25 0714       Muscle Loss    Loss of Muscle Mass  Findings Severe    Christian Region Severe - deep hollowing/scooping, lack of muscle to touch, facial bones well defined    Clavicle Bone Region Severe - protruding prominent bone    Acromion Bone Region Severe - squared shoulders, bones, and acromion process protrusion prominent    Scapular Bone Region Severe - prominent bones, depressions easily visible between ribs, scapula, spine, shoulders    Dorsal Hand Region Severe - prominent depression    Patellar Region Severe - prominent bone, square looking, very little muscle definition    Anterior Thigh Region Severe - line/depression along thigh, obviously thin    Posterior Calf Region Severe - thin with very little definition/firmness      Row Name 02/07/25 0714       Fat Loss    Subcutaneous Fat Loss Findings Severe    Orbital Region  Severe - pronounced hollowness/depression, dark circles, loose saggy skin    Upper Arm Region Severe - mostly skin, very little space between folds, fingers touch    Thoracic & Lumbar Region Severe - ribs visible with prominent depressions, iliac crest very prominent      Row Name 02/07/25 0714       Criteria Met (Must meet criteria for severity in at least 2 of these categories: M Wasting, Fat Loss, Fluid, Secondary Signs, Wt. Status, Intake)    Patient meets criteria for  Severe Malnutrition                     Electronically signed by:  Kathrine Maharaj RD  01/30/25 08:35 EST

## 2025-01-30 NOTE — PLAN OF CARE
Goal Outcome Evaluation:   Clinical swallow evaluation completed. Pt was awake and alert, able to follow commands, oriented x4. Upon room entry, pt was sitting upright in recliner finishing lunch. Pt on 5L O2NC. Pt was on a regular diet and thin liquids at the time of evaluation. Pt is edentulous w/o dentures. Pt was in hospital last week which ST was following and completed VFSS on 1/21/25 and recommended a mechanical soft diet and thin liquids. Oral motor exam revealed overall generalized oral motor weakness. Trials assessed:  ice chips x1, thin by cup x2, thin by straw x2, puree x2, mixed/mech soft x2, & STC x1. Pt had good bolus control of ice chip and and able to self feed. Adequate labial seal w/straw. No anterior loss. Prolonged mastication during STC trial resulting in fatigue. Oral transit appeared timely. There was no pocketing. MILD lingual residue on STC, SLP cued pt to swallow x1 and follow w/ liquid wash which cleared residue. Digital palpation suggests adequate hyolaryngeal excursion. Pt demonstrated a delayed cough x1 about 1 minute after consumption of P.O. SLP not suspecting from aspiration. Pt demonstrated no other overt s/s of penetration/aspiration throughout the evaluation. Pt agreeable to recommendations. Nursing made aware.  Per above, pt presents w/ mild oral stage dysphagia. It is recommended pt initiate an mechanical soft diet w/ thin liquids.  Only feed when pt is fully awake and alert. Meds as tolerated. ST will continue to follow to ensure diet tolerance and safety.      SLP Plan & Recommendation:      - Mechanical soft diet & thin liquids  - Only feed when pt is fully awake and alert  - Meds: as tolerated   - Safe swallow strategies: HOB at a 90 degree angle, slow rate of intake, small bites/sips  -Oral care at least x2 daily   - ST will continue to complete meal assessment(s) to ensure tolerance and safety of rec'd diet, provide further recs as indicated.  Anticipated Discharge  Disposition (SLP): unknown          SLP Swallowing Diagnosis: oral dysphagia, mild (01/30/25 1300)        Plan for Continued Treatment (SLP): continue treatment per plan of care (01/30/25 1300)

## 2025-01-30 NOTE — H&P
[facility] Medicine Services  History & Physical    Patient Name: Kali Garcia  : 1945  MRN: [id number]  Primary Care Physician:  Tami Ward APRN  Date of admission: 2025  Date and Time of Service: 2025 at 2200      Assessment & Plan      Chief Complaint: decreased oxygen saturations    Plan:    Pneumonia  -ABG showed pH 7.48, CO2 40, pO2 45.4  -Xray of the chest reviewed, bilateral infiltrates noted  -WBC 4.58  -Blood cultures pending  -Urine Legionella ordered  -Urine strep pneumo ordered  -Respiratory culture ordered  -DuoNebs ordered  -Unasyn ordered  -Supplemental oxygen to keep sats greater than 92%  -Incentive spirometry  -Cough and deep breathe    Chronic atrial fibrillation  Chronic CAD with HLD  -Continue amiodarone, Eliquis, atorvastatin, Plavix    Essential Hypertension  -Controlled  -Monitor BP  -Continue home Klonopin    COPD  -Chronic, stable  -Continue home inhalers, Theodur    Home medications for chronic conditions will be restarted as appropriate when verified by pharmacy      History of Present Illness     History of Present Illness: Kali Garcia is a 79 y.o. male with a previous medical history of atrial fibrillation, CAD, HLD and COPD who presented to [facility] on 2025 after being found hypoxic by staff at the rehab facility.  The patient is a poor historian and other than complaining of being tired, could not provide any HPI. HPI was taken from the chart.  Per ED report the staff found him after receiving a DuoNeb shaking and having trouble breathing With an O2 saturation of 70%.  On arrival he was on a nonrebreather but was weaned down to an Airvo while in the ED. He is on O2 at 2L at baseline.    On chart review, he was discharged on 25 after treatment for pneumonia and a COPD exacerbation.      In the ED, ABG showed pH 7.48, CO2 40, pO2 45.4. Troponin's 43,42. Hemoglobin 9.0. Otherwise, labs are unremarkable.  Chest  xray shows residual bibasilar infiltrates, mildly progressed from previous xray on 1/18/25.  He is afebrile, all vitals are stable with the exception of his O2 saturation which is 98% on Airvo.      12 point ROS reviewed and negative except as mentioned above    Objective      Vitals:   Temp:  [98.2 °F (36.8 °C)] 98.2 °F (36.8 °C)  Heart Rate:  [55-70] 55  Resp:  [19-20] 19  BP: (105-135)/(48-57) 105/48  Body mass index is 15.34 kg/m².    Physical Exam  Vitals and nursing note reviewed.   Constitutional:       Appearance: Normal appearance.      Comments: Airvo   HENT:      Mouth/Throat:      Mouth: Mucous membranes are moist.   Cardiovascular:      Rate and Rhythm: Normal rate and regular rhythm.   Pulmonary:      Effort: Pulmonary effort is normal.      Breath sounds: Examination of the right-lower field reveals decreased breath sounds. Examination of the left-lower field reveals decreased breath sounds. Decreased breath sounds present.   Abdominal:      General: Bowel sounds are normal.      Palpations: Abdomen is soft.   Musculoskeletal:         General: Normal range of motion.   Skin:     General: Skin is warm and dry.   Neurological:      General: No focal deficit present.      Mental Status: He is oriented to person, place, and time. Mental status is at baseline. He is lethargic.      Comments: Poor historian   Psychiatric:         Mood and Affect: Mood normal.         Behavior: Behavior normal.            Personal History     This is a 79 y.o. male with:    Past Medical History:   Diagnosis Date    COPD (chronic obstructive pulmonary disease)     Coronary artery disease     Emphysema lung     Hyperlipidemia     Hypertension     Osteoarthritis        Past Surgical History:   Procedure Laterality Date    CARDIAC CATHETERIZATION      CARDIAC ELECTROPHYSIOLOGY PROCEDURE N/A 1/2/2025    Procedure: Bijan SIMS;  Surgeon: Eliot Mcgarry MD;  Location: [facility] INVASIVE LOCATION;  Service: Cardiovascular;   Laterality: N/A;    COLONOSCOPY N/A 8/4/2022    Procedure: COLONOSCOPY with 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 and endoscopic clipping x 1;  Surgeon: Luz Jacques MD;  Location: [facility] ENDOSCOPY;  Service: Gastroenterology;  Laterality: N/A;  post op: cecal AVM    CORONARY STENT PLACEMENT      ENDOSCOPY N/A 4/14/2022    Procedure: ESOPHAGOGASTRODUODENOSCOPY WITH BIOPSY X1 AREA;  Surgeon: Luz Jacques MD;  Location: [facility] ENDOSCOPY;  Service: Gastroenterology;  Laterality: N/A;  esophagitis, gastritis, HH    ENDOSCOPY N/A 8/4/2022    Procedure: ESOPHAGOGASTRODUODENOSCOPY;  Surgeon: Luz Jacques MD;  Location: [facility] ENDOSCOPY;  Service: Gastroenterology;  Laterality: N/A;  post op: hiatal hernia, eosphagitis    EYE SURGERY      FEMORAL ARTERY STENT      KNEE SURGERY Left     KNEE SURGERY Left     LUNG SURGERY         Active and Resolved Problems  Active Hospital Problems    Diagnosis  POA    **Pneumonia [J18.9]  Yes      Resolved Hospital Problems   No resolved problems to display.       Family History: family history is not on file. Otherwise pertinent FHx was reviewed and not pertinent to current issue.    Social History:  reports that he quit smoking about 14 months ago. His smoking use included cigarettes. He started smoking about 64 years ago. He has a 63 pack-year smoking history. He has never been exposed to tobacco smoke. He does not have any smokeless tobacco history on file. He reports that he does not currently use alcohol. He reports that he does not use drugs.    Home Medications:  Prior to Admission Medications       Prescriptions Last Dose Informant Patient Reported? Taking?    amiodarone (PACERONE) 200 MG tablet   No Yes    Take 1 tablet by mouth 2 (Two) Times a Day for 30 days.    apixaban (ELIQUIS) 2.5 MG tablet tablet   No Yes    Take 1 tablet by mouth Every 12 (Twelve) Hours for 30 days. Indications: Atrial Fibrillation    arformoterol (BROVANA) 15 MCG/2ML nebulizer  solution   No Yes    Take 2 mL by nebulization 2 (Two) Times a Day for 30 days.    atorvastatin (LIPITOR) 10 MG tablet   Yes Yes    Take 1 tablet by mouth Every Night.    budesonide (PULMICORT) 0.5 MG/2ML nebulizer solution   No Yes    Take 2 mL by nebulization 2 (Two) Times a Day for 30 days.    cholecalciferol (VITAMIN D3) 1.25 MG (35825 UT) capsule   Yes Yes    Take 1 capsule by mouth Every 7 (Seven) Days. thur    citalopram (CeleXA) 10 MG tablet   Yes Yes    Take 1 tablet by mouth Daily.    clonazePAM (KlonoPIN) 0.25 MG disintegrating tablet   Yes Yes    Place 1 tablet on the tongue 2 (Two) Times a Day.    clopidogrel (PLAVIX) 75 MG tablet   Yes Yes    Take 1 tablet by mouth Daily. Indications: Percutaneous Coronary Intervention    ipratropium-albuterol (DUO-NEB) 0.5-2.5 mg/3 ml nebulizer   Yes Yes    Take 3 mL by nebulization Every 4 (Four) Hours As Needed for Wheezing. Indications: Chronic Obstructive Lung Disease    mirtazapine (REMERON) 15 MG tablet   Yes Yes    Take 1 tablet by mouth Every Night.    multivitamin with minerals tablet tablet   Yes Yes    Take 1 tablet by mouth Daily.    Nutritional Supplements (Declan) powder   Yes Yes    Take 1 packet by mouth 2 (Two) Times a Day.    pantoprazole (PROTONIX) 40 MG EC tablet   Yes Yes    Take 1 tablet by mouth Daily.    polyethylene glycol (MIRALAX) 17 g packet   Yes Yes    Take 17 g by mouth Daily. Indications: Constipation    tamsulosin (FLOMAX) 0.4 MG capsule 24 hr capsule   No Yes    Take 1 capsule by mouth Daily for 30 days.    theophylline (THEODUR) 300 MG 12 hr tablet   No Yes    Take 1 tablet by mouth Daily for 30 days.    traZODone (DESYREL) 100 MG tablet   Yes Yes    Take 1 tablet by mouth Daily As Needed for Sleep.    naloxone (Narcan) 4 MG/0.1ML nasal spray   Yes No    Administer 1 spray into the nostril(s) as directed by provider As Needed for Opioid Reversal. Call 911. Don't prime. [place] in 1 nostril for overdose. Repeat in 2-3 minutes in other  nostril if no or minimal breathing/responsiveness.              Allergies:  Allergies   Allergen Reactions    Codeine GI Intolerance           VTE Prophylaxis:  No VTE prophylaxis order currently exists.        CODE STATUS:    Code Status (Patient has no pulse and is not breathing): CPR (Attempt to Resuscitate)  Medical Interventions (Patient has pulse or is breathing): Full Support        Admission Status:  I believe this patient meets inpatient status.    I discussed the patient's findings and my recommendations with patient.    Signature:     This document has been electronically signed by Sonal Hutson, MAYRA, APRN, AGACNP-BC on January 30, 2025 02:40 [facility] Hospitalist Team

## 2025-01-31 LAB
ABO GROUP BLD: NORMAL
ANION GAP SERPL CALCULATED.3IONS-SCNC: 4.7 MMOL/L (ref 5–15)
BLD GP AB SCN SERPL QL: NEGATIVE
BUN SERPL-MCNC: 23 MG/DL (ref 8–23)
BUN/CREAT SERPL: 34.8 (ref 7–25)
CALCIUM SPEC-SCNC: 8.1 MG/DL (ref 8.6–10.5)
CHLORIDE SERPL-SCNC: 107 MMOL/L (ref 98–107)
CO2 SERPL-SCNC: 30.3 MMOL/L (ref 22–29)
CREAT SERPL-MCNC: 0.66 MG/DL (ref 0.76–1.27)
DEPRECATED RDW RBC AUTO: 61.4 FL (ref 37–54)
EGFRCR SERPLBLD CKD-EPI 2021: 94.8 ML/MIN/1.73
ERYTHROCYTE [DISTWIDTH] IN BLOOD BY AUTOMATED COUNT: 17.9 % (ref 12.3–15.4)
FERRITIN SERPL-MCNC: 1154 NG/ML (ref 30–400)
GLUCOSE SERPL-MCNC: 90 MG/DL (ref 65–99)
HCT VFR BLD AUTO: 20.9 % (ref 37.5–51)
HGB BLD-MCNC: 6.5 G/DL (ref 13–17.7)
IRON 24H UR-MRATE: 51 MCG/DL (ref 59–158)
IRON SATN MFR SERPL: 28 % (ref 20–50)
MCH RBC QN AUTO: 30.1 PG (ref 26.6–33)
MCHC RBC AUTO-ENTMCNC: 31.1 G/DL (ref 31.5–35.7)
MCV RBC AUTO: 96.8 FL (ref 79–97)
PLATELET # BLD AUTO: 119 10*3/MM3 (ref 140–450)
PMV BLD AUTO: 11.2 FL (ref 6–12)
POTASSIUM SERPL-SCNC: 4.2 MMOL/L (ref 3.5–5.2)
RBC # BLD AUTO: 2.16 10*6/MM3 (ref 4.14–5.8)
RH BLD: POSITIVE
SODIUM SERPL-SCNC: 142 MMOL/L (ref 136–145)
T&S EXPIRATION DATE: NORMAL
TIBC SERPL-MCNC: 183 MCG/DL (ref 298–536)
TRANSFERRIN SERPL-MCNC: 123 MG/DL (ref 200–360)
WBC NRBC COR # BLD AUTO: 5.29 10*3/MM3 (ref 3.4–10.8)

## 2025-01-31 PROCEDURE — 86900 BLOOD TYPING SEROLOGIC ABO: CPT | Performed by: INTERNAL MEDICINE

## 2025-01-31 PROCEDURE — 94664 DEMO&/EVAL PT USE INHALER: CPT

## 2025-01-31 PROCEDURE — 82728 ASSAY OF FERRITIN: CPT | Performed by: NURSE PRACTITIONER

## 2025-01-31 PROCEDURE — 94799 UNLISTED PULMONARY SVC/PX: CPT

## 2025-01-31 PROCEDURE — 86901 BLOOD TYPING SEROLOGIC RH(D): CPT | Performed by: INTERNAL MEDICINE

## 2025-01-31 PROCEDURE — 25010000002 AMPICILLIN-SULBACTAM PER 1.5 G

## 2025-01-31 PROCEDURE — 94640 AIRWAY INHALATION TREATMENT: CPT

## 2025-01-31 PROCEDURE — 97530 THERAPEUTIC ACTIVITIES: CPT

## 2025-01-31 PROCEDURE — 86923 COMPATIBILITY TEST ELECTRIC: CPT

## 2025-01-31 PROCEDURE — 36430 TRANSFUSION BLD/BLD COMPNT: CPT

## 2025-01-31 PROCEDURE — 84466 ASSAY OF TRANSFERRIN: CPT | Performed by: NURSE PRACTITIONER

## 2025-01-31 PROCEDURE — 94761 N-INVAS EAR/PLS OXIMETRY MLT: CPT

## 2025-01-31 PROCEDURE — 80048 BASIC METABOLIC PNL TOTAL CA: CPT

## 2025-01-31 PROCEDURE — P9016 RBC LEUKOCYTES REDUCED: HCPCS

## 2025-01-31 PROCEDURE — 86900 BLOOD TYPING SEROLOGIC ABO: CPT

## 2025-01-31 PROCEDURE — 83540 ASSAY OF IRON: CPT | Performed by: NURSE PRACTITIONER

## 2025-01-31 PROCEDURE — 97110 THERAPEUTIC EXERCISES: CPT

## 2025-01-31 PROCEDURE — 86850 RBC ANTIBODY SCREEN: CPT | Performed by: INTERNAL MEDICINE

## 2025-01-31 PROCEDURE — 85027 COMPLETE CBC AUTOMATED: CPT

## 2025-01-31 RX ORDER — SORBITOL SOLUTION 70 %
50 SOLUTION, ORAL MISCELLANEOUS ONCE
Status: COMPLETED | OUTPATIENT
Start: 2025-01-31 | End: 2025-01-31

## 2025-01-31 RX ORDER — TAMSULOSIN HYDROCHLORIDE 0.4 MG/1
0.4 CAPSULE ORAL DAILY
Status: DISCONTINUED | OUTPATIENT
Start: 2025-01-31 | End: 2025-02-15 | Stop reason: HOSPADM

## 2025-01-31 RX ADMIN — IPRATROPIUM BROMIDE AND ALBUTEROL SULFATE 3 ML: .5; 3 SOLUTION RESPIRATORY (INHALATION) at 17:03

## 2025-01-31 RX ADMIN — ARFORMOTEROL TARTRATE 15 MCG: 15 SOLUTION RESPIRATORY (INHALATION) at 08:09

## 2025-01-31 RX ADMIN — SORBITOL SOLUTION (BULK) 50 ML: 70 SOLUTION at 12:40

## 2025-01-31 RX ADMIN — Medication 1 TABLET: at 09:13

## 2025-01-31 RX ADMIN — TAMSULOSIN HYDROCHLORIDE 0.4 MG: 0.4 CAPSULE ORAL at 20:17

## 2025-01-31 RX ADMIN — MIRTAZAPINE 15 MG: 15 TABLET, FILM COATED ORAL at 20:17

## 2025-01-31 RX ADMIN — Medication 10 ML: at 20:18

## 2025-01-31 RX ADMIN — POLYETHYLENE GLYCOL 3350 17 G: 17 POWDER, FOR SOLUTION ORAL at 20:24

## 2025-01-31 RX ADMIN — AMPICILLIN SODIUM AND SULBACTAM SODIUM 3 G: 2; 1 INJECTION, POWDER, FOR SOLUTION INTRAMUSCULAR; INTRAVENOUS at 05:07

## 2025-01-31 RX ADMIN — ATORVASTATIN CALCIUM 10 MG: 10 TABLET ORAL at 20:17

## 2025-01-31 RX ADMIN — CITALOPRAM 10 MG: 20 TABLET, FILM COATED ORAL at 09:13

## 2025-01-31 RX ADMIN — ARFORMOTEROL TARTRATE 15 MCG: 15 SOLUTION RESPIRATORY (INHALATION) at 19:35

## 2025-01-31 RX ADMIN — AMIODARONE HYDROCHLORIDE 200 MG: 200 TABLET ORAL at 20:17

## 2025-01-31 RX ADMIN — AMPICILLIN SODIUM AND SULBACTAM SODIUM 3 G: 2; 1 INJECTION, POWDER, FOR SOLUTION INTRAMUSCULAR; INTRAVENOUS at 00:18

## 2025-01-31 RX ADMIN — AMPICILLIN SODIUM AND SULBACTAM SODIUM 3 G: 2; 1 INJECTION, POWDER, FOR SOLUTION INTRAMUSCULAR; INTRAVENOUS at 18:58

## 2025-01-31 RX ADMIN — AMPICILLIN SODIUM AND SULBACTAM SODIUM 3 G: 2; 1 INJECTION, POWDER, FOR SOLUTION INTRAMUSCULAR; INTRAVENOUS at 23:36

## 2025-01-31 RX ADMIN — IPRATROPIUM BROMIDE AND ALBUTEROL SULFATE 3 ML: .5; 3 SOLUTION RESPIRATORY (INHALATION) at 04:20

## 2025-01-31 RX ADMIN — APIXABAN 2.5 MG: 2.5 TABLET, FILM COATED ORAL at 20:17

## 2025-01-31 RX ADMIN — PANTOPRAZOLE SODIUM 40 MG: 40 TABLET, DELAYED RELEASE ORAL at 09:14

## 2025-01-31 RX ADMIN — BUDESONIDE 0.5 MG: 0.5 INHALANT RESPIRATORY (INHALATION) at 08:04

## 2025-01-31 RX ADMIN — CLONAZEPAM 0.25 MG: 0.5 TABLET ORAL at 20:17

## 2025-01-31 RX ADMIN — AMIODARONE HYDROCHLORIDE 200 MG: 200 TABLET ORAL at 09:13

## 2025-01-31 RX ADMIN — BISACODYL 5 MG: 5 TABLET, COATED ORAL at 20:24

## 2025-01-31 RX ADMIN — Medication 10 ML: at 09:14

## 2025-01-31 RX ADMIN — AMPICILLIN SODIUM AND SULBACTAM SODIUM 3 G: 2; 1 INJECTION, POWDER, FOR SOLUTION INTRAMUSCULAR; INTRAVENOUS at 12:40

## 2025-01-31 RX ADMIN — CLONAZEPAM 0.25 MG: 0.5 TABLET ORAL at 09:13

## 2025-01-31 RX ADMIN — BUDESONIDE 0.5 MG: 0.5 INHALANT RESPIRATORY (INHALATION) at 19:40

## 2025-01-31 NOTE — PLAN OF CARE
ST attempted to see this pt for AM snack for ongoing assessment of swallow function. Per nursing, pt was made NPO for GI concerns. Nursing agreed to reach out when pt is cleared for PO. ST will continue to follow.

## 2025-01-31 NOTE — PLAN OF CARE
"Assessment: Kali Garcia presents with functional mobility impairments which indicate the need for skilled intervention. Pt limited to bed level activities this date; Hgb 6.5; significant dizziness with supine to sit.  Will continue to follow and progress as tolerated.     Plan/Recommendations:   If medically appropriate, Moderate Intensity Therapy recommended post-acute care. This is recommended as therapy feels the patient would require 3-4 days per week and wouldn't tolerate \"3 hour daily\" rehab intensity. SNF would be the preferred choice. If the patient does not agree to SNF, arrange HH or OP depending on home bound status. If patient is medically complex, consider LTACH. Pt requires no DME at discharge.     Pt desires Skilled Rehab placement at discharge. Pt cooperative; agreeable to therapeutic recommendations and plan of care.                                             "

## 2025-01-31 NOTE — CASE MANAGEMENT/SOCIAL WORK
Continued Stay Note  [facility]     Patient Name: Kali Garcia  MRN: [id number]  Today's Date: 1/31/2025    Admit Date: 1/29/2025    Plan: Return to [facility]. No PASRR needed. Precert required. Followed by [facility] facility.   Discharge Plan       Row Name 01/31/25 0952       Plan    Plan Comments DC Barriers: Hgb 6.5, GI consult, IV Unasyn, 4L O2, pulm following and ordered Chest CT w/o contrast and r/u of amiodarone toxicity             Angeles Shane RN     [facility]  Office: [phone]  Cell: [phone]  Fax # [phone]

## 2025-01-31 NOTE — PROGRESS NOTES
[facility] MEDICINE SERVICE  DAILY PROGRESS NOTE    NAME: Kali Garcia  : 1945  MRN: [id number]      LOS: 2 days     PROVIDER OF SERVICE: Blair Ponce MD    Chief Complaint: Pneumonia    Subjective:     Interval History:  History taken from: patient      History of Present Illness: Kali Garcia is a 79 y.o. male with a previous medical history of atrial fibrillation, CAD, HLD and COPD who presented to [facility] on 2025 after being found hypoxic by staff at the rehab facility.  The patient is a poor historian and other than complaining of being tired, could not provide any HPI. HPI was taken from the chart.  Per ED report the staff found him after receiving a DuoNeb shaking and having trouble breathing With an O2 saturation of 70%.  On arrival he was on a nonrebreather but was weaned down to an Airvo while in the ED. He is on O2 at 2L at baseline.     On chart review, he was discharged on 25 after treatment for pneumonia and a COPD exacerbation.       In the ED, ABG showed pH 7.48, CO2 40, pO2 45.4. Troponin's 43,42. Hemoglobin 9.0. Otherwise, labs are unremarkable.  Chest xray shows residual bibasilar infiltrates, mildly progressed from previous xray on 25.  He is afebrile, all vitals are stable with the exception of his O2 saturation which is 98% on Airvo.       seen in bed NAD pt c/o dyspnea on 4lts, severe anemia, hgl 6.5  Will   transfuse 1 unit prbc    Review of Systems  12 point ROS reviewed and negative except as mentioned above  Objective:     Vital Signs  Temp:  [97.4 °F (36.3 °C)-97.8 °F (36.6 °C)] 97.8 °F (36.6 °C)  Heart Rate:  [50-56] 51  Resp:  [12-20] 14  BP: (116-141)/(48-58) 141/55  Flow (L/min) (Oxygen Therapy):  [4-6] 4   Body mass index is 15.34 kg/m².    Physical Exam  Vitals and nursing note reviewed.   Constitutional:       Appearance: Normal appearance.      Comments: Airvo   HENT:      Mouth/Throat:      Mouth: Mucous membranes  are moist.   Cardiovascular:      Rate and Rhythm: Normal rate and regular rhythm.   Pulmonary:      Effort: Pulmonary effort is normal.      Breath sounds: Examination of the right-lower field reveals decreased breath sounds. Examination of the left-lower field reveals decreased breath sounds. Decreased breath sounds present.   Abdominal:      General: Bowel sounds are normal.      Palpations: Abdomen is soft.   Musculoskeletal:         General: Normal range of motion.   Skin:     General: Skin is warm and dry.   Neurological:      General: No focal deficit present.      Mental Status: He is oriented to person, place, and time. Mental status is at baseline. He is lethargic.      Comments: Poor historian   Psychiatric:         Mood and Affect: Mood normal.         Behavior: Behavior normal.            Diagnostic Data    Results from last 7 days   Lab Units 01/31/25  0835 01/31/25  0506 01/30/25  0331 01/29/25  1834   WBC 10*3/mm3  --  5.29   < > 4.58   HEMOGLOBIN g/dL  --  6.5*   < > 9.0*   HEMATOCRIT %  --  20.9*   < > 28.2*   PLATELETS 10*3/mm3  --  119*   < > 120*   GLUCOSE mg/dL 90  --    < > 121*   CREATININE mg/dL 0.66*  --    < > 0.71*   BUN mg/dL 23  --    < > 16   SODIUM mmol/L 142  --    < > 138   POTASSIUM mmol/L 4.2  --    < > 4.1   AST (SGOT) U/L  --   --   --  29   ALT (SGPT) U/L  --   --   --  23   ALK PHOS U/L  --   --   --  62   BILIRUBIN mg/dL  --   --   --  0.4   ANION GAP mmol/L 4.7*  --    < > 7.9    < > = values in this interval not displayed.       XR Chest 1 View    Result Date: 1/29/2025  Impression: Residual infiltrates are noted throughout the mid to lower lungs bilaterally. There is a small left pleural effusion. Diffuse interstitial changes are noted. These findings are stable to mildly progressed. The findings may indicate changes of CHF and pulmonary edema. Electronically Signed: Jonny Espinal MD  1/29/2025 7:10 PM EST  Workstation ID: [id number]       I reviewed the patient's new  clinical results.    Assessment/Plan:     Active and Resolved Problems  Active Hospital Problems    Diagnosis  POA    **Pneumonia [J18.9]  Yes      Resolved Hospital Problems   No resolved problems to display.         Pneumonia  -ABG showed pH 7.48, CO2 40, pO2 45.4  -Xray of the chest reviewed, bilateral infiltrates noted  -WBC 4.58  -Blood cultures pending  -Urine Legionella ordered  -Urine strep pneumo ordered  -Respiratory culture ordered  -DuoNebs ordered  -Unasyn ordered  -Supplemental oxygen to keep sats greater than 92%  -Incentive spirometry  -Cough and deep breathe     Chronic atrial fibrillation  Chronic CAD with HLD  -Continue amiodarone, Eliquis, atorvastatin, Plavix     Essential Hypertension  -Controlled  -Monitor BP  -Continue home Klonopin     COPD-Chronic, stable  -Continue home inhalers, Theodur    Severe anemia  Stool for occult blood  Transfuse 1 unit  GI consulted    VTE Prophylaxis:  Pharmacologic VTE prophylaxis orders are present.    Disposition Planning:     Barriers to Discharge:anemia/pna  Anticipated Date of Discharge: 2/3  Place of Discharge: [facility]      Time: 34 minutes     Code Status and Medical Interventions: CPR (Attempt to Resuscitate); Full Support   Ordered at: 01/29/25 2224     Code Status (Patient has no pulse and is not breathing):    CPR (Attempt to Resuscitate)     Medical Interventions (Patient has pulse or is breathing):    Full Support       Signature: Electronically signed by Blair Ponce MD, 01/31/25, 12:49 EST.  [facility] Hospitalist Team

## 2025-01-31 NOTE — THERAPY TREATMENT NOTE
"Subjective: Pt agreeable to therapeutic plan of care.    Objective:     Precautions - fall    Bed mobility - Max-A with supine to/from sit; pt with improving effort with BLE and trunk with supine to sit.  Transfers - N/A or Not attempted. Pt with dizziness EOB.  Ambulation -  N/A or Not attempted.    Therapeutic Exercise - 10 Reps B LE AROM unsupported sitting / EOB    Vitals: Orthostatic  symptoms;  Supine /58 mmHg, pulse 53 bpm  Sitting /58 \"very dizzy with prolonged sitting, pulse 54 bpm, SaO2 88% with 4L NC;  Supine end of session /58 mmHg    Pain: 4 VAS   Location: generalized  Intervention for pain: Repositioned and Therapeutic Presence    Education: Provided education on the importance of mobility in the acute care setting, Verbal/Tactile Cues, Energy conservation strategies, and HEP    Assessment: Kali Garcia presents with functional mobility impairments which indicate the need for skilled intervention. Pt limited to bed level activities this date; Hgb 6.5; significant dizziness with supine to sit.  Will continue to follow and progress as tolerated.     Plan/Recommendations:   If medically appropriate, Moderate Intensity Therapy recommended post-acute care. This is recommended as therapy feels the patient would require 3-4 days per week and wouldn't tolerate \"3 hour daily\" rehab intensity. SNF would be the preferred choice. If the patient does not agree to SNF, arrange HH or OP depending on home bound status. If patient is medically complex, consider LTACH. Pt requires no DME at discharge.     Pt desires Skilled Rehab placement at discharge. Pt cooperative; agreeable to therapeutic recommendations and plan of care.     Post-Tx Position: Supine with HOB Elevated, Alarms activated, and Call light and personal items within reach  PPE: gloves, surgical mask, and gown    Therapy Charges for Today       Code Description Service Date Service Provider Modifiers Qty    66706593077 HC PT " THERAPEUTIC ACT EA 15 MIN 1/31/2025 Haylee Rai GP 1    32609373314 HC PT THER PROC EA 15 MIN 1/31/2025 Haylee Rai GP 1           PT Charges       Row Name 01/31/25 1248             Time Calculation    Start Time 1118  -JV      Stop Time 1140  -JV      Time Calculation (min) 22 min  -JV      PT Received On 01/31/25  -JV      PT - Next Appointment 02/02/25  -JV         Time Calculation- PT    Total Timed Code Minutes- PT 22 minute(s)  -JV                User Key  (r) = Recorded By, (t) = Taken By, (c) = Cosigned By      Initials Name Provider Type    STEFANYV Haylee Rai Physical Therapist

## 2025-02-01 ENCOUNTER — APPOINTMENT (OUTPATIENT)
Dept: CT IMAGING | Facility: HOSPITAL | Age: 80
End: 2025-02-01
Payer: MEDICARE

## 2025-02-01 ENCOUNTER — INPATIENT HOSPITAL (OUTPATIENT)
Dept: URBAN - METROPOLITAN AREA HOSPITAL 84 | Facility: HOSPITAL | Age: 80
End: 2025-02-01
Payer: COMMERCIAL

## 2025-02-01 DIAGNOSIS — Z79.01 LONG TERM (CURRENT) USE OF ANTICOAGULANTS: ICD-10-CM

## 2025-02-01 DIAGNOSIS — D50.9 IRON DEFICIENCY ANEMIA, UNSPECIFIED: ICD-10-CM

## 2025-02-01 DIAGNOSIS — K59.00 CONSTIPATION, UNSPECIFIED: ICD-10-CM

## 2025-02-01 DIAGNOSIS — R11.0 NAUSEA: ICD-10-CM

## 2025-02-01 LAB
ALBUMIN SERPL-MCNC: 2.5 G/DL (ref 3.5–5.2)
ALBUMIN/GLOB SERPL: 1.1 G/DL
ALP SERPL-CCNC: 55 U/L (ref 39–117)
ALT SERPL W P-5'-P-CCNC: 25 U/L (ref 1–41)
ANION GAP SERPL CALCULATED.3IONS-SCNC: 4.9 MMOL/L (ref 5–15)
ANISOCYTOSIS BLD QL: ABNORMAL
AST SERPL-CCNC: 26 U/L (ref 1–40)
BASOPHILS # BLD MANUAL: 0.04 10*3/MM3 (ref 0–0.2)
BASOPHILS NFR BLD MANUAL: 1 % (ref 0–1.5)
BH BB BLOOD EXPIRATION DATE: NORMAL
BH BB BLOOD TYPE BARCODE: 6200
BH BB DISPENSE STATUS: NORMAL
BH BB PRODUCT CODE: NORMAL
BH BB UNIT NUMBER: NORMAL
BILIRUB SERPL-MCNC: 0.6 MG/DL (ref 0–1.2)
BUN SERPL-MCNC: 16 MG/DL (ref 8–23)
BUN/CREAT SERPL: 30.2 (ref 7–25)
CALCIUM SPEC-SCNC: 7.7 MG/DL (ref 8.6–10.5)
CHLORIDE SERPL-SCNC: 105 MMOL/L (ref 98–107)
CO2 SERPL-SCNC: 29.1 MMOL/L (ref 22–29)
CREAT SERPL-MCNC: 0.53 MG/DL (ref 0.76–1.27)
CROSSMATCH INTERPRETATION: NORMAL
DEPRECATED RDW RBC AUTO: 64 FL (ref 37–54)
EGFRCR SERPLBLD CKD-EPI 2021: 101.3 ML/MIN/1.73
ERYTHROCYTE [DISTWIDTH] IN BLOOD BY AUTOMATED COUNT: 18.8 % (ref 12.3–15.4)
FOLATE SERPL-MCNC: 16.2 NG/ML (ref 4.78–24.2)
GLOBULIN UR ELPH-MCNC: 2.2 GM/DL
GLUCOSE SERPL-MCNC: 74 MG/DL (ref 65–99)
HCT VFR BLD AUTO: 26.9 % (ref 37.5–51)
HGB BLD-MCNC: 8.6 G/DL (ref 13–17.7)
LYMPHOCYTES # BLD MANUAL: 0.34 10*3/MM3 (ref 0.7–3.1)
LYMPHOCYTES NFR BLD MANUAL: 2 % (ref 5–12)
MCH RBC QN AUTO: 30 PG (ref 26.6–33)
MCHC RBC AUTO-ENTMCNC: 32 G/DL (ref 31.5–35.7)
MCV RBC AUTO: 93.7 FL (ref 79–97)
MONOCYTES # BLD: 0.09 10*3/MM3 (ref 0.1–0.9)
MYELOCYTES NFR BLD MANUAL: 1 % (ref 0–0)
NEUTROPHILS # BLD AUTO: 3.76 10*3/MM3 (ref 1.7–7)
NEUTROPHILS NFR BLD MANUAL: 86 % (ref 42.7–76)
NEUTS BAND NFR BLD MANUAL: 2 % (ref 0–5)
NEUTS VAC BLD QL SMEAR: ABNORMAL
PLAT MORPH BLD: NORMAL
PLATELET # BLD AUTO: 112 10*3/MM3 (ref 140–450)
PMV BLD AUTO: 10.7 FL (ref 6–12)
POTASSIUM SERPL-SCNC: 4.2 MMOL/L (ref 3.5–5.2)
PROT SERPL-MCNC: 4.7 G/DL (ref 6–8.5)
RBC # BLD AUTO: 2.87 10*6/MM3 (ref 4.14–5.8)
SCAN SLIDE: NORMAL
SODIUM SERPL-SCNC: 139 MMOL/L (ref 136–145)
TOXIC GRANULATION: ABNORMAL
UNIT  ABO: NORMAL
UNIT  RH: NORMAL
VARIANT LYMPHS NFR BLD MANUAL: 8 % (ref 19.6–45.3)
VIT B12 BLD-MCNC: 345 PG/ML (ref 211–946)
WBC NRBC COR # BLD AUTO: 4.27 10*3/MM3 (ref 3.4–10.8)

## 2025-02-01 PROCEDURE — 94761 N-INVAS EAR/PLS OXIMETRY MLT: CPT

## 2025-02-01 PROCEDURE — 85025 COMPLETE CBC W/AUTO DIFF WBC: CPT | Performed by: NURSE PRACTITIONER

## 2025-02-01 PROCEDURE — 94799 UNLISTED PULMONARY SVC/PX: CPT

## 2025-02-01 PROCEDURE — 94664 DEMO&/EVAL PT USE INHALER: CPT

## 2025-02-01 PROCEDURE — 85007 BL SMEAR W/DIFF WBC COUNT: CPT | Performed by: NURSE PRACTITIONER

## 2025-02-01 PROCEDURE — 25010000002 AMPICILLIN-SULBACTAM PER 1.5 G

## 2025-02-01 PROCEDURE — 82746 ASSAY OF FOLIC ACID SERUM: CPT | Performed by: INTERNAL MEDICINE

## 2025-02-01 PROCEDURE — 71250 CT THORAX DX C-: CPT

## 2025-02-01 PROCEDURE — 80053 COMPREHEN METABOLIC PANEL: CPT | Performed by: NURSE PRACTITIONER

## 2025-02-01 PROCEDURE — 99232 SBSQ HOSP IP/OBS MODERATE 35: CPT | Performed by: NURSE PRACTITIONER

## 2025-02-01 PROCEDURE — 82607 VITAMIN B-12: CPT | Performed by: INTERNAL MEDICINE

## 2025-02-01 RX ADMIN — ATORVASTATIN CALCIUM 10 MG: 10 TABLET ORAL at 20:27

## 2025-02-01 RX ADMIN — PANTOPRAZOLE SODIUM 40 MG: 40 TABLET, DELAYED RELEASE ORAL at 08:20

## 2025-02-01 RX ADMIN — Medication 10 ML: at 20:28

## 2025-02-01 RX ADMIN — CLOPIDOGREL BISULFATE 75 MG: 75 TABLET ORAL at 08:19

## 2025-02-01 RX ADMIN — CITALOPRAM 10 MG: 20 TABLET, FILM COATED ORAL at 08:18

## 2025-02-01 RX ADMIN — MIRTAZAPINE 15 MG: 15 TABLET, FILM COATED ORAL at 20:32

## 2025-02-01 RX ADMIN — CLONAZEPAM 0.25 MG: 0.5 TABLET ORAL at 20:27

## 2025-02-01 RX ADMIN — BUDESONIDE 0.5 MG: 0.5 INHALANT RESPIRATORY (INHALATION) at 18:54

## 2025-02-01 RX ADMIN — APIXABAN 2.5 MG: 2.5 TABLET, FILM COATED ORAL at 08:20

## 2025-02-01 RX ADMIN — AMPICILLIN SODIUM AND SULBACTAM SODIUM 3 G: 2; 1 INJECTION, POWDER, FOR SOLUTION INTRAMUSCULAR; INTRAVENOUS at 12:34

## 2025-02-01 RX ADMIN — CLONAZEPAM 0.25 MG: 0.5 TABLET ORAL at 08:21

## 2025-02-01 RX ADMIN — AMPICILLIN SODIUM AND SULBACTAM SODIUM 3 G: 2; 1 INJECTION, POWDER, FOR SOLUTION INTRAMUSCULAR; INTRAVENOUS at 17:17

## 2025-02-01 RX ADMIN — Medication 1 TABLET: at 08:19

## 2025-02-01 RX ADMIN — APIXABAN 2.5 MG: 2.5 TABLET, FILM COATED ORAL at 20:27

## 2025-02-01 RX ADMIN — AMPICILLIN SODIUM AND SULBACTAM SODIUM 3 G: 2; 1 INJECTION, POWDER, FOR SOLUTION INTRAMUSCULAR; INTRAVENOUS at 05:03

## 2025-02-01 RX ADMIN — BUDESONIDE 0.5 MG: 0.5 INHALANT RESPIRATORY (INHALATION) at 08:28

## 2025-02-01 RX ADMIN — Medication 10 ML: at 08:22

## 2025-02-01 RX ADMIN — AMIODARONE HYDROCHLORIDE 200 MG: 200 TABLET ORAL at 20:27

## 2025-02-01 RX ADMIN — TAMSULOSIN HYDROCHLORIDE 0.4 MG: 0.4 CAPSULE ORAL at 08:19

## 2025-02-01 RX ADMIN — ARFORMOTEROL TARTRATE 15 MCG: 15 SOLUTION RESPIRATORY (INHALATION) at 18:54

## 2025-02-01 RX ADMIN — ARFORMOTEROL TARTRATE 15 MCG: 15 SOLUTION RESPIRATORY (INHALATION) at 08:32

## 2025-02-01 NOTE — CONSULTS
FIRST UROLOGY CONSULT      Patient Identification:  NAME:  Kali Garcia  Age:  79 y.o.   Sex:  male   :  1945   MRN:  [id number]     Chief complaint:     History of present illness:      Admit for pneumonia dizziness anemia failed vt yest springer reinserted no hematuria    Past medical history:  Past Medical History:   Diagnosis Date    COPD (chronic obstructive pulmonary disease)     Coronary artery disease     Emphysema lung     Hyperlipidemia     Hypertension     Osteoarthritis        Past surgical history:  Past Surgical History:   Procedure Laterality Date    CARDIAC CATHETERIZATION      CARDIAC ELECTROPHYSIOLOGY PROCEDURE N/A 2025    Procedure: Bijan SIMS;  Surgeon: Eliot Mcgarry MD;  Location: [facility] CATH INVASIVE LOCATION;  Service: Cardiovascular;  Laterality: N/A;    COLONOSCOPY N/A 2022    Procedure: COLONOSCOPY with 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 and endoscopic clipping x 1;  Surgeon: Luz Jacques MD;  Location: [facility] ENDOSCOPY;  Service: Gastroenterology;  Laterality: N/A;  post op: cecal AVM    CORONARY STENT PLACEMENT      ENDOSCOPY N/A 2022    Procedure: ESOPHAGOGASTRODUODENOSCOPY WITH BIOPSY X1 AREA;  Surgeon: Luz Jacques MD;  Location: [facility] ENDOSCOPY;  Service: Gastroenterology;  Laterality: N/A;  esophagitis, gastritis, HH    ENDOSCOPY N/A 2022    Procedure: ESOPHAGOGASTRODUODENOSCOPY;  Surgeon: Luz Jacques MD;  Location: [facility] ENDOSCOPY;  Service: Gastroenterology;  Laterality: N/A;  post op: hiatal hernia, eosphagitis    EYE SURGERY      FEMORAL ARTERY STENT      KNEE SURGERY Left     KNEE SURGERY Left     LUNG SURGERY         Allergies:  Codeine    Home medications:  Medications Prior to Admission   Medication Sig Dispense Refill Last Dose/Taking    [] amiodarone (PACERONE) 200 MG tablet Take 1 tablet by mouth 2 (Two) Times a Day for 30 days. 60 tablet 0 Taking    apixaban (ELIQUIS) 2.5 MG tablet tablet Take 1  tablet by mouth Every 12 (Twelve) Hours for 30 days. Indications: Atrial Fibrillation 60 tablet 0 Taking    [] arformoterol (BROVANA) 15 MCG/2ML nebulizer solution Take 2 mL by nebulization 2 (Two) Times a Day for 30 days. 120 mL 0 Taking    atorvastatin (LIPITOR) 10 MG tablet Take 1 tablet by mouth Every Night.   Taking    budesonide (PULMICORT) 0.5 MG/2ML nebulizer solution Take 2 mL by nebulization 2 (Two) Times a Day for 30 days. 120 mL 0 Taking    cholecalciferol (VITAMIN D3) 1.25 MG (84693 UT) capsule Take 1 capsule by mouth Every 7 (Seven) Days. thur   Taking    citalopram (CeleXA) 10 MG tablet Take 1 tablet by mouth Daily.   Taking    clonazePAM (KlonoPIN) 0.25 MG disintegrating tablet Place 1 tablet on the tongue 2 (Two) Times a Day.   Taking    clopidogrel (PLAVIX) 75 MG tablet Take 1 tablet by mouth Daily. Indications: Percutaneous Coronary Intervention   Taking    ipratropium-albuterol (DUO-NEB) 0.5-2.5 mg/3 ml nebulizer Take 3 mL by nebulization Every 4 (Four) Hours As Needed for Wheezing. Indications: Chronic Obstructive Lung Disease   Taking As Needed    mirtazapine (REMERON) 15 MG tablet Take 1 tablet by mouth Every Night.   Taking    multivitamin with minerals tablet tablet Take 1 tablet by mouth Daily.   Taking    Nutritional Supplements (Declan) powder Take 1 packet by mouth 2 (Two) Times a Day.   Taking    pantoprazole (PROTONIX) 40 MG EC tablet Take 1 tablet by mouth Daily.   Taking    polyethylene glycol (MIRALAX) 17 g packet Take 17 g by mouth Daily. Indications: Constipation   Taking    [] tamsulosin (FLOMAX) 0.4 MG capsule 24 hr capsule Take 1 capsule by mouth Daily for 30 days. 30 capsule 0 Taking    theophylline (THEODUR) 300 MG 12 hr tablet Take 1 tablet by mouth Daily for 30 days. 30 tablet 0 Taking    traZODone (DESYREL) 100 MG tablet Take 1 tablet by mouth Daily As Needed for Sleep.   Taking As Needed    naloxone (Narcan) 4 MG/0.1ML nasal spray Administer 1 spray into the  nostril(s) as directed by provider As Needed for Opioid Reversal. Call 911. Don't prime. Farber in 1 nostril for overdose. Repeat in 2-3 minutes in other nostril if no or minimal breathing/responsiveness.           Hospital medications:  amiodarone, 200 mg, Oral, BID  ampicillin-sulbactam, 3 g, Intravenous, Q6H  apixaban, 2.5 mg, Oral, Q12H  arformoterol, 15 mcg, Nebulization, BID - RT  atorvastatin, 10 mg, Oral, Nightly  budesonide, 0.5 mg, Nebulization, BID - RT  citalopram, 10 mg, Oral, Daily  clonazePAM, 0.25 mg, Oral, BID  clopidogrel, 75 mg, Oral, Daily  mirtazapine, 15 mg, Oral, Nightly  multivitamin with minerals, 1 tablet, Oral, Daily  pantoprazole, 40 mg, Oral, Daily  sodium chloride, 10 mL, Intravenous, Q12H  tamsulosin, 0.4 mg, Oral, Daily           acetaminophen **OR** acetaminophen **OR** acetaminophen    senna-docusate sodium **AND** polyethylene glycol **AND** bisacodyl **AND** bisacodyl    ipratropium-albuterol    melatonin    nitroglycerin    ondansetron    [COMPLETED] Insert Peripheral IV **AND** sodium chloride    sodium chloride    sodium chloride    Family history:  History reviewed. No pertinent family history.    Social history:  Social History     Tobacco Use    Smoking status: Former     Current packs/day: 0.00     Average packs/day: 1 pack/day for 63.0 years (63.0 ttl pk-yrs)     Types: Cigarettes     Start date: 1960     Quit date: 2023     Years since quittin.1     Passive exposure: Never   Vaping Use    Vaping status: Never Used   Substance Use Topics    Alcohol use: Not Currently    Drug use: Never       Review of systems:      Positive for:  nothing  Negative for:  chest pain, cough, sob, o/w neg    Objective:  TMax 24 hours:   Temp (24hrs), Av.7 °F (37.1 °C), Min:97.8 °F (36.6 °C), Max:99.9 °F (37.7 °C)      Vitals Ranges:   Temp:  [97.8 °F (36.6 °C)-99.9 °F (37.7 °C)] 99.9 °F (37.7 °C)  Heart Rate:  [51-70] 57  Resp:  [14-26] 22  BP: (128-156)/(50-60)  140/53    Intake/Output Last 3 shifts:  I/O last 3 completed shifts:  In: 833.8 [P.O.:540; Blood:293.8]  Out: 1100 [Urine:1100]     Physical Exam:    General Appearance:   cooperative, NAD   Back:     No CVA tenderness   Lungs:     Respirations labored, no wheezing       Abdomen:     Soft, NDNT, no masses, no guarding   :    Yap in uop yellow           Results review:   I reviewed the patient's new clinical results.    Data review:  Lab Results (last 24 hours)       Procedure Component Value Units Date/Time    Comprehensive Metabolic Panel [[id number]] Updated: 02/01/25 0923    Specimen: Blood from Arm, Left     CBC & Differential [[id number]]  (Abnormal) Collected: 02/01/25 0322    Specimen: Blood Updated: 02/01/25 0402    Narrative:      The following orders were created for panel order CBC & Differential.  Procedure                               Abnormality         Status                     ---------                               -----------         ------                     CBC Auto Differential[[id number]]        Abnormal            Final result               Scan Slide[[id number]]                                       Final result                 Please view results for these tests on the individual orders.    CBC Auto Differential [[id number]]  (Abnormal) Collected: 02/01/25 0322    Specimen: Blood Updated: 02/01/25 0402     WBC 4.27 10*3/mm3      RBC 2.87 10*6/mm3      Hemoglobin 8.6 g/dL      Comment: Result checked          Hematocrit 26.9 %      MCV 93.7 fL      MCH 30.0 pg      MCHC 32.0 g/dL      RDW 18.8 %      RDW-SD 64.0 fl      MPV 10.7 fL      Platelets 112 10*3/mm3     Narrative:      The previously reported component NRBC is no longer being reported. Previous result was 0.0 /100 WBC (Reference Range: 0.0-0.2 /100 WBC) on 2/1/2025 at 0331 EST.    Scan Slide [[id number]] Collected: 02/01/25 0322    Specimen: Blood Updated: 02/01/25 0402     Scan Slide --     Comment: See Manual Differential Results        Manual Differential [[id number]]  (Abnormal) Collected: 02/01/25 0322    Specimen: Blood Updated: 02/01/25 0402     Neutrophil % 86.0 %      Lymphocyte % 8.0 %      Monocyte % 2.0 %      Basophil % 1.0 %      Bands %  2.0 %      Myelocyte % 1.0 %      Neutrophils Absolute 3.76 10*3/mm3      Lymphocytes Absolute 0.34 10*3/mm3      Monocytes Absolute 0.09 10*3/mm3      Basophils Absolute 0.04 10*3/mm3      Anisocytosis Slight/1+     Toxic Granulation Slight/1+     Vacuolated Neutrophils Slight/1+     Platelet Morphology Normal    Folate [[id number]] Collected: 02/01/25 0322    Specimen: Blood Updated: 02/01/25 0325    Vitamin B12 [[id number]] Collected: 02/01/25 0322    Specimen: Blood Updated: 02/01/25 0325    Iron Profile [[id number]]  (Abnormal) Collected: 01/31/25 0835    Specimen: Blood from Arm, Left Updated: 01/31/25 1211     Iron 51 mcg/dL      Iron Saturation (TSAT) 28 %      Transferrin 123 mg/dL      TIBC 183 mcg/dL     Ferritin [[id number]]  (Abnormal) Collected: 01/31/25 0835    Specimen: Blood from Arm, Left Updated: 01/31/25 1211     Ferritin 1,154.00 ng/mL     Narrative:      Results may be falsely decreased if patient taking Biotin.               Imaging:  Imaging Results (Last 24 Hours)       ** No results found for the last 24 hours. **             Assessment:     Retention  dizziness  Anemia  pneumonia  Plan:   Yap  Rapaflo recommend rather than flomax, doesn't appear to be available here on epic  Vt later  Will see Monday to celeste Olson MD  02/01/25  09:53 EST

## 2025-02-01 NOTE — PROGRESS NOTES
Daily Progress Note        Pneumonia      Assessment:    Hypoxic respiratory insufficiency  Chest x-ray bilateral alveolar infiltrate possible inflammation versus infection versus edema    History of severe COPD  Respiratory panel negative on 1/30/2025  Hypoxemia: On home oxygen  CAD  PVD  HTN  HLD  A-fib  Anemia  Former smoker quit 2022 after 60 pack years  Echo 2/1/2024 EF 61-65% mild pulmonary hypertension 35-45 mmHg     CT scan of the chest in June 2024 showing a noncalcified nodule in the right upper lobe around 2 x 1.1 x 1 cm. Patient was also noted to have patchy airspace disease and clustered micronodules in the lingula.     He then underwent PET scan 10/10/2024 showing a 1.7 x 1.1 cm partially calcified irregular shaped nodule in the right upper lobe to be metabolically active with SUV of 4.84 and additional 2 metabolically active irregular nodular densities in the right upper lobe. Inferior apical segment with intervening areas of metabolically active interstitial thickening.     PFTs: FEV1/FVC postbronchodilator is 0.39 with FEV1 of 0.71 L or 28% predicted and FVC of 1.82 L or 47% predicted.   Total lung capacity of 7.77 L or 131% predicted and residual volume of 5.82 L or 226% predicted and DLCO of 61% predicted noted.      Recommendations:      Chest CT without contrast monitor right upper lobe density as well as rule out amiodarone toxicity    Oxygen titration currently on 4 L    Antibiotics on IV Unasyn  Received 1 dose of IV vancomycin and cefepime  Bronchodilator Pulmicort and DuoNeb  On amiodarone             LOS: 2 days     Subjective         Objective     Vital signs for last 24 hours:  Vitals:    01/31/25 1940 01/31/25 1945 01/31/25 2013 01/31/25 2050   BP:   156/60    BP Location:   Left arm    Patient Position:   Lying    Pulse: 61 61 62 62   Resp: 24 24 26    Temp:   99.1 °F (37.3 °C)    TempSrc:   Oral    SpO2:   91% 90%   Weight:       Height:           Intake/Output last 3 shifts:  I/O  last 3 completed shifts:  In: 1273.8 [P.O.:980; Blood:293.8]  Out: 1050 [Urine:1050]  Intake/Output this shift:  No intake/output data recorded.      Radiology  Imaging Results (Last 24 Hours)       ** No results found for the last 24 hours. **            Labs:  Results from last 7 days   Lab Units 01/31/25  0506   WBC 10*3/mm3 5.29   HEMOGLOBIN g/dL 6.5*   HEMATOCRIT % 20.9*   PLATELETS 10*3/mm3 119*     Results from last 7 days   Lab Units 01/31/25  0835 01/30/25  0625 01/29/25  1834   SODIUM mmol/L 142   < > 138   POTASSIUM mmol/L 4.2   < > 4.1   CHLORIDE mmol/L 107   < > 102   CO2 mmol/L 30.3*   < > 28.1   BUN mg/dL 23   < > 16   CREATININE mg/dL 0.66*   < > 0.71*   CALCIUM mg/dL 8.1*   < > 8.0*   BILIRUBIN mg/dL  --   --  0.4   ALK PHOS U/L  --   --  62   ALT (SGPT) U/L  --   --  23   AST (SGOT) U/L  --   --  29   GLUCOSE mg/dL 90   < > 121*    < > = values in this interval not displayed.     Results from last 7 days   Lab Units 01/29/25  1839   PH, ARTERIAL pH units 7.485*   PO2 ART mm Hg 45.4*   PCO2, ARTERIAL mm Hg 40.5   HCO3 ART mmol/L 30.5*     Results from last 7 days   Lab Units 01/29/25  1834   ALBUMIN g/dL 3.0*     Results from last 7 days   Lab Units 01/29/25 2018 01/29/25  1834   HSTROP T ng/L 42* 43*             Results from last 7 days   Lab Units 01/29/25  1834   INR  1.31*   APTT seconds 29.6               Meds:   SCHEDULE  amiodarone, 200 mg, Oral, BID  ampicillin-sulbactam, 3 g, Intravenous, Q6H  apixaban, 2.5 mg, Oral, Q12H  arformoterol, 15 mcg, Nebulization, BID - RT  atorvastatin, 10 mg, Oral, Nightly  budesonide, 0.5 mg, Nebulization, BID - RT  citalopram, 10 mg, Oral, Daily  clonazePAM, 0.25 mg, Oral, BID  clopidogrel, 75 mg, Oral, Daily  [START ON 2/1/2025] ferric gluconate, 250 mg, Intravenous, Daily  mirtazapine, 15 mg, Oral, Nightly  multivitamin with minerals, 1 tablet, Oral, Daily  pantoprazole, 40 mg, Oral, Daily  sodium chloride, 10 mL, Intravenous, Q12H  tamsulosin, 0.4 mg,  Oral, Daily      Infusions     PRNs    acetaminophen **OR** acetaminophen **OR** acetaminophen    senna-docusate sodium **AND** polyethylene glycol **AND** bisacodyl **AND** bisacodyl    ipratropium-albuterol    melatonin    nitroglycerin    ondansetron    [COMPLETED] Insert Peripheral IV **AND** sodium chloride    sodium chloride    sodium chloride    Physical Exam:  Physical Exam  Cardiovascular:      Heart sounds: Murmur heard.      No gallop.   Pulmonary:      Effort: No respiratory distress.      Breath sounds: No stridor. Rhonchi and rales present. No wheezing.   Chest:      Chest wall: No tenderness.         ROS  Review of Systems   Respiratory:  Positive for cough and shortness of breath. Negative for wheezing and stridor.    Cardiovascular:  Positive for palpitations and leg swelling. Negative for chest pain.             Total time spent with patient greater than: 45 Minutes

## 2025-02-01 NOTE — PROGRESS NOTES
Daily Progress Note        Pneumonia      Assessment:    Hypoxic respiratory insufficiency  Chest x-ray bilateral alveolar infiltrate possible inflammation versus infection versus edema    History of severe COPD  Respiratory panel negative on 1/30/2025  Hypoxemia: On home oxygen  CAD  PVD  HTN  HLD  A-fib  Anemia  Former smoker quit 2022 after 60 pack years  Echo 2/1/2024 EF 61-65% mild pulmonary hypertension 35-45 mmHg     CT scan of the chest in June 2024 showing a noncalcified nodule in the right upper lobe around 2 x 1.1 x 1 cm. Patient was also noted to have patchy airspace disease and clustered micronodules in the lingula.     He then underwent PET scan 10/10/2024 showing a 1.7 x 1.1 cm partially calcified irregular shaped nodule in the right upper lobe to be metabolically active with SUV of 4.84 and additional 2 metabolically active irregular nodular densities in the right upper lobe. Inferior apical segment with intervening areas of metabolically active interstitial thickening.     PFTs: FEV1/FVC postbronchodilator is 0.39 with FEV1 of 0.71 L or 28% predicted and FVC of 1.82 L or 47% predicted.   Total lung capacity of 7.77 L or 131% predicted and residual volume of 5.82 L or 226% predicted and DLCO of 61% predicted noted.      Recommendations:      Chest CT without contrast monitor right upper lobe density as well as rule out amiodarone toxicity    Oxygen titration currently on Airvo high flow which we will titrate down to 20 L    Antibiotics on IV Unasyn  Received 1 dose of IV vancomycin and cefepime  Bronchodilator Pulmicort and DuoNeb  On amiodarone             LOS: 3 days     Subjective         Objective     Vital signs for last 24 hours:  Vitals:    02/01/25 0831 02/01/25 0832 02/01/25 0835 02/01/25 1219   BP:    119/48   BP Location:    Right arm   Patient Position:    Lying   Pulse: 58 58 57 54   Resp: 20 22 22 21   Temp:    99.2 °F (37.3 °C)   TempSrc:    Oral   SpO2: 92% 93% 93% 97%   Weight:        Height:           Intake/Output last 3 shifts:  I/O last 3 completed shifts:  In: 833.8 [P.O.:540; Blood:293.8]  Out: 1100 [Urine:1100]  Intake/Output this shift:  No intake/output data recorded.      Radiology  Imaging Results (Last 24 Hours)       ** No results found for the last 24 hours. **            Labs:  Results from last 7 days   Lab Units 02/01/25  0322   WBC 10*3/mm3 4.27   HEMOGLOBIN g/dL 8.6*   HEMATOCRIT % 26.9*   PLATELETS 10*3/mm3 112*     Results from last 7 days   Lab Units 02/01/25  1011   SODIUM mmol/L 139   POTASSIUM mmol/L 4.2   CHLORIDE mmol/L 105   CO2 mmol/L 29.1*   BUN mg/dL 16   CREATININE mg/dL 0.53*   CALCIUM mg/dL 7.7*   BILIRUBIN mg/dL 0.6   ALK PHOS U/L 55   ALT (SGPT) U/L 25   AST (SGOT) U/L 26   GLUCOSE mg/dL 74     Results from last 7 days   Lab Units 01/29/25  1839   PH, ARTERIAL pH units 7.485*   PO2 ART mm Hg 45.4*   PCO2, ARTERIAL mm Hg 40.5   HCO3 ART mmol/L 30.5*     Results from last 7 days   Lab Units 02/01/25  1011 01/29/25  1834   ALBUMIN g/dL 2.5* 3.0*     Results from last 7 days   Lab Units 01/29/25  2018 01/29/25  1834   HSTROP T ng/L 42* 43*             Results from last 7 days   Lab Units 01/29/25  1834   INR  1.31*   APTT seconds 29.6               Meds:   SCHEDULE  amiodarone, 200 mg, Oral, BID  ampicillin-sulbactam, 3 g, Intravenous, Q6H  apixaban, 2.5 mg, Oral, Q12H  arformoterol, 15 mcg, Nebulization, BID - RT  atorvastatin, 10 mg, Oral, Nightly  budesonide, 0.5 mg, Nebulization, BID - RT  citalopram, 10 mg, Oral, Daily  clonazePAM, 0.25 mg, Oral, BID  clopidogrel, 75 mg, Oral, Daily  mirtazapine, 15 mg, Oral, Nightly  multivitamin with minerals, 1 tablet, Oral, Daily  pantoprazole, 40 mg, Oral, Daily  sodium chloride, 10 mL, Intravenous, Q12H  tamsulosin, 0.4 mg, Oral, Daily      Infusions     PRNs    acetaminophen **OR** acetaminophen **OR** acetaminophen    senna-docusate sodium **AND** polyethylene glycol **AND** bisacodyl **AND** bisacodyl     ipratropium-albuterol    melatonin    nitroglycerin    ondansetron    [COMPLETED] Insert Peripheral IV **AND** sodium chloride    sodium chloride    sodium chloride    Physical Exam:  Physical Exam  Cardiovascular:      Heart sounds: Murmur heard.      No gallop.   Pulmonary:      Effort: No respiratory distress.      Breath sounds: No stridor. Rhonchi and rales present. No wheezing.   Chest:      Chest wall: No tenderness.         ROS  Review of Systems   Respiratory:  Positive for cough and shortness of breath. Negative for wheezing and stridor.    Cardiovascular:  Positive for palpitations and leg swelling. Negative for chest pain.             Total time spent with patient greater than: 45 Minutes

## 2025-02-01 NOTE — PLAN OF CARE
Problem: Adult Inpatient Plan of Care  Goal: Plan of Care Review  Outcome: Progressing  Goal: Patient-Specific Goal (Individualized)  Outcome: Progressing  Goal: Absence of Hospital-Acquired Illness or Injury  Outcome: Progressing  Intervention: Identify and Manage Fall Risk  Recent Flowsheet Documentation  Taken 2/1/2025 1400 by Boby Acuna RN  Safety Promotion/Fall Prevention: safety round/check completed  Taken 2/1/2025 1144 by Boby Acuna RN  Safety Promotion/Fall Prevention: safety round/check completed  Taken 2/1/2025 1000 by Boby Acuna RN  Safety Promotion/Fall Prevention: safety round/check completed  Taken 2/1/2025 0751 by Boby Acuna RN  Safety Promotion/Fall Prevention: safety round/check completed  Intervention: Prevent Skin Injury  Recent Flowsheet Documentation  Taken 2/1/2025 1144 by Boby Acuna RN  Body Position: position changed independently  Skin Protection:   incontinence pads utilized   transparent dressing maintained  Taken 2/1/2025 0751 by Boby Acuna RN  Body Position: position changed independently  Skin Protection:   incontinence pads utilized   transparent dressing maintained  Intervention: Prevent and Manage VTE (Venous Thromboembolism) Risk  Recent Flowsheet Documentation  Taken 2/1/2025 1144 by Boby Acuna RN  VTE Prevention/Management: (see mar) other (see comments)  Taken 2/1/2025 0751 by Boby Acuna RN  VTE Prevention/Management: (See mar) other (see comments)  Intervention: Prevent Infection  Recent Flowsheet Documentation  Taken 2/1/2025 1144 by Boby Acuna RN  Infection Prevention:   cohorting utilized   environmental surveillance performed   equipment surfaces disinfected   hand hygiene promoted   personal protective equipment utilized   rest/sleep promoted   single patient room provided  Taken 2/1/2025 0751 by Boby Acuna RN  Infection Prevention:   cohorting utilized   environmental surveillance performed   equipment surfaces  disinfected   hand hygiene promoted   personal protective equipment utilized   single patient room provided   rest/sleep promoted  Goal: Optimal Comfort and Wellbeing  Outcome: Progressing  Intervention: Provide Person-Centered Care  Recent Flowsheet Documentation  Taken 2/1/2025 1144 by Boby Acuna RN  Trust Relationship/Rapport: care explained  Taken 2/1/2025 0751 by Boby Acuna RN  Trust Relationship/Rapport: care explained  Goal: Readiness for Transition of Care  Outcome: Progressing     Problem: Skin Injury Risk Increased  Goal: Skin Health and Integrity  Outcome: Progressing  Intervention: Optimize Skin Protection  Recent Flowsheet Documentation  Taken 2/1/2025 1144 by Boby Acuna RN  Activity Management: bedrest  Pressure Reduction Techniques:   frequent weight shift encouraged   pressure points protected   weight shift assistance provided  Head of Bed (HOB) Positioning: HOB elevated  Pressure Reduction Devices:   pressure-redistributing mattress utilized   specialty bed utilized   positioning supports utilized  Skin Protection:   incontinence pads utilized   transparent dressing maintained  Taken 2/1/2025 0751 by Boby Acuna RN  Activity Management: bedrest  Pressure Reduction Techniques:   frequent weight shift encouraged   pressure points protected   weight shift assistance provided   positioned off wounds  Head of Bed (HOB) Positioning: HOB elevated  Pressure Reduction Devices:   specialty bed utilized   pressure-redistributing mattress utilized   positioning supports utilized  Skin Protection:   incontinence pads utilized   transparent dressing maintained   Goal Outcome Evaluation:      Pt started on Airvo moved to high flow nasal cannula at 6L. Palliative care consult placed.

## 2025-02-01 NOTE — PROGRESS NOTES
LOS: 3 days   Patient Care Team:  Tami Ward APRN as PCP - General (Family Medicine)      Subjective     Interval History:   LABS: WBCs 4.27, hemoglobin 8.6 (6.5) after 1u PRBC's, MCV 93.7, platelets 112.  Serum iron 51, iron saturation 28%.  Ferritin 1154.  Sodium and potassium are normal.  Creatinine 0.53.  LFTs were normal.      ROS:   No chest pain, shortness of breath, or cough.         Medication Review:     Current Facility-Administered Medications:     acetaminophen (TYLENOL) tablet 650 mg, 650 mg, Oral, Q4H PRN **OR** acetaminophen (TYLENOL) 160 MG/5ML oral solution 650 mg, 650 mg, Oral, Q4H PRN **OR** acetaminophen (TYLENOL) suppository 650 mg, 650 mg, Rectal, Q4H PRN, Sonal Hutson APRN    amiodarone (PACERONE) tablet 200 mg, 200 mg, Oral, BID, Sonal Hutson APRN, 200 mg at 01/31/25 2017    ampicillin-sulbactam (UNASYN) 3 g in sodium chloride 0.9 % 100 mL MBP, 3 g, Intravenous, Q6H, Sonal Hutson APRN, 3 g at 02/01/25 1234    apixaban (ELIQUIS) tablet 2.5 mg, 2.5 mg, Oral, Q12H, Sonal Hutson APRN, 2.5 mg at 02/01/25 0820    arformoterol (BROVANA) nebulizer solution 15 mcg, 15 mcg, Nebulization, BID - RT, Sonal Hutson APRN, 15 mcg at 02/01/25 0832    atorvastatin (LIPITOR) tablet 10 mg, 10 mg, Oral, Nightly, Sonal Hutson APRN, 10 mg at 01/31/25 2017    sennosides-docusate (PERICOLACE) 8.6-50 MG per tablet 2 tablet, 2 tablet, Oral, BID PRN **AND** polyethylene glycol (MIRALAX) packet 17 g, 17 g, Oral, Daily PRN, 17 g at 01/31/25 2024 **AND** bisacodyl (DULCOLAX) EC tablet 5 mg, 5 mg, Oral, Daily PRN, 5 mg at 01/31/25 2024 **AND** bisacodyl (DULCOLAX) suppository 10 mg, 10 mg, Rectal, Daily PRN, Sonal Hutson APRN    budesonide (PULMICORT) nebulizer solution 0.5 mg, 0.5 mg, Nebulization, BID - RT, Sonal Hutson APRN, 0.5 mg at 02/01/25 0828    citalopram (CeleXA) tablet 10 mg, 10 mg, Oral, Daily, Sonal Hutson APRN, 10 mg at 02/01/25 0818    clonazePAM  (KlonoPIN) tablet 0.25 mg, 0.25 mg, Oral, BID, Yahyawi, Sonal L, APRN, 0.25 mg at 02/01/25 0821    clopidogrel (PLAVIX) tablet 75 mg, 75 mg, Oral, Daily, Yahyawi, Sonal L, APRN, 75 mg at 02/01/25 0819    ipratropium-albuterol (DUO-NEB) nebulizer solution 3 mL, 3 mL, Nebulization, Q4H PRN, Caryl, Sonal L, APRN, 3 mL at 01/31/25 1703    melatonin tablet 5 mg, 5 mg, Oral, Nightly PRN, Yahyawi, Sonal L, APRN    mirtazapine (REMERON) tablet 15 mg, 15 mg, Oral, Nightly, Yahyawi, Sonal L, APRN, 15 mg at 01/31/25 2017    multivitamin with minerals 1 tablet, 1 tablet, Oral, Daily, Yalore, Sonal L, APRN, 1 tablet at 02/01/25 0819    nitroglycerin (NITROSTAT) SL tablet 0.4 mg, 0.4 mg, Sublingual, Q5 Min PRN, Caryl, Sonal L, APRN    ondansetron (ZOFRAN) injection 4 mg, 4 mg, Intravenous, Q6H PRN, Sonal Hutson L, APRN    pantoprazole (PROTONIX) EC tablet 40 mg, 40 mg, Oral, Daily, Yahyawi, Sonal L, APRN, 40 mg at 02/01/25 0820    [COMPLETED] Insert Peripheral IV, , , Once **AND** sodium chloride 0.9 % flush 10 mL, 10 mL, Intravenous, PRN, Marlon Peters,     sodium chloride 0.9 % flush 10 mL, 10 mL, Intravenous, Q12H, Sonal Hutson L, APRN, 10 mL at 02/01/25 0822    sodium chloride 0.9 % flush 10 mL, 10 mL, Intravenous, PRN, Sonal Hutson L, APRN    sodium chloride 0.9 % infusion 40 mL, 40 mL, Intravenous, PRN, Shobha Hutsony L, APRN    tamsulosin (FLOMAX) 24 hr capsule 0.4 mg, 0.4 mg, Oral, Daily, Blair Ponce MD, 0.4 mg at 02/01/25 0819      Objective     Vital Signs  Temp:  [98 °F (36.7 °C)-99.9 °F (37.7 °C)] 99.2 °F (37.3 °C)  Heart Rate:  [51-70] 54  Resp:  [16-26] 21  BP: (119-156)/(48-60) 119/48  Physical Exam:    General Appearance:    Awake and alert, in no acute distress   Head:    Normocephalic, without obvious abnormality   Eyes:          Conjunctivae normal, anicteric sclerae   Ears:    Hearing intact   Throat:   No oral lesions, no thrush, oral mucosa moist   Neck:   No adenopathy, supple,  "no JVD   Lungs:      respirations regular, even and unlabored        Abdomen:      soft, non-tender, no rebound or guarding, non-distended, no hepatosplenomegaly   Rectal:     Deferred   Extremities:   No edema, no cyanosis, no redness   Skin:   No bleeding, bruising or rash, no jaundice   Neurologic:   Cranial nerves 2 - 12 grossly intact, no asterixis, sensation   intact        Results Review:    CBC    Results from last 7 days   Lab Units 02/01/25  0322 01/31/25  0506 01/30/25  0331 01/29/25  1834   WBC 10*3/mm3 4.27 5.29 3.63 4.58   HEMOGLOBIN g/dL 8.6* 6.5* 7.2* 9.0*   PLATELETS 10*3/mm3 112* 119* 109* 120*     CMP   Results from last 7 days   Lab Units 02/01/25  1011 01/31/25  0835 01/30/25  0625 01/29/25  1834   SODIUM mmol/L 139 142 137 138   POTASSIUM mmol/L 4.2 4.2 4.4 4.1   CHLORIDE mmol/L 105 107 105 102   CO2 mmol/L 29.1* 30.3* 25.8 28.1   BUN mg/dL 16 23 20 16   CREATININE mg/dL 0.53* 0.66* 0.63* 0.71*   GLUCOSE mg/dL 74 90 146* 121*   ALBUMIN g/dL 2.5*  --   --  3.0*   BILIRUBIN mg/dL 0.6  --   --  0.4   ALK PHOS U/L 55  --   --  62   AST (SGOT) U/L 26  --   --  29   ALT (SGPT) U/L 25  --   --  23     Cr Clearance Estimated Creatinine Clearance: 78.5 mL/min (A) (by C-G formula based on SCr of 0.53 mg/dL (L)).  Coag   Results from last 7 days   Lab Units 01/29/25  1834   INR  1.31*   APTT seconds 29.6     HbA1C No results found for: \"HGBA1C\"  Blood Glucose No results found for: \"POCGLU\"  Infection     UA      Radiology(recent) No radiology results for the last day          Assessment & Plan   ASSESSMENT:  -Normocytic anemia  -Constipation  -Nausea  -Unintentional weight loss  -Pneumonia  -A-fib on Eliquis  -CAD s/p stent placement on Plavix  -Hypertension  -Hyperlipidemia  -COPD on home O2     PLAN:  Patient is an 79-year-old male with history of COPD on home O2, A-fib on Eliquis, and CAD with stent on Plavix who presented on 1/29 from rehab for hypoxemia.  He has been diagnosed with pneumonia.  GI " asked to consult for anemia.      Patient's hemoglobin is up to 8.6 today from 6.5 after 1 unit of packed red blood cells.       Marlen Ho, APRN  02/01/25  14:14 EST

## 2025-02-02 ENCOUNTER — APPOINTMENT (OUTPATIENT)
Dept: GENERAL RADIOLOGY | Facility: HOSPITAL | Age: 80
End: 2025-02-02
Payer: MEDICARE

## 2025-02-02 LAB
ALBUMIN SERPL-MCNC: 2.6 G/DL (ref 3.5–5.2)
ALBUMIN/GLOB SERPL: 1 G/DL
ALP SERPL-CCNC: 60 U/L (ref 39–117)
ALT SERPL W P-5'-P-CCNC: 24 U/L (ref 1–41)
ANION GAP SERPL CALCULATED.3IONS-SCNC: 7.5 MMOL/L (ref 5–15)
ARTERIAL PATENCY WRIST A: POSITIVE
AST SERPL-CCNC: 28 U/L (ref 1–40)
ATMOSPHERIC PRESS: ABNORMAL MM[HG]
BASE EXCESS BLDA CALC-SCNC: 1.1 MMOL/L (ref 0–3)
BDY SITE: ABNORMAL
BILIRUB SERPL-MCNC: 0.7 MG/DL (ref 0–1.2)
BUN SERPL-MCNC: 17 MG/DL (ref 8–23)
BUN/CREAT SERPL: 31.5 (ref 7–25)
CA-I BLDA-SCNC: 1.18 MMOL/L (ref 1.15–1.33)
CA-I SERPL ISE-MCNC: 1.1 MMOL/L (ref 1.15–1.3)
CALCIUM SPEC-SCNC: 8 MG/DL (ref 8.6–10.5)
CHLORIDE SERPL-SCNC: 102 MMOL/L (ref 98–107)
CO2 SERPL-SCNC: 29.5 MMOL/L (ref 22–29)
CREAT BLDA-MCNC: 0.76 MG/DL (ref 0.6–1.3)
CREAT SERPL-MCNC: 0.54 MG/DL (ref 0.76–1.27)
D-LACTATE SERPL-SCNC: 0.7 MMOL/L (ref 0.2–2)
DEPRECATED RDW RBC AUTO: 60.3 FL (ref 37–54)
EGFRCR SERPLBLD CKD-EPI 2021: 100.7 ML/MIN/1.73
EGFRCR SERPLBLD CKD-EPI 2021: 90.9 ML/MIN/1.73
EOSINOPHIL # BLD MANUAL: 0.04 10*3/MM3 (ref 0–0.4)
EOSINOPHIL NFR BLD MANUAL: 1 % (ref 0.3–6.2)
ERYTHROCYTE [DISTWIDTH] IN BLOOD BY AUTOMATED COUNT: 17.5 % (ref 12.3–15.4)
GLOBULIN UR ELPH-MCNC: 2.6 GM/DL
GLUCOSE BLDC GLUCOMTR-MCNC: 81 MG/DL (ref 70–105)
GLUCOSE BLDC GLUCOMTR-MCNC: 83 MG/DL (ref 74–100)
GLUCOSE BLDC GLUCOMTR-MCNC: 83 MG/DL (ref 74–100)
GLUCOSE BLDC GLUCOMTR-MCNC: 90 MG/DL (ref 70–105)
GLUCOSE SERPL-MCNC: 74 MG/DL (ref 65–99)
HCO3 BLDA-SCNC: 26.2 MMOL/L (ref 21–28)
HCT VFR BLD AUTO: 30.4 % (ref 37.5–51)
HCT VFR BLDA CALC: 27 % (ref 38–51)
HEMODILUTION: NO
HGB BLD-MCNC: 9.6 G/DL (ref 13–17.7)
HGB BLDA-MCNC: 9.3 G/DL (ref 12–17)
INHALED O2 CONCENTRATION: 91 %
LYMPHOCYTES # BLD MANUAL: 0.21 10*3/MM3 (ref 0.7–3.1)
LYMPHOCYTES NFR BLD MANUAL: 1 % (ref 5–12)
MAGNESIUM SERPL-MCNC: 2 MG/DL (ref 1.6–2.4)
MCH RBC QN AUTO: 29.5 PG (ref 26.6–33)
MCHC RBC AUTO-ENTMCNC: 31.6 G/DL (ref 31.5–35.7)
MCV RBC AUTO: 93.5 FL (ref 79–97)
METAMYELOCYTES NFR BLD MANUAL: 1 % (ref 0–0)
MODALITY: ABNORMAL
MONOCYTES # BLD: 0.04 10*3/MM3 (ref 0.1–0.9)
NEUTROPHILS # BLD AUTO: 3.93 10*3/MM3 (ref 1.7–7)
NEUTROPHILS NFR BLD MANUAL: 75 % (ref 42.7–76)
NEUTS BAND NFR BLD MANUAL: 17 % (ref 0–5)
NT-PROBNP SERPL-MCNC: 1011 PG/ML (ref 0–1800)
PCO2 BLDA: 42.9 MM HG (ref 35–48)
PH BLDA: 7.39 PH UNITS (ref 7.35–7.45)
PLATELET # BLD AUTO: 87 10*3/MM3 (ref 140–450)
PMV BLD AUTO: 10.9 FL (ref 6–12)
PO2 BLD: 60 MM[HG] (ref 0–500)
PO2 BLDA: 54.8 MM HG (ref 83–108)
POTASSIUM BLDA-SCNC: 3.5 MMOL/L (ref 3.5–4.5)
POTASSIUM SERPL-SCNC: 3.7 MMOL/L (ref 3.5–5.2)
PROT SERPL-MCNC: 5.2 G/DL (ref 6–8.5)
RBC # BLD AUTO: 3.25 10*6/MM3 (ref 4.14–5.8)
RBC MORPH BLD: NORMAL
SAO2 % BLDCOA: 87.8 % (ref 94–98)
SCAN SLIDE: NORMAL
SMALL PLATELETS BLD QL SMEAR: ABNORMAL
SODIUM BLD-SCNC: 139 MMOL/L (ref 138–146)
SODIUM SERPL-SCNC: 139 MMOL/L (ref 136–145)
TOXIC GRANULATION: ABNORMAL
VARIANT LYMPHS NFR BLD MANUAL: 5 % (ref 19.6–45.3)
WBC NRBC COR # BLD AUTO: 4.27 10*3/MM3 (ref 3.4–10.8)

## 2025-02-02 PROCEDURE — 82565 ASSAY OF CREATININE: CPT

## 2025-02-02 PROCEDURE — 82330 ASSAY OF CALCIUM: CPT | Performed by: INTERNAL MEDICINE

## 2025-02-02 PROCEDURE — 94664 DEMO&/EVAL PT USE INHALER: CPT

## 2025-02-02 PROCEDURE — 94761 N-INVAS EAR/PLS OXIMETRY MLT: CPT

## 2025-02-02 PROCEDURE — 25010000002 FUROSEMIDE PER 20 MG: Performed by: INTERNAL MEDICINE

## 2025-02-02 PROCEDURE — 85025 COMPLETE CBC W/AUTO DIFF WBC: CPT | Performed by: INTERNAL MEDICINE

## 2025-02-02 PROCEDURE — 82803 BLOOD GASES ANY COMBINATION: CPT | Performed by: INTERNAL MEDICINE

## 2025-02-02 PROCEDURE — 93296 REM INTERROG EVL PM/IDS: CPT | Performed by: INTERNAL MEDICINE

## 2025-02-02 PROCEDURE — 83880 ASSAY OF NATRIURETIC PEPTIDE: CPT | Performed by: INTERNAL MEDICINE

## 2025-02-02 PROCEDURE — 25010000002 AMPICILLIN-SULBACTAM PER 1.5 G

## 2025-02-02 PROCEDURE — 85018 HEMOGLOBIN: CPT

## 2025-02-02 PROCEDURE — 83735 ASSAY OF MAGNESIUM: CPT | Performed by: INTERNAL MEDICINE

## 2025-02-02 PROCEDURE — 99222 1ST HOSP IP/OBS MODERATE 55: CPT | Performed by: INTERNAL MEDICINE

## 2025-02-02 PROCEDURE — 93294 REM INTERROG EVL PM/LDLS PM: CPT | Performed by: INTERNAL MEDICINE

## 2025-02-02 PROCEDURE — 99223 1ST HOSP IP/OBS HIGH 75: CPT | Performed by: SURGERY

## 2025-02-02 PROCEDURE — 94799 UNLISTED PULMONARY SVC/PX: CPT

## 2025-02-02 PROCEDURE — 80053 COMPREHEN METABOLIC PANEL: CPT | Performed by: INTERNAL MEDICINE

## 2025-02-02 PROCEDURE — 93010 ELECTROCARDIOGRAM REPORT: CPT | Performed by: INTERNAL MEDICINE

## 2025-02-02 PROCEDURE — 83605 ASSAY OF LACTIC ACID: CPT

## 2025-02-02 PROCEDURE — 80051 ELECTROLYTE PANEL: CPT

## 2025-02-02 PROCEDURE — 85007 BL SMEAR W/DIFF WBC COUNT: CPT | Performed by: INTERNAL MEDICINE

## 2025-02-02 PROCEDURE — 36600 WITHDRAWAL OF ARTERIAL BLOOD: CPT | Performed by: INTERNAL MEDICINE

## 2025-02-02 PROCEDURE — 82330 ASSAY OF CALCIUM: CPT

## 2025-02-02 PROCEDURE — 93005 ELECTROCARDIOGRAM TRACING: CPT | Performed by: INTERNAL MEDICINE

## 2025-02-02 PROCEDURE — 71045 X-RAY EXAM CHEST 1 VIEW: CPT

## 2025-02-02 PROCEDURE — 82948 REAGENT STRIP/BLOOD GLUCOSE: CPT

## 2025-02-02 RX ORDER — MIDODRINE HYDROCHLORIDE 5 MG/1
10 TABLET ORAL
Status: DISCONTINUED | OUTPATIENT
Start: 2025-02-02 | End: 2025-02-02

## 2025-02-02 RX ORDER — MIDODRINE HYDROCHLORIDE 5 MG/1
5 TABLET ORAL
Status: DISCONTINUED | OUTPATIENT
Start: 2025-02-02 | End: 2025-02-02

## 2025-02-02 RX ORDER — AMIODARONE HYDROCHLORIDE 200 MG/1
200 TABLET ORAL
Status: DISCONTINUED | OUTPATIENT
Start: 2025-02-03 | End: 2025-02-08

## 2025-02-02 RX ORDER — MIDODRINE HYDROCHLORIDE 5 MG/1
10 TABLET ORAL EVERY 8 HOURS
Status: DISCONTINUED | OUTPATIENT
Start: 2025-02-03 | End: 2025-02-15 | Stop reason: HOSPADM

## 2025-02-02 RX ORDER — FUROSEMIDE 10 MG/ML
20 INJECTION INTRAMUSCULAR; INTRAVENOUS EVERY 12 HOURS
Status: DISCONTINUED | OUTPATIENT
Start: 2025-02-02 | End: 2025-02-10

## 2025-02-02 RX ADMIN — AMPICILLIN SODIUM AND SULBACTAM SODIUM 3 G: 2; 1 INJECTION, POWDER, FOR SOLUTION INTRAMUSCULAR; INTRAVENOUS at 00:32

## 2025-02-02 RX ADMIN — AMPICILLIN SODIUM AND SULBACTAM SODIUM 3 G: 2; 1 INJECTION, POWDER, FOR SOLUTION INTRAMUSCULAR; INTRAVENOUS at 11:56

## 2025-02-02 RX ADMIN — CLONAZEPAM 0.25 MG: 0.5 TABLET ORAL at 09:13

## 2025-02-02 RX ADMIN — ATORVASTATIN CALCIUM 10 MG: 10 TABLET ORAL at 20:23

## 2025-02-02 RX ADMIN — ARFORMOTEROL TARTRATE 15 MCG: 15 SOLUTION RESPIRATORY (INHALATION) at 06:21

## 2025-02-02 RX ADMIN — MIDODRINE HYDROCHLORIDE 10 MG: 5 TABLET ORAL at 16:50

## 2025-02-02 RX ADMIN — BUDESONIDE 0.5 MG: 0.5 INHALANT RESPIRATORY (INHALATION) at 06:16

## 2025-02-02 RX ADMIN — CLOPIDOGREL BISULFATE 75 MG: 75 TABLET ORAL at 09:15

## 2025-02-02 RX ADMIN — MIRTAZAPINE 15 MG: 15 TABLET, FILM COATED ORAL at 20:23

## 2025-02-02 RX ADMIN — PANTOPRAZOLE SODIUM 40 MG: 40 TABLET, DELAYED RELEASE ORAL at 09:15

## 2025-02-02 RX ADMIN — APIXABAN 2.5 MG: 2.5 TABLET, FILM COATED ORAL at 20:23

## 2025-02-02 RX ADMIN — MIDODRINE HYDROCHLORIDE 5 MG: 5 TABLET ORAL at 11:56

## 2025-02-02 RX ADMIN — MIDODRINE HYDROCHLORIDE 10 MG: 5 TABLET ORAL at 15:08

## 2025-02-02 RX ADMIN — SENNOSIDES AND DOCUSATE SODIUM 2 TABLET: 50; 8.6 TABLET ORAL at 20:23

## 2025-02-02 RX ADMIN — ARFORMOTEROL TARTRATE 15 MCG: 15 SOLUTION RESPIRATORY (INHALATION) at 19:10

## 2025-02-02 RX ADMIN — FUROSEMIDE 20 MG: 10 INJECTION, SOLUTION INTRAMUSCULAR; INTRAVENOUS at 16:50

## 2025-02-02 RX ADMIN — APIXABAN 2.5 MG: 2.5 TABLET, FILM COATED ORAL at 09:15

## 2025-02-02 RX ADMIN — CLONAZEPAM 0.25 MG: 0.5 TABLET ORAL at 20:23

## 2025-02-02 RX ADMIN — CITALOPRAM 10 MG: 20 TABLET, FILM COATED ORAL at 09:14

## 2025-02-02 RX ADMIN — TAMSULOSIN HYDROCHLORIDE 0.4 MG: 0.4 CAPSULE ORAL at 09:15

## 2025-02-02 RX ADMIN — BUDESONIDE 0.5 MG: 0.5 INHALANT RESPIRATORY (INHALATION) at 19:14

## 2025-02-02 RX ADMIN — Medication 1 TABLET: at 09:13

## 2025-02-02 RX ADMIN — Medication 10 ML: at 09:16

## 2025-02-02 RX ADMIN — Medication 10 ML: at 20:23

## 2025-02-02 RX ADMIN — AMPICILLIN SODIUM AND SULBACTAM SODIUM 3 G: 2; 1 INJECTION, POWDER, FOR SOLUTION INTRAMUSCULAR; INTRAVENOUS at 17:55

## 2025-02-02 RX ADMIN — AMPICILLIN SODIUM AND SULBACTAM SODIUM 3 G: 2; 1 INJECTION, POWDER, FOR SOLUTION INTRAMUSCULAR; INTRAVENOUS at 05:24

## 2025-02-02 NOTE — PLAN OF CARE
Goal Outcome Evaluation:   Pt bp low. 10mg of midodrine started and lasix given due to pleural effusion. Pt is aox2 to 3. Bradycardic with paced rhythm. Cardiology consulted due to pt having moments of very low HR in the 30s. Pacemaker interrogated with no signs of abnormalities according to [employer]. After speaking with Dr. Kay he recommended vascular surgery due to CT results. Vascular consulted as of right now they will not proceed with any interventions will follow care. Pt remains on Airvo. Xray and abgs done due to pt having poor prognosis. Dr. Ponce informed and keep updated on pt. Care is on going.

## 2025-02-02 NOTE — SIGNIFICANT NOTE
02/02/25 1345   OTHER   Discipline physical therapy assistant   Rehab Time/Intention   Session Not Performed other (see comments)  (RN first stated it was ok to do a little with pt and had been on airvo but he was going to switch  him to 15 L hf. Took BP and was 90/41 and RN stated to hold PT today due to a lot going on with him. Will f/u tomorrow)   Recommendation   PT - Next Appointment 02/03/25

## 2025-02-02 NOTE — CASE MANAGEMENT/SOCIAL WORK
Social Work Assessment  [facility]     Patient Name: Kali Garcia  MRN: [id number]  Today's Date: 2/2/2025    Admit Date: 1/29/2025       Discharge Plan       Row Name 02/02/25 1103       Plan    Plan Comments ACP consult placed. Per chart review, Pt is disoriented and unable to participate in conversation. LSW attempted to call Pt's son, Ivis. LSW following.           KATELIN Rabago, MSW    Phone: [phone]  Fax: [phone]  Email: [email or web address]

## 2025-02-02 NOTE — PROGRESS NOTES
[facility] MEDICINE SERVICE  DAILY PROGRESS NOTE    NAME: Kali Garcia  : 1945  MRN: [id number]      LOS: 3 days     PROVIDER OF SERVICE: Blair Ponce MD    Chief Complaint: Pneumonia    Subjective:     Interval History:  History taken from: patient      History of Present Illness: Kali Garcia is a 79 y.o. male with a previous medical history of atrial fibrillation, CAD, HLD and COPD who presented to [facility] on 2025 after being found hypoxic by staff at the rehab facility.  The patient is a poor historian and other than complaining of being tired, could not provide any HPI. HPI was taken from the chart.  Per ED report the staff found him after receiving a DuoNeb shaking and having trouble breathing With an O2 saturation of 70%.  On arrival he was on a nonrebreather but was weaned down to an Airvo while in the ED. He is on O2 at 2L at baseline.     On chart review, he was discharged on 25 after treatment for pneumonia and a COPD exacerbation.       In the ED, ABG showed pH 7.48, CO2 40, pO2 45.4. Troponin's 43,42. Hemoglobin 9.0. Otherwise, labs are unremarkable.  Chest xray shows residual bibasilar infiltrates, mildly progressed from previous xray on 25.  He is afebrile, all vitals are stable with the exception of his O2 saturation which is 98% on Airvo.       seen in bed NAD pt c/o dyspnea on 4lts, severe anemia, hgl 6.5  Will   transfuse 1 unit prbc   seen in bed NAD KALEB RN, dyspnea on High flow oxygen, 15 lts     Review of Systems  12 point ROS reviewed and negative except as mentioned above  Objective:     Vital Signs  Temp:  [98.5 °F (36.9 °C)-99.9 °F (37.7 °C)] 99.1 °F (37.3 °C)  Heart Rate:  [53-96] 96  Resp:  [17-23] 19  BP: ()/(42-59) 95/42  Flow (L/min) (Oxygen Therapy):  [15-45] 15   Body mass index is 15.34 kg/m².    Physical Exam  Vitals and nursing note reviewed.   Constitutional:       Appearance: Normal appearance.       Comments: Airvo   HENT:      Mouth/Throat:      Mouth: Mucous membranes are moist.   Cardiovascular:      Rate and Rhythm: Normal rate and regular rhythm.   Pulmonary:      Effort: Pulmonary effort is normal.      Breath sounds: Examination of the right-lower field reveals decreased breath sounds. Examination of the left-lower field reveals decreased breath sounds. Decreased breath sounds present.   Abdominal:      General: Bowel sounds are normal.      Palpations: Abdomen is soft.   Musculoskeletal:         General: Normal range of motion.   Skin:     General: Skin is warm and dry.   Neurological:      General: No focal deficit present.      Mental Status: He is oriented to person, place, and time. Mental status is at baseline. He is lethargic.      Comments: Poor historian   Psychiatric:         Mood and Affect: Mood normal.         Behavior: Behavior normal.            Diagnostic Data    Results from last 7 days   Lab Units 02/01/25  1011 02/01/25  0322   WBC 10*3/mm3  --  4.27   HEMOGLOBIN g/dL  --  8.6*   HEMATOCRIT %  --  26.9*   PLATELETS 10*3/mm3  --  112*   GLUCOSE mg/dL 74  --    CREATININE mg/dL 0.53*  --    BUN mg/dL 16  --    SODIUM mmol/L 139  --    POTASSIUM mmol/L 4.2  --    AST (SGOT) U/L 26  --    ALT (SGPT) U/L 25  --    ALK PHOS U/L 55  --    BILIRUBIN mg/dL 0.6  --    ANION GAP mmol/L 4.9*  --        CT Chest Without Contrast Diagnostic    Result Date: 2/1/2025  Impression: 1. Dense bilateral lower lobe consolidation compatible with pneumonia. 2. Moderate bilateral pleural effusions. 3. Severe emphysema with areas of chronic scarring in the right upper lobe and left upper lobe. 4. Extensive coronary artery calcifications. 5. 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 Electronically Signed: Ryder Scherer MD  2/1/2025 4:15 PM EST  Workstation ID: [id number]       I reviewed the patient's new clinical  results.  Scheduled Meds:amiodarone, 200 mg, Oral, BID  ampicillin-sulbactam, 3 g, Intravenous, Q6H  apixaban, 2.5 mg, Oral, Q12H  arformoterol, 15 mcg, Nebulization, BID - RT  atorvastatin, 10 mg, Oral, Nightly  budesonide, 0.5 mg, Nebulization, BID - RT  citalopram, 10 mg, Oral, Daily  clonazePAM, 0.25 mg, Oral, BID  clopidogrel, 75 mg, Oral, Daily  mirtazapine, 15 mg, Oral, Nightly  multivitamin with minerals, 1 tablet, Oral, Daily  pantoprazole, 40 mg, Oral, Daily  sodium chloride, 10 mL, Intravenous, Q12H  tamsulosin, 0.4 mg, Oral, Daily      Continuous Infusions:   PRN Meds:.  acetaminophen **OR** acetaminophen **OR** acetaminophen    senna-docusate sodium **AND** polyethylene glycol **AND** bisacodyl **AND** bisacodyl    ipratropium-albuterol    melatonin    nitroglycerin    ondansetron    [COMPLETED] Insert Peripheral IV **AND** sodium chloride    sodium chloride    sodium chloride   Assessment/Plan:     Active and Resolved Problems  Active Hospital Problems    Diagnosis  POA    **Pneumonia [J18.9]  Yes      Resolved Hospital Problems   No resolved problems to display.     Pneumonia  -ABG showed pH 7.48, CO2 40, pO2 45.4  -Xray of the chest reviewed, bilateral infiltrates noted  -WBC 4.58  -Blood cultures pending  -Urine Legionella neg  -Urine strep pneumo neg  -Respiratory culture ordered  -DuoNebs ordered  -Unasyn ordered  -Supplemental oxygen to keep sats greater than 92%  -Incentive spirometry  -Cough and deep breathe     Chronic atrial fibrillation  Chronic CAD with HLD  -Continue amiodarone, Eliquis, atorvastatin, Plavix     Essential Hypertension  -Controlled  -Monitor BP  -Continue home Klonopin     COPD-Chronic, stable  -Continue home inhalers, Theodur    Severe anemia  Stool for occult blood  Transfuse 1 unit  GI consulted    VTE Prophylaxis:  Pharmacologic VTE prophylaxis orders are present.    Disposition Planning:     Barriers to Discharge:anemia/pna  Anticipated Date of Discharge: 2/3  Place of  Discharge: [facility]      Time: 34 minutes     Code Status and Medical Interventions: CPR (Attempt to Resuscitate); Full Support   Ordered at: 01/29/25 2224     Code Status (Patient has no pulse and is not breathing):    CPR (Attempt to Resuscitate)     Medical Interventions (Patient has pulse or is breathing):    Full Support       Signature: Electronically signed by Blair Ponce MD, 02/01/25, 21:28 EST.  [facility] Hospitalist Team

## 2025-02-02 NOTE — PLAN OF CARE
Goal Outcome Evaluation:                    Pt resting abed Aox2-3 on 15L HFNC. No s/s of distress noted. F/C intact and patent. HR in 50s.

## 2025-02-02 NOTE — CASE MANAGEMENT/SOCIAL WORK
Social Work Assessment  [facility]     Patient Name: Kali Garcia  MRN: [id number]  Today's Date: 2/2/2025    Admit Date: 1/29/2025         Discharge Plan       Row Name 02/02/25 1120       Plan    Plan Comments LSW received phone call from Ivis, Pt's son. Ivis had questions regarding Medicaid and Waiver programs. LSW sent email to [employer] to follow up with family. Pt is not A&O per chart reivew. Ivis reported that Pt has three adult children. Ivis would like information on ACP emailed to him to read over. LSW sent Advance directives information to [email or web address]. LSW explained that until pt is A&O he cannot sign legal documents at this time. Ivis verbalized understanding.        KATELIN Rabago, MSW    Phone: [phone]  Fax: [phone]  Email: [email or web address]

## 2025-02-02 NOTE — CONSULTS
[facility] [place]  Cardiology consult note  Berto Barroso MD, PhD      Subjective:     Encounter Date:02/2/2025      Patient ID: Kali Garcia is a 79 y.o. male.    Chief Complaint: shortness of breath, hypoxia  Cardiology consult: bradycardia    Referring Physician: Dr. Ponce  Seen and examined nursing staff at bedside as well, agree with narrative as discussed with nurse practitioner after face-to-face encounter scribed findings below greater than 50% of total encounter time in medical decision making performed by me    HPI:  Kali Garcia is a 79 y.o. male who presents with shortness of breath, hypoxia. Mr. Garcia has previously seen Dr. Barroso for cardiology while hospitalized. He has pmh severe COPD emphysema, acute respiratory failure requiring 12 L of oxygen, history of hypertension hyperlipidemia peripheral vascular disease along with coronary disease, rhinovirus infection COPD on home Trilogy, underlying atrial fibrillation, last echo 2024 with EF of 60% with mild pulmonary hypertension. He has sick sinus syndrome, symptomatic sinus pauses of greater than 6 seconds and underwent implantation of Micra leadless pacemaker on 1/2/2025.  During that admission he had atrial fibrillation and atrial flutter. He converted to SR with amiodarone. He was discharged on amiodarone 200mg BID and Eliquis 2.5mg BID.  He continues on amiodarone once daily, QT is stable on telemetry, he is not able to give much history with acuity of condition, he is very thin and cachectic BMI is only 15    Nursing at bedside was concerned over paroxysmal atrial fibrillation, reviewed telemetry extensively with interpretation demonstrating sinus rhythm rate of 50s with sick sinus syndrome ultimately breakthrough with ventricular pacing and wide-complex rhythm with Micra leadless pacemaker in place, I did not find any evidence of atrial fibrillation or malignant atrial or ventricular arrhythmia although he does have evidence of  "sick sinus syndrome which is known    patient hypotensive started on midodrine blood pressures 100 systolic    He has had several hospitalizations for pneumonia and hypoxic respiratory failure.  He is in this admission with pneumonia, hypoxic respiratory failure.  No evidence of ACS, no ischemic ECG changes    At the time of nurse encounter, he was noted to be \"bradycardic\" on the telemetry. He does have his PPM in place. He is also hypotensive and was started on midodrine. On my encounter he is intermittently V pacing.      Review of systems unable to be obtained reliably secondary to acuity of condition  Historical data copied forward from previous encounters in EMR is unchanged    Past Medical History:   Diagnosis Date    COPD (chronic obstructive pulmonary disease)     Coronary artery disease     Emphysema lung     Hyperlipidemia     Hypertension     Osteoarthritis        Past Surgical History:   Procedure Laterality Date    CARDIAC CATHETERIZATION      CARDIAC ELECTROPHYSIOLOGY PROCEDURE N/A 1/2/2025    Procedure: Bijan SIMS;  Surgeon: Eliot Mcgarry MD;  Location: [facility] CATH INVASIVE LOCATION;  Service: Cardiovascular;  Laterality: N/A;    COLONOSCOPY N/A 8/4/2022    Procedure: COLONOSCOPY with 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 and endoscopic clipping x 1;  Surgeon: Luz Jacques MD;  Location: [facility] ENDOSCOPY;  Service: Gastroenterology;  Laterality: N/A;  post op: cecal AVM    CORONARY STENT PLACEMENT      ENDOSCOPY N/A 4/14/2022    Procedure: ESOPHAGOGASTRODUODENOSCOPY WITH BIOPSY X1 AREA;  Surgeon: Luz Jacques MD;  Location: [facility] ENDOSCOPY;  Service: Gastroenterology;  Laterality: N/A;  esophagitis, gastritis, HH    ENDOSCOPY N/A 8/4/2022    Procedure: ESOPHAGOGASTRODUODENOSCOPY;  Surgeon: Luz Jacques MD;  Location: [facility] ENDOSCOPY;  Service: Gastroenterology;  Laterality: N/A;  post op: hiatal hernia, eosphagitis    EYE SURGERY      FEMORAL ARTERY STENT      KNEE " SURGERY Left     KNEE SURGERY Left     LUNG SURGERY         History reviewed. No pertinent family history.    Social History     Socioeconomic History    Marital status:    Tobacco Use    Smoking status: Former     Current packs/day: 0.00     Average packs/day: 1 pack/day for 63.0 years (63.0 ttl pk-yrs)     Types: Cigarettes     Start date: 1960     Quit date: 2023     Years since quittin.1     Passive exposure: Never   Vaping Use    Vaping status: Never Used   Substance and Sexual Activity    Alcohol use: Not Currently    Drug use: Never    Sexual activity: Defer         Allergies   Allergen Reactions    Codeine GI Intolerance       Current Medications:   Scheduled Meds:amiodarone, 200 mg, Oral, BID  ampicillin-sulbactam, 3 g, Intravenous, Q6H  apixaban, 2.5 mg, Oral, Q12H  arformoterol, 15 mcg, Nebulization, BID - RT  atorvastatin, 10 mg, Oral, Nightly  budesonide, 0.5 mg, Nebulization, BID - RT  citalopram, 10 mg, Oral, Daily  clonazePAM, 0.25 mg, Oral, BID  clopidogrel, 75 mg, Oral, Daily  midodrine, 5 mg, Oral, TID AC  mirtazapine, 15 mg, Oral, Nightly  multivitamin with minerals, 1 tablet, Oral, Daily  pantoprazole, 40 mg, Oral, Daily  sodium chloride, 10 mL, Intravenous, Q12H  tamsulosin, 0.4 mg, Oral, Daily      Continuous Infusions:     Review of Systems   Constitutional: Negative for chills, diaphoresis and malaise/fatigue.   Cardiovascular:  Negative for chest pain, dyspnea on exertion, irregular heartbeat, leg swelling, near-syncope, orthopnea, palpitations, paroxysmal nocturnal dyspnea and syncope.   Respiratory:  Negative for cough, shortness of breath, sleep disturbances due to breathing and sputum production.    Gastrointestinal:  Negative for change in bowel habit.   Genitourinary:  Negative for urgency.   Neurological:  Negative for dizziness and headaches.   Psychiatric/Behavioral:  Negative for altered mental status.             Objective:         /49 (BP Location:  "Right arm, Patient Position: Lying)   Pulse 53   Temp 98 °F (36.7 °C) (Oral)   Resp 16   Ht 180.3 cm (71\")   Wt 49.9 kg (110 lb)   SpO2 95%   BMI 15.34 kg/m²     Physical Exam:  General Appearance:  No acute distress, appears acutely and chronically ill, cachectic                                Head: Atraumatic, normocephalic, PERRLA, thin cerebral muscle wasting               Neck:   supple, no JVD   Lungs:     High flow, dim bilat bases, delayed expiratory phase no gross rhonchi    Heart:    Regular rhythm and normal rate, normal S1 and S2   Abdomen:     Normal bowel sounds, no masses, no organomegaly, soft  nontender, nondistended, no guarding, no rebound  tenderness   Extremities:   Moves all extremities well, no edema, no cyanosis, no  redness   Pulses:   Pulses palpable and equal bilaterally   Skin:   No bleeding, bruising or rash   Neurologic:   Awake, alert, oriented x 2, moves extremities no focal deficits but has some confusion and lethargy                 ASCVD Risk Score::  The ASCVD Risk score (Fabián ROMERO, et al., 2019) failed to calculate for the following reasons:    The 2019 ASCVD risk score is only valid for ages 40 to 79      Lab Review:     Results from last 7 days   Lab Units 02/02/25  1004 02/01/25  1011   SODIUM mmol/L 139 139   POTASSIUM mmol/L 3.7 4.2   CHLORIDE mmol/L 102 105   CO2 mmol/L 29.5* 29.1*   BUN mg/dL 17 16   CREATININE mg/dL 0.54* 0.53*   GLUCOSE mg/dL 74 74   CALCIUM mg/dL 8.0* 7.7*   AST (SGOT) U/L 28 26   ALT (SGPT) U/L 24 25     Results from last 7 days   Lab Units 01/29/25 2018 01/29/25  1834   HSTROP T ng/L 42* 43*     Results from last 7 days   Lab Units 02/02/25  1004 02/01/25  0322   WBC 10*3/mm3 4.27 4.27   HEMOGLOBIN g/dL 9.6* 8.6*   HEMATOCRIT % 30.4* 26.9*   PLATELETS 10*3/mm3 87* 112*     Results from last 7 days   Lab Units 01/29/25  1834   INR  1.31*   APTT seconds 29.6     Results from last 7 days   Lab Units 02/02/25  1004   MAGNESIUM mg/dL 2.0       " "    Invalid input(s): \"LDLCALC\"  Results from last 7 days   Lab Units 01/29/25  1834   PROBNP pg/mL 637.0           Recent Radiology:  Imaging Results (Most Recent)       Procedure Component Value Units Date/Time    CT Chest Without Contrast Diagnostic [[id number]] Collected: 02/01/25 1608     Updated: 02/01/25 1617    Narrative:      CT CHEST WO CONTRAST DIAGNOSTIC    Date of Exam: 2/1/2025 3:32 PM EST    Indication: Hypoxia.    Comparison: PET/CT 10/10/2024, CT chest with contrast 2/1/2024    Technique: Axial CT images were obtained of the chest without contrast administration.  Sagittal and coronal reconstructions were performed.  Automated exposure control and iterative reconstruction methods were used.    Findings:  Noncontrast visualized soft tissues of the lower neck are without acute abnormality. Heart size normal. Extensive coronary calcification. There is a leadless pacemaker noted in the right ventricle. Trace pericardial effusion. There are several enlarged   mediastinal lymph nodes for example low paratracheal node measuring 13 mm in short axis (2/54). These appear similar to the prior study and could relate to chronic reactive adenopathy. These nodes were not hypermetabolic on the prior PET/CT per report    Severe emphysema. Moderate bilateral pleural effusions. Lower lobe dependent airspace disease concerning for bilateral lower lobe pneumonia. No pneumothorax. Within the anterior aspect of the right upper lobe near the apex there is an area of suspected   chronic scarring with calcification which measures 2.3 x 1.1 cm (2/28). More posteriorly in the right upper lobe there is a small area of chronic nodular scarring which measures 10 mm (2/31). Area of chronic scarring at the anterior left upper lobe   unchanged (2/50).    Upper abdomen demonstrates hyperdense hepatic parenchyma which can be seen with prior amiodarone use or iron deposition among other etiologies. The spleen and adrenal glands are " unremarkable. Small nonobstructing left nephrolithiasis. Extensive   atherosclerotic calcifications of the abdominal aorta and visualized branch vasculature. No free fluid in the upper abdomen. Suspect severe stenosis of the SMA due to calcified plaque, unchanged (2/121). Remote left rib fractures. Reduced mineral density   measured at L1 consistent with osteopenia/osteoporosis. Healed remote fracture at the mid sternum. The thoracic vertebral bodies are maintained in height.      Impression:      Impression:  1. Dense bilateral lower lobe consolidation compatible with pneumonia.  2. Moderate bilateral pleural effusions.  3. Severe emphysema with areas of chronic scarring in the right upper lobe and left upper lobe.  4. Extensive coronary artery calcifications.  5. 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          Electronically Signed: Ryder Scherer MD    2/1/2025 4:15 PM EST    Workstation ID: [id number]    XR Chest 1 View [[id number]] Collected: 01/29/25 1908     Updated: 01/29/25 1912    Narrative:      XR CHEST 1 VW    Date of Exam: 1/29/2025 6:40 PM EST    Indication: soa    Comparison: 1/18/2025 at 1739 hours, 12/22/2024    Findings:  Residual infiltrates are noted throughout the mid to lower lungs bilaterally. There is a small left pleural effusion. Diffuse interstitial changes are noted. These findings are stable to mildly progressed. The findings are also superimposed on underlying   chronic changes throughout the lungs. The cardiac silhouette and mediastinum are stable.      Impression:      Impression:  Residual infiltrates are noted throughout the mid to lower lungs bilaterally. There is a small left pleural effusion. Diffuse interstitial changes are noted. These findings are stable to mildly progressed. The findings may indicate changes of CHF and   pulmonary edema.        Electronically Signed: Jonny Espinal  MD    1/29/2025 7:10 PM EST    Workstation ID: [id number]              ECHOCARDIOGRAM:    Results for orders placed during the hospital encounter of 02/01/24    Adult Transthoracic Echo Limited W/ Cont if Necessary Per Protocol    Interpretation Summary    Left ventricular systolic function is normal. Left ventricular ejection fraction appears to be 61 - 65%.    Left ventricular diastolic function was normal.    The left atrial cavity is mildly dilated.    Left atrial volume is mildly increased.    There is moderate calcification of the aortic valve mainly affecting the left coronary and right coronary cusp(s).    Estimated right ventricular systolic pressure from tricuspid regurgitation is mildly elevated (35-45 mmHg).    Mild pulmonary hypertension is present.            Assessment:         Active Hospital Problems    Diagnosis  POA    **Pneumonia [J18.9]  Yes     Acute on chronic hypoxic respiratory failure / pneumonia    2. Known SSS s/p micra leadless PPM placed 1/2025    3. Concern for bradycardia  Interrogate device    4. pAFib/flutter  Converted to SR last admission on amiodarone 200mg BID  Will de escalate to 200mg daily  On eliquis 2.5mg daily    5. Normal LVEF / mild pulm HTN    6. H/o CAD currently stable     Plan:   No evidence of atrial or ventricular arrhythmia  Sick sinus syndrome present with intermittent ventricular pacing  Continue amiodarone 200 daily  Eliquis 2.5 twice daily, can transition to Lovenox if any procedures are planned alternatively heparin drip  Underlying severe respiratory insufficiency hypoxia and failure now hypotensive requiring midodrine with potentially active pneumonia and active infection  No indication for ischemic evaluation, no evidence of any ACS or ischemic ECG changes, no evidence of any chest pain previously    CV supportive care at this time, multiorgan dysfunction, respiratory failure, arrhythmias stable, pacemaker is normal functioning  Okay to decrease blood  thinner transition to heparin if anticipated procedures as stated  Patient charlene full code  Goals of care, patient will need assessment of enteral nutrition  Treatment of infection pneumonia per pulmonary and primary    Further recommendations to follow findings and clinical course    Berto Barroso MD, PhD               Darlyn Guan, FADIA  02/02/25  13:17 EST

## 2025-02-02 NOTE — PROGRESS NOTES
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 Patient was also noted to have patchy airspace disease and clustered micronodules in the lingula.     He then underwent PET scan 10/10/2024 showing a 1.7 x 1.1 cm partially calcified irregular shaped nodule in the right upper lobe to be metabolically active with SUV of 4.84 and additional 2 metabolically active irregular nodular densities in the right upper lobe. Inferior apical segment with intervening areas of metabolically active interstitial thickening.     PFTs: FEV1/FVC postbronchodilator is 0.39 with FEV1 of 0.71 L or 28% predicted and FVC of 1.82 L or 47% predicted.   Total lung capacity of 7.77 L or 131% predicted and residual volume of 5.82 L or 226% predicted and DLCO of 61% predicted noted.      Recommendations:    Vascular surgery consult for SMA occlusion  Oxygen titration currently on Airvo high flow 30 L    Antibiotics on IV Unasyn  Received 1 dose of IV vancomycin and cefepime    Bronchodilator Pulmicort and DuoNeb  On amiodarone  Plavix  Midodrine 5 mg 3 times daily  Protonix 40 mg daily  Eliquis 2.5 mg twice daily    CT chest 2/1/2025             LOS: 4 days     Subjective         Objective     Vital signs for last 24 hours:  Vitals:    02/02/25 0619 02/02/25 0621 02/02/25 0626 02/02/25 0829   BP:    96/59   BP  Location:    Right arm   Patient Position:    Lying   Pulse: 64 63 64 53   Resp: 20 16 16 18   Temp:    97.5 °F (36.4 °C)   TempSrc:    Oral   SpO2: (!) 88% 91% 92% 99%   Weight:       Height:           Intake/Output last 3 shifts:  I/O last 3 completed shifts:  In: 200 [IV Piggyback:200]  Out: 750 [Urine:750]  Intake/Output this shift:  No intake/output data recorded.      Radiology  Imaging Results (Last 24 Hours)       Procedure Component Value Units Date/Time    CT Chest Without Contrast Diagnostic [[id number]] Collected: 02/01/25 1608     Updated: 02/01/25 1617    Narrative:      CT CHEST WO CONTRAST DIAGNOSTIC    Date of Exam: 2/1/2025 3:32 PM EST    Indication: Hypoxia.    Comparison: PET/CT 10/10/2024, CT chest with contrast 2/1/2024    Technique: Axial CT images were obtained of the chest without contrast administration.  Sagittal and coronal reconstructions were performed.  Automated exposure control and iterative reconstruction methods were used.    Findings:  Noncontrast visualized soft tissues of the lower neck are without acute abnormality. Heart size normal. Extensive coronary calcification. There is a leadless pacemaker noted in the right ventricle. Trace pericardial effusion. There are several enlarged   mediastinal lymph nodes for example low paratracheal node measuring 13 mm in short axis (2/54). These appear similar to the prior study and could relate to chronic reactive adenopathy. These nodes were not hypermetabolic on the prior PET/CT per report    Severe emphysema. Moderate bilateral pleural effusions. Lower lobe dependent airspace disease concerning for bilateral lower lobe pneumonia. No pneumothorax. Within the anterior aspect of the right upper lobe near the apex there is an area of suspected   chronic scarring with calcification which measures 2.3 x 1.1 cm (2/28). More posteriorly in the right upper lobe there is a small area of chronic nodular scarring which measures 10 mm (2/31). Area of  chronic scarring at the anterior left upper lobe   unchanged (2/50).    Upper abdomen demonstrates hyperdense hepatic parenchyma which can be seen with prior amiodarone use or iron deposition among other etiologies. The spleen and adrenal glands are unremarkable. Small nonobstructing left nephrolithiasis. Extensive   atherosclerotic calcifications of the abdominal aorta and visualized branch vasculature. No free fluid in the upper abdomen. Suspect severe stenosis of the SMA due to calcified plaque, unchanged (2/121). Remote left rib fractures. Reduced mineral density   measured at L1 consistent with osteopenia/osteoporosis. Healed remote fracture at the mid sternum. The thoracic vertebral bodies are maintained in height.      Impression:      Impression:  1. Dense bilateral lower lobe consolidation compatible with pneumonia.  2. Moderate bilateral pleural effusions.  3. Severe emphysema with areas of chronic scarring in the right upper lobe and left upper lobe.  4. Extensive coronary artery calcifications.  5. 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          Electronically Signed: Ryder Scherer MD    2/1/2025 4:15 PM EST    Workstation ID: [id number]            Labs:  Results from last 7 days   Lab Units 02/01/25  0322   WBC 10*3/mm3 4.27   HEMOGLOBIN g/dL 8.6*   HEMATOCRIT % 26.9*   PLATELETS 10*3/mm3 112*     Results from last 7 days   Lab Units 02/01/25  1011   SODIUM mmol/L 139   POTASSIUM mmol/L 4.2   CHLORIDE mmol/L 105   CO2 mmol/L 29.1*   BUN mg/dL 16   CREATININE mg/dL 0.53*   CALCIUM mg/dL 7.7*   BILIRUBIN mg/dL 0.6   ALK PHOS U/L 55   ALT (SGPT) U/L 25   AST (SGOT) U/L 26   GLUCOSE mg/dL 74     Results from last 7 days   Lab Units 01/29/25  1839   PH, ARTERIAL pH units 7.485*   PO2 ART mm Hg 45.4*   PCO2, ARTERIAL mm Hg 40.5   HCO3 ART mmol/L 30.5*     Results from last 7 days   Lab Units 02/01/25  1011  01/29/25 1834   ALBUMIN g/dL 2.5* 3.0*     Results from last 7 days   Lab Units 01/29/25 2018 01/29/25 1834   HSTROP T ng/L 42* 43*             Results from last 7 days   Lab Units 01/29/25  1834   INR  1.31*   APTT seconds 29.6               Meds:   SCHEDULE  amiodarone, 200 mg, Oral, BID  ampicillin-sulbactam, 3 g, Intravenous, Q6H  apixaban, 2.5 mg, Oral, Q12H  arformoterol, 15 mcg, Nebulization, BID - RT  atorvastatin, 10 mg, Oral, Nightly  budesonide, 0.5 mg, Nebulization, BID - RT  citalopram, 10 mg, Oral, Daily  clonazePAM, 0.25 mg, Oral, BID  clopidogrel, 75 mg, Oral, Daily  midodrine, 5 mg, Oral, TID AC  mirtazapine, 15 mg, Oral, Nightly  multivitamin with minerals, 1 tablet, Oral, Daily  pantoprazole, 40 mg, Oral, Daily  sodium chloride, 10 mL, Intravenous, Q12H  tamsulosin, 0.4 mg, Oral, Daily      Infusions     PRNs    acetaminophen **OR** acetaminophen **OR** acetaminophen    senna-docusate sodium **AND** polyethylene glycol **AND** bisacodyl **AND** bisacodyl    ipratropium-albuterol    melatonin    nitroglycerin    ondansetron    [COMPLETED] Insert Peripheral IV **AND** sodium chloride    sodium chloride    sodium chloride    Physical Exam:  Physical Exam  Cardiovascular:      Heart sounds: Murmur heard.      No gallop.   Pulmonary:      Effort: No respiratory distress.      Breath sounds: No stridor. Rhonchi and rales present. No wheezing.   Chest:      Chest wall: No tenderness.         ROS  Review of Systems   Respiratory:  Positive for cough and shortness of breath. Negative for wheezing and stridor.    Cardiovascular:  Positive for palpitations and leg swelling. Negative for chest pain.             Total time spent with patient greater than: 45 Minutes

## 2025-02-02 NOTE — PROGRESS NOTES
[facility] MEDICINE SERVICE  DAILY PROGRESS NOTE    NAME: Kali Garcia  : 1945  MRN: [id number]      LOS: 4 days     PROVIDER OF SERVICE: Blair Ponce MD    Chief Complaint: Pneumonia    Subjective:     Interval History:  History taken from: patient      History of Present Illness: Kali Garcia is a 79 y.o. male with a previous medical history of atrial fibrillation, CAD, HLD and COPD who presented to [facility] on 2025 after being found hypoxic by staff at the rehab facility.  The patient is a poor historian and other than complaining of being tired, could not provide any HPI. HPI was taken from the chart.  Per ED report the staff found him after receiving a DuoNeb shaking and having trouble breathing With an O2 saturation of 70%.  On arrival he was on a nonrebreather but was weaned down to an Airvo while in the ED. He is on O2 at 2L at baseline.     On chart review, he was discharged on 25 after treatment for pneumonia and a COPD exacerbation.       In the ED, ABG showed pH 7.48, CO2 40, pO2 45.4. Troponin's 43,42. Hemoglobin 9.0. Otherwise, labs are unremarkable.  Chest xray shows residual bibasilar infiltrates, mildly progressed from previous xray on 25.  He is afebrile, all vitals are stable with the exception of his O2 saturation which is 98% on Airvo.       seen in bed NAD pt c/o dyspnea on 4lts, severe anemia, hgl 6.5  Will   transfuse 1 unit prbc  / seen in bed NAD KALEB RN, dyspnea on High flow oxygen, 15 lts   2/2 seen in bed NAD, vss, KALEB RN, patient developed bradycardia, consulted cardiology    Review of Systems  12 point ROS reviewed and negative except as mentioned above  Objective:     Vital Signs  Temp:  [97.5 °F (36.4 °C)-99.2 °F (37.3 °C)] 97.5 °F (36.4 °C)  Heart Rate:  [] 53  Resp:  [14-21] 18  BP: ()/(42-59) 96/59  Flow (L/min) (Oxygen Therapy):  [15-45] 30   Body mass index is 15.34 kg/m².    Physical Exam  Vitals and  nursing note reviewed.   Constitutional:       Appearance: Normal appearance.      Comments: Airvo   HENT:      Mouth/Throat:      Mouth: Mucous membranes are moist.   Cardiovascular:      Rate and Rhythm: Normal rate and regular rhythm.   Pulmonary:      Effort: Pulmonary effort is normal.      Breath sounds: Examination of the right-lower field reveals decreased breath sounds. Examination of the left-lower field reveals decreased breath sounds. Decreased breath sounds present.   Abdominal:      General: Bowel sounds are normal.      Palpations: Abdomen is soft.   Musculoskeletal:         General: Normal range of motion.   Skin:     General: Skin is warm and dry.   Neurological:      General: No focal deficit present.      Mental Status: He is oriented to person, place, and time. Mental status is at baseline. He is lethargic.      Comments: Poor historian   Psychiatric:         Mood and Affect: Mood normal.         Behavior: Behavior normal.            Diagnostic Data    Results from last 7 days   Lab Units 02/01/25  1011 02/01/25  0322   WBC 10*3/mm3  --  4.27   HEMOGLOBIN g/dL  --  8.6*   HEMATOCRIT %  --  26.9*   PLATELETS 10*3/mm3  --  112*   GLUCOSE mg/dL 74  --    CREATININE mg/dL 0.53*  --    BUN mg/dL 16  --    SODIUM mmol/L 139  --    POTASSIUM mmol/L 4.2  --    AST (SGOT) U/L 26  --    ALT (SGPT) U/L 25  --    ALK PHOS U/L 55  --    BILIRUBIN mg/dL 0.6  --    ANION GAP mmol/L 4.9*  --        CT Chest Without Contrast Diagnostic    Result Date: 2/1/2025  Impression: 1. Dense bilateral lower lobe consolidation compatible with pneumonia. 2. Moderate bilateral pleural effusions. 3. Severe emphysema with areas of chronic scarring in the right upper lobe and left upper lobe. 4. Extensive coronary artery calcifications. 5. 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 Electronically Signed: Ryder Scherer MD  2/1/2025 4:15 PM  EST  Workstation ID: [id number]       I reviewed the patient's new clinical results.  Scheduled Meds:amiodarone, 200 mg, Oral, BID  ampicillin-sulbactam, 3 g, Intravenous, Q6H  apixaban, 2.5 mg, Oral, Q12H  arformoterol, 15 mcg, Nebulization, BID - RT  atorvastatin, 10 mg, Oral, Nightly  budesonide, 0.5 mg, Nebulization, BID - RT  citalopram, 10 mg, Oral, Daily  clonazePAM, 0.25 mg, Oral, BID  clopidogrel, 75 mg, Oral, Daily  mirtazapine, 15 mg, Oral, Nightly  multivitamin with minerals, 1 tablet, Oral, Daily  pantoprazole, 40 mg, Oral, Daily  sodium chloride, 10 mL, Intravenous, Q12H  tamsulosin, 0.4 mg, Oral, Daily      Continuous Infusions:   PRN Meds:.  acetaminophen **OR** acetaminophen **OR** acetaminophen    senna-docusate sodium **AND** polyethylene glycol **AND** bisacodyl **AND** bisacodyl    ipratropium-albuterol    melatonin    nitroglycerin    ondansetron    [COMPLETED] Insert Peripheral IV **AND** sodium chloride    sodium chloride    sodium chloride   Assessment/Plan:     Active and Resolved Problems  Active Hospital Problems    Diagnosis  POA    **Pneumonia [J18.9]  Yes      Resolved Hospital Problems   No resolved problems to display.     Pneumonia  -ABG showed pH 7.48, CO2 40, pO2 45.4  -Xray of the chest reviewed, bilateral infiltrates noted  -WBC 4.58  -Blood cultures pending  -Urine Legionella neg  -Urine strep pneumo neg  -Respiratory culture ordered  -DuoNebs ordered  -Unasyn ordered  -Supplemental oxygen to keep sats greater than 92%  -Incentive spirometry  -Cough and deep breathe     Chronic atrial fibrillation  Chronic CAD with HLD  -Continue amiodarone, Eliquis, atorvastatin, Plavix     Essential Hypertension-bp low  -Monitor BP-midodrine added     COPD-Chronic, stable  -Continue home inhalers, Theodur    Severe anemia  Stool for occult blood  Transfuse 1 unit  GI consulted    VTE Prophylaxis:  Pharmacologic VTE prophylaxis orders are present.    Disposition Planning:     Barriers to  Discharge:anemia/pna  Anticipated Date of Discharge: 2/3  Place of Discharge: [facility]      Time: 34 minutes     Code Status and Medical Interventions: CPR (Attempt to Resuscitate); Full Support   Ordered at: 01/29/25 2224     Code Status (Patient has no pulse and is not breathing):    CPR (Attempt to Resuscitate)     Medical Interventions (Patient has pulse or is breathing):    Full Support       Signature: Electronically signed by Blair Ponce MD, 02/02/25, 09:13 EST.  [facility] Hospitalist Team

## 2025-02-02 NOTE — CONSULTS
Patient Name: Kali Garcia Account #: [id number]    MRN: [id number] Admission Date: 1/29/2025      Consulting Service: Vascular Surgery Date of Evaluation: February 2, 2025    Requesting Provider: Bernardo Ponce MD    CHIEF COMPLAINT: Superior mesenteric artery stenosis  HPI: Kali Garcia is a 79 y.o. male is being seen for a consultation and evaluation/management of what appears to be incidental finding of superior mesenteric artery stenosis which is severely calcified segment does not orificial and is really in the mid SMA.  I believe it likely is flow-limiting at that level but he has a widely patent celiac artery with what I think is good collateralization.  He had a CTA done several days before the noncontrasted study and it confirms patency through the SMA.  The stenosis is really at the bifurcation of the SMA into its second and third branch points.  Intervention here would be fraught with risk as stenting is not likely an option.  He certainly is not a candidate for bypass.  He has some less than 50% carotid stenosis and some intracranial atherosclerosis as well and studies were reviewed independently.  At this point in time however his primary complaint is pulmonary and this is where the attention needs to be focused.  He has no symptoms of pain with eating no fear of food and no loss of appetite.  He is actually complaining of difficulty in defecating which is not related.  He is malnourished and emaciated but not from abdominal pain or fear of food.    PAST MEDICAL HISTORY:   Past Medical History:   Diagnosis Date    COPD (chronic obstructive pulmonary disease)     Coronary artery disease     Emphysema lung     Hyperlipidemia     Hypertension     Osteoarthritis       PAST SURGICAL HISTORY:   Past Surgical History:   Procedure Laterality Date    CARDIAC CATHETERIZATION      CARDIAC ELECTROPHYSIOLOGY PROCEDURE N/A 1/2/2025    Procedure: Bijan SIMS;  Surgeon: Eliot Mcgarry MD;  Location:   [facility];  Service: Cardiovascular;  Laterality: N/A;    COLONOSCOPY N/A 2022    Procedure: COLONOSCOPY with 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 and endoscopic clipping x 1;  Surgeon: Luz Jacques MD;  Location: [facility] ENDOSCOPY;  Service: Gastroenterology;  Laterality: N/A;  post op: cecal AVM    CORONARY STENT PLACEMENT      ENDOSCOPY N/A 2022    Procedure: ESOPHAGOGASTRODUODENOSCOPY WITH BIOPSY X1 AREA;  Surgeon: Luz Jacques MD;  Location: [facility] ENDOSCOPY;  Service: Gastroenterology;  Laterality: N/A;  esophagitis, gastritis, HH    ENDOSCOPY N/A 2022    Procedure: ESOPHAGOGASTRODUODENOSCOPY;  Surgeon: Luz Jacques MD;  Location: [facility] ENDOSCOPY;  Service: Gastroenterology;  Laterality: N/A;  post op: hiatal hernia, eosphagitis    EYE SURGERY      FEMORAL ARTERY STENT      KNEE SURGERY Left     KNEE SURGERY Left     LUNG SURGERY        FAMILY HISTORY: History reviewed. No pertinent family history.   SOCIAL HISTORY:   Social History     Tobacco Use    Smoking status: Former     Current packs/day: 0.00     Average packs/day: 1 pack/day for 63.0 years (63.0 ttl pk-yrs)     Types: Cigarettes     Start date: 1960     Quit date: 2023     Years since quittin.1     Passive exposure: Never   Vaping Use    Vaping status: Never Used   Substance Use Topics    Alcohol use: Not Currently    Drug use: Never      MEDICATIONS:   No current facility-administered medications on file prior to encounter.     Current Outpatient Medications on File Prior to Encounter   Medication Sig Dispense Refill    apixaban (ELIQUIS) 2.5 MG tablet tablet Take 1 tablet by mouth Every 12 (Twelve) Hours for 30 days. Indications: Atrial Fibrillation 60 tablet 0    atorvastatin (LIPITOR) 10 MG tablet Take 1 tablet by mouth Every Night.      budesonide (PULMICORT) 0.5 MG/2ML nebulizer solution Take 2 mL by nebulization 2 (Two) Times a Day for 30 days. 120 mL 0    cholecalciferol  (VITAMIN D3) 1.25 MG (99512 UT) capsule Take 1 capsule by mouth Every 7 (Seven) Days. thur      citalopram (CeleXA) 10 MG tablet Take 1 tablet by mouth Daily.      clonazePAM (KlonoPIN) 0.25 MG disintegrating tablet Place 1 tablet on the tongue 2 (Two) Times a Day.      clopidogrel (PLAVIX) 75 MG tablet Take 1 tablet by mouth Daily. Indications: Percutaneous Coronary Intervention      ipratropium-albuterol (DUO-NEB) 0.5-2.5 mg/3 ml nebulizer Take 3 mL by nebulization Every 4 (Four) Hours As Needed for Wheezing. Indications: Chronic Obstructive Lung Disease      mirtazapine (REMERON) 15 MG tablet Take 1 tablet by mouth Every Night.      multivitamin with minerals tablet tablet Take 1 tablet by mouth Daily.      Nutritional Supplements (Declan) powder Take 1 packet by mouth 2 (Two) Times a Day.      pantoprazole (PROTONIX) 40 MG EC tablet Take 1 tablet by mouth Daily.      polyethylene glycol (MIRALAX) 17 g packet Take 17 g by mouth Daily. Indications: Constipation      theophylline (THEODUR) 300 MG 12 hr tablet Take 1 tablet by mouth Daily for 30 days. 30 tablet 0    traZODone (DESYREL) 100 MG tablet Take 1 tablet by mouth Daily As Needed for Sleep.      naloxone (Narcan) 4 MG/0.1ML nasal spray Administer 1 spray into the nostril(s) as directed by provider As Needed for Opioid Reversal. Call 911. Don't prime. [place] in 1 nostril for overdose. Repeat in 2-3 minutes in other nostril if no or minimal breathing/responsiveness.               ALLERGIES: Codeine   COMPLETE REVIEW OF SYSTEMS:     ENT ROS: negative  Cardiovascular ROS: no chest pain or dyspnea on exertion  Respiratory ROS: Positive shortness of air  Gastrointestinal ROS: positive for - constipation  Neurological ROS: no TIA or stroke symptoms  Genito-Urinary ROS: no dysuria, trouble voiding, or hematuria  Musculoskeletal ROS: negative  Dermatological ROS: negative  Psychological ROS: negative      PHYSICAL EXAM:   Patient Vitals for the past 24 hrs:   BP Temp  Temp src Pulse Resp SpO2   02/02/25 1253 120/49 98 °F (36.7 °C) Oral 53 16 95 %   02/02/25 0829 96/59 97.5 °F (36.4 °C) Oral 53 18 99 %   02/02/25 0626 -- -- -- 64 16 92 %   02/02/25 0621 -- -- -- 63 16 91 %   02/02/25 0619 -- -- -- 64 20 (!) 88 %   02/02/25 0616 -- -- -- 64 16 90 %   02/02/25 0556 -- -- -- 65 -- 90 %   02/02/25 0525 107/54 -- -- 51 -- (!) 88 %   02/02/25 0500 -- -- -- 66 -- --   02/02/25 0425 -- -- -- 61 -- 90 %   02/02/25 0419 -- -- -- 59 -- 96 %   02/02/25 0402 (!) 89/44 97.9 °F (36.6 °C) Oral 52 14 95 %   02/02/25 0400 -- -- -- 53 -- 96 %   02/02/25 0329 -- -- -- 54 16 93 %   02/02/25 0320 -- -- -- 57 -- 94 %   02/02/25 0200 -- -- -- 50 -- 98 %   02/01/25 2356 109/53 -- -- 52 -- 95 %   02/01/25 2353 109/53 98.1 °F (36.7 °C) Axillary 50 15 95 %   02/01/25 2317 -- -- -- 56 14 94 %   02/01/25 2300 -- -- -- 56 -- 96 %   02/01/25 2200 -- -- -- 53 -- 97 %   02/01/25 2120 -- -- -- 55 -- 95 %   02/01/25 2100 -- -- -- 58 -- 94 %   02/01/25 2038 -- -- -- 60 -- 90 %   02/01/25 2029 -- -- -- 101 -- 90 %   02/01/25 2025 110/59 -- -- 97 -- 90 %   02/01/25 2020 -- -- -- 94 -- 95 %   02/01/25 2014 95/42 -- -- 89 -- 92 %   02/01/25 2009 95/42 99.1 °F (37.3 °C) Oral 96 19 92 %   02/01/25 1901 -- -- -- 59 17 95 %   02/01/25 1858 -- -- -- 62 19 94 %   02/01/25 1857 -- -- -- 60 19 92 %   02/01/25 1854 -- -- -- 64 19 92 %   02/01/25 1600 114/45 99 °F (37.2 °C) Oral 61 17 90 %        General appearance: oriented to person, place, and time, ill-appearing, chronically ill appearing, and in fairly severe respiratory distress at rest.  Neurological exam reveals alert, oriented, normal speech, no focal findings or movement disorder noted.  ENT exam reveals - ENT exam normal, no neck nodes or sinus tenderness.  CVS exam: normal rate, regular rhythm, normal S1, S2, no murmurs, rubs, clicks or gallops.  Chest: clear to auscultation, no wheezes, rales or rhonchi, symmetric air entry.  Abdominal exam: soft, nontender,  nondistended, no masses or organomegaly.  Examination of the feet reveals warm, good capillary refill.  Femoral pulse is not palpable nor pedal pulses.        LABS:      Results Review:       I reviewed the patient's new clinical results.  Results from last 7 days   Lab Units 02/02/25  1004 02/01/25  0322 01/31/25  0506 01/30/25  0331 01/29/25  1834   WBC 10*3/mm3 4.27 4.27 5.29 3.63 4.58   HEMOGLOBIN g/dL 9.6* 8.6* 6.5* 7.2* 9.0*   PLATELETS 10*3/mm3 87* 112* 119* 109* 120*     Results from last 7 days   Lab Units 02/02/25  1004 02/01/25  1011 01/31/25  0835 01/30/25  0625 01/29/25  1834   SODIUM mmol/L 139 139 142 137 138   POTASSIUM mmol/L 3.7 4.2 4.2 4.4 4.1   CHLORIDE mmol/L 102 105 107 105 102   CO2 mmol/L 29.5* 29.1* 30.3* 25.8 28.1   BUN mg/dL 17 16 23 20 16   CREATININE mg/dL 0.54* 0.53* 0.66* 0.63* 0.71*   GLUCOSE mg/dL 74 74 90 146* 121*   Estimated Creatinine Clearance: 77 mL/min (A) (by C-G formula based on SCr of 0.54 mg/dL (L)).  Results from last 7 days   Lab Units 02/02/25  1004 02/01/25  1011 01/31/25  0835 01/30/25  0625 01/29/25  1834   CALCIUM mg/dL 8.0* 7.7* 8.1* 7.6* 8.0*   ALBUMIN g/dL 2.6* 2.5*  --   --  3.0*   MAGNESIUM mg/dL 2.0  --   --   --   --      Results from last 7 days   Lab Units 01/29/25  1834   PROTIME Seconds 16.4*   INR  1.31*       The following radiologic or non-invasive studies have been reviewed by me: CT scan reviewed with images reviewed showing calcified atherosclerotic plaque does not orificial in the SMA that likely is significantly limiting flow but the patient continues to have no symptoms.  Carotid duplex also negative for significant disease with less than 50% stenosis.  Chest x-ray also reviewed.  CT of the head also reviewed and negative    Total of 4 independently ordered studies reviewed and images reviewed along with laboratory values.    Active Hospital Problems    Diagnosis  POA    **Pneumonia [J18.9]  Yes      Resolved Hospital Problems   No resolved  problems to display.         ASSESSMENT/PLAN: 79 y.o. male with what appears to be asymptomatic severe SMA disease is not artificial.  It is highly calcified.  His only GI complaints consist of difficulty in defecating.  He gets constipated and cannot get things out.  He is however emaciated and malnourished.  He denies real weight loss.  His breathing seems to be his main problem but he has multiple issues at hand and I would do not believe that an asymptomatic SMA stenosis with a widely patent celiac and apparent SEPIDEH patency warrant further workup at this time.  I would not pursue ultrasound as I do not think our results from that are reliable.  At this point in time clinical follow-up with our team will be pursued.  I do not anticipate intervention on this area during this admission or likely any time unless he becomes symptomatic.      I discussed the plan with the patient and he is agreeable to the plan of care at this point. Thank you for this consult.     Berto Guerrero MD   02/02/25

## 2025-02-03 PROBLEM — K55.1 SUPERIOR MESENTERIC ARTERY STENOSIS: Status: ACTIVE | Noted: 2025-02-03

## 2025-02-03 PROCEDURE — 97530 THERAPEUTIC ACTIVITIES: CPT

## 2025-02-03 PROCEDURE — 94799 UNLISTED PULMONARY SVC/PX: CPT

## 2025-02-03 PROCEDURE — 25010000002 FUROSEMIDE PER 20 MG: Performed by: INTERNAL MEDICINE

## 2025-02-03 PROCEDURE — 25010000002 AMPICILLIN-SULBACTAM PER 1.5 G

## 2025-02-03 PROCEDURE — 93010 ELECTROCARDIOGRAM REPORT: CPT | Performed by: INTERNAL MEDICINE

## 2025-02-03 PROCEDURE — 97110 THERAPEUTIC EXERCISES: CPT

## 2025-02-03 PROCEDURE — 97535 SELF CARE MNGMENT TRAINING: CPT

## 2025-02-03 PROCEDURE — 99232 SBSQ HOSP IP/OBS MODERATE 35: CPT | Performed by: INTERNAL MEDICINE

## 2025-02-03 PROCEDURE — 94761 N-INVAS EAR/PLS OXIMETRY MLT: CPT

## 2025-02-03 PROCEDURE — 99232 SBSQ HOSP IP/OBS MODERATE 35: CPT | Performed by: NURSE PRACTITIONER

## 2025-02-03 PROCEDURE — 94664 DEMO&/EVAL PT USE INHALER: CPT

## 2025-02-03 PROCEDURE — 92526 ORAL FUNCTION THERAPY: CPT

## 2025-02-03 PROCEDURE — 93005 ELECTROCARDIOGRAM TRACING: CPT | Performed by: INTERNAL MEDICINE

## 2025-02-03 RX ORDER — CITALOPRAM HYDROBROMIDE 20 MG/1
10 TABLET ORAL DAILY
Status: DISCONTINUED | OUTPATIENT
Start: 2025-02-03 | End: 2025-02-15 | Stop reason: HOSPADM

## 2025-02-03 RX ADMIN — FUROSEMIDE 20 MG: 10 INJECTION, SOLUTION INTRAMUSCULAR; INTRAVENOUS at 05:34

## 2025-02-03 RX ADMIN — AMIODARONE HYDROCHLORIDE 200 MG: 200 TABLET ORAL at 08:53

## 2025-02-03 RX ADMIN — CLOPIDOGREL BISULFATE 75 MG: 75 TABLET ORAL at 08:53

## 2025-02-03 RX ADMIN — IPRATROPIUM BROMIDE AND ALBUTEROL SULFATE 3 ML: .5; 3 SOLUTION RESPIRATORY (INHALATION) at 15:10

## 2025-02-03 RX ADMIN — ACETAMINOPHEN 650 MG: 325 TABLET, FILM COATED ORAL at 21:15

## 2025-02-03 RX ADMIN — Medication 10 ML: at 21:15

## 2025-02-03 RX ADMIN — Medication 10 ML: at 08:52

## 2025-02-03 RX ADMIN — FUROSEMIDE 20 MG: 10 INJECTION, SOLUTION INTRAMUSCULAR; INTRAVENOUS at 17:08

## 2025-02-03 RX ADMIN — BUDESONIDE 0.5 MG: 0.5 INHALANT RESPIRATORY (INHALATION) at 07:08

## 2025-02-03 RX ADMIN — BUDESONIDE 0.5 MG: 0.5 INHALANT RESPIRATORY (INHALATION) at 19:49

## 2025-02-03 RX ADMIN — IPRATROPIUM BROMIDE AND ALBUTEROL SULFATE 3 ML: .5; 3 SOLUTION RESPIRATORY (INHALATION) at 00:35

## 2025-02-03 RX ADMIN — MIDODRINE HYDROCHLORIDE 10 MG: 5 TABLET ORAL at 08:52

## 2025-02-03 RX ADMIN — ARFORMOTEROL TARTRATE 15 MCG: 15 SOLUTION RESPIRATORY (INHALATION) at 19:45

## 2025-02-03 RX ADMIN — ARFORMOTEROL TARTRATE 15 MCG: 15 SOLUTION RESPIRATORY (INHALATION) at 07:11

## 2025-02-03 RX ADMIN — CLONAZEPAM 0.25 MG: 0.5 TABLET ORAL at 08:53

## 2025-02-03 RX ADMIN — MIRTAZAPINE 15 MG: 15 TABLET, FILM COATED ORAL at 21:15

## 2025-02-03 RX ADMIN — IPRATROPIUM BROMIDE AND ALBUTEROL SULFATE 3 ML: .5; 3 SOLUTION RESPIRATORY (INHALATION) at 11:37

## 2025-02-03 RX ADMIN — APIXABAN 2.5 MG: 2.5 TABLET, FILM COATED ORAL at 21:15

## 2025-02-03 RX ADMIN — MIDODRINE HYDROCHLORIDE 10 MG: 5 TABLET ORAL at 17:09

## 2025-02-03 RX ADMIN — TAMSULOSIN HYDROCHLORIDE 0.4 MG: 0.4 CAPSULE ORAL at 08:53

## 2025-02-03 RX ADMIN — AMPICILLIN SODIUM AND SULBACTAM SODIUM 3 G: 2; 1 INJECTION, POWDER, FOR SOLUTION INTRAMUSCULAR; INTRAVENOUS at 17:08

## 2025-02-03 RX ADMIN — ATORVASTATIN CALCIUM 10 MG: 10 TABLET ORAL at 21:15

## 2025-02-03 RX ADMIN — AMPICILLIN SODIUM AND SULBACTAM SODIUM 3 G: 2; 1 INJECTION, POWDER, FOR SOLUTION INTRAMUSCULAR; INTRAVENOUS at 12:29

## 2025-02-03 RX ADMIN — APIXABAN 2.5 MG: 2.5 TABLET, FILM COATED ORAL at 08:53

## 2025-02-03 RX ADMIN — CITALOPRAM 10 MG: 20 TABLET, FILM COATED ORAL at 14:06

## 2025-02-03 RX ADMIN — AMPICILLIN SODIUM AND SULBACTAM SODIUM 3 G: 2; 1 INJECTION, POWDER, FOR SOLUTION INTRAMUSCULAR; INTRAVENOUS at 00:31

## 2025-02-03 RX ADMIN — AMPICILLIN SODIUM AND SULBACTAM SODIUM 3 G: 2; 1 INJECTION, POWDER, FOR SOLUTION INTRAMUSCULAR; INTRAVENOUS at 05:34

## 2025-02-03 RX ADMIN — Medication 1 TABLET: at 08:52

## 2025-02-03 RX ADMIN — CLONAZEPAM 0.25 MG: 0.5 TABLET ORAL at 21:15

## 2025-02-03 RX ADMIN — PANTOPRAZOLE SODIUM 40 MG: 40 TABLET, DELAYED RELEASE ORAL at 08:52

## 2025-02-03 RX ADMIN — MIDODRINE HYDROCHLORIDE 10 MG: 5 TABLET ORAL at 00:32

## 2025-02-03 NOTE — CASE MANAGEMENT/SOCIAL WORK
Continued Stay Note  [facility]     Patient Name: Kali Garcia  MRN: [id number]  Today's Date: 2/3/2025    Admit Date: 1/29/2025    Plan: Return to [facility]. No PASRR needed. Precert required. Followed by [place] [facility] facility.   Discharge Plan       Row Name 02/03/25 1306       Plan    Plan Comments DC Barriers: Airvo, IV abt, failed voiding trial             Angeles Shane RN     [facility]  Office: [phone]  Cell: [phone]  Fax # [phone]

## 2025-02-03 NOTE — PROGRESS NOTES
"  [facility] DAILY PROGRESS NOTE    Patient Identification  Name: Kali Garcia  Age: 79 y.o.  Sex: male  :  1945  MRN: [id number]    Date: 2/3/2025             Subjective:  Interval History: Yap in place    Objective:    Scheduled Meds:amiodarone, 200 mg, Oral, Q24H  ampicillin-sulbactam, 3 g, Intravenous, Q6H  apixaban, 2.5 mg, Oral, Q12H  arformoterol, 15 mcg, Nebulization, BID - RT  atorvastatin, 10 mg, Oral, Nightly  budesonide, 0.5 mg, Nebulization, BID - RT  [Held by provider] citalopram, 10 mg, Oral, Daily  clonazePAM, 0.25 mg, Oral, BID  clopidogrel, 75 mg, Oral, Daily  furosemide, 20 mg, Intravenous, Q12H  midodrine, 10 mg, Oral, Q8H  mirtazapine, 15 mg, Oral, Nightly  multivitamin with minerals, 1 tablet, Oral, Daily  pantoprazole, 40 mg, Oral, Daily  sodium chloride, 500 mL, Intravenous, Once  sodium chloride, 10 mL, Intravenous, Q12H  tamsulosin, 0.4 mg, Oral, Daily      Continuous Infusions:   PRN Meds:  acetaminophen **OR** acetaminophen **OR** acetaminophen    senna-docusate sodium **AND** polyethylene glycol **AND** bisacodyl **AND** bisacodyl    ipratropium-albuterol    melatonin    nitroglycerin    ondansetron    [COMPLETED] Insert Peripheral IV **AND** sodium chloride    sodium chloride    sodium chloride    Vital signs in last 24 hours:  Temp:  [97.4 °F (36.3 °C)-98.7 °F (37.1 °C)] 98.7 °F (37.1 °C)  Heart Rate:  [50-59] 53  Resp:  [12-18] 15  BP: ()/(38-55) 108/42    Intake/Output:    Intake/Output Summary (Last 24 hours) at 2/3/2025 0945  Last data filed at 2/3/2025 0500  Gross per 24 hour   Intake 1722 ml   Output 1425 ml   Net 297 ml       Exam:  /42   Pulse 53   Temp 98.7 °F (37.1 °C) (Axillary)   Resp 15   Ht 180.3 cm (71\")   Wt 49.9 kg (110 lb)   SpO2 94%   BMI 15.34 kg/m²     General Appearance:    Alert, cooperative, NAD   Lungs:     Respirations unlabored, no audible wheezing    Heart:    No cyanosis   Abdomen:     Soft, ND    :    No suprapubic " distention, Yap clear            Data Review:  All labs (24hrs):   Recent Results (from the past 24 hours)   Calcium, Ionized    Collection Time: 02/02/25 10:04 AM    Specimen: Arm, Right; Blood   Result Value Ref Range    Ionized Calcium 1.10 (L) 1.15 - 1.30 mmol/L   Comprehensive Metabolic Panel    Collection Time: 02/02/25 10:04 AM    Specimen: Arm, Right; Blood   Result Value Ref Range    Glucose 74 65 - 99 mg/dL    BUN 17 8 - 23 mg/dL    Creatinine 0.54 (L) 0.76 - 1.27 mg/dL    Sodium 139 136 - 145 mmol/L    Potassium 3.7 3.5 - 5.2 mmol/L    Chloride 102 98 - 107 mmol/L    CO2 29.5 (H) 22.0 - 29.0 mmol/L    Calcium 8.0 (L) 8.6 - 10.5 mg/dL    Total Protein 5.2 (L) 6.0 - 8.5 g/dL    Albumin 2.6 (L) 3.5 - 5.2 g/dL    ALT (SGPT) 24 1 - 41 U/L    AST (SGOT) 28 1 - 40 U/L    Alkaline Phosphatase 60 39 - 117 U/L    Total Bilirubin 0.7 0.0 - 1.2 mg/dL    Globulin 2.6 gm/dL    A/G Ratio 1.0 g/dL    BUN/Creatinine Ratio 31.5 (H) 7.0 - 25.0    Anion Gap 7.5 5.0 - 15.0 mmol/L    eGFR 100.7 >60.0 mL/min/1.73   CBC Auto Differential    Collection Time: 02/02/25 10:04 AM    Specimen: Arm, Right; Blood   Result Value Ref Range    WBC 4.27 3.40 - 10.80 10*3/mm3    RBC 3.25 (L) 4.14 - 5.80 10*6/mm3    Hemoglobin 9.6 (L) 13.0 - 17.7 g/dL    Hematocrit 30.4 (L) 37.5 - 51.0 %    MCV 93.5 79.0 - 97.0 fL    MCH 29.5 26.6 - 33.0 pg    MCHC 31.6 31.5 - 35.7 g/dL    RDW 17.5 (H) 12.3 - 15.4 %    RDW-SD 60.3 (H) 37.0 - 54.0 fl    MPV 10.9 6.0 - 12.0 fL    Platelets 87 (L) 140 - 450 10*3/mm3   Magnesium    Collection Time: 02/02/25 10:04 AM    Specimen: Arm, Right; Blood   Result Value Ref Range    Magnesium 2.0 1.6 - 2.4 mg/dL   Scan Slide    Collection Time: 02/02/25 10:04 AM    Specimen: Arm, Right; Blood   Result Value Ref Range    Scan Slide     Manual Differential    Collection Time: 02/02/25 10:04 AM    Specimen: Arm, Right; Blood   Result Value Ref Range    Neutrophil % 75.0 42.7 - 76.0 %    Lymphocyte % 5.0 (L) 19.6 - 45.3 %     Monocyte % 1.0 (L) 5.0 - 12.0 %    Eosinophil % 1.0 0.3 - 6.2 %    Bands %  17.0 (H) 0.0 - 5.0 %    Metamyelocyte % 1.0 (H) 0.0 - 0.0 %    Neutrophils Absolute 3.93 1.70 - 7.00 10*3/mm3    Lymphocytes Absolute 0.21 (L) 0.70 - 3.10 10*3/mm3    Monocytes Absolute 0.04 (L) 0.10 - 0.90 10*3/mm3    Eosinophils Absolute 0.04 0.00 - 0.40 10*3/mm3    RBC Morphology Normal Normal    Toxic Granulation Mod/2+ None Seen    Platelet Estimate Decreased Normal   BNP    Collection Time: 02/02/25 10:04 AM    Specimen: Arm, Right; Blood   Result Value Ref Range    proBNP 1,011.0 0.0 - 1,800.0 pg/mL   POC Glucose Once    Collection Time: 02/02/25 12:41 PM    Specimen: Blood   Result Value Ref Range    Glucose 90 70 - 105 mg/dL   ECG 12 Lead Rhythm Change    Collection Time: 02/02/25 12:43 PM   Result Value Ref Range    QT Interval 684 ms    QTC Interval 641 ms   POC Glucose Once    Collection Time: 02/02/25  4:14 PM    Specimen: Blood   Result Value Ref Range    Glucose 81 70 - 105 mg/dL   Blood Gas, Arterial -    Collection Time: 02/02/25  4:31 PM    Specimen: Arterial Blood   Result Value Ref Range    Site Right Radial     Christian's Test Positive     pH, Arterial 7.394 7.350 - 7.450 pH units    pCO2, Arterial 42.9 35.0 - 48.0 mm Hg    pO2, Arterial 54.8 (L) 83.0 - 108.0 mm Hg    HCO3, Arterial 26.2 21.0 - 28.0 mmol/L    Base Excess, Arterial 1.1 0.0 - 3.0 mmol/L    O2 Saturation, Arterial 87.8 (L) 94.0 - 98.0 %    Barometric Pressure for Blood Gas      Modality AquinOx     FIO2 91 %    Hemodilution No     PO2/FIO2 60 0 - 500   POC Creatinine    Collection Time: 02/02/25  4:31 PM    Specimen: Arterial Blood   Result Value Ref Range    Creatinine 0.76 0.60 - 1.30 mg/dL    eGFR 90.9 >60.0 mL/min/1.73   POCT Electrolytes +HGB +HCT    Collection Time: 02/02/25  4:31 PM    Specimen: Arterial Blood   Result Value Ref Range    Sodium 139 138 - 146 mmol/L    POC Potassium 3.5 3.5 - 4.5 mmol/L    Ionized Calcium 1.18 1.15 - 1.33 mmol/L     Glucose 83 74 - 100 mg/dL    Hematocrit 27 (L) 38 - 51 %    Hemoglobin 9.3 (L) 12.0 - 17.0 g/dL   POC Lactate    Collection Time: 02/02/25  4:31 PM    Specimen: Arterial Blood   Result Value Ref Range    Lactate 0.7 0.2 - 2.0 mmol/L   POC Glucose Once    Collection Time: 02/02/25  4:31 PM    Specimen: Arterial Blood   Result Value Ref Range    Glucose 83 74 - 100 mg/dL   ECG 12 Lead QT Measurement    Collection Time: 02/03/25  5:50 AM   Result Value Ref Range    QT Interval 513 ms    QTC Interval 481 ms      Imaging Results (Last 24 Hours)       Procedure Component Value Units Date/Time    XR Chest 1 View [[id number]] Collected: 02/02/25 1635     Updated: 02/02/25 1638    Narrative:      XR CHEST 1 VW    Date of Exam: 2/2/2025 4:18 PM EST    Indication: Dyspnea    Comparison: CT chest without contrast 2/1/2025    Findings:  Patient is rotated to the left. There is persistent diffuse bilateral airspace disease consistent with multifocal pneumonia with worsening at the right lung base. Moderate lateral pleural effusions. Negative for pneumothorax. Advanced emphysema.      Impression:      Impression:  1. Persistent diffuse basilar airspace disease consistent with multifocal pneumonia with worsening at the right lung base.  2. Moderate bilateral pleural effusions.  3. Advanced emphysema.          Electronically Signed: Ryder Scherer MD    2/2/2025 4:36 PM EST    Workstation ID: [id number]             Assessment:    Pneumonia      Retention    Plan:    Failed void trial, continue Flomax  Void trial again once patient more active or out of ICU    Sandro Crum MD  [facility]  [facility], [address]  [place] [address]  Office: [phone]  Available via Epic Secure Chat  02/03/25  09:45 EST

## 2025-02-03 NOTE — THERAPY TREATMENT NOTE
"Subjective: Pt agreeable to therapeutic plan of care.    Objective:     Precautions - orthostatic hypotension    Bed mobility - Max-A with HANNAH rolling and attempted transition to long sitting (unable to tolerate due to hypotension); pt with large bowel incontinence requiring DEP assist for hygiene.   Transfers - N/A or Not attempted.; hypotension;  Ambulation -  N/A or Not attempted.    Therapeutic Exercise - 10 Reps B LE AAROM (heels supported for decreased shearing) lying supine    Vitals: Hypotensive  Supine: /49 mmHg,   Long sitting BP 95/35 mmHg,   Long sitting x3 min BP 83/37 mmHg;  Supine with HOB 30 deg BP 92/48 mmHg;   SaO2 91-93% throughout with 45L airvo. Pulse 54-58 bpm throughout.     Pain: 3 VAS   Location: heels  Intervention for pain: Repositioned, RN notified, and Therapeutic Presence, HANNAH waffle cushions applied.     Education: Provided education on the importance of mobility in the acute care setting, Verbal/Tactile Cues, ADL training, Energy conservation strategies, and HEP    Assessment: Kali Garcia presents with functional mobility impairments which indicate the need for skilled intervention. Session limited by pt's significant hypotension. Pt educated on alerting staff in instance of BM for skin integrity.  Will continue to follow and progress as tolerated.     Plan/Recommendations:   If medically appropriate, Moderate Intensity Therapy recommended post-acute care. This is recommended as therapy feels the patient would require 3-4 days per week and wouldn't tolerate \"3 hour daily\" rehab intensity. SNF would be the preferred choice. If the patient does not agree to SNF, arrange HH or OP depending on home bound status. If patient is medically complex, consider LTACH. Pt requires no DME at discharge.     Pt desires Skilled Rehab placement at discharge. Pt cooperative; agreeable to therapeutic recommendations and plan of care.       Post-Tx Position: Supine with HOB Elevated, Alarms " activated, and Call light and personal items within reach; waffle boots donned HANNAH  PPE: gloves, surgical mask, and gown    Therapy Charges for Today       Code Description Service Date Service Provider Modifiers Qty    66052580193 HC PT THERAPEUTIC ACT EA 15 MIN 2/3/2025 Haylee Rai GP 1    57519369569 HC PT THER PROC EA 15 MIN 2/3/2025 Haylee Rai GP 1    80563890367 HC PT SELF CARE/MGMT/TRAIN EA 15 MIN 2/3/2025 Haylee Rai GP 1           PT Charges       Row Name 02/03/25 1328             Time Calculation    Start Time 1136  -CANDY      Stop Time 1210  -CANDY      Time Calculation (min) 34 min  -CANDY      PT Received On 02/03/25  -STEFANYV      PT - Next Appointment 02/04/25  -CANDY         Time Calculation- PT    Total Timed Code Minutes- PT 34 minute(s)  -CANDY                User Key  (r) = Recorded By, (t) = Taken By, (c) = Cosigned By      Initials Name Provider Type    Haylee Mitchell Physical Therapist

## 2025-02-03 NOTE — PLAN OF CARE
"Vitals: Hypotensive  Supine: /49 mmHg,   Long sitting BP 95/35 mmHg,   Long sitting x3 min BP 83/37 mmHg;  Supine with HOB 30 deg BP 92/48 mmHg;   SaO2 91-93% throughout with 45L airvo. Pulse 54-58 bpm throughout.     Assessment: Kali Garcia presents with functional mobility impairments which indicate the need for skilled intervention. Session limited by pt's significant hypotension. Pt educated on alerting staff in instance of BM for skin integrity.  Will continue to follow and progress as tolerated.     Plan/Recommendations:   If medically appropriate, Moderate Intensity Therapy recommended post-acute care. This is recommended as therapy feels the patient would require 3-4 days per week and wouldn't tolerate \"3 hour daily\" rehab intensity. SNF would be the preferred choice. If the patient does not agree to SNF, arrange HH or OP depending on home bound status. If patient is medically complex, consider LTACH. Pt requires no DME at discharge.     Pt desires Skilled Rehab placement at discharge. Pt cooperative; agreeable to therapeutic recommendations and plan of care.                                             "

## 2025-02-03 NOTE — PROGRESS NOTES
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 Patient was also noted to have patchy airspace disease and clustered micronodules in the lingula.     He then underwent PET scan 10/10/2024 showing a 1.7 x 1.1 cm partially calcified irregular shaped nodule in the right upper lobe to be metabolically active with SUV of 4.84 and additional 2 metabolically active irregular nodular densities in the right upper lobe. Inferior apical segment with intervening areas of metabolically active interstitial thickening.     PFTs: FEV1/FVC postbronchodilator is 0.39 with FEV1 of 0.71 L or 28% predicted and FVC of 1.82 L or 47% predicted.   Total lung capacity of 7.77 L or 131% predicted and residual volume of 5.82 L or 226% predicted and DLCO of 61% predicted noted.      Recommendations:    Oxygen titration currently on Airvo high flow 60 L    Antibiotics on IV Unasyn  Received 1 dose of IV vancomycin and cefepime    Lasix 20 mg IV every 12 started on 2/2/2025    Bronchodilator Pulmicort and DuoNeb  On amiodarone  Plavix  Midodrine 5 mg 3 times daily  Protonix 40 mg daily  Eliquis 2.5 mg twice daily    Chest x-ray 2/2/2025      CT chest 2/1/2025             LOS: 5 days     Subjective         Objective     Vital signs for last 24 hours:  Vitals:    02/03/25 0708 02/03/25 0710 02/03/25 0711 02/03/25  0714   BP:    (!) 81/38   BP Location:    Right arm   Patient Position:    Lying   Pulse: 52 54 52 51   Resp: 15 15 15 15   Temp:    98.7 °F (37.1 °C)   TempSrc:    Axillary   SpO2: 94% 94% 94% 94%   Weight:       Height:           Intake/Output last 3 shifts:  I/O last 3 completed shifts:  In: 1922 [P.O.:540; I.V.:1182; IV Piggyback:200]  Out: 1825 [Urine:1825]  Intake/Output this shift:  No intake/output data recorded.      Radiology  Imaging Results (Last 24 Hours)       Procedure Component Value Units Date/Time    XR Chest 1 View [[id number]] Collected: 02/02/25 1635     Updated: 02/02/25 1638    Narrative:      XR CHEST 1 VW    Date of Exam: 2/2/2025 4:18 PM EST    Indication: Dyspnea    Comparison: CT chest without contrast 2/1/2025    Findings:  Patient is rotated to the left. There is persistent diffuse bilateral airspace disease consistent with multifocal pneumonia with worsening at the right lung base. Moderate lateral pleural effusions. Negative for pneumothorax. Advanced emphysema.      Impression:      Impression:  1. Persistent diffuse basilar airspace disease consistent with multifocal pneumonia with worsening at the right lung base.  2. Moderate bilateral pleural effusions.  3. Advanced emphysema.          Electronically Signed: Ryder Scherer MD    2/2/2025 4:36 PM EST    Workstation ID: [id number]            Labs:  Results from last 7 days   Lab Units 02/02/25  1631 02/02/25  1004   WBC 10*3/mm3  --  4.27   HEMOGLOBIN g/dL  --  9.6*   HEMOGLOBIN, POC g/dL 9.3*  --    HEMATOCRIT %  --  30.4*   HEMATOCRIT POC % 27*  --    PLATELETS 10*3/mm3  --  87*     Results from last 7 days   Lab Units 02/02/25  1631 02/02/25  1004   SODIUM mmol/L  --  139   POTASSIUM mmol/L  --  3.7   CHLORIDE mmol/L  --  102   CO2 mmol/L  --  29.5*   BUN mg/dL  --  17   CREATININE mg/dL 0.76 0.54*   CALCIUM mg/dL  --  8.0*   BILIRUBIN mg/dL  --  0.7   ALK PHOS U/L  --  60   ALT (SGPT) U/L  --  24   AST (SGOT) U/L  --  28   GLUCOSE  mg/dL  --  74     Results from last 7 days   Lab Units 02/02/25  1631   PH, ARTERIAL pH units 7.394   PO2 ART mm Hg 54.8*   PCO2, ARTERIAL mm Hg 42.9   HCO3 ART mmol/L 26.2     Results from last 7 days   Lab Units 02/02/25  1004 02/01/25  1011 01/29/25  1834   ALBUMIN g/dL 2.6* 2.5* 3.0*     Results from last 7 days   Lab Units 01/29/25  2018 01/29/25  1834   HSTROP T ng/L 42* 43*         Results from last 7 days   Lab Units 02/02/25  1004   MAGNESIUM mg/dL 2.0     Results from last 7 days   Lab Units 01/29/25  1834   INR  1.31*   APTT seconds 29.6               Meds:   SCHEDULE  amiodarone, 200 mg, Oral, Q24H  ampicillin-sulbactam, 3 g, Intravenous, Q6H  apixaban, 2.5 mg, Oral, Q12H  arformoterol, 15 mcg, Nebulization, BID - RT  atorvastatin, 10 mg, Oral, Nightly  budesonide, 0.5 mg, Nebulization, BID - RT  [Held by provider] citalopram, 10 mg, Oral, Daily  clonazePAM, 0.25 mg, Oral, BID  clopidogrel, 75 mg, Oral, Daily  furosemide, 20 mg, Intravenous, Q12H  midodrine, 10 mg, Oral, Q8H  mirtazapine, 15 mg, Oral, Nightly  multivitamin with minerals, 1 tablet, Oral, Daily  pantoprazole, 40 mg, Oral, Daily  sodium chloride, 500 mL, Intravenous, Once  sodium chloride, 10 mL, Intravenous, Q12H  tamsulosin, 0.4 mg, Oral, Daily      Infusions     PRNs    acetaminophen **OR** acetaminophen **OR** acetaminophen    senna-docusate sodium **AND** polyethylene glycol **AND** bisacodyl **AND** bisacodyl    ipratropium-albuterol    melatonin    nitroglycerin    ondansetron    [COMPLETED] Insert Peripheral IV **AND** sodium chloride    sodium chloride    sodium chloride    Physical Exam:  Physical Exam  Cardiovascular:      Heart sounds: Murmur heard.      No gallop.   Pulmonary:      Effort: No respiratory distress.      Breath sounds: No stridor. Rhonchi and rales present. No wheezing.   Chest:      Chest wall: No tenderness.         ROS  Review of Systems   Respiratory:  Positive for cough and shortness of breath. Negative for  wheezing and stridor.    Cardiovascular:  Positive for palpitations and leg swelling. Negative for chest pain.             Total time spent with patient greater than: 45 Minutes

## 2025-02-03 NOTE — PROGRESS NOTES
[facility] MEDICINE SERVICE  DAILY PROGRESS NOTE    NAME: Kali Garcia  : 1945  MRN: [id number]      LOS: 5 days     PROVIDER OF SERVICE: Jarad Lock MD    Chief Complaint: Pneumonia    Subjective:     Interval History:  History taken from: patient      History of Present Illness: Kali Garcia is a 79 y.o. male with a previous medical history of atrial fibrillation, CAD, HLD and COPD who presented to [facility] on 2025 after being found hypoxic by staff at the rehab facility.  The patient is a poor historian and other than complaining of being tired, could not provide any HPI. HPI was taken from the chart.  Per ED report the staff found him after receiving a DuoNeb shaking and having trouble breathing With an O2 saturation of 70%.  On arrival he was on a nonrebreather but was weaned down to an Airvo while in the ED. He is on O2 at 2L at baseline.     On chart review, he was discharged on 25 after treatment for pneumonia and a COPD exacerbation.       In the ED, ABG showed pH 7.48, CO2 40, pO2 45.4. Troponin's 43,42. Hemoglobin 9.0. Otherwise, labs are unremarkable.  Chest xray shows residual bibasilar infiltrates, mildly progressed from previous xray on 25.  He is afebrile, all vitals are stable with the exception of his O2 saturation which is 98% on Airvo.       seen in bed NAD pt c/o dyspnea on 4lts, severe anemia, hgl 6.5  Will   transfuse 1 unit prbc  / seen in bed NAD KALEB RN, dyspnea on High flow oxygen, 15 lts   2/2 seen in bed NAD, vss, KALEB RN, patient developed bradycardia, consulted cardiology  2/3 Mostly concerned about his diet.  Largely asymptomatic despite severe oxygen requirements.    Review of Systems  12 point ROS reviewed and negative except as mentioned above  Objective:     Vital Signs  Temp:  [97.4 °F (36.3 °C)-98.7 °F (37.1 °C)] 98.7 °F (37.1 °C)  Heart Rate:  [50-59] 53  Resp:  [12-18] 15  BP: ()/(38-55) 108/42  Flow (L/min)  (Oxygen Therapy):  [30-45] 45   Body mass index is 15.34 kg/m².    Physical Exam  Vitals and nursing note reviewed.   Constitutional:       Appearance: Normal appearance.      Comments: Airvo   HENT:      Mouth/Throat:      Mouth: Mucous membranes are moist.   Cardiovascular:      Rate and Rhythm: Normal rate and regular rhythm.   Pulmonary:      Effort: Pulmonary effort is normal.      Breath sounds: Examination of the right-lower field reveals decreased breath sounds. Examination of the left-lower field reveals decreased breath sounds. Decreased breath sounds present.   Abdominal:      General: Bowel sounds are normal.      Palpations: Abdomen is soft.   Musculoskeletal:         General: Normal range of motion.   Skin:     General: Skin is warm and dry.   Neurological:      General: No focal deficit present.      Mental Status: He is oriented to person, place, and time. Mental status is at baseline.     Comments: Poor insight into his condition.  Psychiatric:         Mood and Affect: Mood normal.         Behavior: Behavior normal.            Diagnostic Data    Results from last 7 days   Lab Units 02/02/25  1631 02/02/25  1004   WBC 10*3/mm3  --  4.27   HEMOGLOBIN g/dL  --  9.6*   HEMOGLOBIN, POC g/dL 9.3*  --    HEMATOCRIT %  --  30.4*   HEMATOCRIT POC % 27*  --    PLATELETS 10*3/mm3  --  87*   GLUCOSE mg/dL  --  74   CREATININE mg/dL 0.76 0.54*   BUN mg/dL  --  17   SODIUM mmol/L  --  139   POTASSIUM mmol/L  --  3.7   AST (SGOT) U/L  --  28   ALT (SGPT) U/L  --  24   ALK PHOS U/L  --  60   BILIRUBIN mg/dL  --  0.7   ANION GAP mmol/L  --  7.5       XR Chest 1 View    Result Date: 2/2/2025  Impression: 1. Persistent diffuse basilar airspace disease consistent with multifocal pneumonia with worsening at the right lung base. 2. Moderate bilateral pleural effusions. 3. Advanced emphysema. Electronically Signed: Ryder Scherer MD  2/2/2025 4:36 PM EST  Workstation ID: [id number]    CT Chest Without Contrast  Diagnostic    Result Date: 2/1/2025  Impression: 1. Dense bilateral lower lobe consolidation compatible with pneumonia. 2. Moderate bilateral pleural effusions. 3. Severe emphysema with areas of chronic scarring in the right upper lobe and left upper lobe. 4. Extensive coronary artery calcifications. 5. 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 Electronically Signed: Ryder Scherer MD  2/1/2025 4:15 PM EST  Workstation ID: [id number]       I reviewed the patient's new clinical results.  Scheduled Meds:amiodarone, 200 mg, Oral, Q24H  ampicillin-sulbactam, 3 g, Intravenous, Q6H  apixaban, 2.5 mg, Oral, Q12H  arformoterol, 15 mcg, Nebulization, BID - RT  atorvastatin, 10 mg, Oral, Nightly  budesonide, 0.5 mg, Nebulization, BID - RT  [Held by provider] citalopram, 10 mg, Oral, Daily  clonazePAM, 0.25 mg, Oral, BID  clopidogrel, 75 mg, Oral, Daily  furosemide, 20 mg, Intravenous, Q12H  midodrine, 10 mg, Oral, Q8H  mirtazapine, 15 mg, Oral, Nightly  multivitamin with minerals, 1 tablet, Oral, Daily  pantoprazole, 40 mg, Oral, Daily  sodium chloride, 500 mL, Intravenous, Once  sodium chloride, 10 mL, Intravenous, Q12H  tamsulosin, 0.4 mg, Oral, Daily      Continuous Infusions:   PRN Meds:.  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 versus 30 L yesterday.  -Continued on IV Unasyn.  No culture growth.     Acute urinary retention  -Failed voiding trial 2/3/2025  -Repeat trial when more stable    Chronic atrial fibrillation  Chronic CAD with HLD  Severe SMA stenosis, asymptomatic  -Continue amiodarone, Eliquis, atorvastatin, Plavix  -Outpatient follow-up regarding asymptomatic SMA stenosis.  No inpatient procedure at this time.     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    Disposition Planning:     Barriers to Discharge: Pneumonia management, severe oxygen requirements, acute urinary retention  Anticipated Date of Discharge: 2/6  Place of Discharge: [facility]      Time: 34 minutes     Code Status and Medical Interventions: CPR (Attempt to Resuscitate); Full Support   Ordered at: 01/29/25 2224     Code Status (Patient has no pulse and is not breathing):    CPR (Attempt to Resuscitate)     Medical Interventions (Patient has pulse or is breathing):    Full Support       Signature: Electronically signed by Jarad Lock MD, 02/03/25, 11:39 EST.  [facility] Hospitalist Team

## 2025-02-03 NOTE — PROGRESS NOTES
Nutrition Services  Patient Name: Kali Garcia  YOB: 1945  MRN: [id number]  Admission date: 1/29/2025    PROGRESS NOTE      Nutrition Intervention Updates: Diet per SLP    Continue supplement and encourage good PO intakes        Encounter Information: Check on for PO intakes.  SLP following.  Vascular surgery consulted 2/2 - no plans for intervention during admission.  Patient followed by hospice at home.         PO Diet: Diet: Regular/House; Fluid Consistency: Thin (IDDSI 0)   PO Supplements: Boost Original BID   PO Intake:  25-50%       Current nutrition support:    Nutrition support review:        Labs (reviewed below): Reviewed, management per attending        GI Function:  Last documented BM 2/2 (yesterday)       Brief weight review   Wt Readings from Last 10 Encounters:   01/29/25 1824 49.9 kg (110 lb)   01/23/25 0524 49 kg (108 lb 0.4 oz)   01/22/25 0500 48.6 kg (107 lb 2.3 oz)   01/21/25 0539 48.8 kg (107 lb 9.4 oz)   01/20/25 0510 48.3 kg (106 lb 7.7 oz)   01/19/25 0500 48.3 kg (106 lb 7.7 oz)   01/18/25 2332 48.3 kg (106 lb 7.7 oz)   01/18/25 1625 51 kg (112 lb 7 oz)   01/03/25 0500 51 kg (112 lb 7 oz)   01/02/25 0557 51.8 kg (114 lb 3.2 oz)   01/01/25 0500 51.2 kg (112 lb 14 oz)   12/31/24 0501 51.1 kg (112 lb 10.5 oz)   12/30/24 0600 52.8 kg (116 lb 6.5 oz)   12/29/24 0514 53 kg (116 lb 13.5 oz)   12/28/24 0538 51.4 kg (113 lb 5.1 oz)   12/27/24 0647 50.4 kg (111 lb 1.8 oz)   12/26/24 0610 49.9 kg (110 lb 0.2 oz)   12/25/24 0600 49.6 kg (109 lb 5.6 oz)   12/23/24 0531 50.1 kg (110 lb 7.2 oz)   12/22/24 2139 49.9 kg (110 lb 0.2 oz)   03/04/24 1452 53 kg (116 lb 12.1 oz)   02/19/24 1303 50.5 kg (111 lb 5 oz)   02/16/24 2101 50 kg (110 lb 2.3 oz)   02/16/24 2054 51.8 kg (114 lb 2.8 oz)   02/15/24 1416 52.9 kg (116 lb 10.3 oz)   02/04/24 0315 51.5 kg (113 lb 8.6 oz)   02/03/24 1415 51.3 kg (113 lb)   02/03/24 0345 51.5 kg (113 lb 8.6 oz)   02/02/24 1524 51.8 kg (114 lb 3.2 oz)   02/02/24  0337 54.4 kg (120 lb)   02/01/24 1330 52.1 kg (114 lb 14.5 oz)   08/04/22 1124 51.1 kg (112 lb 10.5 oz)   07/26/22 1511 59 kg (130 lb)   04/15/22 0436 58.3 kg (128 lb 8.5 oz)   04/13/22 0355 54.6 kg (120 lb 5.9 oz)   04/12/22 2037 128 kg (282 lb 3 oz)        Results from last 7 days   Lab Units 02/02/25  1631 02/02/25  1004 02/01/25  1011 01/31/25  0835 01/30/25  0625 01/29/25  1834   SODIUM mmol/L  --  139 139 142   < > 138   POTASSIUM mmol/L  --  3.7 4.2 4.2   < > 4.1   CHLORIDE mmol/L  --  102 105 107   < > 102   CO2 mmol/L  --  29.5* 29.1* 30.3*   < > 28.1   BUN mg/dL  --  17 16 23   < > 16   CREATININE mg/dL 0.76 0.54* 0.53* 0.66*   < > 0.71*   CALCIUM mg/dL  --  8.0* 7.7* 8.1*   < > 8.0*   BILIRUBIN mg/dL  --  0.7 0.6  --   --  0.4   ALK PHOS U/L  --  60 55  --   --  62   ALT (SGPT) U/L  --  24 25  --   --  23   AST (SGOT) U/L  --  28 26  --   --  29   GLUCOSE mg/dL  --  74 74 90   < > 121*    < > = values in this interval not displayed.     Results from last 7 days   Lab Units 02/02/25  1631 02/02/25  1004   MAGNESIUM mg/dL  --  2.0   HEMOGLOBIN g/dL  --  9.6*   HEMOGLOBIN, POC g/dL 9.3*  --    HEMATOCRIT %  --  30.4*   HEMATOCRIT POC % 27*  --        RD to follow up per protocol.    Electronically signed by:  Kathrine Maharaj RD  02/03/25 13:28 EST

## 2025-02-03 NOTE — CONSULTS
Palliative Care Social Work Progress Note    Code Status:full code    Goals of Care: Full Treatment    Narrative: Palliative care  notified by SID Reynoso patient is already being followed by [facility] for admission after rehab. No palliative needs.     Plan: [facility] hospice following          Arelis Marchal

## 2025-02-03 NOTE — CONSULTS
Cardiology [place]  Cardiology consult note  Berto Barroso MD, PhD      Subjective:     Encounter Date:02/2/2025      Patient ID: Kali Garcia is a 79 y.o. male.    Chief Complaint: shortness of breath, hypoxia  Cardiology consult: bradycardia    Referring Physician: Dr. Ponce  Seen and examined nursing staff at bedside as well, agree with narrative as discussed with nurse practitioner after face-to-face encounter scribed findings below greater than 50% of total encounter time in medical decision making performed by me    HPI:  Kali Garcia is a 79 y.o. male who presents with shortness of breath, hypoxia. Mr. Garcia has previously seen Dr. Barroso for cardiology while hospitalized. He has pmh severe COPD emphysema, acute respiratory failure requiring 12 L of oxygen, history of hypertension hyperlipidemia peripheral vascular disease along with coronary disease, rhinovirus infection COPD on home Trilogy, underlying atrial fibrillation, last echo 2024 with EF of 60% with mild pulmonary hypertension. He has sick sinus syndrome, symptomatic sinus pauses of greater than 6 seconds and underwent implantation of Micra leadless pacemaker on 1/2/2025.  During that admission he had atrial fibrillation and atrial flutter. He converted to SR with amiodarone. He was discharged on amiodarone 200mg BID and Eliquis 2.5mg BID.  He continues on amiodarone once daily, QT is stable on telemetry, he is not able to give much history with acuity of condition, he is very thin and cachectic BMI is only 15    Nursing at bedside was concerned over paroxysmal atrial fibrillation, reviewed telemetry extensively with interpretation demonstrating sinus rhythm rate of 50s with sick sinus syndrome ultimately breakthrough with ventricular pacing and wide-complex rhythm with Micra leadless pacemaker in place, I did not find any evidence of atrial fibrillation or malignant atrial or ventricular arrhythmia although he does have evidence of  "sick sinus syndrome which is known    patient hypotensive started on midodrine blood pressures 100 systolic    He has had several hospitalizations for pneumonia and hypoxic respiratory failure.  He is in this admission with pneumonia, hypoxic respiratory failure.  No evidence of ACS, no ischemic ECG changes    At the time of nurse encounter, he was noted to be \"bradycardic\" on the telemetry. He does have his PPM in place. He is also hypotensive and was started on midodrine. On my encounter he is intermittently V pacing.  ===============================================  Overnight events discussed with staff  Sinus rhythm with intermittent ventricular pacing  Chest pain-free      Review of systems unable to be obtained reliably secondary to acuity of condition  Historical data copied forward from previous encounters in EMR is unchanged    Past Medical History:   Diagnosis Date    COPD (chronic obstructive pulmonary disease)     Coronary artery disease     Emphysema lung     Hyperlipidemia     Hypertension     Osteoarthritis        Past Surgical History:   Procedure Laterality Date    CARDIAC CATHETERIZATION      CARDIAC ELECTROPHYSIOLOGY PROCEDURE N/A 1/2/2025    Procedure: Bijan SIMS;  Surgeon: Eliot Mcgarry MD;  Location: [facility] CATH INVASIVE LOCATION;  Service: Cardiovascular;  Laterality: N/A;    COLONOSCOPY N/A 8/4/2022    Procedure: COLONOSCOPY with 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 and endoscopic clipping x 1;  Surgeon: Luz Jacques MD;  Location: [facility] ENDOSCOPY;  Service: Gastroenterology;  Laterality: N/A;  post op: cecal AVM    CORONARY STENT PLACEMENT      ENDOSCOPY N/A 4/14/2022    Procedure: ESOPHAGOGASTRODUODENOSCOPY WITH BIOPSY X1 AREA;  Surgeon: Luz Jacques MD;  Location: [facility] ENDOSCOPY;  Service: Gastroenterology;  Laterality: N/A;  esophagitis, gastritis, HH    ENDOSCOPY N/A 8/4/2022    Procedure: ESOPHAGOGASTRODUODENOSCOPY;  Surgeon: Luz Jacques MD;  Location: " [facility] ENDOSCOPY;  Service: Gastroenterology;  Laterality: N/A;  post op: hiatal hernia, eosphagitis    EYE SURGERY      FEMORAL ARTERY STENT      KNEE SURGERY Left     KNEE SURGERY Left     LUNG SURGERY         History reviewed. No pertinent family history.    Social History     Socioeconomic History    Marital status:    Tobacco Use    Smoking status: Former     Current packs/day: 0.00     Average packs/day: 1 pack/day for 63.0 years (63.0 ttl pk-yrs)     Types: Cigarettes     Start date: 1960     Quit date: 2023     Years since quittin.1     Passive exposure: Never   Vaping Use    Vaping status: Never Used   Substance and Sexual Activity    Alcohol use: Not Currently    Drug use: Never    Sexual activity: Defer         Allergies   Allergen Reactions    Codeine GI Intolerance       Current Medications:   Scheduled Meds:amiodarone, 200 mg, Oral, Q24H  ampicillin-sulbactam, 3 g, Intravenous, Q6H  apixaban, 2.5 mg, Oral, Q12H  arformoterol, 15 mcg, Nebulization, BID - RT  atorvastatin, 10 mg, Oral, Nightly  budesonide, 0.5 mg, Nebulization, BID - RT  citalopram, 10 mg, Oral, Daily  clonazePAM, 0.25 mg, Oral, BID  clopidogrel, 75 mg, Oral, Daily  furosemide, 20 mg, Intravenous, Q12H  midodrine, 10 mg, Oral, Q8H  mirtazapine, 15 mg, Oral, Nightly  multivitamin with minerals, 1 tablet, Oral, Daily  pantoprazole, 40 mg, Oral, Daily  sodium chloride, 500 mL, Intravenous, Once  sodium chloride, 10 mL, Intravenous, Q12H  tamsulosin, 0.4 mg, Oral, Daily      Continuous Infusions:     Review of Systems   Constitutional: Negative for chills, diaphoresis and malaise/fatigue.   Cardiovascular:  Negative for chest pain, dyspnea on exertion, irregular heartbeat, leg swelling, near-syncope, orthopnea, palpitations, paroxysmal nocturnal dyspnea and syncope.   Respiratory:  Negative for cough, shortness of breath, sleep disturbances due to breathing and sputum production.    Gastrointestinal:  Negative for change  "in bowel habit.   Genitourinary:  Negative for urgency.   Neurological:  Negative for dizziness and headaches.   Psychiatric/Behavioral:  Negative for altered mental status.             Objective:         /42   Pulse 53   Temp 98.6 °F (37 °C) (Oral)   Resp 16   Ht 180.3 cm (71\")   Wt 49.9 kg (110 lb)   SpO2 93%   BMI 15.34 kg/m²     Physical Exam:  General Appearance:  No acute distress, appears acutely and chronically ill, cachectic                                Head: Atraumatic, normocephalic, PERRLA, thin cerebral muscle wasting               Neck:   supple, no JVD   Lungs:     High flow, dim bilat bases, delayed expiratory phase no gross rhonchi    Heart:    Regular rhythm and normal rate, normal S1 and S2   Abdomen:     Normal bowel sounds, no masses, no organomegaly, soft  nontender, nondistended, no guarding, no rebound  tenderness   Extremities:   Moves all extremities well, no edema, no cyanosis, no  redness   Pulses:   Pulses palpable and equal bilaterally   Skin:   No bleeding, bruising or rash   Neurologic:   Awake, alert, oriented x 2, moves extremities no focal deficits but has some confusion and lethargy     Unchanged from prior encounter            ASCVD Risk Score::  The ASCVD Risk score (Fabián DK, et al., 2019) failed to calculate for the following reasons:    The 2019 ASCVD risk score is only valid for ages 40 to 79      Lab Review:     Results from last 7 days   Lab Units 02/02/25  1631 02/02/25  1004 02/01/25  1011   SODIUM mmol/L  --  139 139   POTASSIUM mmol/L  --  3.7 4.2   CHLORIDE mmol/L  --  102 105   CO2 mmol/L  --  29.5* 29.1*   BUN mg/dL  --  17 16   CREATININE mg/dL 0.76 0.54* 0.53*   GLUCOSE mg/dL  --  74 74   CALCIUM mg/dL  --  8.0* 7.7*   AST (SGOT) U/L  --  28 26   ALT (SGPT) U/L  --  24 25     Results from last 7 days   Lab Units 01/29/25 2018 01/29/25  1834   HSTROP T ng/L 42* 43*     Results from last 7 days   Lab Units 02/02/25  1631 02/02/25  1004 " "02/01/25  0322   WBC 10*3/mm3  --  4.27 4.27   HEMOGLOBIN g/dL  --  9.6* 8.6*   HEMOGLOBIN, POC g/dL 9.3*  --   --    HEMATOCRIT %  --  30.4* 26.9*   HEMATOCRIT POC % 27*  --   --    PLATELETS 10*3/mm3  --  87* 112*     Results from last 7 days   Lab Units 01/29/25  1834   INR  1.31*   APTT seconds 29.6     Results from last 7 days   Lab Units 02/02/25  1004   MAGNESIUM mg/dL 2.0           Invalid input(s): \"LDLCALC\"  Results from last 7 days   Lab Units 02/02/25  1004 01/29/25  1834   PROBNP pg/mL 1,011.0 637.0           Recent Radiology:  Imaging Results (Most Recent)       Procedure Component Value Units Date/Time    XR Chest 1 View [[id number]] Collected: 02/02/25 1635     Updated: 02/02/25 1638    Narrative:      XR CHEST 1 VW    Date of Exam: 2/2/2025 4:18 PM EST    Indication: Dyspnea    Comparison: CT chest without contrast 2/1/2025    Findings:  Patient is rotated to the left. There is persistent diffuse bilateral airspace disease consistent with multifocal pneumonia with worsening at the right lung base. Moderate lateral pleural effusions. Negative for pneumothorax. Advanced emphysema.      Impression:      Impression:  1. Persistent diffuse basilar airspace disease consistent with multifocal pneumonia with worsening at the right lung base.  2. Moderate bilateral pleural effusions.  3. Advanced emphysema.          Electronically Signed: Ryder Scherer MD    2/2/2025 4:36 PM EST    Workstation ID: [id number]    CT Chest Without Contrast Diagnostic [[id number]] Collected: 02/01/25 1608     Updated: 02/01/25 1617    Narrative:      CT CHEST WO CONTRAST DIAGNOSTIC    Date of Exam: 2/1/2025 3:32 PM EST    Indication: Hypoxia.    Comparison: PET/CT 10/10/2024, CT chest with contrast 2/1/2024    Technique: Axial CT images were obtained of the chest without contrast administration.  Sagittal and coronal reconstructions were performed.  Automated exposure control and iterative reconstruction methods were " used.    Findings:  Noncontrast visualized soft tissues of the lower neck are without acute abnormality. Heart size normal. Extensive coronary calcification. There is a leadless pacemaker noted in the right ventricle. Trace pericardial effusion. There are several enlarged   mediastinal lymph nodes for example low paratracheal node measuring 13 mm in short axis (2/54). These appear similar to the prior study and could relate to chronic reactive adenopathy. These nodes were not hypermetabolic on the prior PET/CT per report    Severe emphysema. Moderate bilateral pleural effusions. Lower lobe dependent airspace disease concerning for bilateral lower lobe pneumonia. No pneumothorax. Within the anterior aspect of the right upper lobe near the apex there is an area of suspected   chronic scarring with calcification which measures 2.3 x 1.1 cm (2/28). More posteriorly in the right upper lobe there is a small area of chronic nodular scarring which measures 10 mm (2/31). Area of chronic scarring at the anterior left upper lobe   unchanged (2/50).    Upper abdomen demonstrates hyperdense hepatic parenchyma which can be seen with prior amiodarone use or iron deposition among other etiologies. The spleen and adrenal glands are unremarkable. Small nonobstructing left nephrolithiasis. Extensive   atherosclerotic calcifications of the abdominal aorta and visualized branch vasculature. No free fluid in the upper abdomen. Suspect severe stenosis of the SMA due to calcified plaque, unchanged (2/121). Remote left rib fractures. Reduced mineral density   measured at L1 consistent with osteopenia/osteoporosis. Healed remote fracture at the mid sternum. The thoracic vertebral bodies are maintained in height.      Impression:      Impression:  1. Dense bilateral lower lobe consolidation compatible with pneumonia.  2. Moderate bilateral pleural effusions.  3. Severe emphysema with areas of chronic scarring in the right upper lobe and left  upper lobe.  4. Extensive coronary artery calcifications.  5. 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          Electronically Signed: Ryder Scherer MD    2/1/2025 4:15 PM EST    Workstation ID: [id number]    XR Chest 1 View [[id number]] Collected: 01/29/25 1908     Updated: 01/29/25 1912    Narrative:      XR CHEST 1 VW    Date of Exam: 1/29/2025 6:40 PM EST    Indication: soa    Comparison: 1/18/2025 at 1739 hours, 12/22/2024    Findings:  Residual infiltrates are noted throughout the mid to lower lungs bilaterally. There is a small left pleural effusion. Diffuse interstitial changes are noted. These findings are stable to mildly progressed. The findings are also superimposed on underlying   chronic changes throughout the lungs. The cardiac silhouette and mediastinum are stable.      Impression:      Impression:  Residual infiltrates are noted throughout the mid to lower lungs bilaterally. There is a small left pleural effusion. Diffuse interstitial changes are noted. These findings are stable to mildly progressed. The findings may indicate changes of CHF and   pulmonary edema.        Electronically Signed: Jonny Espinal MD    1/29/2025 7:10 PM EST    Workstation ID: [id number]              ECHOCARDIOGRAM:    Results for orders placed during the hospital encounter of 02/01/24    Adult Transthoracic Echo Limited W/ Cont if Necessary Per Protocol    Interpretation Summary    Left ventricular systolic function is normal. Left ventricular ejection fraction appears to be 61 - 65%.    Left ventricular diastolic function was normal.    The left atrial cavity is mildly dilated.    Left atrial volume is mildly increased.    There is moderate calcification of the aortic valve mainly affecting the left coronary and right coronary cusp(s).    Estimated right ventricular systolic pressure from tricuspid regurgitation is mildly elevated  (35-45 mmHg).    Mild pulmonary hypertension is present.            Assessment:         Active Hospital Problems    Diagnosis  POA    **Pneumonia [J18.9]  Yes    Superior mesenteric artery stenosis [K55.1]  Yes     Acute on chronic hypoxic respiratory failure / pneumonia    2. Known SSS s/p micra leadless PPM placed 1/2025    3. Concern for bradycardia  Interrogate device    4. pAFib/flutter  Converted to SR last admission on amiodarone 200mg BID  Will de escalate to 200mg daily  On eliquis 2.5mg daily    5. Normal LVEF / mild pulm HTN    6. H/o CAD currently stable     Plan:   No evidence of atrial or ventricular arrhythmia  Still with significant oxygen requirement  Sick sinus syndrome present with intermittent ventricular pacing, normal functioning pacemaker and device  No arrhythmias overnight on telemetry tracing reviewed and interpreted by me  Continue amiodarone 200 daily    Eliquis 2.5 twice daily, can transition to Lovenox if any procedures are planned alternatively heparin drip  Underlying severe respiratory insufficiency hypoxia and failure now hypotensive requiring midodrine with potentially active pneumonia and active infection  No indication for ischemic evaluation, no evidence of any ACS or ischemic ECG changes, no evidence of any chest pain previously    Patient is followed by hospice by outside institution    CV supportive care at this time, multiorgan dysfunction, respiratory failure, arrhythmias stable, pacemaker is normal functioning  Okay to decrease blood thinner / transition to heparin if anticipated procedures as stated  Patient charlene full code  Goals of care, patient will need assessment of enteral nutrition  Treatment of infection pneumonia per pulmonary and primary    No further cardiac testing indicated at this time  Continue current pharmacotherapy  Please call with any questions or concerns    Further recommendations to follow findings and clinical course    Berto Barroso MD,  PhD Berto Barroso MD  02/02/25  12:43 EST

## 2025-02-03 NOTE — THERAPY TREATMENT NOTE
Acute Care - Speech Language Pathology   Swallow Treatment Note [facility]     Patient Name: Kali Garcia  : 1945  MRN: [id number]  Today's Date: 2/3/2025               Admit Date: 2025    Visit Dx:     ICD-10-CM ICD-9-CM   1. Acute respiratory distress  R06.03 518.82   2. Pneumonia of both lungs due to infectious organism, unspecified part of lung  J18.9 483.8     Patient Active Problem List   Diagnosis    Severe malnutrition    Abnormal CT scan, esophagus    Pneumonia due to COVID-19 virus    Pneumonia    Rhinovirus    Anemia    Acute on chronic respiratory failure with hypoxia    Coronary artery disease    Hypertension    COPD (chronic obstructive pulmonary disease)    Pneumonia, unspecified organism    Sick sinus syndrome    Unresponsive episode    HCAP (healthcare-associated pneumonia)    Acute UTI (urinary tract infection)    Syncope and collapse    Superior mesenteric artery stenosis     Past Medical History:   Diagnosis Date    COPD (chronic obstructive pulmonary disease)     Coronary artery disease     Emphysema lung     Hyperlipidemia     Hypertension     Osteoarthritis      Past Surgical History:   Procedure Laterality Date    CARDIAC CATHETERIZATION      CARDIAC ELECTROPHYSIOLOGY PROCEDURE N/A 2025    Procedure: Bijan SIMS;  Surgeon: Eliot Mcgarry MD;  Location: [facility] CATH INVASIVE LOCATION;  Service: Cardiovascular;  Laterality: N/A;    COLONOSCOPY N/A 2022    Procedure: COLONOSCOPY with 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 and endoscopic clipping x 1;  Surgeon: Luz Jacques MD;  Location: [facility] ENDOSCOPY;  Service: Gastroenterology;  Laterality: N/A;  post op: cecal AVM    CORONARY STENT PLACEMENT      ENDOSCOPY N/A 2022    Procedure: ESOPHAGOGASTRODUODENOSCOPY WITH BIOPSY X1 AREA;  Surgeon: Luz Jacques MD;  Location: [facility] ENDOSCOPY;  Service: Gastroenterology;  Laterality: N/A;  esophagitis, gastritis, HH    ENDOSCOPY N/A 2022     "Procedure: ESOPHAGOGASTRODUODENOSCOPY;  Surgeon: Luz Jacques MD;  Location: [facility] ENDOSCOPY;  Service: Gastroenterology;  Laterality: N/A;  post op: hiatal hernia, eosphagitis    EYE SURGERY      FEMORAL ARTERY STENT      KNEE SURGERY Left     KNEE SURGERY Left     LUNG SURGERY         SLP Recommendation and Plan   Per statement below, recommend pt to be made NPO d/t high oxygen demands until O2 is able to be titrated down. ST will continue to follow while in-house and provide further recs as indicated.     Per research, (Teri, 2019), \"5/10 pt presented with silent penetration/aspiration on VFSS with O2 flow rate ranges from 35-50 liters. Of note, 6/10 pt cognitively appropriate.\" (Rustam, 2015), determines \"A high flow nasal cannula flow rate of greater than 40L/min was associated with decreased swallow function in HEALTHY subjects.“     EDUCATION  The patient has been educated in the following areas:   Dysphagia (Swallowing Impairment) Oral Care/Hydration Modified Diet Instruction.      SLP GOALS       Row Name 02/03/25 1325       (STG) Swallow 1    (STG) Swallow 1 The patient will participate in a full meal assessment to determine safety and adequacy of recommended diet, independent use of safe swallow compensations, and additional goals/recommendations to follow.  -PF    [place] (Swallow Short Term Goal 1) with minimal cues (75-90% accuracy)  -PF    Time Frame (Swallow Short Term Goal 1) 1 week  -PF    Progress/Outcomes (Swallow Short Term Goal 1) goal ongoing  -PF    Comment (Swallow Short Term Goal 1) Pt was seen for skilled DT/meal assessment to ensure tolerance of PO diet. Pt's diet was advanced from clear liquids to regular/thins this date per hospitalist. Pt is on 45L of Airvo. Per statement below, clinician d/w nsg regarding pt's increased O2 demands and diet upgrade, and rec'd to hold PO: Per research, (Teri, 2019), \"5/10 pt presented with silent penetration/aspiration on VFSS with O2 flow " "rate ranges from 35-50 liters. Of note, 6/10 pt cognitively appropriate.\" (Rustam, 2015), determines \"A high flow nasal cannula flow rate of greater than 40L/min was associated with decreased swallow function in HEALTHY subjects.“ However, pt had already received lunch tray and was consuming lunch when clinician arrived at his room. Pt has many teeth missing and does not wear dentures. Tray consisted of meatloaf, mac n' cheese, mashed potatoes, green beans, boost, and milk. Rotary munching on mech soft solids but functional w/ min lingual residual. Pt was encouraged to alternate liquids/solids and utilize a liquid wash to achieve oral clearance. Pharyngeal phase was marked by clear vocal quality w/ no clinical s/s of aspiration observed. Per statement above, recommend pt to be made NPO d/t high oxygen demands until O2 is able to be titrated down. ST will continue to follow while in-house and provide further recs as indicated. -PF              User Key  (r) = Recorded By, (t) = Taken By, (c) = Cosigned By      Initials Name Provider Type    PF Fakto, [place], SLP Speech and Language Pathologist               ADRIANNE Koenig  2/3/2025  "

## 2025-02-03 NOTE — PLAN OF CARE
Goal Outcome Evaluation:              Outcome Evaluation: Patient on Airvo 45L 90% FiO2 to maintain oxygen saturation above 88%. Currently on clear liquid diet. Yap catheter for retention with Urology consult. Low dose lasix started with moderate output. 10mg Midodrine every 8 hours for new hypotension.

## 2025-02-03 NOTE — PROGRESS NOTES
[facility] Vascular Surgery Progress Note    Name: Kali Garcia ADMIT: 2025   : 1945  PCP: Tami Ward APRN    MRN: [id number] LOS: 5 days   AGE/SEX: 79 y.o. male  ROOM: [facility]/[address] [place] [address]    CC: SMA stenosis    Subjective     Patient resting in bed.  Denies any postprandial pain or food fear.  Has a liquid diet on his tray right now, he is asking for regular food.    Objective     Scheduled Medications:   amiodarone, 200 mg, Oral, Q24H  ampicillin-sulbactam, 3 g, Intravenous, Q6H  apixaban, 2.5 mg, Oral, Q12H  arformoterol, 15 mcg, Nebulization, BID - RT  atorvastatin, 10 mg, Oral, Nightly  budesonide, 0.5 mg, Nebulization, BID - RT  [Held by provider] citalopram, 10 mg, Oral, Daily  clonazePAM, 0.25 mg, Oral, BID  clopidogrel, 75 mg, Oral, Daily  furosemide, 20 mg, Intravenous, Q12H  midodrine, 10 mg, Oral, Q8H  mirtazapine, 15 mg, Oral, Nightly  multivitamin with minerals, 1 tablet, Oral, Daily  pantoprazole, 40 mg, Oral, Daily  sodium chloride, 500 mL, Intravenous, Once  sodium chloride, 10 mL, Intravenous, Q12H  tamsulosin, 0.4 mg, Oral, Daily        Active Infusions:       As Needed Medications:    acetaminophen **OR** acetaminophen **OR** acetaminophen    senna-docusate sodium **AND** polyethylene glycol **AND** bisacodyl **AND** bisacodyl    ipratropium-albuterol    melatonin    nitroglycerin    ondansetron    [COMPLETED] Insert Peripheral IV **AND** sodium chloride    sodium chloride    sodium chloride    Vital Signs  Vitals:    25 0852   BP: 108/42   Pulse: 53   Resp:    Temp:    SpO2:       Body mass index is 15.34 kg/m².     Physical Exam:  NAD, alert and oriented  RRR  Lungs clear  Abd soft, benign  Vascular: Nonpalpable pedal pulses  Skin: Warm to touch and dry, appropriate capillary refill    Results Review:     CBC    Results from last 7 days   Lab Units 25  1631 25  1004 25  0322 25  0506 25  0331 25  1834   WBC  "10*3/mm3  --  4.27 4.27 5.29 3.63 4.58   HEMOGLOBIN g/dL  --  9.6* 8.6* 6.5* 7.2* 9.0*   HEMOGLOBIN, POC g/dL 9.3*  --   --   --   --   --    PLATELETS 10*3/mm3  --  87* 112* 119* 109* 120*     BMP   Results from last 7 days   Lab Units 02/02/25  1631 02/02/25  1004 02/01/25  1011 01/31/25  0835 01/30/25  0625 01/29/25  1834   SODIUM mmol/L  --  139 139 142 137 138   POTASSIUM mmol/L  --  3.7 4.2 4.2 4.4 4.1   CHLORIDE mmol/L  --  102 105 107 105 102   CO2 mmol/L  --  29.5* 29.1* 30.3* 25.8 28.1   BUN mg/dL  --  17 16 23 20 16   CREATININE mg/dL 0.76 0.54* 0.53* 0.66* 0.63* 0.71*   GLUCOSE mg/dL  --  74 74 90 146* 121*   MAGNESIUM mg/dL  --  2.0  --   --   --   --      Coag   Results from last 7 days   Lab Units 01/29/25  1834   INR  1.31*   APTT seconds 29.6     HbA1C No results found for: \"HGBA1C\"  Infection     Radiology(recent) XR Chest 1 View    Result Date: 2/2/2025  Impression: 1. Persistent diffuse basilar airspace disease consistent with multifocal pneumonia with worsening at the right lung base. 2. Moderate bilateral pleural effusions. 3. Advanced emphysema. Electronically Signed: Ryder Scherer MD  2/2/2025 4:36 PM EST  Workstation ID: [id number]    CT Chest Without Contrast Diagnostic    Result Date: 2/1/2025  Impression: 1. Dense bilateral lower lobe consolidation compatible with pneumonia. 2. Moderate bilateral pleural effusions. 3. Severe emphysema with areas of chronic scarring in the right upper lobe and left upper lobe. 4. Extensive coronary artery calcifications. 5. 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 Electronically Signed: Ryder Scherer MD  2/1/2025 4:15 PM EST  Workstation ID: [id number]     VTE Prophylaxis:  Pharmacologic VTE prophylaxis orders are present.         Problems:    Active Hospital Problems:  Active Hospital Problems    Diagnosis  POA    **Pneumonia [J18.9]  Yes      Resolved Hospital " Problems   No resolved problems to display.        Assessment & Plan   Assessment / Plan     Pneumonia      79-year-old male presents with hypoxia found to have pneumonia  CT scan of the chest shows incidental finding of SMA stenosis  Patient without signs of mesenteric ischemia such as postprandial pain or food fear  No plans for vascular intervention  Plan for outpatient follow-up in 3 months  Discharge instructions in epic  We will sign off        FADIA Shukla  [facility] Vascular Surgery  02/03/25   O: [phone]  F: [phone]

## 2025-02-04 ENCOUNTER — TELEPHONE (OUTPATIENT)
Dept: CARDIOLOGY | Facility: CLINIC | Age: 80
End: 2025-02-04
Payer: MEDICARE

## 2025-02-04 PROCEDURE — 25010000002 FUROSEMIDE PER 20 MG: Performed by: INTERNAL MEDICINE

## 2025-02-04 PROCEDURE — 94664 DEMO&/EVAL PT USE INHALER: CPT

## 2025-02-04 PROCEDURE — 94799 UNLISTED PULMONARY SVC/PX: CPT

## 2025-02-04 PROCEDURE — 94761 N-INVAS EAR/PLS OXIMETRY MLT: CPT

## 2025-02-04 PROCEDURE — 25010000002 AMPICILLIN-SULBACTAM PER 1.5 G

## 2025-02-04 PROCEDURE — 99232 SBSQ HOSP IP/OBS MODERATE 35: CPT | Performed by: INTERNAL MEDICINE

## 2025-02-04 RX ADMIN — ARFORMOTEROL TARTRATE 15 MCG: 15 SOLUTION RESPIRATORY (INHALATION) at 18:50

## 2025-02-04 RX ADMIN — CLOPIDOGREL BISULFATE 75 MG: 75 TABLET ORAL at 09:44

## 2025-02-04 RX ADMIN — MIDODRINE HYDROCHLORIDE 10 MG: 5 TABLET ORAL at 09:44

## 2025-02-04 RX ADMIN — BUDESONIDE 0.5 MG: 0.5 INHALANT RESPIRATORY (INHALATION) at 07:36

## 2025-02-04 RX ADMIN — AMIODARONE HYDROCHLORIDE 200 MG: 200 TABLET ORAL at 09:44

## 2025-02-04 RX ADMIN — APIXABAN 2.5 MG: 2.5 TABLET, FILM COATED ORAL at 22:04

## 2025-02-04 RX ADMIN — ACETAMINOPHEN 650 MG: 325 TABLET, FILM COATED ORAL at 22:03

## 2025-02-04 RX ADMIN — MIDODRINE HYDROCHLORIDE 10 MG: 5 TABLET ORAL at 00:39

## 2025-02-04 RX ADMIN — FUROSEMIDE 20 MG: 10 INJECTION, SOLUTION INTRAMUSCULAR; INTRAVENOUS at 05:52

## 2025-02-04 RX ADMIN — Medication 10 ML: at 22:05

## 2025-02-04 RX ADMIN — AMPICILLIN SODIUM AND SULBACTAM SODIUM 3 G: 2; 1 INJECTION, POWDER, FOR SOLUTION INTRAMUSCULAR; INTRAVENOUS at 00:39

## 2025-02-04 RX ADMIN — APIXABAN 2.5 MG: 2.5 TABLET, FILM COATED ORAL at 09:44

## 2025-02-04 RX ADMIN — ARFORMOTEROL TARTRATE 15 MCG: 15 SOLUTION RESPIRATORY (INHALATION) at 07:40

## 2025-02-04 RX ADMIN — IPRATROPIUM BROMIDE AND ALBUTEROL SULFATE 3 ML: .5; 3 SOLUTION RESPIRATORY (INHALATION) at 15:35

## 2025-02-04 RX ADMIN — Medication 5 MG: at 22:04

## 2025-02-04 RX ADMIN — FUROSEMIDE 20 MG: 10 INJECTION, SOLUTION INTRAMUSCULAR; INTRAVENOUS at 18:09

## 2025-02-04 RX ADMIN — PANTOPRAZOLE SODIUM 40 MG: 40 TABLET, DELAYED RELEASE ORAL at 09:44

## 2025-02-04 RX ADMIN — Medication 1 TABLET: at 09:44

## 2025-02-04 RX ADMIN — MIRTAZAPINE 15 MG: 15 TABLET, FILM COATED ORAL at 22:03

## 2025-02-04 RX ADMIN — BUDESONIDE 0.5 MG: 0.5 INHALANT RESPIRATORY (INHALATION) at 18:55

## 2025-02-04 RX ADMIN — ACETAMINOPHEN 650 MG: 325 TABLET, FILM COATED ORAL at 18:09

## 2025-02-04 RX ADMIN — ATORVASTATIN CALCIUM 10 MG: 10 TABLET ORAL at 22:03

## 2025-02-04 RX ADMIN — TAMSULOSIN HYDROCHLORIDE 0.4 MG: 0.4 CAPSULE ORAL at 09:44

## 2025-02-04 RX ADMIN — CITALOPRAM 10 MG: 20 TABLET, FILM COATED ORAL at 09:44

## 2025-02-04 RX ADMIN — MIDODRINE HYDROCHLORIDE 10 MG: 5 TABLET ORAL at 18:09

## 2025-02-04 RX ADMIN — Medication 10 ML: at 09:50

## 2025-02-04 NOTE — SIGNIFICANT NOTE
02/04/25 1557   OTHER   Discipline physical therapist   Rehab Time/Intention   Session Not Performed   (nursing reporting poor reserve, not recovering well, at risk for intubation. Decreased PT frequency to 3x/week. Significant pulmonary decline since PT eval.)   Recommendation   PT - Next Appointment 02/05/25

## 2025-02-04 NOTE — PLAN OF CARE
Patient back on max airvo oxygen, may require mechanical lventilation. Very weak. Family & patient considering hospice

## 2025-02-04 NOTE — PLAN OF CARE
"Goal Outcome Evaluation:    ST recommended yesterday for pt to be made NPO d/t high oxygen demands (45L of Airvo) per statement below.  Pt now requiring 60L of Airvo and currently receiving a regular diet w/ breakfast tray ordered and the way.  ST will sign off. Thank you.     Per research, (Teri, 2019), \"5/10 pt presented with silent penetration/aspiration on VFSS with O2 flow rate ranges from 35-50 liters. Of note, 6/10 pt cognitively appropriate.\" (Rustam, 2015), determines \"A high flow nasal cannula flow rate of greater than 40L/min was associated with decreased swallow function in HEALTHY subjects.“                              "

## 2025-02-04 NOTE — PROGRESS NOTES
"  [facility] DAILY PROGRESS NOTE    Patient Identification  Name: Kali Garcia  Age: 79 y.o.  Sex: male  :  1945  MRN: [id number]    Date: 2025             Subjective:  Interval History: Yap in place    Objective:    Scheduled Meds:amiodarone, 200 mg, Oral, Q24H  apixaban, 2.5 mg, Oral, Q12H  arformoterol, 15 mcg, Nebulization, BID - RT  atorvastatin, 10 mg, Oral, Nightly  budesonide, 0.5 mg, Nebulization, BID - RT  citalopram, 10 mg, Oral, Daily  clopidogrel, 75 mg, Oral, Daily  furosemide, 20 mg, Intravenous, Q12H  midodrine, 10 mg, Oral, Q8H  mirtazapine, 15 mg, Oral, Nightly  multivitamin with minerals, 1 tablet, Oral, Daily  pantoprazole, 40 mg, Oral, Daily  sodium chloride, 500 mL, Intravenous, Once  sodium chloride, 10 mL, Intravenous, Q12H  tamsulosin, 0.4 mg, Oral, Daily      Continuous Infusions:   PRN Meds:  acetaminophen **OR** acetaminophen **OR** acetaminophen    senna-docusate sodium **AND** polyethylene glycol **AND** bisacodyl **AND** bisacodyl    ipratropium-albuterol    melatonin    nitroglycerin    ondansetron    [COMPLETED] Insert Peripheral IV **AND** sodium chloride    sodium chloride    sodium chloride    Vital signs in last 24 hours:  Temp:  [97.9 °F (36.6 °C)-98.7 °F (37.1 °C)] 97.9 °F (36.6 °C)  Heart Rate:  [50-63] 51  Resp:  [10-20] 20  BP: ()/(39-60) 88/39    Intake/Output:    Intake/Output Summary (Last 24 hours) at 2025 1010  Last data filed at 2025 0954  Gross per 24 hour   Intake 1043 ml   Output 1525 ml   Net -482 ml       Exam:  BP (!) 88/39 (BP Location: Left arm, Patient Position: Lying)   Pulse 51   Temp 97.9 °F (36.6 °C) (Oral)   Resp 20   Ht 180.3 cm (71\")   Wt 53.8 kg (118 lb 9.7 oz)   SpO2 96%   BMI 16.54 kg/m²     General Appearance:    Alert, cooperative, NAD   Lungs:     Respirations unlabored, no audible wheezing    Heart:    No cyanosis   Abdomen:     Soft, ND    :    No suprapubic distention, Yap clear            Data " Review:  All labs (24hrs):   No results found for this or any previous visit (from the past 24 hours).     Imaging Results (Last 24 Hours)       ** No results found for the last 24 hours. **             Assessment:    Pneumonia    Superior mesenteric artery stenosis      Retention    Plan:    Failed void trial, continue Flomax  Void trial again once stronger or patient more active or out of ICU    Sandro Crum MD  [facility]  [facility] [facility], [address]  [place] [address]  Office: [phone]  Available via "[employer] Secure [employer]  02/04/25  10:10 EST

## 2025-02-04 NOTE — PROGRESS NOTES
Daily Progress Note        Pneumonia    Superior mesenteric artery stenosis      Assessment:    Hypoxic respiratory insufficiency, multifactorial, severe lung and cardiac disease and vascular disease    Left lower lobe pneumonia    severe COPD  Respiratory panel negative on 1/30/2025  On home oxygen    Urine cultures 1/18/2025 positive for Pseudomonas    CAD, PVD  Extensive coronary artery calcifications  Severe upper abdominal atherosclerotic calcifications with SMA stenosis    HTN  HLD  A-fib  Anemia  Former smoker quit 2022 after 60 pack years  Echo 2/1/2024 EF 61-65% mild pulmonary hypertension 35-45 mmHg     CT scan of the chest in June 2024 showing a noncalcified nodule in the right upper lobe around 2 x 1.1 x 1 cm. Patient was also noted to have patchy airspace disease and clustered micronodules in the lingula.     He then underwent PET scan 10/10/2024 showing a 1.7 x 1.1 cm partially calcified irregular shaped nodule in the right upper lobe to be metabolically active with SUV of 4.84 and additional 2 metabolically active irregular nodular densities in the right upper lobe. Inferior apical segment with intervening areas of metabolically active interstitial thickening.     PFTs: FEV1/FVC postbronchodilator is 0.39 with FEV1 of 0.71 L or 28% predicted and FVC of 1.82 L or 47% predicted.   Total lung capacity of 7.77 L or 131% predicted and residual volume of 5.82 L or 226% predicted and DLCO of 61% predicted noted.      Recommendations:    Oxygen titration currently on Airvo high flow 60 L    Antibiotics on IV Unasyn  Received 1 dose of IV vancomycin and cefepime    Lasix 20 mg IV every 12 started on 2/2/2025    Bronchodilator Pulmicort and DuoNeb  On amiodarone  Plavix  Midodrine 5 mg 3 times daily  Protonix 40 mg daily  Eliquis 2.5 mg twice daily    Spoke to his son on the phone, regarding advanced directives he will be here tomorrow to discuss further with his father    Chest x-ray 2/2/2025      CT chest  2/1/2025             LOS: 6 days     Subjective         Objective     Vital signs for last 24 hours:  Vitals:    02/04/25 0739 02/04/25 0740 02/04/25 0743 02/04/25 0820   BP:    (!) 88/39   BP Location:    Left arm   Patient Position:    Lying   Pulse: 53 53 52 51   Resp: 10 12 11 20   Temp:    97.9 °F (36.6 °C)   TempSrc:    Oral   SpO2: 94% 94% 95% 96%   Weight:       Height:           Intake/Output last 3 shifts:  I/O last 3 completed shifts:  In: 2525 [P.O.:1020; I.V.:1505]  Out: 2950 [Urine:2950]  Intake/Output this shift:  No intake/output data recorded.      Radiology  Imaging Results (Last 24 Hours)       ** No results found for the last 24 hours. **            Labs:  Results from last 7 days   Lab Units 02/02/25  1631 02/02/25  1004   WBC 10*3/mm3  --  4.27   HEMOGLOBIN g/dL  --  9.6*   HEMOGLOBIN, POC g/dL 9.3*  --    HEMATOCRIT %  --  30.4*   HEMATOCRIT POC % 27*  --    PLATELETS 10*3/mm3  --  87*     Results from last 7 days   Lab Units 02/02/25  1631 02/02/25  1004   SODIUM mmol/L  --  139   POTASSIUM mmol/L  --  3.7   CHLORIDE mmol/L  --  102   CO2 mmol/L  --  29.5*   BUN mg/dL  --  17   CREATININE mg/dL 0.76 0.54*   CALCIUM mg/dL  --  8.0*   BILIRUBIN mg/dL  --  0.7   ALK PHOS U/L  --  60   ALT (SGPT) U/L  --  24   AST (SGOT) U/L  --  28   GLUCOSE mg/dL  --  74     Results from last 7 days   Lab Units 02/02/25  1631   PH, ARTERIAL pH units 7.394   PO2 ART mm Hg 54.8*   PCO2, ARTERIAL mm Hg 42.9   HCO3 ART mmol/L 26.2     Results from last 7 days   Lab Units 02/02/25  1004 02/01/25  1011 01/29/25  1834   ALBUMIN g/dL 2.6* 2.5* 3.0*     Results from last 7 days   Lab Units 01/29/25 2018 01/29/25 1834   HSTROP T ng/L 42* 43*         Results from last 7 days   Lab Units 02/02/25  1004   MAGNESIUM mg/dL 2.0     Results from last 7 days   Lab Units 01/29/25  1834   INR  1.31*   APTT seconds 29.6               Meds:   SCHEDULE  amiodarone, 200 mg, Oral, Q24H  apixaban, 2.5 mg, Oral, Q12H  arformoterol, 15  mcg, Nebulization, BID - RT  atorvastatin, 10 mg, Oral, Nightly  budesonide, 0.5 mg, Nebulization, BID - RT  citalopram, 10 mg, Oral, Daily  clonazePAM, 0.25 mg, Oral, BID  clopidogrel, 75 mg, Oral, Daily  furosemide, 20 mg, Intravenous, Q12H  midodrine, 10 mg, Oral, Q8H  mirtazapine, 15 mg, Oral, Nightly  multivitamin with minerals, 1 tablet, Oral, Daily  pantoprazole, 40 mg, Oral, Daily  sodium chloride, 500 mL, Intravenous, Once  sodium chloride, 10 mL, Intravenous, Q12H  tamsulosin, 0.4 mg, Oral, Daily      Infusions     PRNs    acetaminophen **OR** acetaminophen **OR** acetaminophen    senna-docusate sodium **AND** polyethylene glycol **AND** bisacodyl **AND** bisacodyl    ipratropium-albuterol    melatonin    nitroglycerin    ondansetron    [COMPLETED] Insert Peripheral IV **AND** sodium chloride    sodium chloride    sodium chloride    Physical Exam:  Physical Exam  Cardiovascular:      Heart sounds: Murmur heard.      No gallop.   Pulmonary:      Effort: No respiratory distress.      Breath sounds: No stridor. Rhonchi and rales present. No wheezing.   Chest:      Chest wall: No tenderness.         ROS  Review of Systems   Respiratory:  Positive for cough and shortness of breath. Negative for wheezing and stridor.    Cardiovascular:  Positive for palpitations and leg swelling. Negative for chest pain.             Total time spent with patient greater than: 45 Minutes

## 2025-02-04 NOTE — PROGRESS NOTES
[facility] MEDICINE SERVICE  DAILY PROGRESS NOTE    NAME: Kali Garcia  : 1945  MRN: [id number]      LOS: 6 days     PROVIDER OF SERVICE: Jarad Lock MD    Chief Complaint: Pneumonia    Subjective:     Interval History:  History taken from: patient      History of Present Illness: Kali Garcia is a 79 y.o. male with a previous medical history of atrial fibrillation, CAD, HLD and COPD who presented to [facility] on 2025 after being found hypoxic by staff at the rehab facility.  The patient is a poor historian and other than complaining of being tired, could not provide any HPI. HPI was taken from the chart.  Per ED report the staff found him after receiving a DuoNeb shaking and having trouble breathing With an O2 saturation of 70%.  On arrival he was on a nonrebreather but was weaned down to an Airvo while in the ED. He is on O2 at 2L at baseline.     On chart review, he was discharged on 25 after treatment for pneumonia and a COPD exacerbation.       In the ED, ABG showed pH 7.48, CO2 40, pO2 45.4. Troponin's 43,42. Hemoglobin 9.0. Otherwise, labs are unremarkable.  Chest xray shows residual bibasilar infiltrates, mildly progressed from previous xray on 25.  He is afebrile, all vitals are stable with the exception of his O2 saturation which is 98% on Airvo.       seen in bed NAD pt c/o dyspnea on 4lts, severe anemia, hgl 6.5  Will   transfuse 1 unit prbc  / seen in bed NAD KALEB RN, dyspnea on High flow oxygen, 15 lts   2/2 seen in bed NAD, vss, KALEB RN, patient developed bradycardia, consulted cardiology  2/3 Mostly concerned about his diet.  Largely asymptomatic despite severe oxygen requirements.    Review of Systems  12 point ROS reviewed and negative except as mentioned above  Objective:     Vital Signs  Temp:  [97.9 °F (36.6 °C)-100.1 °F (37.8 °C)] 100.1 °F (37.8 °C)  Heart Rate:  [50-63] 59  Resp:  [10-20] 16  BP: ()/(39-60) 132/55  Flow (L/min)  (Oxygen Therapy):  [45-80] 55   Body mass index is 16.54 kg/m².    Physical Exam  Vitals and nursing note reviewed.   Constitutional:       Appearance: Normal appearance.      Comments: Airvo   HENT:      Mouth/Throat:      Mouth: Mucous membranes are moist.   Cardiovascular:      Rate and Rhythm: Normal rate and regular rhythm.   Pulmonary:      Effort: Pulmonary effort is normal.      Breath sounds: Examination of the right-lower field reveals decreased breath sounds. Examination of the left-lower field reveals decreased breath sounds. Decreased breath sounds present.   Abdominal:      General: Bowel sounds are normal.      Palpations: Abdomen is soft.   Musculoskeletal:         General: Normal range of motion.   Skin:     General: Skin is warm and dry.   Neurological:      General: No focal deficit present.      Mental Status: He is oriented to person, place, and time. Mental status is at baseline.     Comments: Poor insight into his condition.  Psychiatric:         Mood and Affect: Mood normal.         Behavior: Behavior normal.            Diagnostic Data    Results from last 7 days   Lab Units 02/02/25  1631 02/02/25  1004   WBC 10*3/mm3  --  4.27   HEMOGLOBIN g/dL  --  9.6*   HEMOGLOBIN, POC g/dL 9.3*  --    HEMATOCRIT %  --  30.4*   HEMATOCRIT POC % 27*  --    PLATELETS 10*3/mm3  --  87*   GLUCOSE mg/dL  --  74   CREATININE mg/dL 0.76 0.54*   BUN mg/dL  --  17   SODIUM mmol/L  --  139   POTASSIUM mmol/L  --  3.7   AST (SGOT) U/L  --  28   ALT (SGPT) U/L  --  24   ALK PHOS U/L  --  60   BILIRUBIN mg/dL  --  0.7   ANION GAP mmol/L  --  7.5       No radiology results for the last day      I reviewed the patient's new clinical results.  Scheduled Meds:amiodarone, 200 mg, Oral, Q24H  apixaban, 2.5 mg, Oral, Q12H  arformoterol, 15 mcg, Nebulization, BID - RT  atorvastatin, 10 mg, Oral, Nightly  budesonide, 0.5 mg, Nebulization, BID - RT  citalopram, 10 mg, Oral, Daily  clopidogrel, 75 mg, Oral, Daily  furosemide,  20 mg, Intravenous, Q12H  midodrine, 10 mg, Oral, Q8H  mirtazapine, 15 mg, Oral, Nightly  multivitamin with minerals, 1 tablet, Oral, Daily  pantoprazole, 40 mg, Oral, Daily  sodium chloride, 500 mL, Intravenous, Once  sodium chloride, 10 mL, Intravenous, Q12H  tamsulosin, 0.4 mg, Oral, Daily      Continuous Infusions:   PRN Meds:.  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 Worsened from previous despite minimal symptoms exhibited  -Continued on IV Unasyn.  No culture growth.     Acute urinary retention  -Failed voiding trial 2/3/2025  -Repeat trial when more stable    Chronic atrial fibrillation  Chronic CAD with HLD  Severe SMA stenosis, asymptomatic  -Continue amiodarone, Eliquis, atorvastatin, Plavix  -Outpatient follow-up regarding asymptomatic SMA stenosis.  No inpatient procedure at this time.     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    Disposition Planning:     Barriers to Discharge: Pneumonia  management, severe oxygen requirements, acute urinary retention  Anticipated Date of Discharge: 2/9  Place of Discharge: [facility]      Time: 34 minutes     Code Status and Medical Interventions: CPR (Attempt to Resuscitate); Full Support   Ordered at: 01/29/25 2224     Code Status (Patient has no pulse and is not breathing):    CPR (Attempt to Resuscitate)     Medical Interventions (Patient has pulse or is breathing):    Full Support       Signature: Electronically signed by Jarad Lock MD, 02/04/25, 18:25 EST.  [facility] Hospitalist Team

## 2025-02-04 NOTE — TELEPHONE ENCOUNTER
Patient is currently inpatient. He will need device check with hospital follow up scheduled please.

## 2025-02-04 NOTE — PROGRESS NOTES
Cardiology [place]        LOS:  LOS: 6 days   Patient Name: Kali Garcia  Age/Sex: 79 y.o. male  : 1945  MRN: [id number]    Day of Service: 25   Length of Stay: 6  Encounter Provider: FADIA Kamara  Place of Service: [facility] CARDIOLOGY  Patient Care Team:  Tami Ward APRN as PCP - General (Family Medicine)    Subjective:     Chief Complaint: f/u concern for bradycardia  Seen and examined agree with narrative as discussed with nurse practitioner after face-to-face encounter greater than 50% of total encounter time in medical decision making performed by me  Scribed findings below with recommendations    Subjective: no acute CV events.  Overnight events discussed with staff  Telemetry tracings reviewed and interpreted by me with no new events    Remains on midodrine for blood pressure support       Current Medications:   Scheduled Meds:amiodarone, 200 mg, Oral, Q24H  apixaban, 2.5 mg, Oral, Q12H  arformoterol, 15 mcg, Nebulization, BID - RT  atorvastatin, 10 mg, Oral, Nightly  budesonide, 0.5 mg, Nebulization, BID - RT  citalopram, 10 mg, Oral, Daily  clopidogrel, 75 mg, Oral, Daily  furosemide, 20 mg, Intravenous, Q12H  midodrine, 10 mg, Oral, Q8H  mirtazapine, 15 mg, Oral, Nightly  multivitamin with minerals, 1 tablet, Oral, Daily  pantoprazole, 40 mg, Oral, Daily  sodium chloride, 500 mL, Intravenous, Once  sodium chloride, 10 mL, Intravenous, Q12H  tamsulosin, 0.4 mg, Oral, Daily      Continuous Infusions:     Allergies:  Allergies   Allergen Reactions    Codeine GI Intolerance       Review of Systems   Constitutional: Negative for chills, diaphoresis and malaise/fatigue.   Cardiovascular:  Positive for dyspnea on exertion. Negative for chest pain, irregular heartbeat, leg swelling, near-syncope, orthopnea, palpitations, paroxysmal nocturnal dyspnea and syncope.   Respiratory:  Positive for cough and shortness of breath. Negative for sleep  disturbances due to breathing and sputum production.    Gastrointestinal:  Negative for change in bowel habit.   Genitourinary:  Negative for urgency.   Neurological:  Negative for dizziness and headaches.   Psychiatric/Behavioral:  Negative for altered mental status.          Objective:     Temp:  [97.9 °F (36.6 °C)-98.7 °F (37.1 °C)] 97.9 °F (36.6 °C)  Heart Rate:  [50-63] 51  Resp:  [10-20] 20  BP: ()/(39-60) 88/39     Intake/Output Summary (Last 24 hours) at 2/4/2025 1257  Last data filed at 2/4/2025 0954  Gross per 24 hour   Intake 1043 ml   Output 1525 ml   Net -482 ml     Body mass index is 16.54 kg/m².      01/29/25  1824 02/04/25  0500   Weight: 49.9 kg (110 lb) 53.8 kg (118 lb 9.7 oz)         General Appearance:    Alert, cooperative, in no acute distress                                Head: Atraumatic, normocephalic, PERRLA               Neck:   supple, trachea midline, no thyromegaly, no carotid bruit, no JVD   Lungs:     Supplemental O2, scattered rhonchi    Heart:    Regular rhythm and normal rate, normal S1 and S2   Abdomen:     Normal bowel sounds, no masses, no organomegaly, soft  nontender, nondistended, no guarding, no rebound  tenderness   Extremities:   Moves all extremities well, no edema, no cyanosis, no  redness   Pulses:   Pulses palpable and equal bilaterally   Skin:   No bleeding, bruising or rash   Neurologic:   Awake, alert, oriented x3     Scribed findings above agree    Lab Review:   Results from last 7 days   Lab Units 02/02/25  1631 02/02/25  1004 02/01/25  1011   SODIUM mmol/L  --  139 139   POTASSIUM mmol/L  --  3.7 4.2   CHLORIDE mmol/L  --  102 105   CO2 mmol/L  --  29.5* 29.1*   BUN mg/dL  --  17 16   CREATININE mg/dL 0.76 0.54* 0.53*   GLUCOSE mg/dL  --  74 74   CALCIUM mg/dL  --  8.0* 7.7*   AST (SGOT) U/L  --  28 26   ALT (SGPT) U/L  --  24 25     Results from last 7 days   Lab Units 01/29/25 2018 01/29/25  1834   HSTROP T ng/L 42* 43*     Results from last 7 days  "  Lab Units 02/02/25  1631 02/02/25  1004 02/01/25  0322   WBC 10*3/mm3  --  4.27 4.27   HEMOGLOBIN g/dL  --  9.6* 8.6*   HEMOGLOBIN, POC g/dL 9.3*  --   --    HEMATOCRIT %  --  30.4* 26.9*   HEMATOCRIT POC % 27*  --   --    PLATELETS 10*3/mm3  --  87* 112*     Results from last 7 days   Lab Units 01/29/25  1834   INR  1.31*   APTT seconds 29.6     Results from last 7 days   Lab Units 02/02/25  1004   MAGNESIUM mg/dL 2.0           Invalid input(s): \"LDLCALC\"  Results from last 7 days   Lab Units 02/02/25  1004 01/29/25  1834   PROBNP pg/mL 1,011.0 637.0           Recent Radiology:  Imaging Results (Most Recent)       Procedure Component Value Units Date/Time    XR Chest 1 View [[id number]] Collected: 02/02/25 1635     Updated: 02/02/25 1638    Narrative:      XR CHEST 1 VW    Date of Exam: 2/2/2025 4:18 PM EST    Indication: Dyspnea    Comparison: CT chest without contrast 2/1/2025    Findings:  Patient is rotated to the left. There is persistent diffuse bilateral airspace disease consistent with multifocal pneumonia with worsening at the right lung base. Moderate lateral pleural effusions. Negative for pneumothorax. Advanced emphysema.      Impression:      Impression:  1. Persistent diffuse basilar airspace disease consistent with multifocal pneumonia with worsening at the right lung base.  2. Moderate bilateral pleural effusions.  3. Advanced emphysema.          Electronically Signed: Ryder Scherer MD    2/2/2025 4:36 PM EST    Workstation ID: [id number]    CT Chest Without Contrast Diagnostic [[id number]] Collected: 02/01/25 1608     Updated: 02/01/25 1617    Narrative:      CT CHEST WO CONTRAST DIAGNOSTIC    Date of Exam: 2/1/2025 3:32 PM EST    Indication: Hypoxia.    Comparison: PET/CT 10/10/2024, CT chest with contrast 2/1/2024    Technique: Axial CT images were obtained of the chest without contrast administration.  Sagittal and coronal reconstructions were performed.  Automated exposure control and iterative " reconstruction methods were used.    Findings:  Noncontrast visualized soft tissues of the lower neck are without acute abnormality. Heart size normal. Extensive coronary calcification. There is a leadless pacemaker noted in the right ventricle. Trace pericardial effusion. There are several enlarged   mediastinal lymph nodes for example low paratracheal node measuring 13 mm in short axis (2/54). These appear similar to the prior study and could relate to chronic reactive adenopathy. These nodes were not hypermetabolic on the prior PET/CT per report    Severe emphysema. Moderate bilateral pleural effusions. Lower lobe dependent airspace disease concerning for bilateral lower lobe pneumonia. No pneumothorax. Within the anterior aspect of the right upper lobe near the apex there is an area of suspected   chronic scarring with calcification which measures 2.3 x 1.1 cm (2/28). More posteriorly in the right upper lobe there is a small area of chronic nodular scarring which measures 10 mm (2/31). Area of chronic scarring at the anterior left upper lobe   unchanged (2/50).    Upper abdomen demonstrates hyperdense hepatic parenchyma which can be seen with prior amiodarone use or iron deposition among other etiologies. The spleen and adrenal glands are unremarkable. Small nonobstructing left nephrolithiasis. Extensive   atherosclerotic calcifications of the abdominal aorta and visualized branch vasculature. No free fluid in the upper abdomen. Suspect severe stenosis of the SMA due to calcified plaque, unchanged (2/121). Remote left rib fractures. Reduced mineral density   measured at L1 consistent with osteopenia/osteoporosis. Healed remote fracture at the mid sternum. The thoracic vertebral bodies are maintained in height.      Impression:      Impression:  1. Dense bilateral lower lobe consolidation compatible with pneumonia.  2. Moderate bilateral pleural effusions.  3. Severe emphysema with areas of chronic scarring in  the right upper lobe and left upper lobe.  4. Extensive coronary artery calcifications.  5. 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          Electronically Signed: Ryder Scherer MD    2/1/2025 4:15 PM EST    Workstation ID: [id number]    XR Chest 1 View [[id number]] Collected: 01/29/25 1908     Updated: 01/29/25 1912    Narrative:      XR CHEST 1 VW    Date of Exam: 1/29/2025 6:40 PM EST    Indication: soa    Comparison: 1/18/2025 at 1739 hours, 12/22/2024    Findings:  Residual infiltrates are noted throughout the mid to lower lungs bilaterally. There is a small left pleural effusion. Diffuse interstitial changes are noted. These findings are stable to mildly progressed. The findings are also superimposed on underlying   chronic changes throughout the lungs. The cardiac silhouette and mediastinum are stable.      Impression:      Impression:  Residual infiltrates are noted throughout the mid to lower lungs bilaterally. There is a small left pleural effusion. Diffuse interstitial changes are noted. These findings are stable to mildly progressed. The findings may indicate changes of CHF and   pulmonary edema.        Electronically Signed: Jonny Espinal MD    1/29/2025 7:10 PM EST    Workstation ID: [id number]            ECHOCARDIOGRAM:    Results for orders placed during the hospital encounter of 02/01/24    Adult Transthoracic Echo Limited W/ Cont if Necessary Per Protocol    Interpretation Summary    Left ventricular systolic function is normal. Left ventricular ejection fraction appears to be 61 - 65%.    Left ventricular diastolic function was normal.    The left atrial cavity is mildly dilated.    Left atrial volume is mildly increased.    There is moderate calcification of the aortic valve mainly affecting the left coronary and right coronary cusp(s).    Estimated right ventricular systolic pressure from tricuspid  regurgitation is mildly elevated (35-45 mmHg).    Mild pulmonary hypertension is present.        I reviewed the patient's new clinical results.    EKG:      Assessment:       Pneumonia    Superior mesenteric artery stenosis    Acute on chronic hypoxic respiratory failure / pneumonia     2. Known SSS s/p micra leadless PPM placed 1/2025     3. Concern for bradycardia  Interrogate device     4. pAFib/flutter  Converted to SR last admission on amiodarone 200mg BID  Will de escalate to 200mg daily  On eliquis 2.5mg BID     5. Normal LVEF / mild pulm HTN     6. H/o CAD currently stable    Plan:   No events on telemetry  Remains in SR continue amiodarone 200mg daily  Significant oxygen requirement remains  Continue a/c with Eliquis 2.5mg BID  No bradycardia, PPM functioning properly  Continue supportive care      Continue to follow  Please call with any questions or concerns        FADIA Kamara  02/04/25  12:57 EST

## 2025-02-05 LAB
ALBUMIN SERPL-MCNC: 2.1 G/DL (ref 3.5–5.2)
ALBUMIN/GLOB SERPL: 0.9 G/DL
ALP SERPL-CCNC: 57 U/L (ref 39–117)
ALT SERPL W P-5'-P-CCNC: 18 U/L (ref 1–41)
ANION GAP SERPL CALCULATED.3IONS-SCNC: 4.5 MMOL/L (ref 5–15)
ANISOCYTOSIS BLD QL: ABNORMAL
AST SERPL-CCNC: 22 U/L (ref 1–40)
BILIRUB SERPL-MCNC: 0.4 MG/DL (ref 0–1.2)
BUN SERPL-MCNC: 24 MG/DL (ref 8–23)
BUN/CREAT SERPL: 37.5 (ref 7–25)
CALCIUM SPEC-SCNC: 8.1 MG/DL (ref 8.6–10.5)
CHLORIDE SERPL-SCNC: 100 MMOL/L (ref 98–107)
CO2 SERPL-SCNC: 35.5 MMOL/L (ref 22–29)
CREAT SERPL-MCNC: 0.64 MG/DL (ref 0.76–1.27)
DEPRECATED RDW RBC AUTO: 56.5 FL (ref 37–54)
EGFRCR SERPLBLD CKD-EPI 2021: 95.7 ML/MIN/1.73
ERYTHROCYTE [DISTWIDTH] IN BLOOD BY AUTOMATED COUNT: 16.8 % (ref 12.3–15.4)
GLOBULIN UR ELPH-MCNC: 2.3 GM/DL
GLUCOSE SERPL-MCNC: 121 MG/DL (ref 65–99)
HCT VFR BLD AUTO: 24.9 % (ref 37.5–51)
HGB BLD-MCNC: 7.8 G/DL (ref 13–17.7)
LYMPHOCYTES # BLD MANUAL: 0.36 10*3/MM3 (ref 0.7–3.1)
LYMPHOCYTES NFR BLD MANUAL: 5 % (ref 5–12)
MCH RBC QN AUTO: 29.3 PG (ref 26.6–33)
MCHC RBC AUTO-ENTMCNC: 31.3 G/DL (ref 31.5–35.7)
MCV RBC AUTO: 93.6 FL (ref 79–97)
METAMYELOCYTES NFR BLD MANUAL: 1 % (ref 0–0)
MONOCYTES # BLD: 0.18 10*3/MM3 (ref 0.1–0.9)
NEUTROPHILS # BLD AUTO: 3.04 10*3/MM3 (ref 1.7–7)
NEUTROPHILS NFR BLD MANUAL: 75 % (ref 42.7–76)
NEUTS BAND NFR BLD MANUAL: 9 % (ref 0–5)
PLATELET # BLD AUTO: 56 10*3/MM3 (ref 140–450)
PMV BLD AUTO: 9.9 FL (ref 6–12)
POTASSIUM SERPL-SCNC: 3.2 MMOL/L (ref 3.5–5.2)
PROT SERPL-MCNC: 4.4 G/DL (ref 6–8.5)
RBC # BLD AUTO: 2.66 10*6/MM3 (ref 4.14–5.8)
SCAN SLIDE: NORMAL
SMALL PLATELETS BLD QL SMEAR: ABNORMAL
SODIUM SERPL-SCNC: 140 MMOL/L (ref 136–145)
VARIANT LYMPHS NFR BLD MANUAL: 10 % (ref 19.6–45.3)
WBC MORPH BLD: NORMAL
WBC NRBC COR # BLD AUTO: 3.62 10*3/MM3 (ref 3.4–10.8)

## 2025-02-05 PROCEDURE — 94799 UNLISTED PULMONARY SVC/PX: CPT

## 2025-02-05 PROCEDURE — 80053 COMPREHEN METABOLIC PANEL: CPT | Performed by: STUDENT IN AN ORGANIZED HEALTH CARE EDUCATION/TRAINING PROGRAM

## 2025-02-05 PROCEDURE — 85025 COMPLETE CBC W/AUTO DIFF WBC: CPT | Performed by: STUDENT IN AN ORGANIZED HEALTH CARE EDUCATION/TRAINING PROGRAM

## 2025-02-05 PROCEDURE — 99232 SBSQ HOSP IP/OBS MODERATE 35: CPT | Performed by: INTERNAL MEDICINE

## 2025-02-05 PROCEDURE — 94761 N-INVAS EAR/PLS OXIMETRY MLT: CPT

## 2025-02-05 PROCEDURE — 94664 DEMO&/EVAL PT USE INHALER: CPT

## 2025-02-05 PROCEDURE — 85007 BL SMEAR W/DIFF WBC COUNT: CPT | Performed by: STUDENT IN AN ORGANIZED HEALTH CARE EDUCATION/TRAINING PROGRAM

## 2025-02-05 PROCEDURE — 94660 CPAP INITIATION&MGMT: CPT

## 2025-02-05 RX ORDER — POTASSIUM CHLORIDE 1500 MG/1
40 TABLET, EXTENDED RELEASE ORAL ONCE
Status: COMPLETED | OUTPATIENT
Start: 2025-02-05 | End: 2025-02-05

## 2025-02-05 RX ADMIN — ARFORMOTEROL TARTRATE 15 MCG: 15 SOLUTION RESPIRATORY (INHALATION) at 19:40

## 2025-02-05 RX ADMIN — BUDESONIDE 0.5 MG: 0.5 INHALANT RESPIRATORY (INHALATION) at 19:40

## 2025-02-05 RX ADMIN — CITALOPRAM 10 MG: 20 TABLET, FILM COATED ORAL at 08:24

## 2025-02-05 RX ADMIN — MIDODRINE HYDROCHLORIDE 10 MG: 5 TABLET ORAL at 00:04

## 2025-02-05 RX ADMIN — BUDESONIDE 0.5 MG: 0.5 INHALANT RESPIRATORY (INHALATION) at 08:18

## 2025-02-05 RX ADMIN — Medication 10 ML: at 08:24

## 2025-02-05 RX ADMIN — Medication 10 ML: at 21:02

## 2025-02-05 RX ADMIN — MIRTAZAPINE 15 MG: 15 TABLET, FILM COATED ORAL at 21:01

## 2025-02-05 RX ADMIN — AMIODARONE HYDROCHLORIDE 200 MG: 200 TABLET ORAL at 08:25

## 2025-02-05 RX ADMIN — ARFORMOTEROL TARTRATE 15 MCG: 15 SOLUTION RESPIRATORY (INHALATION) at 08:13

## 2025-02-05 RX ADMIN — MIDODRINE HYDROCHLORIDE 10 MG: 5 TABLET ORAL at 16:53

## 2025-02-05 RX ADMIN — ACETAMINOPHEN 650 MG: 325 TABLET, FILM COATED ORAL at 21:36

## 2025-02-05 RX ADMIN — PANTOPRAZOLE SODIUM 40 MG: 40 TABLET, DELAYED RELEASE ORAL at 08:25

## 2025-02-05 RX ADMIN — APIXABAN 2.5 MG: 2.5 TABLET, FILM COATED ORAL at 21:01

## 2025-02-05 RX ADMIN — TAMSULOSIN HYDROCHLORIDE 0.4 MG: 0.4 CAPSULE ORAL at 08:25

## 2025-02-05 RX ADMIN — MIDODRINE HYDROCHLORIDE 10 MG: 5 TABLET ORAL at 08:25

## 2025-02-05 RX ADMIN — POTASSIUM CHLORIDE 40 MEQ: 1500 TABLET, EXTENDED RELEASE ORAL at 14:30

## 2025-02-05 RX ADMIN — ATORVASTATIN CALCIUM 10 MG: 10 TABLET ORAL at 21:01

## 2025-02-05 RX ADMIN — APIXABAN 2.5 MG: 2.5 TABLET, FILM COATED ORAL at 08:25

## 2025-02-05 RX ADMIN — CLOPIDOGREL BISULFATE 75 MG: 75 TABLET ORAL at 08:25

## 2025-02-05 RX ADMIN — Medication 1 TABLET: at 08:25

## 2025-02-05 NOTE — PROGRESS NOTES
Daily Progress Note        Pneumonia    Superior mesenteric artery stenosis      Assessment:    Hypoxic respiratory insufficiency, multifactorial, severe lung and cardiac disease and vascular disease    Left lower lobe pneumonia    severe COPD  Respiratory panel negative on 1/30/2025  On home oxygen    Urine cultures 1/18/2025 positive for Pseudomonas    CAD, PVD  Extensive coronary artery calcifications  Severe upper abdominal atherosclerotic calcifications with SMA stenosis    HTN  HLD  A-fib  Anemia  Former smoker quit 2022 after 60 pack years  Echo 2/1/2024 EF 61-65% mild pulmonary hypertension 35-45 mmHg     CT scan of the chest in June 2024 showing a noncalcified nodule in the right upper lobe around 2 x 1.1 x 1 cm. Patient was also noted to have patchy airspace disease and clustered micronodules in the lingula.     He then underwent PET scan 10/10/2024 showing a 1.7 x 1.1 cm partially calcified irregular shaped nodule in the right upper lobe to be metabolically active with SUV of 4.84 and additional 2 metabolically active irregular nodular densities in the right upper lobe. Inferior apical segment with intervening areas of metabolically active interstitial thickening.     PFTs: FEV1/FVC postbronchodilator is 0.39 with FEV1 of 0.71 L or 28% predicted and FVC of 1.82 L or 47% predicted.   Total lung capacity of 7.77 L or 131% predicted and residual volume of 5.82 L or 226% predicted and DLCO of 61% predicted noted.     TSH 3.4 on 12/29/2024     Recommendations:    Oxygen titration currently on Airvo high flow 60 L alternating with AVAPS    Antibiotics on IV Unasyn  Received 1 dose of IV vancomycin and cefepime    Lasix 20 mg IV every 12 started on 2/2/2025    Bronchodilator Pulmicort and DuoNeb  On amiodarone  Plavix  Midodrine 5 mg 3 times daily  Protonix 40 mg daily  Eliquis 2.5 mg twice daily     Patient is DNR DNI    Chest x-ray 2/2/2025      CT chest 2/1/2025             LOS: 7 days     Subjective          Objective     Vital signs for last 24 hours:  Vitals:    02/05/25 0300 02/05/25 0452 02/05/25 0500 02/05/25 0700   BP: 99/44 124/53 94/51 95/46   BP Location:  Left arm     Patient Position:  Lying     Pulse: 50 53 54 52   Resp:  15  20   Temp:  98.3 °F (36.8 °C)  97.6 °F (36.4 °C)   TempSrc:  Axillary  Axillary   SpO2: 95% 94% 94% 97%   Weight:       Height:           Intake/Output last 3 shifts:  I/O last 3 completed shifts:  In: 1523 [P.O.:1200; I.V.:323]  Out: 4275 [Urine:4275]  Intake/Output this shift:  No intake/output data recorded.      Radiology  Imaging Results (Last 24 Hours)       ** No results found for the last 24 hours. **            Labs:  Results from last 7 days   Lab Units 02/05/25  0255   WBC 10*3/mm3 3.62   HEMOGLOBIN g/dL 7.8*   HEMATOCRIT % 24.9*   PLATELETS 10*3/mm3 56*     Results from last 7 days   Lab Units 02/05/25  0255   SODIUM mmol/L 140   POTASSIUM mmol/L 3.2*   CHLORIDE mmol/L 100   CO2 mmol/L 35.5*   BUN mg/dL 24*   CREATININE mg/dL 0.64*   CALCIUM mg/dL 8.1*   BILIRUBIN mg/dL 0.4   ALK PHOS U/L 57   ALT (SGPT) U/L 18   AST (SGOT) U/L 22   GLUCOSE mg/dL 121*     Results from last 7 days   Lab Units 02/02/25  1631   PH, ARTERIAL pH units 7.394   PO2 ART mm Hg 54.8*   PCO2, ARTERIAL mm Hg 42.9   HCO3 ART mmol/L 26.2     Results from last 7 days   Lab Units 02/05/25  0255 02/02/25  1004 02/01/25  1011   ALBUMIN g/dL 2.1* 2.6* 2.5*     Results from last 7 days   Lab Units 01/29/25 2018 01/29/25  1834   HSTROP T ng/L 42* 43*         Results from last 7 days   Lab Units 02/02/25  1004   MAGNESIUM mg/dL 2.0     Results from last 7 days   Lab Units 01/29/25  1834   INR  1.31*   APTT seconds 29.6               Meds:   SCHEDULE  amiodarone, 200 mg, Oral, Q24H  apixaban, 2.5 mg, Oral, Q12H  arformoterol, 15 mcg, Nebulization, BID - RT  atorvastatin, 10 mg, Oral, Nightly  budesonide, 0.5 mg, Nebulization, BID - RT  citalopram, 10 mg, Oral, Daily  clopidogrel, 75 mg, Oral,  Daily  furosemide, 20 mg, Intravenous, Q12H  midodrine, 10 mg, Oral, Q8H  mirtazapine, 15 mg, Oral, Nightly  multivitamin with minerals, 1 tablet, Oral, Daily  pantoprazole, 40 mg, Oral, Daily  sodium chloride, 500 mL, Intravenous, Once  sodium chloride, 10 mL, Intravenous, Q12H  tamsulosin, 0.4 mg, Oral, Daily      Infusions     PRNs    acetaminophen **OR** acetaminophen **OR** acetaminophen    senna-docusate sodium **AND** polyethylene glycol **AND** bisacodyl **AND** bisacodyl    ipratropium-albuterol    melatonin    nitroglycerin    ondansetron    [COMPLETED] Insert Peripheral IV **AND** sodium chloride    sodium chloride    sodium chloride    Physical Exam:  Physical Exam  Cardiovascular:      Heart sounds: Murmur heard.      No gallop.   Pulmonary:      Effort: No respiratory distress.      Breath sounds: No stridor. Rhonchi and rales present. No wheezing.   Chest:      Chest wall: No tenderness.         ROS  Review of Systems   Respiratory:  Positive for cough and shortness of breath. Negative for wheezing and stridor.    Cardiovascular:  Positive for palpitations and leg swelling. Negative for chest pain.             Total time spent with patient greater than: 45 Minutes

## 2025-02-05 NOTE — PLAN OF CARE
Goal Outcome Evaluation:           Progress: no change  Outcome Evaluation: Pt began this shift on AIrvo at 60/100. Pt de'sat into low 80's and required 15L NRB over Airvo to help recover. N.O. received per Dr. Kay to initiate AVAPS if patient could not recover or began to decompensate. During this shift, pt's O2 sat was in the low 80's. Pt was not recovering and required being placed on AVAPS. Pt tolerating AVAPS well. F/C remains intact & patent. HR has been paced during this shift. Pt has been turned and repositioned during this shift. Call light remains within reach. Plan of care remains ongoing.

## 2025-02-05 NOTE — PROGRESS NOTES
"  [facility] DAILY PROGRESS NOTE    Patient Identification  Name: Kali Garcia  Age: 79 y.o.  Sex: male  :  1945  MRN: [id number]    Date: 2025             Subjective:  Interval History: Yap in place    Objective:    Scheduled Meds:amiodarone, 200 mg, Oral, Q24H  apixaban, 2.5 mg, Oral, Q12H  arformoterol, 15 mcg, Nebulization, BID - RT  atorvastatin, 10 mg, Oral, Nightly  budesonide, 0.5 mg, Nebulization, BID - RT  citalopram, 10 mg, Oral, Daily  clopidogrel, 75 mg, Oral, Daily  furosemide, 20 mg, Intravenous, Q12H  midodrine, 10 mg, Oral, Q8H  mirtazapine, 15 mg, Oral, Nightly  multivitamin with minerals, 1 tablet, Oral, Daily  pantoprazole, 40 mg, Oral, Daily  sodium chloride, 500 mL, Intravenous, Once  sodium chloride, 10 mL, Intravenous, Q12H  tamsulosin, 0.4 mg, Oral, Daily      Continuous Infusions:   PRN Meds:  acetaminophen **OR** acetaminophen **OR** acetaminophen    senna-docusate sodium **AND** polyethylene glycol **AND** bisacodyl **AND** bisacodyl    ipratropium-albuterol    melatonin    nitroglycerin    ondansetron    [COMPLETED] Insert Peripheral IV **AND** sodium chloride    sodium chloride    sodium chloride    Vital signs in last 24 hours:  Temp:  [97.5 °F (36.4 °C)-100.1 °F (37.8 °C)] 97.7 °F (36.5 °C)  Heart Rate:  [50-94] 94  Resp:  [10-24] 24  BP: ()/(37-60) 94/48    Intake/Output:    Intake/Output Summary (Last 24 hours) at 2025 1456  Last data filed at 2025 1400  Gross per 24 hour   Intake 970 ml   Output 3850 ml   Net -2880 ml       Exam:  BP 94/48 (BP Location: Right arm, Patient Position: Lying)   Pulse 94   Temp 97.7 °F (36.5 °C) (Axillary)   Resp 24   Ht 180 cm (70.87\")   Wt 52 kg (114 lb 10.2 oz)   SpO2 96%   BMI 16.05 kg/m²     General Appearance:    Alert, cooperative, NAD   Lungs:     Respirations unlabored, no audible wheezing    Heart:    No cyanosis   Abdomen:     Soft, ND    :    No suprapubic distention, Yap clear            Data " Review:  All labs (24hrs):   Recent Results (from the past 24 hours)   Comprehensive Metabolic Panel    Collection Time: 02/05/25  2:55 AM    Specimen: Arm, Left; Blood   Result Value Ref Range    Glucose 121 (H) 65 - 99 mg/dL    BUN 24 (H) 8 - 23 mg/dL    Creatinine 0.64 (L) 0.76 - 1.27 mg/dL    Sodium 140 136 - 145 mmol/L    Potassium 3.2 (L) 3.5 - 5.2 mmol/L    Chloride 100 98 - 107 mmol/L    CO2 35.5 (H) 22.0 - 29.0 mmol/L    Calcium 8.1 (L) 8.6 - 10.5 mg/dL    Total Protein 4.4 (L) 6.0 - 8.5 g/dL    Albumin 2.1 (L) 3.5 - 5.2 g/dL    ALT (SGPT) 18 1 - 41 U/L    AST (SGOT) 22 1 - 40 U/L    Alkaline Phosphatase 57 39 - 117 U/L    Total Bilirubin 0.4 0.0 - 1.2 mg/dL    Globulin 2.3 gm/dL    A/G Ratio 0.9 g/dL    BUN/Creatinine Ratio 37.5 (H) 7.0 - 25.0    Anion Gap 4.5 (L) 5.0 - 15.0 mmol/L    eGFR 95.7 >60.0 mL/min/1.73   CBC Auto Differential    Collection Time: 02/05/25  2:55 AM    Specimen: Arm, Left; Blood   Result Value Ref Range    WBC 3.62 3.40 - 10.80 10*3/mm3    RBC 2.66 (L) 4.14 - 5.80 10*6/mm3    Hemoglobin 7.8 (L) 13.0 - 17.7 g/dL    Hematocrit 24.9 (L) 37.5 - 51.0 %    MCV 93.6 79.0 - 97.0 fL    MCH 29.3 26.6 - 33.0 pg    MCHC 31.3 (L) 31.5 - 35.7 g/dL    RDW 16.8 (H) 12.3 - 15.4 %    RDW-SD 56.5 (H) 37.0 - 54.0 fl    MPV 9.9 6.0 - 12.0 fL    Platelets 56 (L) 140 - 450 10*3/mm3   Scan Slide    Collection Time: 02/05/25  2:55 AM    Specimen: Arm, Left; Blood   Result Value Ref Range    Scan Slide     Manual Differential    Collection Time: 02/05/25  2:55 AM    Specimen: Arm, Left; Blood   Result Value Ref Range    Neutrophil % 75.0 42.7 - 76.0 %    Lymphocyte % 10.0 (L) 19.6 - 45.3 %    Monocyte % 5.0 5.0 - 12.0 %    Bands %  9.0 (H) 0.0 - 5.0 %    Metamyelocyte % 1.0 (H) 0.0 - 0.0 %    Neutrophils Absolute 3.04 1.70 - 7.00 10*3/mm3    Lymphocytes Absolute 0.36 (L) 0.70 - 3.10 10*3/mm3    Monocytes Absolute 0.18 0.10 - 0.90 10*3/mm3    Anisocytosis Slight/1+ None Seen    WBC Morphology Normal Normal     Platelet Estimate Decreased Normal        Imaging Results (Last 24 Hours)       ** No results found for the last 24 hours. **             Assessment:    Pneumonia    Superior mesenteric artery stenosis      Retention    Plan:    Failed void trial, continue Flomax  No improvement in condition, keep Yap  Void trial again once stronger or patient more active or out of ICU    Sandro Crum MD  [facility]  [facility], [address]  [place] [address]  Office: [phone]  Available via [employer] Secure Chat  02/05/25  14:56 EST

## 2025-02-05 NOTE — TELEPHONE ENCOUNTER
Just 2 week device check for pacemaker placement by Dr Mcgarry. He can make follow up with Dr Barroso post initial device follow up. Thanks

## 2025-02-05 NOTE — TELEPHONE ENCOUNTER
Looks like Dr. Barroso would be his primary cardiologist since he consulted him in the hospital. Do we still need to schedule here in the office for the initial visit?

## 2025-02-05 NOTE — PROGRESS NOTES
[facility] [place]        LOS:  LOS: 7 days   Patient Name: Kali Garcia  Age/Sex: 79 y.o. male  : 1945  MRN: [id number]    Day of Service: 25   Length of Stay: 7  Encounter Provider: FADIA Layne  Place of Service: [facility] CARDIOLOGY  Patient Care Team:  Tami Ward APRN as PCP - General (Family Medicine)    Subjective:     Chief Complaint: f/u concern for bradycardia    Subjective: Significant oxygen requirement remains  Remains with rate controlled heart rates 80s to 90s  Overnight events discussed with staff  Paced rhythm    Midodrine remains  Twice daily Lasix remains    Current Medications:   Scheduled Meds:amiodarone, 200 mg, Oral, Q24H  apixaban, 2.5 mg, Oral, Q12H  arformoterol, 15 mcg, Nebulization, BID - RT  atorvastatin, 10 mg, Oral, Nightly  budesonide, 0.5 mg, Nebulization, BID - RT  citalopram, 10 mg, Oral, Daily  clopidogrel, 75 mg, Oral, Daily  furosemide, 20 mg, Intravenous, Q12H  midodrine, 10 mg, Oral, Q8H  mirtazapine, 15 mg, Oral, Nightly  multivitamin with minerals, 1 tablet, Oral, Daily  pantoprazole, 40 mg, Oral, Daily  sodium chloride, 500 mL, Intravenous, Once  sodium chloride, 10 mL, Intravenous, Q12H  tamsulosin, 0.4 mg, Oral, Daily      Continuous Infusions:     Allergies:  Allergies   Allergen Reactions    Codeine GI Intolerance       Review of Systems   Constitutional: Negative for chills, diaphoresis and malaise/fatigue.   Cardiovascular:  Positive for dyspnea on exertion. Negative for chest pain, irregular heartbeat, leg swelling, near-syncope, orthopnea, palpitations, paroxysmal nocturnal dyspnea and syncope.   Respiratory:  Positive for cough and sputum production. Negative for shortness of breath and sleep disturbances due to breathing.    Gastrointestinal:  Negative for change in bowel habit.   Genitourinary:  Negative for urgency.   Neurological:  Negative for dizziness and headaches.    Psychiatric/Behavioral:  Negative for altered mental status.          Objective:     Temp:  [97.5 °F (36.4 °C)-100.1 °F (37.8 °C)] 97.5 °F (36.4 °C)  Heart Rate:  [50-64] 56  Resp:  [10-20] 20  BP: ()/(37-60) 113/47     Intake/Output Summary (Last 24 hours) at 2/5/2025 1105  Last data filed at 2/5/2025 1000  Gross per 24 hour   Intake 970 ml   Output 3750 ml   Net -2780 ml     Body mass index is 16.05 kg/m².      01/29/25  1824 02/04/25  0500 02/05/25  0822   Weight: 49.9 kg (110 lb) 53.8 kg (118 lb 9.7 oz) 52 kg (114 lb 10.2 oz)         General Appearance:    Alert, cooperative, in no acute distress                                Head: Atraumatic, normocephalic, PERRLA               Neck:   supple, trachea midline, no thyromegaly, no carotid bruit, no JVD   Lungs:     Supplemental O2, scattered rhonchi    Heart:    Regular rhythm and normal rate, normal S1 and S2   Abdomen:     Normal bowel sounds, no masses, no organomegaly, soft  nontender, nondistended, no guarding, no rebound  tenderness   Extremities:   Moves all extremities well, no edema, no cyanosis, no  redness   Pulses:   Pulses palpable and equal bilaterally   Skin:   No bleeding, bruising or rash   Neurologic:   Awake, alert, oriented x3       Scribed findings above    Lab Review:   Results from last 7 days   Lab Units 02/05/25  0255 02/02/25  1631 02/02/25  1004   SODIUM mmol/L 140  --  139   POTASSIUM mmol/L 3.2*  --  3.7   CHLORIDE mmol/L 100  --  102   CO2 mmol/L 35.5*  --  29.5*   BUN mg/dL 24*  --  17   CREATININE mg/dL 0.64* 0.76 0.54*   GLUCOSE mg/dL 121*  --  74   CALCIUM mg/dL 8.1*  --  8.0*   AST (SGOT) U/L 22  --  28   ALT (SGPT) U/L 18  --  24     Results from last 7 days   Lab Units 01/29/25 2018 01/29/25  1834   HSTROP T ng/L 42* 43*     Results from last 7 days   Lab Units 02/05/25  0255 02/02/25  1631 02/02/25  1004   WBC 10*3/mm3 3.62  --  4.27   HEMOGLOBIN g/dL 7.8*  --  9.6*   HEMOGLOBIN, POC g/dL  --  9.3*  --    HEMATOCRIT  "% 24.9*  --  30.4*   HEMATOCRIT POC %  --  27*  --    PLATELETS 10*3/mm3 56*  --  87*     Results from last 7 days   Lab Units 01/29/25  1834   INR  1.31*   APTT seconds 29.6     Results from last 7 days   Lab Units 02/02/25  1004   MAGNESIUM mg/dL 2.0           Invalid input(s): \"LDLCALC\"  Results from last 7 days   Lab Units 02/02/25  1004 01/29/25  1834   PROBNP pg/mL 1,011.0 637.0           Recent Radiology:  Imaging Results (Most Recent)       Procedure Component Value Units Date/Time    XR Chest 1 View [[id number]] Collected: 02/02/25 1635     Updated: 02/02/25 1638    Narrative:      XR CHEST 1 VW    Date of Exam: 2/2/2025 4:18 PM EST    Indication: Dyspnea    Comparison: CT chest without contrast 2/1/2025    Findings:  Patient is rotated to the left. There is persistent diffuse bilateral airspace disease consistent with multifocal pneumonia with worsening at the right lung base. Moderate lateral pleural effusions. Negative for pneumothorax. Advanced emphysema.      Impression:      Impression:  1. Persistent diffuse basilar airspace disease consistent with multifocal pneumonia with worsening at the right lung base.  2. Moderate bilateral pleural effusions.  3. Advanced emphysema.          Electronically Signed: Ryder Scherer MD    2/2/2025 4:36 PM EST    Workstation ID: [id number]    CT Chest Without Contrast Diagnostic [[id number]] Collected: 02/01/25 1608     Updated: 02/01/25 1617    Narrative:      CT CHEST WO CONTRAST DIAGNOSTIC    Date of Exam: 2/1/2025 3:32 PM EST    Indication: Hypoxia.    Comparison: PET/CT 10/10/2024, CT chest with contrast 2/1/2024    Technique: Axial CT images were obtained of the chest without contrast administration.  Sagittal and coronal reconstructions were performed.  Automated exposure control and iterative reconstruction methods were used.    Findings:  Noncontrast visualized soft tissues of the lower neck are without acute abnormality. Heart size normal. Extensive coronary " calcification. There is a leadless pacemaker noted in the right ventricle. Trace pericardial effusion. There are several enlarged   mediastinal lymph nodes for example low paratracheal node measuring 13 mm in short axis (2/54). These appear similar to the prior study and could relate to chronic reactive adenopathy. These nodes were not hypermetabolic on the prior PET/CT per report    Severe emphysema. Moderate bilateral pleural effusions. Lower lobe dependent airspace disease concerning for bilateral lower lobe pneumonia. No pneumothorax. Within the anterior aspect of the right upper lobe near the apex there is an area of suspected   chronic scarring with calcification which measures 2.3 x 1.1 cm (2/28). More posteriorly in the right upper lobe there is a small area of chronic nodular scarring which measures 10 mm (2/31). Area of chronic scarring at the anterior left upper lobe   unchanged (2/50).    Upper abdomen demonstrates hyperdense hepatic parenchyma which can be seen with prior amiodarone use or iron deposition among other etiologies. The spleen and adrenal glands are unremarkable. Small nonobstructing left nephrolithiasis. Extensive   atherosclerotic calcifications of the abdominal aorta and visualized branch vasculature. No free fluid in the upper abdomen. Suspect severe stenosis of the SMA due to calcified plaque, unchanged (2/121). Remote left rib fractures. Reduced mineral density   measured at L1 consistent with osteopenia/osteoporosis. Healed remote fracture at the mid sternum. The thoracic vertebral bodies are maintained in height.      Impression:      Impression:  1. Dense bilateral lower lobe consolidation compatible with pneumonia.  2. Moderate bilateral pleural effusions.  3. Severe emphysema with areas of chronic scarring in the right upper lobe and left upper lobe.  4. Extensive coronary artery calcifications.  5. Severe upper abdominal atherosclerotic calcifications with SMA stenosis,  osteopenia/osteoporosis and additional chronic findings above.          Electronically Signed: Ryder Scherer MD    2/1/2025 4:15 PM EST    Workstation ID: [id number]    XR Chest 1 View [[id number]] Collected: 01/29/25 1908     Updated: 01/29/25 1912    Narrative:      XR CHEST 1 VW    Date of Exam: 1/29/2025 6:40 PM EST    Indication: soa    Comparison: 1/18/2025 at 1739 hours, 12/22/2024    Findings:  Residual infiltrates are noted throughout the mid to lower lungs bilaterally. There is a small left pleural effusion. Diffuse interstitial changes are noted. These findings are stable to mildly progressed. The findings are also superimposed on underlying   chronic changes throughout the lungs. The cardiac silhouette and mediastinum are stable.      Impression:      Impression:  Residual infiltrates are noted throughout the mid to lower lungs bilaterally. There is a small left pleural effusion. Diffuse interstitial changes are noted. These findings are stable to mildly progressed. The findings may indicate changes of CHF and   pulmonary edema.        Electronically Signed: Jonny Espinal MD    1/29/2025 7:10 PM EST    Workstation ID: [id number]            ECHOCARDIOGRAM:    Results for orders placed during the hospital encounter of 02/01/24    Adult Transthoracic Echo Limited W/ Cont if Necessary Per Protocol    Interpretation Summary    Left ventricular systolic function is normal. Left ventricular ejection fraction appears to be 61 - 65%.    Left ventricular diastolic function was normal.    The left atrial cavity is mildly dilated.    Left atrial volume is mildly increased.    There is moderate calcification of the aortic valve mainly affecting the left coronary and right coronary cusp(s).    Estimated right ventricular systolic pressure from tricuspid regurgitation is mildly elevated (35-45 mmHg).    Mild pulmonary hypertension is present.        I reviewed the patient's new clinical results.    EKG:      Assessment:        Pneumonia    Superior mesenteric artery stenosis    Acute on chronic hypoxic respiratory failure / pneumonia     2. Known SSS s/p micra leadless PPM placed 1/2025     3. Concern for bradycardia  Interrogate device     4. pAFib/flutter  Converted to SR last admission on amiodarone 200mg BID  Will de escalate to 200mg daily  On eliquis 2.5mg BID     5. Normal LVEF / mild pulm HTN     6. H/o CAD currently stable    Plan:   Gentle diuresis ongoing  Replace potassium  Remains in SR/paced continue amiodarone 200mg daily  Continue a/c with Eliquis 2.5mg BID  Some louann 50s-PPM in place  Continue supportive care  BP stable on midodrine  Airvo continues    Elisabeth Mendez, FADIA  02/05/25  11:05 EST

## 2025-02-05 NOTE — NURSING NOTE
Pt O2 sat's in low 80's at start of this shift. Airvo increased to 60/100. RT notified. NRB placed on pt at 15 LPM. Pt recovered to low 90's. RT performed breathing tx. Dr. Kay present on unit. Verbal orders received for AVAPS. RT notified. AVAPS to be used as backup when pt is unable to recover independently or begins to decompensate.

## 2025-02-05 NOTE — SIGNIFICANT NOTE
02/05/25 1253   OTHER   Discipline physical therapist   Rehab Time/Intention   Session Not Performed   (continues to be at risk for intubation and not appropriate for PT. Have not completed PT orders yet due to need for precert if he is to d/c to SNF. Will follow for d/c plans)   Recommendation   PT - Next Appointment 02/06/25

## 2025-02-05 NOTE — PROGRESS NOTES
[facility] MEDICINE SERVICE  DAILY PROGRESS NOTE    NAME: Kali Garcia  : 1945  MRN: [id number]      LOS: 7 days     PROVIDER OF SERVICE: Jarad Lock MD    Chief Complaint: Pneumonia    Subjective:     Interval History:  History taken from: patient      History of Present Illness: Kali Garcia is a 79 y.o. male with a previous medical history of atrial fibrillation, CAD, HLD and COPD who presented to [facility] on 2025 after being found hypoxic by staff at the rehab facility.  The patient is a poor historian and other than complaining of being tired, could not provide any HPI. HPI was taken from the chart.  Per ED report the staff found him after receiving a DuoNeb shaking and having trouble breathing With an O2 saturation of 70%.  On arrival he was on a nonrebreather but was weaned down to an Airvo while in the ED. He is on O2 at 2L at baseline.     On chart review, he was discharged on 25 after treatment for pneumonia and a COPD exacerbation.       In the ED, ABG showed pH 7.48, CO2 40, pO2 45.4. Troponin's 43,42. Hemoglobin 9.0. Otherwise, labs are unremarkable.  Chest xray shows residual bibasilar infiltrates, mildly progressed from previous xray on 25.  He is afebrile, all vitals are stable with the exception of his O2 saturation which is 98% on Airvo.       seen in bed NAD pt c/o dyspnea on 4lts, severe anemia, hgl 6.5  Will   transfuse 1 unit prbc  / seen in bed NAD KALEB RN, dyspnea on High flow oxygen, 15 lts   2/2 seen in bed NAD, vss, KALEB RN, patient developed bradycardia, consulted cardiology  2/3 Mostly concerned about his diet.  Largely asymptomatic despite severe oxygen requirements.  2/4 worsening oxygenation  2/5 Bipap dependent    Review of Systems  12 point ROS reviewed and negative except as mentioned above  Objective:     Vital Signs  Temp:  [97.5 °F (36.4 °C)-100.1 °F (37.8 °C)] 97.7 °F (36.5 °C)  Heart Rate:  [50-80] 80  Resp:  [10-20]  20  BP: ()/(37-60) 112/56  Flow (L/min) (Oxygen Therapy):  [50-60] 60   Body mass index is 16.05 kg/m².    Physical Exam  Vitals and nursing note reviewed.   Constitutional:       Appearance: Normal appearance.      Comments: Airvo   HENT:      Mouth/Throat:      Mouth: Mucous membranes are moist.   Cardiovascular:      Rate and Rhythm: Normal rate and regular rhythm.   Pulmonary:      Effort: Pulmonary effort is normal.      Breath sounds: Examination of the right-lower field reveals decreased breath sounds. Examination of the left-lower field reveals decreased breath sounds. Decreased breath sounds present.   Abdominal:      General: Bowel sounds are normal.      Palpations: Abdomen is soft.   Musculoskeletal:         General: Normal range of motion.   Skin:     General: Skin is warm and dry.   Neurological:      General: No focal deficit present.      Mental Status: He is oriented to person, place, and time. Mental status is at baseline.     Comments: Poor insight into his condition.  Psychiatric:         Mood and Affect: Mood normal.         Behavior: Behavior normal.            Diagnostic Data    Results from last 7 days   Lab Units 02/05/25  0255   WBC 10*3/mm3 3.62   HEMOGLOBIN g/dL 7.8*   HEMATOCRIT % 24.9*   PLATELETS 10*3/mm3 56*   GLUCOSE mg/dL 121*   CREATININE mg/dL 0.64*   BUN mg/dL 24*   SODIUM mmol/L 140   POTASSIUM mmol/L 3.2*   AST (SGOT) U/L 22   ALT (SGPT) U/L 18   ALK PHOS U/L 57   BILIRUBIN mg/dL 0.4   ANION GAP mmol/L 4.5*       No radiology results for the last day      I reviewed the patient's new clinical results.  Scheduled Meds:amiodarone, 200 mg, Oral, Q24H  apixaban, 2.5 mg, Oral, Q12H  arformoterol, 15 mcg, Nebulization, BID - RT  atorvastatin, 10 mg, Oral, Nightly  budesonide, 0.5 mg, Nebulization, BID - RT  citalopram, 10 mg, Oral, Daily  clopidogrel, 75 mg, Oral, Daily  furosemide, 20 mg, Intravenous, Q12H  midodrine, 10 mg, Oral, Q8H  mirtazapine, 15 mg, Oral,  Nightly  multivitamin with minerals, 1 tablet, Oral, Daily  pantoprazole, 40 mg, Oral, Daily  potassium chloride, 40 mEq, Oral, Once  sodium chloride, 500 mL, Intravenous, Once  sodium chloride, 10 mL, Intravenous, Q12H  tamsulosin, 0.4 mg, Oral, Daily      Continuous Infusions:   PRN Meds:.  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       Afternoon addendum 2/5/2025: I held a family meeting this afternoon with his son and daughter-in-law.  Patient nominate's son to make all medical decisions for him.  I spoke to the son at length about the medical situation at hand.  We discussed the possible routes forward and his father's wishes.  In the past the patient had expressed to his family that he would not want to be on a ventilator ever and the son believes that we should honor these wishes.  I discussed the possible routes forward for his father.  We settled on continuing the medical treatment as we are at this time but not escalating care to intubation and mechanical ventilation and no CPR.  Chart is updated to reflect these changes.  Son and family were realistic about father's overall health and decline over the last few years.  If patient were to decline medically overnight they would want to be informed with the likely plan to make patient comfort care at that time if he were not to be able to tolerate BiPAP or high flow.          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  Essentially BiPAP dependent at this point, heavy desaturation when mask is removed briefly.  -Continue IV antibiotics     Acute urinary retention  -Failed voiding trial 2/3/2025  -Repeat trial when more stable    Chronic atrial fibrillation  Chronic CAD with HLD  Severe SMA stenosis, asymptomatic  -Continue amiodarone, Eliquis, atorvastatin, Plavix  -Outpatient follow-up regarding asymptomatic SMA stenosis.  No inpatient procedure at this time.     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    Disposition Planning:     Barriers to Discharge: Pneumonia management, severe oxygen requirements, acute urinary retention  Anticipated Date of Discharge: 2/9  Place of Discharge: [facility]      Time: 34 minutes     Code Status and Medical Interventions: No CPR (Do Not Attempt to Resuscitate); Limited Support; No intubation (DNI)   Ordered at: 02/05/25 1405     Medical Intervention Limits:    No intubation (DNI)     Level Of Support Discussed With:    Next of Kin (If No Surrogate)    Health Care Surrogate     Code Status (Patient has no pulse and is not breathing):    No CPR (Do Not Attempt to Resuscitate)     Medical Interventions (Patient has pulse or is breathing):    Limited Support       Signature: Electronically signed by Jarad Lock MD, 02/05/25, 14:23 EST.  [facility] Hospitalist Team

## 2025-02-05 NOTE — PLAN OF CARE
Problem: Adult Inpatient Plan of Care  Goal: Plan of Care Review  Outcome: Progressing  Flowsheets (Taken 2/5/2025 1018)  Plan of Care Reviewed With: patient  Goal: Patient-Specific Goal (Individualized)  Outcome: Progressing  Goal: Absence of Hospital-Acquired Illness or Injury  Outcome: Progressing  Intervention: Identify and Manage Fall Risk  Recent Flowsheet Documentation  Taken 2/5/2025 0822 by Love Goel RN  Safety Promotion/Fall Prevention:   activity supervised   assistive device/personal items within reach   clutter free environment maintained   fall prevention program maintained   nonskid shoes/slippers when out of bed   room organization consistent   safety round/check completed  Intervention: Prevent Skin Injury  Recent Flowsheet Documentation  Taken 2/5/2025 0822 by Love Goel RN  Body Position:   position changed independently   supine   weight shifting  Skin Protection: incontinence pads utilized  Intervention: Prevent and Manage VTE (Venous Thromboembolism) Risk  Recent Flowsheet Documentation  Taken 2/5/2025 0822 by Love Goel RN  VTE Prevention/Management: (dr escudero notified patient educated on risk and benefit)   bilateral   SCDs (sequential compression devices) off   patient refused intervention  Intervention: Prevent Infection  Recent Flowsheet Documentation  Taken 2/5/2025 0822 by Love Goel RN  Infection Prevention:   environmental surveillance performed   equipment surfaces disinfected   hand hygiene promoted  Goal: Optimal Comfort and Wellbeing  Outcome: Progressing  Intervention: Provide Person-Centered Care  Recent Flowsheet Documentation  Taken 2/5/2025 0822 by Love Goel RN  Trust Relationship/Rapport:   care explained   choices provided   emotional support provided   empathic listening provided   questions answered   questions encouraged   reassurance provided   thoughts/feelings acknowledged  Goal: Readiness for Transition of Care  Outcome: Progressing      Problem: Skin Injury Risk Increased  Goal: Skin Health and Integrity  Outcome: Progressing  Intervention: Optimize Skin Protection  Recent Flowsheet Documentation  Taken 2/5/2025 0822 by Love Goel RN  Activity Management: bedrest  Pressure Reduction Techniques:   frequent weight shift encouraged   weight shift assistance provided  Head of Bed (HOB) Positioning: HOB at 45 degrees  Pressure Reduction Devices: pressure-redistributing mattress utilized  Skin Protection: incontinence pads utilized     Problem: Malnutrition  Goal: Improved Nutritional Intake  Outcome: Progressing     Problem: Fall Injury Risk  Goal: Absence of Fall and Fall-Related Injury  Outcome: Progressing  Intervention: Identify and Manage Contributors  Recent Flowsheet Documentation  Taken 2/5/2025 0822 by Love Goel RN  Medication Review/Management: medications reviewed  Self-Care Promotion:   independence encouraged   BADL personal objects within reach  Intervention: Promote Injury-Free Environment  Recent Flowsheet Documentation  Taken 2/5/2025 0822 by Love Goel RN  Safety Promotion/Fall Prevention:   activity supervised   assistive device/personal items within reach   clutter free environment maintained   fall prevention program maintained   nonskid shoes/slippers when out of bed   room organization consistent   safety round/check completed     Problem: Noninvasive Ventilation Acute  Goal: Effective Unassisted Ventilation and Oxygenation  Outcome: Progressing  Intervention: Monitor and Manage Noninvasive Ventilation  Recent Flowsheet Documentation  Taken 2/5/2025 0822 by Love Goel RN  Airway/Ventilation Management:   airway patency maintained   positive pressure ventilation provided   pulmonary hygiene promoted   position adjusted   calming measures promoted   humidification applied   Goal Outcome Evaluation:  Plan of Care Reviewed With: patient            Patient a/ox4. Can have sips of meds with medications, remains  high falls precautions, clean and dry, call light in reach.   Yap catheter In place per urology. Staff continues to turn and reposition patient q 2 hours and as needed   Reason for admission:  01/29 from [facility] with SOB, DX PNA  Significant PMH: COPD, CAD, HTN, HLD, Afib, Anemia, EF 61-65%, OA   Significant 24 hour events:   2/2 Airvo restarted. Cardiology consulted due to bradycardia. Vascular consulted due to stenosis of the mesenteric artery. Midodrine 10mg started due to low bp. Lasix given due to pleural effusion of lungs  2/3 Worsening O2 requirement, max on airvo with prn NRB addition - Dr Kay encouraging palliative decisions as patient does not want ventilator  Urology c/s-Acute Rentention.  2/4 maxed on Airvo, slow to recover with 15L NRB with Airvo, AVAPS orders received per Dr. Kay  2/5 BIPAP dependent,  waiting on son to arrive Dr Abraham to discuss palliative options  NPO with sips of water with medications  Yap catheter in place  Significant Assessment Findings: 2L O2 at baseline   -   Had difficulty breathing, O2 in the 70's, Arrived by EMS on nonrebreather   Social: From [facility]  Additional Info:  1/29 CXR-Edema  Mobility Plan: x1 Assist

## 2025-02-05 NOTE — CASE MANAGEMENT/SOCIAL WORK
Continued Stay Note  [facility]     Patient Name: Kali Garcia  MRN: [id number]  Today's Date: 2/5/2025    Admit Date: 1/29/2025    Plan: Return to [facility]. No PASRR needed. Precert required. Followed by [facility] facility.   Discharge Plan       Row Name 02/05/25 1106       Plan    Plan Comments DC Barriers: Airvo 60L/AVAPS, significant pulm decline at risk for intubation, IV lasix, failed voiding trial, Dr Kay and Dr Lock encouraging palliative options- son to meet at bedside today to discuss             Angeles Shane RN     [facility]  Office: [phone]  Cell: [phone]  Fax # [phone]

## 2025-02-06 PROCEDURE — 94799 UNLISTED PULMONARY SVC/PX: CPT

## 2025-02-06 PROCEDURE — 94761 N-INVAS EAR/PLS OXIMETRY MLT: CPT

## 2025-02-06 PROCEDURE — 25010000002 FUROSEMIDE PER 20 MG: Performed by: INTERNAL MEDICINE

## 2025-02-06 PROCEDURE — 94664 DEMO&/EVAL PT USE INHALER: CPT

## 2025-02-06 PROCEDURE — 99232 SBSQ HOSP IP/OBS MODERATE 35: CPT | Performed by: INTERNAL MEDICINE

## 2025-02-06 PROCEDURE — 94660 CPAP INITIATION&MGMT: CPT

## 2025-02-06 RX ADMIN — Medication 10 ML: at 11:41

## 2025-02-06 RX ADMIN — CLOPIDOGREL BISULFATE 75 MG: 75 TABLET ORAL at 11:41

## 2025-02-06 RX ADMIN — FUROSEMIDE 20 MG: 10 INJECTION, SOLUTION INTRAMUSCULAR; INTRAVENOUS at 05:56

## 2025-02-06 RX ADMIN — BUDESONIDE 0.5 MG: 0.5 INHALANT RESPIRATORY (INHALATION) at 07:44

## 2025-02-06 RX ADMIN — BUDESONIDE 0.5 MG: 0.5 INHALANT RESPIRATORY (INHALATION) at 20:25

## 2025-02-06 RX ADMIN — MIDODRINE HYDROCHLORIDE 10 MG: 5 TABLET ORAL at 11:41

## 2025-02-06 RX ADMIN — TAMSULOSIN HYDROCHLORIDE 0.4 MG: 0.4 CAPSULE ORAL at 11:41

## 2025-02-06 RX ADMIN — Medication 10 ML: at 21:04

## 2025-02-06 RX ADMIN — MIRTAZAPINE 15 MG: 15 TABLET, FILM COATED ORAL at 21:03

## 2025-02-06 RX ADMIN — PANTOPRAZOLE SODIUM 40 MG: 40 TABLET, DELAYED RELEASE ORAL at 11:41

## 2025-02-06 RX ADMIN — APIXABAN 2.5 MG: 2.5 TABLET, FILM COATED ORAL at 21:04

## 2025-02-06 RX ADMIN — CITALOPRAM 10 MG: 20 TABLET, FILM COATED ORAL at 11:40

## 2025-02-06 RX ADMIN — ARFORMOTEROL TARTRATE 15 MCG: 15 SOLUTION RESPIRATORY (INHALATION) at 20:20

## 2025-02-06 RX ADMIN — ARFORMOTEROL TARTRATE 15 MCG: 15 SOLUTION RESPIRATORY (INHALATION) at 07:40

## 2025-02-06 RX ADMIN — MIDODRINE HYDROCHLORIDE 10 MG: 5 TABLET ORAL at 16:21

## 2025-02-06 RX ADMIN — APIXABAN 2.5 MG: 2.5 TABLET, FILM COATED ORAL at 11:41

## 2025-02-06 RX ADMIN — ATORVASTATIN CALCIUM 10 MG: 10 TABLET ORAL at 21:04

## 2025-02-06 RX ADMIN — MIDODRINE HYDROCHLORIDE 10 MG: 5 TABLET ORAL at 00:37

## 2025-02-06 RX ADMIN — Medication 1 TABLET: at 11:40

## 2025-02-06 RX ADMIN — AMIODARONE HYDROCHLORIDE 200 MG: 200 TABLET ORAL at 11:41

## 2025-02-06 NOTE — PROGRESS NOTES
Cardiology [place]        LOS:  LOS: 8 days   Patient Name: Kali Garcia  Age/Sex: 79 y.o. male  : 1945  MRN: [id number]    Day of Service: 25   Length of Stay: 8  Encounter Provider: Berto Barroso MD  Place of Service: [facility]  Patient Care Team:  Tami Ward APRN as PCP - General (Family Medicine)    Subjective:     Chief Complaint: f/u concern for bradycardia    Subjective: Significant oxygen requirement remains on Airvo 60 L  Remains with rate controlled heart rates 80s to 90s  Overnight events discussed with staff  Paced rhythm in the 50s  Awake alert denies any chest pain    Midodrine remains  Twice daily Lasix remains    Remains with significant respiratory failure, multiorgan dysfunction    Current Medications:   Scheduled Meds:amiodarone, 200 mg, Oral, Q24H  apixaban, 2.5 mg, Oral, Q12H  arformoterol, 15 mcg, Nebulization, BID - RT  atorvastatin, 10 mg, Oral, Nightly  budesonide, 0.5 mg, Nebulization, BID - RT  citalopram, 10 mg, Oral, Daily  clopidogrel, 75 mg, Oral, Daily  furosemide, 20 mg, Intravenous, Q12H  midodrine, 10 mg, Oral, Q8H  mirtazapine, 15 mg, Oral, Nightly  multivitamin with minerals, 1 tablet, Oral, Daily  pantoprazole, 40 mg, Oral, Daily  sodium chloride, 500 mL, Intravenous, Once  sodium chloride, 10 mL, Intravenous, Q12H  tamsulosin, 0.4 mg, Oral, Daily      Continuous Infusions:     Allergies:  Allergies   Allergen Reactions    Codeine GI Intolerance       Review of Systems   Constitutional: Negative for chills, diaphoresis and malaise/fatigue.   Cardiovascular:  Positive for dyspnea on exertion. Negative for chest pain, irregular heartbeat, leg swelling, near-syncope, orthopnea, palpitations, paroxysmal nocturnal dyspnea and syncope.   Respiratory:  Positive for cough and sputum production. Negative for shortness of breath and sleep disturbances due to breathing.    Gastrointestinal:  Negative  for change in bowel habit.   Genitourinary:  Negative for urgency.   Neurological:  Negative for dizziness and headaches.   Psychiatric/Behavioral:  Negative for altered mental status.          Objective:     Temp:  [97.6 °F (36.4 °C)-98.2 °F (36.8 °C)] 98 °F (36.7 °C)  Heart Rate:  [50-56] 53  Resp:  [13-28] 19  BP: (108-127)/(50-55) 113/50     Intake/Output Summary (Last 24 hours) at 2/6/2025 1704  Last data filed at 2/5/2025 2345  Gross per 24 hour   Intake 240 ml   Output 800 ml   Net -560 ml     Body mass index is 16.05 kg/m².      01/29/25  1824 02/04/25  0500 02/05/25  0822   Weight: 49.9 kg (110 lb) 53.8 kg (118 lb 9.7 oz) 52 kg (114 lb 10.2 oz)         General Appearance:    Alert, cooperative, in no acute distress                                Head: Atraumatic, normocephalic, PERRLA               Neck:   supple, trachea midline, no thyromegaly, no carotid bruit, no JVD   Lungs:     Supplemental O2, scattered rhonchi    Heart:    Regular rhythm and normal rate, normal S1 and S2   Abdomen:     Normal bowel sounds, no masses, no organomegaly, soft  nontender, nondistended, no guarding, no rebound  tenderness   Extremities:   Moves all extremities well, no edema, no cyanosis, no  redness   Pulses:   Pulses palpable and equal bilaterally   Skin:   No bleeding, bruising or rash   Neurologic:   Awake, alert, oriented x3       Scribed findings above    Lab Review:   Results from last 7 days   Lab Units 02/05/25  0255 02/02/25  1631 02/02/25  1004   SODIUM mmol/L 140  --  139   POTASSIUM mmol/L 3.2*  --  3.7   CHLORIDE mmol/L 100  --  102   CO2 mmol/L 35.5*  --  29.5*   BUN mg/dL 24*  --  17   CREATININE mg/dL 0.64* 0.76 0.54*   GLUCOSE mg/dL 121*  --  74   CALCIUM mg/dL 8.1*  --  8.0*   AST (SGOT) U/L 22  --  28   ALT (SGPT) U/L 18  --  24           Results from last 7 days   Lab Units 02/05/25  0255 02/02/25  1631 02/02/25  1004   WBC 10*3/mm3 3.62  --  4.27   HEMOGLOBIN g/dL 7.8*  --  9.6*   HEMOGLOBIN, POC  "g/dL  --  9.3*  --    HEMATOCRIT % 24.9*  --  30.4*   HEMATOCRIT POC %  --  27*  --    PLATELETS 10*3/mm3 56*  --  87*           Results from last 7 days   Lab Units 02/02/25  1004   MAGNESIUM mg/dL 2.0           Invalid input(s): \"LDLCALC\"  Results from last 7 days   Lab Units 02/02/25  1004   PROBNP pg/mL 1,011.0           Recent Radiology:  Imaging Results (Most Recent)       Procedure Component Value Units Date/Time    XR Chest 1 View [[id number]] Collected: 02/02/25 1635     Updated: 02/02/25 1638    Narrative:      XR CHEST 1 VW    Date of Exam: 2/2/2025 4:18 PM EST    Indication: Dyspnea    Comparison: CT chest without contrast 2/1/2025    Findings:  Patient is rotated to the left. There is persistent diffuse bilateral airspace disease consistent with multifocal pneumonia with worsening at the right lung base. Moderate lateral pleural effusions. Negative for pneumothorax. Advanced emphysema.      Impression:      Impression:  1. Persistent diffuse basilar airspace disease consistent with multifocal pneumonia with worsening at the right lung base.  2. Moderate bilateral pleural effusions.  3. Advanced emphysema.          Electronically Signed: Ryder Scherer MD    2/2/2025 4:36 PM EST    Workstation ID: [id number]    CT Chest Without Contrast Diagnostic [[id number]] Collected: 02/01/25 1608     Updated: 02/01/25 1617    Narrative:      CT CHEST WO CONTRAST DIAGNOSTIC    Date of Exam: 2/1/2025 3:32 PM EST    Indication: Hypoxia.    Comparison: PET/CT 10/10/2024, CT chest with contrast 2/1/2024    Technique: Axial CT images were obtained of the chest without contrast administration.  Sagittal and coronal reconstructions were performed.  Automated exposure control and iterative reconstruction methods were used.    Findings:  Noncontrast visualized soft tissues of the lower neck are without acute abnormality. Heart size normal. Extensive coronary calcification. There is a leadless pacemaker noted in the right ventricle. " Trace pericardial effusion. There are several enlarged   mediastinal lymph nodes for example low paratracheal node measuring 13 mm in short axis (2/54). These appear similar to the prior study and could relate to chronic reactive adenopathy. These nodes were not hypermetabolic on the prior PET/CT per report    Severe emphysema. Moderate bilateral pleural effusions. Lower lobe dependent airspace disease concerning for bilateral lower lobe pneumonia. No pneumothorax. Within the anterior aspect of the right upper lobe near the apex there is an area of suspected   chronic scarring with calcification which measures 2.3 x 1.1 cm (2/28). More posteriorly in the right upper lobe there is a small area of chronic nodular scarring which measures 10 mm (2/31). Area of chronic scarring at the anterior left upper lobe   unchanged (2/50).    Upper abdomen demonstrates hyperdense hepatic parenchyma which can be seen with prior amiodarone use or iron deposition among other etiologies. The spleen and adrenal glands are unremarkable. Small nonobstructing left nephrolithiasis. Extensive   atherosclerotic calcifications of the abdominal aorta and visualized branch vasculature. No free fluid in the upper abdomen. Suspect severe stenosis of the SMA due to calcified plaque, unchanged (2/121). Remote left rib fractures. Reduced mineral density   measured at L1 consistent with osteopenia/osteoporosis. Healed remote fracture at the mid sternum. The thoracic vertebral bodies are maintained in height.      Impression:      Impression:  1. Dense bilateral lower lobe consolidation compatible with pneumonia.  2. Moderate bilateral pleural effusions.  3. Severe emphysema with areas of chronic scarring in the right upper lobe and left upper lobe.  4. Extensive coronary artery calcifications.  5. Severe upper abdominal atherosclerotic calcifications with SMA stenosis, osteopenia/osteoporosis and additional chronic findings  above.          Electronically Signed: Ryder Scherer MD    2/1/2025 4:15 PM EST    Workstation ID: [id number]    XR Chest 1 View [[id number]] Collected: 01/29/25 1908     Updated: 01/29/25 1912    Narrative:      XR CHEST 1 VW    Date of Exam: 1/29/2025 6:40 PM EST    Indication: soa    Comparison: 1/18/2025 at 1739 hours, 12/22/2024    Findings:  Residual infiltrates are noted throughout the mid to lower lungs bilaterally. There is a small left pleural effusion. Diffuse interstitial changes are noted. These findings are stable to mildly progressed. The findings are also superimposed on underlying   chronic changes throughout the lungs. The cardiac silhouette and mediastinum are stable.      Impression:      Impression:  Residual infiltrates are noted throughout the mid to lower lungs bilaterally. There is a small left pleural effusion. Diffuse interstitial changes are noted. These findings are stable to mildly progressed. The findings may indicate changes of CHF and   pulmonary edema.        Electronically Signed: Jonny Espinal MD    1/29/2025 7:10 PM EST    Workstation ID: [id number]            ECHOCARDIOGRAM:    Results for orders placed during the hospital encounter of 02/01/24    Adult Transthoracic Echo Limited W/ Cont if Necessary Per Protocol    Interpretation Summary    Left ventricular systolic function is normal. Left ventricular ejection fraction appears to be 61 - 65%.    Left ventricular diastolic function was normal.    The left atrial cavity is mildly dilated.    Left atrial volume is mildly increased.    There is moderate calcification of the aortic valve mainly affecting the left coronary and right coronary cusp(s).    Estimated right ventricular systolic pressure from tricuspid regurgitation is mildly elevated (35-45 mmHg).    Mild pulmonary hypertension is present.        I reviewed the patient's new clinical results.    EKG:      Assessment:       Pneumonia    Superior mesenteric artery  stenosis    Acute on chronic hypoxic respiratory failure / pneumonia     2. Known SSS s/p micra leadless PPM placed 1/2025     3. Concern for bradycardia  Interrogate device     4. pAFib/flutter  Converted to SR last admission on amiodarone 200mg BID  Will de escalate to 200mg daily  On eliquis 2.5mg BID     5. Normal LVEF / mild pulm HTN     6. H/o CAD currently stable    Plan:   Gentle diuresis ongoing  Replace potassium aggressively  Continues with significant oxygen requirement on Airvo 60 L  Pacemaker functioning well appropriate heart rate of 60  Continue amiodarone 200 daily A-fib prophylaxis with Eliquis 2.5 twice daily  Midodrine for blood pressure support      Guarded condition multiorgan dysfunction    Berto Barroso MD, PhD  Berto Barroso MD  02/06/25  17:04 EST

## 2025-02-06 NOTE — PROGRESS NOTES
[facility] MEDICINE SERVICE  DAILY PROGRESS NOTE    NAME: Kali Garcia  : 1945  MRN: [id number]      LOS: 8 days     PROVIDER OF SERVICE: Jarad Lock MD    Chief Complaint: Pneumonia    Subjective:     Interval History:  History taken from: patient      History of Present Illness: Kali Garcia is a 79 y.o. male with a previous medical history of atrial fibrillation, CAD, HLD and COPD who presented to [facility] on 2025 after being found hypoxic by staff at the rehab facility.  The patient is a poor historian and other than complaining of being tired, could not provide any HPI. HPI was taken from the chart.  Per ED report the staff found him after receiving a DuoNeb shaking and having trouble breathing With an O2 saturation of 70%.  On arrival he was on a nonrebreather but was weaned down to an Airvo while in the ED. He is on O2 at 2L at baseline.     On chart review, he was discharged on 25 after treatment for pneumonia and a COPD exacerbation.       In the ED, ABG showed pH 7.48, CO2 40, pO2 45.4. Troponin's 43,42. Hemoglobin 9.0. Otherwise, labs are unremarkable.  Chest xray shows residual bibasilar infiltrates, mildly progressed from previous xray on 25.  He is afebrile, all vitals are stable with the exception of his O2 saturation which is 98% on Airvo.       seen in bed NAD pt c/o dyspnea on 4lts, severe anemia, hgl 6.5  Will   transfuse 1 unit prbc  / seen in bed NAD KALEB RN, dyspnea on High flow oxygen, 15 lts   2/2 seen in bed NAD, vss, KALEB RN, patient developed bradycardia, consulted cardiology  2/3 Mostly concerned about his diet.  Largely asymptomatic despite severe oxygen requirements.  2/4 worsening oxygenation  2/5 Bipap dependent  2/6 Alternating BiPAP and Airvo    Review of Systems  12 point ROS reviewed and negative except as mentioned above  Objective:     Vital Signs  Temp:  [97.6 °F (36.4 °C)-98.2 °F (36.8 °C)] 97.7 °F (36.5 °C)  Heart  Rate:  [50-56] 56  Resp:  [13-28] 13  BP: (100-127)/(44-55) 127/52  Flow (L/min) (Oxygen Therapy):  [60] 60   Body mass index is 16.05 kg/m².    Physical Exam  Vitals and nursing note reviewed.   Constitutional:       Appearance: Normal appearance.      Comments: Airvo   HENT:      Mouth/Throat:      Mouth: Mucous membranes are moist.   Cardiovascular:      Rate and Rhythm: Normal rate and regular rhythm.   Pulmonary:      Effort: Pulmonary effort is normal.      Breath sounds: Examination of the right-lower field reveals decreased breath sounds. Examination of the left-lower field reveals decreased breath sounds. Decreased breath sounds present.   Abdominal:      General: Bowel sounds are normal.      Palpations: Abdomen is soft.   Musculoskeletal:         General: Normal range of motion.   Skin:     General: Skin is warm and dry.   Neurological:      General: No focal deficit present.      Mental Status: He is oriented to person, place, and time. Mental status is at baseline.     Comments: Poor insight into his condition.  Psychiatric:         Mood and Affect: Mood normal.         Behavior: Behavior normal.            Diagnostic Data    Results from last 7 days   Lab Units 02/05/25  0255   WBC 10*3/mm3 3.62   HEMOGLOBIN g/dL 7.8*   HEMATOCRIT % 24.9*   PLATELETS 10*3/mm3 56*   GLUCOSE mg/dL 121*   CREATININE mg/dL 0.64*   BUN mg/dL 24*   SODIUM mmol/L 140   POTASSIUM mmol/L 3.2*   AST (SGOT) U/L 22   ALT (SGPT) U/L 18   ALK PHOS U/L 57   BILIRUBIN mg/dL 0.4   ANION GAP mmol/L 4.5*       No radiology results for the last day      I reviewed the patient's new clinical results.  Scheduled Meds:amiodarone, 200 mg, Oral, Q24H  apixaban, 2.5 mg, Oral, Q12H  arformoterol, 15 mcg, Nebulization, BID - RT  atorvastatin, 10 mg, Oral, Nightly  budesonide, 0.5 mg, Nebulization, BID - RT  citalopram, 10 mg, Oral, Daily  clopidogrel, 75 mg, Oral, Daily  furosemide, 20 mg, Intravenous, Q12H  midodrine, 10 mg, Oral,  Q8H  mirtazapine, 15 mg, Oral, Nightly  multivitamin with minerals, 1 tablet, Oral, Daily  pantoprazole, 40 mg, Oral, Daily  sodium chloride, 500 mL, Intravenous, Once  sodium chloride, 10 mL, Intravenous, Q12H  tamsulosin, 0.4 mg, Oral, Daily      Continuous Infusions:   PRN Meds:.  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       Family meeting held 2/5/2025; patient is DNR/DNI.  Family considering comfort care if condition is not soon to improve.          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  He wishes to eat food so I think that is reasonable given the overall trajectory of his care and overall de-escalation that we allow him to eat food for comfort when he is off of the Airvo.  He has been informed and understands aspiration risks.  -Continue IV antibiotics.  Continue the Lasix to control his volume status.     Acute urinary retention  -Failed voiding trial 2/3/2025  -Repeat trial when more stable    Chronic atrial fibrillation  Chronic CAD with HLD  Severe SMA stenosis, asymptomatic  -Continue amiodarone, Eliquis, atorvastatin, Plavix  -Outpatient follow-up regarding asymptomatic SMA stenosis.  No inpatient procedure at this time.     Essential Hypertension-bp low  -Monitor BP-midodrine added     COPD-Chronic, stable  -Continue home inhalers, Theodur    Iron  deficiency anemia with history of an ulcer at GE junction in 2022  -Gastroenterology previously consulted and recommended outpatient EGD after this hospitalization    VTE Prophylaxis:  Pharmacologic VTE prophylaxis orders are present.    Disposition Planning:     Barriers to Discharge: Pneumonia management, severe oxygen requirements, acute urinary retention  Anticipated Date of Discharge: 2/9  Place of Discharge: [facility]      Time: 34 minutes     Code Status and Medical Interventions: No CPR (Do Not Attempt to Resuscitate); Limited Support; No intubation (DNI)   Ordered at: 02/05/25 1405     Medical Intervention Limits:    No intubation (DNI)     Level Of Support Discussed With:    Next of Kin (If No Surrogate)    Health Care Surrogate     Code Status (Patient has no pulse and is not breathing):    No CPR (Do Not Attempt to Resuscitate)     Medical Interventions (Patient has pulse or is breathing):    Limited Support       Signature: Electronically signed by Jarad Lock MD, 02/06/25, 13:19 [facility]  [facility] Hospitalist Team

## 2025-02-06 NOTE — PROGRESS NOTES
Daily Progress Note        Pneumonia    Superior mesenteric artery stenosis      Assessment:    Hypoxic respiratory insufficiency, multifactorial, severe lung and cardiac disease and vascular disease    Left lower lobe pneumonia    severe COPD  Respiratory panel negative on 1/30/2025  On home oxygen    Urine cultures 1/18/2025 positive for Pseudomonas    CAD, PVD  Extensive coronary artery calcifications  Severe upper abdominal atherosclerotic calcifications with SMA stenosis    HTN  HLD  A-fib  Anemia  Former smoker quit 2022 after 60 pack years  Echo 2/1/2024 EF 61-65% mild pulmonary hypertension 35-45 mmHg     CT scan of the chest in June 2024 showing a noncalcified nodule in the right upper lobe around 2 x 1.1 x 1 cm. Patient was also noted to have patchy airspace disease and clustered micronodules in the lingula.     He then underwent PET scan 10/10/2024 showing a 1.7 x 1.1 cm partially calcified irregular shaped nodule in the right upper lobe to be metabolically active with SUV of 4.84 and additional 2 metabolically active irregular nodular densities in the right upper lobe. Inferior apical segment with intervening areas of metabolically active interstitial thickening.     PFTs: FEV1/FVC postbronchodilator is 0.39 with FEV1 of 0.71 L or 28% predicted and FVC of 1.82 L or 47% predicted.   Total lung capacity of 7.77 L or 131% predicted and residual volume of 5.82 L or 226% predicted and DLCO of 61% predicted noted.     TSH 3.4 on 12/29/2024     Recommendations:    Oxygen titration currently on Airvo high flow 60 L alternating with AVAPS    Antibiotics on IV Unasyn  Received 1 dose of IV vancomycin and cefepime    Lasix 20 mg IV every 12 started on 2/2/2025    Bronchodilator Pulmicort and DuoNeb  On amiodarone  Plavix  Midodrine 5 mg 3 times daily  Protonix 40 mg daily  Eliquis 2.5 mg twice daily     Patient is DNR DNI    Overall poor prognosis    Chest x-ray 2/2/2025      CT chest 2/1/2025             LOS: 8  days     Subjective         Objective     Vital signs for last 24 hours:  Vitals:    02/06/25 0740 02/06/25 0743 02/06/25 0744 02/06/25 0747   BP:       BP Location:       Patient Position:       Pulse: 53 54 54 54   Resp: 28 18 18 19   Temp:       TempSrc:       SpO2: 100% 100% 100% 100%   Weight:       Height:           Intake/Output last 3 shifts:  I/O last 3 completed shifts:  In: 1444 [P.O.:1444]  Out: 1750 [Urine:1750]  Intake/Output this shift:  No intake/output data recorded.      Radiology  Imaging Results (Last 24 Hours)       ** No results found for the last 24 hours. **            Labs:  Results from last 7 days   Lab Units 02/05/25  0255   WBC 10*3/mm3 3.62   HEMOGLOBIN g/dL 7.8*   HEMATOCRIT % 24.9*   PLATELETS 10*3/mm3 56*     Results from last 7 days   Lab Units 02/05/25  0255   SODIUM mmol/L 140   POTASSIUM mmol/L 3.2*   CHLORIDE mmol/L 100   CO2 mmol/L 35.5*   BUN mg/dL 24*   CREATININE mg/dL 0.64*   CALCIUM mg/dL 8.1*   BILIRUBIN mg/dL 0.4   ALK PHOS U/L 57   ALT (SGPT) U/L 18   AST (SGOT) U/L 22   GLUCOSE mg/dL 121*     Results from last 7 days   Lab Units 02/02/25  1631   PH, ARTERIAL pH units 7.394   PO2 ART mm Hg 54.8*   PCO2, ARTERIAL mm Hg 42.9   HCO3 ART mmol/L 26.2     Results from last 7 days   Lab Units 02/05/25  0255 02/02/25  1004 02/01/25  1011   ALBUMIN g/dL 2.1* 2.6* 2.5*               Results from last 7 days   Lab Units 02/02/25  1004   MAGNESIUM mg/dL 2.0                     Meds:   SCHEDULE  amiodarone, 200 mg, Oral, Q24H  apixaban, 2.5 mg, Oral, Q12H  arformoterol, 15 mcg, Nebulization, BID - RT  atorvastatin, 10 mg, Oral, Nightly  budesonide, 0.5 mg, Nebulization, BID - RT  citalopram, 10 mg, Oral, Daily  clopidogrel, 75 mg, Oral, Daily  furosemide, 20 mg, Intravenous, Q12H  midodrine, 10 mg, Oral, Q8H  mirtazapine, 15 mg, Oral, Nightly  multivitamin with minerals, 1 tablet, Oral, Daily  pantoprazole, 40 mg, Oral, Daily  sodium chloride, 500 mL, Intravenous, Once  sodium  chloride, 10 mL, Intravenous, Q12H  tamsulosin, 0.4 mg, Oral, Daily      Infusions     PRNs    acetaminophen **OR** acetaminophen **OR** acetaminophen    senna-docusate sodium **AND** polyethylene glycol **AND** bisacodyl **AND** bisacodyl    ipratropium-albuterol    melatonin    nitroglycerin    ondansetron    [COMPLETED] Insert Peripheral IV **AND** sodium chloride    sodium chloride    sodium chloride    Physical Exam:  Physical Exam  Cardiovascular:      Heart sounds: Murmur heard.      No gallop.   Pulmonary:      Effort: No respiratory distress.      Breath sounds: No stridor. Rhonchi and rales present. No wheezing.   Chest:      Chest wall: No tenderness.         ROS  Review of Systems   Respiratory:  Positive for cough and shortness of breath. Negative for wheezing and stridor.    Cardiovascular:  Positive for palpitations and leg swelling. Negative for chest pain.             Total time spent with patient greater than: 45 Minutes

## 2025-02-06 NOTE — NURSING NOTE
Patient's family member asks for glass of water. RN noticed that family is giving water to patient. RN discusses NPO status with patient and patient's son. RN explains to patient and his son the risk of aspiration. Patient's son verbalizes understanding, but states that he does not want his father to suffer.

## 2025-02-06 NOTE — PROGRESS NOTES
Enter Query Response Below      Query Response: Chronic illness/disease related severe protein calorie malnutrition              If applicable, please update the problem list.

## 2025-02-06 NOTE — PLAN OF CARE
Problem: Adult Inpatient Plan of Care  Goal: Plan of Care Review  Outcome: Progressing  Flowsheets (Taken 2/6/2025 0108)  Progress: declining  Outcome Evaluation: Patient does not want to wear AVAPS. Patient switched to airvo by Respiratory Therapy, per patient request early in shift. Many family members gathered at bedside around 2000. Patient's son asking why patient not receiving antibiotics. Son encouraging patient to cough big and use yaunker as well as flutter valve. Patient mildly anxious after family left, asking nurse if airvo will stay in place while he sleeps, states that he is worried he will quit breathing in his sleep and not wake up.  Plan of Care Reviewed With:   patient   child  Goal: Patient-Specific Goal (Individualized)  Outcome: Progressing  Goal: Absence of Hospital-Acquired Illness or Injury  Outcome: Progressing  Intervention: Identify and Manage Fall Risk  Description: Perform standard risk assessment on admission using a validated tool or comprehensive approach appropriate to the patient; reassess fall risk frequently, with change in status or transfer to another level of care.  Communicate risk to interprofessional healthcare team; ensure fall risk visible cue.  Determine need for increased observation, equipment and environmental modification, as well as use of supportive, nonskid footwear.  Adjust safety measures to individual needs and identified risk factors.  Reinforce the importance of active participation with fall risk prevention, safety, and physical activity with the patient and family.  Perform regular intentional rounding to assess need for position change, pain assessment and personal needs, including assistance with toileting.  Recent Flowsheet Documentation  Taken 2/6/2025 0000 by Rhoda Richards, RN  Safety Promotion/Fall Prevention: safety round/check completed  Taken 2/5/2025 2200 by Rhoda Richards, RN  Safety Promotion/Fall Prevention: safety round/check  completed  Taken 2/5/2025 2000 by Rhoda Richards RN  Safety Promotion/Fall Prevention: safety round/check completed  Intervention: Prevent Skin Injury  Description: Perform a screening for skin injury risk, such as pressure or moisture-associated skin damage on admission and at regular intervals throughout hospital stay.  Keep all areas of skin (especially folds) clean and dry.  Maintain adequate skin hydration.  Relieve and redistribute pressure and protect bony prominences and skin at risk for injury; implement measures based on patient-specific risk factors.  Match turning and repositioning schedule to clinical condition.  Encourage weight shift frequently; assist with reposition if unable to complete independently.  Float heels off bed; avoid pressure on the Achilles tendon.  Keep skin free from extended contact with medical devices.  Optimize nutrition and hydration.  Encourage functional activity and mobility, as early as tolerated.  Use aids (e.g., slide boards, mechanical lift) during transfer.  Recent Flowsheet Documentation  Taken 2/6/2025 0000 by Rhoda Richards RN  Skin Protection:   incontinence pads utilized   skin sealant/moisture barrier applied  Taken 2/5/2025 2200 by Rhoda Richards RN  Body Position:   turned   head facing, left  Taken 2/5/2025 1954 by Rhoda Richards, RN  Skin Protection:   incontinence pads utilized   skin sealant/moisture barrier applied  Intervention: Prevent Infection  Description: Maintain skin and mucous membrane integrity; promote hand, oral and pulmonary hygiene.  Optimize fluid balance, nutrition, sleep and glycemic control to maximize infection resistance.  Identify potential sources of infection early to prevent or mitigate progression of infection (e.g., wound, lines, devices).  Evaluate ongoing need for invasive devices; remove promptly when no longer indicated.  Review vaccination status.  Recent Flowsheet Documentation  Taken 2/6/2025 0000 by Maurice  SAM Duong  Infection Prevention:   environmental surveillance performed   equipment surfaces disinfected   hand hygiene promoted   rest/sleep promoted  Taken 2/5/2025 2200 by Rhoda Richards RN  Infection Prevention:   environmental surveillance performed   equipment surfaces disinfected   hand hygiene promoted   rest/sleep promoted  Taken 2/5/2025 2000 by Rhoda Richards RN  Infection Prevention:   environmental surveillance performed   equipment surfaces disinfected   hand hygiene promoted   rest/sleep promoted  Goal: Optimal Comfort and Wellbeing  Outcome: Progressing  Intervention: Monitor Pain and Promote Comfort  Description: Assess pain level, treatment efficacy and patient response at regular intervals using a consistent pain scale.  Consider the presence and impact of preexisting chronic pain.  Encourage patient and caregiver involvement in pain assessment, interventions and safety measures.  Promote activity; balance with sleep and rest to enhance healing.  Recent Flowsheet Documentation  Taken 2/5/2025 2136 by Rhoda Richards RN  Pain Management Interventions:   position adjusted   pillow support provided   pain medication given   pain management plan reviewed with patient/caregiver  Goal: Readiness for Transition of Care  Outcome: Progressing     Problem: Skin Injury Risk Increased  Goal: Skin Health and Integrity  Outcome: Progressing  Intervention: Optimize Skin Protection  Description: Perform a full pressure injury risk assessment, as indicated by screening, upon admission to care unit.  Reassess skin (full inspection and injury risk, including skin temperature, consistency and color) frequently (e.g., scheduled interval, with change in condition) to provide optimal early detection and prevention.  Maintain adequate tissue perfusion (e.g., encourage fluid balance; avoid crossing legs, constrictive clothing or devices) to promote tissue oxygenation.  Maintain head of bed at lowest degree of  elevation tolerated, considering medical condition and other restrictions. Use positioning supports to prevent sliding and friction. Consider low friction textiles.  Avoid positioning onto an area that remains reddened or on bony prominences.  Minimize incontinence and moisture (e.g., toileting schedule; moisture-wicking pad, diaper or incontinence collection device; skin moisture barrier).  Cleanse skin promptly and gently, when soiled, utilizing a pH-balanced cleanser.  Relieve and redistribute pressure (e.g., scheduled position changes, weight shifts, use of support surface, medical device repositioning, protective dressing application, use of positioning device, microclimate control, use of pressure-injury-monitor  Encourage increased activity, such as sitting in a chair at the bedside or early mobilization, when able to tolerate. Avoid prolonged sitting.  Recent Flowsheet Documentation  Taken 2/6/2025 0000 by Rhoda Richards, RN  Pressure Reduction Techniques:   frequent weight shift encouraged   weight shift assistance provided  Pressure Reduction Devices:   pressure-redistributing mattress utilized   positioning supports utilized   specialty bed utilized  Skin Protection:   incontinence pads utilized   skin sealant/moisture barrier applied  Taken 2/5/2025 2200 by Rhoda Richards, RN  Head of Bed (HOB) Positioning: HOB elevated  Taken 2/5/2025 2000 by Rhoda Richards, RN  Activity Management: bedrest  Taken 2/5/2025 1954 by Rhoda Richards, RN  Pressure Reduction Techniques:   frequent weight shift encouraged   weight shift assistance provided  Pressure Reduction Devices: pressure-redistributing mattress utilized  Skin Protection:   incontinence pads utilized   skin sealant/moisture barrier applied     Problem: Malnutrition  Goal: Improved Nutritional Intake  Outcome: Progressing     Problem: Fall Injury Risk  Goal: Absence of Fall and Fall-Related Injury  Outcome: Progressing  Intervention: Identify and  Manage Contributors  Description: Develop a fall prevention plan, considering patient-centered interventions and family/caregiver involvement; identify and address patient's facilitators and barriers.  Provide reorientation, appropriate sensory stimulation and routines with changes in mental status to decrease risk of fall.  Promote use of personal vision and auditory aids.  Assess assistance level required for safe and effective self-care; provide support as needed, such as toileting and mobilization. For age 65 and older, implement timed toileting with assistance.  Encourage physical activity, such as performance of mobility and self-care at highest level of patient ability, multicomponent exercise program and provision of appropriate assistive devices.  If fall occurs, assess the severity of injury; implement fall injury protocol. Determine the cause and revise fall injury prevention plan.  Regularly review and advocate for medication adjustment to decrease fall risk; consider administration times, polypharmacy and age.  Balance adequate pain management with potential for oversedation.  Recent Flowsheet Documentation  Taken 2/5/2025 2000 by Rhoda Richards RN  Medication Review/Management: medications reviewed  Intervention: Promote Injury-Free Environment  Description: Provide a safe, barrier-free environment that encourages independent activity.  Keep care area uncluttered and well-lighted.  Determine need for increased observation or monitoring.  Avoid use of devices that minimize mobility, such as restraints or indwelling urinary catheter.  Recent Flowsheet Documentation  Taken 2/6/2025 0000 by Rhoda Richards, RN  Safety Promotion/Fall Prevention: safety round/check completed  Taken 2/5/2025 2200 by Rhoda Richards, RN  Safety Promotion/Fall Prevention: safety round/check completed  Taken 2/5/2025 2000 by Rhoda Richards, RN  Safety Promotion/Fall Prevention: safety round/check completed     Problem:  Noninvasive Ventilation Acute  Goal: Effective Unassisted Ventilation and Oxygenation  Outcome: Progressing   Goal Outcome Evaluation:  Plan of Care Reviewed With: patient, child        Progress: declining  Outcome Evaluation: Patient does not want to wear AVAPS. Patient switched to airvo by Respiratory Therapy, per patient request early in shift. Many family members gathered at bedside around 2000. Patient's son asking why patient not receiving antibiotics. Son encouraging patient to cough big and use yaunker as well as flutter valve. Patient mildly anxious after family left, asking nurse if airvo will stay in place while he sleeps, states that he is worried he will quit breathing in his sleep and not wake up.

## 2025-02-06 NOTE — SIGNIFICANT NOTE
02/06/25 0849   Rehab Time/Intention   Session Not Performed   (pt has experienced medical decline, not tolerating mobility and is not appropriate for PT treatment. Will complete orders.)   Therapy Assessment/Plan (PT)   Criteria for Skilled Interventions Met (PT) no

## 2025-02-06 NOTE — NURSING NOTE
Patient continues on airvo. Continues to refute bipap. Patient asks nurse if the airvo will stay in his nose and states that he is afraid that it will come out and he wont wake up. RN again recommends and encourages bipap. Patient refuses. RN tells patient she will monitor him closely

## 2025-02-06 NOTE — NURSING NOTE
Patient requesting to remove his bipap. Educated patient on risks involved with removing bipap. Patient continued to pull on mask and states that he is hungry and wants to eat. Patient's son and other family members are at bedside. Respiratory therapist on unit and able to transfer patient to airvo. RT and RN explained to patient that his oxygen may not sustain his oxygen needs and he may need to go back on bipap quickly. Patient verbalized understanding. Patient placed on airvo and maintaining 93%.

## 2025-02-06 NOTE — PLAN OF CARE
Problem: Adult Inpatient Plan of Care  Goal: Plan of Care Review  2/6/2025 0538 by Rhoda Richards RN  Outcome: Progressing  Flowsheets (Taken 2/6/2025 0538)  Progress: improving  Plan of Care Reviewed With: patient  2/6/2025 0108 by Rhoda Richards RN  Outcome: Progressing  Flowsheets (Taken 2/6/2025 0108)  Progress: declining  Outcome Evaluation: Patient does not want to wear AVAPS. Patient switched to airvo by Respiratory Therapy, per patient request early in shift. Many family members gathered at bedside around 2000. Patient's son asking why patient not receiving antibiotics. Son encouraging patient to cough big and use yaunker as well as flutter valve. Patient mildly anxious after family left, asking nurse if airvo will stay in place while he sleeps, states that he is worried he will quit breathing in his sleep and not wake up.  Plan of Care Reviewed With:   patient   child  Goal: Patient-Specific Goal (Individualized)  2/6/2025 0538 by Rhoda Richards RN  Outcome: Progressing  2/6/2025 0108 by Rhoda Richards RN  Outcome: Progressing  Goal: Absence of Hospital-Acquired Illness or Injury  2/6/2025 0538 by Rhoda Richards RN  Outcome: Progressing  2/6/2025 0108 by Rhoda Richards RN  Outcome: Progressing  Intervention: Identify and Manage Fall Risk  Description: Perform standard risk assessment on admission using a validated tool or comprehensive approach appropriate to the patient; reassess fall risk frequently, with change in status or transfer to another level of care.  Communicate risk to interprofessional healthcare team; ensure fall risk visible cue.  Determine need for increased observation, equipment and environmental modification, as well as use of supportive, nonskid footwear.  Adjust safety measures to individual needs and identified risk factors.  Reinforce the importance of active participation with fall risk prevention, safety, and physical activity with the patient and  family.  Perform regular intentional rounding to assess need for position change, pain assessment and personal needs, including assistance with toileting.  Recent Flowsheet Documentation  Taken 2/6/2025 0400 by Rhoda Richards RN  Safety Promotion/Fall Prevention:   clutter free environment maintained   safety round/check completed  Taken 2/6/2025 0200 by Rhoda Richards RN  Safety Promotion/Fall Prevention:   assistive device/personal items within reach   safety round/check completed   room organization consistent  Taken 2/6/2025 0000 by Rhoda Richards RN  Safety Promotion/Fall Prevention: safety round/check completed  Taken 2/5/2025 2200 by Rhoda Richards RN  Safety Promotion/Fall Prevention: safety round/check completed  Taken 2/5/2025 2000 by Rhoda Richards RN  Safety Promotion/Fall Prevention: safety round/check completed  Intervention: Prevent Skin Injury  Description: Perform a screening for skin injury risk, such as pressure or moisture-associated skin damage on admission and at regular intervals throughout hospital stay.  Keep all areas of skin (especially folds) clean and dry.  Maintain adequate skin hydration.  Relieve and redistribute pressure and protect bony prominences and skin at risk for injury; implement measures based on patient-specific risk factors.  Match turning and repositioning schedule to clinical condition.  Encourage weight shift frequently; assist with reposition if unable to complete independently.  Float heels off bed; avoid pressure on the Achilles tendon.  Keep skin free from extended contact with medical devices.  Optimize nutrition and hydration.  Encourage functional activity and mobility, as early as tolerated.  Use aids (e.g., slide boards, mechanical lift) during transfer.  Recent Flowsheet Documentation  Taken 2/6/2025 0400 by Rhoda Richards RN  Skin Protection:   incontinence pads utilized   skin sealant/moisture barrier applied  Taken 2/6/2025 0000 by  Maurice, Rhoda, RN  Skin Protection:   incontinence pads utilized   skin sealant/moisture barrier applied  Taken 2/5/2025 2200 by Rhoda Richards RN  Body Position:   turned   head facing, left  Taken 2/5/2025 1954 by Rhoda Richards RN  Skin Protection:   incontinence pads utilized   skin sealant/moisture barrier applied  Intervention: Prevent Infection  Description: Maintain skin and mucous membrane integrity; promote hand, oral and pulmonary hygiene.  Optimize fluid balance, nutrition, sleep and glycemic control to maximize infection resistance.  Identify potential sources of infection early to prevent or mitigate progression of infection (e.g., wound, lines, devices).  Evaluate ongoing need for invasive devices; remove promptly when no longer indicated.  Review vaccination status.  Recent Flowsheet Documentation  Taken 2/6/2025 0400 by Rhoda Richards RN  Infection Prevention:   environmental surveillance performed   equipment surfaces disinfected   hand hygiene promoted   rest/sleep promoted  Taken 2/6/2025 0200 by Rhoda Richards RN  Infection Prevention:   environmental surveillance performed   equipment surfaces disinfected   hand hygiene promoted   rest/sleep promoted  Taken 2/6/2025 0000 by Rhoda Richards RN  Infection Prevention:   environmental surveillance performed   equipment surfaces disinfected   hand hygiene promoted   rest/sleep promoted  Taken 2/5/2025 2200 by Rhoda Richards RN  Infection Prevention:   environmental surveillance performed   equipment surfaces disinfected   hand hygiene promoted   rest/sleep promoted  Taken 2/5/2025 2000 by Rhoda Richards RN  Infection Prevention:   environmental surveillance performed   equipment surfaces disinfected   hand hygiene promoted   rest/sleep promoted  Goal: Optimal Comfort and Wellbeing  2/6/2025 0538 by Rhoda Richards RN  Outcome: Progressing  2/6/2025 0108 by Rhoda Richards RN  Outcome: Progressing  Intervention: Monitor  Pain and Promote Comfort  Description: Assess pain level, treatment efficacy and patient response at regular intervals using a consistent pain scale.  Consider the presence and impact of preexisting chronic pain.  Encourage patient and caregiver involvement in pain assessment, interventions and safety measures.  Promote activity; balance with sleep and rest to enhance healing.  Recent Flowsheet Documentation  Taken 2/5/2025 2136 by Rhoda Richards RN  Pain Management Interventions:   position adjusted   pillow support provided   pain medication given   pain management plan reviewed with patient/caregiver  Goal: Readiness for Transition of Care  2/6/2025 0538 by Rhoda Richards RN  Outcome: Progressing  2/6/2025 0108 by Rhoda Richards RN  Outcome: Progressing     Problem: Skin Injury Risk Increased  Goal: Skin Health and Integrity  2/6/2025 0538 by Rhoda Richards RN  Outcome: Progressing  2/6/2025 0108 by Rhoda Richards RN  Outcome: Progressing  Intervention: Optimize Skin Protection  Description: Perform a full pressure injury risk assessment, as indicated by screening, upon admission to care unit.  Reassess skin (full inspection and injury risk, including skin temperature, consistency and color) frequently (e.g., scheduled interval, with change in condition) to provide optimal early detection and prevention.  Maintain adequate tissue perfusion (e.g., encourage fluid balance; avoid crossing legs, constrictive clothing or devices) to promote tissue oxygenation.  Maintain head of bed at lowest degree of elevation tolerated, considering medical condition and other restrictions. Use positioning supports to prevent sliding and friction. Consider low friction textiles.  Avoid positioning onto an area that remains reddened or on bony prominences.  Minimize incontinence and moisture (e.g., toileting schedule; moisture-wicking pad, diaper or incontinence collection device; skin moisture barrier).  Cleanse skin  promptly and gently, when soiled, utilizing a pH-balanced cleanser.  Relieve and redistribute pressure (e.g., scheduled position changes, weight shifts, use of support surface, medical device repositioning, protective dressing application, use of positioning device, microclimate control, use of pressure-injury-monitor  Encourage increased activity, such as sitting in a chair at the bedside or early mobilization, when able to tolerate. Avoid prolonged sitting.  Recent Flowsheet Documentation  Taken 2/6/2025 0400 by Rhoda Richards, RN  Pressure Reduction Techniques:   frequent weight shift encouraged   weight shift assistance provided  Pressure Reduction Devices: pressure-redistributing mattress utilized  Skin Protection:   incontinence pads utilized   skin sealant/moisture barrier applied  Taken 2/6/2025 0000 by Rhoda Richards RN  Pressure Reduction Techniques:   frequent weight shift encouraged   weight shift assistance provided  Pressure Reduction Devices:   pressure-redistributing mattress utilized   positioning supports utilized   specialty bed utilized  Skin Protection:   incontinence pads utilized   skin sealant/moisture barrier applied  Taken 2/5/2025 2200 by Rhoda Richards RN  Head of Bed (HOB) Positioning: HOB elevated  Taken 2/5/2025 2000 by Rhoda Richards, RN  Activity Management: bedrest  Taken 2/5/2025 1954 by Rhoda Richards, RN  Pressure Reduction Techniques:   frequent weight shift encouraged   weight shift assistance provided  Pressure Reduction Devices: pressure-redistributing mattress utilized  Skin Protection:   incontinence pads utilized   skin sealant/moisture barrier applied     Problem: Malnutrition  Goal: Improved Nutritional Intake  2/6/2025 0538 by Rhoda Richards RN  Outcome: Progressing  2/6/2025 0108 by Rhoda Richards RN  Outcome: Progressing     Problem: Fall Injury Risk  Goal: Absence of Fall and Fall-Related Injury  2/6/2025 0538 by Rhoda Richards, SAM  Outcome:  Progressing  2/6/2025 0108 by Rhoda Richards, RN  Outcome: Progressing  Intervention: Identify and Manage Contributors  Description: Develop a fall prevention plan, considering patient-centered interventions and family/caregiver involvement; identify and address patient's facilitators and barriers.  Provide reorientation, appropriate sensory stimulation and routines with changes in mental status to decrease risk of fall.  Promote use of personal vision and auditory aids.  Assess assistance level required for safe and effective self-care; provide support as needed, such as toileting and mobilization. For age 65 and older, implement timed toileting with assistance.  Encourage physical activity, such as performance of mobility and self-care at highest level of patient ability, multicomponent exercise program and provision of appropriate assistive devices.  If fall occurs, assess the severity of injury; implement fall injury protocol. Determine the cause and revise fall injury prevention plan.  Regularly review and advocate for medication adjustment to decrease fall risk; consider administration times, polypharmacy and age.  Balance adequate pain management with potential for oversedation.  Recent Flowsheet Documentation  Taken 2/5/2025 2000 by Rhoda Richards, RN  Medication Review/Management: medications reviewed  Intervention: Promote Injury-Free Environment  Description: Provide a safe, barrier-free environment that encourages independent activity.  Keep care area uncluttered and well-lighted.  Determine need for increased observation or monitoring.  Avoid use of devices that minimize mobility, such as restraints or indwelling urinary catheter.  Recent Flowsheet Documentation  Taken 2/6/2025 0400 by Rhoda Richards, RN  Safety Promotion/Fall Prevention:   clutter free environment maintained   safety round/check completed  Taken 2/6/2025 0200 by Rhoda Richards, RN  Safety Promotion/Fall Prevention:   assistive  device/personal items within reach   safety round/check completed   room organization consistent  Taken 2/6/2025 0000 by Rhoda Richards RN  Safety Promotion/Fall Prevention: safety round/check completed  Taken 2/5/2025 2200 by Rhoda Richards RN  Safety Promotion/Fall Prevention: safety round/check completed  Taken 2/5/2025 2000 by Rhoda Richards RN  Safety Promotion/Fall Prevention: safety round/check completed     Problem: Noninvasive Ventilation Acute  Goal: Effective Unassisted Ventilation and Oxygenation  2/6/2025 0538 by Rhoda Richards RN  Outcome: Progressing  2/6/2025 0108 by Rhoda Richards RN  Outcome: Progressing   Goal Outcome Evaluation:  Plan of Care Reviewed With: patient        Progress: improving  Outcome Evaluation: Patient does not want to wear AVAPS. Patient switched to airvo by Respiratory Therapy, per patient request early in shift. Many family members gathered at bedside around 2000. Patient's son asking why patient not receiving antibiotics. Son encouraging patient to cough big and use yaunker as well as flutter valve. Patient mildly anxious after family left, asking nurse if airvo will stay in place while he sleeps, states that he is worried he will quit breathing in his sleep and not wake up.

## 2025-02-06 NOTE — PLAN OF CARE
Problem: Adult Inpatient Plan of Care  Goal: Plan of Care Review  Outcome: Progressing  Goal: Patient-Specific Goal (Individualized)  Outcome: Progressing  Goal: Absence of Hospital-Acquired Illness or Injury  Outcome: Progressing  Intervention: Identify and Manage Fall Risk  Recent Flowsheet Documentation  Taken 2/6/2025 1400 by Elisabeth Magana RN  Safety Promotion/Fall Prevention: safety round/check completed  Taken 2/6/2025 1200 by Elisabeth Magana RN  Safety Promotion/Fall Prevention: safety round/check completed  Taken 2/6/2025 1000 by Elisabeth Magana RN  Safety Promotion/Fall Prevention: safety round/check completed  Taken 2/6/2025 0800 by Elisabeth Magana RN  Safety Promotion/Fall Prevention: safety round/check completed  Intervention: Prevent Skin Injury  Recent Flowsheet Documentation  Taken 2/6/2025 1400 by Elisabeth Magana RN  Body Position: position maintained  Taken 2/6/2025 1200 by Elisabeth Magana RN  Body Position: patient/family refused  Intervention: Prevent and Manage VTE (Venous Thromboembolism) Risk  Recent Flowsheet Documentation  Taken 2/6/2025 0800 by Elisabeth Magana RN  VTE Prevention/Management:   bilateral   SCDs (sequential compression devices) on  Goal: Optimal Comfort and Wellbeing  Outcome: Progressing  Intervention: Provide Person-Centered Care  Recent Flowsheet Documentation  Taken 2/6/2025 0800 by Elisabeth Magana RN  Trust Relationship/Rapport:   care explained   choices provided   emotional support provided   empathic listening provided   questions answered   reassurance provided  Goal: Readiness for Transition of Care  Outcome: Progressing     Problem: Skin Injury Risk Increased  Goal: Skin Health and Integrity  Outcome: Progressing  Intervention: Optimize Skin Protection  Recent Flowsheet Documentation  Taken 2/6/2025 1000 by Elisabeth Magana RN  Activity Management: bedrest  Taken 2/6/2025 0800 by Elisabeth Magana RN  Activity Management: bedrest     Problem: Malnutrition  Goal:  Improved Nutritional Intake  Outcome: Progressing     Problem: Fall Injury Risk  Goal: Absence of Fall and Fall-Related Injury  Outcome: Progressing  Intervention: Promote Injury-Free Environment  Recent Flowsheet Documentation  Taken 2/6/2025 1400 by Elisabeth Magana RN  Safety Promotion/Fall Prevention: safety round/check completed  Taken 2/6/2025 1200 by Elisabeth Mgaana RN  Safety Promotion/Fall Prevention: safety round/check completed  Taken 2/6/2025 1000 by lEisabeth Magana RN  Safety Promotion/Fall Prevention: safety round/check completed  Taken 2/6/2025 0800 by Elisabeth Magana RN  Safety Promotion/Fall Prevention: safety round/check completed     Problem: Noninvasive Ventilation Acute  Goal: Effective Unassisted Ventilation and Oxygenation  Outcome: Progressing

## 2025-02-06 NOTE — CASE MANAGEMENT/SOCIAL WORK
Continued Stay Note  [facility]     Patient Name: Kali Garcia  MRN: [id number]  Today's Date: 2/6/2025    Admit Date: 1/29/2025    Plan: [facility] (accepted). Possible transition to [facility] this weekend   Discharge Plan       Row Name 02/06/25 1350       Plan    Plan [facility] (accepted). Possible transition to [facility] this weekend    Plan Comments MD has updated patient/family on prognosis and overall trajectory of his care. Patient's family members are visiting and arranging visits this weekend with possible transition to hospice/[facility] this weekend. CM updated [facility] liaison Starla. Starla has been in contact with patient's family and is up to date on plan of care. MD placed diet order, and patient allowed to eat for comfort when off of airvo.             Angeles Shane RN     [facility]  Office: [phone]  Cell: [phone]  Fax # [phone]

## 2025-02-06 NOTE — CONSULTS
Nutrition Services    Patient Name: Kali Garcia  YOB: 1945  MRN: [id number]  Admission date: 1/29/2025    Comment:  -- Continue current diet and encourage good PO intakes as clinically tolerated.    -- Severe chronic disease related malnutrition related to multiple catabolic chronic medical problems as evidenced by likely PO intakes less than 75% x 1 year and severe muscle and fat wasting per physical exam.     CLINICAL NUTRITION ASSESSMENT      Reason for Assessment 2/6: Follow-up protocol   1/30: BMI less than 19, history of malnutrition      H&P      Past Medical History:   Diagnosis Date    COPD (chronic obstructive pulmonary disease)     Coronary artery disease     Emphysema lung     Hyperlipidemia     Hypertension     Osteoarthritis        Past Surgical History:   Procedure Laterality Date    CARDIAC CATHETERIZATION      CARDIAC ELECTROPHYSIOLOGY PROCEDURE N/A 1/2/2025    Procedure: Bijan SIMS;  Surgeon: Eliot Mcgarry MD;  Location: [facility] CATH INVASIVE LOCATION;  Service: Cardiovascular;  Laterality: N/A;    COLONOSCOPY N/A 8/4/2022    Procedure: COLONOSCOPY with 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 and endoscopic clipping x 1;  Surgeon: Luz Jacques MD;  Location: [facility] ENDOSCOPY;  Service: Gastroenterology;  Laterality: N/A;  post op: cecal AVM    CORONARY STENT PLACEMENT      ENDOSCOPY N/A 4/14/2022    Procedure: ESOPHAGOGASTRODUODENOSCOPY WITH BIOPSY X1 AREA;  Surgeon: Luz Jacques MD;  Location: [facility] ENDOSCOPY;  Service: Gastroenterology;  Laterality: N/A;  esophagitis, gastritis, HH    ENDOSCOPY N/A 8/4/2022    Procedure: ESOPHAGOGASTRODUODENOSCOPY;  Surgeon: Luz Jacques MD;  Location: [facility] ENDOSCOPY;  Service: Gastroenterology;  Laterality: N/A;  post op: hiatal hernia, eosphagitis    EYE SURGERY      FEMORAL ARTERY STENT      KNEE SURGERY Left     KNEE SURGERY Left     LUNG SURGERY          Current Problems   Pneumonia     Chronic atrial  "fibrillation  Chronic CAD with HLD     Essential Hypertension     COPD       Encounter Information        Trending Narrative     2/6: Checking in to monitor po diet tolerance and Nutrition POC. Plan to discharge with [facility] hospice services. PO diet ordered and pt allowed to eat for comfort when off airvo per nursing report. RD will continue to monitor and adjust Nutrition POC based on demands of clinical course and GOC.     1/30: Admitted after being found hypoxic by staff at his rehab facility.  RD visited patient at bedside.  Patient refused breakfast this AM but accepted chocolate Boost from RD.  Patient reports occasional nausea.  NFPE completed, consistent with nutrition diagnosis of malnutrition using AND/ASPEN criteria. See MSA below.         Anthropometrics        Current Height, Weight Height: 180 cm (70.87\")  Weight: 52 kg (114 lb 10.2 oz) (02/05/25 0822)       Usual Body Weight (UBW) Unable to obtain from patient        Trending Weight Hx     This admission: 2/5: 114# (3.5% weight gain in the last week)  1/30: 110#             PTA: No significant weight loss     Wt Readings from Last 30 Encounters:   02/05/25 0822 52 kg (114 lb 10.2 oz)   02/04/25 0500 53.8 kg (118 lb 9.7 oz)   01/29/25 1824 49.9 kg (110 lb)   01/23/25 0524 49 kg (108 lb 0.4 oz)   01/22/25 0500 48.6 kg (107 lb 2.3 oz)   01/21/25 0539 48.8 kg (107 lb 9.4 oz)   01/20/25 0510 48.3 kg (106 lb 7.7 oz)   01/19/25 0500 48.3 kg (106 lb 7.7 oz)   01/18/25 2332 48.3 kg (106 lb 7.7 oz)   01/18/25 1625 51 kg (112 lb 7 oz)   01/03/25 0500 51 kg (112 lb 7 oz)   01/02/25 0557 51.8 kg (114 lb 3.2 oz)   01/01/25 0500 51.2 kg (112 lb 14 oz)   12/31/24 0501 51.1 kg (112 lb 10.5 oz)   12/30/24 0600 52.8 kg (116 lb 6.5 oz)   12/29/24 0514 53 kg (116 lb 13.5 oz)   12/28/24 0538 51.4 kg (113 lb 5.1 oz)   12/27/24 0647 50.4 kg (111 lb 1.8 oz)   12/26/24 0610 49.9 kg (110 lb 0.2 oz)   12/25/24 0600 49.6 kg (109 lb 5.6 oz)   12/23/24 0531 50.1 kg (110 lb 7.2 " "oz)   12/22/24 2139 49.9 kg (110 lb 0.2 oz)   03/04/24 1452 53 kg (116 lb 12.1 oz)   02/19/24 1303 50.5 kg (111 lb 5 oz)   02/16/24 2101 50 kg (110 lb 2.3 oz)   02/16/24 2054 51.8 kg (114 lb 2.8 oz)   02/15/24 1416 52.9 kg (116 lb 10.3 oz)   02/04/24 0315 51.5 kg (113 lb 8.6 oz)   02/03/24 1415 51.3 kg (113 lb)   02/03/24 0345 51.5 kg (113 lb 8.6 oz)   02/02/24 1524 51.8 kg (114 lb 3.2 oz)   02/02/24 0337 54.4 kg (120 lb)   02/01/24 1330 52.1 kg (114 lb 14.5 oz)   08/04/22 1124 51.1 kg (112 lb 10.5 oz)   07/26/22 1511 59 kg (130 lb)   04/15/22 0436 58.3 kg (128 lb 8.5 oz)   04/13/22 0355 54.6 kg (120 lb 5.9 oz)   04/12/22 2037 128 kg (282 lb 3 oz)      BMI kg/m2 Body mass index is 16.05 kg/m².       Labs        Pertinent Labs Hypokalemia - replaced yesterday    Results from last 7 days   Lab Units 02/05/25  0255 02/02/25  1631 02/02/25  1004 02/01/25  1011   SODIUM mmol/L 140  --  139 139   POTASSIUM mmol/L 3.2*  --  3.7 4.2   CHLORIDE mmol/L 100  --  102 105   CO2 mmol/L 35.5*  --  29.5* 29.1*   BUN mg/dL 24*  --  17 16   CREATININE mg/dL 0.64* 0.76 0.54* 0.53*   CALCIUM mg/dL 8.1*  --  8.0* 7.7*   BILIRUBIN mg/dL 0.4  --  0.7 0.6   ALK PHOS U/L 57  --  60 55   ALT (SGPT) U/L 18  --  24 25   AST (SGOT) U/L 22  --  28 26   GLUCOSE mg/dL 121*  --  74 74     Results from last 7 days   Lab Units 02/05/25  0255 02/02/25  1631 02/02/25  1004   MAGNESIUM mg/dL  --   --  2.0   HEMOGLOBIN g/dL 7.8*  --  9.6*   HEMOGLOBIN, POC   --    < >  --    HEMATOCRIT % 24.9*  --  30.4*   HEMATOCRIT POC   --    < >  --     < > = values in this interval not displayed.     No results found for: \"HGBA1C\"     Medications    Scheduled Medications amiodarone, 200 mg, Oral, Q24H  apixaban, 2.5 mg, Oral, Q12H  arformoterol, 15 mcg, Nebulization, BID - RT  atorvastatin, 10 mg, Oral, Nightly  budesonide, 0.5 mg, Nebulization, BID - RT  citalopram, 10 mg, Oral, Daily  clopidogrel, 75 mg, Oral, Daily  furosemide, 20 mg, Intravenous, " Q12H  midodrine, 10 mg, Oral, Q8H  mirtazapine, 15 mg, Oral, Nightly  multivitamin with minerals, 1 tablet, Oral, Daily  pantoprazole, 40 mg, Oral, Daily  sodium chloride, 500 mL, Intravenous, Once  sodium chloride, 10 mL, Intravenous, Q12H  tamsulosin, 0.4 mg, Oral, Daily        Infusions      PRN Medications   acetaminophen **OR** acetaminophen **OR** acetaminophen    senna-docusate sodium **AND** polyethylene glycol **AND** bisacodyl **AND** bisacodyl    Calcium Replacement - Follow Nurse / BPA Driven Protocol    ipratropium-albuterol    Magnesium Standard Dose Replacement - Follow Nurse / BPA Driven Protocol    melatonin    nitroglycerin    ondansetron    Phosphorus Replacement - Follow Nurse / BPA Driven Protocol    Potassium Replacement - Follow Nurse / BPA Driven Protocol    [COMPLETED] Insert Peripheral IV **AND** sodium chloride    sodium chloride    sodium chloride     Physical Findings        Trending Physical   Appearance, NFPE 2/6: Agree with previous assessment    1/30: NFPE completed, consistent with nutrition diagnosis of malnutrition using AND/ASPEN criteria. See MSA below.      --  Edema  No edema documented      Bowel Function Last documented BM 2/3     Tubes No feeding tube      Chewing/Swallowing No known issues      Skin Stage 1 coccyx - no WOCN note at this time      --  Current Nutrition Orders & Evaluation of Intake       Oral Nutrition     Food Allergies NKFA   Current PO Diet Diet: Regular/House; Fluid Consistency: Thin (IDDSI 0)   Supplement Magic Cups at lunch/dinner (Provides 580 kcals, 18 g protein if consumed)     Boost Original BID (Provides 480 kcals, 20 g protein if consumed)      PO Evaluation     Trending % PO Intake 2/6: Has been NPO the last few days. Consuming supplements  1/30: None documented    --  Nutritional Risk Screening        NRS-2002 Score          Nutrition Diagnosis         Nutrition Dx Problem 1 Severe chronic disease related malnutrition related to multiple  catabolic chronic medical problems as evidenced by likely PO intakes less than 75% x 1 year and severe muscle and fat wasting per physical exam.        Nutrition Dx Problem 2        Intervention Goal         Intervention Goal(s) Accept ONS  No weight loss  PO intakes at least 60%  Improve skin integrity     Nutrition Intervention        RD Action Monitor po intake and continue to encourage good intake.   Continue Magic Cups  Continues Boost Original BID     Nutrition Prescription          Diet Prescription Heart Healthy   Supplement Prescription Magic Cup BID  Boost Original BID   --  Monitor/Evaluation        Monitor Per protocol, I&O, PO intake, Supplement intake, Pertinent labs, Weight       Electronically signed by:  Patito Ochoa, GIOVANNI  02/06/25 14:29 EST

## 2025-02-07 ENCOUNTER — APPOINTMENT (OUTPATIENT)
Dept: GENERAL RADIOLOGY | Facility: HOSPITAL | Age: 80
End: 2025-02-07
Payer: COMMERCIAL

## 2025-02-07 LAB
ALBUMIN SERPL-MCNC: 2.4 G/DL (ref 3.5–5.2)
ALBUMIN/GLOB SERPL: 0.8 G/DL
ALP SERPL-CCNC: 69 U/L (ref 39–117)
ALT SERPL W P-5'-P-CCNC: 22 U/L (ref 1–41)
ANION GAP SERPL CALCULATED.3IONS-SCNC: 7.1 MMOL/L (ref 5–15)
AST SERPL-CCNC: 28 U/L (ref 1–40)
BILIRUB SERPL-MCNC: 0.6 MG/DL (ref 0–1.2)
BUN SERPL-MCNC: 21 MG/DL (ref 8–23)
BUN/CREAT SERPL: 35 (ref 7–25)
CALCIUM SPEC-SCNC: 8.5 MG/DL (ref 8.6–10.5)
CHLORIDE SERPL-SCNC: 97 MMOL/L (ref 98–107)
CO2 SERPL-SCNC: 35.9 MMOL/L (ref 22–29)
CREAT SERPL-MCNC: 0.6 MG/DL (ref 0.76–1.27)
DEPRECATED RDW RBC AUTO: 56.3 FL (ref 37–54)
EGFRCR SERPLBLD CKD-EPI 2021: 97.6 ML/MIN/1.73
ERYTHROCYTE [DISTWIDTH] IN BLOOD BY AUTOMATED COUNT: 16.4 % (ref 12.3–15.4)
GLOBULIN UR ELPH-MCNC: 3 GM/DL
GLUCOSE SERPL-MCNC: 117 MG/DL (ref 65–99)
HCT VFR BLD AUTO: 27.8 % (ref 37.5–51)
HGB BLD-MCNC: 8.7 G/DL (ref 13–17.7)
MCH RBC QN AUTO: 29.4 PG (ref 26.6–33)
MCHC RBC AUTO-ENTMCNC: 31.3 G/DL (ref 31.5–35.7)
MCV RBC AUTO: 93.9 FL (ref 79–97)
PLATELET # BLD AUTO: 84 10*3/MM3 (ref 140–450)
PMV BLD AUTO: 10.2 FL (ref 6–12)
POTASSIUM SERPL-SCNC: 3.3 MMOL/L (ref 3.5–5.2)
POTASSIUM SERPL-SCNC: 4 MMOL/L (ref 3.5–5.2)
PROT SERPL-MCNC: 5.4 G/DL (ref 6–8.5)
RBC # BLD AUTO: 2.96 10*6/MM3 (ref 4.14–5.8)
SODIUM SERPL-SCNC: 140 MMOL/L (ref 136–145)
WBC NRBC COR # BLD AUTO: 4.62 10*3/MM3 (ref 3.4–10.8)

## 2025-02-07 PROCEDURE — 99233 SBSQ HOSP IP/OBS HIGH 50: CPT | Performed by: INTERNAL MEDICINE

## 2025-02-07 PROCEDURE — 94660 CPAP INITIATION&MGMT: CPT

## 2025-02-07 PROCEDURE — 84132 ASSAY OF SERUM POTASSIUM: CPT | Performed by: STUDENT IN AN ORGANIZED HEALTH CARE EDUCATION/TRAINING PROGRAM

## 2025-02-07 PROCEDURE — 94799 UNLISTED PULMONARY SVC/PX: CPT

## 2025-02-07 PROCEDURE — 71045 X-RAY EXAM CHEST 1 VIEW: CPT

## 2025-02-07 PROCEDURE — 94664 DEMO&/EVAL PT USE INHALER: CPT

## 2025-02-07 PROCEDURE — 94761 N-INVAS EAR/PLS OXIMETRY MLT: CPT

## 2025-02-07 PROCEDURE — 80053 COMPREHEN METABOLIC PANEL: CPT | Performed by: INTERNAL MEDICINE

## 2025-02-07 PROCEDURE — 85027 COMPLETE CBC AUTOMATED: CPT | Performed by: STUDENT IN AN ORGANIZED HEALTH CARE EDUCATION/TRAINING PROGRAM

## 2025-02-07 PROCEDURE — 25010000002 FUROSEMIDE PER 20 MG: Performed by: INTERNAL MEDICINE

## 2025-02-07 PROCEDURE — 25010000002 HYDROCORTISONE SOD SUC (PF) 100 MG RECONSTITUTED SOLUTION: Performed by: INTERNAL MEDICINE

## 2025-02-07 RX ORDER — POTASSIUM CHLORIDE 1.5 G/1.58G
40 POWDER, FOR SOLUTION ORAL EVERY 4 HOURS
Status: COMPLETED | OUTPATIENT
Start: 2025-02-07 | End: 2025-02-07

## 2025-02-07 RX ORDER — HYDROCORTISONE SODIUM SUCCINATE 100 MG/2ML
50 INJECTION INTRAMUSCULAR; INTRAVENOUS EVERY 8 HOURS
Status: DISCONTINUED | OUTPATIENT
Start: 2025-02-07 | End: 2025-02-15 | Stop reason: HOSPADM

## 2025-02-07 RX ADMIN — APIXABAN 2.5 MG: 2.5 TABLET, FILM COATED ORAL at 08:45

## 2025-02-07 RX ADMIN — Medication 10 ML: at 08:45

## 2025-02-07 RX ADMIN — POTASSIUM CHLORIDE 40 MEQ: 1.5 POWDER, FOR SOLUTION ORAL at 16:52

## 2025-02-07 RX ADMIN — POTASSIUM CHLORIDE 40 MEQ: 1.5 POWDER, FOR SOLUTION ORAL at 12:37

## 2025-02-07 RX ADMIN — TAMSULOSIN HYDROCHLORIDE 0.4 MG: 0.4 CAPSULE ORAL at 08:45

## 2025-02-07 RX ADMIN — PANTOPRAZOLE SODIUM 40 MG: 40 TABLET, DELAYED RELEASE ORAL at 08:45

## 2025-02-07 RX ADMIN — APIXABAN 2.5 MG: 2.5 TABLET, FILM COATED ORAL at 20:09

## 2025-02-07 RX ADMIN — MIDODRINE HYDROCHLORIDE 10 MG: 5 TABLET ORAL at 16:52

## 2025-02-07 RX ADMIN — MIDODRINE HYDROCHLORIDE 10 MG: 5 TABLET ORAL at 08:45

## 2025-02-07 RX ADMIN — ARFORMOTEROL TARTRATE 15 MCG: 15 SOLUTION RESPIRATORY (INHALATION) at 20:15

## 2025-02-07 RX ADMIN — AMIODARONE HYDROCHLORIDE 200 MG: 200 TABLET ORAL at 08:45

## 2025-02-07 RX ADMIN — ARFORMOTEROL TARTRATE 15 MCG: 15 SOLUTION RESPIRATORY (INHALATION) at 07:20

## 2025-02-07 RX ADMIN — MIRTAZAPINE 15 MG: 15 TABLET, FILM COATED ORAL at 20:09

## 2025-02-07 RX ADMIN — HYDROCORTISONE SODIUM SUCCINATE 50 MG: 100 INJECTION, POWDER, FOR SOLUTION INTRAMUSCULAR; INTRAVENOUS at 12:37

## 2025-02-07 RX ADMIN — FUROSEMIDE 20 MG: 10 INJECTION, SOLUTION INTRAMUSCULAR; INTRAVENOUS at 16:52

## 2025-02-07 RX ADMIN — HYDROCORTISONE SODIUM SUCCINATE 50 MG: 100 INJECTION, POWDER, FOR SOLUTION INTRAMUSCULAR; INTRAVENOUS at 19:55

## 2025-02-07 RX ADMIN — IPRATROPIUM BROMIDE AND ALBUTEROL SULFATE 3 ML: .5; 3 SOLUTION RESPIRATORY (INHALATION) at 04:40

## 2025-02-07 RX ADMIN — BUDESONIDE 0.5 MG: 0.5 INHALANT RESPIRATORY (INHALATION) at 20:22

## 2025-02-07 RX ADMIN — ATORVASTATIN CALCIUM 10 MG: 10 TABLET ORAL at 20:09

## 2025-02-07 RX ADMIN — CITALOPRAM 10 MG: 20 TABLET, FILM COATED ORAL at 08:45

## 2025-02-07 RX ADMIN — BUDESONIDE 0.5 MG: 0.5 INHALANT RESPIRATORY (INHALATION) at 07:15

## 2025-02-07 RX ADMIN — CLOPIDOGREL BISULFATE 75 MG: 75 TABLET ORAL at 08:45

## 2025-02-07 RX ADMIN — Medication 1 TABLET: at 08:45

## 2025-02-07 RX ADMIN — FUROSEMIDE 20 MG: 10 INJECTION, SOLUTION INTRAMUSCULAR; INTRAVENOUS at 04:44

## 2025-02-07 NOTE — PROGRESS NOTES
"Enter Query Response Below      Query Response: -Bacterial pneumonia unspecified             If applicable, please update the problem list.         Patient: Kali Gimenez        : 1945  Account: [id number]           Admit Date:         How to Respond to this query:       a. Click New Note     b. Answer query within the yellow box.                c. Update the Problem List, if applicable.      If you have any questions about this query contact me at: [email or web address]     Dr. Lock:     79 y.o. male with history of COPD admitted () with \"Pneumonia\" per the H&P through the hospitalist's progress notes (-2/3).   Pulmonology's progress notes ( & 2/3) include 'Left lower lobe pneumonia.\"   Treatment:  IV Unasyn (-2/3) End date   IV Cefepime ()  IV Vancomycin (129)  IV Azithromycin ()    Please clarify the type of pneumonia the patient was treated/monitored for:    -Gram negative pneumonia (excluding Haemophilus influenzae)  -Bacterial pneumonia unspecified  -Other- specify______    By submitting this query, we are merely seeking further clarification of documentation to accurately reflect all conditions that you are monitoring, evaluating, treating or that extend the hospitalization or utilize additional resources of care. Please utilize your independent clinical judgment when addressing the question(s) above.     This query and your response, once completed, will be entered into the legal medical record.    Sincerely,  Silke MAC RN   Clinical Documentation Integrity Program     "

## 2025-02-07 NOTE — PROGRESS NOTES
[facility] MEDICINE SERVICE  DAILY PROGRESS NOTE    NAME: Kali Garcia  : 1945  MRN: [id number]      LOS: 9 days     PROVIDER OF SERVICE: Jarad Lock MD    Chief Complaint: Pneumonia    Subjective:     Interval History:  History taken from: patient      History of Present Illness: Kali Garcia is a 79 y.o. male with a previous medical history of atrial fibrillation, CAD, HLD and COPD who presented to [facility] on 2025 after being found hypoxic by staff at the rehab facility.  The patient is a poor historian and other than complaining of being tired, could not provide any HPI. HPI was taken from the chart.  Per ED report the staff found him after receiving a DuoNeb shaking and having trouble breathing With an O2 saturation of 70%.  On arrival he was on a nonrebreather but was weaned down to an Airvo while in the ED. He is on O2 at 2L at baseline.     On chart review, he was discharged on 25 after treatment for pneumonia and a COPD exacerbation.       In the ED, ABG showed pH 7.48, CO2 40, pO2 45.4. Troponin's 43,42. Hemoglobin 9.0. Otherwise, labs are unremarkable.  Chest xray shows residual bibasilar infiltrates, mildly progressed from previous xray on 25.  He is afebrile, all vitals are stable with the exception of his O2 saturation which is 98% on Airvo.       seen in bed NAD pt c/o dyspnea on 4lts, severe anemia, hgl 6.5  Will   transfuse 1 unit prbc  / seen in bed NAD KALEB RN, dyspnea on High flow oxygen, 15 lts   2/2 seen in bed NAD, vss, KALEB RN, patient developed bradycardia, consulted cardiology  2/3 Mostly concerned about his diet.  Largely asymptomatic despite severe oxygen requirements.  2/4 worsening oxygenation  2/5 Bipap dependent  2/6 Alternating BiPAP and Airvo  2/7 Alteranating BiPAP and Airbo, significant Bipap discomfort    Review of Systems  12 point ROS reviewed and negative except as mentioned above  Objective:     Vital Signs  Temp:   [97.6 °F (36.4 °C)-98.9 °F (37.2 °C)] 98.2 °F (36.8 °C)  Heart Rate:  [52-59] 55  Resp:  [11-20] 18  BP: (103-133)/(43-83) 110/55  Flow (L/min) (Oxygen Therapy):  [60] 60   Body mass index is 16.05 kg/m².    Physical Exam  Vitals and nursing note reviewed.   Constitutional:       Appearance: Normal appearance.      Comments: Airvo   HENT:      Mouth/Throat:      Mouth: Mucous membranes are moist.   Cardiovascular:      Rate and Rhythm: Normal rate and regular rhythm.   Pulmonary:      Effort: Pulmonary effort is normal.      Breath sounds: Examination of the right-lower field reveals decreased breath sounds. Examination of the left-lower field reveals decreased breath sounds. Decreased breath sounds present.   Abdominal:      General: Bowel sounds are normal.      Palpations: Abdomen is soft.   Musculoskeletal:         General: Normal range of motion.   Skin:     General: Skin is warm and dry.   Neurological:      General: No focal deficit present.      Mental Status: He is oriented to person, place, and time. Mental status is at baseline.     Comments: Poor insight into his condition.  Psychiatric:         Mood and Affect: Mood normal.         Behavior: Behavior normal.            Diagnostic Data    Results from last 7 days   Lab Units 02/07/25  0932   WBC 10*3/mm3 4.62   HEMOGLOBIN g/dL 8.7*   HEMATOCRIT % 27.8*   PLATELETS 10*3/mm3 84*   GLUCOSE mg/dL 117*   CREATININE mg/dL 0.60*   BUN mg/dL 21   SODIUM mmol/L 140   POTASSIUM mmol/L 3.3*   AST (SGOT) U/L 28   ALT (SGPT) U/L 22   ALK PHOS U/L 69   BILIRUBIN mg/dL 0.6   ANION GAP mmol/L 7.1       XR Chest 1 View    Result Date: 2/7/2025  Impression: 1. No significant change in bilateral lower lobe airspace disease, corresponding to suspected pneumonia based on prior CT chest imaging. 2. Advanced emphysema. 3. Right apical lung nodule is unchanged. 4. Suspected small to moderate layering bilateral pleural effusions, stable. Electronically Signed: Martha Dodd MD   2/7/2025 9:36 AM EST  Workstation ID: [id number]       I reviewed the patient's new clinical results.  Scheduled Meds:amiodarone, 200 mg, Oral, Q24H  apixaban, 2.5 mg, Oral, Q12H  arformoterol, 15 mcg, Nebulization, BID - RT  atorvastatin, 10 mg, Oral, Nightly  budesonide, 0.5 mg, Nebulization, BID - RT  citalopram, 10 mg, Oral, Daily  clopidogrel, 75 mg, Oral, Daily  furosemide, 20 mg, Intravenous, Q12H  hydrocortisone sodium succinate, 50 mg, Intravenous, Q8H  midodrine, 10 mg, Oral, Q8H  mirtazapine, 15 mg, Oral, Nightly  multivitamin with minerals, 1 tablet, Oral, Daily  pantoprazole, 40 mg, Oral, Daily  potassium chloride, 40 mEq, Oral, Q4H  sodium chloride, 500 mL, Intravenous, Once  sodium chloride, 10 mL, Intravenous, Q12H  tamsulosin, 0.4 mg, Oral, Daily      Continuous Infusions:   PRN Meds:.  acetaminophen **OR** acetaminophen **OR** acetaminophen    senna-docusate sodium **AND** polyethylene glycol **AND** bisacodyl **AND** bisacodyl    Calcium Replacement - Follow Nurse / BPA Driven Protocol    ipratropium-albuterol    Magnesium Standard Dose Replacement - Follow Nurse / BPA Driven Protocol    melatonin    nitroglycerin    ondansetron    Phosphorus Replacement - Follow Nurse / BPA Driven Protocol    Potassium Replacement - Follow Nurse / BPA Driven Protocol    [COMPLETED] Insert Peripheral IV **AND** sodium chloride    sodium chloride    sodium chloride   Assessment/Plan:     Active and Resolved Problems  Active Hospital Problems    Diagnosis  POA    **Pneumonia [J18.9]  Yes    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       Attending afternoon addendum:     I had another family meeting today with the children.  We discussed his overall poor prognosis and declining respiratory status.  He is now essentially 100% BiPAP dependent with worsening oxygenation overnight.  We discussed his significant discomfort that the patient is in with  continued BiPAP and steps forward.    The family is bringing in his youngest grandchild to say goodbye to him this evening.  Afterwards they would like to transition the patient to high flow nasal cannula and to not reescalate to BiPAP.  If the patient begins to desat on the high flow nasal cannula then the family would like for us to make the patient comfortable and allow him to pass with as much dignity and comfort as possible.    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  He wishes to eat food so I think that is reasonable given the overall trajectory of his care and overall de-escalation that we allow him to eat food for comfort when he is off of the Airvo.  He has been informed and understands aspiration risks.  -Continue IV antibiotics.  Continue the Lasix to control his volume status.     Acute urinary retention  -Failed voiding trial 2/3/2025  -Repeat trial when more stable    Chronic atrial fibrillation  Chronic CAD with HLD  Severe SMA stenosis, asymptomatic  -Continue amiodarone, Eliquis, atorvastatin, Plavix  -Outpatient follow-up regarding asymptomatic SMA stenosis.  No inpatient procedure at this time.     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    Disposition Planning:     Barriers to Discharge: Pneumonia management, severe oxygen requirements, acute urinary retention  Anticipated Date of Discharge: 2/9  Place of Discharge: [facility]      Time: 34 minutes     Code Status and Medical Interventions: No CPR (Do Not Attempt to  Resuscitate); Limited Support; No intubation (DNI)   Ordered at: 02/05/25 1405     Medical Intervention Limits:    No intubation (DNI)     Level Of Support Discussed With:    Next of Kin (If No Surrogate)    Health Care Surrogate     Code Status (Patient has no pulse and is not breathing):    No CPR (Do Not Attempt to Resuscitate)     Medical Interventions (Patient has pulse or is breathing):    Limited Support       Signature: Electronically signed by Jarad Lock MD, 02/07/25, 14:09 EST.  [facility], [place]

## 2025-02-07 NOTE — CASE MANAGEMENT/SOCIAL WORK
Continued Stay Note  [facility]     Patient Name: Kali Garcia  MRN: [id number]  Today's Date: 2/7/2025    Admit Date: 1/29/2025    Plan: [facility] (accepted). Possible transition to [facility] this weekend   Discharge Plan       Row Name 02/07/25 1104       Plan    Plan Comments DC Barriers: Airvo 60L/AVAPS, significant pulm decline, MD in contact with family and anticipates poss transition to hospice later today             Angeles Shane RN     [facility]  Office: [phone]  Cell: [phone]  Fax # [phone]

## 2025-02-07 NOTE — PROGRESS NOTES
Daily Progress Note        Pneumonia    Superior mesenteric artery stenosis      Assessment:    Hypoxic respiratory insufficiency, multifactorial, severe lung and cardiac disease and vascular disease    Left lower lobe pneumonia    severe COPD  Respiratory panel negative on 1/30/2025  On home oxygen    Urine cultures 1/18/2025 positive for Pseudomonas    CAD, PVD  Extensive coronary artery calcifications  Severe upper abdominal atherosclerotic calcifications with SMA stenosis    HTN  HLD  A-fib  Anemia  Former smoker quit 2022 after 60 pack years  Echo 2/1/2024 EF 61-65% mild pulmonary hypertension 35-45 mmHg     CT scan of the chest in June 2024 showing a noncalcified nodule in the right upper lobe around 2 x 1.1 x 1 cm. Patient was also noted to have patchy airspace disease and clustered micronodules in the lingula.     He then underwent PET scan 10/10/2024 showing a 1.7 x 1.1 cm partially calcified irregular shaped nodule in the right upper lobe to be metabolically active with SUV of 4.84 and additional 2 metabolically active irregular nodular densities in the right upper lobe. Inferior apical segment with intervening areas of metabolically active interstitial thickening.     PFTs: FEV1/FVC postbronchodilator is 0.39 with FEV1 of 0.71 L or 28% predicted and FVC of 1.82 L or 47% predicted.   Total lung capacity of 7.77 L or 131% predicted and residual volume of 5.82 L or 226% predicted and DLCO of 61% predicted noted.     TSH 3.4 on 12/29/2024     Recommendations:    hydrocortisone 50 mg IV Q8 for possibility of amiodarone toxicity     oxygen titration currently on Airvo high flow 60 L alternating with AVAPS  Midodrine 10 mg 3 times daily  Antibiotics on IV Unasyn  Received 1 dose of IV vancomycin and cefepime    Lasix 20 mg IV every 12 started on 2/2/2025    Bronchodilator Pulmicort and DuoNeb  On amiodarone  Plavix    Protonix 40 mg daily  Eliquis 2.5 mg twice daily     Patient is DNR DNI    Overall poor  prognosis    Chest x-ray 2/2/2025      CT chest 2/1/2025             LOS: 9 days     Subjective         Objective     Vital signs for last 24 hours:  Vitals:    02/07/25 0715 02/07/25 0800 02/07/25 0822 02/07/25 0837   BP:  103/43  103/43   BP Location:    Left arm   Patient Position:    Lying   Pulse:  53  55   Resp:    19   Temp:    98.9 °F (37.2 °C)   TempSrc:    Oral   SpO2: 91% (!) 71% 95% 100%   Weight:       Height:           Intake/Output last 3 shifts:  I/O last 3 completed shifts:  In: 240 [P.O.:240]  Out: 2350 [Urine:2350]  Intake/Output this shift:  No intake/output data recorded.      Radiology  Imaging Results (Last 24 Hours)       Procedure Component Value Units Date/Time    XR Chest 1 View [[id number]] Collected: 02/07/25 0932     Updated: 02/07/25 0938    Narrative:      XR CHEST 1 VW    Date of Exam: 2/7/2025 9:17 AM EST    Indication: followup respiratory failure    Comparison: AP chest 2/2/2025. CT chest 4/14/2022, 2/1/2025. PET/CT 10/10/2024.    Findings:  Interstitial and alveolar disease is present in the lung bases, similar to prior examination. Suspected, moderate small layering bilateral pleural effusions, without significant change. Normal heart size. Emphysematous/fibrotic changes are noted in the   apices. No pneumothorax. Old left rib resection changes. Nodular opacity in the right apex thought to correspond to the metabolically active nodule on prior PET/CT from 10/10/2024.      Impression:      Impression:    1. No significant change in bilateral lower lobe airspace disease, corresponding to suspected pneumonia based on prior CT chest imaging.  2. Advanced emphysema.  3. Right apical lung nodule is unchanged.  4. Suspected small to moderate layering bilateral pleural effusions, stable.      Electronically Signed: Martha Dodd MD    2/7/2025 9:36 AM EST    Workstation ID: [id number]            Labs:  Results from last 7 days   Lab Units 02/05/25  0255   WBC 10*3/mm3 3.62   HEMOGLOBIN  g/dL 7.8*   HEMATOCRIT % 24.9*   PLATELETS 10*3/mm3 56*     Results from last 7 days   Lab Units 02/05/25  0255   SODIUM mmol/L 140   POTASSIUM mmol/L 3.2*   CHLORIDE mmol/L 100   CO2 mmol/L 35.5*   BUN mg/dL 24*   CREATININE mg/dL 0.64*   CALCIUM mg/dL 8.1*   BILIRUBIN mg/dL 0.4   ALK PHOS U/L 57   ALT (SGPT) U/L 18   AST (SGOT) U/L 22   GLUCOSE mg/dL 121*     Results from last 7 days   Lab Units 02/02/25  1631   PH, ARTERIAL pH units 7.394   PO2 ART mm Hg 54.8*   PCO2, ARTERIAL mm Hg 42.9   HCO3 ART mmol/L 26.2     Results from last 7 days   Lab Units 02/05/25  0255 02/02/25  1004 02/01/25  1011   ALBUMIN g/dL 2.1* 2.6* 2.5*               Results from last 7 days   Lab Units 02/02/25  1004   MAGNESIUM mg/dL 2.0                     Meds:   SCHEDULE  amiodarone, 200 mg, Oral, Q24H  apixaban, 2.5 mg, Oral, Q12H  arformoterol, 15 mcg, Nebulization, BID - RT  atorvastatin, 10 mg, Oral, Nightly  budesonide, 0.5 mg, Nebulization, BID - RT  citalopram, 10 mg, Oral, Daily  clopidogrel, 75 mg, Oral, Daily  furosemide, 20 mg, Intravenous, Q12H  midodrine, 10 mg, Oral, Q8H  mirtazapine, 15 mg, Oral, Nightly  multivitamin with minerals, 1 tablet, Oral, Daily  pantoprazole, 40 mg, Oral, Daily  sodium chloride, 500 mL, Intravenous, Once  sodium chloride, 10 mL, Intravenous, Q12H  tamsulosin, 0.4 mg, Oral, Daily      Infusions     PRNs    acetaminophen **OR** acetaminophen **OR** acetaminophen    senna-docusate sodium **AND** polyethylene glycol **AND** bisacodyl **AND** bisacodyl    Calcium Replacement - Follow Nurse / BPA Driven Protocol    ipratropium-albuterol    Magnesium Standard Dose Replacement - Follow Nurse / BPA Driven Protocol    melatonin    nitroglycerin    ondansetron    Phosphorus Replacement - Follow Nurse / BPA Driven Protocol    Potassium Replacement - Follow Nurse / BPA Driven Protocol    [COMPLETED] Insert Peripheral IV **AND** sodium chloride    sodium chloride    sodium chloride    Physical Exam:  Physical  Exam  Cardiovascular:      Heart sounds: Murmur heard.      No gallop.   Pulmonary:      Effort: No respiratory distress.      Breath sounds: No stridor. Rhonchi and rales present. No wheezing.   Chest:      Chest wall: No tenderness.         ROS  Review of Systems   Respiratory:  Positive for cough and shortness of breath. Negative for wheezing and stridor.    Cardiovascular:  Positive for palpitations and leg swelling. Negative for chest pain.             Total time spent with patient greater than: 45 Minutes

## 2025-02-07 NOTE — PLAN OF CARE
Goal Outcome Evaluation:              Outcome Evaluation: pt has had no compaints throughout shift. pt A/O. back and forth on airvos-avaps. possible comfort care.

## 2025-02-07 NOTE — CASE MANAGEMENT/SOCIAL WORK
Continued Stay Note  [facility]     Patient Name: Kali Garcia  MRN: [id number]  Today's Date: 2/7/2025    Admit Date: 1/29/2025    Plan: [facility] (accepted). Possible transition to GIP this weekend   Discharge Plan       Row Name 02/07/25 1533       Plan    Plan Comments MD placed hospice consult and CM was updated by primary nurse that family is requesting comfort soon. CM called and notified [facility] hosp[facility] liaison Starla. Starla states she spoke to son and daughter in law and they want him on Airvo right now while they go home and get the baby to say goodbye with possible transition to comfort and nasal cannula tonight. Starla states she is going to touch base with the family in the morning as well.             Angeles Shane RN     [facility]  Office: [phone]  Cell: [phone]  Fax # [phone]

## 2025-02-07 NOTE — PROGRESS NOTES
Cardiology [place]        LOS:  LOS: 9 days   Patient Name: Kali Garcia  Age/Sex: 79 y.o. male  : 1945  MRN: [id number]    Day of Service: 25   Length of Stay: 9  Encounter Provider: Berto Barroso MD  Place of Service: [facility] CARDIOLOGY  Patient Care Team:  Tami Ward APRN as PCP - General (Family Medicine)    Subjective:     Chief Complaint: f/u concern for bradycardia    Subjective:   Remains with significant oxygen requirement remains on Airvo 60 L  Remains with rate controlled heart rates 50s, paced rhythm, consistent with prior encounter  Overnight events discussed with staff  Awake alert denies any chest pain  Elderly deconditioned and frail  Chest pain-free no evidence of angina    Midodrine remains  Twice daily Lasix remains    Remains with significant respiratory failure, multiorgan dysfunction      Continues on antiarrhythmics and high risk medicines with amiodarone, monitor for toxicity  Eliquis twice daily continues  On low-dose statin  Plavix daily without aspirin to avoid triple therapy  Midodrine 10 3 times daily to support blood pressure    Current Medications:   Scheduled Meds:amiodarone, 200 mg, Oral, Q24H  apixaban, 2.5 mg, Oral, Q12H  arformoterol, 15 mcg, Nebulization, BID - RT  atorvastatin, 10 mg, Oral, Nightly  budesonide, 0.5 mg, Nebulization, BID - RT  citalopram, 10 mg, Oral, Daily  clopidogrel, 75 mg, Oral, Daily  furosemide, 20 mg, Intravenous, Q12H  midodrine, 10 mg, Oral, Q8H  mirtazapine, 15 mg, Oral, Nightly  multivitamin with minerals, 1 tablet, Oral, Daily  pantoprazole, 40 mg, Oral, Daily  sodium chloride, 500 mL, Intravenous, Once  sodium chloride, 10 mL, Intravenous, Q12H  tamsulosin, 0.4 mg, Oral, Daily      Continuous Infusions:     Allergies:  Allergies   Allergen Reactions    Codeine GI Intolerance       Review of Systems   Constitutional: Negative for chills, diaphoresis and malaise/fatigue.    Cardiovascular:  Positive for dyspnea on exertion. Negative for chest pain, irregular heartbeat, leg swelling, near-syncope, orthopnea, palpitations, paroxysmal nocturnal dyspnea and syncope.   Respiratory:  Positive for cough and sputum production. Negative for shortness of breath and sleep disturbances due to breathing.    Gastrointestinal:  Negative for change in bowel habit.   Genitourinary:  Negative for urgency.   Neurological:  Negative for dizziness and headaches.   Psychiatric/Behavioral:  Negative for altered mental status.          Objective:     Temp:  [97.6 °F (36.4 °C)-98.5 °F (36.9 °C)] 98.5 °F (36.9 °C)  Heart Rate:  [52-58] 53  Resp:  [11-20] 16  BP: (103-133)/(43-83) 103/43     Intake/Output Summary (Last 24 hours) at 2/7/2025 0857  Last data filed at 2/7/2025 0300  Gross per 24 hour   Intake --   Output 1800 ml   Net -1800 ml     Body mass index is 16.05 kg/m².      01/29/25  1824 02/04/25  0500 02/05/25  0822   Weight: 49.9 kg (110 lb) 53.8 kg (118 lb 9.7 oz) 52 kg (114 lb 10.2 oz)         General Appearance:    Alert, cooperative, in no acute distress                                Head: Atraumatic, normocephalic, PERRLA               Neck:   supple, trachea midline, no thyromegaly, no carotid bruit, no JVD   Lungs:     Supplemental O2, scattered rhonchi    Heart:    Regular rhythm and normal rate, normal S1 and S2   Abdomen:     Normal bowel sounds, no masses, no organomegaly, soft  nontender, nondistended, no guarding, no rebound  tenderness   Extremities:   Moves all extremities well, no edema, no cyanosis, no  redness   Pulses:   Pulses palpable and equal bilaterally   Skin:   No bleeding, bruising or rash   Neurologic:   Awake, alert, oriented x3       Unchanged from prior    Lab Review:   Results from last 7 days   Lab Units 02/05/25  0255 02/02/25  1631 02/02/25  1004   SODIUM mmol/L 140  --  139   POTASSIUM mmol/L 3.2*  --  3.7   CHLORIDE mmol/L 100  --  102   CO2 mmol/L 35.5*  --   "29.5*   BUN mg/dL 24*  --  17   CREATININE mg/dL 0.64* 0.76 0.54*   GLUCOSE mg/dL 121*  --  74   CALCIUM mg/dL 8.1*  --  8.0*   AST (SGOT) U/L 22  --  28   ALT (SGPT) U/L 18  --  24           Results from last 7 days   Lab Units 02/05/25  0255 02/02/25  1631 02/02/25  1004   WBC 10*3/mm3 3.62  --  4.27   HEMOGLOBIN g/dL 7.8*  --  9.6*   HEMOGLOBIN, POC g/dL  --  9.3*  --    HEMATOCRIT % 24.9*  --  30.4*   HEMATOCRIT POC %  --  27*  --    PLATELETS 10*3/mm3 56*  --  87*           Results from last 7 days   Lab Units 02/02/25  1004   MAGNESIUM mg/dL 2.0           Invalid input(s): \"LDLCALC\"  Results from last 7 days   Lab Units 02/02/25  1004   PROBNP pg/mL 1,011.0           Recent Radiology:  Imaging Results (Most Recent)       Procedure Component Value Units Date/Time    XR Chest 1 View [[id number]] Collected: 02/02/25 1635     Updated: 02/02/25 1638    Narrative:      XR CHEST 1 VW    Date of Exam: 2/2/2025 4:18 PM EST    Indication: Dyspnea    Comparison: CT chest without contrast 2/1/2025    Findings:  Patient is rotated to the left. There is persistent diffuse bilateral airspace disease consistent with multifocal pneumonia with worsening at the right lung base. Moderate lateral pleural effusions. Negative for pneumothorax. Advanced emphysema.      Impression:      Impression:  1. Persistent diffuse basilar airspace disease consistent with multifocal pneumonia with worsening at the right lung base.  2. Moderate bilateral pleural effusions.  3. Advanced emphysema.          Electronically Signed: Ryder Scherer MD    2/2/2025 4:36 PM EST    Workstation ID: [id number]    CT Chest Without Contrast Diagnostic [[id number]] Collected: 02/01/25 1608     Updated: 02/01/25 1617    Narrative:      CT CHEST WO CONTRAST DIAGNOSTIC    Date of Exam: 2/1/2025 3:32 PM EST    Indication: Hypoxia.    Comparison: PET/CT 10/10/2024, CT chest with contrast 2/1/2024    Technique: Axial CT images were obtained of the chest without contrast " administration.  Sagittal and coronal reconstructions were performed.  Automated exposure control and iterative reconstruction methods were used.    Findings:  Noncontrast visualized soft tissues of the lower neck are without acute abnormality. Heart size normal. Extensive coronary calcification. There is a leadless pacemaker noted in the right ventricle. Trace pericardial effusion. There are several enlarged   mediastinal lymph nodes for example low paratracheal node measuring 13 mm in short axis (2/54). These appear similar to the prior study and could relate to chronic reactive adenopathy. These nodes were not hypermetabolic on the prior PET/CT per report    Severe emphysema. Moderate bilateral pleural effusions. Lower lobe dependent airspace disease concerning for bilateral lower lobe pneumonia. No pneumothorax. Within the anterior aspect of the right upper lobe near the apex there is an area of suspected   chronic scarring with calcification which measures 2.3 x 1.1 cm (2/28). More posteriorly in the right upper lobe there is a small area of chronic nodular scarring which measures 10 mm (2/31). Area of chronic scarring at the anterior left upper lobe   unchanged (2/50).    Upper abdomen demonstrates hyperdense hepatic parenchyma which can be seen with prior amiodarone use or iron deposition among other etiologies. The spleen and adrenal glands are unremarkable. Small nonobstructing left nephrolithiasis. Extensive   atherosclerotic calcifications of the abdominal aorta and visualized branch vasculature. No free fluid in the upper abdomen. Suspect severe stenosis of the SMA due to calcified plaque, unchanged (2/121). Remote left rib fractures. Reduced mineral density   measured at L1 consistent with osteopenia/osteoporosis. Healed remote fracture at the mid sternum. The thoracic vertebral bodies are maintained in height.      Impression:      Impression:  1. Dense bilateral lower lobe consolidation compatible  with pneumonia.  2. Moderate bilateral pleural effusions.  3. Severe emphysema with areas of chronic scarring in the right upper lobe and left upper lobe.  4. Extensive coronary artery calcifications.  5. 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          Electronically Signed: Ryder Scherer MD    2/1/2025 4:15 PM EST    Workstation ID: [id number]    XR Chest 1 View [[id number]] Collected: 01/29/25 1908     Updated: 01/29/25 1912    Narrative:      XR CHEST 1 VW    Date of Exam: 1/29/2025 6:40 PM EST    Indication: soa    Comparison: 1/18/2025 at 1739 hours, 12/22/2024    Findings:  Residual infiltrates are noted throughout the mid to lower lungs bilaterally. There is a small left pleural effusion. Diffuse interstitial changes are noted. These findings are stable to mildly progressed. The findings are also superimposed on underlying   chronic changes throughout the lungs. The cardiac silhouette and mediastinum are stable.      Impression:      Impression:  Residual infiltrates are noted throughout the mid to lower lungs bilaterally. There is a small left pleural effusion. Diffuse interstitial changes are noted. These findings are stable to mildly progressed. The findings may indicate changes of CHF and   pulmonary edema.        Electronically Signed: Jonny Espinal MD    1/29/2025 7:10 PM EST    Workstation ID: [id number]            ECHOCARDIOGRAM:    Results for orders placed during the hospital encounter of 02/01/24    Adult Transthoracic Echo Limited W/ Cont if Necessary Per Protocol    Interpretation Summary    Left ventricular systolic function is normal. Left ventricular ejection fraction appears to be 61 - 65%.    Left ventricular diastolic function was normal.    The left atrial cavity is mildly dilated.    Left atrial volume is mildly increased.    There is moderate calcification of the aortic valve mainly affecting the  left coronary and right coronary cusp(s).    Estimated right ventricular systolic pressure from tricuspid regurgitation is mildly elevated (35-45 mmHg).    Mild pulmonary hypertension is present.        I reviewed the patient's new clinical results.    EKG:      Assessment:       Pneumonia    Superior mesenteric artery stenosis    Acute on chronic hypoxic respiratory failure / pneumonia     2. Known SSS s/p micra leadless PPM placed 1/2025     3. Concern for bradycardia  Interrogate device     4. pAFib/flutter  Converted to SR last admission on amiodarone 200mg BID  Will de escalate to 200mg daily  On eliquis 2.5mg BID     5. Normal LVEF / mild pulm HTN     6. H/o CAD currently stable    Plan:   Gentle diuresis ongoing Lasix twice daily  Replace potassium aggressively, has not had a BMP in the last 2 days, discussed with nursing to get stat to monitor renal function electrolytes on IV diuretics    Continues with significant oxygen requirement on Airvo greater than 50 L  Pacemaker functioning well appropriate heart rate of 60  Continue amiodarone 200 daily A-fib prophylaxis with Eliquis 2.5 twice daily, monitor for toxicity with high risk medicines  Midodrine for blood pressure support      Guarded condition multiorgan dysfunction  Requiring IV diuretics for decompensated status  Midodrine for blood pressure support on board  Pacemaker dependent at this time sick sinus syndrome backup pacing basal rate of 50        Berto Barroso MD, PhD  Berto Barroso MD  02/07/25  08:57 EST

## 2025-02-08 LAB
QT INTERVAL: 513 MS
QT INTERVAL: 684 MS
QTC INTERVAL: 481 MS
QTC INTERVAL: 641 MS

## 2025-02-08 PROCEDURE — 94799 UNLISTED PULMONARY SVC/PX: CPT

## 2025-02-08 PROCEDURE — 93010 ELECTROCARDIOGRAM REPORT: CPT | Performed by: INTERNAL MEDICINE

## 2025-02-08 PROCEDURE — 25010000002 HYDROCORTISONE SOD SUC (PF) 100 MG RECONSTITUTED SOLUTION: Performed by: INTERNAL MEDICINE

## 2025-02-08 PROCEDURE — 94761 N-INVAS EAR/PLS OXIMETRY MLT: CPT

## 2025-02-08 PROCEDURE — 99233 SBSQ HOSP IP/OBS HIGH 50: CPT | Performed by: INTERNAL MEDICINE

## 2025-02-08 PROCEDURE — 25010000002 FUROSEMIDE PER 20 MG: Performed by: INTERNAL MEDICINE

## 2025-02-08 PROCEDURE — 93005 ELECTROCARDIOGRAM TRACING: CPT | Performed by: INTERNAL MEDICINE

## 2025-02-08 PROCEDURE — 94664 DEMO&/EVAL PT USE INHALER: CPT

## 2025-02-08 RX ADMIN — BUDESONIDE 0.5 MG: 0.5 INHALANT RESPIRATORY (INHALATION) at 20:22

## 2025-02-08 RX ADMIN — Medication 10 ML: at 00:27

## 2025-02-08 RX ADMIN — ATORVASTATIN CALCIUM 10 MG: 10 TABLET ORAL at 20:59

## 2025-02-08 RX ADMIN — AMIODARONE HYDROCHLORIDE 200 MG: 200 TABLET ORAL at 10:38

## 2025-02-08 RX ADMIN — FUROSEMIDE 20 MG: 10 INJECTION, SOLUTION INTRAMUSCULAR; INTRAVENOUS at 18:30

## 2025-02-08 RX ADMIN — APIXABAN 2.5 MG: 2.5 TABLET, FILM COATED ORAL at 20:59

## 2025-02-08 RX ADMIN — Medication 1 TABLET: at 10:37

## 2025-02-08 RX ADMIN — ARFORMOTEROL TARTRATE 15 MCG: 15 SOLUTION RESPIRATORY (INHALATION) at 10:50

## 2025-02-08 RX ADMIN — MIDODRINE HYDROCHLORIDE 10 MG: 5 TABLET ORAL at 00:27

## 2025-02-08 RX ADMIN — HYDROCORTISONE SODIUM SUCCINATE 50 MG: 100 INJECTION, POWDER, FOR SOLUTION INTRAMUSCULAR; INTRAVENOUS at 18:30

## 2025-02-08 RX ADMIN — HYDROCORTISONE SODIUM SUCCINATE 50 MG: 100 INJECTION, POWDER, FOR SOLUTION INTRAMUSCULAR; INTRAVENOUS at 10:38

## 2025-02-08 RX ADMIN — CLOPIDOGREL BISULFATE 75 MG: 75 TABLET ORAL at 10:38

## 2025-02-08 RX ADMIN — MIDODRINE HYDROCHLORIDE 10 MG: 5 TABLET ORAL at 10:37

## 2025-02-08 RX ADMIN — CITALOPRAM 10 MG: 20 TABLET, FILM COATED ORAL at 10:37

## 2025-02-08 RX ADMIN — BUDESONIDE 0.5 MG: 0.5 INHALANT RESPIRATORY (INHALATION) at 10:54

## 2025-02-08 RX ADMIN — Medication 10 ML: at 21:02

## 2025-02-08 RX ADMIN — PANTOPRAZOLE SODIUM 40 MG: 40 TABLET, DELAYED RELEASE ORAL at 10:38

## 2025-02-08 RX ADMIN — APIXABAN 2.5 MG: 2.5 TABLET, FILM COATED ORAL at 10:37

## 2025-02-08 RX ADMIN — HYDROCORTISONE SODIUM SUCCINATE 50 MG: 100 INJECTION, POWDER, FOR SOLUTION INTRAMUSCULAR; INTRAVENOUS at 04:00

## 2025-02-08 RX ADMIN — Medication 10 ML: at 10:38

## 2025-02-08 RX ADMIN — MIRTAZAPINE 15 MG: 15 TABLET, FILM COATED ORAL at 20:59

## 2025-02-08 RX ADMIN — FUROSEMIDE 20 MG: 10 INJECTION, SOLUTION INTRAMUSCULAR; INTRAVENOUS at 04:00

## 2025-02-08 RX ADMIN — TAMSULOSIN HYDROCHLORIDE 0.4 MG: 0.4 CAPSULE ORAL at 10:38

## 2025-02-08 RX ADMIN — ARFORMOTEROL TARTRATE 15 MCG: 15 SOLUTION RESPIRATORY (INHALATION) at 20:18

## 2025-02-08 RX ADMIN — MIDODRINE HYDROCHLORIDE 10 MG: 5 TABLET ORAL at 18:30

## 2025-02-08 NOTE — PROGRESS NOTES
Daily Progress Note        Pneumonia    Superior mesenteric artery stenosis      Assessment:    Hypoxic respiratory insufficiency, multifactorial, severe lung and cardiac disease and vascular disease    Left lower lobe pneumonia    severe COPD  Respiratory panel negative on 1/30/2025  On home oxygen    Urine cultures 1/18/2025 positive for Pseudomonas    CAD, PVD  Extensive coronary artery calcifications  Severe upper abdominal atherosclerotic calcifications with SMA stenosis    HTN  HLD  A-fib  Anemia  Former smoker quit 2022 after 60 pack years  Echo 2/1/2024 EF 61-65% mild pulmonary hypertension 35-45 mmHg     CT scan of the chest in June 2024 showing a noncalcified nodule in the right upper lobe around 2 x 1.1 x 1 cm. Patient was also noted to have patchy airspace disease and clustered micronodules in the lingula.     He then underwent PET scan 10/10/2024 showing a 1.7 x 1.1 cm partially calcified irregular shaped nodule in the right upper lobe to be metabolically active with SUV of 4.84 and additional 2 metabolically active irregular nodular densities in the right upper lobe. Inferior apical segment with intervening areas of metabolically active interstitial thickening.     PFTs: FEV1/FVC postbronchodilator is 0.39 with FEV1 of 0.71 L or 28% predicted and FVC of 1.82 L or 47% predicted.   Total lung capacity of 7.77 L or 131% predicted and residual volume of 5.82 L or 226% predicted and DLCO of 61% predicted noted.     TSH 3.4 on 12/29/2024     Recommendations:    Continue hydrocortisone 50 mg IV Q8 for possibility of amiodarone toxicity   There is improvement in his respiratory status and oxygenation  oxygen titration currently on Airvo high flow 50 L alternating with AVAPS  Midodrine 10 mg 3 times daily  Antibiotics on IV Unasyn  Received 1 dose of IV vancomycin and cefepime    Lasix 20 mg IV every 12 started on 2/2/2025    Bronchodilator Pulmicort and DuoNeb  On amiodarone  Plavix    Protonix 40 mg  daily  Eliquis 2.5 mg twice daily     Patient is DNR DNI    Overall poor prognosis    Chest x-ray 2/2/2025      CT chest 2/1/2025             LOS: 10 days     Subjective         Objective     Vital signs for last 24 hours:  Vitals:    02/08/25 1050 02/08/25 1053 02/08/25 1054 02/08/25 1057   BP:       BP Location:       Patient Position:       Pulse: 58 57 56 56   Resp: 21 17 14 15   Temp:       TempSrc:       SpO2: 95% 96% 99% 97%   Weight:       Height:           Intake/Output last 3 shifts:  I/O last 3 completed shifts:  In: -   Out: 2260 [Urine:2260]  Intake/Output this shift:  No intake/output data recorded.      Radiology  Imaging Results (Last 24 Hours)       ** No results found for the last 24 hours. **            Labs:  Results from last 7 days   Lab Units 02/07/25  0932   WBC 10*3/mm3 4.62   HEMOGLOBIN g/dL 8.7*   HEMATOCRIT % 27.8*   PLATELETS 10*3/mm3 84*     Results from last 7 days   Lab Units 02/07/25  2151 02/07/25  0932   SODIUM mmol/L  --  140   POTASSIUM mmol/L 4.0 3.3*   CHLORIDE mmol/L  --  97*   CO2 mmol/L  --  35.9*   BUN mg/dL  --  21   CREATININE mg/dL  --  0.60*   CALCIUM mg/dL  --  8.5*   BILIRUBIN mg/dL  --  0.6   ALK PHOS U/L  --  69   ALT (SGPT) U/L  --  22   AST (SGOT) U/L  --  28   GLUCOSE mg/dL  --  117*     Results from last 7 days   Lab Units 02/02/25  1631   PH, ARTERIAL pH units 7.394   PO2 ART mm Hg 54.8*   PCO2, ARTERIAL mm Hg 42.9   HCO3 ART mmol/L 26.2     Results from last 7 days   Lab Units 02/07/25  0932 02/05/25  0255 02/02/25  1004   ALBUMIN g/dL 2.4* 2.1* 2.6*               Results from last 7 days   Lab Units 02/02/25  1004   MAGNESIUM mg/dL 2.0                     Meds:   SCHEDULE  amiodarone, 200 mg, Oral, Q24H  apixaban, 2.5 mg, Oral, Q12H  arformoterol, 15 mcg, Nebulization, BID - RT  atorvastatin, 10 mg, Oral, Nightly  budesonide, 0.5 mg, Nebulization, BID - RT  citalopram, 10 mg, Oral, Daily  clopidogrel, 75 mg, Oral, Daily  furosemide, 20 mg, Intravenous,  Q12H  hydrocortisone sodium succinate, 50 mg, Intravenous, Q8H  midodrine, 10 mg, Oral, Q8H  mirtazapine, 15 mg, Oral, Nightly  multivitamin with minerals, 1 tablet, Oral, Daily  pantoprazole, 40 mg, Oral, Daily  sodium chloride, 500 mL, Intravenous, Once  sodium chloride, 10 mL, Intravenous, Q12H  tamsulosin, 0.4 mg, Oral, Daily      Infusions     PRNs    acetaminophen **OR** acetaminophen **OR** acetaminophen    senna-docusate sodium **AND** polyethylene glycol **AND** bisacodyl **AND** bisacodyl    Calcium Replacement - Follow Nurse / BPA Driven Protocol    ipratropium-albuterol    Magnesium Standard Dose Replacement - Follow Nurse / BPA Driven Protocol    melatonin    nitroglycerin    ondansetron    Phosphorus Replacement - Follow Nurse / BPA Driven Protocol    Potassium Replacement - Follow Nurse / BPA Driven Protocol    [COMPLETED] Insert Peripheral IV **AND** sodium chloride    sodium chloride    sodium chloride    Physical Exam:  Physical Exam  Cardiovascular:      Heart sounds: Murmur heard.      No gallop.   Pulmonary:      Effort: No respiratory distress.      Breath sounds: No stridor. Rhonchi and rales present. No wheezing.   Chest:      Chest wall: No tenderness.         ROS  Review of Systems   Respiratory:  Positive for cough and shortness of breath. Negative for wheezing and stridor.    Cardiovascular:  Positive for palpitations and leg swelling. Negative for chest pain.             Total time spent with patient greater than: 45 Minutes

## 2025-02-08 NOTE — PROGRESS NOTES
CARDIOLOGY PROGRESS NOTE:    Kali Garcia  79 y.o.  male  1945  [id number]      Referring Provider: Hospitalist    Reason for follow-up: Bradycardia     Patient Care Team:  Tami Ward APRN as PCP - General (Family Medicine)    Subjective .  Patient does not have any symptoms    Objective lying in bed comfortably     Review of Systems   Constitutional: Negative for malaise/fatigue.   Cardiovascular:  Negative for chest pain, dyspnea on exertion, leg swelling and palpitations.   Respiratory:  Negative for cough and shortness of breath.    Gastrointestinal:  Negative for abdominal pain, nausea and vomiting.   Neurological:  Negative for dizziness, focal weakness, headaches, light-headedness and numbness.   All other systems reviewed and are negative.      Allergies: Codeine    Scheduled Meds:amiodarone, 200 mg, Oral, Q24H  apixaban, 2.5 mg, Oral, Q12H  arformoterol, 15 mcg, Nebulization, BID - RT  atorvastatin, 10 mg, Oral, Nightly  budesonide, 0.5 mg, Nebulization, BID - RT  citalopram, 10 mg, Oral, Daily  clopidogrel, 75 mg, Oral, Daily  furosemide, 20 mg, Intravenous, Q12H  hydrocortisone sodium succinate, 50 mg, Intravenous, Q8H  midodrine, 10 mg, Oral, Q8H  mirtazapine, 15 mg, Oral, Nightly  multivitamin with minerals, 1 tablet, Oral, Daily  pantoprazole, 40 mg, Oral, Daily  sodium chloride, 500 mL, Intravenous, Once  sodium chloride, 10 mL, Intravenous, Q12H  tamsulosin, 0.4 mg, Oral, Daily      Continuous Infusions:   PRN Meds:.  acetaminophen **OR** acetaminophen **OR** acetaminophen    senna-docusate sodium **AND** polyethylene glycol **AND** bisacodyl **AND** bisacodyl    Calcium Replacement - Follow Nurse / BPA Driven Protocol    ipratropium-albuterol    Magnesium Standard Dose Replacement - Follow Nurse / BPA Driven Protocol    melatonin    nitroglycerin    ondansetron    Phosphorus Replacement - Follow Nurse / BPA Driven Protocol    Potassium Replacement - Follow Nurse / BPA Driven  "Protocol    [COMPLETED] Insert Peripheral IV **AND** sodium chloride    sodium chloride    sodium chloride        VITAL SIGNS  Vitals:    02/08/25 1050 02/08/25 1053 02/08/25 1054 02/08/25 1057   BP:       BP Location:       Patient Position:       Pulse: 58 57 56 56   Resp: 21 17 14 15   Temp:       TempSrc:       SpO2: 95% 96% 99% 97%   Weight:       Height:           Flowsheet Rows      Flowsheet Row First Filed Value   Admission Height 180.3 cm (71\") Documented at 01/29/2025 1824   Admission Weight 49.9 kg (110 lb) Documented at 01/29/2025 1824             TELEMETRY: Sinus bradycardia    Physical Exam:  Constitutional:       Appearance: Well-developed.   Eyes:      General: No scleral icterus.     Conjunctiva/sclera: Conjunctivae normal.   HENT:      Head: Normocephalic and atraumatic.   Neck:      Vascular: No carotid bruit or JVD.   Pulmonary:      Effort: Pulmonary effort is normal.      Breath sounds: Normal breath sounds. No wheezing. No rales.   Cardiovascular:      Normal rate. Regular rhythm.   Pulses:     Intact distal pulses.   Abdominal:      General: Bowel sounds are normal.      Palpations: Abdomen is soft.   Musculoskeletal:      Cervical back: Normal range of motion and neck supple. Skin:     General: Skin is warm and dry.      Findings: No rash.   Neurological:      Mental Status: Alert.          Results Review:   I reviewed the patient's new clinical results.  Lab Results (last 24 hours)       Procedure Component Value Units Date/Time    Potassium [[id number]]  (Normal) Collected: 02/07/25 2151    Specimen: Blood from Arm, Left Updated: 02/07/25 2216     Potassium 4.0 mmol/L             Imaging Results (Last 24 Hours)       ** No results found for the last 24 hours. **            EKG      I personally viewed and interpreted the patient's EKG/Telemetry data:    ECHOCARDIOGRAM:  Results for orders placed during the hospital encounter of 02/01/24    Adult Transthoracic Echo Limited W/ Cont if " Necessary Per Protocol    Interpretation Summary    Left ventricular systolic function is normal. Left ventricular ejection fraction appears to be 61 - 65%.    Left ventricular diastolic function was normal.    The left atrial cavity is mildly dilated.    Left atrial volume is mildly increased.    There is moderate calcification of the aortic valve mainly affecting the left coronary and right coronary cusp(s).    Estimated right ventricular systolic pressure from tricuspid regurgitation is mildly elevated (35-45 mmHg).    Mild pulmonary hypertension is present.       STRESS MYOVIEW:       CARDIAC CATHETERIZATION:  No results found for this or any previous visit.       OTHER:         Assessment & Plan     Severe bradycardia  Patient has significant bradycardia with U waves also  Patient will be ruled out for hypokalemia also  Patient had a pacemaker which will be interrogated.    Sick sinus syndrome status post leadless pacemaker placement  Patient's leadless pacemaker will be interrogated because of concerning bradycardia.    Atrial fibrillation/flutter  Patient has been on amiodarone and also Eliquis but the amiodarone dose has been decreased to 20 mg once a day and still has significant bradycardia.    Coronary disease  Patient has nonobstructive disease in the past and is currently stable without any angina    Acute on chronic hypoxic respiratory failure  Patient is currently ruled out for MI and has an echo which showed normal LV function  Patient is currently on hydrocortisone and high oxygen  Will most likely stop amiodarone because pulmonologist thinks it could be amiodarone toxicity also  Patient also on IV antibiotics Pulmicort and DuoNeb.    I discussed the patients findings and my recommendations with patient and nurse    Dawit Hutton MD  02/08/25  15:27 EST

## 2025-02-08 NOTE — PROGRESS NOTES
[facility] MEDICINE SERVICE  DAILY PROGRESS NOTE    NAME: Kali Garcia  : 1945  MRN: [id number]      LOS: 10 days     PROVIDER OF SERVICE: Jarad Lock MD    Chief Complaint: Pneumonia    Subjective:     Interval History:  History taken from: patient      History of Present Illness: Kali Garcia is a 79 y.o. male with a previous medical history of atrial fibrillation, CAD, HLD and COPD who presented to [facility] on 2025 after being found hypoxic by staff at the rehab facility.  The patient is a poor historian and other than complaining of being tired, could not provide any HPI. HPI was taken from the chart.  Per ED report the staff found him after receiving a DuoNeb shaking and having trouble breathing With an O2 saturation of 70%.  On arrival he was on a nonrebreather but was weaned down to an Airvo while in the ED. He is on O2 at 2L at baseline.     On chart review, he was discharged on 25 after treatment for pneumonia and a COPD exacerbation.       In the ED, ABG showed pH 7.48, CO2 40, pO2 45.4. Troponin's 43,42. Hemoglobin 9.0. Otherwise, labs are unremarkable.  Chest xray shows residual bibasilar infiltrates, mildly progressed from previous xray on 25.  He is afebrile, all vitals are stable with the exception of his O2 saturation which is 98% on Airvo.       seen in bed NAD pt c/o dyspnea on 4lts, severe anemia, hgl 6.5  Will   transfuse 1 unit prbc   seen in bed NAD KALEB RN, dyspnea on High flow oxygen, 15 lts   2/2 seen in bed NAD, vss, KALEB RN, patient developed bradycardia, consulted cardiology  2/3 Mostly concerned about his diet.  Largely asymptomatic despite severe oxygen requirements.  2/4 worsening oxygenation  2/5 Bipap dependent  2/6 Alternating BiPAP and Airvo  2/7 Alteranating BiPAP and Airbo, significant Bipap discomfort  2/8 after discussion with family we de-escalated to Airvo only    Review of Systems  12 point ROS reviewed and negative  except as mentioned above  Objective:     Vital Signs  Temp:  [97.4 °F (36.3 °C)-98.2 °F (36.8 °C)] 97.4 °F (36.3 °C)  Heart Rate:  [51-58] 56  Resp:  [14-24] 15  BP: ()/(45-66) 130/59  Flow (L/min) (Oxygen Therapy):  [50-60] 50   Body mass index is 16.05 kg/m².    Physical Exam  Vitals and nursing note reviewed.   Constitutional:       Appearance: Normal appearance.      Comments: Airvo   HENT:      Mouth/Throat:      Mouth: Mucous membranes are moist.   Cardiovascular:      Rate and Rhythm: Normal rate and regular rhythm.   Pulmonary:      Effort: Pulmonary effort is normal.      Breath sounds: Examination of the right-lower field reveals decreased breath sounds. Examination of the left-lower field reveals decreased breath sounds. Decreased breath sounds present.   Abdominal:      General: Bowel sounds are normal.      Palpations: Abdomen is soft.   Musculoskeletal:         General: Normal range of motion.   Skin:     General: Skin is warm and dry.   Neurological:      General: No focal deficit present.      Mental Status: He is oriented to person, place, and time. Mental status is at baseline.     Comments: Poor insight into his condition.  Psychiatric:         Mood and Affect: Mood normal.         Behavior: Behavior normal.            Diagnostic Data    Results from last 7 days   Lab Units 02/07/25  2151 02/07/25  0932   WBC 10*3/mm3  --  4.62   HEMOGLOBIN g/dL  --  8.7*   HEMATOCRIT %  --  27.8*   PLATELETS 10*3/mm3  --  84*   GLUCOSE mg/dL  --  117*   CREATININE mg/dL  --  0.60*   BUN mg/dL  --  21   SODIUM mmol/L  --  140   POTASSIUM mmol/L 4.0 3.3*   AST (SGOT) U/L  --  28   ALT (SGPT) U/L  --  22   ALK PHOS U/L  --  69   BILIRUBIN mg/dL  --  0.6   ANION GAP mmol/L  --  7.1       XR Chest 1 View    Result Date: 2/7/2025  Impression: 1. No significant change in bilateral lower lobe airspace disease, corresponding to suspected pneumonia based on prior CT chest imaging. 2. Advanced emphysema. 3. Right  apical lung nodule is unchanged. 4. Suspected small to moderate layering bilateral pleural effusions, stable. Electronically Signed: Martha Dodd MD  2/7/2025 9:36 AM EST  Workstation ID: [id number]       I reviewed the patient's new clinical results.  Scheduled Meds:amiodarone, 200 mg, Oral, Q24H  apixaban, 2.5 mg, Oral, Q12H  arformoterol, 15 mcg, Nebulization, BID - RT  atorvastatin, 10 mg, Oral, Nightly  budesonide, 0.5 mg, Nebulization, BID - RT  citalopram, 10 mg, Oral, Daily  clopidogrel, 75 mg, Oral, Daily  furosemide, 20 mg, Intravenous, Q12H  hydrocortisone sodium succinate, 50 mg, Intravenous, Q8H  midodrine, 10 mg, Oral, Q8H  mirtazapine, 15 mg, Oral, Nightly  multivitamin with minerals, 1 tablet, Oral, Daily  pantoprazole, 40 mg, Oral, Daily  sodium chloride, 500 mL, Intravenous, Once  sodium chloride, 10 mL, Intravenous, Q12H  tamsulosin, 0.4 mg, Oral, Daily      Continuous Infusions:   PRN Meds:.  acetaminophen **OR** acetaminophen **OR** acetaminophen    senna-docusate sodium **AND** polyethylene glycol **AND** bisacodyl **AND** bisacodyl    Calcium Replacement - Follow Nurse / BPA Driven Protocol    ipratropium-albuterol    Magnesium Standard Dose Replacement - Follow Nurse / BPA Driven Protocol    melatonin    nitroglycerin    ondansetron    Phosphorus Replacement - Follow Nurse / BPA Driven Protocol    Potassium Replacement - Follow Nurse / BPA Driven Protocol    [COMPLETED] Insert Peripheral IV **AND** sodium chloride    sodium chloride    sodium chloride   Assessment/Plan:     Active and Resolved Problems  Active Hospital Problems    Diagnosis  POA    **Pneumonia [J18.9]  Yes    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       Attending afternoon addendum:     Yesterday we de-escalated from the BiPAP to Airvo only with the plan of not reintroducing the BiPAP and instead transitioning to comfort measures if patient were to  desaturate on the Airvo.  This decision came from an extensive conversation with his family regarding the goals of care and they were in agreement/asked for this.  This plan continues this morning.    There was overall minor improvement in his oxygenation overnight, the Airvo was able to be turned down to 50 L from 55 to 60 L yesterday.  Patient is essentially at his baseline that he was yesterday.    Hospice is following the case as well.    Please call directly if any questions or concerns.          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  Extensive goals of care discussions have been had with family over the past several days.     Acute urinary retention  -Failed voiding trial 2/3/2025  -Repeat trial when more stable    Chronic atrial fibrillation  Chronic CAD with HLD  Severe SMA stenosis, asymptomatic  -Continue amiodarone, Eliquis, atorvastatin, Plavix  -Outpatient follow-up regarding asymptomatic SMA stenosis.  No inpatient procedure at this time.     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    Disposition Planning:     Barriers to Discharge: Pneumonia management, severe oxygen requirements, acute urinary retention  Anticipated Date of Discharge: 2/12  Place of Discharge: Likely hospice      Time: 34 minutes     Code Status and Medical Interventions: No CPR (Do Not Attempt to Resuscitate); Limited Support; No intubation (DNI)   Ordered at: 02/05/25 1405     Medical Intervention Limits:    No intubation (DNI)     Level Of Support Discussed With:    Next of  Kin (If No Surrogate)    Health Care Surrogate     Code Status (Patient has no pulse and is not breathing):    No CPR (Do Not Attempt to Resuscitate)     Medical Interventions (Patient has pulse or is breathing):    Limited Support       Signature: Electronically signed by Jarad Lock MD, 02/08/25, 13:44 EST.  [facility] [place]

## 2025-02-09 LAB
ANION GAP SERPL CALCULATED.3IONS-SCNC: 8.6 MMOL/L (ref 5–15)
BUN SERPL-MCNC: 30 MG/DL (ref 8–23)
BUN/CREAT SERPL: 42.9 (ref 7–25)
CALCIUM SPEC-SCNC: 8.5 MG/DL (ref 8.6–10.5)
CHLORIDE SERPL-SCNC: 99 MMOL/L (ref 98–107)
CO2 SERPL-SCNC: 36.4 MMOL/L (ref 22–29)
CREAT SERPL-MCNC: 0.7 MG/DL (ref 0.76–1.27)
EGFRCR SERPLBLD CKD-EPI 2021: 93.1 ML/MIN/1.73
GLUCOSE SERPL-MCNC: 137 MG/DL (ref 65–99)
POTASSIUM SERPL-SCNC: 3.3 MMOL/L (ref 3.5–5.2)
SODIUM SERPL-SCNC: 144 MMOL/L (ref 136–145)

## 2025-02-09 PROCEDURE — 94660 CPAP INITIATION&MGMT: CPT

## 2025-02-09 PROCEDURE — 25010000002 HYDROCORTISONE SOD SUC (PF) 100 MG RECONSTITUTED SOLUTION: Performed by: INTERNAL MEDICINE

## 2025-02-09 PROCEDURE — 94799 UNLISTED PULMONARY SVC/PX: CPT

## 2025-02-09 PROCEDURE — 80048 BASIC METABOLIC PNL TOTAL CA: CPT | Performed by: STUDENT IN AN ORGANIZED HEALTH CARE EDUCATION/TRAINING PROGRAM

## 2025-02-09 PROCEDURE — 94761 N-INVAS EAR/PLS OXIMETRY MLT: CPT

## 2025-02-09 PROCEDURE — 25010000002 FUROSEMIDE PER 20 MG: Performed by: INTERNAL MEDICINE

## 2025-02-09 PROCEDURE — 94664 DEMO&/EVAL PT USE INHALER: CPT

## 2025-02-09 PROCEDURE — 99232 SBSQ HOSP IP/OBS MODERATE 35: CPT | Performed by: INTERNAL MEDICINE

## 2025-02-09 RX ORDER — POTASSIUM CHLORIDE 1500 MG/1
40 TABLET, EXTENDED RELEASE ORAL EVERY 4 HOURS
Status: COMPLETED | OUTPATIENT
Start: 2025-02-09 | End: 2025-02-09

## 2025-02-09 RX ADMIN — Medication 5 MG: at 22:46

## 2025-02-09 RX ADMIN — ATORVASTATIN CALCIUM 10 MG: 10 TABLET ORAL at 20:07

## 2025-02-09 RX ADMIN — MIDODRINE HYDROCHLORIDE 10 MG: 5 TABLET ORAL at 16:47

## 2025-02-09 RX ADMIN — CLOPIDOGREL BISULFATE 75 MG: 75 TABLET ORAL at 09:10

## 2025-02-09 RX ADMIN — PANTOPRAZOLE SODIUM 40 MG: 40 TABLET, DELAYED RELEASE ORAL at 09:10

## 2025-02-09 RX ADMIN — APIXABAN 2.5 MG: 2.5 TABLET, FILM COATED ORAL at 20:07

## 2025-02-09 RX ADMIN — CITALOPRAM 10 MG: 20 TABLET, FILM COATED ORAL at 09:10

## 2025-02-09 RX ADMIN — HYDROCORTISONE SODIUM SUCCINATE 50 MG: 100 INJECTION, POWDER, FOR SOLUTION INTRAMUSCULAR; INTRAVENOUS at 20:06

## 2025-02-09 RX ADMIN — FUROSEMIDE 20 MG: 10 INJECTION, SOLUTION INTRAMUSCULAR; INTRAVENOUS at 04:10

## 2025-02-09 RX ADMIN — APIXABAN 2.5 MG: 2.5 TABLET, FILM COATED ORAL at 09:10

## 2025-02-09 RX ADMIN — POTASSIUM CHLORIDE 40 MEQ: 1500 TABLET, EXTENDED RELEASE ORAL at 16:46

## 2025-02-09 RX ADMIN — HYDROCORTISONE SODIUM SUCCINATE 50 MG: 100 INJECTION, POWDER, FOR SOLUTION INTRAMUSCULAR; INTRAVENOUS at 11:16

## 2025-02-09 RX ADMIN — HYDROCORTISONE SODIUM SUCCINATE 50 MG: 100 INJECTION, POWDER, FOR SOLUTION INTRAMUSCULAR; INTRAVENOUS at 04:11

## 2025-02-09 RX ADMIN — MIRTAZAPINE 15 MG: 15 TABLET, FILM COATED ORAL at 22:46

## 2025-02-09 RX ADMIN — BUDESONIDE 0.5 MG: 0.5 INHALANT RESPIRATORY (INHALATION) at 10:24

## 2025-02-09 RX ADMIN — MIDODRINE HYDROCHLORIDE 10 MG: 5 TABLET ORAL at 04:09

## 2025-02-09 RX ADMIN — BUDESONIDE 0.5 MG: 0.5 INHALANT RESPIRATORY (INHALATION) at 17:42

## 2025-02-09 RX ADMIN — MIDODRINE HYDROCHLORIDE 10 MG: 5 TABLET ORAL at 09:10

## 2025-02-09 RX ADMIN — ARFORMOTEROL TARTRATE 15 MCG: 15 SOLUTION RESPIRATORY (INHALATION) at 17:40

## 2025-02-09 RX ADMIN — ARFORMOTEROL TARTRATE 15 MCG: 15 SOLUTION RESPIRATORY (INHALATION) at 10:20

## 2025-02-09 RX ADMIN — Medication 1 TABLET: at 09:10

## 2025-02-09 RX ADMIN — FUROSEMIDE 20 MG: 10 INJECTION, SOLUTION INTRAMUSCULAR; INTRAVENOUS at 16:47

## 2025-02-09 RX ADMIN — Medication 10 ML: at 20:08

## 2025-02-09 RX ADMIN — TAMSULOSIN HYDROCHLORIDE 0.4 MG: 0.4 CAPSULE ORAL at 09:10

## 2025-02-09 RX ADMIN — POTASSIUM CHLORIDE 40 MEQ: 1500 TABLET, EXTENDED RELEASE ORAL at 20:08

## 2025-02-09 NOTE — PLAN OF CARE
Problem: Adult Inpatient Plan of Care  Goal: Plan of Care Review  2/9/2025 0637 by Tiny Zacarias RN  Outcome: Not Progressing  2/9/2025 0636 by Tiny Zacarias RN  Outcome: Progressing  Goal: Patient-Specific Goal (Individualized)  2/9/2025 0637 by Tiny Zacarias RN  Outcome: Not Progressing  2/9/2025 0636 by Tiny Zacarias RN  Outcome: Progressing  Goal: Absence of Hospital-Acquired Illness or Injury  2/9/2025 0637 by Tiny Zacarias RN  Outcome: Not Progressing  2/9/2025 0636 by Tiny Zacarias RN  Outcome: Progressing  Intervention: Identify and Manage Fall Risk  Recent Flowsheet Documentation  Taken 2/9/2025 0000 by Tiny Zacarias RN  Safety Promotion/Fall Prevention:   safety round/check completed   room organization consistent   nonskid shoes/slippers when out of bed   fall prevention program maintained   clutter free environment maintained   assistive device/personal items within reach  Taken 2/8/2025 2000 by Tiny Zacarias RN  Safety Promotion/Fall Prevention:   safety round/check completed   room organization consistent   nonskid shoes/slippers when out of bed   fall prevention program maintained   clutter free environment maintained   assistive device/personal items within reach  Intervention: Prevent Skin Injury  Recent Flowsheet Documentation  Taken 2/9/2025 0000 by Tiny Zacarias RN  Body Position: weight shifting  Skin Protection:   incontinence pads utilized   transparent dressing maintained  Taken 2/8/2025 2000 by Tiny Zacarias RN  Body Position: weight shifting  Skin Protection: incontinence pads utilized  Intervention: Prevent and Manage VTE (Venous Thromboembolism) Risk  Recent Flowsheet Documentation  Taken 2/9/2025 0000 by Tiny Zacarias RN  VTE Prevention/Management:   SCDs (sequential compression devices) off   patient refused intervention  Taken 2/8/2025 2000 by Tiny Zacarias RN  VTE Prevention/Management:   SCDs (sequential compression devices) off   patient refused  intervention  Intervention: Prevent Infection  Recent Flowsheet Documentation  Taken 2/9/2025 0000 by Tiny Zacarias RN  Infection Prevention:   single patient room provided   rest/sleep promoted   personal protective equipment utilized   hand hygiene promoted   equipment surfaces disinfected   environmental surveillance performed  Taken 2/8/2025 2000 by Tiny Zacarias RN  Infection Prevention:   single patient room provided   rest/sleep promoted   personal protective equipment utilized   hand hygiene promoted   equipment surfaces disinfected   environmental surveillance performed  Goal: Optimal Comfort and Wellbeing  2/9/2025 0637 by Tiny Zacarias RN  Outcome: Not Progressing  2/9/2025 0636 by Tiny Zacarias RN  Outcome: Progressing  Intervention: Provide Person-Centered Care  Recent Flowsheet Documentation  Taken 2/8/2025 2000 by Tiny Zacarias RN  Trust Relationship/Rapport:   care explained   choices provided   questions answered  Goal: Readiness for Transition of Care  2/9/2025 0637 by Tiny Zacarias RN  Outcome: Not Progressing  2/9/2025 0636 by Tiny Zacarias RN  Outcome: Progressing     Problem: Skin Injury Risk Increased  Goal: Skin Health and Integrity  2/9/2025 0637 by Tiny Zacarias RN  Outcome: Not Progressing  2/9/2025 0636 by Tiny Zacarias RN  Outcome: Progressing  Intervention: Optimize Skin Protection  Recent Flowsheet Documentation  Taken 2/9/2025 0000 by Tiny Zacarias RN  Pressure Reduction Techniques:   heels elevated off bed   positioned off wounds   weight shift assistance provided  Head of Bed (HOB) Positioning: HOB elevated  Pressure Reduction Devices:   pressure-redistributing mattress utilized   heel offloading device utilized   positioning supports utilized  Skin Protection:   incontinence pads utilized   transparent dressing maintained  Taken 2/8/2025 2000 by Tiny Zacarias RN  Activity Management: bedrest  Pressure Reduction Techniques:   weight shift assistance provided    positioned off wounds   heels elevated off bed  Head of Bed (HOB) Positioning: HOB elevated  Pressure Reduction Devices: pressure-redistributing mattress utilized  Skin Protection: incontinence pads utilized     Problem: Malnutrition  Goal: Improved Nutritional Intake  2/9/2025 0637 by Tiny Zacarias RN  Outcome: Not Progressing  2/9/2025 0636 by Tiny Zacarias RN  Outcome: Progressing     Problem: Fall Injury Risk  Goal: Absence of Fall and Fall-Related Injury  2/9/2025 0637 by Tiny Zacarias RN  Outcome: Not Progressing  2/9/2025 0636 by Tiny Zacarias RN  Outcome: Progressing  Intervention: Identify and Manage Contributors  Recent Flowsheet Documentation  Taken 2/9/2025 0000 by Tiny Zacarias RN  Medication Review/Management: medications reviewed  Self-Care Promotion: BADL personal objects within reach  Taken 2/8/2025 2000 by Tiny Zacarias RN  Medication Review/Management: medications reviewed  Self-Care Promotion: BADL personal objects within reach  Intervention: Promote Injury-Free Environment  Recent Flowsheet Documentation  Taken 2/9/2025 0000 by Tiny Zacarias RN  Safety Promotion/Fall Prevention:   safety round/check completed   room organization consistent   nonskid shoes/slippers when out of bed   fall prevention program maintained   clutter free environment maintained   assistive device/personal items within reach  Taken 2/8/2025 2000 by Tiny Zacarias RN  Safety Promotion/Fall Prevention:   safety round/check completed   room organization consistent   nonskid shoes/slippers when out of bed   fall prevention program maintained   clutter free environment maintained   assistive device/personal items within reach     Problem: Noninvasive Ventilation Acute  Goal: Effective Unassisted Ventilation and Oxygenation  2/9/2025 0637 by Tiny Zacarias RN  Outcome: Not Progressing  2/9/2025 0636 by Tiny Zacarias RN  Outcome: Progressing  Intervention: Monitor and Manage Noninvasive Ventilation  Recent  Flowsheet Documentation  Taken 2/8/2025 2000 by Tiny Zacarias RN  Airway/Ventilation Management: airway patency maintained   Goal Outcome Evaluation:

## 2025-02-09 NOTE — PLAN OF CARE
Problem: Adult Inpatient Plan of Care  Goal: Plan of Care Review  Outcome: Progressing  Flowsheets  Taken 2/8/2025 1937 by Dahlia Garcias, RN  Progress: improving  Outcome Evaluation: pt had no complaints throughout shift. AOx4. On airvo all day, able to decrease airvo settings & tolereated.  Taken 2/6/2025 0538 by Rhoda Richards, RN  Plan of Care Reviewed With: patient   Goal Outcome Evaluation:           Progress: improving  Outcome Evaluation: pt had no complaints throughout shift. AOx4. On airvo all day, able to decrease airvo settings & tolereated.

## 2025-02-09 NOTE — PROGRESS NOTES
Daily Progress Note        Pneumonia    Superior mesenteric artery stenosis      Assessment:    Hypoxic respiratory insufficiency, multifactorial, severe lung and cardiac disease and vascular disease    Left lower lobe pneumonia    severe COPD  Respiratory panel negative on 1/30/2025  On home oxygen    Urine cultures 1/18/2025 positive for Pseudomonas    CAD, PVD  Extensive coronary artery calcifications  Severe upper abdominal atherosclerotic calcifications with SMA stenosis    HTN  HLD  A-fib  Anemia  Former smoker quit 2022 after 60 pack years  Echo 2/1/2024 EF 61-65% mild pulmonary hypertension 35-45 mmHg     CT scan of the chest in June 2024 showing a noncalcified nodule in the right upper lobe around 2 x 1.1 x 1 cm. Patient was also noted to have patchy airspace disease and clustered micronodules in the lingula.     He then underwent PET scan 10/10/2024 showing a 1.7 x 1.1 cm partially calcified irregular shaped nodule in the right upper lobe to be metabolically active with SUV of 4.84 and additional 2 metabolically active irregular nodular densities in the right upper lobe. Inferior apical segment with intervening areas of metabolically active interstitial thickening.     PFTs: FEV1/FVC postbronchodilator is 0.39 with FEV1 of 0.71 L or 28% predicted and FVC of 1.82 L or 47% predicted.   Total lung capacity of 7.77 L or 131% predicted and residual volume of 5.82 L or 226% predicted and DLCO of 61% predicted noted.     TSH 3.4 on 12/29/2024     Recommendations:    Continue hydrocortisone 50 mg IV Q8 for possibility of amiodarone toxicity   There is improvement in his respiratory status and oxygenation  oxygen titration currently on Airvo high flow 50 L alternating with AVAPS  Midodrine 10 mg 3 times daily  Antibiotics on IV Unasyn  Received 1 dose of IV vancomycin and cefepime    Lasix 20 mg IV every 12 started on 2/2/2025    Bronchodilator Pulmicort and DuoNeb  On amiodarone  Plavix    Protonix 40 mg  daily  Eliquis 2.5 mg twice daily     Patient is DNR DNI    Overall poor prognosis    Chest x-ray 2/2/2025      CT chest 2/1/2025             LOS: 11 days     Subjective         Objective     Vital signs for last 24 hours:  Vitals:    02/09/25 0246 02/09/25 0409 02/09/25 0523 02/09/25 0839   BP:  91/45 142/56 112/69   BP Location:   Left arm Left arm   Patient Position:   Lying Lying   Pulse:  58 54 57   Resp:   17 16   Temp:   97.9 °F (36.6 °C) 97.9 °F (36.6 °C)   TempSrc:   Axillary Axillary   SpO2: 91%  90% 90%   Weight:       Height:           Intake/Output last 3 shifts:  I/O last 3 completed shifts:  In: -   Out: 1960 [Urine:1960]  Intake/Output this shift:  I/O this shift:  In: 120 [P.O.:120]  Out: -       Radiology  Imaging Results (Last 24 Hours)       ** No results found for the last 24 hours. **            Labs:  Results from last 7 days   Lab Units 02/07/25  0932   WBC 10*3/mm3 4.62   HEMOGLOBIN g/dL 8.7*   HEMATOCRIT % 27.8*   PLATELETS 10*3/mm3 84*     Results from last 7 days   Lab Units 02/07/25  2151 02/07/25  0932   SODIUM mmol/L  --  140   POTASSIUM mmol/L 4.0 3.3*   CHLORIDE mmol/L  --  97*   CO2 mmol/L  --  35.9*   BUN mg/dL  --  21   CREATININE mg/dL  --  0.60*   CALCIUM mg/dL  --  8.5*   BILIRUBIN mg/dL  --  0.6   ALK PHOS U/L  --  69   ALT (SGPT) U/L  --  22   AST (SGOT) U/L  --  28   GLUCOSE mg/dL  --  117*     Results from last 7 days   Lab Units 02/02/25  1631   PH, ARTERIAL pH units 7.394   PO2 ART mm Hg 54.8*   PCO2, ARTERIAL mm Hg 42.9   HCO3 ART mmol/L 26.2     Results from last 7 days   Lab Units 02/07/25  0932 02/05/25  0255   ALBUMIN g/dL 2.4* 2.1*                                     Meds:   SCHEDULE  apixaban, 2.5 mg, Oral, Q12H  arformoterol, 15 mcg, Nebulization, BID - RT  atorvastatin, 10 mg, Oral, Nightly  budesonide, 0.5 mg, Nebulization, BID - RT  citalopram, 10 mg, Oral, Daily  clopidogrel, 75 mg, Oral, Daily  furosemide, 20 mg, Intravenous, Q12H  hydrocortisone sodium  succinate, 50 mg, Intravenous, Q8H  midodrine, 10 mg, Oral, Q8H  mirtazapine, 15 mg, Oral, Nightly  multivitamin with minerals, 1 tablet, Oral, Daily  pantoprazole, 40 mg, Oral, Daily  sodium chloride, 500 mL, Intravenous, Once  sodium chloride, 10 mL, Intravenous, Q12H  tamsulosin, 0.4 mg, Oral, Daily      Infusions     PRNs  •  acetaminophen **OR** acetaminophen **OR** acetaminophen  •  senna-docusate sodium **AND** polyethylene glycol **AND** bisacodyl **AND** bisacodyl  •  Calcium Replacement - Follow Nurse / BPA Driven Protocol  •  ipratropium-albuterol  •  Magnesium Standard Dose Replacement - Follow Nurse / BPA Driven Protocol  •  melatonin  •  nitroglycerin  •  ondansetron  •  Phosphorus Replacement - Follow Nurse / BPA Driven Protocol  •  Potassium Replacement - Follow Nurse / BPA Driven Protocol  •  [COMPLETED] Insert Peripheral IV **AND** sodium chloride  •  sodium chloride  •  sodium chloride    Physical Exam:  Physical Exam  Cardiovascular:      Heart sounds: Murmur heard.      No gallop.   Pulmonary:      Effort: No respiratory distress.      Breath sounds: No stridor. Rhonchi and rales present. No wheezing.   Chest:      Chest wall: No tenderness.       ROS  Review of Systems   Respiratory:  Positive for cough and shortness of breath. Negative for wheezing and stridor.    Cardiovascular:  Positive for palpitations and leg swelling. Negative for chest pain.             Total time spent with patient greater than: 45 Minutes

## 2025-02-09 NOTE — PROGRESS NOTES
CARDIOLOGY PROGRESS NOTE:    Kali Garcia  79 y.o.  male  1945  [id number]      Referring Provider: Hospitalist    Reason for follow-up: Bradycardia     Patient Care Team:  Tami Ward APRN as PCP - General (Family Medicine)    Subjective .  Patient now has paced rhythm    Objective lying in bed comfortably     Review of Systems   Constitutional: Negative for malaise/fatigue.   Cardiovascular:  Negative for chest pain, dyspnea on exertion, leg swelling and palpitations.   Respiratory:  Negative for cough and shortness of breath.    Gastrointestinal:  Negative for abdominal pain, nausea and vomiting.   Neurological:  Negative for dizziness, focal weakness, headaches, light-headedness and numbness.   All other systems reviewed and are negative.      Allergies: Codeine    Scheduled Meds:apixaban, 2.5 mg, Oral, Q12H  arformoterol, 15 mcg, Nebulization, BID - RT  atorvastatin, 10 mg, Oral, Nightly  budesonide, 0.5 mg, Nebulization, BID - RT  citalopram, 10 mg, Oral, Daily  clopidogrel, 75 mg, Oral, Daily  furosemide, 20 mg, Intravenous, Q12H  hydrocortisone sodium succinate, 50 mg, Intravenous, Q8H  midodrine, 10 mg, Oral, Q8H  mirtazapine, 15 mg, Oral, Nightly  multivitamin with minerals, 1 tablet, Oral, Daily  pantoprazole, 40 mg, Oral, Daily  sodium chloride, 500 mL, Intravenous, Once  sodium chloride, 10 mL, Intravenous, Q12H  tamsulosin, 0.4 mg, Oral, Daily      Continuous Infusions:   PRN Meds:.  acetaminophen **OR** acetaminophen **OR** acetaminophen    senna-docusate sodium **AND** polyethylene glycol **AND** bisacodyl **AND** bisacodyl    Calcium Replacement - Follow Nurse / BPA Driven Protocol    ipratropium-albuterol    Magnesium Standard Dose Replacement - Follow Nurse / BPA Driven Protocol    melatonin    nitroglycerin    ondansetron    Phosphorus Replacement - Follow Nurse / BPA Driven Protocol    Potassium Replacement - Follow Nurse / BPA Driven Protocol    [COMPLETED] Insert  "Peripheral IV **AND** sodium chloride    sodium chloride    sodium chloride        VITAL SIGNS  Vitals:    02/09/25 1027 02/09/25 1222 02/09/25 1235 02/09/25 1237   BP:  133/56  133/56   BP Location:    Left arm   Patient Position:    Lying   Pulse:   59 57   Resp: 16   14   Temp:    97.9 °F (36.6 °C)   TempSrc:    Axillary   SpO2:   91% 91%   Weight:       Height:           Flowsheet Rows      Flowsheet Row First Filed Value   Admission Height 180.3 cm (71\") Documented at 01/29/2025 1824   Admission Weight 49.9 kg (110 lb) Documented at 01/29/2025 1824             TELEMETRY: Pacemaker rhythm    Physical Exam:  Constitutional:       Appearance: Well-developed.   Eyes:      General: No scleral icterus.     Conjunctiva/sclera: Conjunctivae normal.   HENT:      Head: Normocephalic and atraumatic.   Neck:      Vascular: No carotid bruit or JVD.   Pulmonary:      Effort: Pulmonary effort is normal.      Breath sounds: Normal breath sounds. No wheezing. No rales.   Cardiovascular:      Normal rate. Regular rhythm.   Pulses:     Intact distal pulses.   Abdominal:      General: Bowel sounds are normal.      Palpations: Abdomen is soft.   Musculoskeletal:      Cervical back: Normal range of motion and neck supple. Skin:     General: Skin is warm and dry.      Findings: No rash.   Neurological:      Mental Status: Alert.          Results Review:   I reviewed the patient's new clinical results.  Lab Results (last 24 hours)       Procedure Component Value Units Date/Time    Basic Metabolic Panel [[id number]]  (Abnormal) Collected: 02/09/25 1421    Specimen: Blood Updated: 02/09/25 1451     Glucose 137 mg/dL      BUN 30 mg/dL      Creatinine 0.70 mg/dL      Sodium 144 mmol/L      Potassium 3.3 mmol/L      Chloride 99 mmol/L      CO2 36.4 mmol/L      Calcium 8.5 mg/dL      BUN/Creatinine Ratio 42.9     Anion Gap 8.6 mmol/L      eGFR 93.1 mL/min/1.73     Narrative:      GFR Categories in Chronic Kidney Disease (CKD)      GFR " Category          GFR (mL/min/1.73)    Interpretation  G1                     90 or greater         Normal or high (1)  G2                      60-89                Mild decrease (1)  G3a                   45-59                Mild to moderate decrease  G3b                   30-44                Moderate to severe decrease  G4                    15-29                Severe decrease  G5                    14 or less           Kidney failure          (1)In the absence of evidence of kidney disease, neither GFR category G1 or G2 fulfill the criteria for CKD.    eGFR calculation 2021 CKD-EPI creatinine equation, which does not include race as a factor            Imaging Results (Last 24 Hours)       ** No results found for the last 24 hours. **            EKG      I personally viewed and interpreted the patient's EKG/Telemetry data:    ECHOCARDIOGRAM:  Results for orders placed during the hospital encounter of 02/01/24    Adult Transthoracic Echo Limited W/ Cont if Necessary Per Protocol    Interpretation Summary    Left ventricular systolic function is normal. Left ventricular ejection fraction appears to be 61 - 65%.    Left ventricular diastolic function was normal.    The left atrial cavity is mildly dilated.    Left atrial volume is mildly increased.    There is moderate calcification of the aortic valve mainly affecting the left coronary and right coronary cusp(s).    Estimated right ventricular systolic pressure from tricuspid regurgitation is mildly elevated (35-45 mmHg).    Mild pulmonary hypertension is present.       STRESS MYOVIEW:       CARDIAC CATHETERIZATION:  No results found for this or any previous visit.       OTHER:         Assessment & Plan     Severe bradycardia/sick sinus syndrome/pacemaker placement  Patient had significant bradycardia with U waves and had hypokalemia which is corrected  Patient was also on amiodarone which is stopped  Patient's pacemaker is working very well and that he has a  leadless pacemaker.    History of atrial fibrillation and flutter  Patient is currently on amiodarone but has been stopped because of U waves and severe bradycardia and will monitor the rhythm but is also on Eliquis.    Coronary disease  Patient has nonobstructive disease in the past and is currently stable on medications.    Acute on chronic hypoxic respiratory failure  Patient is currently on IV antibiotics Pulmicort and DuoNeb at pulmonologist think it could be amiodarone toxicity and hence amiodarone has been stopped  Patient is on cortisone on high flow oxygen.    I discussed the patients findings and my recommendations with patient and his    Dawit Hutton MD  02/09/25  15:04 EST

## 2025-02-09 NOTE — PROGRESS NOTES
[facility] MEDICINE SERVICE  DAILY PROGRESS NOTE    NAME: Kali Garcia  : 1945  MRN: [id number]      LOS: 11 days     PROVIDER OF SERVICE: Jarad Lock MD    Chief Complaint: Pneumonia    Subjective:     Interval History:  History taken from: patient      History of Present Illness: Kali Garcia is a 79 y.o. male with a previous medical history of atrial fibrillation, CAD, HLD and COPD who presented to [facility] on 2025 after being found hypoxic by staff at the rehab facility.  The patient is a poor historian and other than complaining of being tired, could not provide any HPI. HPI was taken from the chart.  Per ED report the staff found him after receiving a DuoNeb shaking and having trouble breathing With an O2 saturation of 70%.  On arrival he was on a nonrebreather but was weaned down to an Airvo while in the ED. He is on O2 at 2L at baseline.     On chart review, he was discharged on 25 after treatment for pneumonia and a COPD exacerbation.       In the ED, ABG showed pH 7.48, CO2 40, pO2 45.4. Troponin's 43,42. Hemoglobin 9.0. Otherwise, labs are unremarkable.  Chest xray shows residual bibasilar infiltrates, mildly progressed from previous xray on 25.  He is afebrile, all vitals are stable with the exception of his O2 saturation which is 98% on Airvo.       seen in bed NAD pt c/o dyspnea on 4lts, severe anemia, hgl 6.5  Will   transfuse 1 unit prbc   seen in bed NAD KALEB RN, dyspnea on High flow oxygen, 15 lts   2/2 seen in bed NAD, vss, KALEB RN, patient developed bradycardia, consulted cardiology  2/3 Mostly concerned about his diet.  Largely asymptomatic despite severe oxygen requirements.  2/4 worsening oxygenation  2/5 Bipap dependent  2/6 Alternating BiPAP and Airvo  2/7 Alteranating BiPAP and Airbo, significant Bipap discomfort  2/8 after discussion with family we de-escalated to Airvo only  2/ improved to 50L    Review of Systems  12 point ROS  reviewed and negative except as mentioned above  Objective:     Vital Signs  Temp:  [97.4 °F (36.3 °C)-98.1 °F (36.7 °C)] 97.9 °F (36.6 °C)  Heart Rate:  [53-58] 53  Resp:  [12-20] 16  BP: ()/(45-69) 112/69  Flow (L/min) (Oxygen Therapy):  [50] 50   Body mass index is 16.05 kg/m².    Physical Exam  Vitals and nursing note reviewed.   Constitutional:       Appearance: Normal appearance.      Comments: Airvo   HENT:      Mouth/Throat:      Mouth: Mucous membranes are moist.   Cardiovascular:      Rate and Rhythm: Normal rate and regular rhythm.   Pulmonary:      Effort: Pulmonary effort is normal.      Breath sounds: Examination of the right-lower field reveals decreased breath sounds. Examination of the left-lower field reveals decreased breath sounds. Decreased breath sounds present.   Abdominal:      General: Bowel sounds are normal.      Palpations: Abdomen is soft.   Musculoskeletal:         General: Normal range of motion.   Skin:     General: Skin is warm and dry.   Neurological:      General: No focal deficit present.      Mental Status: He is oriented to person, place, and time. Mental status is at baseline.     Comments: Poor insight into his condition.  Psychiatric:         Mood and Affect: Mood normal.         Behavior: Behavior normal.            Diagnostic Data    Results from last 7 days   Lab Units 02/07/25  2151 02/07/25  0932   WBC 10*3/mm3  --  4.62   HEMOGLOBIN g/dL  --  8.7*   HEMATOCRIT %  --  27.8*   PLATELETS 10*3/mm3  --  84*   GLUCOSE mg/dL  --  117*   CREATININE mg/dL  --  0.60*   BUN mg/dL  --  21   SODIUM mmol/L  --  140   POTASSIUM mmol/L 4.0 3.3*   AST (SGOT) U/L  --  28   ALT (SGPT) U/L  --  22   ALK PHOS U/L  --  69   BILIRUBIN mg/dL  --  0.6   ANION GAP mmol/L  --  7.1       No radiology results for the last day      I reviewed the patient's new clinical results.  Scheduled Meds:apixaban, 2.5 mg, Oral, Q12H  arformoterol, 15 mcg, Nebulization, BID - RT  atorvastatin, 10 mg,  Oral, Nightly  budesonide, 0.5 mg, Nebulization, BID - RT  citalopram, 10 mg, Oral, Daily  clopidogrel, 75 mg, Oral, Daily  furosemide, 20 mg, Intravenous, Q12H  hydrocortisone sodium succinate, 50 mg, Intravenous, Q8H  midodrine, 10 mg, Oral, Q8H  mirtazapine, 15 mg, Oral, Nightly  multivitamin with minerals, 1 tablet, Oral, Daily  pantoprazole, 40 mg, Oral, Daily  sodium chloride, 500 mL, Intravenous, Once  sodium chloride, 10 mL, Intravenous, Q12H  tamsulosin, 0.4 mg, Oral, Daily      Continuous Infusions:   PRN Meds:.  acetaminophen **OR** acetaminophen **OR** acetaminophen    senna-docusate sodium **AND** polyethylene glycol **AND** bisacodyl **AND** bisacodyl    Calcium Replacement - Follow Nurse / BPA Driven Protocol    ipratropium-albuterol    Magnesium Standard Dose Replacement - Follow Nurse / BPA Driven Protocol    melatonin    nitroglycerin    ondansetron    Phosphorus Replacement - Follow Nurse / BPA Driven Protocol    Potassium Replacement - Follow Nurse / BPA Driven Protocol    [COMPLETED] Insert Peripheral IV **AND** sodium chloride    sodium chloride    sodium chloride   Assessment/Plan:     Active and Resolved Problems  Active Hospital Problems    Diagnosis  POA    **Pneumonia [J18.9]  Yes    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       Attending afternoon addendum:     After extensive discussion with family on 2/7/2025 we de-escalated from the BiPAP to Airvo only with the plan of not reintroducing the BiPAP and instead transitioning to comfort measures if patient were to desaturate on the Airvo.  This decision came from an extensive conversation with his family regarding the goals of care and they were in agreement/asked for this.  This plan continues this morning.    There was again a minor improvement in oxygenation today.  50 L from 55 L.  Patient is more alert and interactive.    Recheck labs.  Family called and  updated to 2/9/2025    Hospice is following the case as well.    Please call directly if any questions or concerns.          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  Extensive goals of care discussions have been had with family over the past several days.  Ongoing, although patient is showing some minimal improvements the last few days.     Acute urinary retention  -Failed voiding trial 2/3/2025  -Repeat trial when more stable    Chronic atrial fibrillation  Chronic CAD with HLD  Severe SMA stenosis, asymptomatic  -Continue amiodarone, Eliquis, atorvastatin, Plavix  -Outpatient follow-up regarding asymptomatic SMA stenosis.  No inpatient procedure at this time.     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    Disposition Planning:     Barriers to Discharge: Pneumonia management, severe oxygen requirements, acute urinary retention  Anticipated Date of Discharge: 2/12  Place of Discharge: Likely hospice      Time: 34 minutes     Code Status and Medical Interventions: No CPR (Do Not Attempt to Resuscitate); Limited Support; No intubation (DNI)   Ordered at: 02/05/25 1405     Medical Intervention Limits:    No intubation (DNI)     Level Of Support Discussed With:    Next of Kin (If No Surrogate)    Health Care Surrogate     Code Status (Patient has no pulse and is not breathing):    No CPR (Do Not Attempt to Resuscitate)     Medical Interventions (Patient has pulse or is breathing):    Limited Support       Signature:  Electronically signed by Jarad Lokc MD, 02/09/25, 12:14 EST.  [facility] Center[facility]

## 2025-02-10 LAB
ANION GAP SERPL CALCULATED.3IONS-SCNC: 7.1 MMOL/L (ref 5–15)
BUN SERPL-MCNC: 30 MG/DL (ref 8–23)
BUN/CREAT SERPL: 48.4 (ref 7–25)
CALCIUM SPEC-SCNC: 8.5 MG/DL (ref 8.6–10.5)
CHLORIDE SERPL-SCNC: 100 MMOL/L (ref 98–107)
CO2 SERPL-SCNC: 37.9 MMOL/L (ref 22–29)
CREAT SERPL-MCNC: 0.62 MG/DL (ref 0.76–1.27)
EGFRCR SERPLBLD CKD-EPI 2021: 96.6 ML/MIN/1.73
GLUCOSE SERPL-MCNC: 146 MG/DL (ref 65–99)
POTASSIUM SERPL-SCNC: 3.5 MMOL/L (ref 3.5–5.2)
POTASSIUM SERPL-SCNC: 4.9 MMOL/L (ref 3.5–5.2)
SODIUM SERPL-SCNC: 145 MMOL/L (ref 136–145)

## 2025-02-10 PROCEDURE — 25010000002 FUROSEMIDE PER 20 MG: Performed by: INTERNAL MEDICINE

## 2025-02-10 PROCEDURE — 94664 DEMO&/EVAL PT USE INHALER: CPT

## 2025-02-10 PROCEDURE — 80048 BASIC METABOLIC PNL TOTAL CA: CPT | Performed by: STUDENT IN AN ORGANIZED HEALTH CARE EDUCATION/TRAINING PROGRAM

## 2025-02-10 PROCEDURE — 94761 N-INVAS EAR/PLS OXIMETRY MLT: CPT

## 2025-02-10 PROCEDURE — 25010000002 HYDROCORTISONE SOD SUC (PF) 100 MG RECONSTITUTED SOLUTION: Performed by: INTERNAL MEDICINE

## 2025-02-10 PROCEDURE — 84132 ASSAY OF SERUM POTASSIUM: CPT | Performed by: FAMILY MEDICINE

## 2025-02-10 PROCEDURE — 94799 UNLISTED PULMONARY SVC/PX: CPT

## 2025-02-10 PROCEDURE — 99232 SBSQ HOSP IP/OBS MODERATE 35: CPT | Performed by: INTERNAL MEDICINE

## 2025-02-10 RX ORDER — CLONAZEPAM 0.5 MG/1
0.5 TABLET ORAL EVERY 8 HOURS PRN
Status: DISCONTINUED | OUTPATIENT
Start: 2025-02-10 | End: 2025-02-14

## 2025-02-10 RX ORDER — POTASSIUM CHLORIDE 1500 MG/1
40 TABLET, EXTENDED RELEASE ORAL ONCE
Status: COMPLETED | OUTPATIENT
Start: 2025-02-10 | End: 2025-02-10

## 2025-02-10 RX ORDER — FUROSEMIDE 20 MG/1
20 TABLET ORAL DAILY
Status: DISCONTINUED | OUTPATIENT
Start: 2025-02-11 | End: 2025-02-11

## 2025-02-10 RX ORDER — POTASSIUM CHLORIDE 1.5 G/1.58G
40 POWDER, FOR SOLUTION ORAL EVERY 4 HOURS
Status: COMPLETED | OUTPATIENT
Start: 2025-02-10 | End: 2025-02-10

## 2025-02-10 RX ADMIN — BUDESONIDE 0.5 MG: 0.5 INHALANT RESPIRATORY (INHALATION) at 18:57

## 2025-02-10 RX ADMIN — MIDODRINE HYDROCHLORIDE 10 MG: 5 TABLET ORAL at 17:14

## 2025-02-10 RX ADMIN — Medication 10 ML: at 08:49

## 2025-02-10 RX ADMIN — PANTOPRAZOLE SODIUM 40 MG: 40 TABLET, DELAYED RELEASE ORAL at 08:49

## 2025-02-10 RX ADMIN — CITALOPRAM 10 MG: 20 TABLET, FILM COATED ORAL at 08:49

## 2025-02-10 RX ADMIN — APIXABAN 2.5 MG: 2.5 TABLET, FILM COATED ORAL at 20:50

## 2025-02-10 RX ADMIN — FUROSEMIDE 20 MG: 10 INJECTION, SOLUTION INTRAMUSCULAR; INTRAVENOUS at 04:10

## 2025-02-10 RX ADMIN — Medication 1 TABLET: at 08:50

## 2025-02-10 RX ADMIN — TAMSULOSIN HYDROCHLORIDE 0.4 MG: 0.4 CAPSULE ORAL at 08:50

## 2025-02-10 RX ADMIN — Medication 10 ML: at 20:51

## 2025-02-10 RX ADMIN — MIDODRINE HYDROCHLORIDE 10 MG: 5 TABLET ORAL at 08:50

## 2025-02-10 RX ADMIN — CLOPIDOGREL BISULFATE 75 MG: 75 TABLET ORAL at 08:49

## 2025-02-10 RX ADMIN — HYDROCORTISONE SODIUM SUCCINATE 50 MG: 100 INJECTION, POWDER, FOR SOLUTION INTRAMUSCULAR; INTRAVENOUS at 04:10

## 2025-02-10 RX ADMIN — ARFORMOTEROL TARTRATE 15 MCG: 15 SOLUTION RESPIRATORY (INHALATION) at 18:52

## 2025-02-10 RX ADMIN — BUDESONIDE 0.5 MG: 0.5 INHALANT RESPIRATORY (INHALATION) at 07:52

## 2025-02-10 RX ADMIN — APIXABAN 2.5 MG: 2.5 TABLET, FILM COATED ORAL at 08:50

## 2025-02-10 RX ADMIN — MIRTAZAPINE 15 MG: 15 TABLET, FILM COATED ORAL at 20:50

## 2025-02-10 RX ADMIN — ATORVASTATIN CALCIUM 10 MG: 10 TABLET ORAL at 20:50

## 2025-02-10 RX ADMIN — ARFORMOTEROL TARTRATE 15 MCG: 15 SOLUTION RESPIRATORY (INHALATION) at 07:48

## 2025-02-10 RX ADMIN — POTASSIUM CHLORIDE 40 MEQ: 1.5 POWDER, FOR SOLUTION ORAL at 08:49

## 2025-02-10 RX ADMIN — POTASSIUM CHLORIDE 40 MEQ: 1.5 POWDER, FOR SOLUTION ORAL at 11:52

## 2025-02-10 RX ADMIN — IPRATROPIUM BROMIDE AND ALBUTEROL SULFATE 3 ML: .5; 3 SOLUTION RESPIRATORY (INHALATION) at 23:16

## 2025-02-10 RX ADMIN — MIDODRINE HYDROCHLORIDE 10 MG: 5 TABLET ORAL at 01:11

## 2025-02-10 RX ADMIN — POTASSIUM CHLORIDE 40 MEQ: 1500 TABLET, EXTENDED RELEASE ORAL at 14:08

## 2025-02-10 RX ADMIN — HYDROCORTISONE SODIUM SUCCINATE 50 MG: 100 INJECTION, POWDER, FOR SOLUTION INTRAMUSCULAR; INTRAVENOUS at 20:50

## 2025-02-10 RX ADMIN — CLONAZEPAM 0.5 MG: 0.5 TABLET ORAL at 11:53

## 2025-02-10 RX ADMIN — HYDROCORTISONE SODIUM SUCCINATE 50 MG: 100 INJECTION, POWDER, FOR SOLUTION INTRAMUSCULAR; INTRAVENOUS at 11:53

## 2025-02-10 NOTE — PROGRESS NOTES
CARDIOLOGY PROGRESS NOTE:    Kali Garcia  79 y.o.  male  1945  [id number]      Referring Provider: Hospitalist    Reason for follow-up: Bradycardia     Patient Care Team:  Tami Ward APRN as PCP - General (Family Medicine)    Subjective .  Patient now has paced rhythm 50s, unchanged  Remains with significant oxygen requirement  Blood pressures okay 1 20-1 30 systolic  Volume status appears stable    Objective lying in bed comfortably     Review of Systems   Constitutional: Negative for malaise/fatigue.   Cardiovascular:  Negative for chest pain, dyspnea on exertion, leg swelling and palpitations.   Respiratory:  Negative for cough and shortness of breath.    Gastrointestinal:  Negative for abdominal pain, nausea and vomiting.   Neurological:  Negative for dizziness, focal weakness, headaches, light-headedness and numbness.   All other systems reviewed and are negative.      Allergies: Codeine    Scheduled Meds:apixaban, 2.5 mg, Oral, Q12H  arformoterol, 15 mcg, Nebulization, BID - RT  atorvastatin, 10 mg, Oral, Nightly  budesonide, 0.5 mg, Nebulization, BID - RT  citalopram, 10 mg, Oral, Daily  clopidogrel, 75 mg, Oral, Daily  furosemide, 20 mg, Intravenous, Q12H  hydrocortisone sodium succinate, 50 mg, Intravenous, Q8H  midodrine, 10 mg, Oral, Q8H  mirtazapine, 15 mg, Oral, Nightly  multivitamin with minerals, 1 tablet, Oral, Daily  pantoprazole, 40 mg, Oral, Daily  sodium chloride, 500 mL, Intravenous, Once  sodium chloride, 10 mL, Intravenous, Q12H  tamsulosin, 0.4 mg, Oral, Daily      Continuous Infusions:   PRN Meds:.  acetaminophen **OR** acetaminophen **OR** acetaminophen    senna-docusate sodium **AND** polyethylene glycol **AND** bisacodyl **AND** bisacodyl    Calcium Replacement - Follow Nurse / BPA Driven Protocol    clonazePAM    ipratropium-albuterol    Magnesium Standard Dose Replacement - Follow Nurse / BPA Driven Protocol    melatonin    nitroglycerin    ondansetron     "Phosphorus Replacement - Follow Nurse / BPA Driven Protocol    Potassium Replacement - Follow Nurse / BPA Driven Protocol    [COMPLETED] Insert Peripheral IV **AND** sodium chloride    sodium chloride    sodium chloride        VITAL SIGNS  Vitals:    02/10/25 0752 02/10/25 0755 02/10/25 1115 02/10/25 1151   BP:  131/64     BP Location:  Left arm     Patient Position:  Lying     Pulse: 57 58  52   Resp: 18 19 13    Temp:  98.3 °F (36.8 °C) 98.4 °F (36.9 °C)    TempSrc:  Oral Oral    SpO2: (!) 84% (!) 81%  92%   Weight:       Height:           Flowsheet Rows      Flowsheet Row First Filed Value   Admission Height 180.3 cm (71\") Documented at 01/29/2025 1824   Admission Weight 49.9 kg (110 lb) Documented at 01/29/2025 1824             TELEMETRY: Pacemaker rhythm    Physical Exam:  Constitutional:       Appearance: Well-developed.   Eyes:      General: No scleral icterus.     Conjunctiva/sclera: Conjunctivae normal.   HENT:      Head: Normocephalic and atraumatic.   Neck:      Vascular: No carotid bruit or JVD.   Pulmonary:      Effort: Pulmonary effort is normal.      Breath sounds: Normal breath sounds. No wheezing. No rales.   Cardiovascular:      Normal rate. Regular rhythm.   Pulses:     Intact distal pulses.   Abdominal:      General: Bowel sounds are normal.      Palpations: Abdomen is soft.   Musculoskeletal:      Cervical back: Normal range of motion and neck supple. Skin:     General: Skin is warm and dry.      Findings: No rash.   Neurological:      Mental Status: Alert.        Unchanged from prior encounter    Results Review:   I reviewed the patient's new clinical results.  Lab Results (last 24 hours)       Procedure Component Value Units Date/Time    Basic Metabolic Panel [[id number]]  (Abnormal) Collected: 02/10/25 0225    Specimen: Blood Updated: 02/10/25 0309     Glucose 146 mg/dL      BUN 30 mg/dL      Creatinine 0.62 mg/dL      Sodium 145 mmol/L      Potassium 3.5 mmol/L      Chloride 100 mmol/L      " CO2 37.9 mmol/L      Calcium 8.5 mg/dL      BUN/Creatinine Ratio 48.4     Anion Gap 7.1 mmol/L      eGFR 96.6 mL/min/1.73     Narrative:      GFR Categories in Chronic Kidney Disease (CKD)      GFR Category          GFR (mL/min/1.73)    Interpretation  G1                     90 or greater         Normal or high (1)  G2                      60-89                Mild decrease (1)  G3a                   45-59                Mild to moderate decrease  G3b                   30-44                Moderate to severe decrease  G4                    15-29                Severe decrease  G5                    14 or less           Kidney failure          (1)In the absence of evidence of kidney disease, neither GFR category G1 or G2 fulfill the criteria for CKD.    eGFR calculation 2021 CKD-EPI creatinine equation, which does not include race as a factor    Basic Metabolic Panel [[id number]]  (Abnormal) Collected: 02/09/25 1421    Specimen: Blood Updated: 02/09/25 1451     Glucose 137 mg/dL      BUN 30 mg/dL      Creatinine 0.70 mg/dL      Sodium 144 mmol/L      Potassium 3.3 mmol/L      Chloride 99 mmol/L      CO2 36.4 mmol/L      Calcium 8.5 mg/dL      BUN/Creatinine Ratio 42.9     Anion Gap 8.6 mmol/L      eGFR 93.1 mL/min/1.73     Narrative:      GFR Categories in Chronic Kidney Disease (CKD)      GFR Category          GFR (mL/min/1.73)    Interpretation  G1                     90 or greater         Normal or high (1)  G2                      60-89                Mild decrease (1)  G3a                   45-59                Mild to moderate decrease  G3b                   30-44                Moderate to severe decrease  G4                    15-29                Severe decrease  G5                    14 or less           Kidney failure          (1)In the absence of evidence of kidney disease, neither GFR category G1 or G2 fulfill the criteria for CKD.    eGFR calculation 2021 CKD-EPI creatinine equation, which does not  include race as a factor            Imaging Results (Last 24 Hours)       ** No results found for the last 24 hours. **            EKG      I personally viewed and interpreted the patient's EKG/Telemetry data:    ECHOCARDIOGRAM:  Results for orders placed during the hospital encounter of 02/01/24    Adult Transthoracic Echo Limited W/ Cont if Necessary Per Protocol    Interpretation Summary    Left ventricular systolic function is normal. Left ventricular ejection fraction appears to be 61 - 65%.    Left ventricular diastolic function was normal.    The left atrial cavity is mildly dilated.    Left atrial volume is mildly increased.    There is moderate calcification of the aortic valve mainly affecting the left coronary and right coronary cusp(s).    Estimated right ventricular systolic pressure from tricuspid regurgitation is mildly elevated (35-45 mmHg).    Mild pulmonary hypertension is present.       STRESS MYOVIEW:       CARDIAC CATHETERIZATION:  No results found for this or any previous visit.       OTHER:         Assessment & Plan     Severe bradycardia/sick sinus syndrome/pacemaker placement, stable paced rhythm backup rate of 60  Amiodarone has been discontinued  Replace potassium, keep greater than 4, magnesium greater than 2  Patient's pacemaker is working very well and that he has a leadless pacemaker.    History of atrial fibrillation and flutter  Patient is currently on amiodarone but has been stopped because of U waves and severe bradycardia and will monitor the rhythm but is also on Eliquis.  Will reevaluate candidacy for alternative agents    Coronary disease  Patient has nonobstructive disease in the past and is currently stable on medications.    Acute on chronic hypoxic respiratory failure  Patient is currently on IV antibiotics Pulmicort and Jordyn at pulmonologist think it could be amiodarone toxicity and hence amiodarone has been stopped, can continue off at this point look for alternatives,  may consider increase pacing rate, try sotalol if has recurrent atrial fibrillation, would need follow QT closely  Patient is on cortisone on high flow oxygen.    I discussed the patients findings and my recommendations with patient and his    Berto Anders Barroso MD  02/10/25  12:32 EST

## 2025-02-10 NOTE — CASE MANAGEMENT/SOCIAL WORK
Continued Stay Note  [facility]     Patient Name: Kali Garcia  MRN: [id number]  Today's Date: 2/10/2025    Admit Date: 1/29/2025    Plan: Return to [facility]. No PASRR needed. Precert required. Followed by [facility] facility.   Discharge Plan       Row Name 02/10/25 1042       Plan    [facility]. Precert required. Followed by [facility] facility.      Row Name 02/10/25 1005       Plan    Plan Comments DC Barriers: Airvo 60L, significant pulm decline, hospice following, MD discussing plan with family        Angeles Shane RN     [facility]  Office: [phone]  Cell: [phone]  Fax # [phone]

## 2025-02-10 NOTE — PLAN OF CARE
Goal Outcome Evaluation:      No new concerns this shift. Frequently pulls off airvo. Educated on need for oxygen therapy, easily redirected. Confused to time and situation when at rest. No c/o pain. Call light in reach.

## 2025-02-10 NOTE — PROGRESS NOTES
Nutrition Services  Patient Name: Kali Garcia  YOB: 1945  MRN: [id number]  Admission date: 1/29/2025    PROGRESS NOTE      Nutrition Intervention Updates: Continue current po diet and ONS. Encourage good po intake.        Encounter Information: Checking in to monitor PO diet tolerance. Plan to return to [facility] and followed by [facility] upon discharge. Pt continues to encourage supplemental O2 but pulls off airvo frequently. Tolerating po diet without noted issues at this time.        PO Diet: Diet: Regular/House; Fluid Consistency: Thin (IDDSI 0)   PO Supplements: Boost Original BID (Provides 480 kcals, 20 g protein if consumed)     Magic Cups at lunch/dinner (Provides 580 kcals, 18 g protein if consumed)    PO Intake:  75% average po intake x last 4 documented meals          Current nutrition support: -   Nutrition support review: -       Labs (reviewed below): Reviewed. Management per attending.       GI Function:  Last documented BM 2/3       Brief weight review   Wt Readings from Last 10 Encounters:   02/05/25 0822 52 kg (114 lb 10.2 oz)   02/04/25 0500 53.8 kg (118 lb 9.7 oz)   01/29/25 1824 49.9 kg (110 lb)   01/23/25 0524 49 kg (108 lb 0.4 oz)   01/22/25 0500 48.6 kg (107 lb 2.3 oz)   01/21/25 0539 48.8 kg (107 lb 9.4 oz)   01/20/25 0510 48.3 kg (106 lb 7.7 oz)   01/19/25 0500 48.3 kg (106 lb 7.7 oz)   01/18/25 2332 48.3 kg (106 lb 7.7 oz)   01/18/25 1625 51 kg (112 lb 7 oz)   01/03/25 0500 51 kg (112 lb 7 oz)   01/02/25 0557 51.8 kg (114 lb 3.2 oz)   01/01/25 0500 51.2 kg (112 lb 14 oz)   12/31/24 0501 51.1 kg (112 lb 10.5 oz)   12/30/24 0600 52.8 kg (116 lb 6.5 oz)   12/29/24 0514 53 kg (116 lb 13.5 oz)   12/28/24 0538 51.4 kg (113 lb 5.1 oz)   12/27/24 0647 50.4 kg (111 lb 1.8 oz)   12/26/24 0610 49.9 kg (110 lb 0.2 oz)   12/25/24 0600 49.6 kg (109 lb 5.6 oz)   12/23/24 0531 50.1 kg (110 lb 7.2 oz)   12/22/24 2139 49.9 kg (110 lb 0.2 oz)   03/04/24 1452 53 kg (116 lb 12.1  oz)   02/19/24 1303 50.5 kg (111 lb 5 oz)   02/16/24 2101 50 kg (110 lb 2.3 oz)   02/16/24 2054 51.8 kg (114 lb 2.8 oz)   02/15/24 1416 52.9 kg (116 lb 10.3 oz)   02/04/24 0315 51.5 kg (113 lb 8.6 oz)   02/03/24 1415 51.3 kg (113 lb)   02/03/24 0345 51.5 kg (113 lb 8.6 oz)   02/02/24 1524 51.8 kg (114 lb 3.2 oz)   02/02/24 0337 54.4 kg (120 lb)   02/01/24 1330 52.1 kg (114 lb 14.5 oz)   08/04/22 1124 51.1 kg (112 lb 10.5 oz)   07/26/22 1511 59 kg (130 lb)   04/15/22 0436 58.3 kg (128 lb 8.5 oz)   04/13/22 0355 54.6 kg (120 lb 5.9 oz)   04/12/22 2037 128 kg (282 lb 3 oz)        Results from last 7 days   Lab Units 02/10/25  0225 02/09/25  1421 02/07/25  2151 02/07/25  0932 02/05/25  0255   SODIUM mmol/L 145 144  --  140 140   POTASSIUM mmol/L 3.5 3.3* 4.0 3.3* 3.2*   CHLORIDE mmol/L 100 99  --  97* 100   CO2 mmol/L 37.9* 36.4*  --  35.9* 35.5*   BUN mg/dL 30* 30*  --  21 24*   CREATININE mg/dL 0.62* 0.70*  --  0.60* 0.64*   CALCIUM mg/dL 8.5* 8.5*  --  8.5* 8.1*   BILIRUBIN mg/dL  --   --   --  0.6 0.4   ALK PHOS U/L  --   --   --  69 57   ALT (SGPT) U/L  --   --   --  22 18   AST (SGOT) U/L  --   --   --  28 22   GLUCOSE mg/dL 146* 137*  --  117* 121*     Results from last 7 days   Lab Units 02/07/25  0932   HEMOGLOBIN g/dL 8.7*   HEMATOCRIT % 27.8*         RD to follow up per protocol.    Electronically signed by:  Patito Ochoa RD  02/10/25 14:06 EST

## 2025-02-10 NOTE — PROGRESS NOTES
[facility] MEDICINE SERVICE  DAILY PROGRESS NOTE    NAME: Kali Garcia  : 1945  MRN: [id number]      LOS: 12 days     PROVIDER OF SERVICE: Christopher Leach DO    Chief Complaint: Pneumonia    Subjective:     Interval History:  History taken from: patient    Having more SOB this AM; denies chest pain; says he feels anxious         Review of Systems:   Review of Systems    Objective:     Vital Signs  Temp:  [97.4 °F (36.3 °C)-98.5 °F (36.9 °C)] 98.3 °F (36.8 °C)  Heart Rate:  [51-59] 58  Resp:  [11-19] 19  BP: ()/(45-88) 131/64  Flow (L/min) (Oxygen Therapy):  [40-60] 60   Body mass index is 16.05 kg/m².    Physical Exam  Physical Exam  General: Sitting up in bed; on HFNC; increased work of breathing  CV: Bradycardic; regular rhythm; S1-S2  Lungs: Diminished and coarse BS bilaterally; no wheezing  Abdomen: soft, NT, ND        Diagnostic Data    Results from last 7 days   Lab Units 02/10/25  0225 25  2151 25  0932   WBC 10*3/mm3  --   --  4.62   HEMOGLOBIN g/dL  --   --  8.7*   HEMATOCRIT %  --   --  27.8*   PLATELETS 10*3/mm3  --   --  84*   GLUCOSE mg/dL 146*   < > 117*   CREATININE mg/dL 0.62*   < > 0.60*   BUN mg/dL 30*   < > 21   SODIUM mmol/L 145   < > 140   POTASSIUM mmol/L 3.5   < > 3.3*   AST (SGOT) U/L  --   --  28   ALT (SGPT) U/L  --   --  22   ALK PHOS U/L  --   --  69   BILIRUBIN mg/dL  --   --  0.6   ANION GAP mmol/L 7.1   < > 7.1    < > = values in this interval not displayed.       No radiology results for the last day      I reviewed the patient's new clinical results.    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       Acute on chronic respiratory failure, multifactorial from advanced COPD, suspected amiodarone toxicity   Metabolically active lung mass seen  previously on imaging  -up to 60% high flow this AM; still feeling SOB  -continue steroids, diuretics, and Abx per pulmonology recs  -there have been prior discussions with patient/family last week that patient has an overall poor prognosis; Hospice following.   Apparently had some improvement over the weekend, but appears worse today  -seems very anxious  -will add low dose prn klonapin  -continue ongoing GOC discussions      Acute urinary retention  -Failed voiding trial 2/3/2025  -Repeat trial when more stable     Chronic atrial fibrillation  Chronic CAD with HLD  Severe SMA stenosis, asymptomatic  -Continue amiodarone, Eliquis, atorvastatin, Plavix  -Outpatient follow-up regarding asymptomatic SMA stenosis.  No inpatient procedure at this time.     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     Disposition Planning:      Barriers to Discharge: severe oxygen requirements  Anticipated Date of Discharge: TBD  Place of Discharge: continue ongoing goals of care discussions; still suspect Hospice is best course of action           VTE Prophylaxis:  Pharmacologic VTE prophylaxis orders are present.             Disposition Planning:             Code Status and Medical Interventions: No CPR (Do Not Attempt to Resuscitate); Limited Support; No intubation (DNI)   Ordered at: 02/05/25 1405     Medical Intervention Limits:    No intubation (DNI)     Level Of Support Discussed With:    Next of Kin (If No Surrogate)    Health Care Surrogate     Code Status (Patient has no pulse and is not breathing):    No CPR (Do Not Attempt to Resuscitate)     Medical Interventions (Patient has pulse or is breathing):     Limited Support       Signature: Electronically signed by Christopher Leach DO, 02/10/25, 10:45 EST.  [facility]

## 2025-02-10 NOTE — PROGRESS NOTES
Daily Progress Note        Pneumonia    Superior mesenteric artery stenosis      Assessment:    Hypoxic respiratory insufficiency, multifactorial, severe lung and cardiac disease and vascular disease    Left lower lobe pneumonia    severe COPD  Respiratory panel negative on 1/30/2025  On home oxygen    Urine cultures 1/18/2025 positive for Pseudomonas    CAD, PVD  Extensive coronary artery calcifications  Severe upper abdominal atherosclerotic calcifications with SMA stenosis    HTN  HLD  A-fib  Anemia  Former smoker quit 2022 after 60 pack years  Echo 2/1/2024 EF 61-65% mild pulmonary hypertension 35-45 mmHg     CT scan of the chest in June 2024 showing a noncalcified nodule in the right upper lobe around 2 x 1.1 x 1 cm. Patient was also noted to have patchy airspace disease and clustered micronodules in the lingula.     He then underwent PET scan 10/10/2024 showing a 1.7 x 1.1 cm partially calcified irregular shaped nodule in the right upper lobe to be metabolically active with SUV of 4.84 and additional 2 metabolically active irregular nodular densities in the right upper lobe. Inferior apical segment with intervening areas of metabolically active interstitial thickening.     PFTs: FEV1/FVC postbronchodilator is 0.39 with FEV1 of 0.71 L or 28% predicted and FVC of 1.82 L or 47% predicted.   Total lung capacity of 7.77 L or 131% predicted and residual volume of 5.82 L or 226% predicted and DLCO of 61% predicted noted.     TSH 3.4 on 12/29/2024     Recommendations:    Continue hydrocortisone 50 mg IV Q8 for possibility of amiodarone toxicity   There is improvement in his respiratory status and oxygenation  oxygen titration currently on Airvo high flow 60 L alternating with AVAPS  Midodrine 10 mg 3 times daily  Antibiotics on IV Unasyn  Received 1 dose of IV vancomycin and cefepime    Lasix 20 mg IV every 12 started on 2/2/2025    Bronchodilator Pulmicort and DuoNeb  On amiodarone  Plavix    Protonix 40 mg  daily  Eliquis 2.5 mg twice daily     Patient is DNR DNI    Overall poor prognosis    Chest x-ray 2/7/2025      CT chest 2/1/2025             LOS: 12 days     Subjective         Objective     Vital signs for last 24 hours:  Vitals:    02/10/25 0748 02/10/25 0751 02/10/25 0752 02/10/25 0755   BP:    131/64   BP Location:    Left arm   Patient Position:    Lying   Pulse: 56 56 57 58   Resp: 18  18 19   Temp:    98.3 °F (36.8 °C)   TempSrc:    Oral   SpO2: (!) 78% (!) 77% (!) 84% (!) 81%   Weight:       Height:           Intake/Output last 3 shifts:  I/O last 3 completed shifts:  In: 720 [P.O.:720]  Out: 2700 [Urine:2700]  Intake/Output this shift:  No intake/output data recorded.      Radiology  Imaging Results (Last 24 Hours)       ** No results found for the last 24 hours. **            Labs:  Results from last 7 days   Lab Units 02/07/25  0932   WBC 10*3/mm3 4.62   HEMOGLOBIN g/dL 8.7*   HEMATOCRIT % 27.8*   PLATELETS 10*3/mm3 84*     Results from last 7 days   Lab Units 02/10/25  0225 02/07/25  2151 02/07/25  0932   SODIUM mmol/L 145   < > 140   POTASSIUM mmol/L 3.5   < > 3.3*   CHLORIDE mmol/L 100   < > 97*   CO2 mmol/L 37.9*   < > 35.9*   BUN mg/dL 30*   < > 21   CREATININE mg/dL 0.62*   < > 0.60*   CALCIUM mg/dL 8.5*   < > 8.5*   BILIRUBIN mg/dL  --   --  0.6   ALK PHOS U/L  --   --  69   ALT (SGPT) U/L  --   --  22   AST (SGOT) U/L  --   --  28   GLUCOSE mg/dL 146*   < > 117*    < > = values in this interval not displayed.           Results from last 7 days   Lab Units 02/07/25  0932 02/05/25  0255   ALBUMIN g/dL 2.4* 2.1*                                     Meds:   SCHEDULE  apixaban, 2.5 mg, Oral, Q12H  arformoterol, 15 mcg, Nebulization, BID - RT  atorvastatin, 10 mg, Oral, Nightly  budesonide, 0.5 mg, Nebulization, BID - RT  citalopram, 10 mg, Oral, Daily  clopidogrel, 75 mg, Oral, Daily  furosemide, 20 mg, Intravenous, Q12H  hydrocortisone sodium succinate, 50 mg, Intravenous, Q8H  midodrine, 10 mg, Oral,  Q8H  mirtazapine, 15 mg, Oral, Nightly  multivitamin with minerals, 1 tablet, Oral, Daily  pantoprazole, 40 mg, Oral, Daily  potassium chloride, 40 mEq, Oral, Q4H  sodium chloride, 500 mL, Intravenous, Once  sodium chloride, 10 mL, Intravenous, Q12H  tamsulosin, 0.4 mg, Oral, Daily      Infusions     PRNs    acetaminophen **OR** acetaminophen **OR** acetaminophen    senna-docusate sodium **AND** polyethylene glycol **AND** bisacodyl **AND** bisacodyl    Calcium Replacement - Follow Nurse / BPA Driven Protocol    ipratropium-albuterol    Magnesium Standard Dose Replacement - Follow Nurse / BPA Driven Protocol    melatonin    nitroglycerin    ondansetron    Phosphorus Replacement - Follow Nurse / BPA Driven Protocol    Potassium Replacement - Follow Nurse / BPA Driven Protocol    [COMPLETED] Insert Peripheral IV **AND** sodium chloride    sodium chloride    sodium chloride    Physical Exam:  Physical Exam  Cardiovascular:      Heart sounds: Murmur heard.      No gallop.   Pulmonary:      Effort: No respiratory distress.      Breath sounds: No stridor. Rhonchi and rales present. No wheezing.   Chest:      Chest wall: No tenderness.         ROS  Review of Systems   Respiratory:  Positive for cough and shortness of breath. Negative for wheezing and stridor.    Cardiovascular:  Positive for palpitations and leg swelling. Negative for chest pain.             Total time spent with patient greater than: 45 Minutes

## 2025-02-11 LAB
ANION GAP SERPL CALCULATED.3IONS-SCNC: 6.6 MMOL/L (ref 5–15)
BUN SERPL-MCNC: 35 MG/DL (ref 8–23)
BUN/CREAT SERPL: 50.7 (ref 7–25)
CALCIUM SPEC-SCNC: 8.8 MG/DL (ref 8.6–10.5)
CHLORIDE SERPL-SCNC: 106 MMOL/L (ref 98–107)
CO2 SERPL-SCNC: 33.4 MMOL/L (ref 22–29)
CREAT SERPL-MCNC: 0.69 MG/DL (ref 0.76–1.27)
EGFRCR SERPLBLD CKD-EPI 2021: 93.6 ML/MIN/1.73
GLUCOSE SERPL-MCNC: 117 MG/DL (ref 65–99)
POTASSIUM SERPL-SCNC: 5 MMOL/L (ref 3.5–5.2)
SODIUM SERPL-SCNC: 146 MMOL/L (ref 136–145)

## 2025-02-11 PROCEDURE — 94799 UNLISTED PULMONARY SVC/PX: CPT

## 2025-02-11 PROCEDURE — 25010000002 HYDROCORTISONE SOD SUC (PF) 100 MG RECONSTITUTED SOLUTION: Performed by: INTERNAL MEDICINE

## 2025-02-11 PROCEDURE — 94761 N-INVAS EAR/PLS OXIMETRY MLT: CPT

## 2025-02-11 PROCEDURE — 99232 SBSQ HOSP IP/OBS MODERATE 35: CPT | Performed by: INTERNAL MEDICINE

## 2025-02-11 PROCEDURE — 94664 DEMO&/EVAL PT USE INHALER: CPT

## 2025-02-11 PROCEDURE — 87481 CANDIDA DNA AMP PROBE: CPT | Performed by: FAMILY MEDICINE

## 2025-02-11 PROCEDURE — 80048 BASIC METABOLIC PNL TOTAL CA: CPT | Performed by: STUDENT IN AN ORGANIZED HEALTH CARE EDUCATION/TRAINING PROGRAM

## 2025-02-11 RX ORDER — FUROSEMIDE 20 MG/1
20 TABLET ORAL DAILY
Status: DISCONTINUED | OUTPATIENT
Start: 2025-02-13 | End: 2025-02-15 | Stop reason: HOSPADM

## 2025-02-11 RX ADMIN — HYDROCORTISONE SODIUM SUCCINATE 50 MG: 100 INJECTION, POWDER, FOR SOLUTION INTRAMUSCULAR; INTRAVENOUS at 11:22

## 2025-02-11 RX ADMIN — MIDODRINE HYDROCHLORIDE 10 MG: 5 TABLET ORAL at 08:47

## 2025-02-11 RX ADMIN — APIXABAN 2.5 MG: 2.5 TABLET, FILM COATED ORAL at 19:56

## 2025-02-11 RX ADMIN — IPRATROPIUM BROMIDE AND ALBUTEROL SULFATE 3 ML: .5; 3 SOLUTION RESPIRATORY (INHALATION) at 14:54

## 2025-02-11 RX ADMIN — ATORVASTATIN CALCIUM 10 MG: 10 TABLET ORAL at 19:56

## 2025-02-11 RX ADMIN — APIXABAN 2.5 MG: 2.5 TABLET, FILM COATED ORAL at 08:46

## 2025-02-11 RX ADMIN — MIDODRINE HYDROCHLORIDE 10 MG: 5 TABLET ORAL at 00:11

## 2025-02-11 RX ADMIN — Medication 1 TABLET: at 08:47

## 2025-02-11 RX ADMIN — Medication 10 ML: at 19:56

## 2025-02-11 RX ADMIN — CLOPIDOGREL BISULFATE 75 MG: 75 TABLET ORAL at 08:46

## 2025-02-11 RX ADMIN — FUROSEMIDE 20 MG: 20 TABLET ORAL at 08:46

## 2025-02-11 RX ADMIN — ARFORMOTEROL TARTRATE 15 MCG: 15 SOLUTION RESPIRATORY (INHALATION) at 07:44

## 2025-02-11 RX ADMIN — Medication 10 ML: at 08:45

## 2025-02-11 RX ADMIN — CITALOPRAM 10 MG: 20 TABLET, FILM COATED ORAL at 08:46

## 2025-02-11 RX ADMIN — BUDESONIDE 0.5 MG: 0.5 INHALANT RESPIRATORY (INHALATION) at 07:44

## 2025-02-11 RX ADMIN — HYDROCORTISONE SODIUM SUCCINATE 50 MG: 100 INJECTION, POWDER, FOR SOLUTION INTRAMUSCULAR; INTRAVENOUS at 03:46

## 2025-02-11 RX ADMIN — TAMSULOSIN HYDROCHLORIDE 0.4 MG: 0.4 CAPSULE ORAL at 08:47

## 2025-02-11 RX ADMIN — HYDROCORTISONE SODIUM SUCCINATE 50 MG: 100 INJECTION, POWDER, FOR SOLUTION INTRAMUSCULAR; INTRAVENOUS at 19:55

## 2025-02-11 RX ADMIN — MIRTAZAPINE 15 MG: 15 TABLET, FILM COATED ORAL at 19:56

## 2025-02-11 RX ADMIN — ARFORMOTEROL TARTRATE 15 MCG: 15 SOLUTION RESPIRATORY (INHALATION) at 20:02

## 2025-02-11 RX ADMIN — PANTOPRAZOLE SODIUM 40 MG: 40 TABLET, DELAYED RELEASE ORAL at 08:46

## 2025-02-11 RX ADMIN — MIDODRINE HYDROCHLORIDE 10 MG: 5 TABLET ORAL at 17:33

## 2025-02-11 RX ADMIN — BUDESONIDE 0.5 MG: 0.5 INHALANT RESPIRATORY (INHALATION) at 20:06

## 2025-02-11 NOTE — PLAN OF CARE
Goal Outcome Evaluation:      Pt status and vital signs stable entire shift. Pt remains maxxed on airvo settings, with oxygen sats maintaining in the low 90s. Pt has intermittently complained of shortness of breath, not being able to get enough air in, and one time stated he felt like he was going to pass, and that he didn't want to die. Nasal prongs adjusted, encouraged coughing and deep breathing and the anxiousness has subsided as of this time. Pt has not had any pain or other complaints this shift. Pt able to get some rest throughout the night. Some urine output present, large volume at beginning of shift due to bag not being emptied routinely during previous shifts. Otherwise, urine output was minimal throughout the night. No BM this shift. Pt expressed gratitude for care received.

## 2025-02-11 NOTE — PROGRESS NOTES
Cardiology Agenda        LOS:  LOS: 13 days   Patient Name: Kali Garcia  Age/Sex: 79 y.o. male  : 1945  MRN: [id number]    Day of Service: 25   Length of Stay: 13  Encounter Provider: FADIA Kamara  Place of Service: [facility] CARDIOLOGY  Patient Care Team:  Tami Ward APRN as PCP - General (Family Medicine)    Subjective:   Seen and examined greater than 50% of total encounter time in medical decision making performed by me  Scribaminta findings below    Chief Complaint: f/u concern for bradycardia    Subjective:  Remains with significant oxygen requirement, no acute CV events. Patient is paced  Overnight events discussed with staff  Stable volume status  Tolerating Plavix and Eliquis    Current Medications:   Scheduled Meds:apixaban, 2.5 mg, Oral, Q12H  arformoterol, 15 mcg, Nebulization, BID - RT  atorvastatin, 10 mg, Oral, Nightly  budesonide, 0.5 mg, Nebulization, BID - RT  citalopram, 10 mg, Oral, Daily  clopidogrel, 75 mg, Oral, Daily  furosemide, 20 mg, Oral, Daily  hydrocortisone sodium succinate, 50 mg, Intravenous, Q8H  midodrine, 10 mg, Oral, Q8H  mirtazapine, 15 mg, Oral, Nightly  multivitamin with minerals, 1 tablet, Oral, Daily  pantoprazole, 40 mg, Oral, Daily  sodium chloride, 500 mL, Intravenous, Once  sodium chloride, 10 mL, Intravenous, Q12H  tamsulosin, 0.4 mg, Oral, Daily      Continuous Infusions:     Allergies:  Allergies   Allergen Reactions    Codeine GI Intolerance       Review of Systems   Constitutional: Negative for chills, diaphoresis and malaise/fatigue.   Cardiovascular:  Positive for dyspnea on exertion. Negative for chest pain, irregular heartbeat, leg swelling, near-syncope, orthopnea, palpitations, paroxysmal nocturnal dyspnea and syncope.   Respiratory:  Positive for cough and sputum production. Negative for shortness of breath and sleep disturbances due to breathing.    Gastrointestinal:  Negative for change in  bowel habit.   Genitourinary:  Negative for urgency.   Neurological:  Negative for dizziness and headaches.   Psychiatric/Behavioral:  Negative for altered mental status.          Objective:     Temp:  [97.3 °F (36.3 °C)-98.8 °F (37.1 °C)] 97.4 °F (36.3 °C)  Heart Rate:  [50-62] 52  Resp:  [12-21] 21  BP: ()/(43-83) 123/61     Intake/Output Summary (Last 24 hours) at 2/11/2025 1259  Last data filed at 2/11/2025 0900  Gross per 24 hour   Intake 1052 ml   Output 800 ml   Net 252 ml     Body mass index is 16.05 kg/m².      01/29/25  1824 02/04/25  0500 02/05/25  0822   Weight: 49.9 kg (110 lb) 53.8 kg (118 lb 9.7 oz) 52 kg (114 lb 10.2 oz)         General Appearance:    Alert, cooperative, frail, deconditioned                                Head: Atraumatic, normocephalic, PERRLA               Neck:   supple, trachea midline, no thyromegaly, no carotid bruit, no JVD   Lungs:     Supplemental O2, scattered rhonchi    Heart:    Regular rhythm and normal rate, normal S1 and S2   Abdomen:     Normal bowel sounds, no masses, no organomegaly, soft  nontender, nondistended, no guarding, no rebound  tenderness   Extremities:   Moves all extremities well, no edema, no cyanosis, no  redness   Pulses:   Pulses palpable and equal bilaterally   Skin:   No bleeding, bruising or rash   Neurologic:   Awake, alert, oriented x3   Scribe findings above    Lab Review:   Results from last 7 days   Lab Units 02/11/25  0424 02/10/25  1535 02/10/25  0225 02/07/25  2151 02/07/25  0932 02/05/25  0255   SODIUM mmol/L 146*  --  145   < > 140 140   POTASSIUM mmol/L 5.0 4.9 3.5   < > 3.3* 3.2*   CHLORIDE mmol/L 106  --  100   < > 97* 100   CO2 mmol/L 33.4*  --  37.9*   < > 35.9* 35.5*   BUN mg/dL 35*  --  30*   < > 21 24*   CREATININE mg/dL 0.69*  --  0.62*   < > 0.60* 0.64*   GLUCOSE mg/dL 117*  --  146*   < > 117* 121*   CALCIUM mg/dL 8.8  --  8.5*   < > 8.5* 8.1*   AST (SGOT) U/L  --   --   --   --  28 22   ALT (SGPT) U/L  --   --   --    "--  22 18    < > = values in this interval not displayed.           Results from last 7 days   Lab Units 02/07/25  0932 02/05/25  0255   WBC 10*3/mm3 4.62 3.62   HEMOGLOBIN g/dL 8.7* 7.8*   HEMATOCRIT % 27.8* 24.9*   PLATELETS 10*3/mm3 84* 56*                       Invalid input(s): \"LDLCALC\"              Recent Radiology:  Imaging Results (Most Recent)       Procedure Component Value Units Date/Time    XR Chest 1 View [[id number]] Collected: 02/07/25 0932     Updated: 02/07/25 0938    Narrative:      XR CHEST 1 VW    Date of Exam: 2/7/2025 9:17 AM EST    Indication: followup respiratory failure    Comparison: AP chest 2/2/2025. CT chest 4/14/2022, 2/1/2025. PET/CT 10/10/2024.    Findings:  Interstitial and alveolar disease is present in the lung bases, similar to prior examination. Suspected, moderate small layering bilateral pleural effusions, without significant change. Normal heart size. Emphysematous/fibrotic changes are noted in the   apices. No pneumothorax. Old left rib resection changes. Nodular opacity in the right apex thought to correspond to the metabolically active nodule on prior PET/CT from 10/10/2024.      Impression:      Impression:    1. No significant change in bilateral lower lobe airspace disease, corresponding to suspected pneumonia based on prior CT chest imaging.  2. Advanced emphysema.  3. Right apical lung nodule is unchanged.  4. Suspected small to moderate layering bilateral pleural effusions, stable.      Electronically Signed: Martha Dodd MD    2/7/2025 9:36 AM EST    Workstation ID: [id number]    XR Chest 1 View [[id number]] Collected: 02/02/25 1635     Updated: 02/02/25 1638    Narrative:      XR CHEST 1 VW    Date of Exam: 2/2/2025 4:18 PM EST    Indication: Dyspnea    Comparison: CT chest without contrast 2/1/2025    Findings:  Patient is rotated to the left. There is persistent diffuse bilateral airspace disease consistent with multifocal pneumonia with worsening at the right lung " base. Moderate lateral pleural effusions. Negative for pneumothorax. Advanced emphysema.      Impression:      Impression:  1. Persistent diffuse basilar airspace disease consistent with multifocal pneumonia with worsening at the right lung base.  2. Moderate bilateral pleural effusions.  3. Advanced emphysema.          Electronically Signed: Ryder Scherer MD    2/2/2025 4:36 PM EST    Workstation ID: [id number]    CT Chest Without Contrast Diagnostic [[id number]] Collected: 02/01/25 1608     Updated: 02/01/25 1617    Narrative:      CT CHEST WO CONTRAST DIAGNOSTIC    Date of Exam: 2/1/2025 3:32 PM EST    Indication: Hypoxia.    Comparison: PET/CT 10/10/2024, CT chest with contrast 2/1/2024    Technique: Axial CT images were obtained of the chest without contrast administration.  Sagittal and coronal reconstructions were performed.  Automated exposure control and iterative reconstruction methods were used.    Findings:  Noncontrast visualized soft tissues of the lower neck are without acute abnormality. Heart size normal. Extensive coronary calcification. There is a leadless pacemaker noted in the right ventricle. Trace pericardial effusion. There are several enlarged   mediastinal lymph nodes for example low paratracheal node measuring 13 mm in short axis (2/54). These appear similar to the prior study and could relate to chronic reactive adenopathy. These nodes were not hypermetabolic on the prior PET/CT per report    Severe emphysema. Moderate bilateral pleural effusions. Lower lobe dependent airspace disease concerning for bilateral lower lobe pneumonia. No pneumothorax. Within the anterior aspect of the right upper lobe near the apex there is an area of suspected   chronic scarring with calcification which measures 2.3 x 1.1 cm (2/28). More posteriorly in the right upper lobe there is a small area of chronic nodular scarring which measures 10 mm (2/31). Area of chronic scarring at the anterior left upper lobe    unchanged (2/50).    Upper abdomen demonstrates hyperdense hepatic parenchyma which can be seen with prior amiodarone use or iron deposition among other etiologies. The spleen and adrenal glands are unremarkable. Small nonobstructing left nephrolithiasis. Extensive   atherosclerotic calcifications of the abdominal aorta and visualized branch vasculature. No free fluid in the upper abdomen. Suspect severe stenosis of the SMA due to calcified plaque, unchanged (2/121). Remote left rib fractures. Reduced mineral density   measured at L1 consistent with osteopenia/osteoporosis. Healed remote fracture at the mid sternum. The thoracic vertebral bodies are maintained in height.      Impression:      Impression:  1. Dense bilateral lower lobe consolidation compatible with pneumonia.  2. Moderate bilateral pleural effusions.  3. Severe emphysema with areas of chronic scarring in the right upper lobe and left upper lobe.  4. Extensive coronary artery calcifications.  5. 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          Electronically Signed: Ryder Scherer MD    2/1/2025 4:15 PM EST    Workstation ID: [id number]    XR Chest 1 View [[id number]] Collected: 01/29/25 1908     Updated: 01/29/25 1912    Narrative:      XR CHEST 1 VW    Date of Exam: 1/29/2025 6:40 PM EST    Indication: soa    Comparison: 1/18/2025 at 1739 hours, 12/22/2024    Findings:  Residual infiltrates are noted throughout the mid to lower lungs bilaterally. There is a small left pleural effusion. Diffuse interstitial changes are noted. These findings are stable to mildly progressed. The findings are also superimposed on underlying   chronic changes throughout the lungs. The cardiac silhouette and mediastinum are stable.      Impression:      Impression:  Residual infiltrates are noted throughout the mid to lower lungs bilaterally. There is a small left pleural  effusion. Diffuse interstitial changes are noted. These findings are stable to mildly progressed. The findings may indicate changes of CHF and   pulmonary edema.        Electronically Signed: Jonny Espinal MD    1/29/2025 7:10 PM EST    Workstation ID: [id number]            ECHOCARDIOGRAM:    Results for orders placed during the hospital encounter of 02/01/24    Adult Transthoracic Echo Limited W/ Cont if Necessary Per Protocol    Interpretation Summary    Left ventricular systolic function is normal. Left ventricular ejection fraction appears to be 61 - 65%.    Left ventricular diastolic function was normal.    The left atrial cavity is mildly dilated.    Left atrial volume is mildly increased.    There is moderate calcification of the aortic valve mainly affecting the left coronary and right coronary cusp(s).    Estimated right ventricular systolic pressure from tricuspid regurgitation is mildly elevated (35-45 mmHg).    Mild pulmonary hypertension is present.        I reviewed the patient's new clinical results.    EKG:      Assessment:       Pneumonia    Superior mesenteric artery stenosis    Acute on chronic hypoxic respiratory failure / pneumonia     2. Known SSS s/p micra leadless PPM placed 1/2025     3. Concern for bradycardia  Device interrogation reveals normal functioning device     4. pAFib/flutter  Converted to SR last admission on amiodarone 200mg BID  Will de escalate to 200mg daily  On eliquis 2.5mg BID     5. Normal LVEF / mild pulm HTN     6. H/o CAD currently stable    Plan:   Gentle diuresis ongoing Lasix as indicated, scheduled dosing presently  BMP today demonstrates significantly replace potassium now 5.0  No further replacement  Sodium up to 146 likely secondary to diuretics with contraction alkalosis  Decrease Lasix, hold next dose      Continues with significant oxygen requirement   Pacemaker functioning well appropriate heart rate of 60  Continue amiodarone 200 daily A-fib prophylaxis  with Eliquis 2.5 twice daily, monitor for toxicity with high risk medicines  Midodrine for blood pressure support    Midodrine for blood pressure support on board  Pacemaker dependent at this time sick sinus syndrome    Further recommendations to follow findings and clinical course    Berto Barroso MD, PhD    Darlyn Guan, FADIA  02/11/25  12:59 EST

## 2025-02-11 NOTE — PROGRESS NOTES
"[facility] MEDICINE SERVICE  DAILY PROGRESS NOTE    NAME: Kali Garcia  : 1945  MRN: [id number]      LOS: 13 days     PROVIDER OF SERVICE: Christopher Leach DO    Chief Complaint: Pneumonia    Subjective:     Interval History:  History taken from: patient    Having more SOB; denies pain; says he still wants to \"fight\"        Review of Systems:   Review of Systems    Objective:     Vital Signs  Temp:  [97.3 °F (36.3 °C)-98.8 °F (37.1 °C)] 97.3 °F (36.3 °C)  Heart Rate:  [50-62] 54  Resp:  [12-20] 18  BP: ()/(43-83) 96/55  Flow (L/min) (Oxygen Therapy):  [50-60] 60   Body mass index is 16.05 kg/m².    Physical Exam  Physical Exam  General: Elderly male; lying in bed; thin; weak appearing; on HFNC; increased work of breathing  CV: Bradycardic; regular rhythm; S1-S2  Lungs: Diminished and coarse BS bilaterally; no wheezing  Abdomen: soft, NT, ND        Diagnostic Data    Results from last 7 days   Lab Units 25  0424 25  2151 25  0932   WBC 10*3/mm3  --   --  4.62   HEMOGLOBIN g/dL  --   --  8.7*   HEMATOCRIT %  --   --  27.8*   PLATELETS 10*3/mm3  --   --  84*   GLUCOSE mg/dL 117*   < > 117*   CREATININE mg/dL 0.69*   < > 0.60*   BUN mg/dL 35*   < > 21   SODIUM mmol/L 146*   < > 140   POTASSIUM mmol/L 5.0   < > 3.3*   AST (SGOT) U/L  --   --  28   ALT (SGPT) U/L  --   --  22   ALK PHOS U/L  --   --  69   BILIRUBIN mg/dL  --   --  0.6   ANION GAP mmol/L 6.6   < > 7.1    < > = values in this interval not displayed.       No radiology results for the last day      I reviewed the patient's new clinical results.    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       Acute on chronic respiratory failure, multifactorial from advanced COPD, suspected amiodarone toxicity   Metabolically active " lung mass seen previously on imaging  -up to 60% high flow this AM; still having SOB   -continue steroids, diuretics, and Abx per pulmonology recs  -there have been prior discussions with patient/family last week that patient has an overall poor prognosis  Hospice following.   Apparently had some improvement over the weekend, but appears worse today  -seems very anxious  -continue low dose prn klonapin  -continue ongoing GOC discussions      Acute urinary retention  -Failed voiding trial 2/3/2025  -Repeat trial when more stable     Chronic atrial fibrillation  Chronic CAD with HLD  Severe SMA stenosis, asymptomatic  -Continue amiodarone, Eliquis, atorvastatin, Plavix  -Outpatient follow-up regarding asymptomatic SMA stenosis.  No inpatient procedure at this time.     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     I had another long talk with patient at bedside.  I explained that he remains on high flow oxygen and his lungs are not showing much improvement.  I told him I'm not sure how much better we will get him, and recommended transitioning to comfort care. Patient told me he wants to keep fighting.       Barriers to Discharge: severe oxygen requirements  Anticipated Date of Discharge: TBD  Place of Discharge: continue ongoing goals of care discussions; still suspect Hospice is best course of action           VTE Prophylaxis:  Pharmacologic VTE prophylaxis orders are present.             Disposition Planning:             Code Status and Medical Interventions: No CPR (Do Not Attempt to Resuscitate); Limited Support; No intubation (DNI)   Ordered at: 02/05/25 1405     Medical Intervention Limits:    No  intubation (DNI)     Level Of Support Discussed With:    Next of Kin (If No Surrogate)    Health Care Surrogate     Code Status (Patient has no pulse and is not breathing):    No CPR (Do Not Attempt to Resuscitate)     Medical Interventions (Patient has pulse or is breathing):    Limited Support       Signature: Electronically signed by Christopher Leach DO, 02/11/25, 11:12 EST.  [facility] Hospitalist Team

## 2025-02-11 NOTE — PROGRESS NOTES
Daily Progress Note        Pneumonia    Superior mesenteric artery stenosis      Assessment:    Hypoxic respiratory insufficiency, multifactorial, severe lung and cardiac disease and vascular disease    Left lower lobe pneumonia    severe COPD  Respiratory panel negative on 1/30/2025  On home oxygen    Urine cultures 1/18/2025 positive for Pseudomonas    CAD, PVD  Extensive coronary artery calcifications  Severe upper abdominal atherosclerotic calcifications with SMA stenosis    HTN  HLD  A-fib  Anemia  Former smoker quit 2022 after 60 pack years  Echo 2/1/2024 EF 61-65% mild pulmonary hypertension 35-45 mmHg     CT scan of the chest in June 2024 showing a noncalcified nodule in the right upper lobe around 2 x 1.1 x 1 cm. Patient was also noted to have patchy airspace disease and clustered micronodules in the lingula.     He then underwent PET scan 10/10/2024 showing a 1.7 x 1.1 cm partially calcified irregular shaped nodule in the right upper lobe to be metabolically active with SUV of 4.84 and additional 2 metabolically active irregular nodular densities in the right upper lobe. Inferior apical segment with intervening areas of metabolically active interstitial thickening.     PFTs: FEV1/FVC postbronchodilator is 0.39 with FEV1 of 0.71 L or 28% predicted and FVC of 1.82 L or 47% predicted.   Total lung capacity of 7.77 L or 131% predicted and residual volume of 5.82 L or 226% predicted and DLCO of 61% predicted noted.     TSH 3.4 on 12/29/2024     Recommendations:    Continue hydrocortisone 50 mg IV Q8 for possibility of amiodarone toxicity   There is improvement in his respiratory status and oxygenation  oxygen titration currently on Airvo high flow 60 L alternating with AVAPS  Midodrine 10 mg 3 times daily  Antibiotics on IV Unasyn  Received 1 dose of IV vancomycin and cefepime    Lasix 20 mg IV every 12 started on 2/2/2025    Bronchodilator Pulmicort and DuoNeb  On amiodarone  Plavix    Protonix 40 mg  daily  Eliquis 2.5 mg twice daily     Patient is DNR DNI    Overall poor prognosis    Chest x-ray 2/7/2025      CT chest 2/1/2025             LOS: 13 days     Subjective         Objective     Vital signs for last 24 hours:  Vitals:    02/11/25 0748 02/11/25 0749 02/11/25 0753 02/11/25 0834   BP:    96/55   BP Location:    Right arm   Patient Position:    Lying   Pulse: 51 51 50 54   Resp: 16 16 16 18   Temp:    97.3 °F (36.3 °C)   TempSrc:    Oral   SpO2: 92% 92% 92% 92%   Weight:       Height:           Intake/Output last 3 shifts:  I/O last 3 completed shifts:  In: 722 [P.O.:700; I.V.:22]  Out: 1300 [Urine:1300]  Intake/Output this shift:  No intake/output data recorded.      Radiology  Imaging Results (Last 24 Hours)       ** No results found for the last 24 hours. **            Labs:  Results from last 7 days   Lab Units 02/07/25  0932   WBC 10*3/mm3 4.62   HEMOGLOBIN g/dL 8.7*   HEMATOCRIT % 27.8*   PLATELETS 10*3/mm3 84*     Results from last 7 days   Lab Units 02/11/25  0424 02/07/25  2151 02/07/25  0932   SODIUM mmol/L 146*   < > 140   POTASSIUM mmol/L 5.0   < > 3.3*   CHLORIDE mmol/L 106   < > 97*   CO2 mmol/L 33.4*   < > 35.9*   BUN mg/dL 35*   < > 21   CREATININE mg/dL 0.69*   < > 0.60*   CALCIUM mg/dL 8.8   < > 8.5*   BILIRUBIN mg/dL  --   --  0.6   ALK PHOS U/L  --   --  69   ALT (SGPT) U/L  --   --  22   AST (SGOT) U/L  --   --  28   GLUCOSE mg/dL 117*   < > 117*    < > = values in this interval not displayed.           Results from last 7 days   Lab Units 02/07/25  0932 02/05/25  0255   ALBUMIN g/dL 2.4* 2.1*                                     Meds:   SCHEDULE  apixaban, 2.5 mg, Oral, Q12H  arformoterol, 15 mcg, Nebulization, BID - RT  atorvastatin, 10 mg, Oral, Nightly  budesonide, 0.5 mg, Nebulization, BID - RT  citalopram, 10 mg, Oral, Daily  clopidogrel, 75 mg, Oral, Daily  furosemide, 20 mg, Oral, Daily  hydrocortisone sodium succinate, 50 mg, Intravenous, Q8H  midodrine, 10 mg, Oral,  Q8H  mirtazapine, 15 mg, Oral, Nightly  multivitamin with minerals, 1 tablet, Oral, Daily  pantoprazole, 40 mg, Oral, Daily  sodium chloride, 500 mL, Intravenous, Once  sodium chloride, 10 mL, Intravenous, Q12H  tamsulosin, 0.4 mg, Oral, Daily      Infusions     PRNs    acetaminophen **OR** acetaminophen **OR** acetaminophen    senna-docusate sodium **AND** polyethylene glycol **AND** bisacodyl **AND** bisacodyl    Calcium Replacement - Follow Nurse / BPA Driven Protocol    clonazePAM    ipratropium-albuterol    Magnesium Standard Dose Replacement - Follow Nurse / BPA Driven Protocol    melatonin    nitroglycerin    ondansetron    Phosphorus Replacement - Follow Nurse / BPA Driven Protocol    Potassium Replacement - Follow Nurse / BPA Driven Protocol    [COMPLETED] Insert Peripheral IV **AND** sodium chloride    sodium chloride    sodium chloride    Physical Exam:  Physical Exam  Cardiovascular:      Heart sounds: Murmur heard.      No gallop.   Pulmonary:      Effort: No respiratory distress.      Breath sounds: No stridor. Rhonchi and rales present. No wheezing.   Chest:      Chest wall: No tenderness.         ROS  Review of Systems   Respiratory:  Positive for cough and shortness of breath. Negative for wheezing and stridor.    Cardiovascular:  Positive for palpitations and leg swelling. Negative for chest pain.             Total time spent with patient greater than: 45 Minutes

## 2025-02-12 LAB
ANION GAP SERPL CALCULATED.3IONS-SCNC: 6.4 MMOL/L (ref 5–15)
BUN SERPL-MCNC: 35 MG/DL (ref 8–23)
BUN/CREAT SERPL: 59.3 (ref 7–25)
C AURIS DNA SPEC QL NAA+NON-PROBE: NOT DETECTED
CALCIUM SPEC-SCNC: 8.7 MG/DL (ref 8.6–10.5)
CHLORIDE SERPL-SCNC: 105 MMOL/L (ref 98–107)
CO2 SERPL-SCNC: 33.6 MMOL/L (ref 22–29)
CREAT SERPL-MCNC: 0.59 MG/DL (ref 0.76–1.27)
EGFRCR SERPLBLD CKD-EPI 2021: 98.1 ML/MIN/1.73
GLUCOSE SERPL-MCNC: 107 MG/DL (ref 65–99)
POTASSIUM SERPL-SCNC: 4.1 MMOL/L (ref 3.5–5.2)
SODIUM SERPL-SCNC: 145 MMOL/L (ref 136–145)

## 2025-02-12 PROCEDURE — 99232 SBSQ HOSP IP/OBS MODERATE 35: CPT | Performed by: INTERNAL MEDICINE

## 2025-02-12 PROCEDURE — 94664 DEMO&/EVAL PT USE INHALER: CPT

## 2025-02-12 PROCEDURE — 94799 UNLISTED PULMONARY SVC/PX: CPT

## 2025-02-12 PROCEDURE — 80048 BASIC METABOLIC PNL TOTAL CA: CPT | Performed by: STUDENT IN AN ORGANIZED HEALTH CARE EDUCATION/TRAINING PROGRAM

## 2025-02-12 PROCEDURE — 25010000002 HYDROCORTISONE SOD SUC (PF) 100 MG RECONSTITUTED SOLUTION: Performed by: INTERNAL MEDICINE

## 2025-02-12 PROCEDURE — 94761 N-INVAS EAR/PLS OXIMETRY MLT: CPT

## 2025-02-12 RX ADMIN — APIXABAN 2.5 MG: 2.5 TABLET, FILM COATED ORAL at 20:53

## 2025-02-12 RX ADMIN — BUDESONIDE 0.5 MG: 0.5 INHALANT RESPIRATORY (INHALATION) at 08:27

## 2025-02-12 RX ADMIN — HYDROCORTISONE SODIUM SUCCINATE 50 MG: 100 INJECTION, POWDER, FOR SOLUTION INTRAMUSCULAR; INTRAVENOUS at 20:53

## 2025-02-12 RX ADMIN — MIDODRINE HYDROCHLORIDE 10 MG: 5 TABLET ORAL at 09:08

## 2025-02-12 RX ADMIN — HYDROCORTISONE SODIUM SUCCINATE 50 MG: 100 INJECTION, POWDER, FOR SOLUTION INTRAMUSCULAR; INTRAVENOUS at 03:15

## 2025-02-12 RX ADMIN — TAMSULOSIN HYDROCHLORIDE 0.4 MG: 0.4 CAPSULE ORAL at 09:08

## 2025-02-12 RX ADMIN — ARFORMOTEROL TARTRATE 15 MCG: 15 SOLUTION RESPIRATORY (INHALATION) at 08:23

## 2025-02-12 RX ADMIN — ATORVASTATIN CALCIUM 10 MG: 10 TABLET ORAL at 20:53

## 2025-02-12 RX ADMIN — CLOPIDOGREL BISULFATE 75 MG: 75 TABLET ORAL at 09:08

## 2025-02-12 RX ADMIN — HYDROCORTISONE SODIUM SUCCINATE 50 MG: 100 INJECTION, POWDER, FOR SOLUTION INTRAMUSCULAR; INTRAVENOUS at 11:25

## 2025-02-12 RX ADMIN — ACETAMINOPHEN 650 MG: 325 TABLET, FILM COATED ORAL at 20:53

## 2025-02-12 RX ADMIN — CLONAZEPAM 0.5 MG: 0.5 TABLET ORAL at 20:55

## 2025-02-12 RX ADMIN — IPRATROPIUM BROMIDE AND ALBUTEROL SULFATE 3 ML: .5; 3 SOLUTION RESPIRATORY (INHALATION) at 15:25

## 2025-02-12 RX ADMIN — APIXABAN 2.5 MG: 2.5 TABLET, FILM COATED ORAL at 09:08

## 2025-02-12 RX ADMIN — Medication 10 ML: at 09:09

## 2025-02-12 RX ADMIN — CITALOPRAM 10 MG: 20 TABLET, FILM COATED ORAL at 09:08

## 2025-02-12 RX ADMIN — MIRTAZAPINE 15 MG: 15 TABLET, FILM COATED ORAL at 21:01

## 2025-02-12 RX ADMIN — BUDESONIDE 0.5 MG: 0.5 INHALANT RESPIRATORY (INHALATION) at 19:50

## 2025-02-12 RX ADMIN — Medication 5 MG: at 20:53

## 2025-02-12 RX ADMIN — Medication 1 TABLET: at 09:08

## 2025-02-12 RX ADMIN — Medication 10 ML: at 20:55

## 2025-02-12 RX ADMIN — PANTOPRAZOLE SODIUM 40 MG: 40 TABLET, DELAYED RELEASE ORAL at 09:08

## 2025-02-12 RX ADMIN — ARFORMOTEROL TARTRATE 15 MCG: 15 SOLUTION RESPIRATORY (INHALATION) at 19:46

## 2025-02-12 NOTE — PLAN OF CARE
Goal Outcome Evaluation:                   Problem: Adult Inpatient Plan of Care  Goal: Plan of Care Review  Outcome: Progressing  Goal: Patient-Specific Goal (Individualized)  Outcome: Progressing  Goal: Absence of Hospital-Acquired Illness or Injury  Outcome: Progressing  Intervention: Identify and Manage Fall Risk  Recent Flowsheet Documentation  Taken 2/12/2025 1645 by Elke Oconnor RN  Safety Promotion/Fall Prevention:   activity supervised   assistive device/personal items within reach   clutter free environment maintained   fall prevention program maintained   nonskid shoes/slippers when out of bed   safety round/check completed   room organization consistent  Taken 2/12/2025 1430 by Elke Oconnor RN  Safety Promotion/Fall Prevention:   activity supervised   assistive device/personal items within reach   clutter free environment maintained   fall prevention program maintained   nonskid shoes/slippers when out of bed   safety round/check completed   room organization consistent  Taken 2/12/2025 1240 by Elke Oconnor RN  Safety Promotion/Fall Prevention:   activity supervised   assistive device/personal items within reach   clutter free environment maintained   fall prevention program maintained   nonskid shoes/slippers when out of bed   room organization consistent   safety round/check completed  Taken 2/12/2025 0845 by Elke Oconnor RN  Safety Promotion/Fall Prevention:   activity supervised   assistive device/personal items within reach   clutter free environment maintained   fall prevention program maintained   nonskid shoes/slippers when out of bed   safety round/check completed   room organization consistent  Intervention: Prevent Skin Injury  Recent Flowsheet Documentation  Taken 2/12/2025 0845 by Elke Oconnor RN  Body Position: (pt very particular about positioning, refused left or right turn)   turned   supine  Skin Protection: incontinence pads utilized  Intervention: Prevent and  Manage VTE (Venous Thromboembolism) Risk  Recent Flowsheet Documentation  Taken 2/12/2025 1645 by Elke Oconnor RN  VTE Prevention/Management:   bilateral   SCDs (sequential compression devices) on  Taken 2/12/2025 1240 by Elke Oconnor RN  VTE Prevention/Management:   bilateral   SCDs (sequential compression devices) on  Taken 2/12/2025 0845 by Elke Oconnor RN  VTE Prevention/Management:   bilateral   SCDs (sequential compression devices) off   patient refused intervention  Intervention: Prevent Infection  Recent Flowsheet Documentation  Taken 2/12/2025 1645 by Elke Oconnor RN  Infection Prevention:   hand hygiene promoted   personal protective equipment utilized   rest/sleep promoted   single patient room provided   environmental surveillance performed  Taken 2/12/2025 1430 by Elke Oconnor RN  Infection Prevention:   hand hygiene promoted   personal protective equipment utilized   rest/sleep promoted   single patient room provided   environmental surveillance performed  Taken 2/12/2025 1240 by Elke Oconnor RN  Infection Prevention:   personal protective equipment utilized   hand hygiene promoted   rest/sleep promoted   single patient room provided   environmental surveillance performed  Taken 2/12/2025 0845 by Elke Oconnor RN  Infection Prevention:   hand hygiene promoted   personal protective equipment utilized   rest/sleep promoted   single patient room provided   environmental surveillance performed  Goal: Optimal Comfort and Wellbeing  Outcome: Progressing  Intervention: Provide Person-Centered Care  Recent Flowsheet Documentation  Taken 2/12/2025 1645 by Elke Oconnor RN  Trust Relationship/Rapport:   care explained   choices provided  Taken 2/12/2025 1240 by Elke Oconnor RN  Trust Relationship/Rapport: choices provided  Taken 2/12/2025 0845 by Elke Oconnor RN  Trust Relationship/Rapport:   care explained   choices provided  Goal: Readiness for Transition of Care  Outcome:  Progressing

## 2025-02-12 NOTE — PROGRESS NOTES
[facility] MEDICINE SERVICE  DAILY PROGRESS NOTE    NAME: Kali Garcia  : 1945  MRN: [id number]      LOS: 14 days     PROVIDER OF SERVICE: Christopher Leach DO    Chief Complaint: Pneumonia    Subjective:     Interval History:  History taken from: patient    Still with SOB; admits he is tired, but tells me he doesn't want to give up; wants to keep fighting; denies pain          Review of Systems:   Review of Systems    Objective:     Vital Signs  Temp:  [97.4 °F (36.3 °C)-98 °F (36.7 °C)] 97.5 °F (36.4 °C)  Heart Rate:  [51-55] 51  Resp:  [12-22] 18  BP: ()/(51-79) 141/52  Flow (L/min) (Oxygen Therapy):  [60] 60   Body mass index is 16.05 kg/m².    Physical Exam  Physical Exam  General: Elderly, thin male; lying in bed; on HFNC  CV: Bradycardic; regular rhythm; S1-S2  Lungs: Diminished and coarse BS bilaterally; no wheezing  Abdomen: soft, NT, ND        Diagnostic Data    Results from last 7 days   Lab Units 25  0526 25  2151 25  0932   WBC 10*3/mm3  --   --  4.62   HEMOGLOBIN g/dL  --   --  8.7*   HEMATOCRIT %  --   --  27.8*   PLATELETS 10*3/mm3  --   --  84*   GLUCOSE mg/dL 107*   < > 117*   CREATININE mg/dL 0.59*   < > 0.60*   BUN mg/dL 35*   < > 21   SODIUM mmol/L 145   < > 140   POTASSIUM mmol/L 4.1   < > 3.3*   AST (SGOT) U/L  --   --  28   ALT (SGPT) U/L  --   --  22   ALK PHOS U/L  --   --  69   BILIRUBIN mg/dL  --   --  0.6   ANION GAP mmol/L 6.4   < > 7.1    < > = values in this interval not displayed.       No radiology results for the last day      I reviewed the patient's new clinical results.    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       Acute on chronic respiratory failure, multifactorial from advanced COPD, suspected amiodarone toxicity   Metabolically  active lung mass seen previously on imaging  -up to 70% high flow this AM; still having SOB   -continue steroids, diuretics, and Abx per pulmonology recs  -there have been prior discussions with patient/family last week that patient has an overall poor prognosis  Hospice following.   Apparently had some improvement over the weekend, but unfortunately has not improved all week-continue low dose prn klonapin for anxiety  -I had another long talk with patient today; he says he wants to keep fighting   -continue ongoing GOC discussions      Acute urinary retention  -Failed voiding trial 2/3/2025  -Repeat trial when more stable     Chronic atrial fibrillation  Chronic CAD with HLD  Severe SMA stenosis, asymptomatic  -Continue amiodarone, Eliquis, atorvastatin, Plavix  -Outpatient follow-up regarding asymptomatic SMA stenosis.  No inpatient procedure at this time.     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     I had another long talk with patient and his daughter at bedside.    I explained that he remains on high flow oxygen and his lungs are not showing much improvement.  I reiterated that I'm not sure how much better we will get him, and recommended transitioning to comfort care. Patient told me he wants to keep fighting.  I told him he may need to transition to LTAC if his oxygen requirements remain high.      Barriers to Discharge: severe oxygen requirements  Anticipated Date of Discharge: TBD  Place of Discharge: continue ongoing goals of care discussions; still suspect Hospice is best course of action, but may need LTAC if decides to keep fighting           VTE Prophylaxis:  Pharmacologic VTE  prophylaxis orders are present.             Disposition Planning:             Code Status and Medical Interventions: No CPR (Do Not Attempt to Resuscitate); Limited Support; No intubation (DNI)   Ordered at: 02/05/25 1405     Medical Intervention Limits:    No intubation (DNI)     Level Of Support Discussed With:    Next of Kin (If No Surrogate)    Health Care Surrogate     Code Status (Patient has no pulse and is not breathing):    No CPR (Do Not Attempt to Resuscitate)     Medical Interventions (Patient has pulse or is breathing):    Limited Support       Signature: Electronically signed by Christopher Leach DO, 02/12/25, 14:00 EST.  [facility]

## 2025-02-12 NOTE — DISCHARGE PLACEMENT REQUEST
"Kali Garcia (79 y.o. Male)       Date of Birth   1945    Social Security Number       Address   [address] [place] [address]    Home Phone   [phone]    MRN   [id number]       Jehovah's witness   None    Marital Status                               Admission Date   1/29/25    Admission Type   Emergency    Admitting Provider   Noman Turcios MD    Attending Provider   Christopher Leach DO    Department, Room/Bed   [address], 2212/1       Discharge Date       Discharge Disposition       Discharge Destination                                 Attending Provider: Christopher Leach DO    Allergies: Codeine    Isolation: Contact   Infection: Candida Auris (rule out) (02/11/25)   Code Status: No CPR    Ht: 180 cm (70.87\")   Wt: 52 kg (114 lb 10.2 oz)    Admission Cmt: None   Principal Problem: Pneumonia [J18.9]                   Active Insurance as of 1/29/2025       Primary Coverage       Payor Plan Insurance Group Employer/Plan Group    ANTHEM MEDICARE REPLACEMENT ANTHEM MED ADV PPO [id number]       Payor Plan Address Payor Plan Phone Number Payor Plan Fax Number Effective Dates    [address] [phone]  1/1/2024 - None Entered    [facility] [id number]         Subscriber Name Subscriber Birth Date Member ID       KALI GARCIA 1945 [id number]                     Emergency Contacts        (Rel.) Home Phone Work Phone Mobile Phone    SRINIVAS GARCIA (Son) [phone] -- [phone]    JUAN GARCIA (Other) [phone] -- [phone]    Luisito Garcia (Son) -- -- --    hafsa fox (Daughter) [phone] -- --            "

## 2025-02-12 NOTE — PROGRESS NOTES
Daily Progress Note        Pneumonia    Superior mesenteric artery stenosis      Assessment:    Hypoxic respiratory insufficiency, multifactorial, severe lung and cardiac disease and vascular disease    Left lower lobe pneumonia    severe COPD  Respiratory panel negative on 1/30/2025  On home oxygen    Urine cultures 1/18/2025 positive for Pseudomonas    CAD, PVD  Extensive coronary artery calcifications  Severe upper abdominal atherosclerotic calcifications with SMA stenosis    HTN  HLD  A-fib  Anemia  Former smoker quit 2022 after 60 pack years  Echo 2/1/2024 EF 61-65% mild pulmonary hypertension 35-45 mmHg     CT scan of the chest in June 2024 showing a noncalcified nodule in the right upper lobe around 2 x 1.1 x 1 cm. Patient was also noted to have patchy airspace disease and clustered micronodules in the lingula.     He then underwent PET scan 10/10/2024 showing a 1.7 x 1.1 cm partially calcified irregular shaped nodule in the right upper lobe to be metabolically active with SUV of 4.84 and additional 2 metabolically active irregular nodular densities in the right upper lobe. Inferior apical segment with intervening areas of metabolically active interstitial thickening.     PFTs: FEV1/FVC postbronchodilator is 0.39 with FEV1 of 0.71 L or 28% predicted and FVC of 1.82 L or 47% predicted.   Total lung capacity of 7.77 L or 131% predicted and residual volume of 5.82 L or 226% predicted and DLCO of 61% predicted noted.     TSH 3.4 on 12/29/2024     Recommendations:    Continue hydrocortisone 50 mg IV Q8 for possibility of amiodarone toxicity   There is improvement in his respiratory status and oxygenation  oxygen titration currently on Airvo high flow 60 L alternating with AVAPS  Midodrine 10 mg 3 times daily  Antibiotics on IV Unasyn  Received 1 dose of IV vancomycin and cefepime    Lasix 20 mg IV every 12 started on 2/2/2025    Bronchodilator Pulmicort and DuoNeb  On amiodarone  Plavix    Protonix 40 mg  daily  Eliquis 2.5 mg twice daily     Patient is DNR DNI    Overall poor prognosis    Chest x-ray 2/7/2025      CT chest 2/1/2025             LOS: 14 days     Subjective         Objective     Vital signs for last 24 hours:  Vitals:    02/12/25 0035 02/12/25 0200 02/12/25 0340 02/12/25 0753   BP: 131/79 132/65  141/52   BP Location: Left arm   Left arm   Patient Position: Lying   Lying   Pulse: 53 52  53   Resp: 13   17   Temp: 98 °F (36.7 °C)   97.5 °F (36.4 °C)   TempSrc: Oral   Oral   SpO2: 91% 90% 94% 90%   Weight:       Height:           Intake/Output last 3 shifts:  I/O last 3 completed shifts:  In: 1276 [P.O.:1254; I.V.:22]  Out: 1000 [Urine:1000]  Intake/Output this shift:  No intake/output data recorded.      Radiology  Imaging Results (Last 24 Hours)       ** No results found for the last 24 hours. **            Labs:  Results from last 7 days   Lab Units 02/07/25  0932   WBC 10*3/mm3 4.62   HEMOGLOBIN g/dL 8.7*   HEMATOCRIT % 27.8*   PLATELETS 10*3/mm3 84*     Results from last 7 days   Lab Units 02/12/25  0526 02/07/25  2151 02/07/25  0932   SODIUM mmol/L 145   < > 140   POTASSIUM mmol/L 4.1   < > 3.3*   CHLORIDE mmol/L 105   < > 97*   CO2 mmol/L 33.6*   < > 35.9*   BUN mg/dL 35*   < > 21   CREATININE mg/dL 0.59*   < > 0.60*   CALCIUM mg/dL 8.7   < > 8.5*   BILIRUBIN mg/dL  --   --  0.6   ALK PHOS U/L  --   --  69   ALT (SGPT) U/L  --   --  22   AST (SGOT) U/L  --   --  28   GLUCOSE mg/dL 107*   < > 117*    < > = values in this interval not displayed.           Results from last 7 days   Lab Units 02/07/25  0932   ALBUMIN g/dL 2.4*                                     Meds:   SCHEDULE  apixaban, 2.5 mg, Oral, Q12H  arformoterol, 15 mcg, Nebulization, BID - RT  atorvastatin, 10 mg, Oral, Nightly  budesonide, 0.5 mg, Nebulization, BID - RT  citalopram, 10 mg, Oral, Daily  clopidogrel, 75 mg, Oral, Daily  [START ON 2/13/2025] furosemide, 20 mg, Oral, Daily  hydrocortisone sodium succinate, 50 mg, Intravenous,  Q8H  midodrine, 10 mg, Oral, Q8H  mirtazapine, 15 mg, Oral, Nightly  multivitamin with minerals, 1 tablet, Oral, Daily  pantoprazole, 40 mg, Oral, Daily  sodium chloride, 500 mL, Intravenous, Once  sodium chloride, 10 mL, Intravenous, Q12H  tamsulosin, 0.4 mg, Oral, Daily      Infusions     PRNs    acetaminophen **OR** acetaminophen **OR** acetaminophen    senna-docusate sodium **AND** polyethylene glycol **AND** bisacodyl **AND** bisacodyl    Calcium Replacement - Follow Nurse / BPA Driven Protocol    clonazePAM    ipratropium-albuterol    Magnesium Standard Dose Replacement - Follow Nurse / BPA Driven Protocol    melatonin    nitroglycerin    ondansetron    Phosphorus Replacement - Follow Nurse / BPA Driven Protocol    Potassium Replacement - Follow Nurse / BPA Driven Protocol    [COMPLETED] Insert Peripheral IV **AND** sodium chloride    sodium chloride    sodium chloride    Physical Exam:  Physical Exam  Cardiovascular:      Heart sounds: Murmur heard.      No gallop.   Pulmonary:      Effort: No respiratory distress.      Breath sounds: No stridor. Rhonchi and rales present. No wheezing.   Chest:      Chest wall: No tenderness.         ROS  Review of Systems   Respiratory:  Positive for cough and shortness of breath. Negative for wheezing and stridor.    Cardiovascular:  Positive for palpitations and leg swelling. Negative for chest pain.             Total time spent with patient greater than: 45 Minutes

## 2025-02-12 NOTE — PROGRESS NOTES
Cardiology [place]        LOS:  LOS: 14 days   Patient Name: Kali Garcia  Age/Sex: 79 y.o. male  : 1945  MRN: [id number]    Day of Service: 25   Length of Stay: 14  Encounter Provider: Berto Barroso MD  Place of Service: [facility] CARDIOLOGY  Patient Care Team:  Tami Ward APRN as PCP - General (Family Medicine)    Subjective:   Seen and examined greater than 50% of total encounter time in medical decision making performed by me  Scribe findings below    Chief Complaint: f/u concern for bradycardia    Subjective:    Paced rhythm, comfortable no distress  Overnight events discussed with staff  Fatigue noted  Tolerating Plavix and Eliquis    Current Medications:   Scheduled Meds:apixaban, 2.5 mg, Oral, Q12H  arformoterol, 15 mcg, Nebulization, BID - RT  atorvastatin, 10 mg, Oral, Nightly  budesonide, 0.5 mg, Nebulization, BID - RT  citalopram, 10 mg, Oral, Daily  clopidogrel, 75 mg, Oral, Daily  [START ON 2025] furosemide, 20 mg, Oral, Daily  hydrocortisone sodium succinate, 50 mg, Intravenous, Q8H  midodrine, 10 mg, Oral, Q8H  mirtazapine, 15 mg, Oral, Nightly  multivitamin with minerals, 1 tablet, Oral, Daily  pantoprazole, 40 mg, Oral, Daily  sodium chloride, 500 mL, Intravenous, Once  sodium chloride, 10 mL, Intravenous, Q12H  tamsulosin, 0.4 mg, Oral, Daily      Continuous Infusions:     Allergies:  Allergies   Allergen Reactions    Codeine GI Intolerance       Review of Systems   Constitutional: Negative for chills, diaphoresis and malaise/fatigue.   Cardiovascular:  Positive for dyspnea on exertion. Negative for chest pain, irregular heartbeat, leg swelling, near-syncope, orthopnea, palpitations, paroxysmal nocturnal dyspnea and syncope.   Respiratory:  Positive for cough and sputum production. Negative for shortness of breath and sleep disturbances due to breathing.    Gastrointestinal:  Negative for change in bowel habit.    Genitourinary:  Negative for urgency.   Neurological:  Negative for dizziness and headaches.   Psychiatric/Behavioral:  Negative for altered mental status.          Objective:     Temp:  [97.4 °F (36.3 °C)-98 °F (36.7 °C)] 97.5 °F (36.4 °C)  Heart Rate:  [51-54] 53  Resp:  [12-22] 22  BP: ()/(51-79) 141/52     Intake/Output Summary (Last 24 hours) at 2/12/2025 1634  Last data filed at 2/12/2025 0845  Gross per 24 hour   Intake 462 ml   Output 350 ml   Net 112 ml     Body mass index is 16.05 kg/m².      01/29/25  1824 02/04/25  0500 02/05/25  0822   Weight: 49.9 kg (110 lb) 53.8 kg (118 lb 9.7 oz) 52 kg (114 lb 10.2 oz)         General Appearance:    Alert, cooperative, frail, deconditioned                                Head: Atraumatic, normocephalic, PERRLA               Neck:   supple, trachea midline, no thyromegaly, no carotid bruit, no JVD   Lungs:     Supplemental O2, scattered rhonchi    Heart:    Regular rhythm and normal rate, normal S1 and S2   Abdomen:     Normal bowel sounds, no masses, no organomegaly, soft  nontender, nondistended, no guarding, no rebound  tenderness   Extremities:   Moves all extremities well, no edema, no cyanosis, no  redness   Pulses:   Pulses palpable and equal bilaterally   Skin:   No bleeding, bruising or rash   Neurologic:   Awake, alert, oriented x3   Scribe findings above    Lab Review:   Results from last 7 days   Lab Units 02/12/25  0526 02/11/25  0424 02/07/25  2151 02/07/25  0932   SODIUM mmol/L 145 146*   < > 140   POTASSIUM mmol/L 4.1 5.0   < > 3.3*   CHLORIDE mmol/L 105 106   < > 97*   CO2 mmol/L 33.6* 33.4*   < > 35.9*   BUN mg/dL 35* 35*   < > 21   CREATININE mg/dL 0.59* 0.69*   < > 0.60*   GLUCOSE mg/dL 107* 117*   < > 117*   CALCIUM mg/dL 8.7 8.8   < > 8.5*   AST (SGOT) U/L  --   --   --  28   ALT (SGPT) U/L  --   --   --  22    < > = values in this interval not displayed.           Results from last 7 days   Lab Units 02/07/25  0932   WBC 10*3/mm3 4.62  "  HEMOGLOBIN g/dL 8.7*   HEMATOCRIT % 27.8*   PLATELETS 10*3/mm3 84*                       Invalid input(s): \"LDLCALC\"              Recent Radiology:  Imaging Results (Most Recent)       Procedure Component Value Units Date/Time    XR Chest 1 View [[id number]] Collected: 02/07/25 0932     Updated: 02/07/25 0938    Narrative:      XR CHEST 1 VW    Date of Exam: 2/7/2025 9:17 AM EST    Indication: followup respiratory failure    Comparison: AP chest 2/2/2025. CT chest 4/14/2022, 2/1/2025. PET/CT 10/10/2024.    Findings:  Interstitial and alveolar disease is present in the lung bases, similar to prior examination. Suspected, moderate small layering bilateral pleural effusions, without significant change. Normal heart size. Emphysematous/fibrotic changes are noted in the   apices. No pneumothorax. Old left rib resection changes. Nodular opacity in the right apex thought to correspond to the metabolically active nodule on prior PET/CT from 10/10/2024.      Impression:      Impression:    1. No significant change in bilateral lower lobe airspace disease, corresponding to suspected pneumonia based on prior CT chest imaging.  2. Advanced emphysema.  3. Right apical lung nodule is unchanged.  4. Suspected small to moderate layering bilateral pleural effusions, stable.      Electronically Signed: Martha Dodd MD    2/7/2025 9:36 AM EST    Workstation ID: [id number]    XR Chest 1 View [[id number]] Collected: 02/02/25 1635     Updated: 02/02/25 1638    Narrative:      XR CHEST 1 VW    Date of Exam: 2/2/2025 4:18 PM EST    Indication: Dyspnea    Comparison: CT chest without contrast 2/1/2025    Findings:  Patient is rotated to the left. There is persistent diffuse bilateral airspace disease consistent with multifocal pneumonia with worsening at the right lung base. Moderate lateral pleural effusions. Negative for pneumothorax. Advanced emphysema.      Impression:      Impression:  1. Persistent diffuse basilar airspace disease " consistent with multifocal pneumonia with worsening at the right lung base.  2. Moderate bilateral pleural effusions.  3. Advanced emphysema.          Electronically Signed: Ryder Scherer MD    2/2/2025 4:36 PM EST    Workstation ID: [id number]    CT Chest Without Contrast Diagnostic [[id number]] Collected: 02/01/25 1608     Updated: 02/01/25 1617    Narrative:      CT CHEST WO CONTRAST DIAGNOSTIC    Date of Exam: 2/1/2025 3:32 PM EST    Indication: Hypoxia.    Comparison: PET/CT 10/10/2024, CT chest with contrast 2/1/2024    Technique: Axial CT images were obtained of the chest without contrast administration.  Sagittal and coronal reconstructions were performed.  Automated exposure control and iterative reconstruction methods were used.    Findings:  Noncontrast visualized soft tissues of the lower neck are without acute abnormality. Heart size normal. Extensive coronary calcification. There is a leadless pacemaker noted in the right ventricle. Trace pericardial effusion. There are several enlarged   mediastinal lymph nodes for example low paratracheal node measuring 13 mm in short axis (2/54). These appear similar to the prior study and could relate to chronic reactive adenopathy. These nodes were not hypermetabolic on the prior PET/CT per report    Severe emphysema. Moderate bilateral pleural effusions. Lower lobe dependent airspace disease concerning for bilateral lower lobe pneumonia. No pneumothorax. Within the anterior aspect of the right upper lobe near the apex there is an area of suspected   chronic scarring with calcification which measures 2.3 x 1.1 cm (2/28). More posteriorly in the right upper lobe there is a small area of chronic nodular scarring which measures 10 mm (2/31). Area of chronic scarring at the anterior left upper lobe   unchanged (2/50).    Upper abdomen demonstrates hyperdense hepatic parenchyma which can be seen with prior amiodarone use or iron deposition among other etiologies. The  spleen and adrenal glands are unremarkable. Small nonobstructing left nephrolithiasis. Extensive   atherosclerotic calcifications of the abdominal aorta and visualized branch vasculature. No free fluid in the upper abdomen. Suspect severe stenosis of the SMA due to calcified plaque, unchanged (2/121). Remote left rib fractures. Reduced mineral density   measured at L1 consistent with osteopenia/osteoporosis. Healed remote fracture at the mid sternum. The thoracic vertebral bodies are maintained in height.      Impression:      Impression:  1. Dense bilateral lower lobe consolidation compatible with pneumonia.  2. Moderate bilateral pleural effusions.  3. Severe emphysema with areas of chronic scarring in the right upper lobe and left upper lobe.  4. Extensive coronary artery calcifications.  5. 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          Electronically Signed: Ryder Scherer MD    2/1/2025 4:15 PM EST    Workstation ID: [id number]    XR Chest 1 View [[id number]] Collected: 01/29/25 1908     Updated: 01/29/25 1912    Narrative:      XR CHEST 1 VW    Date of Exam: 1/29/2025 6:40 PM EST    Indication: soa    Comparison: 1/18/2025 at 1739 hours, 12/22/2024    Findings:  Residual infiltrates are noted throughout the mid to lower lungs bilaterally. There is a small left pleural effusion. Diffuse interstitial changes are noted. These findings are stable to mildly progressed. The findings are also superimposed on underlying   chronic changes throughout the lungs. The cardiac silhouette and mediastinum are stable.      Impression:      Impression:  Residual infiltrates are noted throughout the mid to lower lungs bilaterally. There is a small left pleural effusion. Diffuse interstitial changes are noted. These findings are stable to mildly progressed. The findings may indicate changes of CHF and   pulmonary  edema.        Electronically Signed: Jonny Espinal MD    1/29/2025 7:10 PM EST    Workstation ID: [id number]            ECHOCARDIOGRAM:    Results for orders placed during the hospital encounter of 02/01/24    Adult Transthoracic Echo Limited W/ Cont if Necessary Per Protocol    Interpretation Summary    Left ventricular systolic function is normal. Left ventricular ejection fraction appears to be 61 - 65%.    Left ventricular diastolic function was normal.    The left atrial cavity is mildly dilated.    Left atrial volume is mildly increased.    There is moderate calcification of the aortic valve mainly affecting the left coronary and right coronary cusp(s).    Estimated right ventricular systolic pressure from tricuspid regurgitation is mildly elevated (35-45 mmHg).    Mild pulmonary hypertension is present.        I reviewed the patient's new clinical results.    EKG:      Assessment:       Pneumonia    Superior mesenteric artery stenosis    Acute on chronic hypoxic respiratory failure / pneumonia     2. Known SSS s/p micra leadless PPM placed 1/2025     3. Concern for bradycardia  Device interrogation reveals normal functioning device     4. pAFib/flutter  Converted to SR last admission on amiodarone 200mg BID  Will de escalate to 200mg daily  On eliquis 2.5mg BID     5. Normal LVEF / mild pulm HTN     6. H/o CAD currently stable    Plan:   Gentle diuresis ongoing Lasix as indicated, scheduled dosing presently  BMP today demonstrates significantly replace potassium now 5.0  No further replacement  Sodium up to 145 likely secondary to diuretics with contraction alkalosis  Decrease Lasix yesterday, hold next dose today, restart tomorrow lower dose      Continues with significant oxygen requirement   Pacemaker functioning well appropriate heart rate of 60  Continue amiodarone 200 daily A-fib prophylaxis with Eliquis 2.5 twice daily, monitor for toxicity with high risk medicines  Midodrine for blood pressure  support    Midodrine for blood pressure support on board  Pacemaker dependent at this time sick sinus syndrome    Further recommendations to follow findings and clinical course    Continue present CV treatment plan and pharmacotherapy today      Berto Barroso MD, PhD    Berto Barroso MD  02/12/25  16:34 EST

## 2025-02-12 NOTE — CASE MANAGEMENT/SOCIAL WORK
"Continued Stay Note  [facility]     Patient Name: Kali Garcia  MRN: [id number]  Today's Date: 2/12/2025    Admit Date: 1/29/2025    Plan: [facility] (accepted). No PASRR needed. Precert required.   Discharge Plan       Row Name 02/12/25 0947       Plan    Plan [facility] (accepted). No PASRR needed. Precert required.    Plan Comments CM spoke to son Ivis via phone call regarding discharge planning. [facility] followed, patient/family had decided to go with hospice last week but now patient has verbalized that he wants to \"fight\". Patient is requiring high O2 demands, currently on 60L Airvo. Discussed LTAC and appropriateness for increased oxygen needs and son was agreeable. SID sent inbasket and notified [facility] liaison Susannah. Susannah states she will talk to patient and son and possibly start precert today.             Angeles Shane RN     [facility]  Office: [phone]  Cell: [phone]  Fax # [phone]     "

## 2025-02-13 LAB
ANION GAP SERPL CALCULATED.3IONS-SCNC: 6 MMOL/L (ref 5–15)
BUN SERPL-MCNC: 37 MG/DL (ref 8–23)
BUN/CREAT SERPL: 49.3 (ref 7–25)
CALCIUM SPEC-SCNC: 8.5 MG/DL (ref 8.6–10.5)
CHLORIDE SERPL-SCNC: 108 MMOL/L (ref 98–107)
CO2 SERPL-SCNC: 35 MMOL/L (ref 22–29)
CREAT SERPL-MCNC: 0.75 MG/DL (ref 0.76–1.27)
DEPRECATED RDW RBC AUTO: 58.1 FL (ref 37–54)
EGFRCR SERPLBLD CKD-EPI 2021: 91.2 ML/MIN/1.73
ERYTHROCYTE [DISTWIDTH] IN BLOOD BY AUTOMATED COUNT: 16.4 % (ref 12.3–15.4)
GLUCOSE SERPL-MCNC: 133 MG/DL (ref 65–99)
HCT VFR BLD AUTO: 24.6 % (ref 37.5–51)
HGB BLD-MCNC: 7.4 G/DL (ref 13–17.7)
MCH RBC QN AUTO: 28.9 PG (ref 26.6–33)
MCHC RBC AUTO-ENTMCNC: 30.1 G/DL (ref 31.5–35.7)
MCV RBC AUTO: 96.1 FL (ref 79–97)
PLATELET # BLD AUTO: 186 10*3/MM3 (ref 140–450)
PMV BLD AUTO: 10.6 FL (ref 6–12)
POTASSIUM SERPL-SCNC: 4.4 MMOL/L (ref 3.5–5.2)
RBC # BLD AUTO: 2.56 10*6/MM3 (ref 4.14–5.8)
SODIUM SERPL-SCNC: 149 MMOL/L (ref 136–145)
WBC NRBC COR # BLD AUTO: 5.25 10*3/MM3 (ref 3.4–10.8)

## 2025-02-13 PROCEDURE — 25010000002 HYDROCORTISONE SOD SUC (PF) 100 MG RECONSTITUTED SOLUTION: Performed by: INTERNAL MEDICINE

## 2025-02-13 PROCEDURE — 94799 UNLISTED PULMONARY SVC/PX: CPT

## 2025-02-13 PROCEDURE — 85027 COMPLETE CBC AUTOMATED: CPT | Performed by: FAMILY MEDICINE

## 2025-02-13 PROCEDURE — 80048 BASIC METABOLIC PNL TOTAL CA: CPT | Performed by: STUDENT IN AN ORGANIZED HEALTH CARE EDUCATION/TRAINING PROGRAM

## 2025-02-13 PROCEDURE — 94761 N-INVAS EAR/PLS OXIMETRY MLT: CPT

## 2025-02-13 RX ADMIN — Medication 1 TABLET: at 09:05

## 2025-02-13 RX ADMIN — ARFORMOTEROL TARTRATE 15 MCG: 15 SOLUTION RESPIRATORY (INHALATION) at 18:55

## 2025-02-13 RX ADMIN — PANTOPRAZOLE SODIUM 40 MG: 40 TABLET, DELAYED RELEASE ORAL at 09:05

## 2025-02-13 RX ADMIN — ATORVASTATIN CALCIUM 10 MG: 10 TABLET ORAL at 20:09

## 2025-02-13 RX ADMIN — CLOPIDOGREL BISULFATE 75 MG: 75 TABLET ORAL at 09:05

## 2025-02-13 RX ADMIN — Medication 10 ML: at 09:05

## 2025-02-13 RX ADMIN — HYDROCORTISONE SODIUM SUCCINATE 50 MG: 100 INJECTION, POWDER, FOR SOLUTION INTRAMUSCULAR; INTRAVENOUS at 20:09

## 2025-02-13 RX ADMIN — MIRTAZAPINE 15 MG: 15 TABLET, FILM COATED ORAL at 20:09

## 2025-02-13 RX ADMIN — MIDODRINE HYDROCHLORIDE 10 MG: 5 TABLET ORAL at 16:53

## 2025-02-13 RX ADMIN — APIXABAN 2.5 MG: 2.5 TABLET, FILM COATED ORAL at 20:09

## 2025-02-13 RX ADMIN — BUDESONIDE 0.5 MG: 0.5 INHALANT RESPIRATORY (INHALATION) at 09:25

## 2025-02-13 RX ADMIN — MIDODRINE HYDROCHLORIDE 10 MG: 5 TABLET ORAL at 02:07

## 2025-02-13 RX ADMIN — HYDROCORTISONE SODIUM SUCCINATE 50 MG: 100 INJECTION, POWDER, FOR SOLUTION INTRAMUSCULAR; INTRAVENOUS at 12:58

## 2025-02-13 RX ADMIN — APIXABAN 2.5 MG: 2.5 TABLET, FILM COATED ORAL at 09:05

## 2025-02-13 RX ADMIN — BUDESONIDE 0.5 MG: 0.5 INHALANT RESPIRATORY (INHALATION) at 19:00

## 2025-02-13 RX ADMIN — MIDODRINE HYDROCHLORIDE 10 MG: 5 TABLET ORAL at 09:05

## 2025-02-13 RX ADMIN — HYDROCORTISONE SODIUM SUCCINATE 50 MG: 100 INJECTION, POWDER, FOR SOLUTION INTRAMUSCULAR; INTRAVENOUS at 02:06

## 2025-02-13 RX ADMIN — Medication 10 ML: at 20:11

## 2025-02-13 RX ADMIN — TAMSULOSIN HYDROCHLORIDE 0.4 MG: 0.4 CAPSULE ORAL at 09:05

## 2025-02-13 RX ADMIN — BISACODYL 5 MG: 5 TABLET, COATED ORAL at 12:58

## 2025-02-13 RX ADMIN — ARFORMOTEROL TARTRATE 15 MCG: 15 SOLUTION RESPIRATORY (INHALATION) at 09:22

## 2025-02-13 RX ADMIN — CITALOPRAM 10 MG: 20 TABLET, FILM COATED ORAL at 09:05

## 2025-02-13 NOTE — PLAN OF CARE
Goal Outcome Evaluation:                              Problem: Adult Inpatient Plan of Care  Goal: Plan of Care Review  Outcome: Progressing  Goal: Patient-Specific Goal (Individualized)  Outcome: Progressing  Goal: Absence of Hospital-Acquired Illness or Injury  Outcome: Progressing  Intervention: Identify and Manage Fall Risk  Recent Flowsheet Documentation  Taken 2/13/2025 1620 by Elke Oconnor RN  Safety Promotion/Fall Prevention:   activity supervised   assistive device/personal items within reach   clutter free environment maintained   fall prevention program maintained   nonskid shoes/slippers when out of bed   room organization consistent   safety round/check completed  Taken 2/13/2025 1427 by Elke Oconnor RN  Safety Promotion/Fall Prevention:   activity supervised   assistive device/personal items within reach   clutter free environment maintained   fall prevention program maintained   nonskid shoes/slippers when out of bed   room organization consistent   safety round/check completed  Taken 2/13/2025 1245 by Elke Oconnor RN  Safety Promotion/Fall Prevention:   activity supervised   assistive device/personal items within reach   clutter free environment maintained   nonskid shoes/slippers when out of bed   room organization consistent   safety round/check completed  Taken 2/13/2025 1020 by Elke Oconnor RN  Safety Promotion/Fall Prevention:   activity supervised   assistive device/personal items within reach   clutter free environment maintained   fall prevention program maintained   nonskid shoes/slippers when out of bed   safety round/check completed   room organization consistent  Taken 2/13/2025 0820 by Elke Oconnor RN  Safety Promotion/Fall Prevention:   activity supervised   assistive device/personal items within reach   clutter free environment maintained   fall prevention program maintained   nonskid shoes/slippers when out of bed   room organization consistent   safety round/check  completed  Intervention: Prevent Skin Injury  Recent Flowsheet Documentation  Taken 2/13/2025 1620 by Elke Oconnor RN  Body Position:   patient/family refused   position changed independently  Skin Protection: incontinence pads utilized  Taken 2/13/2025 1427 by Elke Oconnor RN  Body Position: patient/family refused  Taken 2/13/2025 1245 by Elke Oconnor RN  Body Position:   patient/family refused   position changed independently   weight shifting  Skin Protection: incontinence pads utilized  Taken 2/13/2025 1020 by Elke Oconnor RN  Body Position:   patient/family refused   position changed independently   weight shifting  Taken 2/13/2025 0820 by Elke Oconnor RN  Body Position:   patient/family refused   weight shifting   position changed independently  Skin Protection: incontinence pads utilized  Intervention: Prevent and Manage VTE (Venous Thromboembolism) Risk  Recent Flowsheet Documentation  Taken 2/13/2025 1620 by Elke Oconnor RN  VTE Prevention/Management:   bilateral   SCDs (sequential compression devices) on  Taken 2/13/2025 1245 by Elke Oconnor RN  VTE Prevention/Management:   bilateral   SCDs (sequential compression devices) on  Taken 2/13/2025 0820 by Elke Oconnor RN  VTE Prevention/Management:   bilateral   SCDs (sequential compression devices) on  Intervention: Prevent Infection  Recent Flowsheet Documentation  Taken 2/13/2025 1620 by Elke Oconnor RN  Infection Prevention:   hand hygiene promoted   personal protective equipment utilized   rest/sleep promoted   single patient room provided   environmental surveillance performed  Taken 2/13/2025 1427 by Elke Oconnor RN  Infection Prevention:   hand hygiene promoted   personal protective equipment utilized   rest/sleep promoted   single patient room provided   environmental surveillance performed  Taken 2/13/2025 1245 by Elke Oconnor RN  Infection Prevention:   hand hygiene promoted   personal protective equipment  utilized   rest/sleep promoted   single patient room provided   environmental surveillance performed  Taken 2/13/2025 1020 by Elke Oconnor RN  Infection Prevention:   personal protective equipment utilized   hand hygiene promoted   rest/sleep promoted   single patient room provided   environmental surveillance performed  Taken 2/13/2025 0820 by Elke Oconnor RN  Infection Prevention:   hand hygiene promoted   personal protective equipment utilized   rest/sleep promoted   single patient room provided   environmental surveillance performed  Goal: Optimal Comfort and Wellbeing  Outcome: Progressing  Intervention: Provide Person-Centered Care  Recent Flowsheet Documentation  Taken 2/13/2025 1620 by Elke Oconnor RN  Trust Relationship/Rapport:   choices provided   care explained  Taken 2/13/2025 1245 by Elke Oconnor RN  Trust Relationship/Rapport:   care explained   choices provided  Taken 2/13/2025 0820 by Elke Oconnor RN  Trust Relationship/Rapport:   care explained   choices provided  Goal: Readiness for Transition of Care  Outcome: Progressing     Problem: Fall Injury Risk  Goal: Absence of Fall and Fall-Related Injury  Outcome: Progressing  Intervention: Identify and Manage Contributors  Recent Flowsheet Documentation  Taken 2/13/2025 1620 by Elke Oconnor RN  Medication Review/Management: medications reviewed  Self-Care Promotion:   BADL personal objects within reach   independence encouraged  Taken 2/13/2025 1427 by Elke Oconnor RN  Medication Review/Management: medications reviewed  Taken 2/13/2025 1245 by Elke Oconnor RN  Medication Review/Management: medications reviewed  Self-Care Promotion:   independence encouraged   BADL personal objects within reach  Taken 2/13/2025 1020 by Elke Oconnor RN  Medication Review/Management: medications reviewed  Taken 2/13/2025 0820 by Elke Oconnor RN  Medication Review/Management: medications reviewed  Self-Care Promotion:   independence  encouraged   BADL personal objects within reach  Intervention: Promote Injury-Free Environment  Recent Flowsheet Documentation  Taken 2/13/2025 1620 by Elke Oconnor RN  Safety Promotion/Fall Prevention:   activity supervised   assistive device/personal items within reach   clutter free environment maintained   fall prevention program maintained   nonskid shoes/slippers when out of bed   room organization consistent   safety round/check completed  Taken 2/13/2025 1427 by Elke Oconnor RN  Safety Promotion/Fall Prevention:   activity supervised   assistive device/personal items within reach   clutter free environment maintained   fall prevention program maintained   nonskid shoes/slippers when out of bed   room organization consistent   safety round/check completed  Taken 2/13/2025 1245 by Elke Oconnor RN  Safety Promotion/Fall Prevention:   activity supervised   assistive device/personal items within reach   clutter free environment maintained   nonskid shoes/slippers when out of bed   room organization consistent   safety round/check completed  Taken 2/13/2025 1020 by Elke Oconnor RN  Safety Promotion/Fall Prevention:   activity supervised   assistive device/personal items within reach   clutter free environment maintained   fall prevention program maintained   nonskid shoes/slippers when out of bed   safety round/check completed   room organization consistent  Taken 2/13/2025 0820 by Elke Oconnor RN  Safety Promotion/Fall Prevention:   activity supervised   assistive device/personal items within reach   clutter free environment maintained   fall prevention program maintained   nonskid shoes/slippers when out of bed   room organization consistent   safety round/check completed

## 2025-02-13 NOTE — PROGRESS NOTES
[facility] MEDICINE SERVICE  DAILY PROGRESS NOTE    NAME: Kali Garcia  : 1945  MRN: [id number]      LOS: 15 days     PROVIDER OF SERVICE: Christopher Leach DO    Chief Complaint: Pneumonia    Subjective:     Interval History:  History taken from: patient    Still with SOB;says he wants to keep fighting; denies pain; oxygen down to 50% HFNC         Review of Systems:   Review of Systems    Objective:     Vital Signs  Temp:  [97.3 °F (36.3 °C)-97.7 °F (36.5 °C)] 97.3 °F (36.3 °C)  Heart Rate:  [50-60] 53  Resp:  [11-22] 20  BP: ()/(43-84) 81/43  Flow (L/min) (Oxygen Therapy):  [2-60] 60   Body mass index is 16.05 kg/m².    Physical Exam  Physical Exam  General: Elderly, thin male; lying in bed; sleeping; arouses; on HFNC  CV: Bradycardic; regular rhythm; S1-S2  Lungs: Diminished and coarse BS bilaterally; no wheezing  Abdomen: soft, NT, ND        Diagnostic Data    Results from last 7 days   Lab Units 25  0231 25  2151 25  0932   WBC 10*3/mm3 5.25  --  4.62   HEMOGLOBIN g/dL 7.4*  --  8.7*   HEMATOCRIT % 24.6*  --  27.8*   PLATELETS 10*3/mm3 186  --  84*   GLUCOSE mg/dL 133*   < > 117*   CREATININE mg/dL 0.75*   < > 0.60*   BUN mg/dL 37*   < > 21   SODIUM mmol/L 149*   < > 140   POTASSIUM mmol/L 4.4   < > 3.3*   AST (SGOT) U/L  --   --  28   ALT (SGPT) U/L  --   --  22   ALK PHOS U/L  --   --  69   BILIRUBIN mg/dL  --   --  0.6   ANION GAP mmol/L 6.0   < > 7.1    < > = values in this interval not displayed.       No radiology results for the last day      I reviewed the patient's new clinical results.    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   Apparently had some improvement over the weekend, but unfortunately has not improved all week-continue low dose prn klonapin for anxiety  -I had another long talk with patient today; he says he wants to keep fighting   -continue ongoing GOC discussions      Acute urinary retention  -Failed voiding trial 2/3/2025  -Repeat trial when more stable     Chronic atrial fibrillation  Chronic CAD with HLD  Severe SMA stenosis, asymptomatic  -Continue amiodarone, Eliquis, atorvastatin, Plavix  -Outpatient follow-up regarding asymptomatic SMA stenosis.  No inpatient procedure at this time.     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     I had another long talk with patient and his daughter at bedside.    I explained that he remains on high flow oxygen and his lungs are not showing much improvement.  I reiterated that I'm not sure how much better we will get him, and recommended transitioning to comfort care. Patient told me he wants to keep fighting.  I told him he may need to transition to LTAC if his oxygen requirements remain high.      Barriers to Discharge: severe oxygen requirements  Anticipated Date of Discharge: TBD  Place of Discharge: continue ongoing goals of care  discussions; still suspect Hospice is best course of action, but may need LTAC if decides to keep fighting           VTE Prophylaxis:  Pharmacologic VTE prophylaxis orders are present.                       Code Status and Medical Interventions: No CPR (Do Not Attempt to Resuscitate); Limited Support; No intubation (DNI)   Ordered at: 02/05/25 1405     Medical Intervention Limits:    No intubation (DNI)     Level Of Support Discussed With:    Next of Kin (If No Surrogate)    Health Care Surrogate     Code Status (Patient has no pulse and is not breathing):    No CPR (Do Not Attempt to Resuscitate)     Medical Interventions (Patient has pulse or is breathing):    Limited Support       Signature: Electronically signed by Christopher Leach DO, 02/13/25, 10:58 EST.  [facility]ist [facility]

## 2025-02-13 NOTE — PLAN OF CARE
Goal Outcome Evaluation:               Pt is currently resting in bed c/o mild pain overnight that was medicated per orders; at start of this shift pt was on airvo w/ settings of 60L & 93% O2 pt was titrated down by RN to 60L & 80% O2 overnight; pt was given scheduled midodrine around 0207 for low BP; pt refused most offers at repositioning (education provided); previous hgb drawn 8.7 (2/7)  ->  most recent hgb 7.4 (2/13); family was at bedside at start of shift; new 20 G IV started in L AC this shift             Problem: Adult Inpatient Plan of Care  Goal: Plan of Care Review  Outcome: Progressing  Goal: Patient-Specific Goal (Individualized)  Outcome: Progressing  Goal: Absence of Hospital-Acquired Illness or Injury  Outcome: Progressing  Intervention: Identify and Manage Fall Risk  Recent Flowsheet Documentation  Taken 2/13/2025 0200 by Lazarus Quintana, RN  Safety Promotion/Fall Prevention:   safety round/check completed   room organization consistent   nonskid shoes/slippers when out of bed   lighting adjusted   assistive device/personal items within reach   clutter free environment maintained   fall prevention program maintained  Taken 2/13/2025 0000 by Lazarus Quintana, RN  Safety Promotion/Fall Prevention:   safety round/check completed   room organization consistent   nonskid shoes/slippers when out of bed   lighting adjusted   assistive device/personal items within reach   clutter free environment maintained   fall prevention program maintained  Taken 2/12/2025 2200 by Lazarus Quintana, RN  Safety Promotion/Fall Prevention:   safety round/check completed   room organization consistent   nonskid shoes/slippers when out of bed   lighting adjusted   assistive device/personal items within reach   clutter free environment maintained   fall prevention program maintained  Taken 2/12/2025 2000 by Lazarus Quinatna, RN  Safety Promotion/Fall Prevention:   safety round/check completed   room organization consistent    nonskid shoes/slippers when out of bed   lighting adjusted   assistive device/personal items within reach   clutter free environment maintained   fall prevention program maintained  Intervention: Prevent Skin Injury  Recent Flowsheet Documentation  Taken 2/13/2025 0000 by Lazarus Quintana RN  Skin Protection: incontinence pads utilized  Taken 2/12/2025 2000 by Lazarus Quintana RN  Body Position: (refused R & L turning)   position changed independently   patient/family refused   weight shifting   other (see comments)  Skin Protection: incontinence pads utilized  Intervention: Prevent Infection  Recent Flowsheet Documentation  Taken 2/13/2025 0200 by Lazarus Quintana RN  Infection Prevention:   equipment surfaces disinfected   hand hygiene promoted   personal protective equipment utilized   rest/sleep promoted   single patient room provided  Taken 2/13/2025 0000 by Lazarus Quintana RN  Infection Prevention:   equipment surfaces disinfected   hand hygiene promoted   personal protective equipment utilized   rest/sleep promoted   single patient room provided  Taken 2/12/2025 2200 by Lazarus Quintana RN  Infection Prevention:   equipment surfaces disinfected   hand hygiene promoted   personal protective equipment utilized   rest/sleep promoted   single patient room provided  Taken 2/12/2025 2000 by Lazarus Quintana RN  Infection Prevention:   equipment surfaces disinfected   hand hygiene promoted   personal protective equipment utilized   rest/sleep promoted   single patient room provided  Goal: Optimal Comfort and Wellbeing  Outcome: Progressing  Intervention: Monitor Pain and Promote Comfort  Recent Flowsheet Documentation  Taken 2/12/2025 2123 by Lazarus Quintana RN  Pain Management Interventions:   quiet environment facilitated   position adjusted   pillow support provided   pain management plan reviewed with patient/caregiver   care clustered  Taken 2/12/2025 2053 by Lazarus Quintana RN  Pain Management  Interventions:   quiet environment facilitated   pain medication given   pain management plan reviewed with patient/caregiver   pillow support provided   position adjusted   care clustered  Taken 2/12/2025 2000 by Lazarus Quintana RN  Pain Management Interventions:   care clustered   pillow support provided   position adjusted   quiet environment facilitated  Intervention: Provide Person-Centered Care  Recent Flowsheet Documentation  Taken 2/12/2025 2000 by Lazarus Quintana RN  Trust Relationship/Rapport:   care explained   choices provided   emotional support provided   empathic listening provided   questions answered   questions encouraged   reassurance provided   thoughts/feelings acknowledged  Goal: Readiness for Transition of Care  Outcome: Progressing     Problem: Skin Injury Risk Increased  Goal: Skin Health and Integrity  Outcome: Progressing  Intervention: Optimize Skin Protection  Recent Flowsheet Documentation  Taken 2/13/2025 0000 by Lazarus Quintana, RN  Pressure Reduction Techniques:   frequent weight shift encouraged   positioned off wounds   pressure points protected   weight shift assistance provided  Pressure Reduction Devices:   positioning supports utilized   pressure-redistributing mattress utilized  Skin Protection: incontinence pads utilized  Taken 2/12/2025 2000 by Lazarus Quintana RN  Pressure Reduction Techniques:   frequent weight shift encouraged   positioned off wounds   pressure points protected   weight shift assistance provided  Head of Bed (HOB) Positioning: HOB elevated  Pressure Reduction Devices:   positioning supports utilized   pressure-redistributing mattress utilized  Skin Protection: incontinence pads utilized     Problem: Malnutrition  Goal: Improved Nutritional Intake  Outcome: Progressing     Problem: Fall Injury Risk  Goal: Absence of Fall and Fall-Related Injury  Outcome: Progressing  Intervention: Identify and Manage Contributors  Recent Flowsheet Documentation  Taken  2/13/2025 0200 by Lazarus Quintana RN  Medication Review/Management: medications reviewed  Taken 2/13/2025 0000 by Lazarus Quintana RN  Medication Review/Management: medications reviewed  Taken 2/12/2025 2200 by Lazarus Quintana RN  Medication Review/Management: medications reviewed  Taken 2/12/2025 2000 by Lazarus Quintana RN  Medication Review/Management: medications reviewed  Self-Care Promotion: independence encouraged  Intervention: Promote Injury-Free Environment  Recent Flowsheet Documentation  Taken 2/13/2025 0200 by Lazarus Quintana RN  Safety Promotion/Fall Prevention:   safety round/check completed   room organization consistent   nonskid shoes/slippers when out of bed   lighting adjusted   assistive device/personal items within reach   clutter free environment maintained   fall prevention program maintained  Taken 2/13/2025 0000 by Lazarus Quintana RN  Safety Promotion/Fall Prevention:   safety round/check completed   room organization consistent   nonskid shoes/slippers when out of bed   lighting adjusted   assistive device/personal items within reach   clutter free environment maintained   fall prevention program maintained  Taken 2/12/2025 2200 by Lazarus Quintana RN  Safety Promotion/Fall Prevention:   safety round/check completed   room organization consistent   nonskid shoes/slippers when out of bed   lighting adjusted   assistive device/personal items within reach   clutter free environment maintained   fall prevention program maintained  Taken 2/12/2025 2000 by Lazarus Quintana RN  Safety Promotion/Fall Prevention:   safety round/check completed   room organization consistent   nonskid shoes/slippers when out of bed   lighting adjusted   assistive device/personal items within reach   clutter free environment maintained   fall prevention program maintained     Problem: Noninvasive Ventilation Acute  Goal: Effective Unassisted Ventilation and Oxygenation  Outcome: Progressing

## 2025-02-13 NOTE — CASE MANAGEMENT/SOCIAL WORK
Continued Stay Note  [facility]     Patient Name: Kali Garcia  MRN: [id number]  Today's Date: 2/13/2025    Admit Date: 1/29/2025    Plan: [facility] (accepted). No PASRR needed. Precert approved   Discharge Plan       Row Name 02/13/25 1712       Plan    Plan [facility] (accepted). No PASRR needed. Precert approved    Plan Comments [facility] liaison Susannah confirmed patient's precert approved and they will have a bed available for patient tomorrow. She is going to call son to update on possible transfer tomorrow. CM updated MD.             Angeles Shane, RN     [facility]  Office: [phone]  Cell: [phone]  Fax # [phone]

## 2025-02-13 NOTE — CONSULTS
Nutrition Services    Patient Name: Kali Garcia  YOB: 1945  MRN: [id number]  Admission date: 1/29/2025    Comment:  -- Continue current diet and encourage good PO intakes as clinically tolerated.    -- Obtain scale weight    -- Monitor for BM recommend use of PRN bowel regimen if patient allows     -- Severe chronic disease related malnutrition related to multiple catabolic chronic medical problems as evidenced by likely PO intakes less than 75% x 1 year and severe muscle and fat wasting per physical exam.  See MSA 2/10/25.        CLINICAL NUTRITION ASSESSMENT      Reason for Assessment Follow-up protocol   1/30: BMI less than 19, history of malnutrition      H&P      Past Medical History:   Diagnosis Date    COPD (chronic obstructive pulmonary disease)     Coronary artery disease     Emphysema lung     Hyperlipidemia     Hypertension     Osteoarthritis        Past Surgical History:   Procedure Laterality Date    CARDIAC CATHETERIZATION      CARDIAC ELECTROPHYSIOLOGY PROCEDURE N/A 1/2/2025    Procedure: Bijan SIMS;  Surgeon: Eliot Mcgarry MD;  Location: [facility] CATH INVASIVE LOCATION;  Service: Cardiovascular;  Laterality: N/A;    COLONOSCOPY N/A 8/4/2022    Procedure: COLONOSCOPY with 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 and endoscopic clipping x 1;  Surgeon: Luz Jacques MD;  Location: [facility] ENDOSCOPY;  Service: Gastroenterology;  Laterality: N/A;  post op: cecal AVM    CORONARY STENT PLACEMENT      ENDOSCOPY N/A 4/14/2022    Procedure: ESOPHAGOGASTRODUODENOSCOPY WITH BIOPSY X1 AREA;  Surgeon: Luz Jacques MD;  Location: [facility] ENDOSCOPY;  Service: Gastroenterology;  Laterality: N/A;  esophagitis, gastritis, HH    ENDOSCOPY N/A 8/4/2022    Procedure: ESOPHAGOGASTRODUODENOSCOPY;  Surgeon: Luz Jacques MD;  Location: [facility] ENDOSCOPY;  Service: Gastroenterology;  Laterality: N/A;  post op: hiatal hernia, eosphagitis    EYE SURGERY      FEMORAL ARTERY STENT      KNEE  "SURGERY Left     KNEE SURGERY Left     LUNG SURGERY          Current Problems   Pneumonia  - pulmonology following    Chronic atrial fibrillation  Chronic CAD with HLD  - cardiology following    Essential Hypertension     COPD       Encounter Information        Trending Narrative     2/13: Since last RD review, patient continues to recover.  No changes to nutritional plan of care.      2/6: Checking in to monitor po diet tolerance and Nutrition POC. Plan to discharge with [facility] hosp[facility]. PO diet ordered and pt allowed to eat for comfort when off airvo per nursing report. RD will continue to monitor and adjust Nutrition POC based on demands of clinical course and GOC.     1/30: Admitted after being found hypoxic by staff at his rehab facility.  RD visited patient at bedside.  Patient refused breakfast this AM but accepted chocolate Boost from RD.  Patient reports occasional nausea.  NFPE completed, consistent with nutrition diagnosis of malnutrition using AND/ASPEN criteria. See MSA below.         Anthropometrics        Current Height, Weight Height: 180 cm (70.87\")  Weight: 52 kg (114 lb 10.2 oz) (02/05/25 0822)       Usual Body Weight (UBW) Unable to obtain from patient        Trending Weight Hx     This admission: 2/13: No new weight since last RD review, RD ordered new one to be obtained   2/5: 114# (3.5% weight gain in the last week)  1/30: 110#             PTA: No significant weight loss     Wt Readings from Last 30 Encounters:   02/05/25 0822 52 kg (114 lb 10.2 oz)   02/04/25 0500 53.8 kg (118 lb 9.7 oz)   01/29/25 1824 49.9 kg (110 lb)   01/23/25 0524 49 kg (108 lb 0.4 oz)   01/22/25 0500 48.6 kg (107 lb 2.3 oz)   01/21/25 0539 48.8 kg (107 lb 9.4 oz)   01/20/25 0510 48.3 kg (106 lb 7.7 oz)   01/19/25 0500 48.3 kg (106 lb 7.7 oz)   01/18/25 2332 48.3 kg (106 lb 7.7 oz)   01/18/25 1625 51 kg (112 lb 7 oz)   01/03/25 0500 51 kg (112 lb 7 oz)   01/02/25 0557 51.8 kg (114 lb 3.2 oz)   01/01/25 0500 " "51.2 kg (112 lb 14 oz)   12/31/24 0501 51.1 kg (112 lb 10.5 oz)   12/30/24 0600 52.8 kg (116 lb 6.5 oz)   12/29/24 0514 53 kg (116 lb 13.5 oz)   12/28/24 0538 51.4 kg (113 lb 5.1 oz)   12/27/24 0647 50.4 kg (111 lb 1.8 oz)   12/26/24 0610 49.9 kg (110 lb 0.2 oz)   12/25/24 0600 49.6 kg (109 lb 5.6 oz)   12/23/24 0531 50.1 kg (110 lb 7.2 oz)   12/22/24 2139 49.9 kg (110 lb 0.2 oz)   03/04/24 1452 53 kg (116 lb 12.1 oz)   02/19/24 1303 50.5 kg (111 lb 5 oz)   02/16/24 2101 50 kg (110 lb 2.3 oz)   02/16/24 2054 51.8 kg (114 lb 2.8 oz)   02/15/24 1416 52.9 kg (116 lb 10.3 oz)   02/04/24 0315 51.5 kg (113 lb 8.6 oz)   02/03/24 1415 51.3 kg (113 lb)   02/03/24 0345 51.5 kg (113 lb 8.6 oz)   02/02/24 1524 51.8 kg (114 lb 3.2 oz)   02/02/24 0337 54.4 kg (120 lb)   02/01/24 1330 52.1 kg (114 lb 14.5 oz)   08/04/22 1124 51.1 kg (112 lb 10.5 oz)   07/26/22 1511 59 kg (130 lb)   04/15/22 0436 58.3 kg (128 lb 8.5 oz)   04/13/22 0355 54.6 kg (120 lb 5.9 oz)   04/12/22 2037 128 kg (282 lb 3 oz)      BMI kg/m2 Body mass index is 16.05 kg/m².       Labs        Pertinent Labs Hypernatremia - no IV fluids to note      Results from last 7 days   Lab Units 02/13/25  0231 02/12/25  0526 02/11/25  0424 02/07/25  2151 02/07/25  0932   SODIUM mmol/L 149* 145 146*   < > 140   POTASSIUM mmol/L 4.4 4.1 5.0   < > 3.3*   CHLORIDE mmol/L 108* 105 106   < > 97*   CO2 mmol/L 35.0* 33.6* 33.4*   < > 35.9*   BUN mg/dL 37* 35* 35*   < > 21   CREATININE mg/dL 0.75* 0.59* 0.69*   < > 0.60*   CALCIUM mg/dL 8.5* 8.7 8.8   < > 8.5*   BILIRUBIN mg/dL  --   --   --   --  0.6   ALK PHOS U/L  --   --   --   --  69   ALT (SGPT) U/L  --   --   --   --  22   AST (SGOT) U/L  --   --   --   --  28   GLUCOSE mg/dL 133* 107* 117*   < > 117*    < > = values in this interval not displayed.     Results from last 7 days   Lab Units 02/13/25  0231   HEMOGLOBIN g/dL 7.4*   HEMATOCRIT % 24.6*     No results found for: \"HGBA1C\"     Medications    Scheduled Medications " apixaban, 2.5 mg, Oral, Q12H  arformoterol, 15 mcg, Nebulization, BID - RT  atorvastatin, 10 mg, Oral, Nightly  budesonide, 0.5 mg, Nebulization, BID - RT  citalopram, 10 mg, Oral, Daily  clopidogrel, 75 mg, Oral, Daily  [Held by provider] furosemide, 20 mg, Oral, Daily  hydrocortisone sodium succinate, 50 mg, Intravenous, Q8H  midodrine, 10 mg, Oral, Q8H  mirtazapine, 15 mg, Oral, Nightly  multivitamin with minerals, 1 tablet, Oral, Daily  pantoprazole, 40 mg, Oral, Daily  sodium chloride, 500 mL, Intravenous, Once  sodium chloride, 10 mL, Intravenous, Q12H  tamsulosin, 0.4 mg, Oral, Daily        Infusions      PRN Medications   acetaminophen **OR** acetaminophen **OR** acetaminophen    senna-docusate sodium **AND** polyethylene glycol **AND** bisacodyl **AND** bisacodyl    Calcium Replacement - Follow Nurse / BPA Driven Protocol    clonazePAM    ipratropium-albuterol    Magnesium Standard Dose Replacement - Follow Nurse / BPA Driven Protocol    melatonin    nitroglycerin    ondansetron    Phosphorus Replacement - Follow Nurse / BPA Driven Protocol    Potassium Replacement - Follow Nurse / BPA Driven Protocol    [COMPLETED] Insert Peripheral IV **AND** sodium chloride    sodium chloride    sodium chloride     Physical Findings        Trending Physical   Appearance, NFPE 2/13: NFPE completed, consistent with nutrition diagnosis of malnutrition using AND/ASPEN criteria. See MSA below.     2/6: Agree with previous assessment    1/30: NFPE completed, consistent with nutrition diagnosis of malnutrition using AND/ASPEN criteria. See MSA below.      --  Edema  No edema documented      Bowel Function Last documented BM 2/3 (x 10 days ago) - RD messaged RN suggesting use of PRN bowel regimen.  RN reports patient refused bowel regimen 2/12     Tubes No feeding tube      Chewing/Swallowing No known issues      Skin Stage 1 coccyx - no WOCN note at this time      --  Current Nutrition Orders & Evaluation of Intake       Oral  Nutrition     Food Allergies NKFA   Current PO Diet Diet: Regular/House; Fluid Consistency: Thin (IDDSI 0)   Supplement Magic Cups BID  Boost Original BID   PO Evaluation     Trending % PO Intake 2/13: Variable %  2/6: Has been NPO the last few days. Consuming supplements  1/30: None documented    --  Nutritional Risk Screening        NRS-2002 Score          Nutrition Diagnosis         Nutrition Dx Problem 1 Severe chronic disease related malnutrition related to multiple catabolic chronic medical problems as evidenced by likely PO intakes less than 75% x 1 year and severe muscle and fat wasting per physical exam.        Nutrition Dx Problem 2        Intervention Goal         Intervention Goal(s) Accept ONS  No weight loss  PO intakes at least 60%  Improve skin integrity     Nutrition Intervention        RD Action Monitor po intake and continue to encourage good intake.   Continue Magic Cups  Continues Boost Original BID     Nutrition Prescription          Diet Prescription Heart Healthy   Supplement Prescription Magic Cup BID  Boost Original BID   --  Monitor/Evaluation        Monitor Per protocol, I&O, PO intake, Supplement intake, Pertinent labs, Weight       Electronically signed by:  Kathrine Maharaj RD  02/13/25 12:17 EST

## 2025-02-13 NOTE — PROGRESS NOTES
Daily Progress Note        Pneumonia    Superior mesenteric artery stenosis      Assessment:    Hypoxic respiratory insufficiency, multifactorial, severe lung and cardiac disease and vascular disease    Left lower lobe pneumonia    severe COPD  Respiratory panel negative on 1/30/2025  On home oxygen    Urine cultures 1/18/2025 positive for Pseudomonas    CAD, PVD  Extensive coronary artery calcifications  Severe upper abdominal atherosclerotic calcifications with SMA stenosis    HTN  HLD  A-fib  Anemia  Former smoker quit 2022 after 60 pack years  Echo 2/1/2024 EF 61-65% mild pulmonary hypertension 35-45 mmHg     CT scan of the chest in June 2024 showing a noncalcified nodule in the right upper lobe around 2 x 1.1 x 1 cm. Patient was also noted to have patchy airspace disease and clustered micronodules in the lingula.     He then underwent PET scan 10/10/2024 showing a 1.7 x 1.1 cm partially calcified irregular shaped nodule in the right upper lobe to be metabolically active with SUV of 4.84 and additional 2 metabolically active irregular nodular densities in the right upper lobe. Inferior apical segment with intervening areas of metabolically active interstitial thickening.     PFTs: FEV1/FVC postbronchodilator is 0.39 with FEV1 of 0.71 L or 28% predicted and FVC of 1.82 L or 47% predicted.   Total lung capacity of 7.77 L or 131% predicted and residual volume of 5.82 L or 226% predicted and DLCO of 61% predicted noted.     TSH 3.4 on 12/29/2024     Recommendations:    Continue hydrocortisone 50 mg IV Q8 for possibility of amiodarone toxicity   There is improvement in his respiratory status and oxygenation  oxygen titration currently on Airvo high flow 60 L alternating with AVAPS  Midodrine 10 mg 3 times daily  Antibiotics on IV Unasyn  Received 1 dose of IV vancomycin and cefepime    Lasix 20 mg IV every 12 started on 2/2/2025    Bronchodilator Pulmicort and DuoNeb  On amiodarone  Plavix    Protonix 40 mg  daily  Eliquis 2.5 mg twice daily     Patient is DNR DNI    Overall poor prognosis    Chest x-ray 2/7/2025      CT chest 2/1/2025             LOS: 15 days     Subjective         Objective     Vital signs for last 24 hours:  Vitals:    02/13/25 0922 02/13/25 0924 02/13/25 0925 02/13/25 0928   BP:       BP Location:       Patient Position:       Pulse: 54 54 54 53   Resp: 20 20 20    Temp:       TempSrc:       SpO2: 92% 94% 91% 94%   Weight:       Height:           Intake/Output last 3 shifts:  I/O last 3 completed shifts:  In: 720 [P.O.:720]  Out: 550 [Urine:550]  Intake/Output this shift:  No intake/output data recorded.      Radiology  Imaging Results (Last 24 Hours)       ** No results found for the last 24 hours. **            Labs:  Results from last 7 days   Lab Units 02/13/25  0231   WBC 10*3/mm3 5.25   HEMOGLOBIN g/dL 7.4*   HEMATOCRIT % 24.6*   PLATELETS 10*3/mm3 186     Results from last 7 days   Lab Units 02/13/25  0231 02/07/25  2151 02/07/25  0932   SODIUM mmol/L 149*   < > 140   POTASSIUM mmol/L 4.4   < > 3.3*   CHLORIDE mmol/L 108*   < > 97*   CO2 mmol/L 35.0*   < > 35.9*   BUN mg/dL 37*   < > 21   CREATININE mg/dL 0.75*   < > 0.60*   CALCIUM mg/dL 8.5*   < > 8.5*   BILIRUBIN mg/dL  --   --  0.6   ALK PHOS U/L  --   --  69   ALT (SGPT) U/L  --   --  22   AST (SGOT) U/L  --   --  28   GLUCOSE mg/dL 133*   < > 117*    < > = values in this interval not displayed.           Results from last 7 days   Lab Units 02/07/25  0932   ALBUMIN g/dL 2.4*                                     Meds:   SCHEDULE  apixaban, 2.5 mg, Oral, Q12H  arformoterol, 15 mcg, Nebulization, BID - RT  atorvastatin, 10 mg, Oral, Nightly  budesonide, 0.5 mg, Nebulization, BID - RT  citalopram, 10 mg, Oral, Daily  clopidogrel, 75 mg, Oral, Daily  [Held by provider] furosemide, 20 mg, Oral, Daily  hydrocortisone sodium succinate, 50 mg, Intravenous, Q8H  midodrine, 10 mg, Oral, Q8H  mirtazapine, 15 mg, Oral, Nightly  multivitamin with  minerals, 1 tablet, Oral, Daily  pantoprazole, 40 mg, Oral, Daily  sodium chloride, 500 mL, Intravenous, Once  sodium chloride, 10 mL, Intravenous, Q12H  tamsulosin, 0.4 mg, Oral, Daily      Infusions     PRNs    acetaminophen **OR** acetaminophen **OR** acetaminophen    senna-docusate sodium **AND** polyethylene glycol **AND** bisacodyl **AND** bisacodyl    Calcium Replacement - Follow Nurse / BPA Driven Protocol    clonazePAM    ipratropium-albuterol    Magnesium Standard Dose Replacement - Follow Nurse / BPA Driven Protocol    melatonin    nitroglycerin    ondansetron    Phosphorus Replacement - Follow Nurse / BPA Driven Protocol    Potassium Replacement - Follow Nurse / BPA Driven Protocol    [COMPLETED] Insert Peripheral IV **AND** sodium chloride    sodium chloride    sodium chloride    Physical Exam:  Physical Exam  Cardiovascular:      Heart sounds: Murmur heard.      No gallop.   Pulmonary:      Effort: No respiratory distress.      Breath sounds: No stridor. Rhonchi and rales present. No wheezing.   Chest:      Chest wall: No tenderness.         ROS  Review of Systems   Respiratory:  Positive for cough and shortness of breath. Negative for wheezing and stridor.    Cardiovascular:  Positive for palpitations and leg swelling. Negative for chest pain.             Total time spent with patient greater than: 45 Minutes

## 2025-02-14 LAB
ANION GAP SERPL CALCULATED.3IONS-SCNC: 6 MMOL/L (ref 5–15)
BUN SERPL-MCNC: 35 MG/DL (ref 8–23)
BUN/CREAT SERPL: 50 (ref 7–25)
CALCIUM SPEC-SCNC: 8.2 MG/DL (ref 8.6–10.5)
CHLORIDE SERPL-SCNC: 106 MMOL/L (ref 98–107)
CO2 SERPL-SCNC: 33 MMOL/L (ref 22–29)
CREAT SERPL-MCNC: 0.7 MG/DL (ref 0.76–1.27)
EGFRCR SERPLBLD CKD-EPI 2021: 93.1 ML/MIN/1.73
GLUCOSE BLDC GLUCOMTR-MCNC: 135 MG/DL (ref 70–105)
GLUCOSE SERPL-MCNC: 107 MG/DL (ref 65–99)
POTASSIUM SERPL-SCNC: 4.2 MMOL/L (ref 3.5–5.2)
SODIUM SERPL-SCNC: 145 MMOL/L (ref 136–145)

## 2025-02-14 PROCEDURE — 94799 UNLISTED PULMONARY SVC/PX: CPT

## 2025-02-14 PROCEDURE — 94660 CPAP INITIATION&MGMT: CPT

## 2025-02-14 PROCEDURE — 82948 REAGENT STRIP/BLOOD GLUCOSE: CPT

## 2025-02-14 PROCEDURE — 94761 N-INVAS EAR/PLS OXIMETRY MLT: CPT

## 2025-02-14 PROCEDURE — 25010000002 HYDROCORTISONE SOD SUC (PF) 100 MG RECONSTITUTED SOLUTION: Performed by: INTERNAL MEDICINE

## 2025-02-14 PROCEDURE — 94664 DEMO&/EVAL PT USE INHALER: CPT

## 2025-02-14 PROCEDURE — 80048 BASIC METABOLIC PNL TOTAL CA: CPT | Performed by: STUDENT IN AN ORGANIZED HEALTH CARE EDUCATION/TRAINING PROGRAM

## 2025-02-14 RX ORDER — HYDROXYZINE HYDROCHLORIDE 25 MG/1
25 TABLET, FILM COATED ORAL EVERY 8 HOURS
Status: DISCONTINUED | OUTPATIENT
Start: 2025-02-14 | End: 2025-02-15 | Stop reason: HOSPADM

## 2025-02-14 RX ORDER — HYDROXYZINE HYDROCHLORIDE 25 MG/1
25 TABLET, FILM COATED ORAL 3 TIMES DAILY PRN
Status: DISCONTINUED | OUTPATIENT
Start: 2025-02-14 | End: 2025-02-14

## 2025-02-14 RX ORDER — TAMSULOSIN HYDROCHLORIDE 0.4 MG/1
0.4 CAPSULE ORAL DAILY
Qty: 30 CAPSULE | Refills: 0 | Status: SHIPPED | OUTPATIENT
Start: 2025-02-14 | End: 2025-03-16

## 2025-02-14 RX ORDER — MIDODRINE HYDROCHLORIDE 10 MG/1
10 TABLET ORAL EVERY 8 HOURS
Qty: 30 TABLET | Refills: 0 | Status: SHIPPED | OUTPATIENT
Start: 2025-02-14

## 2025-02-14 RX ORDER — HYDROCORTISONE SODIUM SUCCINATE 100 MG/2ML
INJECTION INTRAMUSCULAR; INTRAVENOUS
Qty: 10 EACH | Refills: 0 | Status: SHIPPED | OUTPATIENT
Start: 2025-02-14 | End: 2025-02-26

## 2025-02-14 RX ORDER — CLONAZEPAM 0.5 MG/1
0.25 TABLET ORAL EVERY 12 HOURS PRN
Status: DISCONTINUED | OUTPATIENT
Start: 2025-02-14 | End: 2025-02-15 | Stop reason: HOSPADM

## 2025-02-14 RX ADMIN — ATORVASTATIN CALCIUM 10 MG: 10 TABLET ORAL at 21:13

## 2025-02-14 RX ADMIN — HYDROXYZINE HYDROCHLORIDE 25 MG: 25 TABLET, FILM COATED ORAL at 21:13

## 2025-02-14 RX ADMIN — CITALOPRAM 10 MG: 20 TABLET, FILM COATED ORAL at 08:31

## 2025-02-14 RX ADMIN — IPRATROPIUM BROMIDE AND ALBUTEROL SULFATE 3 ML: .5; 3 SOLUTION RESPIRATORY (INHALATION) at 16:22

## 2025-02-14 RX ADMIN — CLOPIDOGREL BISULFATE 75 MG: 75 TABLET ORAL at 08:31

## 2025-02-14 RX ADMIN — HYDROCORTISONE SODIUM SUCCINATE 50 MG: 100 INJECTION, POWDER, FOR SOLUTION INTRAMUSCULAR; INTRAVENOUS at 21:13

## 2025-02-14 RX ADMIN — IPRATROPIUM BROMIDE AND ALBUTEROL SULFATE 3 ML: .5; 3 SOLUTION RESPIRATORY (INHALATION) at 02:58

## 2025-02-14 RX ADMIN — Medication 10 ML: at 21:13

## 2025-02-14 RX ADMIN — MIRTAZAPINE 15 MG: 15 TABLET, FILM COATED ORAL at 21:13

## 2025-02-14 RX ADMIN — HYDROCORTISONE SODIUM SUCCINATE 50 MG: 100 INJECTION, POWDER, FOR SOLUTION INTRAMUSCULAR; INTRAVENOUS at 02:41

## 2025-02-14 RX ADMIN — APIXABAN 2.5 MG: 2.5 TABLET, FILM COATED ORAL at 21:13

## 2025-02-14 RX ADMIN — MIDODRINE HYDROCHLORIDE 10 MG: 5 TABLET ORAL at 08:31

## 2025-02-14 RX ADMIN — HYDROXYZINE HYDROCHLORIDE 25 MG: 25 TABLET, FILM COATED ORAL at 11:14

## 2025-02-14 RX ADMIN — BUDESONIDE 0.5 MG: 0.5 INHALANT RESPIRATORY (INHALATION) at 07:14

## 2025-02-14 RX ADMIN — PANTOPRAZOLE SODIUM 40 MG: 40 TABLET, DELAYED RELEASE ORAL at 08:31

## 2025-02-14 RX ADMIN — APIXABAN 2.5 MG: 2.5 TABLET, FILM COATED ORAL at 08:31

## 2025-02-14 RX ADMIN — TAMSULOSIN HYDROCHLORIDE 0.4 MG: 0.4 CAPSULE ORAL at 08:31

## 2025-02-14 RX ADMIN — MIDODRINE HYDROCHLORIDE 10 MG: 5 TABLET ORAL at 18:09

## 2025-02-14 RX ADMIN — MIDODRINE HYDROCHLORIDE 10 MG: 5 TABLET ORAL at 00:29

## 2025-02-14 RX ADMIN — CLONAZEPAM 0.5 MG: 0.5 TABLET ORAL at 09:22

## 2025-02-14 RX ADMIN — HYDROCORTISONE SODIUM SUCCINATE 50 MG: 100 INJECTION, POWDER, FOR SOLUTION INTRAMUSCULAR; INTRAVENOUS at 11:15

## 2025-02-14 RX ADMIN — Medication 10 ML: at 02:40

## 2025-02-14 RX ADMIN — ARFORMOTEROL TARTRATE 15 MCG: 15 SOLUTION RESPIRATORY (INHALATION) at 07:10

## 2025-02-14 RX ADMIN — Medication 1 TABLET: at 08:31

## 2025-02-14 NOTE — PROGRESS NOTES
[facility] MEDICINE SERVICE  DAILY PROGRESS NOTE    NAME: Kali Garcia  : 1945  MRN: [id number]      LOS: 16 days     PROVIDER OF SERVICE: Sho Capps MD    Chief Complaint: Pneumonia    Subjective:     Interval History:  History taken from: patient    Patient seen and examined at bedside this morning.  Currently on 55 L Airvo, high flow oxygen.  Continues to feel short of breath        Review of Systems: Negative except as described above  Review of Systems    Objective:     Vital Signs  Temp:  [97.1 °F (36.2 °C)-97.6 °F (36.4 °C)] 97.6 °F (36.4 °C)  Heart Rate:  [50-56] 53  Resp:  [11-21] 20  BP: ()/(51-63) 134/61  Flow (L/min) (Oxygen Therapy):  [55-60] 55   Body mass index is 16.05 kg/m².    Physical Exam  Physical Exam  Constitutional:       Comments: NAD    Cardiovascular:      Comments:  RRR, S1 & S2   Pulmonary:      Comments:  Lungs CTA   Abdominal:      Comments:  ABD soft, NT            Diagnostic Data    Results from last 7 days   Lab Units 25  0251 25  0231   WBC 10*3/mm3  --  5.25   HEMOGLOBIN g/dL  --  7.4*   HEMATOCRIT %  --  24.6*   PLATELETS 10*3/mm3  --  186   GLUCOSE mg/dL 107* 133*   CREATININE mg/dL 0.70* 0.75*   BUN mg/dL 35* 37*   SODIUM mmol/L 145 149*   POTASSIUM mmol/L 4.2 4.4   ANION GAP mmol/L 6.0 6.0       No radiology results for the last day      I reviewed the patient's new clinical results.    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   Apparently had some improvement over the weekend, but unfortunately has not improved all week-continue low dose prn klonapin for anxiety along with hydroxyzine  -continue ongoing GOC discussions      Acute urinary retention  -Failed voiding trial 2/3/2025  -Repeat trial when more stable     Chronic atrial fibrillation  Chronic CAD with HLD  Severe SMA stenosis, asymptomatic  -Continue amiodarone, Eliquis, atorvastatin, Plavix  -Outpatient follow-up regarding asymptomatic SMA stenosis.  No inpatient procedure at this time.  -Cardiology following     Essential Hypertension-bp low  -Monitor BP-midodrine added     COPD-Chronic  -no active wheezing   -Continue home inhalers, Theodur     Iron deficiency anemia with history of an ulcer at GE junction in 2022  -Gastroenterology previously consulted and recommended outpatient EGD after this hospitalization  -no signs of active bleeding  -monitor H&H closely transfuse for hgb <7     VTE Prophylaxis:  Pharmacologic VTE prophylaxis orders are present.         VTE Prophylaxis:  Pharmacologic VTE prophylaxis orders are present.             Disposition Planning:            Time: 35 minutes     Code Status and Medical Interventions: No CPR (Do Not Attempt to Resuscitate); Limited Support; No intubation (DNI)   Ordered at: 02/05/25 1405     Medical Intervention Limits:    No intubation (DNI)     Level Of Support Discussed With:    Next of Kin (If No Surrogate)    Health Care Surrogate     Code Status (Patient has no pulse and is not breathing):    No CPR (Do Not Attempt to Resuscitate)     Medical Interventions (Patient has pulse or is breathing):    Limited Support       Signature: Electronically signed by Sho Capps MD, 02/14/25, 11:19 [facility].  [facility] Hospitalist Team

## 2025-02-14 NOTE — CASE MANAGEMENT/SOCIAL WORK
"Physicians Statement of Medical Necessity for  Ambulance Transportation    GENERAL INFORMATION     Name: Kali Garcia  YOB: 1945  Medicare #:     [id number]     Transport Date: 2/14/25 (Valid for round trips this date, or for scheduled repetitive trips for 60 days from the date signed below.)  Origin: [facility]   Destination: [facility] ROOM [address]  [address] [facility]  Is the Patient's stay covered under Medicare Part A (PPS/DRG?)Yes  Closest appropriate facility? Yes  If this a hosp-hosp transfer? Yes, describe services needed at 2nd facility not available at 1st facility LTAC  Is this a hospice patient? No    MEDICAL NECESSITY QUESTIONAIRE    Ambulance Transportation is medically necessary only if other means of transportation are contraindicated or would be potentially harmful to the patient.  To meet this requirement, the patient must be either \"bed confined\" or suffer from a condition such that transport by means other than an ambulance is contraindicated by the patient's condition.  The following questions must be answered by the healthcare professional signing below for this form to be valid:     1) Describe the MEDICAL CONDITION (physical and/or mental) of this patient AT THE TIME OF AMBULANCE TRANSPORT that requires the patient to be transported in an ambulance, and why transport by other means is contraindicated by the patient's condition:     increased O2 needs (currently on 60L Airvo), generalized weakness, bedbound, max assist for bed mobility, unable to tolerate seated position for transport and unable to self support    Past Medical History:   Diagnosis Date    COPD (chronic obstructive pulmonary disease)     Coronary artery disease     Emphysema lung     Hyperlipidemia     Hypertension     Osteoarthritis       Past Surgical History:   Procedure Laterality Date    CARDIAC CATHETERIZATION      CARDIAC ELECTROPHYSIOLOGY PROCEDURE N/A 1/2/2025    Procedure: Bijan MDT;  " "Surgeon: Eliot Mcgarry MD;  Location: [facility] CATH INVASIVE LOCATION;  Service: Cardiovascular;  Laterality: N/A;    COLONOSCOPY N/A 8/4/2022    Procedure: COLONOSCOPY with 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 and endoscopic clipping x 1;  Surgeon: Luz Jacques MD;  Location: [facility] ENDOSCOPY;  Service: Gastroenterology;  Laterality: N/A;  post op: cecal AVM    CORONARY STENT PLACEMENT      ENDOSCOPY N/A 4/14/2022    Procedure: ESOPHAGOGASTRODUODENOSCOPY WITH BIOPSY X1 AREA;  Surgeon: Luz Jacques MD;  Location: [facility] ENDOSCOPY;  Service: Gastroenterology;  Laterality: N/A;  esophagitis, gastritis, HH    ENDOSCOPY N/A 8/4/2022    Procedure: ESOPHAGOGASTRODUODENOSCOPY;  Surgeon: Luz Jacques MD;  Location: [facility] ENDOSCOPY;  Service: Gastroenterology;  Laterality: N/A;  post op: hiatal hernia, eosphagitis    EYE SURGERY      FEMORAL ARTERY STENT      KNEE SURGERY Left     KNEE SURGERY Left     LUNG SURGERY        2) Is this patient \"bed confined\" as defined below?Yes   To be \"bed confined\" the patient must satisfy all three of the following criteria:  (1) unable to get up from bed without assistance; AND (2) unable to ambulate;  AND (3) unable to sit in a chair or wheelchair.  3) Can this patient safely be transported by car or wheelchair van (I.e., may safely sit during transport, without an attendant or monitoring?)No   4. In addition to completing questions 1-3 above, please check any of the following conditions that apply*:          *Note: supporting documentation for any boxes checked must be maintained in the patient's medical records Moderate/severe pain on movement, Medical attendant required, Requires oxygen - unable to self administer, Unable to tolerate seated position for time needed to transport, and Unable to sit in a chair or wheelchair due to decubitus ulcers or other wounds      SIGNATURE OF PHYSICIAN OR OTHER AUTHORIZED HEALTHCARE PROFESSIONAL    I certify that the " above information is true and correct based on my evaluation of this patient, and represent that the patient requires transport by ambulance and that other forms of transport are contraindicated.  I understand that this information will be used by the Centers for Medicare and Medicaid Services (CMS) to support the determiniation of medical necessity for ambulance services, and I represent that I have personal knowledge of the patient's condition at the time of transport.    x   If this box is checked, I also certify that the patient is physically or mentally incapable of signing the ambulance service's claim form and that the institution with which I am affiliated has furnished care, services or assistance to the patient.  My signature below is made on behalf of the patient pursuant to 42 .36(b)(4). In accordance with 42 .37, the specific reason(s) that the patient is physically or mentally incapable of signing the claim for is as follows:     Signature of Physician or Healthcare Professional     Angeles Shane RN Date/Time:   2/14/25 427p     (For Scheduled repetitive transport, this form is not valid for transports performed more than 60 days after this date).                                                                                                                                            ------Dr. Sho Capps------------------------------------------------  Printed Name and Credentials of Physician or Authorized Healthcare Professional     *Form must be signed by patient's attending physician for scheduled, repetitive transports,.  For non-repetitive ambulance transports, if unable to obtain the signature of the attending physician, any of the following may sign (please select below):     Physician  Clinical Nurse Specialist x Registered Nurse     Physician Assistant  Discharge Planner  Licensed Practical Nurse     Nurse Practitioner x

## 2025-02-14 NOTE — PLAN OF CARE
Goal Outcome Evaluation:         Progress: no change    Patient came in here with a diagnosis of Pneumonia. On High Flow at 55 L/min   Plan of care is ongoing.

## 2025-02-14 NOTE — NURSING NOTE
Pt retaining urine in bladder. Hospitalist ordered a springer to be reinserted. MD Crum contacted and will set up a follow up appointment with the patient for a voiding trial outpatient. Pt is able to d/c to facility.

## 2025-02-14 NOTE — CASE MANAGEMENT/SOCIAL WORK
[address]  eMERGENCY dEPARTMENT eNCOUnter   Independent Attestation     Pt Name: Red Young  MRN: [id number]  Armskaelgfmario 1963  Date of evaluation: 11/24/17     Red Young is a 53 y.o. female with CC: Dental Pain      Based on the medical record the care appears appropriate. I was personally available for consultation in the Emergency Department.     Aida Reyes MD  Attending Emergency Physician                    Aida Reyes MD  11/24/17 9363 Continued Stay Note  [facility]     Patient Name: Kali Garcia  MRN: [id number]  Today's Date: 2/14/2025    Admit Date: 1/29/2025    Plan: [facility] (accepted). No PASRR needed. Precert approved   Discharge Plan       Row Name 02/14/25 1630       Plan    Plan Comments CM confirmed with Susannah that bed is ready for patient at LT. Patient is going to room 433 at [address] [facility]. CM placed EMS request and provided primary nurse with EMS medical necessity paperwork. Primary nurse has updated patient's family.             Angeles Shane RN      [facility]  Office: [phone]  Cell: [phone]  Fax # [phone]     BP Temp Temp Source Pulse Resp SpO2 Height Weight   (!) 154/75 98.6 °F (37 °C) Oral 70 17 98 % 5' 1\" (1.549 m) 154 lb (69.9 kg)       Physical Exam   Constitutional: She is oriented to person, place, and time. She appears well-developed and well-nourished. No distress. HENT:   Head: Normocephalic and atraumatic. Right Ear: External ear normal.   Left Ear: External ear normal.   Nose: Nose normal.   Mouth/Throat: Uvula is midline, oropharynx is clear and moist and mucous membranes are normal. Abnormal dentition. Dental abscesses and dental caries (poor dentition; multiple dental caries) present. Dental pain to right upper tooth. + dental caries. +  Fluctuant dental abscess to gum above left upper front tooth. No trismus. No pooling of oral secretions. No swelling involving airway. No Ruben's angina. No facial swelling. Eyes: Right eye exhibits no discharge. Left eye exhibits no discharge. No scleral icterus. Neck: Normal range of motion. No tracheal deviation present. Pulmonary/Chest: Effort normal. No stridor. No respiratory distress. Musculoskeletal: Normal range of motion. She exhibits no edema. Neurological: She is alert and oriented to person, place, and time. Coordination normal.   Skin: Skin is warm and dry. She is not diaphoretic. Psychiatric: She has a normal mood and affect. Her behavior is normal.       DIAGNOSTIC RESULTS     RADIOLOGY:   none      LABS:  Labs Reviewed - No data to display    All other labs were within normal range or not returned as of this dictation. EMERGENCY DEPARTMENT COURSE and DIFFERENTIAL DIAGNOSIS/MDM:   Patient to ED for evaluation of dental pain.  + dental abscess, aspirated with 18 G needle. Pain meds and antibiotic prescriptions. Follow-up with dentist/dental clinic as soon as possible. Instructed to return for worsening or any new symptoms including throat swelling, difficulty swallowing or breathing. Pt agrees.          Vitals:    Vitals:

## 2025-02-14 NOTE — PROGRESS NOTES
Daily Progress Note        Pneumonia    Superior mesenteric artery stenosis      Assessment:    Hypoxic respiratory insufficiency, multifactorial, severe lung and cardiac disease and vascular disease    Left lower lobe pneumonia    severe COPD  Respiratory panel negative on 1/30/2025  On home oxygen    Urine cultures 1/18/2025 positive for Pseudomonas    CAD, PVD  Extensive coronary artery calcifications  Severe upper abdominal atherosclerotic calcifications with SMA stenosis    HTN  HLD  A-fib  Anemia  Former smoker quit 2022 after 60 pack years  Echo 2/1/2024 EF 61-65% mild pulmonary hypertension 35-45 mmHg     CT scan of the chest in June 2024 showing a noncalcified nodule in the right upper lobe around 2 x 1.1 x 1 cm. Patient was also noted to have patchy airspace disease and clustered micronodules in the lingula.     He then underwent PET scan 10/10/2024 showing a 1.7 x 1.1 cm partially calcified irregular shaped nodule in the right upper lobe to be metabolically active with SUV of 4.84 and additional 2 metabolically active irregular nodular densities in the right upper lobe. Inferior apical segment with intervening areas of metabolically active interstitial thickening.     PFTs: FEV1/FVC postbronchodilator is 0.39 with FEV1 of 0.71 L or 28% predicted and FVC of 1.82 L or 47% predicted.   Total lung capacity of 7.77 L or 131% predicted and residual volume of 5.82 L or 226% predicted and DLCO of 61% predicted noted.     TSH 3.4 on 12/29/2024    Echo 2/3/24    Left ventricular systolic function is normal. Left ventricular ejection fraction appears to be 61 - 65%.    Left ventricular diastolic function was normal.    The left atrial cavity is mildly dilated.    Left atrial volume is mildly increased.    There is moderate calcification of the aortic valve mainly affecting the left coronary and right coronary cusp(s).    Estimated right ventricular systolic pressure from tricuspid regurgitation is mildly elevated  (35-45 mmHg).    Mild pulmonary hypertension is present.     Recommendations:    Continue hydrocortisone 50 mg IV Q8    oxygen titration currently on Airvo high flow 55 L alternating with AVAPS  Midodrine 10 mg 3 times daily  Antibiotics on IV Unasyn  Received 1 dose of IV vancomycin and cefepime    Lasix 20 mg IV every 12 started on 2/2/2025    Bronchodilator Pulmicort and DuoNeb  On amiodarone  Plavix    Protonix 40 mg daily  Eliquis 2.5 mg twice daily     Patient is DNR DNI    Overall poor prognosis    Chest x-ray 2/7/2025      CT chest 2/1/2025             LOS: 16 days     Subjective         Objective     Vital signs for last 24 hours:  Vitals:    02/14/25 0710 02/14/25 0713 02/14/25 0714 02/14/25 0717   BP:       BP Location:       Patient Position:       Pulse: 55 54 54 53   Resp: 12 11 20 20   Temp:       TempSrc:       SpO2: 92% 93% 94% 93%   Weight:       Height:           Intake/Output last 3 shifts:  I/O last 3 completed shifts:  In: 120 [P.O.:120]  Out: 1050 [Urine:1050]  Intake/Output this shift:  No intake/output data recorded.      Radiology  Imaging Results (Last 24 Hours)       ** No results found for the last 24 hours. **            Labs:  Results from last 7 days   Lab Units 02/13/25  0231   WBC 10*3/mm3 5.25   HEMOGLOBIN g/dL 7.4*   HEMATOCRIT % 24.6*   PLATELETS 10*3/mm3 186     Results from last 7 days   Lab Units 02/14/25  0251   SODIUM mmol/L 145   POTASSIUM mmol/L 4.2   CHLORIDE mmol/L 106   CO2 mmol/L 33.0*   BUN mg/dL 35*   CREATININE mg/dL 0.70*   CALCIUM mg/dL 8.2*   GLUCOSE mg/dL 107*                                                 Meds:   SCHEDULE  apixaban, 2.5 mg, Oral, Q12H  arformoterol, 15 mcg, Nebulization, BID - RT  atorvastatin, 10 mg, Oral, Nightly  budesonide, 0.5 mg, Nebulization, BID - RT  citalopram, 10 mg, Oral, Daily  clopidogrel, 75 mg, Oral, Daily  [Held by provider] furosemide, 20 mg, Oral, Daily  hydrocortisone sodium succinate, 50 mg, Intravenous, Q8H  midodrine, 10  mg, Oral, Q8H  mirtazapine, 15 mg, Oral, Nightly  multivitamin with minerals, 1 tablet, Oral, Daily  pantoprazole, 40 mg, Oral, Daily  sodium chloride, 500 mL, Intravenous, Once  sodium chloride, 10 mL, Intravenous, Q12H  tamsulosin, 0.4 mg, Oral, Daily      Infusions     PRNs    acetaminophen **OR** acetaminophen **OR** acetaminophen    senna-docusate sodium **AND** polyethylene glycol **AND** bisacodyl **AND** bisacodyl    Calcium Replacement - Follow Nurse / BPA Driven Protocol    clonazePAM    ipratropium-albuterol    Magnesium Standard Dose Replacement - Follow Nurse / BPA Driven Protocol    melatonin    nitroglycerin    ondansetron    Phosphorus Replacement - Follow Nurse / BPA Driven Protocol    Potassium Replacement - Follow Nurse / BPA Driven Protocol    [COMPLETED] Insert Peripheral IV **AND** sodium chloride    sodium chloride    sodium chloride    Physical Exam:  Physical Exam  Cardiovascular:      Heart sounds: Murmur heard.      No gallop.   Pulmonary:      Effort: No respiratory distress.      Breath sounds: No stridor. Rhonchi and rales present. No wheezing.   Chest:      Chest wall: No tenderness.         ROS  Review of Systems   Respiratory:  Positive for cough and shortness of breath. Negative for wheezing and stridor.    Cardiovascular:  Positive for palpitations and leg swelling. Negative for chest pain.             Total time spent with patient greater than: 45 Minutes

## 2025-02-14 NOTE — DISCHARGE SUMMARY
"[facility]  Discharge Summary    Date of Service: 2025  Patient Name: Kali Garcia  : 1945  MRN: [id number]    Date of Admission: 2025  Discharge Diagnosis: Pneumonia  Date of Discharge: 2025  Primary Care Physician: Tami Ward APRN      Presenting Problem:   Acute respiratory distress [R06.03]  Pneumonia [J18.9]  Pneumonia of both lungs due to infectious organism, unspecified part of lung [J18.9]    Active and Resolved Hospital Problems:  Active Hospital Problems    Diagnosis POA    **Pneumonia [J18.9] Yes    Superior mesenteric artery stenosis [K55.1] Yes    Pneumonia, unspecified organism [J18.9] Yes      Resolved Hospital Problems   No resolved problems to display.         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   Apparently had some improvement over the weekend, but unfortunately has not improved all week-continue low dose prn klonapin for anxiety along with hydroxyzine  -continue ongoing [facility] discussions      Acute urinary retention  -Failed voiding trial 2/3/2025 and   -Urology recommended to continue patient on Yap and can be discharged, will have him follow-up outpatient for voiding trial.  -Will continue him on Flomax     Chronic atrial fibrillation  Chronic CAD with HLD  Severe SMA stenosis, asymptomatic  -Holding amiodarone due to possible amiodarone induced lung toxicity, continue Eliquis, atorvastatin, Plavix  -Outpatient follow-up regarding asymptomatic SMA stenosis.  No inpatient procedure at " this time.  -Patient was seen by cardiology during hospital course     Essential Hypertension-bp low  -Will continue patient on midodrine on discharge     COPD-Chronic  -no active wheezing   -Continue home inhalers, Theodur     Iron deficiency anemia with history of an ulcer at GE junction in 2022  -Gastroenterology previously consulted and recommended outpatient EGD after this hospitalization  -no signs of active bleeding             DISCHARGE Follow Up Recommendations for labs and diagnostics: ***        Day of Discharge     Vital Signs:  Temp:  [97.4 °F (36.3 °C)-97.7 °F (36.5 °C)] 97.7 °F (36.5 °C)  Heart Rate:  [50-56] 52  Resp:  [11-23] 17  BP: ()/(49-63) 122/49  Flow (L/min) (Oxygen Therapy):  [55-60] 60    Physical Exam:  Physical Exam ***      Pertinent  and/or Most Recent Results     LAB RESULTS:      Lab 02/13/25  0231   WBC 5.25   HEMOGLOBIN 7.4*   HEMATOCRIT 24.6*   PLATELETS 186   MCV 96.1         Lab 02/14/25  0251 02/13/25  0231 02/12/25  0526 02/11/25  0424 02/10/25  1535 02/10/25  0225   SODIUM 145 149* 145 146*  --  145   POTASSIUM 4.2 4.4 4.1 5.0 4.9 3.5   CHLORIDE 106 108* 105 106  --  100   CO2 33.0* 35.0* 33.6* 33.4*  --  37.9*   ANION GAP 6.0 6.0 6.4 6.6  --  7.1   BUN 35* 37* 35* 35*  --  30*   CREATININE 0.70* 0.75* 0.59* 0.69*  --  0.62*   EGFR 93.1 91.2 98.1 93.6  --  96.6   GLUCOSE 107* 133* 107* 117*  --  146*   CALCIUM 8.2* 8.5* 8.7 8.8  --  8.5*                         Brief Urine Lab Results  (Last result in the past 365 days)        Color   Clarity   Blood   Leuk Est   Nitrite   Protein   CREAT   Urine HCG        01/18/25 1654 Orange  Comment: Any Substance that causes an abnormal urine color can alter the accuracy of the chemical reactions.   Turbid   Large (3+)   Moderate (2+)   Positive   >=300 mg/dL (3+)                 Microbiology Results (last 10 days)       Procedure Component Value - Date/Time    CANDIDA AURIS PCR - Swab, Axilla Right, Axilla Left and Groin  [[id number]]  (Normal) Collected: 02/11/25 1620    Lab Status: Final result Specimen: Swab from Axilla Right, Axilla Left and Groin Updated: 02/12/25 0908     LARISSA AURIS PCR Not Detected            XR Chest 1 View    Result Date: 2/7/2025  Impression: Impression: 1. No significant change in bilateral lower lobe airspace disease, corresponding to suspected pneumonia based on prior CT chest imaging. 2. Advanced emphysema. 3. Right apical lung nodule is unchanged. 4. Suspected small to moderate layering bilateral pleural effusions, stable. Electronically Signed: Martha Dodd MD  2/7/2025 9:36 AM EST  Workstation ID: [id number]    XR Chest 1 View    Result Date: 2/2/2025  Impression: Impression: 1. Persistent diffuse basilar airspace disease consistent with multifocal pneumonia with worsening at the right lung base. 2. Moderate bilateral pleural effusions. 3. Advanced emphysema. Electronically Signed: Ryder Scherer MD  2/2/2025 4:36 PM EST  Workstation ID: [id number]    CT Chest Without Contrast Diagnostic    Result Date: 2/1/2025  Impression: Impression: 1. Dense bilateral lower lobe consolidation compatible with pneumonia. 2. Moderate bilateral pleural effusions. 3. Severe emphysema with areas of chronic scarring in the right upper lobe and left upper lobe. 4. Extensive coronary artery calcifications. 5. 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 Electronically Signed: Ryder Scherer MD  2/1/2025 4:15 PM EST  Workstation ID: [id number]    XR Chest 1 View    Result Date: 1/29/2025  Impression: Impression: Residual infiltrates are noted throughout the mid to lower lungs bilaterally. There is a small left pleural effusion. Diffuse interstitial changes are noted. These findings are stable to mildly progressed. The findings may indicate changes of CHF and pulmonary edema. Electronically Signed: Jonny Espinal MD  1/29/2025 7:10 PM EST   Workstation ID: [id number]    XR Chest 1 View    Result Date: 1/18/2025  Impression: Impression: 1.Bibasilar consolidations, which could reflect aspiration or pneumonia. 2.Small bilateral pleural effusions. 3.Emphysema. Electronically Signed: Jona Bland MD  1/18/2025 6:18 PM EST  Workstation ID: [id number]    CT Head Without Contrast    Result Date: 1/18/2025  Impression: Impression: 1. No acute intracranial abnormality. 2. Acute left maxillary and sphenoid sinusitis. 3. Complete opacification of the left mastoid air cells which appears acute and new from 10/10/2024. Electronically Signed: Mike Celaya MD  1/18/2025 5:46 PM EST  Workstation ID: [id number]     Results for orders placed during the hospital encounter of 02/01/24    Bilateral Carotid Duplex    Interpretation Summary    Minimal atherosclerotic plaque proximal right internal carotid artery with less than 50% ICA stenosis.    Mild atherosclerotic plaque proximal left internal carotid artery with less than 50% ICA stenosis.      Results for orders placed during the hospital encounter of 02/01/24    Bilateral Carotid Duplex    Interpretation Summary    Minimal atherosclerotic plaque proximal right internal carotid artery with less than 50% ICA stenosis.    Mild atherosclerotic plaque proximal left internal carotid artery with less than 50% ICA stenosis.      Results for orders placed during the hospital encounter of 02/01/24    Adult Transthoracic Echo Limited W/ Cont if Necessary Per Protocol    Interpretation Summary    Left ventricular systolic function is normal. Left ventricular ejection fraction appears to be 61 - 65%.    Left ventricular diastolic function was normal.    The left atrial cavity is mildly dilated.    Left atrial volume is mildly increased.    There is moderate calcification of the aortic valve mainly affecting the left coronary and right coronary cusp(s).    Estimated right ventricular systolic pressure from tricuspid regurgitation is  mildly elevated (35-45 mmHg).    Mild pulmonary hypertension is present.      Labs Pending at Discharge:  Pending Results       Procedure [Order ID] Specimen - Date/Time    Respiratory Culture - Sputum, Cough [[id number]]     Specimen: Sputum from Cough             Procedures Performed           Consults:   Consults       Date and Time Order Name Status Description    2/2/2025 12:59 PM Inpatient Vascular Surgery Consult Completed     2/2/2025  9:49 AM Inpatient Cardiology Consult Completed     1/31/2025  6:46 PM Inpatient Urology Consult Completed     1/31/2025  8:21 AM Inpatient Gastroenterology Consult Completed     1/30/2025 10:27 AM Inpatient Pulmonology Consult Completed     12/30/2024 11:22 AM Inpatient Cardiology Consult Completed     12/23/2024  2:10 AM Inpatient Pulmonology Consult Completed               Discharge Details        Discharge Medications        New Medications        Instructions Start Date   Hydrocortisone Sod Suc (PF) 100 MG injection  Commonly known as: Solu-CORTEF   Infuse 1 mL into a venous catheter Every 8 (Eight) Hours for 3 days, THEN 1 mL Every 12 (Twelve) Hours for 3 days, THEN 0.4 mL Every 12 (Twelve) Hours for 3 days, THEN 0.4 mL Daily for 3 days.   Start Date: February 14, 2025     midodrine 10 MG tablet  Commonly known as: PROAMATINE   10 mg, Oral, Every 8 Hours             Continue These Medications        Instructions Start Date   apixaban 2.5 MG tablet tablet  Commonly known as: ELIQUIS   2.5 mg, Oral, Every 12 Hours Scheduled      atorvastatin 10 MG tablet  Commonly known as: LIPITOR   10 mg, Nightly      budesonide 0.5 MG/2ML nebulizer solution  Commonly known as: PULMICORT   0.5 mg, Nebulization, 2 Times Daily - RT      cholecalciferol 1.25 MG (68755 UT) capsule  Commonly known as: VITAMIN D3   50,000 Units, Every 7 Days      citalopram 10 MG tablet  Commonly known as: CeleXA   10 mg, Daily      clonazePAM 0.25 MG disintegrating tablet  Commonly known as: KlonoPIN   0.25 mg,  2 Times Daily      clopidogrel 75 MG tablet  Commonly known as: PLAVIX   1 tablet, Daily      ipratropium-albuterol 0.5-2.5 mg/3 ml nebulizer  Commonly known as: DUO-NEB   3 mL, Every 4 Hours PRN      Declan powder   1 packet, 2 Times Daily      mirtazapine 15 MG tablet  Commonly known as: REMERON   15 mg, Nightly      multivitamin with minerals tablet tablet   1 tablet, Daily      Narcan 4 MG/0.1ML nasal spray  Generic drug: naloxone   1 spray, As Needed      pantoprazole 40 MG EC tablet  Commonly known as: PROTONIX   40 mg, Daily      polyethylene glycol 17 g packet  Commonly known as: MIRALAX   17 g, Daily      tamsulosin 0.4 MG capsule 24 hr capsule  Commonly known as: FLOMAX   0.4 mg, Oral, Daily      theophylline 300 MG 12 hr tablet  Commonly known as: THEODUR   300 mg, Oral, Daily      traZODone 100 MG tablet  Commonly known as: DESYREL   100 mg, Daily PRN             Stop These Medications      amiodarone 200 MG tablet  Commonly known as: PACERONE     arformoterol 15 MCG/2ML nebulizer solution  Commonly known as: BROVANA              Allergies   Allergen Reactions    Codeine GI Intolerance         Discharge Disposition:  [facility]  Skilled Nursing Facility (DC - External)    Diet:  Hospital:  Diet Order   Procedures    Diet: Regular/House; Fluid Consistency: Thin (IDDSI 0)         Discharge Activity:         CODE STATUS:  Code Status and Medical Interventions: No CPR (Do Not Attempt to Resuscitate); Limited Support; No intubation (DNI)   Ordered at: 02/05/25 1405     Medical Intervention Limits:    No intubation (DNI)     Level Of Support Discussed With:    Next of Kin (If No Surrogate)    Health Care Surrogate     Code Status (Patient has no pulse and is not breathing):    No CPR (Do Not Attempt to Resuscitate)     Medical Interventions (Patient has pulse or is breathing):    Limited Support         Future Appointments   Date Time Provider Department Center   2/17/2025  2:30 PM [facility]ICE [facility]  [facility]   5/6/2025  1:00 PM Luan Ruiz II, MD [facility]           Time spent on Discharge including face to face service:  35 minutes    Signature: Electronically signed by Sho Capps MD, 02/14/25, 16:40 EST.  [facility] Hospitalist Team

## 2025-02-15 VITALS
SYSTOLIC BLOOD PRESSURE: 121 MMHG | TEMPERATURE: 97.4 F | RESPIRATION RATE: 18 BRPM | OXYGEN SATURATION: 93 % | HEART RATE: 51 BPM | BODY MASS INDEX: 16.05 KG/M2 | WEIGHT: 114.64 LBS | DIASTOLIC BLOOD PRESSURE: 55 MMHG | HEIGHT: 71 IN

## 2025-02-15 LAB
QT INTERVAL: 667 MS
QTC INTERVAL: 623 MS

## 2025-02-15 NOTE — NURSING NOTE
I spoke with House Supervisor, Haleigh. She spoke with patient's son who expressed that if patient respiratory status declines they do want him intubated and able to make it to [place]. Haleigh is in the process of contacting the provider to change Code status order. RT will set patient up on bipap,(currently on airvo), while  transport paramedica aquires their bipap equipment. Patient currently resting quietly, SaO2 94% on on airvo, 60L/90%.

## 2025-02-16 NOTE — CASE MANAGEMENT/SOCIAL WORK
Case Management Discharge Note      Final Note: [facility]         Selected Continued Care - Discharged on 2/15/2025 Admission date: 1/29/2025 - Discharge disposition: Skilled Nursing Facility (DC - External)      Destination Coordination complete.      Service Provider Services Address Phone Fax Patient Preferred    [facility] [address] [facility] [address] [phone] --              Home Medical Care Coordination complete.      Service Provider Services Address Phone Fax Patient Preferred    [facility] [address] [place] [address] [phone] --               Transportation Services  Ambulance: [facility]    Final Discharge Disposition Code: 63 - [facility]

## (undated) DEVICE — BITEBLOCK ENDO W/STRAP 60F A/ LF DISP

## (undated) DEVICE — ST DIL 8TO20F

## (undated) DEVICE — PINNACLE INTRODUCER SHEATH: Brand: PINNACLE

## (undated) DEVICE — GW XCHG AMPLTZ XSTIF PTFE CRV .035IN 3X180CM

## (undated) DEVICE — PK ENDO GI 50

## (undated) DEVICE — SINGLE-USE BIOPSY FORCEPS: Brand: RADIAL JAW 4

## (undated) DEVICE — ERBE NESSY®PLATE 170 SPLIT; 168CM²; CABLE 3M: Brand: ERBE

## (undated) DEVICE — SHEATH INTRO MICRA HC 23F 55.7CM

## (undated) DEVICE — FIAPC® PROBE W/ FILTER 2200 A OD 2.3MM/6.9FR; L 2.2M/7.2FT: Brand: ERBE

## (undated) DEVICE — PK TRY HEART CATH 50

## (undated) DEVICE — PERCLOSE™ PROSTYLE™ SUTURE-MEDIATED CLOSURE AND REPAIR SYSTEM: Brand: PERCLOSE™ PROSTYLE™